# Patient Record
Sex: MALE | Race: WHITE | NOT HISPANIC OR LATINO | Employment: OTHER | URBAN - METROPOLITAN AREA
[De-identification: names, ages, dates, MRNs, and addresses within clinical notes are randomized per-mention and may not be internally consistent; named-entity substitution may affect disease eponyms.]

---

## 2017-01-11 ENCOUNTER — TELEPHONE (OUTPATIENT)
Dept: AUDIOLOGY | Facility: CLINIC | Age: 67
End: 2017-01-11

## 2017-01-25 ENCOUNTER — DOCUMENTATION ONLY (OUTPATIENT)
Dept: FAMILY MEDICINE | Facility: CLINIC | Age: 67
End: 2017-01-25

## 2017-01-25 NOTE — PROGRESS NOTES
Pre-Visit Chart Review  For Appointment Scheduled on 1/27/17    Health Maintenance Due   Topic Date Due    TETANUS VACCINE  12/16/1970    Influenza Vaccine  08/01/2016

## 2017-01-27 ENCOUNTER — HOSPITAL ENCOUNTER (OUTPATIENT)
Dept: RADIOLOGY | Facility: CLINIC | Age: 67
Discharge: HOME | End: 2017-01-27
Attending: NURSE PRACTITIONER
Payer: MEDICARE

## 2017-01-27 ENCOUNTER — OFFICE VISIT (OUTPATIENT)
Dept: FAMILY MEDICINE | Facility: CLINIC | Age: 67
End: 2017-01-27
Payer: MEDICARE

## 2017-01-27 ENCOUNTER — DOCUMENTATION ONLY (OUTPATIENT)
Dept: FAMILY MEDICINE | Facility: CLINIC | Age: 67
End: 2017-01-27

## 2017-01-27 VITALS
WEIGHT: 166.88 LBS | SYSTOLIC BLOOD PRESSURE: 150 MMHG | HEART RATE: 100 BPM | TEMPERATURE: 98 F | DIASTOLIC BLOOD PRESSURE: 80 MMHG | HEIGHT: 65 IN | BODY MASS INDEX: 27.81 KG/M2

## 2017-01-27 DIAGNOSIS — I10 ESSENTIAL HYPERTENSION: ICD-10-CM

## 2017-01-27 DIAGNOSIS — R19.7 POSTCHOLECYSTECTOMY DIARRHEA: ICD-10-CM

## 2017-01-27 DIAGNOSIS — Z96.21 COCHLEAR IMPLANT IN PLACE: ICD-10-CM

## 2017-01-27 DIAGNOSIS — M25.562 ACUTE PAIN OF LEFT KNEE: ICD-10-CM

## 2017-01-27 DIAGNOSIS — H91.90 DEAFNESS, UNSPECIFIED LATERALITY: ICD-10-CM

## 2017-01-27 DIAGNOSIS — K91.89 POSTCHOLECYSTECTOMY DIARRHEA: ICD-10-CM

## 2017-01-27 DIAGNOSIS — Z00.00 ANNUAL PHYSICAL EXAM: Primary | ICD-10-CM

## 2017-01-27 DIAGNOSIS — E78.5 HYPERLIPIDEMIA, UNSPECIFIED HYPERLIPIDEMIA TYPE: ICD-10-CM

## 2017-01-27 PROCEDURE — 99396 PREV VISIT EST AGE 40-64: CPT | Mod: S$GLB,,, | Performed by: NURSE PRACTITIONER

## 2017-01-27 PROCEDURE — 73562 X-RAY EXAM OF KNEE 3: CPT | Mod: 26,LT,S$GLB, | Performed by: RADIOLOGY

## 2017-01-27 PROCEDURE — 99999 PR PBB SHADOW E&M-EST. PATIENT-LVL IV: CPT | Mod: PBBFAC,,, | Performed by: NURSE PRACTITIONER

## 2017-01-27 PROCEDURE — 3077F SYST BP >= 140 MM HG: CPT | Mod: S$GLB,,, | Performed by: NURSE PRACTITIONER

## 2017-01-27 PROCEDURE — 73560 X-RAY EXAM OF KNEE 1 OR 2: CPT | Mod: 26,59,LT,S$GLB | Performed by: RADIOLOGY

## 2017-01-27 PROCEDURE — 99499 UNLISTED E&M SERVICE: CPT | Mod: S$GLB,,, | Performed by: NURSE PRACTITIONER

## 2017-01-27 PROCEDURE — 73560 X-RAY EXAM OF KNEE 1 OR 2: CPT | Mod: TC,59,PO,RT,LT

## 2017-01-27 PROCEDURE — 3079F DIAST BP 80-89 MM HG: CPT | Mod: S$GLB,,, | Performed by: NURSE PRACTITIONER

## 2017-01-27 RX ORDER — ATORVASTATIN CALCIUM 40 MG/1
40 TABLET, FILM COATED ORAL DAILY
Qty: 30 TABLET | Refills: 11 | Status: SHIPPED | OUTPATIENT
Start: 2017-01-27 | End: 2017-11-16

## 2017-01-27 RX ORDER — LOSARTAN POTASSIUM 50 MG/1
50 TABLET ORAL NIGHTLY
Qty: 30 TABLET | Refills: 11 | Status: SHIPPED | OUTPATIENT
Start: 2017-01-27 | End: 2017-02-23 | Stop reason: SDUPTHER

## 2017-01-27 NOTE — PROGRESS NOTES
Subjective:       Patient ID: Buddy Park is a 66 y.o. male.    Chief Complaint: Annual Exam    HPI Comments: Mr. Park presents to the clinic today for physical.  He does have multiple complaints.  He states he has a cochlear implant he had placed in 2012.  In the past five weeks or so he has had a throbbing sensation over where the implant was placed in the left ear.  He denies severe pain, drainage.  He also states he has left knee pain when he kneels down on the left knee.  No recent falls or trauma.  No fever.  He states knee is painless when walking.  This pain has been occurring for several weeks.  He stopped taking his blood pressure medication and Lipitor because he forgot.  Blood pressure is elevated today.  He admits to eating a high salt diet and not exercising routinely.  He also states he has diarrhea up to 10 times per day and sometimes has fecal incontinence.  This has been occurring since his cholecystectomy.  He has not been taking the Questran prescribed for this because he states he didn't know that was what it was for.  He has history of deafness and would like speech therapy referral for his speech impediment.  He states he goes to a speech therapist every few years to help his impediment.      Review of Systems   Constitutional: Negative for chills, fatigue and fever.   HENT: Positive for ear pain (mild) and hearing loss. Negative for congestion and sinus pressure.    Eyes: Negative for visual disturbance.   Respiratory: Negative for cough, shortness of breath and wheezing.    Cardiovascular: Negative for chest pain, palpitations and leg swelling.   Gastrointestinal: Negative for abdominal pain, constipation and diarrhea.   Musculoskeletal: Positive for arthralgias (left knee). Negative for joint swelling.   Neurological: Negative for dizziness and light-headedness.       Objective:      Physical Exam   Constitutional: He is oriented to person, place, and time. He appears well-developed  and well-nourished. No distress.   HENT:   Head: Normocephalic and atraumatic.   Right Ear: External ear normal. No tenderness. No foreign bodies. Tympanic membrane is not erythematous.   Left Ear: External ear normal. No tenderness. A foreign body is present. Tympanic membrane is not erythematous.   Ears:    Mouth/Throat: Oropharynx is clear and moist. No oropharyngeal exudate.   Eyes: Pupils are equal, round, and reactive to light. Right eye exhibits no discharge. Left eye exhibits no discharge.   Neck: Neck supple. No thyromegaly present.   Cardiovascular: Normal rate and regular rhythm.  Exam reveals no gallop and no friction rub.    No murmur heard.  Pulmonary/Chest: Effort normal and breath sounds normal. No respiratory distress. He has no wheezes. He has no rales.   Abdominal: Soft. He exhibits no distension. There is no tenderness.   Musculoskeletal:        Left knee: He exhibits normal range of motion, no swelling, no effusion, no deformity, no erythema, no LCL laxity, normal patellar mobility, no bony tenderness, normal meniscus and no MCL laxity. Tenderness found. Patellar tendon tenderness noted.   Lymphadenopathy:     He has no cervical adenopathy.   Neurological: He is alert and oriented to person, place, and time. Coordination normal.   Skin: Skin is warm and dry.   Psychiatric: He has a normal mood and affect. His behavior is normal. Thought content normal.   Vitals reviewed.          Current Outpatient Prescriptions:     betamethasone dipropionate (DIPROLENE) 0.05 % cream, Apply topically 2 (two) times daily as needed. Use very sparingly to affected area only.  Do not use for longer than 2 weeks, Disp: 45 g, Rfl: 3    cholestyramine-aspartame (QUESTRAN LIGHT) 4 gram Powd, Take one scoop a day for 3 days, then two scoops a day for three days, then three scoops a day, Disp: 231 g, Rfl: 11    epinephrine (EPIPEN) 0.3 mg/0.3 mL (1:1,000) AtIn, Inject into the muscle once., Disp: , Rfl:      atorvastatin (LIPITOR) 40 MG tablet, Take 1 tablet (40 mg total) by mouth once daily., Disp: 30 tablet, Rfl: 11    losartan (COZAAR) 50 MG tablet, Take 1 tablet (50 mg total) by mouth every evening., Disp: 30 tablet, Rfl: 11  Assessment:       1. Annual physical exam    2. Essential hypertension    3. Hyperlipidemia, unspecified hyperlipidemia type    4. Postcholecystectomy diarrhea    5. Acute pain of left knee    6. Deafness, unspecified laterality        Plan:     Annual physical exam  -     Comprehensive metabolic panel; Future; Expected date: 1/27/17  -     Lipid panel; Future; Expected date: 1/27/17    Essential hypertension  RTC four weeks  Orders:  -     losartan (COZAAR) 50 MG tablet; Take 1 tablet (50 mg total) by mouth every evening.  Dispense: 30 tablet; Refill: 11  Patient readiness: acceptance and barriers:readiness    During the course of the visit the patient was educated and counseled about the following:     Hypertension:   Dietary sodium restriction.  Regular aerobic exercise.    Goals: Hypertension: Reduce Blood Pressure    Did patient meet goals/outcomes: No    The following self management tools provided: blood pressure log    Patient Instructions (the written plan) was given to the patient/family.     Time spent with patient: 45 minutes    Hyperlipidemia, unspecified hyperlipidemia type  Resume lipitor.  -     Lipid panel; Future; Expected date: 1/27/17  -     atorvastatin (LIPITOR) 40 MG tablet; Take 1 tablet (40 mg total) by mouth once daily.  Dispense: 30 tablet; Refill: 11    Postcholecystectomy diarrhea  Education done.  Notify me if not effective.  -     cholestyramine-aspartame (QUESTRAN LIGHT) 4 gram Powd; Take one scoop a day for 3 days, then two scoops a day for three days, then three scoops a day  Dispense: 231 g; Refill: 11    Acute pain of left knee  -     Cancel: X-Ray Knee 3 View Left; Future; Expected date: 1/27/17    Deafness, unspecified laterality  -     Ambulatory Referral  to Speech Therapy    Cochlear implant in place  Patient will schedule with Dr Chiu who placed cochlear implant as soon as possible.

## 2017-01-27 NOTE — PROGRESS NOTES
Pre-Visit Chart Review  For Appointment Scheduled on 01/30/2017      Health Maintenance Due   Topic Date Due    TETANUS VACCINE  12/16/1970    Influenza Vaccine  08/01/2016

## 2017-01-27 NOTE — MR AVS SNAPSHOT
[facility]  [address]  [place] [address]  Phone: [phone]  Fax: [phone]                  Buddy Park   2017 3:40 PM   Office Visit    Description:  Male : 1950   Provider:  MIKE Mcnair   Department:  [facility]           Reason for Visit     Annual Exam           Diagnoses this Visit        Comments    Annual physical exam    -  Primary     Essential hypertension     uncontrolled, patient stoppe dmedication due to ineffectiveness.  Sent in Losartan and scheduled apt with pcp    Hyperlipidemia, unspecified hyperlipidemia type         Postcholecystectomy diarrhea         Acute pain of left knee         Deafness, unspecified laterality                To Do List           Future Appointments        Provider Department Dept Phone    2017 2:00 PM CHAD Galvan [facility] [phone]    2017 9:00 AM MIKE Mcnair [facility] [phone]      Goals (5 Years of Data)     None      Follow-Up and Disposition     Return in about 4 weeks (around 2017) for labs fasting when possible.       These Medications        Disp Refills Start End    losartan (COZAAR) 50 MG tablet 30 tablet 11 2017    Take 1 tablet (50 mg total) by mouth every evening. - Oral    Pharmacy: [facility] [address] [place] [id number] [employer] Ph #: [phone]       cholestyramine-aspartame (QUESTRAN LIGHT) 4 gram Powd 231 g 11 2017     Take one scoop a day for 3 days, then two scoops a day for three days, then three scoops a day    [facility] [place] [employer] [id number] [employer] [place] Ph #: [phone]       atorvastatin (LIPITOR) 40 MG tablet 30 tablet 11 2017     Take 1 tablet (40 mg total) by mouth once daily. - Oral    Pharmacy: [facility] [address] [place] [id number] [employer] Ph #: [phone]         [facility]     [facility]  Line - 24/7 Assistance  Registered nurses in the [facility] provide clinical advisement, health education, appointment booking, and other advisory services.  Call for this free service at [phone].             Medications           Message regarding Medications     Verify the changes and/or additions to your medication regime listed below are the same as discussed with your clinician today.  If any of these changes or additions are incorrect, please notify your healthcare provider.        CHANGE how you are taking these medications     Start Taking Instead of    losartan (COZAAR) 50 MG tablet losartan (COZAAR) 25 MG tablet    Dosage:  Take 1 tablet (50 mg total) by mouth every evening. Dosage:  Take 1 tablet (25 mg total) by mouth once daily.    Reason for Change:  Reorder            Verify that the below list of medications is an accurate representation of the medications you are currently taking.  If none reported, the list may be blank. If incorrect, please contact your healthcare provider. Carry this list with you in case of emergency.           Current Medications     betamethasone dipropionate (DIPROLENE) 0.05 % cream Apply topically 2 (two) times daily as needed. Use very sparingly to affected area only.  Do not use for longer than 2 weeks    cholestyramine-aspartame (QUESTRAN LIGHT) 4 gram Powd Take one scoop a day for 3 days, then two scoops a day for three days, then three scoops a day    epinephrine (EPIPEN) 0.3 mg/0.3 mL (1:1,000) AtIn Inject into the muscle once.    atorvastatin (LIPITOR) 40 MG tablet Take 1 tablet (40 mg total) by mouth once daily.    losartan (COZAAR) 50 MG tablet Take 1 tablet (50 mg total) by mouth every evening.           Clinical Reference Information           Vital Signs - Last Recorded  Most recent update: 1/27/2017  3:45 PM by Daisha Ospina MA    BP Pulse Temp Ht Wt BMI    (!) 160/94 (BP Location: Right arm, Patient Position: Sitting, BP Method: Automatic)  "100 98 °F (36.7 °C) (Oral) 5' 5" (1.651 m) 75.7 kg (166 lb 14.2 oz) 27.77 kg/m2      Blood Pressure          Most Recent Value    BP  (!)  160/94      Allergies as of 1/27/2017     Bee Pollens    Milk Containing Products    Metoprolol      Immunizations Administered on Date of Encounter - 1/27/2017     None      Orders Placed During Today's Visit      Normal Orders This Visit    Ambulatory Referral to Speech Therapy     Future Labs/Procedures Expected by Expires    Comprehensive metabolic panel  1/27/2017 3/28/2018    Lipid panel  1/27/2017 3/28/2018    X-Ray Knee 3 View Left  1/27/2017 1/27/2018      Instructions      Low-Salt Diet  This diet removes foods that are high in salt. It also limits the amount of salt you use when cooking. It is most often used for people with high blood pressure, edema (fluid retention), and kidney, liver, or heart disease.  Table salt contains the mineral sodium. Your body needs sodium to work normally. But too much sodium can make your health problems worse. Your healthcare provider is recommending a low-salt (also called low-sodium) diet for you. Your total daily allowance of salt is 1,500 to 2,300 milligrams (mg). It is less than 1 teaspoon of table salt. This means you can have only about 500 to 700 mg of sodium at each meal. People with certain health problems should limit salt intake to the lower end of the recommended range.    When you cook, dont add much salt. If you can cook without using salt, even better. Dont add salt to your food at the table.  When shopping, read food labels. Salt is often called sodium on the label. Choose foods that are salt-free, low salt, or very low salt. Note that foods with reduced salt may not lower your salt intake enough.    Beans, potatoes, and pasta  Ok: Dry beans, split peas, lentils, potatoes, rice, macaroni, pasta, spaghetti without added salt  Avoid: Potato chips, tortilla chips, and similar products  Breads and cereals  Ok: Low-sodium " breads, rolls, cereals, and cakes; low-salt crackers, matzo crackers  Avoid: Salted crackers, pretzels, popcorn, Malaysian toast, pancakes, muffins  Dairy  Ok: Milk, chocolate milk, hot chocolate mix, low-salt cheeses, and yogurt  Avoid: Processed cheese and cheese spreads; Roquefort, Camembert, and cottage cheese; buttermilk, instant breakfast drink  Desserts  Ok: Ice cream, frozen yogurt, juice bars, gelatin, cookies and pies, sugar, honey, jelly, hard candy  Avoid: Most pies, cakes and cookies prepared or processed with salt; instant pudding  Drinks  Ok: Tea, coffee, fizzy (carbonated) drinks, juices  Avoid: Flavored coffees, electrolyte replacement drinks, sports drinks  Meats  Ok: All fresh meat, fish, poultry, low-salt tuna, eggs, egg substitute  Avoid: Smoked, pickled, brine-cured, or salted meats and fish. This includes lam, chipped beef, corned beef, hot dogs, deli meats, ham, kosher meats, salt pork, sausage, canned tuna, salted codfish, smoked salmon, herring, sardines, or anchovies.  Seasonings and spices  Ok: Most seasonings are okay. Good substitutes for salt include: fresh herb blends, hot sauce, lemon, garlic, kapoor, vinegar, dry mustard, parsley, cilantro, horseradish, tomato paste, regular margarine, mayonnaise, unsalted butter, cream cheese, vegetable oil, cream, low-salt salad dressing and gravy.  Avoid: Regular ketchup, relishes, pickles, soy sauce, teriyaki sauce, Worcestershire sauce, BBQ sauce, tartar sauce, meat tenderizer, chili sauce, regular gravy, regular salad dressing, salted butter  Soups  Ok: Low-salt soups and broths made with allowed foods  Avoid: Bouillon cubes, soups with smoked or salted meats, regular soup and broth  Vegetables  Ok: Most vegetables are okay; also low-salt tomato and vegetable juices  Avoid: Sauerkraut and other brine-soaked vegetables; pickles and other pickled vegetables; tomato juice, olives  © 3710-5519 [employer]. [address] [place] [address],  CHAD Cruz [address]. All rights reserved. This information is not intended as a substitute for professional medical care. Always follow your healthcare professional's instructions.

## 2017-01-27 NOTE — PROGRESS NOTES
Patient, Buddy Park (MRN #[id number]), presented with a recent Ejection Fraction less than 45% consistent with the definition of cardiomyopathy (ICD10- I42.8).    EF   Date Value Ref Range Status   10/03/2013 40       The patient's cardiomyopathy was monitored, evaluated, addressed and/or treated. This addendum to the medical record is made on 01/27/2017.

## 2017-01-27 NOTE — PATIENT INSTRUCTIONS

## 2017-01-29 ENCOUNTER — TELEPHONE (OUTPATIENT)
Dept: FAMILY MEDICINE | Facility: CLINIC | Age: 67
End: 2017-01-29

## 2017-01-29 NOTE — TELEPHONE ENCOUNTER
Spoke with the patient and apologized that we were having to cancel his scheduled appointment for tomorrow due to Provider illness. I informed him that Missy'elsie BOSTON would be contacting him tomorrow to help reschedule his appointment at his earliest convenience.

## 2017-01-30 DIAGNOSIS — R74.01 ELEVATED ALT MEASUREMENT: Primary | ICD-10-CM

## 2017-02-01 ENCOUNTER — OFFICE VISIT (OUTPATIENT)
Dept: OTOLARYNGOLOGY | Facility: CLINIC | Age: 67
End: 2017-02-01
Payer: MEDICARE

## 2017-02-01 ENCOUNTER — LAB VISIT (OUTPATIENT)
Dept: LAB | Facility: HOSPITAL | Age: 67
End: 2017-02-01
Payer: MEDICARE

## 2017-02-01 VITALS — BODY MASS INDEX: 28.17 KG/M2 | WEIGHT: 169.06 LBS | HEIGHT: 65 IN

## 2017-02-01 DIAGNOSIS — Z96.21 COCHLEAR IMPLANT IN PLACE: Primary | ICD-10-CM

## 2017-02-01 DIAGNOSIS — H91.92 DEAFNESS, LEFT: ICD-10-CM

## 2017-02-01 DIAGNOSIS — Z96.21 COCHLEAR IMPLANT IN PLACE: ICD-10-CM

## 2017-02-01 DIAGNOSIS — H93.12 TINNITUS OF LEFT EAR: ICD-10-CM

## 2017-02-01 LAB
CREAT SERPL-MCNC: 1.3 MG/DL
EST. GFR  (AFRICAN AMERICAN): >60 ML/MIN/1.73 M^2
EST. GFR  (NON AFRICAN AMERICAN): 57.7 ML/MIN/1.73 M^2

## 2017-02-01 PROCEDURE — 99213 OFFICE O/P EST LOW 20 MIN: CPT | Mod: S$GLB,,, | Performed by: OTOLARYNGOLOGY

## 2017-02-01 PROCEDURE — 82565 ASSAY OF CREATININE: CPT

## 2017-02-01 PROCEDURE — 36415 COLL VENOUS BLD VENIPUNCTURE: CPT

## 2017-02-01 PROCEDURE — 92504 EAR MICROSCOPY EXAMINATION: CPT | Mod: GC,S$GLB,, | Performed by: OTOLARYNGOLOGY

## 2017-02-01 PROCEDURE — 99999 PR PBB SHADOW E&M-EST. PATIENT-LVL III: CPT | Mod: PBBFAC,,, | Performed by: OTOLARYNGOLOGY

## 2017-02-01 NOTE — PROGRESS NOTES
Otolaryngology - Head and Neck Surgery  Consultation Report      Chief Complaint: tinnitus, concern for cochlear implant on left extruding    History of Present Illness: 66 y.o. male with history of cochlear implant ~5 years ago presents due to concern for device extrusion. Patient denies any hearing complaints -- he has profound hearing loss on the left. He uses a HA on the right with good results. No otorrhea or otalgia. He does report some increase in tinnitus AS over the last several weeks. (Low freq humming)    He does not use his right CI -- he got no benefit from it.     Review of Systems - Negative except as mentioned in HPI.   History obtained from chart review and the patient  General ROS: negative  Psychological ROS: negative  Ophthalmic ROS: negative  ENT ROS: positive for - tinnitus and hearing loss  negative for - vertigo  Allergy and Immunology ROS: negative  Hematological and Lymphatic ROS: negative  Endocrine ROS: negative  Respiratory ROS: no cough, shortness of breath, or wheezing  Cardiovascular ROS: no chest pain or dyspnea on exertion  Gastrointestinal ROS: no abdominal pain, change in bowel habits, or black or bloody stools  Genito-Urinary ROS: no dysuria, trouble voiding, or hematuria  Musculoskeletal ROS: negative  Neurological ROS: no TIA or stroke symptoms  Dermatological ROS: negative    Past Medical History: he  has a past medical history of Angina pectoris; Anxiety; Biliary colic; Cochlear implant in place; Deaf; Depression; [place] (hard of hearing); Hyperlipidemia; Hypertension; Kidney stone; Kidney stones; Renal stones; Trouble in sleeping; and Wears glasses.    Past Surgical History: he  has a past surgical history that includes Ear mastoidectomy w/ cochlear implant w/ landmark; Rotator cuff repair; Tonsillectomy; Sinus surgery; Colonoscopy (1/9/2013); Cholecystectomy (2-6-2014); Fracture surgery; Foreign Body Removal (Right); and Lithotripsy.    Social History: he  reports that he has  never smoked. He has never used smokeless tobacco. He reports that he drinks alcohol. He reports that he does not use illicit drugs.    Family History: family history includes Alcohol abuse in his brother and brother; Arthritis in his father; Cancer in his brother, mother, and paternal uncle; Early death in his daughter; Gout in his father; Heart disease in his father, maternal grandmother, and paternal grandmother; Hypertension in his father; Kidney disease in his father; No Known Problems in his paternal grandfather, sister, son, and son; Stroke in his maternal grandfather. There is no history of Allergic rhinitis, Allergies, Angioedema, Asthma, Atopy, Eczema, Immunodeficiency, Rhinitis, Urticaria, or Collagen disease.    Medications:     Allergies: he is allergic to bee pollens; milk containing products; and metoprolol.    Physical Exam:  afvss    General: nad, alert, oriented, aided speech  Head: ncat  Eyes: eomi, perrl  Ears:   AS: auricle normal. Pinna normal. Well healed PA incision. EAC clear. TM intact but retracted with monomeric drum superiorly. No evidence of device extrusion. No ROGER.   AD: auricle normal. Pinna, mastoid normal. EAC clear. TM intact. No ROGER.   Nose: septum straight, no rhinorrhea or epistaxis  Mouth: adequate dentition, no lesions  Neck: soft supple  Pulmonary: normal effort  Cardiovascular: RRR    AS: The patient was brought to the minor procedure room and placed under the operating microscope. Using a combination of suction, curettes and cup forceps the patient's cerumen impaction was removed. The tympanic membrane was evaluated and was unremarkable with the above findings. The patient tolerated the procedure well. There were no complications.      Assessment: 66M s/p CI without evidence of device extrusion. +TM retraction. +tinnitus. ?? Hydrops AS    Plan:   - CT temporal bone with contrast. Will call patient with results after scan.     Low Na + diet

## 2017-02-03 ENCOUNTER — HOSPITAL ENCOUNTER (OUTPATIENT)
Dept: RADIOLOGY | Facility: HOSPITAL | Age: 67
Discharge: HOME | End: 2017-02-03
Attending: OTOLARYNGOLOGY
Payer: MEDICARE

## 2017-02-03 DIAGNOSIS — H91.92 DEAFNESS, LEFT: ICD-10-CM

## 2017-02-03 DIAGNOSIS — Z96.21 COCHLEAR IMPLANT IN PLACE: ICD-10-CM

## 2017-02-03 DIAGNOSIS — H93.12 TINNITUS OF LEFT EAR: ICD-10-CM

## 2017-02-03 PROCEDURE — 25500020 PHARM REV CODE 255

## 2017-02-03 PROCEDURE — 70482 CT ORBIT/EAR/FOSSA W/O&W/DYE: CPT | Mod: TC

## 2017-02-03 PROCEDURE — 70482 CT ORBIT/EAR/FOSSA W/O&W/DYE: CPT | Mod: 26,,, | Performed by: RADIOLOGY

## 2017-02-03 RX ADMIN — IOHEXOL 75 ML: 350 INJECTION, SOLUTION INTRAVENOUS at 04:02

## 2017-02-07 ENCOUNTER — DOCUMENTATION ONLY (OUTPATIENT)
Dept: FAMILY MEDICINE | Facility: CLINIC | Age: 67
End: 2017-02-07

## 2017-02-07 NOTE — PROGRESS NOTES
Pre-Visit Chart Review  For Appointment Scheduled on 02/09/2017      Health Maintenance Due   Topic Date Due    TETANUS VACCINE  12/16/1970    Influenza Vaccine  08/01/2016

## 2017-02-09 ENCOUNTER — OFFICE VISIT (OUTPATIENT)
Dept: FAMILY MEDICINE | Facility: CLINIC | Age: 67
End: 2017-02-09
Payer: MEDICARE

## 2017-02-09 VITALS
HEIGHT: 65 IN | SYSTOLIC BLOOD PRESSURE: 142 MMHG | HEART RATE: 89 BPM | DIASTOLIC BLOOD PRESSURE: 86 MMHG | WEIGHT: 167.31 LBS | TEMPERATURE: 98 F | BODY MASS INDEX: 27.88 KG/M2

## 2017-02-09 DIAGNOSIS — Z00.00 ENCOUNTER FOR PREVENTIVE HEALTH EXAMINATION: ICD-10-CM

## 2017-02-09 DIAGNOSIS — I77.9 CAROTID DISEASE, BILATERAL: ICD-10-CM

## 2017-02-09 DIAGNOSIS — I10 ESSENTIAL HYPERTENSION: ICD-10-CM

## 2017-02-09 DIAGNOSIS — I42.8 NONISCHEMIC CARDIOMYOPATHY: ICD-10-CM

## 2017-02-09 DIAGNOSIS — I20.9 ANGINA PECTORIS: Primary | ICD-10-CM

## 2017-02-09 DIAGNOSIS — E78.5 HYPERLIPIDEMIA, UNSPECIFIED HYPERLIPIDEMIA TYPE: ICD-10-CM

## 2017-02-09 DIAGNOSIS — I70.0 ABDOMINAL AORTIC ATHEROSCLEROSIS: ICD-10-CM

## 2017-02-09 PROCEDURE — 99499 UNLISTED E&M SERVICE: CPT | Mod: S$GLB,,, | Performed by: PHYSICIAN ASSISTANT

## 2017-02-09 PROCEDURE — 99999 PR PBB SHADOW E&M-EST. PATIENT-LVL III: CPT | Mod: PBBFAC,,, | Performed by: PHYSICIAN ASSISTANT

## 2017-02-09 PROCEDURE — 3077F SYST BP >= 140 MM HG: CPT | Mod: S$GLB,,, | Performed by: PHYSICIAN ASSISTANT

## 2017-02-09 PROCEDURE — 3079F DIAST BP 80-89 MM HG: CPT | Mod: S$GLB,,, | Performed by: PHYSICIAN ASSISTANT

## 2017-02-09 PROCEDURE — G0439 PPPS, SUBSEQ VISIT: HCPCS | Mod: S$GLB,,, | Performed by: PHYSICIAN ASSISTANT

## 2017-02-09 RX ORDER — NEOMYCIN SULFATE, POLYMYXIN B SULFATE, AND DEXAMETHASONE 3.5; 10000; 1 MG/G; [USP'U]/G; MG/G
OINTMENT OPHTHALMIC
COMMUNITY
Start: 2017-02-02 | End: 2017-08-14

## 2017-02-09 RX ORDER — NEOMYCIN SULFATE, POLYMYXIN B SULFATE AND DEXAMETHASONE 3.5; 10000; 1 MG/ML; [USP'U]/ML; MG/ML
SUSPENSION/ DROPS OPHTHALMIC 4 TIMES DAILY PRN
COMMUNITY
Start: 2017-02-02 | End: 2019-02-01 | Stop reason: ALTCHOICE

## 2017-02-09 NOTE — Clinical Note
Primary Care Providers: Buddy Chatman MD, MD (General)  Your patient was seen today for a HRA visit. Gap(s) in care (HEDIS gaps) have been identified during this visit that require additional testing and possible follow up.  No orders of the defined types were placed in this encounter.   These orders were placed using [facility] approved protocol and any results will be forwarded to your office for appropriate follow up. I have included a copy of my visit note; please review the note and feel free to contact me with any questions.   Thank you for allowing me to participate in the care of your patients. CHAD Boothe

## 2017-02-09 NOTE — PATIENT INSTRUCTIONS
Counseling and Referral of Other Preventative  (Italic type indicates deductible and co-insurance are waived)    Patient Name: Buddy Park  Today's Date: 2/9/2017      SERVICE LIMITATIONS RECOMMENDATION    Vaccines    · Pneumococcal (once after 65)    · Influenza (annually)    · Hepatitis B (if medium/high risk)    · Prevnar 13      Hepatitis B medium/high risk factors:       - End-stage renal disease       - Hemophiliacs who received Factor VII or         IX concentrates       - Clients of institutions for the mentally             retarded       - Persons who live in the same house as          a HepB carrier       - Homosexual men       - Illicit injectable drug abusers     Pneumococcal: Recommended to patient, declined     Influenza: Recommended to patient, declined     Hepatitis B: Done, repeat at next scheduled date     Prevnar 13: Recommended to patient, declined    Prostate cancer screening (annually to age 75)     Prostate specific antigen (PSA) Shared decision making with Provider. Sometimes a co-pay may be required if the patient decides to have this test. The USPSTF no longer recommends prostate cancer screening routinely in medicine: not to follow    Colorectal cancer screening (to age 75)    · Fecal occult blood test (annual)  · Flexible sigmoidoscopy (5y)  · Screening colonoscopy (10y)  · Barium enema   Last done 1/9/2013, recommend to repeat every 5  years    Diabetes self-management training (no USPSTF recommendations)  Requires referral by treating physician for patient with diabetes or renal disease. 10 hours of initial DSMT sessions of no less than 30 minutes each in a continuous 12-month period. 2 hours of follow-up DSMT in subsequent years.  N/A not indicated    Glaucoma screening (no USPSTF recommendation)  Diabetes mellitus, family history   , age 50 or over    American, age 65 or over  Scheduled, see appointments    Medical nutrition therapy for diabetes or renal  disease (no recommended schedule)  Requires referral by treating physician for patient with diabetes or renal disease or kidney transplant within the past 3 years.  Can be provided in same year as diabetes self-management training (DSMT), and CMS recommends medical nutrition therapy take place after DSMT. Up to 3 hours for initial year and 2 hours in subsequent years.  N/A not indicated    Cardiovascular screening blood tests (every 5 years)  · Fasting lipid panel  Order as a panel if possible  Done this year, repeat every year    Diabetes screening tests (at least every 3 years, Medicare covers annually or at 6-month intervals for prediabetic patients)  · Fasting blood sugar (FBS) or glucose tolerance test (GTT)  Patient must be diagnosed with one of the following:       - Hypertension       - Dyslipidemia       - Obesity (BMI 30kg/m2)       - Previous elevated impaired FBS or GTT       ... or any two of the following:       - Overweight (BMI 25 but <30)       - Family history of diabetes       - Age 65 or older       - History of gestational diabetes or birth of baby weighing more than 9 pounds  Done this year, repeat every year    Abdominal aortic aneurysm screening (once)  · Sonogram   Limited to patients who meet one of the following criteria:       - Men who are 65-75 years old and have smoked more than 100 cigarette in their lifetime       - Anyone with a family history of abdominal aortic aneurysm       - Anyone recommended for screening by the USPSTF  N/A not indicated    HIV screening (annually for increased risk patients)  · HIV-1 and HIV-2 by EIA, or RADHA, rapid antibody test or oral mucosa transudate  Patients must be at increased risk for HIV infection per USPSTF guidelines or pregnant. Tests covered annually for patient at increased risk or as requested by the patient. Pregnant patients may receive up to 3 tests during pregnancy.  Risks discussed, screening is not recommended    Smoking cessation  counseling (up to 8 sessions per year)  Patients must be asymptomatic of tobacco-related conditions to receive as a preventative service.  non-smoker    Subsequent annual wellness visit  At least 12 months since last AWV  Return in one year     The following information is provided to all patients.  This information is to help you find resources for any of the problems found today that may be affecting your health:                Living healthy guide: www.[facility].[place].[facility]      Understanding Diabetes: www.diabetes.org      Eating healthy: www.cdc.gov/healthyweight      [facility] home safety checklist: www.cdc.gov/steadi/patient.html      Agency on Aging: www.goea.[place].[facility]      Alcoholics anonymous (AA): www.aa.org      Physical Activity: www.noreen.nih.gov/il6rtag      Tobacco use: www.quitwithusla.org

## 2017-02-09 NOTE — PROGRESS NOTES
"Buddy Park presented for a  Medicare AWV and comprehensive Health Risk Assessment today. The following components were reviewed and updated:    · Medical history  · Family History  · Social history  · Allergies and Current Medications  · Health Risk Assessment  · Health Maintenance  · Care Team     ** See Completed Assessments for Annual Wellness Visit within the encounter summary.**       The following assessments were completed:  · Living Situation  · CAGE  · Depression Screening  · Timed Get Up and Go  · Whisper Test  · Cognitive Function Screening  · Nutrition Screening  · ADL Screening  · PAQ Screening    Vitals:    02/09/17 1543   BP: (!) 142/86   BP Location: Right arm   Patient Position: Sitting   BP Method: Automatic   Pulse: 89   Temp: 97.7 °F (36.5 °C)   TempSrc: Oral   Weight: 75.9 kg (167 lb 5.3 oz)   Height: 5' 5" (1.651 m)     Body mass index is 27.85 kg/(m^2).  Physical Exam   Constitutional: He is oriented to person, place, and time. He appears well-developed and well-nourished. No distress.   HENT:   Head: Normocephalic and atraumatic.   Eyes: Conjunctivae are normal. Pupils are equal, round, and reactive to light.   Neck: Normal range of motion. Neck supple. No JVD present. No thyromegaly present.   Cardiovascular: Normal rate and regular rhythm.  Exam reveals no gallop and no friction rub.    No murmur heard.  Pulmonary/Chest: Effort normal. No respiratory distress. He has no wheezes. He has no rales. He exhibits no tenderness.   Neurological: He is alert and oriented to person, place, and time.   Skin: He is not diaphoretic.               Diagnoses and health risks identified today and associated recommendations/orders:    Buddy was seen today for health risk assessment.    Diagnoses and all orders for this visit:    Angina pectoris  Comments:  STABLE, meds if needed    Abdominal aortic atherosclerosis  Comments:  stable, continue to monitor    Nonischemic cardiomyopathy  Comments:  stable, " continue to monitor    Carotid disease, bilateral  Comments:  stable, continue to monitor    Essential hypertension  Comments:  controlled, continue meds    Hyperlipidemia, unspecified hyperlipidemia type  Comments:  stable, continue meds    Encounter for preventive health examination        Provided Buddy with a 5-10 year written screening schedule and personal prevention plan. Recommendations were developed using the USPSTF age appropriate recommendations. Education, counseling, and referrals were provided as needed. After Visit Summary printed and given to patient which includes a list of additional screenings\tests needed.    No Follow-up on file.    CHAD Boothe     Patient readiness: acceptance and barriers:none    During the course of the visit the patient was educated and counseled about the following:     Hypertension:   Regular aerobic exercise.    Goals: Hypertension: Reduce Blood Pressure    Did patient meet goals/outcomes: Yes    The following self management tools provided: declined    Patient Instructions (the written plan) was given to the patient/family.     Time spent with patient: 55 minutes

## 2017-02-14 ENCOUNTER — PATIENT MESSAGE (OUTPATIENT)
Dept: OTOLARYNGOLOGY | Facility: CLINIC | Age: 67
End: 2017-02-14

## 2017-02-15 ENCOUNTER — CLINICAL SUPPORT (OUTPATIENT)
Dept: REHABILITATION | Facility: HOSPITAL | Age: 67
End: 2017-02-15
Attending: NURSE PRACTITIONER
Payer: MEDICARE

## 2017-02-15 DIAGNOSIS — H91.92 DEAFNESS, LEFT: ICD-10-CM

## 2017-02-15 DIAGNOSIS — H90.3 SENSORINEURAL HEARING LOSS (SNHL) OF BOTH EARS: ICD-10-CM

## 2017-02-15 DIAGNOSIS — Z96.21 COCHLEAR IMPLANT IN PLACE: ICD-10-CM

## 2017-02-15 DIAGNOSIS — F80.0 SPEECH ARTICULATION DISORDER: Primary | ICD-10-CM

## 2017-02-15 PROCEDURE — 92522 EVALUATE SPEECH PRODUCTION: CPT | Mod: PN

## 2017-02-15 PROCEDURE — G9186 MOTOR SPEECH GOAL STATUS: HCPCS | Mod: CI,PN

## 2017-02-15 PROCEDURE — G8999 MOTOR SPEECH CURRENT STATUS: HCPCS | Mod: CJ,PN

## 2017-02-15 NOTE — PLAN OF CARE
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 Pt has had speech therapy throughout his life to facilitate and maintain accurate speech production and speech intelligibility. His most recent speech therapy was from 2/2016 to 5/2016  Past Medical History   Diagnosis Date    Angina pectoris     Anxiety     Biliary colic     Cochlear implant in place     Deaf      LEFT EAR    Depression     Grindstone (hard of hearing)      HEARING AID - RIGHT    Hyperlipidemia     Hypertension     Kidney stone     Kidney stones     Renal stones     Trouble in sleeping     Wears glasses      Precautions:  Standard  Medications: Buddy Park has a current medication list which includes the following prescription(s): atorvastatin, betamethasone dipropionate, cholestyramine-aspartame, epinephrine, losartan, neomycin-polymyxin-dexamethasone, and neomycin-polymyxin-dexamethasone.  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  Patient Therapy Goals: Pt wants to maintain his optimal level of articulation. Since he can not hear his own speech he is concerned that his speech production skills have declined    Evaluation:  Articulation: Patient's speech was assessed via his spontaneous conversational speech sample and isolated word productions per phoneme of the English language. He exhibited  "consistent distortions of /s/, /z/, and inconsistent sh/ch, w/r substitutions. Errors were noted in consonantal and vocalic /r/ productions. However, pt's speech was 100% intelligible in spite of the noted articulation errors.  Voice: Pt's voice was judged to be WFL in regards to quality, pitch, intensity and resonance.   Fluency: Rate,  and rhythm of speech was found to be WNL.  Language: Pt presents with receptive and expressive language WNL. He did exhibit inconsistent omission of pluralization of words due to his problem articulating the /s/ and /z/ phonemes.  Rehab Potential: Good    Short Term Goals:  1. Pt will exhibit accurate production of sibilants, /s/, /z/, /sh/, /ch/ and vocalic and consonantal /r/ in speech acts that increase in length, phonetic complexity, and spontaneous nature of utterance from word productions to spontaneous connected speech.  2. Pt will produce plural forms at 98% accuracy in spontaneous speech production.  3. Pt will maintain compensatory speech strategies of slow rate through increased vowel duration, grammatical pause, and over articulation in speech acts that increase in length and spontaneous nature of utterance, progressing from word to spontaneous speech production.  4. Given auditory presentation of information pt will present 98% auditory comprehension via accurate response to "wh" questions and paraphrasing of information.      Long Term Goals   1 Pt will maintain optimal articulation of speech production.  2. Pt will utilize compensatory speech strategies to maintain speech intelligibility.  3. Pt will utilize lip reading skills to maintain accurate auditory comprehension of  the spoken word.    Certification Period: 02/20/2017 to 03/31/2017  Recommended Treatment Plan: speech therapy 1 times per week for 6 weeks:     Functional Measures of Motor Speech Production  Current:  CK  Goal:  CJ        Therapist's Name:Maria A Hummel    Therapist's Signature: " _Maria A Hummel, SERGEY, CCC-SLP_________________________________  Date: 02/15/2017      I CERTIFY THE NEED FOR THESE SERVICES FURNISHED UNDER THIS PLAN OF TREATMENT AND WHILE UNDER  CARE    Physician's comments: ________________________________________________________________________________________________________________________________________________      Physician's Name: Buddy Chatman MD

## 2017-02-15 NOTE — PROGRESS NOTES
Pt seen today for a Speech Evaluation secondary to severe sensorineural hearing loss. See treatment POC for evaluation results.

## 2017-02-23 ENCOUNTER — CLINICAL SUPPORT (OUTPATIENT)
Dept: FAMILY MEDICINE | Facility: CLINIC | Age: 67
End: 2017-02-23
Payer: MEDICARE

## 2017-02-23 VITALS — DIASTOLIC BLOOD PRESSURE: 88 MMHG | SYSTOLIC BLOOD PRESSURE: 168 MMHG

## 2017-02-23 DIAGNOSIS — I10 ESSENTIAL HYPERTENSION: ICD-10-CM

## 2017-02-23 PROCEDURE — 99999 PR PBB SHADOW E&M-EST. PATIENT-LVL II: CPT | Mod: PBBFAC,,, | Performed by: NURSE PRACTITIONER

## 2017-02-23 RX ORDER — LOSARTAN POTASSIUM 50 MG/1
50 TABLET ORAL 2 TIMES DAILY
Qty: 60 TABLET | Refills: 11 | Status: SHIPPED | OUTPATIENT
Start: 2017-02-23 | End: 2018-02-08 | Stop reason: SDUPTHER

## 2017-02-23 NOTE — MR AVS SNAPSHOT
[facility]  [address]  [place] [address]  Phone: [phone]  Fax: [phone]                  Buddy Park   2017 9:00 AM   Clinical Support    Description:  Male : 1950   Provider:  MIKE Mcnair   Department:  [facility]           Diagnoses this Visit        Comments    Essential hypertension     uncontrolled, patient stoppe dmedication due to ineffectiveness.  Sent in Losartan and scheduled apt with pcp           To Do List           Future Appointments        Provider Department Dept Phone    3/7/2017 1:00 PM Maria A Hummel [facility]-SLP [facility] [phone]    3/8/2017 3:00 PM Mildred Rosa CCC-ELZBIETA [facility] - Audiology [phone]    3/8/2017 3:45 PM MD Luis Tong [facility] - Otorhinolaryngology [phone]    3/9/2017 9:40 AM MIKE Mcnair [facility] [phone]    3/14/2017 1:00 PM Maria A Hummel CCC-SLP [facility] [phone]      Goals (5 Years of Data)     None      Follow-Up and Disposition     Return in about 2 weeks (around 3/9/2017).    Follow-up and Disposition History       These Medications        Disp Refills Start End    losartan (COZAAR) 50 MG tablet 60 tablet 11 2017    Take 1 tablet (50 mg total) by mouth 2 (two) times daily. - Oral    Pharmacy: [facility] [address] [place] [address] Ph #: [phone]         [facility]     [facility] Care Line -  Assistance  Registered nurses in the [facility] Center provide clinical advisement, health education, appointment booking, and other advisory services.  Call for this free service at [phone].             Medications           Message regarding Medications     Verify the changes and/or additions to your medication regime listed below are the same as discussed with your clinician today.  If any of these changes or additions are  incorrect, please notify your healthcare provider.        CHANGE how you are taking these medications     Start Taking Instead of    losartan (COZAAR) 50 MG tablet losartan (COZAAR) 50 MG tablet    Dosage:  Take 1 tablet (50 mg total) by mouth 2 (two) times daily. Dosage:  Take 1 tablet (50 mg total) by mouth every evening.    Reason for Change:  Reorder            Verify that the below list of medications is an accurate representation of the medications you are currently taking.  If none reported, the list may be blank. If incorrect, please contact your healthcare provider. Carry this list with you in case of emergency.           Current Medications     atorvastatin (LIPITOR) 40 MG tablet Take 1 tablet (40 mg total) by mouth once daily.    betamethasone dipropionate (DIPROLENE) 0.05 % cream Apply topically 2 (two) times daily as needed. Use very sparingly to affected area only.  Do not use for longer than 2 weeks    cholestyramine-aspartame (QUESTRAN LIGHT) 4 gram Powd Take one scoop a day for 3 days, then two scoops a day for three days, then three scoops a day    epinephrine (EPIPEN) 0.3 mg/0.3 mL (1:1,000) AtIn Inject into the muscle once.    losartan (COZAAR) 50 MG tablet Take 1 tablet (50 mg total) by mouth 2 (two) times daily.    neomycin-polymyxin-dexamethasone (DEXACINE) 3.5 mg/g-10,000 unit/g-0.1 % Oint Apply every evening    neomycin-polymyxin-dexamethasone (MAXITROL) 3.5mg/mL-10,000 unit/mL-0.1 % DrpS One drop four times a day           Clinical Reference Information           Your Vitals Were     BP                   168/88 (Patient Position: Sitting, BP Method: Manual)           Blood Pressure          Most Recent Value    BP  (!)  168/88      Allergies as of 2/23/2017     Bee Pollens    Milk Containing Products    Metoprolol      Immunizations Administered on Date of Encounter - 2/23/2017     None      Language Assistance Services     ATTENTION: Language assistance services are available, free of  charge. Please call [phone].      ATENCIÓN: Si habla español, tiene a garces disposición servicios gratuitos de asistencia lingüística. Llame al [phone].     CHÚ Ý: N?u b?n nói Ti?ng Vi?t, có các d?ch v? h? tr? ngôn ng? mi?n phí dành cho b?n. G?i s? [phone].         [facility] complies with applicable Federal civil rights laws and does not discriminate on the basis of race, color, national origin, age, disability, or sex.

## 2017-02-23 NOTE — PROGRESS NOTES
Patient here for 2 wk f/u for BP check> Automatic reading this /92. After sitting 10 minutes the patients BP reading was 168/88 manually.  Ms Sears has recommended that the patient increase Losartan 50 mg to BID and come in for a BP recheck in 2 wks. The patient was instructed and verbalized full understanding. New Rx for Losartan 50 mg Bid was sent to the pharmacy by Ms Sears. @ wk BP recheck scheduled for 3/9/17.

## 2017-03-07 ENCOUNTER — CLINICAL SUPPORT (OUTPATIENT)
Dept: REHABILITATION | Facility: HOSPITAL | Age: 67
End: 2017-03-07
Attending: NURSE PRACTITIONER
Payer: MEDICARE

## 2017-03-07 DIAGNOSIS — F80.0 SPEECH ARTICULATION DISORDER: Primary | ICD-10-CM

## 2017-03-07 DIAGNOSIS — H91.92 DEAFNESS, LEFT: ICD-10-CM

## 2017-03-07 DIAGNOSIS — Z96.21 COCHLEAR IMPLANT IN PLACE: ICD-10-CM

## 2017-03-07 PROCEDURE — 92507 TX SP LANG VOICE COMM INDIV: CPT | Mod: PN

## 2017-03-07 NOTE — PROGRESS NOTES
"TIME RECORD    Date:  03/07/2017    Start Time: 1300  Stop Time:  1400    PROCEDURES: Speech Therapy/Articulation    Total Timed Minutes:  0  Total Timed Units:  0  Total Untimed Units:  60  Charges Billed/# of units:  1      Progress/Current Status    Subjective:     Patient ID: Buddy Park is a 66 y.o. male.  Diagnosis:   1. Speech articulation disorder     2. Deafness, left     3. Cochlear implant in place         Pt notes he gets "lazy" with speech indicating that he continues to require therapy session periodically to keep his speech "tuned up" He indicates that he does daily speech exercises and he comes to sessions with a therapy exercise book that he purchased online.    Objective:     Pt will improve articulation of sibilant phonemes /s/, /z/, /sh/, /ch/  Pt will present accurate production of /sh and /ch/ given minimal pair words  Pt will express realistic expectations of his speech production in light of his hearing loss.    Assessment:   Pt was able to improve the accuracy of all phonemes targeted given cues for tactile, kinesthetic feedback, placement cues and manner and distinctive feature of targeted phonemes. Given minimal pairs for /sh/ and /ch/ pt was able to produce the phoneme distinctions accurately at 90% success given mo cue. NOted errors in st clusters medially and at end of words in pt.'s conversational speech. Targeted words with this cluster in isolated word production, pt achieved accurate production at 80% success. Pt does not express realistic expectations for his speech production. He expresses motivation for perfect speech and speaks in negative connotations regarding his speech and himself as a speaker.    Patient Education/Response:     Pt educated in phoneme manner and placement and voicing. Pt educated in cognitive therapy in regards to his acceptance of his deficits and realistic expectations of his speech production. Pt does not accept the mild distortions in his spontaneous " speech.    Plans and Goals:     Continue POC to improve the accuracy of phoneme production; address cognitive aspects of his speech impairment

## 2017-03-08 ENCOUNTER — CLINICAL SUPPORT (OUTPATIENT)
Dept: AUDIOLOGY | Facility: CLINIC | Age: 67
End: 2017-03-08
Payer: MEDICARE

## 2017-03-08 ENCOUNTER — OFFICE VISIT (OUTPATIENT)
Dept: OTOLARYNGOLOGY | Facility: CLINIC | Age: 67
End: 2017-03-08
Payer: MEDICARE

## 2017-03-08 VITALS — WEIGHT: 162.94 LBS | BODY MASS INDEX: 27.11 KG/M2

## 2017-03-08 DIAGNOSIS — H81.392 PERIPHERAL VERTIGO, LEFT: Primary | ICD-10-CM

## 2017-03-08 DIAGNOSIS — G43.109 VERTIGINOUS MIGRAINE: Primary | ICD-10-CM

## 2017-03-08 PROCEDURE — 99215 OFFICE O/P EST HI 40 MIN: CPT | Mod: S$GLB,,, | Performed by: OTOLARYNGOLOGY

## 2017-03-08 PROCEDURE — 92537 CALORIC VSTBLR TEST W/REC: CPT | Mod: S$GLB,,, | Performed by: AUDIOLOGIST-HEARING AID FITTER

## 2017-03-08 PROCEDURE — 99999 PR PBB SHADOW E&M-EST. PATIENT-LVL III: CPT | Mod: PBBFAC,,, | Performed by: OTOLARYNGOLOGY

## 2017-03-08 PROCEDURE — 92540 BASIC VESTIBULAR EVALUATION: CPT | Mod: S$GLB,,, | Performed by: AUDIOLOGIST-HEARING AID FITTER

## 2017-03-08 PROCEDURE — 1160F RVW MEDS BY RX/DR IN RCRD: CPT | Mod: S$GLB,,, | Performed by: OTOLARYNGOLOGY

## 2017-03-08 NOTE — LETTER
March 8, 2017      Buddy Chatman MD  [address] [facility] [address]           [facility] - Otorhinolaryngology  [address]  [facility] [address]  Phone: [phone]  Fax: [phone]          Patient: Buddy Park   MR Number: [id number]   YOB: 1950   Date of Visit: 3/8/2017       Dear Dr. Buddy Chatman:    Thank you for referring Buddy Park to me for evaluation. Attached you will find relevant portions of my assessment and plan of care.    If you have questions, please do not hesitate to call me. I look forward to following Buddy Park along with you.    Sincerely,    Fortino Pedraza MD    Enclosure  CC:  No Recipients    If you would like to receive this communication electronically, please contact [email or web address] or [phone] to request more information on [facility] Link access.    For providers and/or their staff who would like to refer a patient to [facility], please contact us through our one-stop-shop provider referral line, [facility], at [phone].    If you feel you have received this communication in error or would no longer like to receive these types of communications, please e-mail [email or web address]

## 2017-03-08 NOTE — PROGRESS NOTES
VNG/Postuography Evaluation    Referring physician:  Dr. Pedraza    66 y.o. male complains of dizziness, lightheadedness, imbalance, nausea and minor headaches.  Symptoms occur spontaneously and have been recurring over the past several months (around [place]).    Gaze nystagmus was absent.    Oculomotor function was normal.    Spontaneous nystagmus was absent.    The head-hanging left Hallpike was negative.    The head-hanging right Hallpike was negative.    Static positional nystagmus was absent.    Unilateral weakness: 64% (left)  Directional preponderance 20% (right beating)    RC: 11 d/s   d/s  RW: 30 d/s  LW: 9 d/s    Fixation suppression was normal.    Impression: Left peripheral vestibular abnormality.    Recommendations: Trial period with [place] exercises

## 2017-03-08 NOTE — PROGRESS NOTES
Subjective:       Patient ID: Buddy Park is a 66 y.o. male.    Chief Complaint: vng results    HPI:Mr. Park presents today for evaluation of his dizziness syndrome.  For the   past few months, he has had brief bursts of subjective feeling of dizziness,   which he localizes to his left hemicranium.  These can happen a number of times   a week or not at all for a number of weeks.  They generally tend to last less   than 30 seconds and are unassociated with any otologic symptoms.  He does have a   remote history of headache and he cannot specifically identify any triggering   factors.  He does have a left-sided cochlear implant, which he has had for five   years and uses daily as well as a right-sided hearing aid.  He does ingest a   fair amount of caffeinated beverages as well chocolate and other migraine type   triggers.      TBM/HN  dd: 03/08/2017 16:48:28 (CST)  td: 03/09/2017 10:37:36 (CST)  Doc ID   #[id number]  Job ID #[id number]    CC:       Past Medical History: Patient has a past medical history of Angina pectoris; Anxiety; Biliary colic; Cochlear implant in place; Deaf; Depression; [place] (hard of hearing); Hyperlipidemia; Hypertension; Kidney stone; Kidney stones; Renal stones; Trouble in sleeping; and Wears glasses.    Past Surgical History: Patient has a past surgical history that includes Ear mastoidectomy w/ cochlear implant w/ landmark; Rotator cuff repair; Tonsillectomy; Sinus surgery; Colonoscopy (1/9/2013); Cholecystectomy (2-6-2014); Fracture surgery; Foreign Body Removal (Right); and Lithotripsy.    Social History: Patient reports that he has never smoked. He has never used smokeless tobacco. He reports that he drinks alcohol. He reports that he does not use illicit drugs.    Family History: family history includes Alcohol abuse in his brother and brother; Arthritis in his father; Cancer in his brother, mother, and paternal uncle; Early death in his daughter; Gout in his father; Heart disease in his  father, maternal grandmother, and paternal grandmother; Hypertension in his father; Kidney disease in his father; No Known Problems in his paternal grandfather, sister, son, and son; Stroke in his maternal grandfather. There is no history of Allergic rhinitis, Allergies, Angioedema, Asthma, Atopy, Eczema, Immunodeficiency, Rhinitis, Urticaria, or Collagen disease.    Medications:   Current Outpatient Prescriptions   Medication Sig    atorvastatin (LIPITOR) 40 MG tablet Take 1 tablet (40 mg total) by mouth once daily.    betamethasone dipropionate (DIPROLENE) 0.05 % cream Apply topically 2 (two) times daily as needed. Use very sparingly to affected area only.  Do not use for longer than 2 weeks    cholestyramine-aspartame (QUESTRAN LIGHT) 4 gram Powd Take one scoop a day for 3 days, then two scoops a day for three days, then three scoops a day    epinephrine (EPIPEN) 0.3 mg/0.3 mL (1:1,000) AtIn Inject into the muscle once.    losartan (COZAAR) 50 MG tablet Take 1 tablet (50 mg total) by mouth 2 (two) times daily.    neomycin-polymyxin-dexamethasone (DEXACINE) 3.5 mg/g-10,000 unit/g-0.1 % Oint Apply every evening    neomycin-polymyxin-dexamethasone (MAXITROL) 3.5mg/mL-10,000 unit/mL-0.1 % DrpS One drop four times a day     No current facility-administered medications for this visit.        Allergies: Patient is allergic to bee pollens; milk containing products; and metoprolol.    Review of Systems   Constitutional: Negative.    HENT: Negative for ear discharge, ear pain, hearing loss and tinnitus.    Neurological: Positive for dizziness. Negative for seizures, syncope, facial asymmetry, speech difficulty, weakness, light-headedness and headaches.       Objective:      Physical Exam   Constitutional: He appears well-developed and well-nourished. No distress.   HENT:   Head: Normocephalic and atraumatic. Head abrasion: Vng with L RVR.   Right Ear: No lacerations. No drainage, swelling or tenderness. No foreign  bodies. No mastoid tenderness. Tympanic membrane is not injected, not scarred, not perforated, not erythematous, not retracted and not bulging. Tympanic membrane mobility is normal. No middle ear effusion. No hemotympanum. No decreased hearing is noted.   Left Ear: No lacerations. No drainage, swelling or tenderness. No foreign bodies. No mastoid tenderness. Tympanic membrane is not injected, not scarred, not perforated, not erythematous, not retracted and not bulging. Tympanic membrane mobility is normal.  No middle ear effusion. No hemotympanum. No decreased hearing is noted.   Mouth/Throat: Oropharynx is clear and moist. No oropharyngeal exudate.       CN II-XII     EOM's nl    Cerebellar nl.    VNG with L RVR       Eyes: Pupils are equal, round, and reactive to light. Right eye exhibits normal extraocular motion and no nystagmus. Left eye exhibits normal extraocular motion and no nystagmus.   Neck: Normal range of motion.   Neurological: He is alert. He has normal strength. He displays no atrophy and no tremor. No cranial nerve deficit or sensory deficit. He exhibits normal muscle tone. He displays a negative Romberg sign. He displays no seizure activity. Coordination and gait normal.   Skin: He is not diaphoretic.       Assessment:       1. Vertiginous migraine vs ?? Hydrops AS       Plan:         Migraine elim diet trial    F/U prn

## 2017-03-14 ENCOUNTER — CLINICAL SUPPORT (OUTPATIENT)
Dept: REHABILITATION | Facility: HOSPITAL | Age: 67
End: 2017-03-14
Attending: NURSE PRACTITIONER
Payer: MEDICARE

## 2017-03-14 DIAGNOSIS — H91.92 DEAFNESS, LEFT: ICD-10-CM

## 2017-03-14 DIAGNOSIS — F80.0 SPEECH ARTICULATION DISORDER: ICD-10-CM

## 2017-03-14 DIAGNOSIS — H90.3 SENSORINEURAL HEARING LOSS (SNHL) OF BOTH EARS: Primary | ICD-10-CM

## 2017-03-14 DIAGNOSIS — Z96.21 COCHLEAR IMPLANT IN PLACE: ICD-10-CM

## 2017-03-14 PROCEDURE — 92507 TX SP LANG VOICE COMM INDIV: CPT | Mod: PN

## 2017-03-14 NOTE — PROGRESS NOTES
TIME RECORD    Date:  03/14/2017    Start Time:  1300  Stop Time:  1400    PROCEDURES: Speech Therapy    Total Timed Minutes:  0  Total Timed Units: 0  Total Untimed Units: 60  Charges Billed/# of units:  1      Progress/Current Status    Subjective:     Patient ID: Buddy Park is a 66 y.o. male.  Diagnosis:   1. Sensorineural hearing loss (SNHL) of both ears     2. Cochlear implant in place     3. Speech articulation disorder     4. Deafness, left       Pain: 0 /10  Pt noted that he had a  Busy and difficult weekend since his father-in law had a stroke, hence implied no time to practice speech exercises    Objective:     Pt. Will accurately produce all phonemes in connected speech  Pt will exhibit accurate articulation for distinctive features of /s/ /sh/ /ch/ /j/ , given minimal pairs  Pt will accurately produce trained minimal pair /sh/ and /ch/ in sentence formulation and repetition tasks.   Accurate production of /st/ cluster in medial and initial placement of words.    Assessment:     Pt presents a mild distortion of /s/ phoneme; He generally produces a slight distortion of /s/ with more palatalization; instructed in modification of placement and manner for more accurate /s/ which he was able to achieve approximately 20% if trials. In connected speech he did not exhibit other distortions or errors, but some over-articulation with increased pausing. He produced minimal pairs /sh-ch/ at 80% success; he produced minimal pair of /j-ch/ for voicing distinction at 100% success; he produced st cluster in final position of words at 80% success and medial at 75% success. His primary error overall is distortion of /sh/    Patient Education/Response:     Pt educated in distinctive features of sounds and minimal pair concept    Plans and Goals:     Continue POC to instruct pt in fine distinction of phoneme productions and stress and inflection.

## 2017-03-21 ENCOUNTER — CLINICAL SUPPORT (OUTPATIENT)
Dept: REHABILITATION | Facility: HOSPITAL | Age: 67
End: 2017-03-21
Attending: NURSE PRACTITIONER
Payer: MEDICARE

## 2017-03-21 DIAGNOSIS — H90.3 SENSORINEURAL HEARING LOSS (SNHL) OF BOTH EARS: Primary | ICD-10-CM

## 2017-03-21 DIAGNOSIS — H91.92 DEAFNESS, LEFT: ICD-10-CM

## 2017-03-21 DIAGNOSIS — Z96.21 COCHLEAR IMPLANT IN PLACE: ICD-10-CM

## 2017-03-21 PROCEDURE — 92507 TX SP LANG VOICE COMM INDIV: CPT | Mod: PN

## 2017-03-21 NOTE — PROGRESS NOTES
"TIME RECORD    Date:  03/21/2017    Start Time: 1300  Stop Time:  1400    PROCEDURES: Speech Therapy: Articulation      Total Timed Minutes: 0  Total Timed Units: 0  Total Untimed Units: 60  Charges Billed/# of units:01      Progress/Current Status    Subjective:     Patient ID: Buddy Park is a 66 y.o. male.  Diagnosis:   1. Sensorineural hearing loss (SNHL) of both ears     2. Deafness, left     3. Cochlear implant in place         Pt stated that a friend pulled him aside at a social event and expressed to him that he was talking too slow and should "pull it back a bit."  Pt reported excessive practice this past week.    Objective:     Pt will actual production for minimal pairs for the following phonemes: /sh-ch/, /sh-s/ in word productions.  Pt will accurately produce  s blends and s in iniital medial and final word positions in isolated word productions.  Pt will express understanding of appropriate compensatory strategies and techniques to slow rate, i.e. Stretch on vowel and grammatical pause.    Assessment:     Pt presented an increase in distortion of his /s/ production caused by over emphasis of the sound and creating either too much stop of air flow or too much air flow as an /sh/ Given direction he was able to improve /s/ production about 30% of the time. He was able to achieve distinctions for minimal pairs sh-ch, and sh-s . Given a barrier activity in which clinician did not know the choice of word pt said from minimal pair list, he achieved 200% intelligibility. Pt cautioned about over exercising, and practicing an error since he did not have auditory feedback to  his sound production. Implementation of video production of mouth postures for /sh/ and /s/ cluster words facilitated pt.'s accuracy and preciseness of sound production.     Patient Education/Response:   Pt educated on mouth postures and tongue placement for phonemes. He was given web site to use for pictures of mouth and tongue " postures and videos of mouth postures.    Plans and Goals:     Continue POC with emphasis on compensatory strategies and possible spectogram apps for /s/ production in order to give pt another feedback mode to use for judging the accuracy of his sound production.

## 2017-03-28 ENCOUNTER — CLINICAL SUPPORT (OUTPATIENT)
Dept: REHABILITATION | Facility: HOSPITAL | Age: 67
End: 2017-03-28
Attending: NURSE PRACTITIONER
Payer: MEDICARE

## 2017-03-28 DIAGNOSIS — H91.92 DEAFNESS, LEFT: ICD-10-CM

## 2017-03-28 DIAGNOSIS — Z96.21 COCHLEAR IMPLANT IN PLACE: ICD-10-CM

## 2017-03-28 DIAGNOSIS — H90.3 SENSORINEURAL HEARING LOSS (SNHL) OF BOTH EARS: Primary | ICD-10-CM

## 2017-03-28 PROCEDURE — 92507 TX SP LANG VOICE COMM INDIV: CPT | Mod: PN

## 2017-03-28 NOTE — PROGRESS NOTES
TIME RECORD    Date:  03/28/2017    Start [id number]  Stop Time:1400    PROCEDURES: Speech Therapy      Total Timed Minutes:0  Total Timed Units:  0  Total Untimed Units:  60  Charges Billed/# of units: 01      Progress/Current Status    Subjective:     Patient ID: Buddy Park is a 66 y.o. male.  Diagnosis:   1. Sensorineural hearing loss (SNHL) of both ears     2. Deafness, left     3. Cochlear implant in place       Pt related deaths of friends at onset of session to indicate why he didn't get much practice time done since last session.    Objective:     Pt will accurately produce sibilant phonemes    Assessment:     Review of /sh/, /ch/ /j/ /s/ minimal pairs, produced /sh/, /ch/, /j/ successfully. /s/ produced with too much pressure on palate and too much palatal placement. He was able to improve with instruction in shift of placement and video of mouth posture. Phonemic rule of plural addressed. Pt given written guide for all phonemes to determine /s/ or /z/ or /es/ production. He generally produced an /s/ for /z/ and created a separate sibilant sound with shwa on end due to overemphasis. Given instruction in the modifications needed pt improved production approximately 30% of trials.     Patient Education/Response:     Phonemic rules for distinctive pairs    Plans and Goals:     Continue POC to facilitate optimal articulatory  production

## 2017-04-04 ENCOUNTER — TELEPHONE (OUTPATIENT)
Dept: FAMILY MEDICINE | Facility: CLINIC | Age: 67
End: 2017-04-04

## 2017-04-04 NOTE — TELEPHONE ENCOUNTER
Letter received from patient :     Dear Dr Chatman    Need help on a matter. Approx Oct 10, 2016, I lost my hearing aid. Since under warranty, I received a new hearing aid on Oct 17, 2016. And had to pay a $350.00 deductible. In Jan 2017 I found out that my insurance would have paid that deductible. they are now telling me that I need an OK or letter authoring that I do indeed need this hearing aid, in order to get the reimbursement of the $350.00. Your help on this matter is greatly appreciated.   Sincerely yours   Buddy ENRIQUEZ The claim # is # [id number]  The fax to the claims office is [phone]

## 2017-04-04 NOTE — TELEPHONE ENCOUNTER
He has a report dated 3/8/17 from an office visit with Dr. Pedraza in [facility] saying he has normal hearing in both ears.  I think he is going to need to get a letter from ent.

## 2017-04-05 ENCOUNTER — PATIENT MESSAGE (OUTPATIENT)
Dept: OTOLARYNGOLOGY | Facility: CLINIC | Age: 67
End: 2017-04-05

## 2017-08-04 ENCOUNTER — DOCUMENTATION ONLY (OUTPATIENT)
Dept: FAMILY MEDICINE | Facility: CLINIC | Age: 67
End: 2017-08-04

## 2017-08-04 NOTE — PROGRESS NOTES
Pre-Visit Chart Review  For Appointment Scheduled on 8-14-17    Health Maintenance Due   Topic Date Due    Influenza Vaccine  08/01/2017

## 2017-08-14 ENCOUNTER — OFFICE VISIT (OUTPATIENT)
Dept: FAMILY MEDICINE | Facility: CLINIC | Age: 67
End: 2017-08-14
Payer: MEDICARE

## 2017-08-14 VITALS
WEIGHT: 167.56 LBS | SYSTOLIC BLOOD PRESSURE: 128 MMHG | TEMPERATURE: 98 F | HEART RATE: 83 BPM | RESPIRATION RATE: 16 BRPM | BODY MASS INDEX: 27.92 KG/M2 | OXYGEN SATURATION: 96 % | HEIGHT: 65 IN | DIASTOLIC BLOOD PRESSURE: 84 MMHG

## 2017-08-14 DIAGNOSIS — I10 GOOD HYPERTENSION CONTROL: ICD-10-CM

## 2017-08-14 DIAGNOSIS — Z96.21 COCHLEAR IMPLANT IN PLACE: ICD-10-CM

## 2017-08-14 DIAGNOSIS — H26.9 CATARACT OF BOTH EYES, UNSPECIFIED CATARACT TYPE: Primary | ICD-10-CM

## 2017-08-14 DIAGNOSIS — E78.5 HYPERLIPIDEMIA, UNSPECIFIED HYPERLIPIDEMIA TYPE: ICD-10-CM

## 2017-08-14 DIAGNOSIS — Z01.818 PREOP EXAMINATION: ICD-10-CM

## 2017-08-14 DIAGNOSIS — F41.9 ANXIETY: ICD-10-CM

## 2017-08-14 PROCEDURE — 3079F DIAST BP 80-89 MM HG: CPT | Mod: S$GLB,,, | Performed by: FAMILY MEDICINE

## 2017-08-14 PROCEDURE — 3008F BODY MASS INDEX DOCD: CPT | Mod: S$GLB,,, | Performed by: FAMILY MEDICINE

## 2017-08-14 PROCEDURE — 99999 PR PBB SHADOW E&M-EST. PATIENT-LVL IV: CPT | Mod: PBBFAC,,, | Performed by: FAMILY MEDICINE

## 2017-08-14 PROCEDURE — 3074F SYST BP LT 130 MM HG: CPT | Mod: S$GLB,,, | Performed by: FAMILY MEDICINE

## 2017-08-14 PROCEDURE — 99499 UNLISTED E&M SERVICE: CPT | Mod: S$GLB,,, | Performed by: FAMILY MEDICINE

## 2017-08-14 PROCEDURE — 99214 OFFICE O/P EST MOD 30 MIN: CPT | Mod: S$GLB,,, | Performed by: FAMILY MEDICINE

## 2017-08-14 NOTE — PATIENT INSTRUCTIONS
Controlling High Blood Pressure  High blood pressure (hypertension) is often called the silent killer. This is because many people who have it dont know it. High blood pressure is defined as 140/90 mm Hg or higher. Know your blood pressure and remember to check it regularly. Doing so can save your life. Here are some things you can do to help control your blood pressure.    Choose heart-healthy foods  · Select low-salt, low-fat foods. Limit sodium intake to 2,400 mg per day or the amount suggested by your healthcare provider.  · Limit canned, dried, cured, packaged, and fast foods. These can contain a lot of salt.  · Eat 8 to 10 servings of fruits and vegetables every day.  · Choose lean meats, fish, or chicken.  · Eat whole-grain pasta, brown rice, and beans.  · Eat 2 to 3 servings of low-fat or fat-free dairy products.  · Ask your doctor about the DASH eating plan. This plan helps reduce blood pressure.  · When you go to a restaurant, ask that your meal be prepared with no added salt.  Maintain a healthy weight  · Ask your healthcare provider how many calories to eat a day. Then stick to that number.  · Ask your healthcare provider what weight range is healthiest for you. If you are overweight, a weight loss of only 3% to 5% of your body weight can help lower blood pressure. Generally, a good weight loss goal is to lose 10% of your body weight in a year.  · Limit snacks and sweets.  · Get regular exercise.  Get up and get active  · Choose activities you enjoy. Find ones you can do with friends or family. This includes bicycling, dancing, walking, and jogging.  · Park farther away from building entrances.  · Use stairs instead of the elevator.  · When you can, walk or bike instead of driving.  · [place] leaves, garden, or do household repairs.  · Be active at a moderate to vigorous level of physical activity for at least 40 minutes for a minimum of 3 to 4 days a week.   Manage stress  · Make time to relax and enjoy  life. Find time to laugh.  · Communicate your concerns with your loved ones and your healthcare provider.  · Visit with family and friends, and keep up with hobbies.  Limit alcohol and quit smoking  · Men should have no more than 2 drinks per day.  · Women should have no more than 1 drink per day.  · Talk with your healthcare provider about quitting smoking. Smoking significantly increases your risk for heart disease and stroke. Ask your healthcare provider about community smoking cessation programs and other options.  Medicines  If lifestyle changes arent enough, your healthcare provider may prescribe high blood pressure medicine. Take all medicines as prescribed. If you have any questions about your medicines, ask your healthcare provider before stopping or changing them.   Date Last Reviewed: 4/27/2016  © 6645-4092 [employer]. [address] [place] [address], [place] [address]. All rights reserved. This information is not intended as a substitute for professional medical care. Always follow your healthcare professional's instructions.

## 2017-08-14 NOTE — PROGRESS NOTES
Subjective:       Patient ID: Buddy Park is a 66 y.o. male.    Chief Complaint: Pre-op Exam (cataracts)    The following is a Dragon Naturally-Speaking voice-recognition dictation.  It frequently converts normal text into incorrect phrases for which the author apologizes.      66-year-old male coming in to have preoperative clearance for bilateral cataract surgery to be done by Dr. Lambert.  The right eye is to be done on August 28 in the left eye to follow on September 14.  The patient reports that he began developing problems over a relatively short period of time at first involving glare blindness at night and then progressing to eye strain with associated fatigue.  He reports he is feeling nervous and jumpy and has difficulty sleeping as he cannot get comfortable.  His wife reports he tosses and turns.  While awake he finds he is very restless and can't sit still and associates this with his father who had similar symptoms when diagnosed with Parkinson's.  The patient reports he had a normal eye exam in January and when I last saw him approximately 15 months ago I did not record any cataracts being present.  He is going to be getting the adjustable lenses so that he won't have to wear reading glasses since his cochlear implant makes it very difficult to wear glasses.  His insurance has declined to cover the cost of the lenses as they consider it to be a cosmetic issue.    Past Medical History:  No date: Angina pectoris  No date: Anxiety  No date: Biliary colic  No date: Cataract  No date: Cochlear implant in place  No date: Deaf      Comment: LEFT EAR  No date: Depression  No date: [place] (hard of hearing)      Comment: HEARING AID - RIGHT  No date: Hyperlipidemia  No date: Hypertension  No date: Kidney stone  No date: Kidney stones  No date: Renal stones  No date: Trouble in sleeping  No date: Wears glasses    Past Surgical History:  2-6-2014: CHOLECYSTECTOMY  1/9/2013: COLONOSCOPY      Comment: Dr. Gillette,  5 year recheck (family history                colon cancer)  No date: EAR MASTOIDECTOMY W/ COCHLEAR IMPLANT W/ LANDM*      Comment: left ear  No date: FOREIGN BODY REMOVAL Right      Comment: glass removed from right foot/repair  No date: FRACTURE SURGERY      Comment: right arm x2  No date: LITHOTRIPSY  No date: ROTATOR CUFF REPAIR      Comment: Right   No date: SINUS SURGERY  No date: TONSILLECTOMY    Review of patient's family history indicates:    Cancer                         Mother                      Comment: colon    Heart disease                  Father                    Gout                           Father                    Kidney disease                 Father                    Arthritis                      Father                    Hypertension                   Father                    Early death                    Daughter                    Comment: mva    Alcohol abuse                  Brother                   Cancer                         Brother                     Comment: mouth cancer w mets    No Known Problems              Sister                    Alcohol abuse                  Brother                   No Known Problems              Son                       Heart disease                  Maternal Grandmother        Comment: MI    Stroke                         Maternal Grandfather      Heart disease                  Paternal Grandmother        Comment: MI    No Known Problems              Paternal Grandfather      No Known Problems              Son                       Cancer                         Paternal Uncle              Comment: lung cancer    Allergic rhinitis              Neg Hx                    Allergies                      Neg Hx                    Angioedema                     Neg Hx                    Asthma                         Neg Hx                    Atopy                          Neg Hx                    Eczema                         Neg Hx                     Immunodeficiency               Neg Hx                    Rhinitis                       Neg Hx                    Urticaria                      Neg Hx                    Collagen disease               Neg Hx                    Social History    Marital status:              Spouse name:                       Years of education:                 Number of children:               Social History Main Topics    Smoking status: Never Smoker                                                                Smokeless tobacco: Never Used                        Alcohol use: Yes                Comment: once every two months    Drug use: No              Sexual activity: Yes                      Current Outpatient Prescriptions:     atorvastatin (LIPITOR) 40 MG tablet, Take 1 tablet (40 mg total) by mouth once daily. (Patient taking differently: Take 40 mg by mouth daily as needed. ), Disp: 30 tablet, Rfl: 11    betamethasone dipropionate (DIPROLENE) 0.05 % cream, Apply topically 2 (two) times daily as needed. Use very sparingly to affected area only.  Do not use for longer than 2 weeks, Disp: 45 g, Rfl: 3    cholestyramine-aspartame (QUESTRAN LIGHT) 4 gram Powd, Take one scoop a day for 3 days, then two scoops a day for three days, then three scoops a day (Patient taking differently: every other day. Take one scoop a day for 3 days, then two scoops a day for three days, then three scoops a day), Disp: 231 g, Rfl: 11    epinephrine (EPIPEN) 0.3 mg/0.3 mL (1:1,000) AtIn, Inject into the muscle once., Disp: , Rfl:     losartan (COZAAR) 50 MG tablet, Take 1 tablet (50 mg total) by mouth 2 (two) times daily., Disp: 60 tablet, Rfl: 11    neomycin-polymyxin-dexamethasone (MAXITROL) 3.5mg/mL-10,000 unit/mL-0.1 % DrpS, One drop four times a day, Disp: , Rfl:     Zoster Vaccine due on 12/16/2012  Influenza Vaccine due on 08/01/2017-not yet due, should get this around the end of October or first of November        Review  of Systems   Constitutional: Positive for activity change (very restless) and fatigue. Negative for chills, diaphoresis and fever.   HENT: Negative for congestion, rhinorrhea and sinus pressure.    Eyes: Positive for photophobia and visual disturbance. Negative for pain, discharge, redness and itching.   Respiratory: Negative for chest tightness and shortness of breath.    Cardiovascular: Negative for chest pain and palpitations.   Gastrointestinal: Negative for constipation, diarrhea, nausea and vomiting.   Endocrine: Negative for cold intolerance, heat intolerance, polydipsia and polyuria.   Genitourinary: Negative for dysuria, frequency and hematuria.   Musculoskeletal: Negative for arthralgias and joint swelling.   Skin: Negative for color change and rash.        He does feel he sweats excessively   Neurological: Negative for dizziness, light-headedness and headaches.   Psychiatric/Behavioral: Positive for sleep disturbance. Negative for decreased concentration and dysphoric mood. The patient is nervous/anxious.        Objective:      Physical Exam   Constitutional: He is oriented to person, place, and time. He appears well-developed. No distress.   --------------------            08/14/17               1736     --------------------   BP:       128/84     Pulse:               Resp:                Temp:               --------------------      Good blood pressure control  Slightly overweight with BMI 27.9.  He is up 2.6 pounds from his last visit with me April 28, 2016   HENT:   Head: Normocephalic and atraumatic.   Right Ear: External ear normal.   Left Ear: External ear normal.   Nose: Nose normal.   Mouth/Throat: Oropharynx is clear and moist. No oropharyngeal exudate.   Eyes: EOM are normal. Pupils are equal, round, and reactive to light. No scleral icterus.   Bilateral cataracts, the right appears more dense but is slightly off center.  He reports the right as being his better eye.  No  proptosis is noted and no lid lag is noted   Neck: Normal range of motion. Neck supple. No JVD present. No tracheal deviation present. No thyromegaly present.   Cardiovascular: Normal rate, regular rhythm and normal heart sounds.  Exam reveals no gallop and no friction rub.    No murmur heard.  No tachycardia.  Carotid pulses 2+ no bruit   Pulmonary/Chest: Effort normal and breath sounds normal. No respiratory distress. He has no wheezes. He has no rales.   Abdominal: Soft. Bowel sounds are normal. He exhibits no distension and no mass. There is no tenderness. There is no rebound and no guarding. No hernia.   Lymphadenopathy:     He has no cervical adenopathy.   Neurological: He is alert and oriented to person, place, and time. He has normal strength. He displays abnormal reflex. He displays no tremor. No cranial nerve deficit or sensory deficit. He exhibits normal muscle tone. GCS eye subscore is 4. GCS verbal subscore is 5. GCS motor subscore is 6.   Reflex Scores:       Tricep reflexes are 3+ on the right side and 3+ on the left side.       Bicep reflexes are 3+ on the right side and 3+ on the left side.       Brachioradialis reflexes are 3+ on the right side and 3+ on the left side.       Patellar reflexes are 3+ on the right side and 3+ on the left side.  He is very restless and constantly moving, reflexes are slightly hyperreflexic.  No lid lag noted.   Skin: Skin is warm. No rash noted. He is not diaphoretic. No erythema. No pallor.   No onycholysis.  Skin somewhat excessively moist   Psychiatric: Judgment and thought content normal. His mood appears anxious. His speech is rapid and/or pressured and slurred (Due to his hearing loss he normally has a somewhat slurred speech and it is no different from his usual). He is hyperactive. He is not agitated and not aggressive. Cognition and memory are normal. He is attentive.   Nursing note and vitals reviewed.      Assessment:       1. Cataract of both eyes,  unspecified cataract type    2. Preop examination    3. Anxiety    4. Cochlear implant in place    5. Good hypertension control    6. Hyperlipidemia, unspecified hyperlipidemia type        Plan:       1. Cataract of both eyes, unspecified cataract type  Right side worse than left.  Appears to have had relatively rapid progression    2. Preop examination  He is cleared for cataract surgery.  I am somewhat concerned that he may be slightly hyperthyroid with his symptoms and findings but there is no sign of Graves' ophthalmopathy at this point.  We'll check a TSH level with some basic labs but it should not delay surgery  - Basic metabolic panel; Future  - CBC auto differential; Future    3. Anxiety  - TSH; Future    4. Cochlear implant in place  Makes it difficult to wear glasses.  Will write a letter for his insurance company seeking an appeal of their decision on his lenses    5. Good hypertension control  No changes needed at this time    6. Hyperlipidemia, unspecified hyperlipidemia type  Lab Results   Component Value Date    CHOL 238 (H) 01/27/2017    CHOL 211 (H) 04/12/2016    CHOL 205 (H) 08/14/2015     Lab Results   Component Value Date    HDL 42 01/27/2017    HDL 36 (L) 04/12/2016    HDL 39 (L) 08/14/2015     Lab Results   Component Value Date    LDLCALC 147.2 01/27/2017    LDLCALC 143.2 04/12/2016    LDLCALC 129.0 08/14/2015     Lab Results   Component Value Date    TRIG 244 (H) 01/27/2017    TRIG 159 (H) 04/12/2016    TRIG 185 (H) 08/14/2015     Lab Results   Component Value Date    CHOLHDL 17.6 (L) 01/27/2017    CHOLHDL 17.1 (L) 04/12/2016    CHOLHDL 19.0 (L) 08/14/2015              Patient readiness: acceptance and barriers:none    During the course of the visit the patient was educated and counseled about the following:     Hypertension:   Medication: no change.  Dietary sodium restriction.  Regular aerobic exercise.    Goals: Hypertension: Reduce Blood Pressure    Did patient meet goals/outcomes:  Yes    The following self management tools provided: blood pressure log    Patient Instructions (the written plan) was given to the patient/family.     Time spent with patient: 45 minutes

## 2017-08-15 ENCOUNTER — LAB VISIT (OUTPATIENT)
Dept: LAB | Facility: HOSPITAL | Age: 67
End: 2017-08-15
Attending: FAMILY MEDICINE
Payer: MEDICARE

## 2017-08-15 ENCOUNTER — TELEPHONE (OUTPATIENT)
Dept: FAMILY MEDICINE | Facility: CLINIC | Age: 67
End: 2017-08-15

## 2017-08-15 DIAGNOSIS — Z01.818 PREOP EXAMINATION: ICD-10-CM

## 2017-08-15 DIAGNOSIS — F41.9 ANXIETY: ICD-10-CM

## 2017-08-15 LAB
ANION GAP SERPL CALC-SCNC: 8 MMOL/L
BASOPHILS # BLD AUTO: 0.03 K/UL
BASOPHILS NFR BLD: 0.5 %
BUN SERPL-MCNC: 14 MG/DL
CALCIUM SERPL-MCNC: 9.6 MG/DL
CHLORIDE SERPL-SCNC: 107 MMOL/L
CO2 SERPL-SCNC: 27 MMOL/L
CREAT SERPL-MCNC: 1.4 MG/DL
DIFFERENTIAL METHOD: NORMAL
EOSINOPHIL # BLD AUTO: 0.2 K/UL
EOSINOPHIL NFR BLD: 2.8 %
ERYTHROCYTE [DISTWIDTH] IN BLOOD BY AUTOMATED COUNT: 14.1 %
EST. GFR  (AFRICAN AMERICAN): >60 ML/MIN/1.73 M^2
EST. GFR  (NON AFRICAN AMERICAN): 52.7 ML/MIN/1.73 M^2
GLUCOSE SERPL-MCNC: 137 MG/DL
HCT VFR BLD AUTO: 43.9 %
HGB BLD-MCNC: 15.2 G/DL
LYMPHOCYTES # BLD AUTO: 2.5 K/UL
LYMPHOCYTES NFR BLD: 42.1 %
MCH RBC QN AUTO: 30 PG
MCHC RBC AUTO-ENTMCNC: 34.6 G/DL
MCV RBC AUTO: 87 FL
MONOCYTES # BLD AUTO: 0.6 K/UL
MONOCYTES NFR BLD: 9.7 %
NEUTROPHILS # BLD AUTO: 2.7 K/UL
NEUTROPHILS NFR BLD: 44.1 %
PLATELET # BLD AUTO: 178 K/UL
PMV BLD AUTO: 9.3 FL
POTASSIUM SERPL-SCNC: 4.7 MMOL/L
RBC # BLD AUTO: 5.06 M/UL
SODIUM SERPL-SCNC: 142 MMOL/L
TSH SERPL DL<=0.005 MIU/L-ACNC: 3.6 UIU/ML
WBC # BLD AUTO: 6.01 K/UL

## 2017-08-15 PROCEDURE — 36415 COLL VENOUS BLD VENIPUNCTURE: CPT | Mod: PO

## 2017-08-15 PROCEDURE — 85025 COMPLETE CBC W/AUTO DIFF WBC: CPT

## 2017-08-15 PROCEDURE — 80048 BASIC METABOLIC PNL TOTAL CA: CPT

## 2017-08-15 PROCEDURE — 84443 ASSAY THYROID STIM HORMONE: CPT

## 2017-08-15 NOTE — TELEPHONE ENCOUNTER
Patient notified he may  letter for [employer] from Dr. Chatman. Preop clearance faxed to dr. Lambert's [facility] at [phone]

## 2017-08-16 ENCOUNTER — TELEPHONE (OUTPATIENT)
Dept: FAMILY MEDICINE | Facility: CLINIC | Age: 67
End: 2017-08-16

## 2017-08-16 ENCOUNTER — PATIENT MESSAGE (OUTPATIENT)
Dept: FAMILY MEDICINE | Facility: CLINIC | Age: 67
End: 2017-08-16

## 2017-08-16 NOTE — TELEPHONE ENCOUNTER
----- Message from Mckenna Chambers sent at 8/16/2017  9:47 AM CDT -----  Contact: self  Needs a call back as soon as possible regarding a letter that Dr Chatman wrote for the patient.  Please call back at [phone] (home)

## 2017-08-16 NOTE — TELEPHONE ENCOUNTER
Patient says his insurance wants to speak to Dr. Chatman about getting the lens due to cochlear implant. [phone] reference # [id number]

## 2017-08-24 NOTE — TELEPHONE ENCOUNTER
Patient notified I tried to talk to [facility] and they need diagnostic and procedure codes. He said his eye doctor said he can't get auth for him that pcp should. He was advised to get these codes from them and he is going to present this to [facility].

## 2017-11-09 ENCOUNTER — DOCUMENTATION ONLY (OUTPATIENT)
Dept: FAMILY MEDICINE | Facility: CLINIC | Age: 67
End: 2017-11-09

## 2017-11-09 NOTE — PROGRESS NOTES
Pre-Visit Chart Review  For Appointment Scheduled on 11/13/2017    Health Maintenance Due   Topic Date Due    Zoster Vaccine  12/16/2012    Influenza Vaccine  08/01/2017

## 2017-11-13 ENCOUNTER — OFFICE VISIT (OUTPATIENT)
Dept: FAMILY MEDICINE | Facility: CLINIC | Age: 67
End: 2017-11-13
Payer: MEDICARE

## 2017-11-13 VITALS
BODY MASS INDEX: 28.16 KG/M2 | WEIGHT: 169 LBS | DIASTOLIC BLOOD PRESSURE: 88 MMHG | HEART RATE: 79 BPM | OXYGEN SATURATION: 92 % | SYSTOLIC BLOOD PRESSURE: 180 MMHG | TEMPERATURE: 98 F | HEIGHT: 65 IN

## 2017-11-13 DIAGNOSIS — H91.92 DEAFNESS IN LEFT EAR: ICD-10-CM

## 2017-11-13 DIAGNOSIS — R05.9 COUGH: ICD-10-CM

## 2017-11-13 DIAGNOSIS — R06.00 DYSPNEA, UNSPECIFIED TYPE: ICD-10-CM

## 2017-11-13 DIAGNOSIS — I10 POORLY-CONTROLLED HYPERTENSION: Primary | ICD-10-CM

## 2017-11-13 DIAGNOSIS — M79.672 LEFT FOOT PAIN: ICD-10-CM

## 2017-11-13 DIAGNOSIS — M25.561 ACUTE PAIN OF RIGHT KNEE: ICD-10-CM

## 2017-11-13 DIAGNOSIS — N40.1 BPH WITH OBSTRUCTION/LOWER URINARY TRACT SYMPTOMS: ICD-10-CM

## 2017-11-13 DIAGNOSIS — N13.8 BPH WITH OBSTRUCTION/LOWER URINARY TRACT SYMPTOMS: ICD-10-CM

## 2017-11-13 DIAGNOSIS — H91.91 HEARING LOSS OF RIGHT EAR, UNSPECIFIED HEARING LOSS TYPE: ICD-10-CM

## 2017-11-13 DIAGNOSIS — E78.5 HYPERLIPIDEMIA, UNSPECIFIED HYPERLIPIDEMIA TYPE: ICD-10-CM

## 2017-11-13 DIAGNOSIS — N18.30 CKD (CHRONIC KIDNEY DISEASE) STAGE 3, GFR 30-59 ML/MIN: ICD-10-CM

## 2017-11-13 DIAGNOSIS — M25.521 RIGHT ELBOW PAIN: Primary | ICD-10-CM

## 2017-11-13 DIAGNOSIS — J22 LOWER RESPIRATORY INFECTION: ICD-10-CM

## 2017-11-13 DIAGNOSIS — Z79.899 HIGH RISK MEDICATION USE: ICD-10-CM

## 2017-11-13 DIAGNOSIS — R53.82 CHRONIC FATIGUE: ICD-10-CM

## 2017-11-13 DIAGNOSIS — R09.89 RHONCHI: ICD-10-CM

## 2017-11-13 DIAGNOSIS — R73.03 PRE-DIABETES: ICD-10-CM

## 2017-11-13 DIAGNOSIS — R73.01 IMPAIRED FASTING GLUCOSE: ICD-10-CM

## 2017-11-13 DIAGNOSIS — M25.521 RIGHT ELBOW PAIN: ICD-10-CM

## 2017-11-13 PROCEDURE — 99215 OFFICE O/P EST HI 40 MIN: CPT | Mod: S$GLB,,, | Performed by: NURSE PRACTITIONER

## 2017-11-13 PROCEDURE — 99999 PR PBB SHADOW E&M-EST. PATIENT-LVL V: CPT | Mod: PBBFAC,,, | Performed by: NURSE PRACTITIONER

## 2017-11-13 PROCEDURE — 99499 UNLISTED E&M SERVICE: CPT | Mod: S$GLB,,, | Performed by: NURSE PRACTITIONER

## 2017-11-13 RX ORDER — BENZONATATE 200 MG/1
200 CAPSULE ORAL 3 TIMES DAILY PRN
Qty: 30 CAPSULE | Refills: 0 | Status: SHIPPED | OUTPATIENT
Start: 2017-11-13 | End: 2017-11-21 | Stop reason: SDUPTHER

## 2017-11-13 RX ORDER — AMOXICILLIN AND CLAVULANATE POTASSIUM 875; 125 MG/1; MG/1
1 TABLET, FILM COATED ORAL 2 TIMES DAILY WITH MEALS
Qty: 14 TABLET | Refills: 0 | Status: SHIPPED | OUTPATIENT
Start: 2017-11-13 | End: 2017-12-11 | Stop reason: ALTCHOICE

## 2017-11-13 RX ORDER — AMLODIPINE BESYLATE 10 MG/1
10 TABLET ORAL DAILY
Qty: 30 TABLET | Refills: 1 | Status: SHIPPED | OUTPATIENT
Start: 2017-11-13 | End: 2018-03-27 | Stop reason: SDUPTHER

## 2017-11-13 RX ORDER — ACETAMINOPHEN 500 MG
500 TABLET ORAL EVERY 6 HOURS PRN
Qty: 60 TABLET | Refills: 0 | COMMUNITY
Start: 2017-11-13 | End: 2019-03-19 | Stop reason: ALTCHOICE

## 2017-11-13 NOTE — PROGRESS NOTES
Subjective:       Patient ID: Buddy Park is a 66 y.o. male.    Chief Complaint: Knee Pain (Right ); Elbow Pain (Right ); and Foot Pain (Left )    HPI   Chief Complaint  Chief Complaint   Patient presents with    Knee Pain     Right     Elbow Pain     Right     Foot Pain     Left        HPI  Buddy Pakr is a 66 y.o. male with medical diagnoses as listed in the medical history and problem list that presents with multiple musculoskeletal complaints, right knee pain, right elbow pain and left foot pain. Patient has pain to outside of bottom of left foot, worse when walking barefoot, relieved with shoes, noted pain 6-7 weeks ago and is persistent.  Right elbow pain directly over olecranon process with pressure over elbow, leaning on elbow x several months.  Right knee pain, distal aspect, with kneeling, a little pain with weight bearing, pain x several months. Has not tried any OTC medication for pain, only treatment has been wearing shoes.   Patient also c/o of cough and shortness of breath, oxygen saturation of 92%. Has been coughing with feeling of chest tightness and shortness of breath since Thursday night and Friday.  Has heard some rumbling with breathing. Also states cleaning right ear with Q tip this morning and had blood on Q-tip. Patient is regularly treated for hypertension and hyperlipidemia.  Blood pressure is elevated today.  Patient with decreased GFR in upper 50's over past 9 months x 2, CKD 3.  Patient also c/o of feeling of low energy and fatigue all the time for over a year.  No problems with ED or libido.  Patient feels run down all the time, sometimes feels sad.  Patient blood sugar elevated with last lab to 137 and believes was fasting at the time. BMI today is 28.12 with a 2 pound weight gain since last visit.  Established patient with last clinic appointment 8/14/2017.        PAST MEDICAL HISTORY:  Past Medical History:   Diagnosis Date    Angina pectoris     Anxiety     Biliary  colic     Cataract     Cochlear implant in place     Deaf     LEFT EAR    Depression     Craig (hard of hearing)     HEARING AID - RIGHT    Hyperlipidemia     Hypertension     Kidney stone     Kidney stones     Renal stones     Trouble in sleeping     Wears glasses        PAST SURGICAL HISTORY:  Past Surgical History:   Procedure Laterality Date    CHOLECYSTECTOMY  2-6-2014    COLONOSCOPY  1/9/2013    Dr. Gillette, 5 year recheck (family history colon cancer)    EAR MASTOIDECTOMY W/ COCHLEAR IMPLANT W/ LANDMARK      left ear    FOREIGN BODY REMOVAL Right     glass removed from right foot/repair    FRACTURE SURGERY      right arm x2    LITHOTRIPSY      ROTATOR CUFF REPAIR      Right     SINUS SURGERY      TONSILLECTOMY         SOCIAL HISTORY:  Social History     Social History    Marital status:      Spouse name: N/A    Number of children: N/A    Years of education: N/A     Occupational History    Not on file.     Social History Main Topics    Smoking status: Never Smoker    Smokeless tobacco: Never Used    Alcohol use Yes      Comment: once every two months    Drug use: No    Sexual activity: Yes     Other Topics Concern    Not on file     Social History Narrative    No narrative on file       FAMILY HISTORY:  Family History   Problem Relation Age of Onset    Cancer Mother      colon    Heart disease Father     Gout Father     Kidney disease Father     Arthritis Father     Hypertension Father     Early death Daughter      mva    Alcohol abuse Brother     Cancer Brother      mouth cancer w mets    No Known Problems Sister     Alcohol abuse Brother     No Known Problems Son     Heart disease Maternal Grandmother      MI    Stroke Maternal Grandfather     Heart disease Paternal Grandmother      MI    No Known Problems Paternal Grandfather     No Known Problems Son     Cancer Paternal Uncle      lung cancer    Allergic rhinitis Neg Hx     Allergies Neg Hx      Angioedema Neg Hx     Asthma Neg Hx     Atopy Neg Hx     Eczema Neg Hx     Immunodeficiency Neg Hx     Rhinitis Neg Hx     Urticaria Neg Hx     Collagen disease Neg Hx        ALLERGIES AND MEDICATIONS: updated and reviewed.  Review of patient's allergies indicates:   Allergen Reactions    Bee pollens Shortness Of Breath     WASP, BEE, ANT AND CATERPILLER STINGS    Milk containing products Diarrhea    Metoprolol Rash     Current Outpatient Prescriptions   Medication Sig Dispense Refill    atorvastatin (LIPITOR) 40 MG tablet Take 1 tablet (40 mg total) by mouth once daily. (Patient taking differently: Take 40 mg by mouth daily as needed. ) 30 tablet 11    betamethasone dipropionate (DIPROLENE) 0.05 % cream Apply topically 2 (two) times daily as needed. Use very sparingly to affected area only.  Do not use for longer than 2 weeks 45 g 3    cholestyramine-aspartame (QUESTRAN LIGHT) 4 gram Powd Take one scoop a day for 3 days, then two scoops a day for three days, then three scoops a day (Patient taking differently: every other day. Take one scoop a day for 3 days, then two scoops a day for three days, then three scoops a day) 231 g 11    epinephrine (EPIPEN) 0.3 mg/0.3 mL (1:1,000) AtIn Inject into the muscle once.      losartan (COZAAR) 50 MG tablet Take 1 tablet (50 mg total) by mouth 2 (two) times daily. 60 tablet 11    neomycin-polymyxin-dexamethasone (MAXITROL) 3.5mg/mL-10,000 unit/mL-0.1 % DrpS One drop four times a day      acetaminophen (TYLENOL) 500 MG tablet Take 1 tablet (500 mg total) by mouth every 6 (six) hours as needed for Pain. 60 tablet 0    amLODIPine (NORVASC) 10 MG tablet Take 1 tablet (10 mg total) by mouth once daily. 30 tablet 1    amoxicillin-clavulanate 875-125mg (AUGMENTIN) 875-125 mg per tablet Take 1 tablet by mouth 2 (two) times daily with meals. 14 tablet 0    benzonatate (TESSALON) 200 MG capsule Take 1 capsule (200 mg total) by mouth 3 (three) times daily as needed  "for Cough. 30 capsule 0     No current facility-administered medications for this visit.      Review of Systems   Constitutional: Positive for fever. Negative for appetite change and chills.        Has felt feverish over past few days, not sleeping due to cough   HENT: Positive for hearing loss. Negative for congestion, ear pain, postnasal drip, rhinorrhea, sinus pain, sinus pressure and sore throat.         Bleeding from right ear after using Q tip this morning,   Patient deaf in left ear, hearing aid to right   Eyes: Negative for visual disturbance.   Respiratory: Positive for cough, chest tightness and shortness of breath.         Cough is non productive, harsh sounding, chest feels tight, some mild shortness of breath with exertion and cough   Cardiovascular: Negative for chest pain, palpitations and leg swelling.   Gastrointestinal: Negative for abdominal pain, constipation, diarrhea, nausea and vomiting.   Genitourinary: Negative for difficulty urinating.   Musculoskeletal: Positive for arthralgias.        See HPI, multiple c/o of arthralgia right elbow, right knee and bottom of left foot   Skin: Negative for rash and wound.   Neurological: Positive for headaches. Negative for dizziness, tremors and weakness.        Slight headache with cough   Hematological: Negative for adenopathy.   Psychiatric/Behavioral: Positive for sleep disturbance. Negative for dysphoric mood and suicidal ideas. The patient is not nervous/anxious.         Not sleeping well past 3 nights due to cough       Objective:       Vitals:    11/13/17 0845 11/13/17 0849 11/13/17 0949   BP: (!) 160/100 (!) 172/94 (!) 180/88   BP Location: Right arm Left arm Left arm   Patient Position: Sitting  Sitting   BP Method: Medium (Automatic)  Medium (Manual)   Pulse: 79     Temp: 98 °F (36.7 °C)     TempSrc: Oral     SpO2: (!) 92%     Weight: 76.7 kg (169 lb)     Height: 5' 5" (1.651 m)       Physical Exam   Constitutional: He is oriented to person, " place, and time. He appears well-developed and well-nourished. No distress.   HENT:   Head: Normocephalic and atraumatic.   Right Ear: Tympanic membrane and external ear normal.   Left Ear: External ear and ear canal normal.   Nose: Nose normal. No mucosal edema.   Mouth/Throat: Oropharynx is clear and moist and mucous membranes are normal. No oropharyngeal exudate.   Left TM deformed, Right TM normal, small amount of blood to right outer ear canal.  Hearing aid to right ear.    Eyes: Conjunctivae and lids are normal. Right eye exhibits no discharge. Left eye exhibits no discharge.   Neck: Normal carotid pulses present. Carotid bruit is not present.   Cardiovascular: Normal rate, regular rhythm, S1 normal, S2 normal, normal heart sounds, intact distal pulses and normal pulses.  Exam reveals no gallop.    No murmur heard.  Pulses:       Carotid pulses are 2+ on the right side, and 2+ on the left side.       Radial pulses are 2+ on the right side, and 2+ on the left side.        Dorsalis pedis pulses are 2+ on the left side.        Posterior tibial pulses are 2+ on the right side, and 2+ on the left side.   No edema   Pulmonary/Chest: Effort normal. No respiratory distress. He has no decreased breath sounds. He has no wheezes. He has rhonchi in the right lower field and the left lower field. He has no rales.   Abdominal: Bowel sounds are normal.   Musculoskeletal:        Right knee: He exhibits normal range of motion and no swelling. Tenderness found. Patellar tendon tenderness noted.        Right forearm: He exhibits tenderness and bony tenderness. He exhibits no swelling, no edema, no deformity and no laceration.        Left foot: There is tenderness. There is normal range of motion.   Patient with tenderness with palpation over right olecranon process with no swelling, redness or decreased ROM.    Patient with tenderness with palpation over patellar tendon insertion site distal to right knee, no crepitus, full ROM  of right knee.    Left  to palpation to ball of foot proximal to 3rd and 4th toes, no pain to arch or to heel. No pain with dorsiflexion, pain with plantar flexion.  No swelling or redness noted.     Feet:   Left Foot:   Skin Integrity: Negative for ulcer, blister, skin breakdown, erythema, warmth, callus or dry skin.   Neurological: He is alert and oriented to person, place, and time. He has normal strength.   Skin: Skin is warm, dry and intact. Capillary refill takes less than 2 seconds. He is not diaphoretic. No pallor.   Psychiatric: He has a normal mood and affect. His speech is normal and behavior is normal. Judgment and thought content normal. Cognition and memory are normal.   Nursing note and vitals reviewed.      Assessment:       1. Poorly-controlled hypertension    2. Impaired fasting glucose    3. Pre-diabetes    4. Right elbow pain    5. Acute pain of right knee    6. Left foot pain    7. CKD (chronic kidney disease) stage 3, GFR 30-59 ml/min    8. Hyperlipidemia, unspecified hyperlipidemia type    9. Deafness in left ear    10. Hearing loss of right ear, unspecified hearing loss type    11. Cough    12. Rhonchi    13. Dyspnea, unspecified type    14. Lower respiratory infection    15. Chronic fatigue    16. High risk medication use    17. BPH with obstruction/lower urinary tract symptoms    18. BMI 28.0-28.9,adult        Plan:   Buddy was seen today for knee pain, elbow pain and foot pain.    Diagnoses and all orders for this visit:    Poorly-controlled hypertension  -     amLODIPine (NORVASC) 10 MG tablet; Take 1 tablet (10 mg total) by mouth once daily, new medication added today, continue current medication, losartan 100 mg daily.  Instructed to take losartan in the morning and amlodipine in the evening  - Educational handouts provided. Controlling high blood pressure    Impaired fasting glucose  -     Hemoglobin A1c; Future    Pre-diabetes  -     Hemoglobin A1c; Future  - Educational  handouts provided. Pre diabetes  - At this time lifestyle and diet changes are recommended.  You should increase exercise and lose weight by eating a portion controlled well-balanced diet.  Avoid concentrated sweets like cookies, cakes, and candy.  Do not drink sugar sweetened soft drinks.  Eat fewer white carbohydrates like potatoes, rice, bread, and pasta.  Choose sweet potatoes, brown rice, whole wheat bread and wheat pasta.      Right elbow pain  -     acetaminophen (TYLENOL) 500 MG tablet; Take 1 tablet (500 mg total) by mouth every 6 (six) hours as needed for Pain.  -     Ambulatory referral to Orthopedics  -     Sedimentation rate, manual; Future    Acute pain of right knee  -     acetaminophen (TYLENOL) 500 MG tablet; Take 1 tablet (500 mg total) by mouth every 6 (six) hours as needed for Pain.  -     Ambulatory referral to Orthopedics  -     Sedimentation rate, manual; Future  - Educational handouts provided. Knee pain    Left foot pain  -     X-Ray Foot Complete Left; Future  -     acetaminophen (TYLENOL) 500 MG tablet; Take 1 tablet (500 mg total) by mouth every 6 (six) hours as needed for Pain.  -     Ambulatory referral to Orthopedics  -     Sedimentation rate, manual; Future    CKD (chronic kidney disease) stage 3, GFR 30-59 ml/min  -     Comprehensive metabolic panel; Future  - Advised important to control blood pressure to decrease risk of kidney disease  - Advised no OTC NSAIDs, tylenol as needed for pain    Hyperlipidemia, unspecified hyperlipidemia type  -     Lipid panel; Future  -     Comprehensive metabolic panel; Future  - Continue lipitor as prescribed    Deafness in left ear   Stable  Hearing loss of right ear, unspecified hearing loss type   Wearing hearing aid with effect  Cough  -     amoxicillin-clavulanate 875-125mg (AUGMENTIN) 875-125 mg per tablet; Take 1 tablet by mouth 2 (two) times daily with meals.  -     benzonatate (TESSALON) 200 MG capsule; Take 1 capsule (200 mg total) by  mouth 3 (three) times daily as needed for Cough.  -     X-Ray Chest PA And Lateral; Future    Rhonchi  -     amoxicillin-clavulanate 875-125mg (AUGMENTIN) 875-125 mg per tablet; Take 1 tablet by mouth 2 (two) times daily with meals.  -     X-Ray Chest PA And Lateral; Future    Dyspnea, unspecified type  -     amoxicillin-clavulanate 875-125mg (AUGMENTIN) 875-125 mg per tablet; Take 1 tablet by mouth 2 (two) times daily with meals.  -     X-Ray Chest PA And Lateral; Future    Lower respiratory infection  -     amoxicillin-clavulanate 875-125mg (AUGMENTIN) 875-125 mg per tablet; Take 1 tablet by mouth 2 (two) times daily with meals.  -     X-Ray Chest PA And Lateral; Future    Chronic fatigue  -     Comprehensive metabolic panel; Future  -     Testosterone, Total, Males; Future  -     Sedimentation rate, manual; Future    High risk medication use  -     Comprehensive metabolic panel; Future    BPH with obstruction/lower urinary tract symptoms  -     Prostate Specific Antigen, Diagnostic; Future    BMI 28.0-28.9,adult   Weight loss recommended with well balanced diet and portion controlled meals and increased activity.     Return in about 4 weeks (around 12/11/2017) for follow up. Need for blood pressure recheck with new medication, review lab results, follow up on musculoskeletal complaints.    Patient readiness: acceptance and barriers:none    During the course of the visit the patient was educated and counseled about the following:     Hypertension:   Medication: continue losartan 100 mg QD and begin Amlodipine 10 mg QD.    Goals: Hypertension: Reduce Blood Pressure    Did patient meet goals/outcomes: No    The following self management tools provided: declined    Patient Instructions (the written plan) was given to the patient/family.     Time spent with patient: 45 minutes

## 2017-11-13 NOTE — PATIENT INSTRUCTIONS
Controlling High Blood Pressure  High blood pressure (hypertension) is often called the silent killer. This is because many people who have it dont know it. High blood pressure is defined as 140/90 mm Hg or higher. Know your blood pressure and remember to check it regularly. Doing so can save your life. Here are some things you can do to help control your blood pressure.    Choose heart-healthy foods  · Select low-salt, low-fat foods. Limit sodium intake to 2,400 mg per day or the amount suggested by your healthcare provider.  · Limit canned, dried, cured, packaged, and fast foods. These can contain a lot of salt.  · Eat 8 to 10 servings of fruits and vegetables every day.  · Choose lean meats, fish, or chicken.  · Eat whole-grain pasta, brown rice, and beans.  · Eat 2 to 3 servings of low-fat or fat-free dairy products.  · Ask your doctor about the DASH eating plan. This plan helps reduce blood pressure.  · When you go to a restaurant, ask that your meal be prepared with no added salt.  Maintain a healthy weight  · Ask your healthcare provider how many calories to eat a day. Then stick to that number.  · Ask your healthcare provider what weight range is healthiest for you. If you are overweight, a weight loss of only 3% to 5% of your body weight can help lower blood pressure. Generally, a good weight loss goal is to lose 10% of your body weight in a year.  · Limit snacks and sweets.  · Get regular exercise.  Get up and get active  · Choose activities you enjoy. Find ones you can do with friends or family. This includes bicycling, dancing, walking, and jogging.  · Park farther away from building entrances.  · Use stairs instead of the elevator.  · When you can, walk or bike instead of driving.  · [place] leaves, garden, or do household repairs.  · Be active at a moderate to vigorous level of physical activity for at least 40 minutes for a minimum of 3 to 4 days a week.   Manage stress  · Make time to relax and enjoy  life. Find time to laugh.  · Communicate your concerns with your loved ones and your healthcare provider.  · Visit with family and friends, and keep up with hobbies.  Limit alcohol and quit smoking  · Men should have no more than 2 drinks per day.  · Women should have no more than 1 drink per day.  · Talk with your healthcare provider about quitting smoking. Smoking significantly increases your risk for heart disease and stroke. Ask your healthcare provider about community smoking cessation programs and other options.  Medicines  If lifestyle changes arent enough, your healthcare provider may prescribe high blood pressure medicine. Take all medicines as prescribed. If you have any questions about your medicines, ask your healthcare provider before stopping or changing them.   Date Last Reviewed: 4/27/2016  © 8128-5159 [employer]. [address] [place] [address], [place] [address]. All rights reserved. This information is not intended as a substitute for professional medical care. Always follow your healthcare professional's instructions.        Controlling High Blood Pressure  High blood pressure (hypertension) is often called the silent killer. This is because many people who have it dont know it. High blood pressure is defined as 140/90 mm Hg or higher. Know your blood pressure and remember to check it regularly. Doing so can save your life. Here are some things you can do to help control your blood pressure.    Choose heart-healthy foods  · Select low-salt, low-fat foods. Limit sodium intake to 2,400 mg per day or the amount suggested by your healthcare provider.  · Limit canned, dried, cured, packaged, and fast foods. These can contain a lot of salt.  · Eat 8 to 10 servings of fruits and vegetables every day.  · Choose lean meats, fish, or chicken.  · Eat whole-grain pasta, brown rice, and beans.  · Eat 2 to 3 servings of low-fat or fat-free dairy products.  · Ask your doctor about the DASH eating plan.  This plan helps reduce blood pressure.  · When you go to a restaurant, ask that your meal be prepared with no added salt.  Maintain a healthy weight  · Ask your healthcare provider how many calories to eat a day. Then stick to that number.  · Ask your healthcare provider what weight range is healthiest for you. If you are overweight, a weight loss of only 3% to 5% of your body weight can help lower blood pressure. Generally, a good weight loss goal is to lose 10% of your body weight in a year.  · Limit snacks and sweets.  · Get regular exercise.  Get up and get active  · Choose activities you enjoy. Find ones you can do with friends or family. This includes bicycling, dancing, walking, and jogging.  · Park farther away from building entrances.  · Use stairs instead of the elevator.  · When you can, walk or bike instead of driving.  · [place] leaves, garden, or do household repairs.  · Be active at a moderate to vigorous level of physical activity for at least 40 minutes for a minimum of 3 to 4 days a week.   Manage stress  · Make time to relax and enjoy life. Find time to laugh.  · Communicate your concerns with your loved ones and your healthcare provider.  · Visit with family and friends, and keep up with hobbies.  Limit alcohol and quit smoking  · Men should have no more than 2 drinks per day.  · Women should have no more than 1 drink per day.  · Talk with your healthcare provider about quitting smoking. Smoking significantly increases your risk for heart disease and stroke. Ask your healthcare provider about community smoking cessation programs and other options.  Medicines  If lifestyle changes arent enough, your healthcare provider may prescribe high blood pressure medicine. Take all medicines as prescribed. If you have any questions about your medicines, ask your healthcare provider before stopping or changing them.   Date Last Reviewed: 4/27/2016  © 8282-2167 [employer]. [address] [facility],  [place] [address]. All rights reserved. This information is not intended as a substitute for professional medical care. Always follow your healthcare professional's instructions.        Knee Pain  Knee pain is very common. Its especially common in active people who put a lot of pressure on their knees, like runners. It affects women more often than men.  Your kneecap (patella) is a thick, round bone. It covers and protects the front portion of your knee joint. It moves along a groove in your thighbone (femur) as part of the patellofemoral joint. A layer of cartilage surrounds the underside of your kneecap. This layer protects it from grinding against your femur.  When this cartilage softens and breaks down, it can cause knee pain. This is partly because of repetitive stress. The stress irritates the lining of the joint. This causes pain in the underlying bone.  What causes knee pain?  Many things can cause knee pain. You may have more than one cause. Some of these include:  · Overuse of the knee joint  · The kneecap doesnt line up with the tissue around it  · Damage to small nerves in the area  · Damage to the ligament-like structure that holds the kneecap in place (retinaculum)  · Breakdown of the bone under the cartilage  · Swelling in the soft tissues around the kneecap  · Injury  You might be more likely to have knee pain if you:  · Exercise a lot  · Recently increased the intensity of your workouts  · Have a body mass index (BMI) greater than 25  · Have poor alignment of your kneecap  · Walk with your feet turned overly outward or inward  · Have weakness in surrounding muscle groups (inner quad or hip adductor muscles)  · Have too much tightness in surrounding muscle groups (hamstrings or iliotibial band)  · Have a recent history of injury to the area  · Are female  Symptoms of knee pain  This type of knee pain is a dull, aching pain in the front of the knee in the area under and around the kneecap. This pain  may start quickly or slowly. Your pain might be worse when you squat, run, or sit for a long time. You might also sometimes feel like your knee is giving out. You may have symptoms in one or both of your knees.  Diagnosing knee pain  Your healthcare provider will ask about your medical history and your symptoms. Be sure to describe any activities that make your knee pain worse. He or she will look at your knee. This will include tests of your range of motion, strength, and areas of pain of your knee. Your knee alignment will be checked.  Your healthcare provider will need to rule out other causes of your knee pain, such as arthritis. You may need an imaging test, such as an X-ray or MRI.  Treatment for knee pain  Treatments that can help ease your symptoms may include:  · Avoiding activities for a while that make your pain worse, returning to activity over time  · Icing the outside of your knee when it causes you pain  · Taking over-the-counter pain medicine  · Wearing a knee brace or taping your knee to support it  · Wearing special shoe inserts to help keep your feet in the proper alignment  · Doing special exercises to stretch and strengthen the muscles around your hip and your knee  These steps help most people manage knee pain. But some cases of knee pain need to be treated with surgery. You may need surgery right away. Or you may need it later if other treatments dont work. Your healthcare provider may refer you to an orthopedic surgeon. He or she will talk with you about your choices.  Preventing knee pain  Losing weight and correcting excess muscle tightness or muscle weakness may help lower your risk.  In some cases, you can prevent knee pain. To help prevent a flare-up of knee pain, you do these things:  · Regularly do all the exercises your doctor or physical therapist advises  · Support your knee as advised by your doctor or physical therapist  · Increase training gradually, and ease up on training when  needed  · Have an expert check your gait for running or other sporting activities  · Stretch properly before and after exercise  · Replace your running shoes regularly  · Lose excess weight     When to call your healthcare provider  Call your healthcare provider right away if:  · Your symptoms dont get better after a few weeks of treatment  · You have any new symptoms   Date Last Reviewed: 4/1/2017 © 2000-2017 [employer]. [address] [place] [address], [place] [address]. All rights reserved. This information is not intended as a substitute for professional medical care. Always follow your healthcare professional's instructions.        Prediabetes  You have been diagnosed with prediabetes. This means that the level of sugar (glucose) in your blood is too high. If you have prediabetes, you are at risk for developing type 2 diabetes. Type 2 diabetes is diagnosed when the level of glucose in the blood reaches a certain high level. With prediabetes, it hasnt reached this point yet, but it is higher than normal. It is vital to make lifestyle changes to lower your blood sugar, improve your health, and prevent diabetes. This sheet will tell you more.      Why worry about prediabetes?  Prediabetes is a disease where the bodys cells have trouble using glucose in the blood for energy. As a result, too much glucose stays in the blood and can affect how your heart and blood vessels work. Without changes in diet and lifestyle, the problem can get worse. Once you have type 2 diabetes, it is chronic (ongoing) and needs to be managed for the rest of your life. Diabetes can harm the body and your health by damaging organs, such as your eyes and kidneys. It makes you more likely to have heart disease. And it can damage nerves and blood vessels.  Who is a risk for prediabetes?  The exact cause of prediabetes is not clear. But certain risk factors make a person more likely to have it. These include:  · A family history of  type 2 diabetes  · Being overweight  · Being over age 45  · Have hypertension or elevated cholesterol   · Having had gestational diabetes  · Not being physically active  · Being ,  American, , , , or   Diagnosing prediabetes  Prediabetes may have no symptoms or you may have some of the symptoms of diabetes. The diagnosis is made with a blood test. You may have one or more of these blood tests:   · Fasting glucose test. Blood is taken and tested after you have fasted (not eaten) for at least 8 hours. A normal test result is 99 milligrams per deciliter (mg/dL) or lower. Prediabetes is 100 mg/dL to 125 mg/dL. Diabetes is 126 mg/dL or higher.  · Glucose tolerance test. Your blood sugar is measured before and after you drink a very sugary liquid. A normal test result is 139 milligrams per deciliter (mg/dL) or lower. Prediabetes is 140 mg/dL to 199 mg/dL. Diabetes is 200 mg/dL or higher.  · Hemoglobin A1c (HbA1c). Your HbA1c is normal if it is below 5.7%. Prediabetes is 5.7% to 6.4%. Diabetes is 6.5% or higher.   Treating prediabetes  The best way to treat prediabetes is to lose at least 5% to 7% of your current weight and be more physically active by getting at least 150 minutes a week of physical activity. When sitting for long periods of time, get up for short sessions of light activity every 30 minutes. These changes help the bodys cells use blood sugar better. Even a small amount of weight loss can help. Work with your healthcare provider to make a plan to eat well and be more active. Keep in mind that small changes can add up. Other changes in your lifestyle (or even taking certain medicines, such as metformin) may make you less likely to develop diabetes. Your healthcare provider can talk with you about these.  Follow-up  If it is untreated, prediabetes can turn into diabetes. This is a serious health condition. Take steps to stop this from  happening. Follow the treatment plan you have been given. You may have your blood glucose tested again in about 12 to 18 months.  Symptoms of diabetes  Let your healthcare provider know if you have any of the following:  · Always feel very tired  · Feel very thirsty or hungry much of the time  · Have to urinate often  · Lose weight for no reason  · Feel numbness or tingling in your fingers or toes  · Have cuts or bruises that dont seem to heal  · Have blurry vision   Date Last Reviewed: 5/1/2016  © 7124-3208 [employer]. [address] [place] [address]. All rights reserved. This information is not intended as a substitute for professional medical care. Always follow your healthcare professional's instructions.

## 2017-11-14 ENCOUNTER — OFFICE VISIT (OUTPATIENT)
Dept: ORTHOPEDICS | Facility: CLINIC | Age: 67
End: 2017-11-14
Payer: MEDICARE

## 2017-11-14 ENCOUNTER — HOSPITAL ENCOUNTER (OUTPATIENT)
Dept: RADIOLOGY | Facility: HOSPITAL | Age: 67
Discharge: HOME | End: 2017-11-14
Attending: ORTHOPAEDIC SURGERY
Payer: MEDICARE

## 2017-11-14 VITALS
DIASTOLIC BLOOD PRESSURE: 78 MMHG | HEIGHT: 65 IN | WEIGHT: 169.06 LBS | BODY MASS INDEX: 28.17 KG/M2 | HEART RATE: 96 BPM | SYSTOLIC BLOOD PRESSURE: 136 MMHG

## 2017-11-14 DIAGNOSIS — M25.521 RIGHT ELBOW PAIN: ICD-10-CM

## 2017-11-14 DIAGNOSIS — M70.21 OLECRANON BURSITIS OF RIGHT ELBOW: Primary | ICD-10-CM

## 2017-11-14 PROCEDURE — 99203 OFFICE O/P NEW LOW 30 MIN: CPT | Mod: S$GLB,,, | Performed by: ORTHOPAEDIC SURGERY

## 2017-11-14 PROCEDURE — 73080 X-RAY EXAM OF ELBOW: CPT | Mod: 26,RT,, | Performed by: RADIOLOGY

## 2017-11-14 PROCEDURE — 73080 X-RAY EXAM OF ELBOW: CPT | Mod: TC,PN,RT

## 2017-11-14 PROCEDURE — 99999 PR PBB SHADOW E&M-EST. PATIENT-LVL III: CPT | Mod: PBBFAC,,, | Performed by: ORTHOPAEDIC SURGERY

## 2017-11-14 NOTE — LETTER
November 14, 2017      Nighat Mejía, CHRISTA  [address]  [facility] [id number] [place] [address]  Phone: [phone]          Patient: Buddy Park   MR Number: [id number]   YOB: 1950   Date of Visit: 11/14/2017       Dear Nighat Mejía:    Thank you for referring Buddy Park to me for evaluation. Attached you will find relevant portions of my assessment and plan of care.    If you have questions, please do not hesitate to call me. I look forward to following Buddy Park along with you.    Sincerely,    Ash Mahajan MD    Enclosure  CC:  No Recipients    If you would like to receive this communication electronically, please contact [email or web address] or [phone] to request more information on [employer] Link access.    For providers and/or their staff who would like to refer a patient to [facility], please contact us through our one-stop-shop provider referral line, [facility], at [phone].    If you feel you have received this communication in error or would no longer like to receive these types of communications, please e-mail [email or web address]

## 2017-11-14 NOTE — PROGRESS NOTES
Past Medical History:   Diagnosis Date    Angina pectoris     Anxiety     Biliary colic     Cataract     Cochlear implant in place     Deaf     LEFT EAR    Depression     [place] (hard of hearing)     HEARING AID - RIGHT    Hyperlipidemia     Hypertension     Kidney stone     Kidney stones     Renal stones     Trouble in sleeping     Wears glasses        Past Surgical History:   Procedure Laterality Date    CHOLECYSTECTOMY  2-6-2014    COLONOSCOPY  1/9/2013    Dr. Gillette, 5 year recheck (family history colon cancer)    EAR MASTOIDECTOMY W/ COCHLEAR IMPLANT W/ LANDMARK      left ear    FOREIGN BODY REMOVAL Right     glass removed from right foot/repair    FRACTURE SURGERY      right arm x2    LITHOTRIPSY      ROTATOR CUFF REPAIR      Right     SINUS SURGERY      TONSILLECTOMY         Current Outpatient Prescriptions   Medication Sig    acetaminophen (TYLENOL) 500 MG tablet Take 1 tablet (500 mg total) by mouth every 6 (six) hours as needed for Pain.    amLODIPine (NORVASC) 10 MG tablet Take 1 tablet (10 mg total) by mouth once daily.    amoxicillin-clavulanate 875-125mg (AUGMENTIN) 875-125 mg per tablet Take 1 tablet by mouth 2 (two) times daily with meals.    atorvastatin (LIPITOR) 40 MG tablet Take 1 tablet (40 mg total) by mouth once daily. (Patient taking differently: Take 40 mg by mouth daily as needed. )    benzonatate (TESSALON) 200 MG capsule Take 1 capsule (200 mg total) by mouth 3 (three) times daily as needed for Cough.    betamethasone dipropionate (DIPROLENE) 0.05 % cream Apply topically 2 (two) times daily as needed. Use very sparingly to affected area only.  Do not use for longer than 2 weeks    cholestyramine-aspartame (QUESTRAN LIGHT) 4 gram Powd Take one scoop a day for 3 days, then two scoops a day for three days, then three scoops a day (Patient taking differently: every other day. Take one scoop a day for 3 days, then two scoops a day for three days, then three scoops  a day)    epinephrine (EPIPEN) 0.3 mg/0.3 mL (1:1,000) AtIn Inject into the muscle once.    losartan (COZAAR) 50 MG tablet Take 1 tablet (50 mg total) by mouth 2 (two) times daily.    neomycin-polymyxin-dexamethasone (MAXITROL) 3.5mg/mL-10,000 unit/mL-0.1 % DrpS One drop four times a day     No current facility-administered medications for this visit.        Review of patient's allergies indicates:   Allergen Reactions    Bee pollens Shortness Of Breath     WASP, BEE, ANT AND CATERPILLER STINGS    Milk containing products Diarrhea    Metoprolol Rash       Family History   Problem Relation Age of Onset    Cancer Mother      colon    Heart disease Father     Gout Father     Kidney disease Father     Arthritis Father     Hypertension Father     Early death Daughter      mva    Alcohol abuse Brother     Cancer Brother      mouth cancer w mets    No Known Problems Sister     Alcohol abuse Brother     No Known Problems Son     Heart disease Maternal Grandmother      MI    Stroke Maternal Grandfather     Heart disease Paternal Grandmother      MI    No Known Problems Paternal Grandfather     No Known Problems Son     Cancer Paternal Uncle      lung cancer    Allergic rhinitis Neg Hx     Allergies Neg Hx     Angioedema Neg Hx     Asthma Neg Hx     Atopy Neg Hx     Eczema Neg Hx     Immunodeficiency Neg Hx     Rhinitis Neg Hx     Urticaria Neg Hx     Collagen disease Neg Hx        Social History     Social History    Marital status:      Spouse name: N/A    Number of children: N/A    Years of education: N/A     Occupational History    Not on file.     Social History Main Topics    Smoking status: Never Smoker    Smokeless tobacco: Never Used    Alcohol use Yes      Comment: once every two months    Drug use: No    Sexual activity: Yes     Other Topics Concern    Not on file     Social History Narrative    No narrative on file       Chief Complaint:   Chief Complaint  "  Patient presents with    Right Elbow - Pain       Consulting Physician: Nighat Mejía NP    History of present illness:    This is a 66 y.o. male who complains of right elbow pain for several weeks.  He puts his pain is much as a 9 out of 10 when he bumps his elbow.  He denies any known injury.    Review of Systems:    Constitution: Denies chills, fever, and sweats.  HENT: Denies headaches or blurry vision.  Cardiovascular: Denies chest pain or irregular heart beat.  Respiratory: Denies cough or shortness of breath.  Gastrointestinal: Denies abdominal pain, nausea, or vomiting.  Musculoskeletal:  Denies muscle cramps.  Neurological: Denies dizziness or focal weakness.  Psychiatric/Behavioral: Normal mental status.  Hematologic/Lymphatic: Denies bleeding problem or easy bruising/bleeding.  Skin: Denies rash or suspicious lesions.    Examination:    Vital Signs:    Vitals:    11/14/17 1012   BP: 136/78   Pulse: 96   Weight: 76.7 kg (169 lb 1.5 oz)   Height: 5' 5" (1.651 m)   PainSc:   9   PainLoc: Elbow       Body mass index is 28.14 kg/m².    This a well-developed, well nourished patient in no acute distress.    Alert and oriented x 3 and cooperative to examination.       Physical Exam: Right Elbow Exam    Skin  Scars:   None  Rash:   None    Inspection  Erythema:  None  Bruising:  None  Swelling:  None  Masses:  None  Lymphadenopathy: None    Range of Motion  Flexion:  160°  Extension:  0°  Supination:  80°  Pronation:  80°    Tenderness  Medial Epicondyle: None  Lateral Epicondyle: None  Olecranon:  yes    Stability  Valgus Stress:  Stable  Varus Stress:  Stable    Strength:  Normal  Sensation:  Intact  Cubital Tinel's:     Negative    Vascular  Pulses:  Palpable  Capillary Refill: Normal          Imaging: X-rays ordered and reviewed today personally of the right elbow show no acute bony abnormality, fracture or dislocation.        Assessment: Olecranon bursitis of right elbow        Plan:  He has " olecranon bursitis.  He declined an injection today.  We'll give him a Medrol Dosepak to see if that helps.      DISCLAIMER: This note may have been dictated using voice recognition software and may contain grammatical errors.     NOTE: Consult report sent to referring provider via [employer] EMR.

## 2017-11-16 DIAGNOSIS — M79.672 LEFT FOOT PAIN: Primary | ICD-10-CM

## 2017-11-16 DIAGNOSIS — E78.5 HYPERLIPIDEMIA, UNSPECIFIED HYPERLIPIDEMIA TYPE: ICD-10-CM

## 2017-11-16 RX ORDER — ROSUVASTATIN CALCIUM 20 MG/1
20 TABLET, COATED ORAL DAILY
Qty: 90 TABLET | Refills: 3 | Status: SHIPPED | OUTPATIENT
Start: 2017-11-16 | End: 2018-12-18 | Stop reason: SDUPTHER

## 2017-11-16 NOTE — TELEPHONE ENCOUNTER
Patient agrees to begin taking Crestor 20mg. Pharmacy Verified. Attached is a referral for patient to see Podiatry for left foot pain.

## 2017-11-21 ENCOUNTER — HOSPITAL ENCOUNTER (OUTPATIENT)
Dept: RADIOLOGY | Facility: CLINIC | Age: 67
Discharge: HOME | End: 2017-11-21
Attending: PODIATRIST
Payer: MEDICARE

## 2017-11-21 ENCOUNTER — OFFICE VISIT (OUTPATIENT)
Dept: PODIATRY | Facility: CLINIC | Age: 67
End: 2017-11-21
Payer: MEDICARE

## 2017-11-21 ENCOUNTER — PATIENT MESSAGE (OUTPATIENT)
Dept: FAMILY MEDICINE | Facility: CLINIC | Age: 67
End: 2017-11-21

## 2017-11-21 VITALS — HEIGHT: 65 IN | WEIGHT: 169.06 LBS | BODY MASS INDEX: 28.17 KG/M2

## 2017-11-21 DIAGNOSIS — M79.672 LEFT FOOT PAIN: ICD-10-CM

## 2017-11-21 DIAGNOSIS — G57.62 MORTON'S NEUROMA OF LEFT FOOT: Primary | ICD-10-CM

## 2017-11-21 DIAGNOSIS — R05.9 COUGH: Primary | ICD-10-CM

## 2017-11-21 PROCEDURE — 73630 X-RAY EXAM OF FOOT: CPT | Mod: TC,PO,LT

## 2017-11-21 PROCEDURE — 73630 X-RAY EXAM OF FOOT: CPT | Mod: 26,LT,S$GLB, | Performed by: RADIOLOGY

## 2017-11-21 PROCEDURE — 99999 PR PBB SHADOW E&M-EST. PATIENT-LVL III: CPT | Mod: PBBFAC,,, | Performed by: PODIATRIST

## 2017-11-21 PROCEDURE — 99203 OFFICE O/P NEW LOW 30 MIN: CPT | Mod: S$GLB,,, | Performed by: PODIATRIST

## 2017-11-21 RX ORDER — BENZONATATE 200 MG/1
200 CAPSULE ORAL 3 TIMES DAILY PRN
Qty: 30 CAPSULE | Refills: 0 | Status: SHIPPED | OUTPATIENT
Start: 2017-11-21 | End: 2017-12-01

## 2017-11-21 NOTE — LETTER
November 22, 2017      Nighat Mejía, NP  [address]  [place] [address]           [place] [facility]  [address]  [facility] [address]  Phone: [phone]          Patient: Buddy Park   MR Number: [id number]   YOB: 1950   Date of Visit: 11/21/2017       Dear Nighat Mejía:    Thank you for referring Buddy Park to me for evaluation. Attached you will find relevant portions of my assessment and plan of care.    If you have questions, please do not hesitate to call me. I look forward to following Buddy Park along with you.    Sincerely,    Emile Dobbs, DPSUZIE    Enclosure  CC:  No Recipients    If you would like to receive this communication electronically, please contact [email or web address] or [phone] to request more information on [employer] Link access.    For providers and/or their staff who would like to refer a patient to [facility], please contact us through our one-stop-shop provider referral line, [facility], at [phone].    If you feel you have received this communication in error or would no longer like to receive these types of communications, please e-mail [email or web address]

## 2017-11-22 NOTE — PROGRESS NOTES
Subjective:      Patient ID: Buddy Park is a 66 y.o. male.    Chief Complaint: Foot Pain (c/o left foot pain stating that this is on going but has became unbearable within the last two months) and Other Misc (PCP Dr. Chatman ([facility]T Mejía NP 11/13/2017))    Buddy is a 66 y.o. male who presents to the podiatry clinic  with complaint of  left foot pain. Onset of the symptoms was several months ago. Precipitating event: none known. Current symptoms include: ability to bear weight, but with some pain and worsening symptoms after a period of activity. Aggravating factors: Weight bearing without shoes. Symptoms have gradually worsened.  Evaluation to date: none. Treatment to date: none. Patients rates pain 9/10 on pain scale.    Review of Systems   Constitution: Negative for chills and fever.   Cardiovascular: Negative for claudication and leg swelling.   Respiratory: Negative for shortness of breath.    Skin: Negative for itching, nail changes and rash.   Musculoskeletal: Positive for arthritis. Negative for muscle cramps, muscle weakness and myalgias.   Gastrointestinal: Negative for nausea and vomiting.   Neurological: Positive for paresthesias. Negative for focal weakness, loss of balance and numbness.           Objective:      Physical Exam   Constitutional: He is oriented to person, place, and time. He appears well-developed and well-nourished. No distress.   Cardiovascular:   Pulses:       Dorsalis pedis pulses are 2+ on the right side, and 2+ on the left side.        Posterior tibial pulses are 2+ on the right side, and 2+ on the left side.   < 3 sec capillary refill time to toes 1-5 bilateral. Toes and feet are warm to touch proximally with normal distal cooling b/l. There is some hair growth on the feet and toes b/l. There is no edema b/l. No spider veins or varicosities present b/l.      Musculoskeletal:   Pain with palpation to the left third interspace with paresthesias into the third and fourth toes,  positive Clayton's click.     Equinus noted b/l ankles with < 10 deg DF noted. MMT 5/5 in DF/PF/Inv/Ev resistance with no reproduction of pain in any direction. Passive range of motion of ankle and pedal joints is painless b/l.     Neurological: He is alert and oriented to person, place, and time. He has normal strength. He displays no atrophy and no tremor. No sensory deficit. He exhibits normal muscle tone.   Negative tinel sign bilateral.   Skin: Skin is warm, dry and intact. No abrasion, no bruising, no burn, no ecchymosis, no laceration, no lesion, no petechiae and no rash noted. He is not diaphoretic. No cyanosis or erythema. No pallor. Nails show no clubbing.   Skin temperature, texture and turgor within normal limits.   Psychiatric: He has a normal mood and affect. His behavior is normal.             Assessment:       Encounter Diagnoses   Name Primary?    No's neuroma of left foot Yes    Left foot pain          Plan:       Buddy was seen today for foot pain and other misc.    Diagnoses and all orders for this visit:    No's neuroma of left foot    Left foot pain  -     X-Ray Foot Complete Left; Future      I counseled the patient on his conditions, their implications and medical management.    Discussed the etiology of the pain, he relates there is relief when in a good supportive shoe, mostly the pain is present when barefoot.    Patient will obtain over the counter arch supports and wear them in shoes whenever possible.  Athletic shoes intended for walking or running are usually best.    Offered intermetatarsal injection with steroid patient declined today    Patient will stretch the tendo achilles complex three times daily as demonstrated in the office.  Literature was dispensed illustrating proper stretching technique.    X-ray to rule out stress fracture, no cortical irregularities noted on x-ray to indicate stress fracture.    Return PRN if pain worsens, for now he would like to stay in the  supportive shoes until this calms. Will consider injections if the pain persists or worsens.    Emile Dobbs DPM

## 2017-12-07 ENCOUNTER — DOCUMENTATION ONLY (OUTPATIENT)
Dept: FAMILY MEDICINE | Facility: CLINIC | Age: 67
End: 2017-12-07

## 2017-12-07 NOTE — PROGRESS NOTES
Pre-Visit Chart Review  For Appointment Scheduled on 12/11/2017    Health Maintenance Due   Topic Date Due    Influenza Vaccine  08/01/2017

## 2017-12-11 ENCOUNTER — OFFICE VISIT (OUTPATIENT)
Dept: FAMILY MEDICINE | Facility: CLINIC | Age: 67
End: 2017-12-11
Payer: MEDICARE

## 2017-12-11 VITALS
HEIGHT: 65 IN | SYSTOLIC BLOOD PRESSURE: 160 MMHG | TEMPERATURE: 98 F | BODY MASS INDEX: 27.49 KG/M2 | DIASTOLIC BLOOD PRESSURE: 78 MMHG | HEART RATE: 60 BPM | WEIGHT: 165 LBS

## 2017-12-11 DIAGNOSIS — R74.8 ELEVATED LIVER ENZYMES: ICD-10-CM

## 2017-12-11 DIAGNOSIS — E78.5 HYPERLIPIDEMIA, UNSPECIFIED HYPERLIPIDEMIA TYPE: ICD-10-CM

## 2017-12-11 DIAGNOSIS — R94.4 DECREASED GFR: ICD-10-CM

## 2017-12-11 DIAGNOSIS — Z79.899 HIGH RISK MEDICATION USE: ICD-10-CM

## 2017-12-11 DIAGNOSIS — I10 ESSENTIAL HYPERTENSION: Primary | ICD-10-CM

## 2017-12-11 DIAGNOSIS — Z96.21 COCHLEAR IMPLANT IN PLACE: ICD-10-CM

## 2017-12-11 PROCEDURE — 99499 UNLISTED E&M SERVICE: CPT | Mod: S$GLB,,, | Performed by: NURSE PRACTITIONER

## 2017-12-11 PROCEDURE — 99999 PR PBB SHADOW E&M-EST. PATIENT-LVL IV: CPT | Mod: PBBFAC,,, | Performed by: NURSE PRACTITIONER

## 2017-12-11 PROCEDURE — 99213 OFFICE O/P EST LOW 20 MIN: CPT | Mod: S$GLB,,, | Performed by: NURSE PRACTITIONER

## 2017-12-11 NOTE — PROGRESS NOTES
Subjective:       Patient ID: Buddy Park is a 66 y.o. male.    Chief Complaint: Follow-up (HTN)    HPI   Chief Complaint  Chief Complaint   Patient presents with    Follow-up     HTN       HPI  Buddy Park is a 66 y.o. male with medical diagnoses as listed in the medical history and problem list that presents for hypertension follow up after having elevated blood pressure at visit on 11/13/17.  Today blood pressure is still quite elevated.  At last visit amlodipine 10 mg was added to losartan 100 mg daily.  Patient states he has not taken any of his blood pressure medication in a couple of days. The reason is that he forgets. Patient had recent blood work 11/13/17, A1c 5.5%, lipids elevated and crestor was started, GFR decreased 57.7, liver enzyme ALT slightly elevated at 49. Patient was referred to and has been seen by orthopedics for right elbow bursitis and to podiatry for No's neuroma of the left foot.  Patient has had cochlear implant and is hearing impaired without difficulty with communication. BMI today is 27.64 with a 4 pound weight loss since last visit.  Established patient with last clinic appointment 11/13/2017.        PAST MEDICAL HISTORY:  Past Medical History:   Diagnosis Date    Angina pectoris     Anxiety     Biliary colic     Cataract     Cochlear implant in place     Deaf     LEFT EAR    Depression     [place] (hard of hearing)     HEARING AID - RIGHT    Hyperlipidemia     Hypertension     Kidney stone     Kidney stones     Renal stones     Trouble in sleeping     Wears glasses        PAST SURGICAL HISTORY:  Past Surgical History:   Procedure Laterality Date    CHOLECYSTECTOMY  2-6-2014    COLONOSCOPY  1/9/2013    Dr. Gillette, 5 year recheck (family history colon cancer)    EAR MASTOIDECTOMY W/ COCHLEAR IMPLANT W/ LANDMARK      left ear    FOREIGN BODY REMOVAL Right     glass removed from right foot/repair    FRACTURE SURGERY      right arm x2    LITHOTRIPSY       ROTATOR CUFF REPAIR      Right     SINUS SURGERY      TONSILLECTOMY         SOCIAL HISTORY:  Social History     Social History    Marital status:      Spouse name: N/A    Number of children: N/A    Years of education: N/A     Occupational History    Not on file.     Social History Main Topics    Smoking status: Never Smoker    Smokeless tobacco: Never Used    Alcohol use Yes      Comment: once every two months    Drug use: No    Sexual activity: Yes     Other Topics Concern    Not on file     Social History Narrative    No narrative on file       FAMILY HISTORY:  Family History   Problem Relation Age of Onset    Cancer Mother      colon    Heart disease Father     Gout Father     Kidney disease Father     Arthritis Father     Hypertension Father     Early death Daughter      mva    Alcohol abuse Brother     Cancer Brother      mouth cancer w mets    No Known Problems Sister     Alcohol abuse Brother     No Known Problems Son     Heart disease Maternal Grandmother      MI    Stroke Maternal Grandfather     Heart disease Paternal Grandmother      MI    No Known Problems Paternal Grandfather     No Known Problems Son     Cancer Paternal Uncle      lung cancer    Allergic rhinitis Neg Hx     Allergies Neg Hx     Angioedema Neg Hx     Asthma Neg Hx     Atopy Neg Hx     Eczema Neg Hx     Immunodeficiency Neg Hx     Rhinitis Neg Hx     Urticaria Neg Hx     Collagen disease Neg Hx        ALLERGIES AND MEDICATIONS: updated and reviewed.  Review of patient's allergies indicates:   Allergen Reactions    Bee pollens Shortness Of Breath     WASP, BEE, ANT AND CATERPILLER STINGS    Milk containing products Diarrhea    Metoprolol Rash     Current Outpatient Prescriptions   Medication Sig Dispense Refill    acetaminophen (TYLENOL) 500 MG tablet Take 1 tablet (500 mg total) by mouth every 6 (six) hours as needed for Pain. 60 tablet 0    amLODIPine (NORVASC) 10 MG tablet Take 1  tablet (10 mg total) by mouth once daily. 30 tablet 1    betamethasone dipropionate (DIPROLENE) 0.05 % cream Apply topically 2 (two) times daily as needed. Use very sparingly to affected area only.  Do not use for longer than 2 weeks 45 g 3    cholestyramine-aspartame (QUESTRAN LIGHT) 4 gram Powd Take one scoop a day for 3 days, then two scoops a day for three days, then three scoops a day (Patient taking differently: every other day. Take one scoop a day for 3 days, then two scoops a day for three days, then three scoops a day) 231 g 11    epinephrine (EPIPEN) 0.3 mg/0.3 mL (1:1,000) AtIn Inject into the muscle once.      losartan (COZAAR) 50 MG tablet Take 1 tablet (50 mg total) by mouth 2 (two) times daily. 60 tablet 11    neomycin-polymyxin-dexamethasone (MAXITROL) 3.5mg/mL-10,000 unit/mL-0.1 % DrpS One drop four times a day      rosuvastatin (CRESTOR) 20 MG tablet Take 1 tablet (20 mg total) by mouth once daily. 90 tablet 3     No current facility-administered medications for this visit.      Review of Systems   Constitutional: Negative for activity change, appetite change, chills, fatigue and fever.        Does not exercise much   HENT: Positive for hearing loss.         Wearing hearing aid to right ear   Eyes: Negative for visual disturbance.   Respiratory: Negative for cough.    Cardiovascular: Negative for chest pain, palpitations and leg swelling.   Gastrointestinal: Positive for diarrhea. Negative for abdominal pain, constipation, nausea and vomiting.        Has been having some diarrhea and takes questran as needed   Genitourinary: Negative for difficulty urinating.   Musculoskeletal: Positive for arthralgias.        Some pain to right elbow and left foot   Skin: Negative for rash and wound.   Neurological: Negative for dizziness, numbness and headaches.   Hematological: Negative for adenopathy.   Psychiatric/Behavioral: Negative for dysphoric mood, sleep disturbance and suicidal ideas. The patient  "is not nervous/anxious.        Objective:       Vitals:    12/11/17 0834 12/11/17 0838 12/11/17 0914   BP: (!) 185/80 (!) 180/90 (!) 160/78   BP Location: Right arm Left arm Right arm   Patient Position: Sitting Sitting Sitting   BP Method: Large (Automatic)  Medium (Manual)   Pulse: 60     Temp: 98.2 °F (36.8 °C)     TempSrc: Oral     Weight: 74.8 kg (165 lb)     Height: 5' 5" (1.651 m)       Physical Exam   Constitutional: He is oriented to person, place, and time. He appears well-developed and well-nourished. No distress.   HENT:   Head: Normocephalic and atraumatic.   Eyes: Lids are normal. Pupils are equal, round, and reactive to light. Right eye exhibits no discharge. Left eye exhibits no discharge.   Neck: Normal carotid pulses present. Carotid bruit is not present.   Cardiovascular: Normal rate, regular rhythm, S1 normal, S2 normal, normal heart sounds, intact distal pulses and normal pulses.    No murmur heard.  Pulses:       Carotid pulses are 2+ on the right side, and 2+ on the left side.       Radial pulses are 2+ on the right side, and 2+ on the left side.        Posterior tibial pulses are 2+ on the right side, and 2+ on the left side.   No edema   Pulmonary/Chest: Effort normal and breath sounds normal. No respiratory distress. He has no decreased breath sounds. He has no wheezes. He has no rhonchi. He has no rales.   Musculoskeletal: Normal range of motion.   Neurological: He is alert and oriented to person, place, and time. He has normal strength.   Skin: Skin is warm, dry and intact. Capillary refill takes less than 2 seconds. He is not diaphoretic. No pallor.   Psychiatric: He has a normal mood and affect. His speech is normal and behavior is normal. Judgment and thought content normal. Cognition and memory are normal.   Nursing note and vitals reviewed.      Assessment:       1. Essential hypertension    2. Hyperlipidemia, unspecified hyperlipidemia type    3. Cochlear implant in place    4. " Decreased GFR    5. Elevated liver enzymes    6. High risk medication use    7. BMI 27.0-27.9,adult        Plan:   Buddy was seen today for follow-up of high blood pressure following medication change.    Diagnoses and all orders for this visit:    Essential hypertension  Comments:  Poorly controlled, patient not taking medication as prescribed, encouraged to take medication as ordered, return 2 weeks for nurse BP check  Educational handouts provided. Uncontrolled high blood pressure; Taking your blood pressure; Controlling your blood pressure  Orders:  -     Comprehensive metabolic panel; Future    Hyperlipidemia, unspecified hyperlipidemia type  Comments:  Taking Crestor, repeat CMP and Lipid end of February  Orders:  -     Comprehensive metabolic panel; Future  -     Lipid panel; Future    Cochlear implant in place  Comments:  Stable, hearing aid to right, able to communicate without difficulty    Decreased GFR  Comments:  Discussed poorly controlled HTN and kidney disease  Advised no OTC or prescription NSAIDs  Orders:  -     Comprehensive metabolic panel; Future    Elevated liver enzymes  Comments:  Has had an US of abdomen that showed fatty liver disease, repeat CMP at end of February  Orders:  -     Comprehensive metabolic panel; Future    High risk medication use  -     Comprehensive metabolic panel; Future    BMI 27.0-27.9,adult   Weight loss recommended with well balanced diet and portion controlled meals and increased activity.       Return in about 2 weeks (around 12/25/2017),  for Nurse visit for blood pressure check and in 3 month regular check up, labs to be done at end of February    Patient readiness: acceptance and barriers:none    During the course of the visit the patient was educated and counseled about the following:     Hypertension:   Medication: no change. Take medication as instructed.  Poorly controlled hypertension leads to kidney disease.    Goals: Hypertension: Reduce Blood  Pressure    Did patient meet goals/outcomes: No    The following self management tools provided: declined    Patient Instructions (the written plan) was given to the patient/family.     Time spent with patient: 30 minutes    Barriers to medications present remembering to take as prescribed, discussed use of pill box, placing medication with toothbrush.    Adverse reactions to current medications (no)    Over the counter medications reviewed (Yes)

## 2017-12-28 ENCOUNTER — CLINICAL SUPPORT (OUTPATIENT)
Dept: FAMILY MEDICINE | Facility: CLINIC | Age: 67
End: 2017-12-28
Payer: MEDICARE

## 2017-12-28 NOTE — PROGRESS NOTES
Patient came into clinic today for blood pressure check. Today his blood pressure reading was 132/76 right arm manually with a pulse of 73. His current medications include amlodipine 10 mg and losartan 50 mg.

## 2018-01-29 ENCOUNTER — OFFICE VISIT (OUTPATIENT)
Dept: FAMILY MEDICINE | Facility: CLINIC | Age: 68
End: 2018-01-29
Payer: MEDICARE

## 2018-01-29 VITALS
SYSTOLIC BLOOD PRESSURE: 152 MMHG | TEMPERATURE: 98 F | HEART RATE: 68 BPM | WEIGHT: 166.69 LBS | DIASTOLIC BLOOD PRESSURE: 90 MMHG | BODY MASS INDEX: 27.77 KG/M2 | HEIGHT: 65 IN

## 2018-01-29 DIAGNOSIS — I10 ESSENTIAL HYPERTENSION: ICD-10-CM

## 2018-01-29 DIAGNOSIS — E78.5 HYPERLIPIDEMIA, UNSPECIFIED HYPERLIPIDEMIA TYPE: ICD-10-CM

## 2018-01-29 DIAGNOSIS — Z00.00 ANNUAL PHYSICAL EXAM: Primary | ICD-10-CM

## 2018-01-29 PROCEDURE — 99397 PER PM REEVAL EST PAT 65+ YR: CPT | Mod: S$GLB,,, | Performed by: NURSE PRACTITIONER

## 2018-01-29 PROCEDURE — 99499 UNLISTED E&M SERVICE: CPT | Mod: S$GLB,,, | Performed by: NURSE PRACTITIONER

## 2018-01-29 PROCEDURE — 99999 PR PBB SHADOW E&M-EST. PATIENT-LVL III: CPT | Mod: PBBFAC,,, | Performed by: NURSE PRACTITIONER

## 2018-01-29 NOTE — PROGRESS NOTES
Subjective:       Patient ID: Buddy Park is a 67 y.o. male.    Chief Complaint: Annual Exam    Mr. Park presents to the clinic today for annual exam.  He has no new complaints today.  He has only been taking losartan once per day instead of twice as prescribed, and blood pressure is elevated.  He does not eat a low salt diet.  He exercises five hours per week.  He does not drink alcohol.  He has hearing loss to left ear and cochlear implant did not help.  He is scheduled for labs next month.  He declines Prevnar.      Review of Systems   Constitutional: Negative for chills and fever.   HENT: Positive for hearing loss. Negative for congestion, ear pain and sinus pressure.    Respiratory: Negative for cough, shortness of breath and wheezing.    Cardiovascular: Negative for chest pain, palpitations and leg swelling.   Gastrointestinal: Negative for abdominal pain, constipation and diarrhea.       Objective:      Physical Exam   Constitutional: He is oriented to person, place, and time. He appears well-developed and well-nourished. No distress.   HENT:   Head: Normocephalic and atraumatic.   Right Ear: External ear normal.   Left Ear: External ear normal.   Mouth/Throat: Oropharynx is clear and moist. No oropharyngeal exudate.   Hearing aid right ear   Eyes: Pupils are equal, round, and reactive to light. Right eye exhibits no discharge. Left eye exhibits no discharge.   Neck: Neck supple. No thyromegaly present.   Cardiovascular: Normal rate and regular rhythm.  Exam reveals no gallop and no friction rub.    No murmur heard.  Pulmonary/Chest: Effort normal and breath sounds normal. No respiratory distress. He has no wheezes. He has no rales.   Abdominal: Soft. He exhibits no distension. There is no tenderness.   Lymphadenopathy:     He has no cervical adenopathy.   Neurological: He is alert and oriented to person, place, and time. Coordination normal.   Skin: Skin is warm and dry.   Psychiatric: He has a normal  mood and affect. His behavior is normal. Thought content normal.   Vitals reviewed.          Current Outpatient Prescriptions:     acetaminophen (TYLENOL) 500 MG tablet, Take 1 tablet (500 mg total) by mouth every 6 (six) hours as needed for Pain., Disp: 60 tablet, Rfl: 0    amLODIPine (NORVASC) 10 MG tablet, Take 1 tablet (10 mg total) by mouth once daily., Disp: 30 tablet, Rfl: 1    betamethasone dipropionate (DIPROLENE) 0.05 % cream, Apply topically 2 (two) times daily as needed. Use very sparingly to affected area only.  Do not use for longer than 2 weeks, Disp: 45 g, Rfl: 3    cholestyramine-aspartame (QUESTRAN LIGHT) 4 gram Powd, Take one scoop a day for 3 days, then two scoops a day for three days, then three scoops a day (Patient taking differently: every other day. Take one scoop a day for 3 days, then two scoops a day for three days, then three scoops a day), Disp: 231 g, Rfl: 11    epinephrine (EPIPEN) 0.3 mg/0.3 mL (1:1,000) AtIn, Inject into the muscle once., Disp: , Rfl:     losartan (COZAAR) 50 MG tablet, Take 1 tablet (50 mg total) by mouth 2 (two) times daily., Disp: 60 tablet, Rfl: 11    neomycin-polymyxin-dexamethasone (MAXITROL) 3.5mg/mL-10,000 unit/mL-0.1 % DrpS, One drop four times a day, Disp: , Rfl:     rosuvastatin (CRESTOR) 20 MG tablet, Take 1 tablet (20 mg total) by mouth once daily., Disp: 90 tablet, Rfl: 3  Assessment:       1. Annual physical exam    2. Essential hypertension    3. Hyperlipidemia, unspecified hyperlipidemia type        Plan:       Annual physical exam  Labs as scheduled.    Essential hypertension  He will check prescription bottle for losartan he has at home against his AVS.    BP check two weeks nurse visit.  Labs already scheduled.  DASH diet, 150 minutes moderate exercise weekly.    Hyperlipidemia, unspecified hyperlipidemia type  Labs as scheduled.    Patient readiness: acceptance and barriers:readiness    During the course of the visit the patient was  educated and counseled about the following:     Hypertension:   Dietary sodium restriction.  Regular aerobic exercise.    Goals: Hypertension: Reduce Blood Pressure    Did patient meet goals/outcomes: No    The following self management tools provided: declined    Patient Instructions (the written plan) was given to the patient/family.     Time spent with patient: 30 minutes    Barriers to medications present (yes )    Adverse reactions to current medications (no)    Over the counter medications reviewed (Yes)

## 2018-01-30 NOTE — PROGRESS NOTES
Patient, Buddy Park (MRN #[id number]), presented with a recent Ejection Fraction less than 45% consistent with the definition of cardiomyopathy (ICD10- I42.8).    EF   Date Value Ref Range Status   10/03/2013 40       The patient's cardiomyopathy was monitored, evaluated, addressed and/or treated. This addendum to the medical record is made on 01/29/2018.

## 2018-01-31 ENCOUNTER — PES CALL (OUTPATIENT)
Dept: ADMINISTRATIVE | Facility: CLINIC | Age: 68
End: 2018-01-31

## 2018-02-08 DIAGNOSIS — I10 ESSENTIAL HYPERTENSION: ICD-10-CM

## 2018-02-08 RX ORDER — LOSARTAN POTASSIUM 50 MG/1
50 TABLET ORAL 2 TIMES DAILY
Qty: 180 TABLET | Refills: 0 | Status: SHIPPED | OUTPATIENT
Start: 2018-02-08 | End: 2018-02-27 | Stop reason: DRUGHIGH

## 2018-02-12 ENCOUNTER — CLINICAL SUPPORT (OUTPATIENT)
Dept: FAMILY MEDICINE | Facility: CLINIC | Age: 68
End: 2018-02-12
Payer: MEDICARE

## 2018-02-12 VITALS — HEART RATE: 70 BPM | DIASTOLIC BLOOD PRESSURE: 90 MMHG | SYSTOLIC BLOOD PRESSURE: 158 MMHG

## 2018-02-12 DIAGNOSIS — I10 ESSENTIAL HYPERTENSION: Primary | ICD-10-CM

## 2018-02-12 PROCEDURE — 99999 PR PBB SHADOW E&M-EST. PATIENT-LVL III: CPT | Mod: PBBFAC,,, | Performed by: NURSE PRACTITIONER

## 2018-02-12 RX ORDER — CHLORTHALIDONE 25 MG/1
25 TABLET ORAL DAILY
Qty: 30 TABLET | Refills: 11 | Status: SHIPPED | OUTPATIENT
Start: 2018-02-12 | End: 2018-02-27 | Stop reason: ALTCHOICE

## 2018-02-12 NOTE — PROGRESS NOTES
Patient here for BP check due to elevated reading at his last 2 visits. States that he has been taking his medications as prescribed. He has not been monitoring his BP at home and just returned from a 7 day cruise. BP this AM are 160/90 P 70 taken with the automatic cuff. After sitting 10 minutes his reading was 158/90 manually. Ms Sears was informed and has added chlorthalidone 25 mg daily to the medication regimen. Patient has been instructed on new medication and verbalizes full understanding. He was provided with a BP record card and instructed to monitor BP at home daily and scheduled to return in 2 weeks for a nurse visit for BP check.

## 2018-02-19 ENCOUNTER — DOCUMENTATION ONLY (OUTPATIENT)
Dept: FAMILY MEDICINE | Facility: CLINIC | Age: 68
End: 2018-02-19

## 2018-02-19 NOTE — PROGRESS NOTES
Pre-Visit Chart Review  For Appointment Scheduled on 2/20/2018    There are no preventive care reminders to display for this patient.

## 2018-02-20 ENCOUNTER — OFFICE VISIT (OUTPATIENT)
Dept: FAMILY MEDICINE | Facility: CLINIC | Age: 68
End: 2018-02-20
Payer: MEDICARE

## 2018-02-20 VITALS
DIASTOLIC BLOOD PRESSURE: 59 MMHG | HEIGHT: 65 IN | SYSTOLIC BLOOD PRESSURE: 87 MMHG | TEMPERATURE: 98 F | BODY MASS INDEX: 27.73 KG/M2 | HEART RATE: 92 BPM | WEIGHT: 166.44 LBS

## 2018-02-20 DIAGNOSIS — Z00.00 ENCOUNTER FOR PREVENTIVE HEALTH EXAMINATION: Primary | ICD-10-CM

## 2018-02-20 DIAGNOSIS — I42.8 NONISCHEMIC CARDIOMYOPATHY: ICD-10-CM

## 2018-02-20 DIAGNOSIS — Z13.31 POSITIVE DEPRESSION SCREENING: ICD-10-CM

## 2018-02-20 DIAGNOSIS — I70.0 ABDOMINAL AORTIC ATHEROSCLEROSIS: ICD-10-CM

## 2018-02-20 DIAGNOSIS — H91.90 DEAFNESS, UNSPECIFIED LATERALITY: ICD-10-CM

## 2018-02-20 DIAGNOSIS — Z80.0 FAMILY HISTORY OF COLON CANCER IN MOTHER: ICD-10-CM

## 2018-02-20 DIAGNOSIS — I20.9 ANGINA PECTORIS: ICD-10-CM

## 2018-02-20 DIAGNOSIS — I10 ESSENTIAL HYPERTENSION: ICD-10-CM

## 2018-02-20 DIAGNOSIS — Z86.010 HISTORY OF COLON POLYPS: ICD-10-CM

## 2018-02-20 DIAGNOSIS — E78.5 HYPERLIPIDEMIA, UNSPECIFIED HYPERLIPIDEMIA TYPE: ICD-10-CM

## 2018-02-20 DIAGNOSIS — I77.9 CAROTID DISEASE, BILATERAL: ICD-10-CM

## 2018-02-20 PROBLEM — Z86.0100 HISTORY OF COLON POLYPS: Status: ACTIVE | Noted: 2018-02-20

## 2018-02-20 PROCEDURE — 99499 UNLISTED E&M SERVICE: CPT | Mod: S$GLB,,, | Performed by: PHYSICIAN ASSISTANT

## 2018-02-20 PROCEDURE — G0402 INITIAL PREVENTIVE EXAM: HCPCS | Mod: S$GLB,,, | Performed by: PHYSICIAN ASSISTANT

## 2018-02-20 PROCEDURE — 99999 PR PBB SHADOW E&M-EST. PATIENT-LVL V: CPT | Mod: PBBFAC,,, | Performed by: PHYSICIAN ASSISTANT

## 2018-02-20 NOTE — PROGRESS NOTES
"Buddy Park presented for a  Medicare AWV and comprehensive Health Risk Assessment today. The following components were reviewed and updated:    · Medical history  · Family History  · Social history  · Allergies and Current Medications  · Health Risk Assessment  · Health Maintenance  · Care Team     ** See Completed Assessments for Annual Wellness Visit within the encounter summary.**       The following assessments were completed:  · Living Situation  · CAGE  · Depression Screening  · Timed Get Up and Go  · Whisper Test  · Cognitive Function Screening  · Nutrition Screening  · ADL Screening  · PAQ Screening    I offered to discuss end of life issues, including information on how to make advance directives that the patient could use to name someone who would make medical decisions on their behalf if they became too ill to make themselves.    ___Patient declined  _X_Patient is interested, I provided paper work and offered to discuss.  Vitals:    02/20/18 1255 02/20/18 1349   BP: (!) 92/57 (!) 87/59   BP Location: Right arm    Patient Position: Sitting    BP Method: Large (Automatic)    Pulse: 95 92   Temp: 97.7 °F (36.5 °C)    TempSrc: Oral    Weight: 75.5 kg (166 lb 7.2 oz)    Height: 5' 5" (1.651 m)      Body mass index is 27.7 kg/m².  Physical Exam   Constitutional: He is oriented to person, place, and time. He appears well-developed and well-nourished.   HENT:   Head: Normocephalic and atraumatic.   Eyes: Conjunctivae and EOM are normal. Pupils are equal, round, and reactive to light.   Cardiovascular: Normal rate, regular rhythm, normal heart sounds and intact distal pulses.    Pulmonary/Chest: Effort normal and breath sounds normal.   Neurological: He is alert and oriented to person, place, and time.   Skin: Skin is warm and dry.   Psychiatric: His speech is normal and behavior is normal. His mood appears anxious. He exhibits a depressed mood.             Diagnoses and health risks identified today and " associated recommendations/orders:    Encounter for preventive health examination    Deafness, unspecified laterality  Comments:  Chronic, continue to follow with PCP    Nonischemic cardiomyopathy  Comments:  Stable, last Echo 2013 showed EF 40%, no follow up since that time, recommend re-establish with Cardiology  Orders:  -     Ambulatory referral to Cardiology    Hyperlipidemia, unspecified hyperlipidemia type  Comments:  Stable, recently changed from lipitor to crestor, continue to follow with PCP    Essential hypertension  Comments:  Hypotensive today in clinic, decrease chlorthalidone 1/2 tab PO daily, continue norvasc 10 mg daily, losartan 50 mg BID, monitor BP at home with goal >100/60    Carotid disease, bilateral  Comments:  Stable, mild plaque notes on carotid US 2011, cholesterol and BP controlled, continue per PCP    Positive depression screening  Comments:  Positive depression screen, pt declined SI/HI. Recommend f/u with PCP within 1-2 weeks    Family history of colon cancer in mother  -     Ambulatory referral to Gastroenterology    History of colon polyps  -     Ambulatory referral to Gastroenterology. Due for colonoscopy 1/2018    Angina pectoris  Comments:  Stable, hx of angina and nitro use, overdue for cardiology follow up    Abdominal aortic atherosclerosis  Comments:  Stable, mild plaque noted on CT ab/pelvis 3/2012, BP and cholesterol controlled per PCP        Provided Buddy with a 5-10 year written screening schedule and personal prevention plan. Recommendations were developed using the USPSTF age appropriate recommendations. Education, counseling, and referrals were provided as needed. After Visit Summary printed and given to patient which includes a list of additional screenings\tests needed.    Follow up with PCP team in 1-2 weeks for (positive depression screen, foot pain, hypotension). Pt declines influenza and pneumonia vaccines.    Katie Waterman PA-C

## 2018-02-20 NOTE — Clinical Note
Primary Care Providers: Buddy Chatman MD, MD (General)  Your patient was seen today for a HRA visit. Gap(s) in care (HEDIS gaps) have been identified during this visit that require additional testing and possible follow up.  Orders Placed This Encounter     Ambulatory referral to Gastroenterology     Ambulatory referral to Cardiology   These orders were placed using [facility] approved protocol and any results will be forwarded to your office for appropriate follow up. I have included a copy of my visit note; please review the note and feel free to contact me with any questions.   Thank you for allowing me to participate in the care of your patients. Katie Waterman PA-C

## 2018-02-20 NOTE — PATIENT INSTRUCTIONS
Counseling and Referral of Other Preventative  (Italic type indicates deductible and co-insurance are waived)    Patient Name: Buddy Park  Today's Date: 2/20/2018    Health Maintenance       Date Due Completion Date    Pneumococcal (65+) (1 of 2 - PCV13) 07/29/2018 (Originally 12/16/2017) ---    Lipid Panel 11/13/2018 11/13/2017    High Dose Statin 01/29/2019 1/29/2018    TETANUS VACCINE 03/10/2022 3/10/2012    Colonoscopy 11/13/2022 11/13/2017 (Declined)    Override on 11/13/2017: Declined        Patient is due for colonoscopy and referral will be placed today.  The following information is provided to all patients.  This information is to help you find resources for any of the problems found today that may be affecting your health:                Living healthy guide: www.[facility].gov      Understanding Diabetes: www.diabetes.org      Eating healthy: www.cdc.gov/healthyweight      CDC home safety checklist: www.cdc.gov/steadi/patient.html      Agency on Aging: www.goea.[place].gov      Alcoholics anonymous (AA): www.aa.org      Physical Activity: www.noreen.nih.gov/oc7xgmh      Tobacco use: www.quitwithusla.org

## 2018-02-21 DIAGNOSIS — E78.5 HYPERLIPIDEMIA, UNSPECIFIED HYPERLIPIDEMIA TYPE: ICD-10-CM

## 2018-02-21 RX ORDER — ATORVASTATIN CALCIUM 40 MG/1
TABLET, FILM COATED ORAL
Qty: 30 TABLET | Refills: 11 | OUTPATIENT
Start: 2018-02-21

## 2018-02-26 ENCOUNTER — LAB VISIT (OUTPATIENT)
Dept: LAB | Facility: HOSPITAL | Age: 68
End: 2018-02-26
Attending: NURSE PRACTITIONER
Payer: MEDICARE

## 2018-02-26 ENCOUNTER — CLINICAL SUPPORT (OUTPATIENT)
Dept: FAMILY MEDICINE | Facility: CLINIC | Age: 68
End: 2018-02-26
Payer: MEDICARE

## 2018-02-26 VITALS — HEART RATE: 83 BPM | SYSTOLIC BLOOD PRESSURE: 123 MMHG | DIASTOLIC BLOOD PRESSURE: 81 MMHG

## 2018-02-26 DIAGNOSIS — Z79.899 HIGH RISK MEDICATION USE: ICD-10-CM

## 2018-02-26 DIAGNOSIS — R94.4 DECREASED GFR: ICD-10-CM

## 2018-02-26 DIAGNOSIS — I10 HYPERTENSION, UNSPECIFIED TYPE: Primary | ICD-10-CM

## 2018-02-26 DIAGNOSIS — E78.5 HYPERLIPIDEMIA, UNSPECIFIED HYPERLIPIDEMIA TYPE: ICD-10-CM

## 2018-02-26 DIAGNOSIS — R74.8 ELEVATED LIVER ENZYMES: ICD-10-CM

## 2018-02-26 DIAGNOSIS — I10 ESSENTIAL HYPERTENSION: ICD-10-CM

## 2018-02-26 LAB
ALBUMIN SERPL BCP-MCNC: 4 G/DL
ALP SERPL-CCNC: 71 U/L
ALT SERPL W/O P-5'-P-CCNC: 21 U/L
ANION GAP SERPL CALC-SCNC: 12 MMOL/L
AST SERPL-CCNC: 16 U/L
BILIRUB SERPL-MCNC: 1 MG/DL
BUN SERPL-MCNC: 33 MG/DL
CALCIUM SERPL-MCNC: 10.2 MG/DL
CHLORIDE SERPL-SCNC: 101 MMOL/L
CHOLEST SERPL-MCNC: 160 MG/DL
CHOLEST/HDLC SERPL: 4.4 {RATIO}
CO2 SERPL-SCNC: 31 MMOL/L
CREAT SERPL-MCNC: 1.6 MG/DL
EST. GFR  (AFRICAN AMERICAN): 51.5 ML/MIN/1.73 M^2
EST. GFR  (NON AFRICAN AMERICAN): 44.5 ML/MIN/1.73 M^2
GLUCOSE SERPL-MCNC: 122 MG/DL
HDLC SERPL-MCNC: 36 MG/DL
HDLC SERPL: 22.5 %
LDLC SERPL CALC-MCNC: 70.6 MG/DL
NONHDLC SERPL-MCNC: 124 MG/DL
POTASSIUM SERPL-SCNC: 4.4 MMOL/L
PROT SERPL-MCNC: 7.8 G/DL
SODIUM SERPL-SCNC: 144 MMOL/L
TRIGL SERPL-MCNC: 267 MG/DL

## 2018-02-26 PROCEDURE — 80061 LIPID PANEL: CPT

## 2018-02-26 PROCEDURE — 80053 COMPREHEN METABOLIC PANEL: CPT

## 2018-02-26 PROCEDURE — 36415 COLL VENOUS BLD VENIPUNCTURE: CPT | Mod: PO

## 2018-02-26 PROCEDURE — 99999 PR PBB SHADOW E&M-EST. PATIENT-LVL II: CPT | Mod: PBBFAC,,, | Performed by: NURSE PRACTITIONER

## 2018-02-26 NOTE — PROGRESS NOTES
"Patient here today for BP check. Reading this morning was 123/81 P 83.  Chlorthalidone 25 mg daily was added to his medication regimen at his last BP check visit on 2/12/18. After starting this medication the patient reports that he was very tired and lightheaded. BP was 86/63, so he decreased to 1/2 tab ob the chlorthalidone. The lightheadedness continued for the next 3 days and patient discontinued this medication all together. Patient reports that he feels "great" at present. He has an appointment with Ms Mejía on tomorrow to address several issues. He has been instructed to bring his home BP monitor with him so it can be checked for accuracy.   Per Ms Sears; Patient should stay off of chlorthalidone for now.  Patient was instructed and verbalizes understanding.  "

## 2018-02-27 ENCOUNTER — OFFICE VISIT (OUTPATIENT)
Dept: FAMILY MEDICINE | Facility: CLINIC | Age: 68
End: 2018-02-27
Payer: MEDICARE

## 2018-02-27 VITALS
HEART RATE: 79 BPM | TEMPERATURE: 98 F | BODY MASS INDEX: 27.66 KG/M2 | WEIGHT: 166 LBS | HEIGHT: 65 IN | SYSTOLIC BLOOD PRESSURE: 129 MMHG | DIASTOLIC BLOOD PRESSURE: 79 MMHG

## 2018-02-27 DIAGNOSIS — G47.00 INSOMNIA, UNSPECIFIED TYPE: ICD-10-CM

## 2018-02-27 DIAGNOSIS — H91.92 DEAFNESS IN LEFT EAR: ICD-10-CM

## 2018-02-27 DIAGNOSIS — K52.9 CHRONIC DIARRHEA: ICD-10-CM

## 2018-02-27 DIAGNOSIS — R53.82 CHRONIC FATIGUE: ICD-10-CM

## 2018-02-27 DIAGNOSIS — F41.8 DEPRESSION WITH ANXIETY: Primary | ICD-10-CM

## 2018-02-27 DIAGNOSIS — R47.9 SPEECH IMPEDIMENT: ICD-10-CM

## 2018-02-27 DIAGNOSIS — I10 ESSENTIAL HYPERTENSION: ICD-10-CM

## 2018-02-27 PROCEDURE — 99499 UNLISTED E&M SERVICE: CPT | Mod: S$GLB,,, | Performed by: NURSE PRACTITIONER

## 2018-02-27 PROCEDURE — 3008F BODY MASS INDEX DOCD: CPT | Mod: S$GLB,,, | Performed by: NURSE PRACTITIONER

## 2018-02-27 PROCEDURE — 99999 PR PBB SHADOW E&M-EST. PATIENT-LVL IV: CPT | Mod: PBBFAC,,, | Performed by: NURSE PRACTITIONER

## 2018-02-27 PROCEDURE — 99214 OFFICE O/P EST MOD 30 MIN: CPT | Mod: S$GLB,,, | Performed by: NURSE PRACTITIONER

## 2018-02-27 RX ORDER — LOSARTAN POTASSIUM 100 MG/1
100 TABLET ORAL DAILY
Qty: 90 TABLET | Refills: 3 | Status: SHIPPED | OUTPATIENT
Start: 2018-02-27 | End: 2019-03-17 | Stop reason: SDUPTHER

## 2018-02-27 RX ORDER — ESCITALOPRAM OXALATE 10 MG/1
10 TABLET ORAL DAILY
Qty: 30 TABLET | Refills: 1 | Status: SHIPPED | OUTPATIENT
Start: 2018-02-27 | End: 2018-03-27

## 2018-02-27 NOTE — PATIENT INSTRUCTIONS
What Can Cause Depression?  Depression is a real illness and certain factors have been known to trigger it. Below are some common known causes. Any of these factors, or a combination of them, can make depression more likely. Sometimes, depression occurs for no one clear reason. But no matter what the cause, depression can be treated.    Loss or stress  Depression can occur in children and adults, but it often starts in adulthood. Normal grief over a death, breakup, or other loss may become depression. Life stresses such as physical abuse, job loss, or sudden change in finances can also trigger depression. In some cases, years go by before the depression sets in.  Family history  The tendency to develop depression seems to run in families. If one or more of your close relatives (parents, grandparents, or siblings) have had an episode of depression, you may be more likely to develop the illness, too.  Drugs or alcohol  Drugs and alcohol can upset the chemical balance in the brain. This can lead to an episode of depression. Some depressed people turn to drugs or alcohol to numb the pain. But in the long run, doing so just makes depression worse.  Medicines  Depression can be a side effect of some medicines for high blood pressure, cancer, pain, and other health problems. So tell your doctor about all medicines you take. But never stop taking one without your doctors OK.  Physical illness  Being sick can make anyone feel frustrated and sad. But some health problems may cause actual changes in your brain that lead to depression. Other health problems, such as an underactive thyroid, may be mistaken for depression.  Hormones  Hormones carry messages in the bloodstream. They may affect brain chemicals, leading to depression. Women may get depressed when their hormone levels change quickly, such as just before their period, after giving birth, or during menopause.  Date Last Reviewed: 1/1/2017  © 7052-8931 [employer]. [address] [place] [address], [place] [address]. All rights reserved. This information is not intended as a substitute for professional medical care. Always follow your healthcare professional's instructions.        Know the Signs and Symptoms of Depression  Everyone feels down at times. The blues are a natural part of life. But an unhappy period thats intense or lasts for more than a couple of weeks can be a sign of depression.  Depression is a serious illness. It is not a sign of weakness or a "character flaw," and it is not something you can "snap out of." In fact, most people with depression need treatment to get better. Depression can disrupt the lives of family and friends. If you know someone you think may be depressed, find out what you can do to help.    Recognizing signs of depression  People who are depressed may:  · Feel unhappy, sad, blue, down, or miserable nearly every day  · Feel helpless, hopeless, or worthless  · Lose interest in hobbies, friends, and activities that used to give pleasure  · Not sleep well or sleep too much  · Gain or lose weight  · Feel low on energy or constantly tired  · Have a hard time concentrating or making decisions  · Lose interest in sex  · Have physical symptoms, such as stomachaches, headaches, or backaches  Know the serious signals  Never ignore a person's comments about suicide or behaviors that can lead to self-harm. Warning signals for suicide include:  · Threats or talk of suicide  · Statements such as I wont be a problem much longer or Nothing matters  · Giving away possessions or making a will or  arrangements  · Buying a gun or other weapon  · Sudden, unexplained cheerfulness or calm after a period of depression  If you notice any of these signs, get help right away. Call a healthcare professional, mental health clinic, or suicide hotline and ask what action to take. In an emergency, dont hesitate to call the police.  Resources:  · National " Institutes of Mental Wuudlz335-442-4438esq.[facility].nih.gov  · National Boss on Mental Negdbar344-024-5455jxd.rigoberto.org   · Mental Health Ihagimq296-331-6534ues.[facility].org  · National Suicide Ufcwyhi077-471-9968 (800-SUICIDE)  · National Suicide Prevention Tvkidzyz608-103-3713 (918-661-QQPS)www.suicidepreventionlifeline.org   Date Last Reviewed: 1/1/2017  © 9680-0901 [employer]. [address] [place] [address]. All rights reserved. This information is not intended as a substitute for professional medical care. Always follow your healthcare professional's instructions.        Depression Affects Your Mind and Body  Everyone feels sad or blue from time to time for a few days or weeks. Depression is when these feelings don't go away and they interfere with daily life.  Depression is a real illness that can develop at any age. It is one of the most common mental health problems in the [place] Depression makes you feel sad, helpless, and hopeless. It gets in the way of your life and relationships. It inhibits your ability to think and act. But, with help, you can feel better again.     When I was depressed, I felt awful. I was so tired all the time I could hardly think, but at night I couldnt fall asleep. My head hurt. My stomach hurt. I didnt know what was wrong with me.   Depression affects your whole body  Brain chemicals affect your body as well as your mood. So depression may do more than just make you feel low. You may also feel bad physically. Depression can:  · Cause trouble with mental tasks such as remembering, concentrating, or making decisions  · Make you feel nervous and jumpy  · Cause trouble sleeping. Or you may sleep too much  · Change your appetite  · Cause headaches, stomachaches, or other aches and pains  · Drain your body of energy  Depression and other illness  It is common for people who have chronic health problems to also have depression. It can often be hard to tell which  one caused the other. A person might become depressed after finding out they have a health problem. But some studies suggest being depressed may make certain health problems more likely. And some depressed people stop taking care of themselves. This may make them more likely to get sick.  Date Last Reviewed: 1/1/2017  © 8790-7219 [employer]. [address] [place] [address], [place] [address]. All rights reserved. This information is not intended as a substitute for professional medical care. Always follow your healthcare professional's instructions.        Depression: Tips to Help Yourself  As your healthcare providers help treat your depression, you can also help yourself. Keep in mind that your illness affects you emotionally, physically, mentally, and socially. So full recovery will take time. Take care of your body and your soul, and be patient with yourself as you get better.    Self-care  · Educate yourself. Read about treatment and medicine options. If you have the energy, attend local conferences or support groups. Keep a list of useful websites and helpful books and use them as needed. This illness is not your fault. Dont blame yourself for your depression.  · Manage early symptoms. If you notice symptoms returning, experience triggers, or identify other factors that may lead to a depressive episode, get help as soon as possible. Ask trusted friends and family to monitor your behavior and let you know if they see anything of concern.  · Work with your provider. Find a provider you can trust. Communicate honestly with that person and share information on your treatment for depression and your reaction to medicines.  · Be prepared for a crisis. Know what to do if you experience a crisis. Keep the phone number of a crisis hotline and know the location of your community's urgent care centers and the closest emergency department.  · Hold off on big decisions. Depression can cloud your judgment. So wait  until you feel better before making major life decisions, such as changing jobs, moving, or getting  or .  · Be patient. Recovering from depression is a process. Dont be discouraged if it takes some time to feel better.  · Keep it simple. Depression saps your energy and concentration. So you wont be able to do all the things you used to do. Set small goals and do what you can.  · Be with others. Dont isolate yourself--youll only feel worse. Try to be with other people. And take part in fun activities when you can. Go to a movie, [employer], [employer] service, or social event. Talk openly with people you can trust. And accept help when its offered.  Take care of your body  People with depression often lose the desire to take care of themselves. That only makes their problems worse. During treatment and afterward, make a point to:  · Exercise. Its a great way to take care of your body. And studies have shown that exercise helps fight depression.  · Avoid drugs and alcohol. These may ease the pain in the short term. But theyll only make your problems worse in the long run.  · Get relief from stress. Ask your healthcare provider for relaxation exercises and techniques to help relieve stress.  · Eat right. A balanced and healthy diet helps keep your body healthy.  Date Last Reviewed: 1/1/2017  © 0394-3976 [employer]. [address] [place] [address], [place] [address]. All rights reserved. This information is not intended as a substitute for professional medical care. Always follow your healthcare professional's instructions.
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
## 2018-03-02 DIAGNOSIS — Z86.010 HISTORY OF COLON POLYPS: Primary | ICD-10-CM

## 2018-03-12 ENCOUNTER — HOSPITAL ENCOUNTER (OUTPATIENT)
Facility: HOSPITAL | Age: 68
Discharge: HOME | End: 2018-03-12
Attending: INTERNAL MEDICINE | Admitting: INTERNAL MEDICINE
Payer: MEDICARE

## 2018-03-12 ENCOUNTER — ANESTHESIA (OUTPATIENT)
Dept: ENDOSCOPY | Facility: HOSPITAL | Age: 68
End: 2018-03-12
Payer: MEDICARE

## 2018-03-12 ENCOUNTER — ANESTHESIA EVENT (OUTPATIENT)
Dept: ENDOSCOPY | Facility: HOSPITAL | Age: 68
End: 2018-03-12
Payer: MEDICARE

## 2018-03-12 ENCOUNTER — SURGERY (OUTPATIENT)
Age: 68
End: 2018-03-12

## 2018-03-12 DIAGNOSIS — Z86.010 HISTORY OF COLON POLYPS: ICD-10-CM

## 2018-03-12 DIAGNOSIS — K63.5 POLYP OF COLON, UNSPECIFIED PART OF COLON, UNSPECIFIED TYPE: Primary | ICD-10-CM

## 2018-03-12 DIAGNOSIS — K64.8 INTERNAL HEMORRHOIDS: ICD-10-CM

## 2018-03-12 PROCEDURE — 27201012 HC FORCEPS, HOT/COLD, DISP: Performed by: INTERNAL MEDICINE

## 2018-03-12 PROCEDURE — D9220A PRA ANESTHESIA: Mod: PT,ANES,, | Performed by: ANESTHESIOLOGY

## 2018-03-12 PROCEDURE — D9220A PRA ANESTHESIA: Mod: PT,CRNA,, | Performed by: NURSE ANESTHETIST, CERTIFIED REGISTERED

## 2018-03-12 PROCEDURE — 45380 COLONOSCOPY AND BIOPSY: CPT | Mod: 59,,, | Performed by: INTERNAL MEDICINE

## 2018-03-12 PROCEDURE — 37000009 HC ANESTHESIA EA ADD 15 MINS: Performed by: INTERNAL MEDICINE

## 2018-03-12 PROCEDURE — 63600175 PHARM REV CODE 636 W HCPCS: Performed by: NURSE ANESTHETIST, CERTIFIED REGISTERED

## 2018-03-12 PROCEDURE — 25000003 PHARM REV CODE 250: Performed by: INTERNAL MEDICINE

## 2018-03-12 PROCEDURE — 37000008 HC ANESTHESIA 1ST 15 MINUTES: Performed by: INTERNAL MEDICINE

## 2018-03-12 PROCEDURE — 27201089 HC SNARE, DISP (ANY): Performed by: INTERNAL MEDICINE

## 2018-03-12 PROCEDURE — 45385 COLONOSCOPY W/LESION REMOVAL: CPT | Performed by: INTERNAL MEDICINE

## 2018-03-12 PROCEDURE — 45380 COLONOSCOPY AND BIOPSY: CPT | Performed by: INTERNAL MEDICINE

## 2018-03-12 PROCEDURE — 88305 TISSUE EXAM BY PATHOLOGIST: CPT | Mod: 59 | Performed by: PATHOLOGY

## 2018-03-12 PROCEDURE — 45385 COLONOSCOPY W/LESION REMOVAL: CPT | Mod: PT,,, | Performed by: INTERNAL MEDICINE

## 2018-03-12 RX ORDER — PROPOFOL 10 MG/ML
VIAL (ML) INTRAVENOUS
Status: DISCONTINUED | OUTPATIENT
Start: 2018-03-12 | End: 2018-03-12

## 2018-03-12 RX ORDER — DEXTROMETHORPHAN/PSEUDOEPHED 2.5-7.5/.8
DROPS ORAL
Status: DISCONTINUED
Start: 2018-03-12 | End: 2018-03-12 | Stop reason: HOSPADM

## 2018-03-12 RX ORDER — SODIUM CHLORIDE 9 MG/ML
INJECTION, SOLUTION INTRAVENOUS CONTINUOUS
Status: DISCONTINUED | OUTPATIENT
Start: 2018-03-12 | End: 2018-03-12 | Stop reason: HOSPADM

## 2018-03-12 RX ORDER — LIDOCAINE HCL/PF 100 MG/5ML
SYRINGE (ML) INTRAVENOUS
Status: DISCONTINUED | OUTPATIENT
Start: 2018-03-12 | End: 2018-03-12

## 2018-03-12 RX ADMIN — SODIUM CHLORIDE 1000 ML: 0.9 INJECTION, SOLUTION INTRAVENOUS at 10:03

## 2018-03-12 RX ADMIN — PROPOFOL 40 MG: 10 INJECTION, EMULSION INTRAVENOUS at 11:03

## 2018-03-12 RX ADMIN — LIDOCAINE HYDROCHLORIDE 50 MG: 20 INJECTION, SOLUTION INTRAVENOUS at 11:03

## 2018-03-12 RX ADMIN — PROPOFOL 80 MG: 10 INJECTION, EMULSION INTRAVENOUS at 11:03

## 2018-03-12 NOTE — ANESTHESIA POSTPROCEDURE EVALUATION
"Anesthesia Post Evaluation    Patient: Buddy Park    Procedure(s) Performed: Procedure(s) (LRB):  COLONOSCOPY (N/A)    Final Anesthesia Type: general  Patient location during evaluation: PACU  Patient participation: Yes- Able to Participate  Level of consciousness: awake and alert and oriented  Post-procedure vital signs: reviewed and stable  Pain management: adequate  Airway patency: patent  PONV status at discharge: No PONV  Anesthetic complications: no      Cardiovascular status: blood pressure returned to baseline  Respiratory status: unassisted, spontaneous ventilation and room air  Hydration status: euvolemic  Follow-up not needed.        Visit Vitals  BP (!) 165/94   Pulse 79   Temp 36.7 °C (98.1 °F) (Oral)   Resp 20   Ht 5' 5" (1.651 m)   Wt 74.8 kg (165 lb)   SpO2 99%   BMI 27.46 kg/m²       Pain/Slime Score: Pain Assessment Performed: Yes (3/12/2018 10:45 AM)  Presence of Pain: denies (3/12/2018 10:45 AM)      "

## 2018-03-12 NOTE — PLAN OF CARE
Have you had a colonoscopy LESS THAN 3 years ago? NO  * If YES, answer these questions*:    1. Did patient have a prior colonic polyp in a previous surveillance/diagnostic colonoscopy and is 18 years or older on date of encounter?    2. Documentation of < 3 year interval since the patients last colonoscopy due to medical reasons (eg., last colonoscopy incomplete, last colonoscopy had inadequate prep, piecemeal removal of adenomas, or last colonoscopy found > 10 adenomas) ?

## 2018-03-12 NOTE — DISCHARGE INSTRUCTIONS
"Discharge Instructions: After Your Surgery/Procedure  Youve just had surgery. During surgery you were given medicine called anesthesia to keep you relaxed and free of pain. After surgery you may have some pain or nausea. This is common. Here are some tips for feeling better and getting well after surgery.     Stay on schedule with your medication.   Going home  Your doctor or nurse will show you how to take care of yourself when you go home. He or she will also answer your questions. Have an adult family member or friend drive you home.      For your safety we recommend these precaution for the first 24 hours after your procedure:  · Do not drive or use heavy equipment.  · Do not make important decisions or sign legal papers.  · Do not drink alcohol.  · Have someone stay with you, if needed. He or she can watch for problems and help keep you safe.  · Your concentration, balance, coordination, and judgement may be impaired for many hours after anesthesia.  Use caution when ambulating or standing up.     · You may feel weak and "washed out" after anesthesia and surgery.      Subtle residual effects of general anesthesia or sedation with regional / local anesthesia can last more than 24 hours.  Rest for the remainder of the day or longer if your Doctor/Surgeon has advised you to do so.  Although you may feel normal within the first 24 hours, your reflexes and mental ability may be impaired without you realizing it.  You may feel dizzy, lightheaded or sleepy for 24 hours or longer.      Be sure to go to all follow-up visits with your doctor. And rest after your surgery for as long as your doctor tells you to.  Coping with pain  If you have pain after surgery, pain medicine will help you feel better. Take it as told, before pain becomes severe. Also, ask your doctor or pharmacist about other ways to control pain. This might be with heat, ice, or relaxation. And follow any other instructions your surgeon or nurse gives " you.  Tips for taking pain medicine  To get the best relief possible, remember these points:  · Pain medicines can upset your stomach. Taking them with a little food may help.  · Most pain relievers taken by mouth need at least 20 to 30 minutes to start to work.  · Taking medicine on a schedule can help you remember to take it. Try to time your medicine so that you can take it before starting an activity. This might be before you get dressed, go for a walk, or sit down for dinner.  · Constipation is a common side effect of pain medicines. Call your doctor before taking any medicines such as laxatives or stool softeners to help ease constipation. Also ask if you should skip any foods. Drinking lots of fluids and eating foods such as fruits and vegetables that are high in fiber can also help. Remember, do not take laxatives unless your surgeon has prescribed them.  · Drinking alcohol and taking pain medicine can cause dizziness and slow your breathing. It can even be deadly. Do not drink alcohol while taking pain medicine.  · Pain medicine can make you react more slowly to things. Do not drive or run machinery while taking pain medicine.  Your health care provider may tell you to take acetaminophen to help ease your pain. Ask him or her how much you are supposed to take each day. Acetaminophen or other pain relievers may interact with your prescription medicines or other over-the-counter (OTC) drugs. Some prescription medicines have acetaminophen and other ingredients. Using both prescription and OTC acetaminophen for pain can cause you to overdose. Read the labels on your OTC medicines with care. This will help you to clearly know the list of ingredients, how much to take, and any warnings. It may also help you not take too much acetaminophen. If you have questions or do not understand the information, ask your pharmacist or health care provider to explain it to you before you take the OTC medicine.  Managing  nausea  Some people have an upset stomach after surgery. This is often because of anesthesia, pain, or pain medicine, or the stress of surgery. These tips will help you handle nausea and eat healthy foods as you get better. If you were on a special food plan before surgery, ask your doctor if you should follow it while you get better. These tips may help:  · Do not push yourself to eat. Your body will tell you when to eat and how much.  · Start off with clear liquids and soup. They are easier to digest.  · Next try semi-solid foods, such as mashed potatoes, applesauce, and gelatin, as you feel ready.  · Slowly move to solid foods. Dont eat fatty, rich, or spicy foods at first.  · Do not force yourself to have 3 large meals a day. Instead eat smaller amounts more often.  · Take pain medicines with a small amount of solid food, such as crackers or toast, to avoid nausea.     Call your surgeon if  · You still have pain an hour after taking medicine. The medicine may not be strong enough.  · You feel too sleepy, dizzy, or groggy. The medicine may be too strong.  · You have side effects like nausea, vomiting, or skin changes, such as rash, itching, or hives.       If you have obstructive sleep apnea  You were given anesthesia medicine during surgery to keep you comfortable and free of pain. After surgery, you may have more apnea spells because of this medicine and other medicines you were given. The spells may last longer than usual.   At home:  · Keep using the continuous positive airway pressure (CPAP) device when you sleep. Unless your health care provider tells you not to, use it when you sleep, day or night. CPAP is a common device used to treat obstructive sleep apnea.  · Talk with your provider before taking any pain medicine, muscle relaxants, or sedatives. Your provider will tell you about the possible dangers of taking these medicines.  © 7860-8677 [employer]. [address] [place] [address]  [place] [address]. All rights reserved. This information is not intended as a substitute for professional medical care. Always follow your healthcare professional's instructions.    Hemorrhoids    Hemorrhoids are swollen and inflamed veins inside the rectum and near the anus. The rectum is the last several inches of the colon. The anus is the passage between the rectum and the outside of the body.  Causes  The veins can become swollen due to increased pressure in them. This is most often caused by:  · Chronic constipation or diarrhea  · Straining when having a bowel movement  · Sitting too long on the toilet  · A low-fiber diet  · Pregnancy  Symptoms  · Bleeding from the rectum (this may be noticeable after bowel movements)  · Lump near the anus  · Itching around the anus  · Pain around the anus  There are different types of hemorrhoids. Depending on the type you have and the severity, you may be able to treat yourself at home. In some cases, a procedure may be the best treatment option. Your healthcare provider can tell you more about this, if needed.  Home care  General care  · To get relief from pain or itching, try:  ¨ Topical products. Your healthcare provider may prescribe or recommend creams, ointments, or pads that can be applied to the hemorrhoid. Use these exactly as directed.  ¨ Medicines. Your healthcare provider may recommend stool softeners, suppositories, or laxatives to help manage constipation. Use these exactly as directed.  ¨ Sitz baths. A sitz bath involves sitting in a few inches of warm bath water. Be careful not to make the water so hot that you burn yourself--test it before sitting in it. Soak for about 10 to 15 minutes a few times a day. This may help relieve pain.  Tips to help prevent hemorrhoids  · Eat more fiber. Fiber adds bulk to stool and absorbs water as it moves through your colon. This makes stool softer and easier to pass.  ¨ Increase the fiber in your diet with more fiber-rich foods.  These include fresh fruit, vegetables, and whole grains.  ¨ Take a fiber supplement or bulking agent, if advised to by your provider. These include products such as psyllium or methylcellulose.  · Drink plenty of water, if directed to by your provider. This can help keep stool soft.  · Be more active. Frequent exercise aids digestion and helps prevent constipation. It may also help make bowel movements more regular.  · Dont strain during bowel movements. This can make hemorrhoids more likely. Also, dont sit on the toilet for long periods of time.  Follow-up care  Follow up with your healthcare provider, or as advised. If a culture or imaging tests were done, you will be notified of the results when they are ready. This may take a few days or longer.  When to seek medical advice  Call your healthcare provider right away if any of these occur:  · Increased bleeding from the rectum  · Increased pain around the rectum or anus  · Weakness or dizziness  Call 911  Call 911 or return to the emergency department right away if any of these occur:  · Trouble breathing or swallowing  · Fainting or loss of consciousness  · Unusually fast heart rate  · Vomiting blood  · Large amounts of blood in stool  Date Last Reviewed: 6/22/2015  © 4965-6663 [employer]. [address] [place] [address], [place] [address]. All rights reserved. This information is not intended as a substitute for professional medical care. Always follow your healthcare professional's instructions.        Understanding Colon and Rectal Polyps    The colon (also called the large intestine) is a muscular tube that forms the last part of the digestive tract. It absorbs water and stores food waste. The colon is about 4 to 6 feet long. The rectum is the last 6 inches of the colon. The colon and rectum have a smooth lining composed of millions of cells. Changes in these cells can lead to growths in the colon that can become cancerous and should be removed.  Multiple tests are available to screen for colon cancer, but the colonoscopy is the most recommended test. During colonoscopy, these polyps can be removed. How often you need this test depends on many things including your condition, your family history, symptoms, and what the findings were at the previous colonoscopy.   When the colon lining changes  Changes that happen in the cells that line the colon or rectum can lead to growths called polyps. Over a period of years, polyps can turn cancerous. Removing polyps early may prevent cancer from ever forming.  Polyps  Polyps are fleshy clumps of tissue that form on the lining of the colon or rectum. Small polyps are usually benign (not cancerous). However, over time, cells in a polyp can change and become cancerous. Certain types of polyps known as adenomatous polyps are premalignant. The risk for invasive cancer increases with the size of the polyp and certain cell and gene features. This means that they can become cancerous if they're not removed. Hyperplastic polyps are benign. They can grow quite large and not turn cancerous.   Cancer  Almost all colorectal cancers start when polyp cells begin growing abnormally. As a cancerous tumor grows, it may involve more and more of the colon or rectum. In time, cancer can also grow beyond the colon or rectum and spread to nearby organs or to glands called lymph nodes. The cells can also travel to other parts of the body. This is known as metastasis. The earlier a cancerous tumor is removed, the better the chance of preventing its spread.    Date Last Reviewed: 8/1/2016  © 2687-4442 [employer]. [address] [place] [address], [place] [address]. All rights reserved. This information is not intended as a substitute for professional medical care. Always follow your healthcare professional's instructions.

## 2018-03-12 NOTE — ANESTHESIA PREPROCEDURE EVALUATION
03/12/2018  Buddy Park is a 67 y.o., male.    Anesthesia Evaluation    I have reviewed the Patient Summary Reports.    I have reviewed the Nursing Notes.   I have reviewed the Medications.     Review of Systems  Anesthesia Hx:  No problems with previous Anesthesia    Social:  Non-Smoker Smoking Status: Never Smoker  Smokeless Tobacco Status: Never Used  Alcohol use: Yes, unspecified volume  Drug use: No     Denies Tobacco Use.   Cardiovascular:   Hypertension Angina           Anesthesia Plan  Type of Anesthesia, risks & benefits discussed:  Anesthesia Type:  general  Patient's Preference:   Intra-op Monitoring Plan: standard ASA monitors  Intra-op Monitoring Plan Comments:   Post Op Pain Control Plan:   Post Op Pain Control Plan Comments:   Induction:   IV  Beta Blocker:  Patient is not currently on a Beta-Blocker (No further documentation required).       Informed Consent: Patient understands risks and agrees with Anesthesia plan.  Questions answered. Anesthesia consent signed with patient.  ASA Score: 3     Day of Surgery Review of History & Physical: I have interviewed and examined the patient. I have reviewed the patient's H&P dated:  There are no significant changes.          Ready For Surgery From Anesthesia Perspective.

## 2018-03-12 NOTE — H&P
CC: History of polyps and family history of colon cancer in mother before age 60 - last scope 5 years ago    67 year old male with above. States that symptoms are absent, no alleviating/exacerbating factors. Positive family history of CA. Positive personal history of polyps. No bleeding or weight loss.     ROS:  No headache, no fever/chills, no chest pain/SOB, no nausea/vomiting/diarrhea/constipation/GI bleeding/abdominal pain, no dysuria/hematuria.    VSSAF   Exam:   Alert and oriented x 3; no apparent distress   PERRLA, sclera anicteric  CV: Regular rate/rhythm, normal PMI   Lungs: Clear bilaterally with no wheeze/rales   Abdomen: Soft, NT/ND, normal bowel sounds   Ext: No cyanosis, clubbing     Impression:   As above    Plan:   Proceed with endoscopy. Further recs to follow.

## 2018-03-12 NOTE — TRANSFER OF CARE
"Anesthesia Transfer of Care Note    Patient: Buddy Park    Procedure(s) Performed: Procedure(s) (LRB):  COLONOSCOPY (N/A)    Patient location: PACU    Anesthesia Type: general    Transport from OR: Transported from OR on room air with adequate spontaneous ventilation    Post pain: adequate analgesia    Post assessment: no apparent anesthetic complications and tolerated procedure well    Post vital signs: stable    Level of consciousness: awake, alert and oriented    Nausea/Vomiting: no nausea/vomiting    Complications: none    Transfer of care protocol was followed      Last vitals:   Visit Vitals  BP (!) 165/94   Pulse 79   Temp 36.7 °C (98.1 °F) (Oral)   Resp 20   Ht 5' 5" (1.651 m)   Wt 74.8 kg (165 lb)   SpO2 99%   BMI 27.46 kg/m²     "

## 2018-03-13 VITALS
OXYGEN SATURATION: 98 % | WEIGHT: 165 LBS | DIASTOLIC BLOOD PRESSURE: 56 MMHG | TEMPERATURE: 98 F | SYSTOLIC BLOOD PRESSURE: 110 MMHG | HEART RATE: 65 BPM | HEIGHT: 65 IN | RESPIRATION RATE: 16 BRPM | BODY MASS INDEX: 27.49 KG/M2

## 2018-03-27 ENCOUNTER — OFFICE VISIT (OUTPATIENT)
Dept: FAMILY MEDICINE | Facility: CLINIC | Age: 68
End: 2018-03-27
Payer: MEDICARE

## 2018-03-27 VITALS
BODY MASS INDEX: 27.16 KG/M2 | HEART RATE: 67 BPM | SYSTOLIC BLOOD PRESSURE: 137 MMHG | TEMPERATURE: 98 F | WEIGHT: 163 LBS | HEIGHT: 65 IN | DIASTOLIC BLOOD PRESSURE: 88 MMHG

## 2018-03-27 DIAGNOSIS — I10 ESSENTIAL HYPERTENSION: ICD-10-CM

## 2018-03-27 DIAGNOSIS — R53.82 CHRONIC FATIGUE: ICD-10-CM

## 2018-03-27 DIAGNOSIS — N52.2 DRUG-INDUCED ERECTILE DYSFUNCTION: ICD-10-CM

## 2018-03-27 DIAGNOSIS — F32.1 CURRENT MODERATE EPISODE OF MAJOR DEPRESSIVE DISORDER WITHOUT PRIOR EPISODE: Primary | ICD-10-CM

## 2018-03-27 PROCEDURE — 99499 UNLISTED E&M SERVICE: CPT | Mod: S$GLB,,, | Performed by: NURSE PRACTITIONER

## 2018-03-27 PROCEDURE — 99214 OFFICE O/P EST MOD 30 MIN: CPT | Mod: S$GLB,,, | Performed by: NURSE PRACTITIONER

## 2018-03-27 PROCEDURE — 3079F DIAST BP 80-89 MM HG: CPT | Mod: CPTII,S$GLB,, | Performed by: NURSE PRACTITIONER

## 2018-03-27 PROCEDURE — 99999 PR PBB SHADOW E&M-EST. PATIENT-LVL III: CPT | Mod: PBBFAC,,, | Performed by: NURSE PRACTITIONER

## 2018-03-27 PROCEDURE — 3075F SYST BP GE 130 - 139MM HG: CPT | Mod: CPTII,S$GLB,, | Performed by: NURSE PRACTITIONER

## 2018-03-27 RX ORDER — BUPROPION HYDROCHLORIDE 150 MG/1
150 TABLET ORAL DAILY
Qty: 30 TABLET | Refills: 1 | Status: SHIPPED | OUTPATIENT
Start: 2018-03-27 | End: 2018-04-24 | Stop reason: SDUPTHER

## 2018-03-27 RX ORDER — AMLODIPINE BESYLATE 5 MG/1
5 TABLET ORAL DAILY
Qty: 90 TABLET | Refills: 3 | Status: SHIPPED | OUTPATIENT
Start: 2018-03-27 | End: 2018-05-21 | Stop reason: SDUPTHER

## 2018-03-27 NOTE — PROGRESS NOTES
Subjective:       Patient ID: Buddy Park is a 67 y.o. male.    Chief Complaint: Depression    HPI   Chief Complaint  Chief Complaint   Patient presents with    Depression       HPI  Buddy Park is a 67 y.o. male with medical diagnoses as listed in the medical history and problem list that presents for follow up of anxiety and depression since starting lexapro 1 month ago. Patient is regularly treated for hypertension and blood pressure today is 137/88.  Apparently, the patient is only taking losartan 100 mg daily and has stopped the amlodipine 10 mg inadvertently.  Patient c/o inability to perform sexually since starting lexapro, takes longer to achieve erection and to reach climax,  and also c/o some restlessness.  Has been feeling more fatigued, when sleeping feels like he sleeps well, but still feels fatigued.  Was fatigued before taking lexapro but more so now.  BMI today is 27.12 and patient has lost 2 pounds since last visit.  Established patient with last clinic appointment 2/27/2018.        PAST MEDICAL HISTORY:  Past Medical History:   Diagnosis Date    Angina pectoris     Anxiety     Biliary colic     Cataract     Cochlear implant in place     Deaf     LEFT EAR    Depression     Heart failure     [place] (hard of hearing)     HEARING AID - RIGHT    Hyperlipidemia     Hypertension     Kidney stone     Kidney stones     Renal stones     Trouble in sleeping     Wears glasses        PAST SURGICAL HISTORY:  Past Surgical History:   Procedure Laterality Date    CHOLECYSTECTOMY  2-6-2014    COLONOSCOPY  1/9/2013    Dr. Gillette, 5 year recheck (family history colon cancer)    COLONOSCOPY N/A 3/12/2018    Procedure: COLONOSCOPY;  Surgeon: Garett Go MD;  Location: [facility];  Service: Endoscopy;  Laterality: N/A;    EAR MASTOIDECTOMY W/ COCHLEAR IMPLANT W/ LANDMARK      left ear    FOREIGN BODY REMOVAL Right     glass removed from right foot/repair    FRACTURE SURGERY      right  arm x2    LITHOTRIPSY      ROTATOR CUFF REPAIR      Right     SINUS SURGERY      TONSILLECTOMY         SOCIAL HISTORY:  Social History     Social History    Marital status:      Spouse name: N/A    Number of children: N/A    Years of education: N/A     Occupational History    Not on file.     Social History Main Topics    Smoking status: Never Smoker    Smokeless tobacco: Never Used    Alcohol use Yes      Comment: once every two months    Drug use: No    Sexual activity: Yes     Other Topics Concern    Not on file     Social History Narrative    No narrative on file       FAMILY HISTORY:  Family History   Problem Relation Age of Onset    Cancer Mother      colon    Heart disease Father     Gout Father     Kidney disease Father     Arthritis Father     Hypertension Father     Early death Daughter      mva    Alcohol abuse Brother     Cancer Brother      mouth cancer w mets    No Known Problems Sister     Alcohol abuse Brother     No Known Problems Son     Heart disease Maternal Grandmother      MI    Stroke Maternal Grandfather     Heart disease Paternal Grandmother      MI    No Known Problems Paternal Grandfather     No Known Problems Son     Cancer Paternal Uncle      lung cancer    Allergic rhinitis Neg Hx     Allergies Neg Hx     Angioedema Neg Hx     Asthma Neg Hx     Atopy Neg Hx     Eczema Neg Hx     Immunodeficiency Neg Hx     Rhinitis Neg Hx     Urticaria Neg Hx     Collagen disease Neg Hx        ALLERGIES AND MEDICATIONS: updated and reviewed.  Review of patient's allergies indicates:   Allergen Reactions    Bee pollens Shortness Of Breath     WASP, BEE, ANT AND CATERPILLER STINGS    Milk containing products Diarrhea    Metoprolol Rash     Current Outpatient Prescriptions   Medication Sig Dispense Refill    acetaminophen (TYLENOL) 500 MG tablet Take 1 tablet (500 mg total) by mouth every 6 (six) hours as needed for Pain. 60 tablet 0    betamethasone  dipropionate (DIPROLENE) 0.05 % cream Apply topically 2 (two) times daily as needed. Use very sparingly to affected area only.  Do not use for longer than 2 weeks 45 g 3    epinephrine (EPIPEN) 0.3 mg/0.3 mL (1:1,000) AtIn Inject into the muscle once.      losartan (COZAAR) 100 MG tablet Take 1 tablet (100 mg total) by mouth once daily. 90 tablet 3    neomycin-polymyxin-dexamethasone (MAXITROL) 3.5mg/mL-10,000 unit/mL-0.1 % DrpS One drop four times a day      rosuvastatin (CRESTOR) 20 MG tablet Take 1 tablet (20 mg total) by mouth once daily. 90 tablet 3    amLODIPine (NORVASC) 5 MG tablet Take 1 tablet (5 mg total) by mouth once daily. 90 tablet 3    buPROPion (WELLBUTRIN XL) 150 MG TB24 tablet Take 1 tablet (150 mg total) by mouth once daily. In the morning 30 tablet 1     No current facility-administered medications for this visit.            Review of Systems   Constitutional: Positive for fatigue. Negative for activity change, appetite change, chills and fever.        No regular exercise, more fatigued recently   HENT: Positive for hearing loss.    Eyes: Negative for visual disturbance.   Respiratory: Negative for cough and shortness of breath.    Cardiovascular: Negative for chest pain, palpitations and leg swelling.   Gastrointestinal: Negative for abdominal pain, constipation, diarrhea, nausea and vomiting.   Genitourinary: Negative for difficulty urinating.        Patient c/o sexual dysfunction difficulty achieving erection and taking longer to climax   Musculoskeletal: Negative for arthralgias and myalgias.   Skin: Negative for rash and wound.   Neurological: Negative for dizziness, numbness and headaches.   Hematological: Negative for adenopathy.   Psychiatric/Behavioral: Positive for dysphoric mood. Negative for sleep disturbance. The patient is nervous/anxious.         Still having some depression and anxiety.  States sleeping well but feels fatigued during the day.     Patient Health  "Questionnaire Depression Scale (PHQ-9):    Over the last 2 weeks, how often have you been bothered by any of the following problems?  (Not at all: 0; several days: 1, more than half the days: 2, nearly every day: 3)    1. Little interest or pleasure in doing things: 2  2. Feeling down, depressed, or hopeless: 2  3. Trouble falling or staying asleep, or sleeping too much: 1  4. Feeling tired or having little energy: 3  5. Poor appetite or overeatin  6. Feeling bad about yourself, or that you are a failure, or have let yourself or your family down: 2  7. Trouble concentrating on things, such as reading the newspaper or watching television: 3  8. Moving or speaking so slowly that other people could have noticed. Or the opposite- being so fidgety or restless that you have been moving       around a lot more than usual: 0  9. Thoughts that you would be better off dead, or of hurting yourself in some way: 0    Total Score: 13    (Score >15 - major depression; Score >20 - severe major depression)  Objective:       Vitals:    18 0907   BP: 137/88   BP Location: Right arm   Patient Position: Sitting   BP Method: Large (Automatic)   Pulse: 67   Temp: 98 °F (36.7 °C)   TempSrc: Oral   Weight: 73.9 kg (163 lb)   Height: 5' 5" (1.651 m)     Physical Exam   Constitutional: He is oriented to person, place, and time. Vital signs are normal. He appears well-developed and well-nourished. No distress.   HENT:   Head: Normocephalic and atraumatic.   Right Ear: Decreased hearing is noted.   Left Ear: Decreased hearing is noted.   Eyes: Conjunctivae and lids are normal. Right conjunctiva is not injected. Left conjunctiva is not injected.   Neck: Normal carotid pulses present. Carotid bruit is not present.   Cardiovascular: Normal rate, regular rhythm, S1 normal, S2 normal, normal heart sounds, intact distal pulses and normal pulses.    No murmur heard.  Pulses:       Carotid pulses are 2+ on the right side, and 2+ on the left " side.       Radial pulses are 2+ on the right side, and 2+ on the left side.        Posterior tibial pulses are 2+ on the right side, and 2+ on the left side.   No edema   Pulmonary/Chest: Effort normal and breath sounds normal. No respiratory distress. He has no decreased breath sounds. He has no wheezes. He has no rhonchi. He has no rales.   Musculoskeletal: Normal range of motion.   Neurological: He is alert and oriented to person, place, and time. He has normal strength.   Skin: Skin is warm, dry and intact. He is not diaphoretic. No pallor.   Psychiatric: He has a normal mood and affect. His speech is normal and behavior is normal. Judgment and thought content normal. Cognition and memory are normal.   Nursing note and vitals reviewed.      Assessment:       1. Current moderate episode of major depressive disorder without prior episode    2. Essential hypertension    3. BMI 27.0-27.9,adult    4. Drug-induced erectile dysfunction    5. Chronic fatigue        Plan:   Buddy was seen today for depression.    Diagnoses and all orders for this visit:    Current moderate episode of major depressive disorder without prior episode  -     buPROPion (WELLBUTRIN XL) 150 MG TB24 tablet; Take 1 tablet (150 mg total) by mouth once daily. In the morning  - Discontinue lexapro at this time, ineffective    Essential hypertension  -     amLODIPine (NORVASC) 5 MG tablet; Take 1 tablet (5 mg total) by mouth once daily.  - Blood pressure slightly higher than usual 137/88, plan to add amlodipine 5 mg and will recheck blood pressure at next visit    BMI 27.0-27.9,adult   Slightly overweight   Weight loss recommended with well balanced diet and portion controlled meals and increased activity.   Drug-induced erectile dysfunction   Lexapro discontinued  Chronic fatigue   Possibly related to depression, will reassess at visit in one month with wellbutrin XL    Follow-up in about 4 weeks (around 4/24/2018) for hypertension and depression  follow up.     Patient readiness: acceptance and barriers:none    During the course of the visit the patient was educated and counseled about the following:     Hypertension:   Medication: continue losartan 100 mg QD and begin Norvasc 5 mg QD.  Dietary sodium restriction.  Regular aerobic exercise.    Goals: Hypertension: Reduce Blood Pressure    Did patient meet goals/outcomes: Yes    The following self management tools provided: declined    Patient Instructions (the written plan) was given to the patient/family.     Time spent with patient: 45 minutes    Barriers to medications present (no )    Adverse reactions to current medications (no)    Over the counter medications reviewed (Yes)

## 2018-04-18 ENCOUNTER — OFFICE VISIT (OUTPATIENT)
Dept: CARDIOLOGY | Facility: CLINIC | Age: 68
End: 2018-04-18
Payer: MEDICARE

## 2018-04-18 VITALS
DIASTOLIC BLOOD PRESSURE: 79 MMHG | BODY MASS INDEX: 27.99 KG/M2 | SYSTOLIC BLOOD PRESSURE: 117 MMHG | WEIGHT: 168 LBS | HEIGHT: 65 IN | HEART RATE: 83 BPM | OXYGEN SATURATION: 98 %

## 2018-04-18 DIAGNOSIS — I10 HYPERTENSION, UNSPECIFIED TYPE: ICD-10-CM

## 2018-04-18 DIAGNOSIS — R09.89 BRUIT: ICD-10-CM

## 2018-04-18 DIAGNOSIS — I42.9 CARDIOMYOPATHY, UNSPECIFIED TYPE: Primary | ICD-10-CM

## 2018-04-18 DIAGNOSIS — I10 HYPERTENSION, UNSPECIFIED TYPE: Primary | ICD-10-CM

## 2018-04-18 PROCEDURE — 93000 ELECTROCARDIOGRAM COMPLETE: CPT | Mod: S$GLB,,, | Performed by: INTERNAL MEDICINE

## 2018-04-18 PROCEDURE — 3078F DIAST BP <80 MM HG: CPT | Mod: CPTII,S$GLB,, | Performed by: INTERNAL MEDICINE

## 2018-04-18 PROCEDURE — 99214 OFFICE O/P EST MOD 30 MIN: CPT | Mod: S$GLB,,, | Performed by: INTERNAL MEDICINE

## 2018-04-18 PROCEDURE — 3074F SYST BP LT 130 MM HG: CPT | Mod: CPTII,S$GLB,, | Performed by: INTERNAL MEDICINE

## 2018-04-18 PROCEDURE — 99999 PR PBB SHADOW E&M-EST. PATIENT-LVL III: CPT | Mod: PBBFAC,,, | Performed by: INTERNAL MEDICINE

## 2018-04-18 RX ORDER — ASPIRIN 81 MG/1
81 TABLET ORAL DAILY
Refills: 0 | COMMUNITY
Start: 2018-04-18 | End: 2019-08-06

## 2018-04-18 NOTE — PROGRESS NOTES
[facility]    CC: [facility]    Patient ID: Buddy Park is a 67 y.o. male with a past medical history of hyper-lipidemia, hypertension, cardiomyopathy  HPI  Patient was seen by Dr. Adames in the past.  He has been feeling fatigued and tired in the last few months.  He denies any exertional chest pains.    Past Medical History:   Diagnosis Date    Angina pectoris     Anxiety     Biliary colic     Cataract     Cochlear implant in place     Deaf     LEFT EAR    Depression     Heart failure     [place] (hard of hearing)     HEARING AID - RIGHT    Hyperlipidemia     Hypertension     Kidney stone     Kidney stones     Renal stones     Trouble in sleeping     Wears glasses      Past Surgical History:   Procedure Laterality Date    CHOLECYSTECTOMY  2-6-2014    COLONOSCOPY  1/9/2013    Dr. Gillette, 5 year recheck (family history colon cancer)    COLONOSCOPY N/A 3/12/2018    Procedure: COLONOSCOPY;  Surgeon: Garett Go MD;  Location: [facility];  Service: Endoscopy;  Laterality: N/A;    EAR MASTOIDECTOMY W/ COCHLEAR IMPLANT W/ LANDMARK      left ear    FOREIGN BODY REMOVAL Right     glass removed from right foot/repair    FRACTURE SURGERY      right arm x2    LITHOTRIPSY      ROTATOR CUFF REPAIR      Right     SINUS SURGERY      TONSILLECTOMY       Social History     Social History    Marital status:      Spouse name: N/A    Number of children: N/A    Years of education: N/A     Occupational History    Not on file.     Social History Main Topics    Smoking status: Never Smoker    Smokeless tobacco: Never Used    Alcohol use Yes      Comment: once every two months    Drug use: No    Sexual activity: Yes     Other Topics Concern    Not on file     Social History Narrative    No narrative on file     Family History   Problem Relation Age of Onset    Cancer Mother      colon    Heart disease Father     Gout Father     Kidney disease Father     Arthritis  Father     Hypertension Father     Early death Daughter      mva    Alcohol abuse Brother     Cancer Brother      mouth cancer w mets    No Known Problems Sister     Alcohol abuse Brother     No Known Problems Son     Heart disease Maternal Grandmother      MI    Stroke Maternal Grandfather     Heart disease Paternal Grandmother      MI    No Known Problems Paternal Grandfather     No Known Problems Son     Cancer Paternal Uncle      lung cancer    Allergic rhinitis Neg Hx     Allergies Neg Hx     Angioedema Neg Hx     Asthma Neg Hx     Atopy Neg Hx     Eczema Neg Hx     Immunodeficiency Neg Hx     Rhinitis Neg Hx     Urticaria Neg Hx     Collagen disease Neg Hx        Review of patient's allergies indicates:   Allergen Reactions    Bee pollens Shortness Of Breath     WASP, BEE, ANT AND CATERPILLER STINGS    Milk containing products Diarrhea    Metoprolol Rash       Medication List with Changes/Refills   New Medications    ASPIRIN (ECOTRIN) 81 MG EC TABLET    Take 1 tablet (81 mg total) by mouth once daily.   Current Medications    ACETAMINOPHEN (TYLENOL) 500 MG TABLET    Take 1 tablet (500 mg total) by mouth every 6 (six) hours as needed for Pain.    AMLODIPINE (NORVASC) 5 MG TABLET    Take 1 tablet (5 mg total) by mouth once daily.    BETAMETHASONE DIPROPIONATE (DIPROLENE) 0.05 % CREAM    Apply topically 2 (two) times daily as needed. Use very sparingly to affected area only.  Do not use for longer than 2 weeks    BUPROPION (WELLBUTRIN XL) 150 MG TB24 TABLET    Take 1 tablet (150 mg total) by mouth once daily. In the morning    EPINEPHRINE (EPIPEN) 0.3 MG/0.3 ML (1:1,000) ATIN    Inject into the muscle once.    LOSARTAN (COZAAR) 100 MG TABLET    Take 1 tablet (100 mg total) by mouth once daily.    NEOMYCIN-POLYMYXIN-DEXAMETHASONE (MAXITROL) 3.5MG/ML-10,000 UNIT/ML-0.1 % DRPS    One drop four times a day    ROSUVASTATIN (CRESTOR) 20 MG TABLET    Take 1 tablet (20 mg total) by mouth once  "daily.       Review of Systems  Constitution: Denies chills, fever, and sweats.  HENT: Denies headaches or blurry vision.  Cardiovascular: Denies chest pain or irregular heart beat.  Respiratory: Denies cough or shortness of breath.  Gastrointestinal: Denies abdominal pain, nausea, or vomiting.  Musculoskeletal: Denies muscle cramps.  Neurological: Denies dizziness or focal weakness.  Psychiatric/Behavioral: Normal mental status.  Hematologic/Lymphatic: Denies bleeding problem or easy bruising/bleeding.  Skin: Denies rash or suspicious lesions    Physical Examination  /79 (BP Location: Left arm, Patient Position: Sitting)   Pulse 83   Ht 5' 5" (1.651 m)   Wt 76.2 kg (167 lb 15.9 oz)   SpO2 98%   BMI 27.96 kg/m²     Constitutional: No acute distress, conversant  HEENT: Sclera anicteric, Pupils equal, round and reactive to light, extraocular motions intact, Oropharynx clear  Neck: No JVD, left carotid bruits  Cardiovascular: regular rate and rhythm, no murmur, rubs or gallops, normal S1/S2  Pulmonary: Clear to auscultation bilaterally  Abdominal: Abdomen soft, nontender, nondistended, positive bowel sounds  Extremities: No lower extremity edema,   Pulses:  Carotid pulses are 2+ on the right side, and 2+ on the left side.  Radial pulses are 2+ on the right side, and 2+ on the left side.      Skin: No ecchymosis, erythema, or ulcers  Psych: Alert and oriented x 3, appropriate affect  Neuro: CNII-XII intact, no focal deficits    Labs:  Most Recent Data  CBC:   Lab Results   Component Value Date    WBC 6.01 08/15/2017    HGB 15.2 08/15/2017    HCT 43.9 08/15/2017     08/15/2017    MCV 87 08/15/2017    RDW 14.1 08/15/2017     BMP:   Lab Results   Component Value Date     02/26/2018    K 4.4 02/26/2018     02/26/2018    CO2 31 (H) 02/26/2018    BUN 33 (H) 02/26/2018    CREATININE 1.6 (H) 02/26/2018     (H) 02/26/2018    CALCIUM 10.2 02/26/2018    MG 2.2 03/26/2012    PHOS 3.7 03/26/2012 "     LFTS;   Lab Results   Component Value Date    PROT 7.8 02/26/2018    ALBUMIN 4.0 02/26/2018    BILITOT 1.0 02/26/2018    AST 16 02/26/2018    ALKPHOS 71 02/26/2018    ALT 21 02/26/2018     COAGS: No results found for: INR, PROTIME, PTT  FLP:   Lab Results   Component Value Date    CHOL 160 02/26/2018    HDL 36 (L) 02/26/2018    LDLCALC 70.6 02/26/2018    TRIG 267 (H) 02/26/2018    CHOLHDL 22.5 02/26/2018     CARDIAC: No results found for: TROPONINI, CKMB, BNP    Imaging:    EKG: Sinus rhythm with multiple PACs.        Stress Testing:  CONCLUSIONS     1 - Concentric remodeling.     2 - Mildly to moderately depressed left ventricular systolic function (EF 40-45%).     3 - Normal left ventricular diastolic function.     4 - Normal right ventricular systolic function .     5 - Mild to moderate mitral regurgitation.     6 - Trivial to mild pulmonic regurgitation.     No evidence of stress induced myocardial ischemia.   I have personally reviewed these images and echo data    Assessment/Plan:  Diagnoses and all orders for this visit:    Cardiomyopathy, unspecified type  -     Transthoracic echo (TTE) complete (CUPID ONLY-[facility] [place]); Future    Hypertension, unspecified type  -     SCHEDULED EKG 12-LEAD (to Muse)    Bruit  -     Cardiology Lab Carotid US Bilateral; Future    Other orders  -     aspirin (ECOTRIN) 81 MG EC tablet; Take 1 tablet (81 mg total) by mouth once daily.        Follow-up in about 3 weeks (around 5/9/2018).          Total duration of face to face visit time 30 minutes.  Total time spent counseling greater than fifty percent of total visit time.  Counseling included discussion regarding imaging findings, diagnosis, possibilities, treatment options, risks and benefits.  The patient had many questions regarding the options and long-term effect which were all answered to my best ability.      Brice Garner MD,MRCP,RPVI,FACC,FSCAI.  Interventional Cardiology   Phone  [phone]

## 2018-04-18 NOTE — LETTER
April 18, 2018      Katie Waterman PA-C  [address]  [place] [address]           [place] [facility] [place] - Cardiology  [address]  [place] [address]  Phone: [phone]          Patient: Buddy Park   MR Number: [id number]   YOB: 1950   Date of Visit: 4/18/2018       Dear Katie Waterman:    Thank you for referring Buddy Park to me for evaluation. Attached you will find relevant portions of my assessment and plan of care.    If you have questions, please do not hesitate to call me. I look forward to following Buddy Park along with you.    Sincerely,    Brice Garner MD    Enclosure  CC:  No Recipients    If you would like to receive this communication electronically, please contact [email or web address] or [phone] to request more information on [employer] access.    For providers and/or their staff who would like to refer a patient to [facility], please contact us through our one-stop-shop provider referral line, [facility], at [phone].    If you feel you have received this communication in error or would no longer like to receive these types of communications, please e-mail [email or web address]

## 2018-04-20 ENCOUNTER — HOSPITAL ENCOUNTER (OUTPATIENT)
Dept: RADIOLOGY | Facility: HOSPITAL | Age: 68
Discharge: HOME | End: 2018-04-20
Attending: INTERNAL MEDICINE
Payer: MEDICARE

## 2018-04-20 DIAGNOSIS — R09.89 BRUIT: ICD-10-CM

## 2018-04-20 PROCEDURE — 93880 EXTRACRANIAL BILAT STUDY: CPT

## 2018-04-23 ENCOUNTER — CLINICAL SUPPORT (OUTPATIENT)
Dept: CARDIOLOGY | Facility: CLINIC | Age: 68
End: 2018-04-23
Attending: INTERNAL MEDICINE
Payer: MEDICARE

## 2018-04-23 DIAGNOSIS — I42.9 CARDIOMYOPATHY, UNSPECIFIED TYPE: ICD-10-CM

## 2018-04-23 LAB
ASCENDING AORTA: 0.02 CM
AV MEAN GRADIENT: 2.74 MMHG
AV VALVE AREA: 2.48 CM2
CV ECHO LV RWT: 0.32 CM
DOP CALC AO PEAK VEL: 1.16 M/S
DOP CALC AO VTI: 0.23 CM
DOP CALC LVOT AREA: 3.17 CM2
DOP CALC LVOT DIAMETER: 2.01 CM
DOP CALC LVOT STROKE VOLUME: 0.57 CM3
DOP CALCLVOT PEAK VEL VTI: 0.18 CM
E WAVE DECELERATION TIME: 135.72 MSEC
E/A RATIO: 0.81
E/E' RATIO: 11.33
ECHO LV POSTERIOR WALL: 0.82 CM (ref 0.6–1.1)
FRACTIONAL SHORTENING: 32 % (ref 28–44)
INTERVENTRICULAR SEPTUM: 0.76 CM (ref 0.6–1.1)
IVRT: 0.1 MSEC
LA MAJOR: 3.49 CM
LA MINOR: 3.62 CM
LA WIDTH: 3.23
LEFT ATRIUM SIZE: 3.45 CM
LEFT INTERNAL DIMENSION IN SYSTOLE: 3.39 CM (ref 2.1–4)
LEFT VENTRICULAR INTERNAL DIMENSION IN DIASTOLE: 5.01 CM (ref 3.5–6)
LV LATERAL E/E' RATIO: 11.33
LV SEPTAL E/E' RATIO: 11.33
MV PEAK A VEL: 0.84 M/S
MV PEAK E VEL: 0.68 M/S
MV STENOSIS PRESSURE HALF TIME: 39.36 MS
MV VALVE AREA P 1/2 METHOD: 5.59 CM2
PISA TR MAX VEL: 2.37 M/S
PULM VEIN S/D RATIO: 1.14
PV PEAK D VEL: 0.43 M/S
PV PEAK S VEL: 0.49 M/S
RA MAJOR: 3.11 CM
RA WIDTH: 2.96 CM
RIGHT VENTRICULAR END-DIASTOLIC DIMENSION: 2.23 CM
SINUS: 0.03 CM
STJ: 0.02 CM
TDI LATERAL: 0.06
TDI SEPTAL: 0.06
TDI: 0.06
TR MAX PG: 22.46 MMHG
TRICUSPID ANNULAR PLANE SYSTOLIC EXCURSION: 0.02 CM

## 2018-04-23 PROCEDURE — 93306 TTE W/DOPPLER COMPLETE: CPT | Mod: S$GLB,,, | Performed by: INTERNAL MEDICINE

## 2018-04-24 ENCOUNTER — OFFICE VISIT (OUTPATIENT)
Dept: FAMILY MEDICINE | Facility: CLINIC | Age: 68
End: 2018-04-24
Payer: MEDICARE

## 2018-04-24 VITALS
DIASTOLIC BLOOD PRESSURE: 76 MMHG | SYSTOLIC BLOOD PRESSURE: 117 MMHG | HEART RATE: 73 BPM | WEIGHT: 165 LBS | BODY MASS INDEX: 27.49 KG/M2 | TEMPERATURE: 98 F | HEIGHT: 65 IN

## 2018-04-24 DIAGNOSIS — Z96.21 COCHLEAR IMPLANT IN PLACE: ICD-10-CM

## 2018-04-24 DIAGNOSIS — I42.9 CARDIOMYOPATHY, UNSPECIFIED TYPE: ICD-10-CM

## 2018-04-24 DIAGNOSIS — H91.92 DEAFNESS IN LEFT EAR: ICD-10-CM

## 2018-04-24 DIAGNOSIS — L57.0 ACTINIC KERATOSIS: ICD-10-CM

## 2018-04-24 DIAGNOSIS — F32.5 MAJOR DEPRESSIVE DISORDER WITH SINGLE EPISODE, IN FULL REMISSION: Primary | ICD-10-CM

## 2018-04-24 DIAGNOSIS — F41.9 ANXIETY: ICD-10-CM

## 2018-04-24 DIAGNOSIS — I10 ESSENTIAL HYPERTENSION: ICD-10-CM

## 2018-04-24 DIAGNOSIS — R09.89 LEFT CAROTID BRUIT: ICD-10-CM

## 2018-04-24 PROCEDURE — 3074F SYST BP LT 130 MM HG: CPT | Mod: CPTII,S$GLB,, | Performed by: NURSE PRACTITIONER

## 2018-04-24 PROCEDURE — 3078F DIAST BP <80 MM HG: CPT | Mod: CPTII,S$GLB,, | Performed by: NURSE PRACTITIONER

## 2018-04-24 PROCEDURE — 99499 UNLISTED E&M SERVICE: CPT | Mod: S$GLB,,, | Performed by: NURSE PRACTITIONER

## 2018-04-24 PROCEDURE — 99999 PR PBB SHADOW E&M-EST. PATIENT-LVL IV: CPT | Mod: PBBFAC,,, | Performed by: NURSE PRACTITIONER

## 2018-04-24 PROCEDURE — 99214 OFFICE O/P EST MOD 30 MIN: CPT | Mod: S$GLB,,, | Performed by: NURSE PRACTITIONER

## 2018-04-24 RX ORDER — BUPROPION HYDROCHLORIDE 150 MG/1
150 TABLET ORAL DAILY
Qty: 90 TABLET | Refills: 1 | Status: SHIPPED | OUTPATIENT
Start: 2018-04-24 | End: 2019-03-19 | Stop reason: SDUPTHER

## 2018-04-24 NOTE — PATIENT INSTRUCTIONS
Understanding Actinic Keratosis     Wear a hat that protects your face and ears from the sun.     Actinic keratosis is a skin growth caused by sun damage. These growths are not cancer, but they may develop into skin cancer (precancerous). Many people get these growths, especially as they age. This is because of cumulative sun exposure over years. Multiple growths are called actinic keratoses.  What causes actinic keratosis?  Sun damage causes actinic keratosis. These growths usually appear on skin that is most often exposed to the sun, such as the face, ears, or back of the hands. People who easily sunburn are likely to develop actinic keratoses. Actinic keratoses often appear later in life from cumulative, extensive sun exposure.  What are the symptoms of actinic keratosis?  Actinic keratosis growths may be described as:  · Rough, like sandpaper  · Wart-like  · Scaly or scabby  · More easily felt than seen  They may appear singly or in clusters. They may start out as red patches, and the skin around them may be discolored.  Actinic keratoses usually are not painful. For some people they may make the skin feel tender.  How is actinic keratosis treated?  Because actinic keratoses may develop into skin cancer, a healthcare provider should look at them. Your healthcare provider may choose to remove one or more of these growths. It may be examined under a microscope. This is called a biopsy. You may also wish to have actinic keratoses removed if they bother you. They can be removed in several ways:  · By using a medicine on the skin such as 5 fluorouracil or imiquimod  · By removing them with a scalpel  · By freezing them with liquid nitrogen  How can I prevent actinic keratoses?  Avoiding sun damage to your skin is the best way to prevent actinic keratoses. Here are some ways to protect your skin:  · Use sunscreen with an SPF of 30 or higher on exposed skin when you are outside.  · Wear a hat to protect your face and  scalp. Consider wearing clothing that covers your arms and legs.  · Avoid the sun in the middle of the day, when sunlight is most direct.  · Be aware of how long you have been out in the sun. Reapply sunscreen according to package directions.  · Do not use tanning beds.  What are the complications of actinic keratosis?  Actinic keratoses are a sign of skin damage. They may become cancerous. Its a good idea to ask your healthcare provider to check new skin growths. Report any skin problem that concerns you.  When should I call my healthcare provider?  Call your healthcare provider right away if any of these occur:  · You have an actinic keratosis sore that does not respond to treatment  · You have an actinic keratosis sore that does not heal within a few weeks, or heals and then comes back  · You have a skin growth that is changing in size, shape, or color  · You have a skin growth that looks different on one side from the other  · You have a skin growth that is not the same color throughout   Date Last Reviewed: 5/1/2016  © 4557-7346 [employer] Get [employer]. [address] [place] [address], [place] [address]. All rights reserved. This information is not intended as a substitute for professional medical care. Always follow your healthcare professional's instructions.

## 2018-04-24 NOTE — PROGRESS NOTES
Subjective:       Patient ID: Buddy Park is a 67 y.o. male.    Chief Complaint: Depression (follow up)    HPI   Chief Complaint  Chief Complaint   Patient presents with    Depression     follow up       HPI  Buddy Park is a 67 y.o. male with medical diagnoses as listed in the medical history and problem list that presents for follow up of depression since discontinuing lexapro and starting wellbutrin in its place.  Lexapro was causing inability to climax during intercourse. Patient feels more motivated to get things done and is working on his projects, working on a cookbook.  States in a good mood, trying to get moving more. Patient regularly treated for hypertension and blood pressure is controlled today 117/76.  BMI today is 27.46 and patient has lost 2 pounds since last visit.   Established patient with last clinic appointment 3/27/2018.        PAST MEDICAL HISTORY:  Past Medical History:   Diagnosis Date    Angina pectoris     Anxiety     Biliary colic     Cataract     Cochlear implant in place     Deaf     LEFT EAR    Depression     Heart failure     [place] (hard of hearing)     HEARING AID - RIGHT    Hyperlipidemia     Hypertension     Kidney stone     Kidney stones     Renal stones     Trouble in sleeping     Wears glasses        PAST SURGICAL HISTORY:  Past Surgical History:   Procedure Laterality Date    CHOLECYSTECTOMY  2-6-2014    COLONOSCOPY  1/9/2013    Dr. Gillette, 5 year recheck (family history colon cancer)    COLONOSCOPY N/A 3/12/2018    Procedure: COLONOSCOPY;  Surgeon: Garett Go MD;  Location: [facility];  Service: Endoscopy;  Laterality: N/A;    EAR MASTOIDECTOMY W/ COCHLEAR IMPLANT W/ LANDMARK      left ear    FOREIGN BODY REMOVAL Right     glass removed from right foot/repair    FRACTURE SURGERY      right arm x2    LITHOTRIPSY      ROTATOR CUFF REPAIR      Right     SINUS SURGERY      TONSILLECTOMY         SOCIAL HISTORY:  Social History     Social  History    Marital status:      Spouse name: N/A    Number of children: N/A    Years of education: N/A     Occupational History    Not on file.     Social History Main Topics    Smoking status: Never Smoker    Smokeless tobacco: Never Used    Alcohol use Yes      Comment: once every two months    Drug use: No    Sexual activity: Yes     Other Topics Concern    Not on file     Social History Narrative    No narrative on file       FAMILY HISTORY:  Family History   Problem Relation Age of Onset    Cancer Mother      colon    Heart disease Father     Gout Father     Kidney disease Father     Arthritis Father     Hypertension Father     Early death Daughter      mva    Alcohol abuse Brother     Cancer Brother      mouth cancer w mets    No Known Problems Sister     Alcohol abuse Brother     No Known Problems Son     Heart disease Maternal Grandmother      MI    Stroke Maternal Grandfather     Heart disease Paternal Grandmother      MI    No Known Problems Paternal Grandfather     No Known Problems Son     Cancer Paternal Uncle      lung cancer    Allergic rhinitis Neg Hx     Allergies Neg Hx     Angioedema Neg Hx     Asthma Neg Hx     Atopy Neg Hx     Eczema Neg Hx     Immunodeficiency Neg Hx     Rhinitis Neg Hx     Urticaria Neg Hx     Collagen disease Neg Hx        ALLERGIES AND MEDICATIONS: updated and reviewed.  Review of patient's allergies indicates:   Allergen Reactions    Bee pollens Shortness Of Breath     WASP, BEE, ANT AND CATERPILLER STINGS    Milk containing products Diarrhea    Metoprolol Rash     Current Outpatient Prescriptions   Medication Sig Dispense Refill    acetaminophen (TYLENOL) 500 MG tablet Take 1 tablet (500 mg total) by mouth every 6 (six) hours as needed for Pain. 60 tablet 0    amLODIPine (NORVASC) 5 MG tablet Take 1 tablet (5 mg total) by mouth once daily. 90 tablet 3    aspirin (ECOTRIN) 81 MG EC tablet Take 1 tablet (81 mg total) by  mouth once daily.  0    betamethasone dipropionate (DIPROLENE) 0.05 % cream Apply topically 2 (two) times daily as needed. Use very sparingly to affected area only.  Do not use for longer than 2 weeks 45 g 3    buPROPion (WELLBUTRIN XL) 150 MG TB24 tablet Take 1 tablet (150 mg total) by mouth once daily. In the morning 90 tablet 1    epinephrine (EPIPEN) 0.3 mg/0.3 mL (1:1,000) AtIn Inject into the muscle once.      losartan (COZAAR) 100 MG tablet Take 1 tablet (100 mg total) by mouth once daily. 90 tablet 3    neomycin-polymyxin-dexamethasone (MAXITROL) 3.5mg/mL-10,000 unit/mL-0.1 % DrpS One drop four times a day      rosuvastatin (CRESTOR) 20 MG tablet Take 1 tablet (20 mg total) by mouth once daily. 90 tablet 3     No current facility-administered medications for this visit.      Review of Systems   Constitutional: Negative for activity change, appetite change, chills, fatigue and fever.        States energy level is improved   Respiratory: Negative for cough, shortness of breath and wheezing.    Cardiovascular: Negative for chest pain and palpitations.   Gastrointestinal: Negative for abdominal pain, constipation, diarrhea, nausea and vomiting.   Genitourinary: Negative for difficulty urinating.        Denies erectile dysfunction with wellbutrin   Musculoskeletal: Negative for arthralgias and myalgias.   Skin: Negative for rash and wound.   Neurological: Negative for dizziness, numbness and headaches.   Psychiatric/Behavioral: Negative for decreased concentration, dysphoric mood, sleep disturbance and suicidal ideas. The patient is not nervous/anxious.      Patient Health Questionnaire Depression Scale (PHQ-9):    Over the last 2 weeks, how often have you been bothered by any of the following problems?  (Not at all: 0; several days: 1, more than half the days: 2, nearly every day: 3)    1. Little interest or pleasure in doing things: 1  2. Feeling down, depressed, or hopeless: 0  3. Trouble falling or  "staying asleep, or sleeping too much: 1  4. Feeling tired or having little energy: 1  5. Poor appetite or overeatin  6. Feeling bad about yourself, or that you are a failure, or have let yourself or your family down: 0  7. Trouble concentrating on things, such as reading the newspaper or watching television: 1  8. Moving or speaking so slowly that other people could have noticed. Or the opposite- being so fidgety or restless that you have been moving       around a lot more than usual: 0  9. Thoughts that you would be better off dead, or of hurting yourself in some way: 0    Total Score: 4    (Score >15 - major depression; Score >20 - severe major depression)  Objective:       Vitals:    18 0909   BP: 117/76   BP Location: Right arm   Patient Position: Sitting   BP Method: Large (Automatic)   Pulse: 73   Temp: 98 °F (36.7 °C)   TempSrc: Oral   Weight: 74.8 kg (165 lb)   Height: 5' 5" (1.651 m)     Physical Exam   Constitutional: He is oriented to person, place, and time. Vital signs are normal. He appears well-developed and well-nourished. No distress.   HENT:   Head: Normocephalic and atraumatic.   Eyes: Conjunctivae and lids are normal. Pupils are equal, round, and reactive to light. Right conjunctiva is not injected. Left conjunctiva is not injected.   Neck: Normal carotid pulses present. Carotid bruit is present.   Very quiet bruit heard to left carotid artery.   Cardiovascular: Normal rate, regular rhythm, S1 normal, S2 normal, normal heart sounds, intact distal pulses and normal pulses.    No murmur heard.  Pulses:       Carotid pulses are 2+ on the right side, and 2+ on the left side.       Radial pulses are 2+ on the right side, and 2+ on the left side.   Pulmonary/Chest: Effort normal and breath sounds normal. No respiratory distress. He has no decreased breath sounds. He has no wheezes. He has no rhonchi. He has no rales.   Musculoskeletal: Normal range of motion.   Neurological: He is alert and " oriented to person, place, and time. He has normal strength.   Skin: Skin is warm, dry and intact. Lesion noted. He is not diaphoretic. No pallor.   Small raised scaly lesion to the right side of nose, possible actinic keratosis   Psychiatric: He has a normal mood and affect. His speech is normal and behavior is normal. Judgment and thought content normal. Cognition and memory are normal.   Nursing note and vitals reviewed.      Assessment:       1. Major depressive disorder with single episode, in full remission    2. Anxiety    3. Deafness in left ear    4. Cochlear implant in place    5. Cardiomyopathy, unspecified type    6. Left carotid bruit    7. Essential hypertension    8. Actinic keratosis    9. BMI 27.0-27.9,adult        Plan:   Buddy was seen today for depression.    Diagnoses and all orders for this visit:    Major depressive disorder with single episode, in full remission  -     buPROPion (WELLBUTRIN XL) 150 MG TB24 tablet; Take 1 tablet (150 mg total) by mouth once daily. In the morning  - PHQ-9 score is 4 today, remission, patient reports doing well    Anxiety   Stable on wellbutrin  Deafness in left ear   Stable, wearing hearing aid  Cochlear implant in place   Stable, patient able to hear and communicate without difficulty  Cardiomyopathy, unspecified type   Patient currently having work up with Dr. Garner, follow up appointment on 5/9/18 to review results of carotid ultrasound and echocardiogram  Left carotid bruit    Recent carotid ultrasound with follow up scheduled with Dr. Garner, final report not available  Essential hypertension   Blood pressure 117/76 today, continue current medication  Actinic keratosis  -     Ambulatory referral to Dermatology  - Educational handouts provided. Actinic keratosis    BMI 27.0-27.9,adult   Overweight with 2 pound weight loss since last visit   Weight loss recommended with well balanced diet and portion controlled meals and increased activity.     Follow-up  in about 4 months (around 8/24/2018) for follow up.     Patient readiness: acceptance and barriers:none    During the course of the visit the patient was educated and counseled about the following:     Hypertension:   Medication: no change.  Dietary sodium restriction.  Regular aerobic exercise.    Goals: Hypertension: Reduce Blood Pressure    Did patient meet goals/outcomes: Yes    The following self management tools provided: declined    Patient Instructions (the written plan) was given to the patient/family.     Time spent with patient: 30 minutes    Barriers to medications present (no )    Adverse reactions to current medications (no)    Over the counter medications reviewed (No)

## 2018-04-30 LAB
LEFT CBA DIAS: 16 CM/S
LEFT CBA SYS: 72 CM/S
LEFT CCA DIST DIAS: 19 CM/S
LEFT CCA DIST SYS: 88 CM/S
LEFT CCA MID DIAS: 25 CM/S
LEFT CCA MID SYS: 103 CM/S
LEFT CCA PROX DIAS: 18 CM/S
LEFT CCA PROX SYS: 100 CM/S
LEFT ECA DIAS: 17 CM/S
LEFT ECA SYS: 290 CM/S
LEFT ICA DIST DIAS: 31 CM/S
LEFT ICA DIST SYS: 99 CM/S
LEFT ICA MID DIAS: 27 CM/S
LEFT ICA MID SYS: 127 CM/S
LEFT ICA PROX DIAS: 109 CM/S
LEFT ICA PROX SYS: 399 CM/S
LEFT ICA/CCA SYS: 4.5 M/S
LEFT VERTEBRAL DIAS: 24 CM/S
LEFT VERTEBRAL SYS: 70 CM/S
RIGHT CBA DIAS: 39 CM/S
RIGHT CBA SYS: 136 CM/S
RIGHT CCA DIST DIAS: 22 CM/S
RIGHT CCA DIST SYS: 88 CM/S
RIGHT CCA MID DIAS: 27 CM/S
RIGHT CCA MID SYS: 95 CM/S
RIGHT CCA PROX DIAS: 22 CM/S
RIGHT CCA PROX SYS: 101 CM/S
RIGHT ECA DIAS: 21 CM/S
RIGHT ECA SYS: 195 CM/S
RIGHT ICA DIST DIAS: 37 CM/S
RIGHT ICA DIST SYS: 120 CM/S
RIGHT ICA MID DIAS: 34 CM/S
RIGHT ICA MID SYS: 146 CM/S
RIGHT ICA PROX DIAS: 34 CM/S
RIGHT ICA PROX SYS: 148 CM/S
RIGHT ICA/CCA SYS: 1.7 CM/S
RIGHT VERTEBRAL DIAS: 15 CM/S
RIGHT VERTEBRAL SYS: 64 CM/S

## 2018-05-09 ENCOUNTER — OFFICE VISIT (OUTPATIENT)
Dept: CARDIOLOGY | Facility: CLINIC | Age: 68
End: 2018-05-09
Payer: MEDICARE

## 2018-05-09 VITALS
WEIGHT: 167.75 LBS | HEART RATE: 80 BPM | SYSTOLIC BLOOD PRESSURE: 126 MMHG | HEIGHT: 65 IN | OXYGEN SATURATION: 96 % | BODY MASS INDEX: 27.95 KG/M2 | DIASTOLIC BLOOD PRESSURE: 82 MMHG

## 2018-05-09 DIAGNOSIS — I77.9 CAROTID DISEASE, BILATERAL: Primary | ICD-10-CM

## 2018-05-09 PROCEDURE — 3074F SYST BP LT 130 MM HG: CPT | Mod: CPTII,S$GLB,, | Performed by: INTERNAL MEDICINE

## 2018-05-09 PROCEDURE — 3079F DIAST BP 80-89 MM HG: CPT | Mod: CPTII,S$GLB,, | Performed by: INTERNAL MEDICINE

## 2018-05-09 PROCEDURE — 99999 PR PBB SHADOW E&M-EST. PATIENT-LVL III: CPT | Mod: PBBFAC,,, | Performed by: INTERNAL MEDICINE

## 2018-05-09 PROCEDURE — 3008F BODY MASS INDEX DOCD: CPT | Mod: CPTII,S$GLB,, | Performed by: INTERNAL MEDICINE

## 2018-05-09 PROCEDURE — 99214 OFFICE O/P EST MOD 30 MIN: CPT | Mod: S$GLB,,, | Performed by: INTERNAL MEDICINE

## 2018-05-09 NOTE — PROGRESS NOTES
[facility]    CC: Follow-up for results.  Chief Complaint   Patient presents with    Follow-up       Patient ID:Buddy Park is a 67 y.o. male with a past medical history of hyper-lipidemia, hypertension  HPI  Patient was here to know the results of his tests.  He has significant lesion on the left carotid.  There is no history of any prior neurological events.    Past Medical History:   Diagnosis Date    Angina pectoris     Anxiety     Biliary colic     Cataract     Cochlear implant in place     Deaf     LEFT EAR    Depression     Heart failure     [place] (hard of hearing)     HEARING AID - RIGHT    Hyperlipidemia     Hypertension     Kidney stone     Kidney stones     Renal stones     Trouble in sleeping     Wears glasses      Past Surgical History:   Procedure Laterality Date    CHOLECYSTECTOMY  2-6-2014    COLONOSCOPY  1/9/2013    Dr. Gillette, 5 year recheck (family history colon cancer)    COLONOSCOPY N/A 3/12/2018    Procedure: COLONOSCOPY;  Surgeon: Garett Go MD;  Location: [facility];  Service: Endoscopy;  Laterality: N/A;    EAR MASTOIDECTOMY W/ COCHLEAR IMPLANT W/ LANDMARK      left ear    FOREIGN BODY REMOVAL Right     glass removed from right foot/repair    FRACTURE SURGERY      right arm x2    LITHOTRIPSY      ROTATOR CUFF REPAIR      Right     SINUS SURGERY      TONSILLECTOMY       Social History     Social History    Marital status:      Spouse name: N/A    Number of children: N/A    Years of education: N/A     Occupational History    Not on file.     Social History Main Topics    Smoking status: Never Smoker    Smokeless tobacco: Never Used    Alcohol use Yes      Comment: once every two months    Drug use: No    Sexual activity: Yes     Other Topics Concern    Not on file     Social History Narrative    No narrative on file     Family History   Problem Relation Age of Onset    Cancer Mother         colon    Heart disease Father      Gout Father     Kidney disease Father     Arthritis Father     Hypertension Father     Early death Daughter         mva    Alcohol abuse Brother     Cancer Brother         mouth cancer w mets    No Known Problems Sister     Alcohol abuse Brother     No Known Problems Son     Heart disease Maternal Grandmother         MI    Stroke Maternal Grandfather     Heart disease Paternal Grandmother         MI    No Known Problems Paternal Grandfather     No Known Problems Son     Cancer Paternal Uncle         lung cancer    Allergic rhinitis Neg Hx     Allergies Neg Hx     Angioedema Neg Hx     Asthma Neg Hx     Atopy Neg Hx     Eczema Neg Hx     Immunodeficiency Neg Hx     Rhinitis Neg Hx     Urticaria Neg Hx     Collagen disease Neg Hx        Review of patient's allergies indicates:   Allergen Reactions    Bee pollens Shortness Of Breath     WASP, BEE, ANT AND CATERPILLER STINGS    Milk containing products Diarrhea    Metoprolol Rash       Medication List with Changes/Refills   Current Medications    ACETAMINOPHEN (TYLENOL) 500 MG TABLET    Take 1 tablet (500 mg total) by mouth every 6 (six) hours as needed for Pain.    AMLODIPINE (NORVASC) 5 MG TABLET    Take 1 tablet (5 mg total) by mouth once daily.    ASPIRIN (ECOTRIN) 81 MG EC TABLET    Take 1 tablet (81 mg total) by mouth once daily.    BETAMETHASONE DIPROPIONATE (DIPROLENE) 0.05 % CREAM    Apply topically 2 (two) times daily as needed. Use very sparingly to affected area only.  Do not use for longer than 2 weeks    BUPROPION (WELLBUTRIN XL) 150 MG TB24 TABLET    Take 1 tablet (150 mg total) by mouth once daily. In the morning    EPINEPHRINE (EPIPEN) 0.3 MG/0.3 ML (1:1,000) ATIN    Inject into the muscle once.    LOSARTAN (COZAAR) 100 MG TABLET    Take 1 tablet (100 mg total) by mouth once daily.    NEOMYCIN-POLYMYXIN-DEXAMETHASONE (MAXITROL) 3.5MG/ML-10,000 UNIT/ML-0.1 % DRPS    One drop four times a day    ROSUVASTATIN (CRESTOR) 20 MG  "TABLET    Take 1 tablet (20 mg total) by mouth once daily.       Review of Systems  Constitution: Denies chills, fever, and sweats.  HENT: Denies headaches or blurry vision.  Cardiovascular: Denies chest pain or irregular heart beat.  Respiratory: Denies cough or shortness of breath.  Gastrointestinal: Denies abdominal pain, nausea, or vomiting.  Musculoskeletal: Denies muscle cramps.  Neurological: Denies dizziness or focal weakness.  Psychiatric/Behavioral: Normal mental status.  Hematologic/Lymphatic: Denies bleeding problem or easy bruising/bleeding.  Skin: Denies rash or suspicious lesions    Physical Examination  /82 (BP Location: Left arm)   Pulse 80   Ht 5' 5" (1.651 m)   Wt 76.1 kg (167 lb 12.3 oz)   SpO2 96%   BMI 27.92 kg/m²     Constitutional: No acute distress, conversant  HEENT: Sclera anicteric, Pupils equal, round and reactive to light, extraocular motions intact, Oropharynx clear  Neck: No JVD, left carotid bruits  Cardiovascular: regular rate and rhythm, no murmur, rubs or gallops, normal S1/S2  Pulmonary: Clear to auscultation bilaterally  Abdominal: Abdomen soft, nontender, nondistended, positive bowel sounds  Extremities: No lower extremity edema,   Pulses:  Carotid pulses are 2+ on the right side, and 2+ on the left side.  Radial pulses are 2+ on the right side, and 2+ on the left side.   .    Skin: No ecchymosis, erythema, or ulcers  Psych: Alert and oriented x 3, appropriate affect  Neuro: CNII-XII intact, no focal deficits    Labs:  Most Recent Data  CBC:   Lab Results   Component Value Date    WBC 6.01 08/15/2017    HGB 15.2 08/15/2017    HCT 43.9 08/15/2017     08/15/2017    MCV 87 08/15/2017    RDW 14.1 08/15/2017     BMP:   Lab Results   Component Value Date     02/26/2018    K 4.4 02/26/2018     02/26/2018    CO2 31 (H) 02/26/2018    BUN 33 (H) 02/26/2018    CREATININE 1.6 (H) 02/26/2018     (H) 02/26/2018    CALCIUM 10.2 02/26/2018    MG 2.2 " 03/26/2012    PHOS 3.7 03/26/2012     LFTS;   Lab Results   Component Value Date    PROT 7.8 02/26/2018    ALBUMIN 4.0 02/26/2018    BILITOT 1.0 02/26/2018    AST 16 02/26/2018    ALKPHOS 71 02/26/2018    ALT 21 02/26/2018     COAGS: No results found for: INR, PROTIME, PTT  FLP:   Lab Results   Component Value Date    CHOL 160 02/26/2018    HDL 36 (L) 02/26/2018    LDLCALC 70.6 02/26/2018    TRIG 267 (H) 02/26/2018    CHOLHDL 22.5 02/26/2018     CARDIAC: No results found for: TROPONINI, CKMB, BNP    Imaging:      Findings     Right Carotid The right proximal internal carotid artery demonstrates 50-69% stenosis with mild homogeneous plaque.      Left Carotid The left proximal internal carotid artery is 80-99% stenosed with severe homogeneous plaque.   The left middle internal carotid artery has turbulent flow.              Echo:  Conclusion     · Left ventricular diastolic filling is abnormal.  · Left ventricle ejection fraction is normal.  · Tricuspid valve shows mild regurgitation.  · Left atrium is mildly dilated.       I have personally reviewed these images and echo data    Assessment/Plan:  Buddy was seen today for follow-up.    Diagnoses and all orders for this visit:    Carotid disease, bilateral  -     CTA Neck; Future  -     Basic metabolic panel; Future       I will reassess him with the carotid arteries results.  If he has significant narrowing we will discuss about the possibility of an endarterectomy.  He is on an aspirin, statin regimen.  Follow-up in about 4 weeks (around 6/6/2018).          Total duration of face to face visit time 30 minutes.  Total time spent counseling greater than fifty percent of total visit time.  Counseling included discussion regarding imaging findings, diagnosis, possibilities, treatment options, risks and benefits.  The patient had many questions regarding the options and long-term effect which were all answered to my best ability.      Brice Garner,  MD,FARHADP,RPVI,FACC,FSCAI.  Interventional Cardiology   Phone [phone]

## 2018-05-15 ENCOUNTER — HOSPITAL ENCOUNTER (OUTPATIENT)
Dept: RADIOLOGY | Facility: HOSPITAL | Age: 68
Discharge: HOME | End: 2018-05-15
Attending: INTERNAL MEDICINE
Payer: MEDICARE

## 2018-05-15 DIAGNOSIS — I77.9 CAROTID DISEASE, BILATERAL: ICD-10-CM

## 2018-05-15 PROCEDURE — 25500020 PHARM REV CODE 255

## 2018-05-15 PROCEDURE — 70498 CT ANGIOGRAPHY NECK: CPT | Mod: TC

## 2018-05-15 PROCEDURE — 70498 CT ANGIOGRAPHY NECK: CPT | Mod: 26,,, | Performed by: RADIOLOGY

## 2018-05-15 RX ORDER — SODIUM CHLORIDE 9 MG/ML
INJECTION, SOLUTION INTRAVENOUS
Status: DISPENSED
Start: 2018-05-15 | End: 2018-05-15

## 2018-05-15 RX ADMIN — IOHEXOL 100 ML: 350 INJECTION, SOLUTION INTRAVENOUS at 08:05

## 2018-05-16 ENCOUNTER — INITIAL CONSULT (OUTPATIENT)
Dept: DERMATOLOGY | Facility: CLINIC | Age: 68
End: 2018-05-16
Payer: MEDICARE

## 2018-05-16 VITALS — BODY MASS INDEX: 27.82 KG/M2 | WEIGHT: 167 LBS | HEIGHT: 65 IN

## 2018-05-16 DIAGNOSIS — L82.1 SEBORRHEIC KERATOSIS: Primary | ICD-10-CM

## 2018-05-16 DIAGNOSIS — L82.0 INFLAMED SEBORRHEIC KERATOSIS: ICD-10-CM

## 2018-05-16 DIAGNOSIS — L81.4 LENTIGINES: ICD-10-CM

## 2018-05-16 PROCEDURE — 3008F BODY MASS INDEX DOCD: CPT | Mod: CPTII,S$GLB,, | Performed by: DERMATOLOGY

## 2018-05-16 PROCEDURE — 99202 OFFICE O/P NEW SF 15 MIN: CPT | Mod: 25,S$GLB,, | Performed by: DERMATOLOGY

## 2018-05-16 PROCEDURE — 17110 DESTRUCTION B9 LES UP TO 14: CPT | Mod: S$GLB,,, | Performed by: DERMATOLOGY

## 2018-05-16 PROCEDURE — 99999 PR PBB SHADOW E&M-EST. PATIENT-LVL II: CPT | Mod: PBBFAC,,, | Performed by: DERMATOLOGY

## 2018-05-16 NOTE — LETTER
May 16, 2018      Nighat Mejía, NP  [address]  [place] [address]           [place] [facility]  [address]  [place] [address]  Phone: [phone]          Patient: Buddy Park   MR Number: [id number]   YOB: 1950   Date of Visit: 5/16/2018       Dear Nighat Mejía:    Thank you for referring Buddy Park to me for evaluation. Attached you will find relevant portions of my assessment and plan of care.    If you have questions, please do not hesitate to call me. I look forward to following Buddy Park along with you.    Sincerely,    Natalie Robles MD    Enclosure  CC:  No Recipients    If you would like to receive this communication electronically, please contact [email or web address] or [phone] to request more information on [facility] Link access.    For providers and/or their staff who would like to refer a patient to [facility], please contact us through our one-stop-shop provider referral line, [facility] , at [phone].    If you feel you have received this communication in error or would no longer like to receive these types of communications, please e-mail [email or web address]

## 2018-05-16 NOTE — PROGRESS NOTES
Subjective:       Patient ID:  Buddy Park is a 67 y.o. male who presents for   Chief Complaint   Patient presents with    Spot     nose on the R side     New patient. No h/skin cancer.  Complaints today of spot on the right of side of nose for about 20 years, comes and goes, does get raised. Nothing visible today. Was treated with cryo years ago.    HPI    Review of Systems   Constitutional: Negative for fever, chills, weight loss, weight gain, fatigue, night sweats and malaise.   Skin: Positive for wears hat. Negative for daily sunscreen use and activity-related sunscreen use.   Hematologic/Lymphatic: Does not bruise/bleed easily.        Objective:    Physical Exam   Constitutional: He appears well-developed and well-nourished. No distress.   HENT:   Head:       Neurological: He is alert and oriented to person, place, and time. He is not disoriented.   Psychiatric: He has a normal mood and affect.   Skin:   Areas Examined (abnormalities noted in diagram):   Head / Face Inspection Performed  Neck Inspection Performed  Chest / Axilla Inspection Performed  Back Inspection Performed  RUE Inspected  LUE Inspection Performed              Diagram Legend     Erythematous scaling macule/papule c/w actinic keratosis       Vascular papule c/w angioma      Pigmented verrucoid papule/plaque c/w seborrheic keratosis      Yellow umbilicated papule c/w sebaceous hyperplasia      Irregularly shaped tan macule c/w lentigo     1-2 mm smooth white papules consistent with Milia      Movable subcutaneous cyst with punctum c/w epidermal inclusion cyst      Subcutaneous movable cyst c/w pilar cyst      Firm pink to brown papule c/w dermatofibroma      Pedunculated fleshy papule(s) c/w skin tag(s)      Evenly pigmented macule c/w junctional nevus     Mildly variegated pigmented, slightly irregular-bordered macule c/w mildly atypical nevus      Flesh colored to evenly pigmented papule c/w intradermal nevus       Pink pearly  papule/plaque c/w basal cell carcinoma      Erythematous hyperkeratotic cursted plaque c/w SCC      Surgical scar with no sign of skin cancer recurrence      Open and closed comedones      Inflammatory papules and pustules      Verrucoid papule consistent consistent with wart     Erythematous eczematous patches and plaques     Dystrophic onycholytic nail with subungual debris c/w onychomycosis     Umbilicated papule    Erythematous-base heme-crusted tan verrucoid plaque consistent with inflamed seborrheic keratosis     Erythematous Silvery Scaling Plaque c/w Psoriasis     See annotation      Assessment / Plan:        Seborrheic keratosis, face  These are benign inherited growths without a malignant potential. Reassurance given to patient. No treatment is necessary.     Cryosurgery procedure note- per patient request    Verbal consent from the patient is obtained. Liquid nitrogen cryosurgery is applied to 1 lesions to produce a freeze injury. The patient is aware that blisters may form and is instructed on wound care with gentle cleansing and use of vaseline ointment to keep moist until healed. The patient is supplied a handout on cryosurgery and is instructed to call if lesions do not completely resolve. Risk of dyspigmentation discussed.       Inflamed seborrheic keratosis  Cryosurgery procedure note:    Verbal consent from the patient is obtained. Liquid nitrogen cryosurgery is applied to 1 lesions to produce a freeze injury. The patient is aware that blisters may form and is instructed on wound care with gentle cleansing and use of vaseline ointment to keep moist until healed. The patient is supplied a handout on cryosurgery and is instructed to call if lesions do not completely resolve. Risk of dyspigmentation discussed.       Lentigines, face  This is a benign hyperpigmented sun induced lesion. Daily sun protection will reduce the number of new lesions. Treatment of these benign lesions are considered  cosmetic.               Follow-up in about 6 months (around 11/16/2018).

## 2018-05-21 ENCOUNTER — OFFICE VISIT (OUTPATIENT)
Dept: CARDIOLOGY | Facility: CLINIC | Age: 68
End: 2018-05-21
Payer: MEDICARE

## 2018-05-21 VITALS
SYSTOLIC BLOOD PRESSURE: 156 MMHG | RESPIRATION RATE: 16 BRPM | DIASTOLIC BLOOD PRESSURE: 76 MMHG | TEMPERATURE: 75 F | OXYGEN SATURATION: 95 % | HEIGHT: 65 IN | WEIGHT: 168.19 LBS | BODY MASS INDEX: 28.02 KG/M2

## 2018-05-21 DIAGNOSIS — R07.9 CHEST PAIN, UNSPECIFIED TYPE: Primary | ICD-10-CM

## 2018-05-21 DIAGNOSIS — I77.9 CAROTID DISEASE, BILATERAL: ICD-10-CM

## 2018-05-21 DIAGNOSIS — I10 ESSENTIAL HYPERTENSION: ICD-10-CM

## 2018-05-21 PROCEDURE — 99214 OFFICE O/P EST MOD 30 MIN: CPT | Mod: S$GLB,,, | Performed by: INTERNAL MEDICINE

## 2018-05-21 PROCEDURE — 3008F BODY MASS INDEX DOCD: CPT | Mod: CPTII,S$GLB,, | Performed by: INTERNAL MEDICINE

## 2018-05-21 PROCEDURE — 3077F SYST BP >= 140 MM HG: CPT | Mod: CPTII,S$GLB,, | Performed by: INTERNAL MEDICINE

## 2018-05-21 PROCEDURE — 3078F DIAST BP <80 MM HG: CPT | Mod: CPTII,S$GLB,, | Performed by: INTERNAL MEDICINE

## 2018-05-21 PROCEDURE — 99999 PR PBB SHADOW E&M-EST. PATIENT-LVL III: CPT | Mod: PBBFAC,,, | Performed by: INTERNAL MEDICINE

## 2018-05-21 RX ORDER — AMLODIPINE BESYLATE 5 MG/1
10 TABLET ORAL DAILY
Qty: 90 TABLET | Refills: 3 | Status: SHIPPED | OUTPATIENT
Start: 2018-05-21 | End: 2019-03-19 | Stop reason: SDUPTHER

## 2018-05-21 NOTE — PROGRESS NOTES
[facility]    CC: Follow-up visit  Chief Complaint   Patient presents with    Follow-up     4 week f/u        Patient ID: Buddy Park is a 67 y.o. male with a past medical history of hypertension hyperlipidemia     HPI  He was here for follow-up office carotid CT scan.  Results are dictated below.  He does not have prior history of neurological events :TIA or strokes.  In addition he said that he occasionally has chest pains.  Not necessarily at exertion.  He denies any associated shortness of breath, diaphoresis, orthopnea, PND, syncope or presyncope    Past Medical History:   Diagnosis Date    Angina pectoris     Anxiety     Biliary colic     Cataract     Cochlear implant in place     Deaf     LEFT EAR    Depression     Heart failure     [place] (hard of hearing)     HEARING AID - RIGHT    Hyperlipidemia     Hypertension     Kidney stone     Kidney stones     Renal stones     Trouble in sleeping     Wears glasses      Past Surgical History:   Procedure Laterality Date    CHOLECYSTECTOMY  2-6-2014    COLONOSCOPY  1/9/2013    Dr. Gillette, 5 year recheck (family history colon cancer)    COLONOSCOPY N/A 3/12/2018    Procedure: COLONOSCOPY;  Surgeon: Garett Go MD;  Location: [facility];  Service: Endoscopy;  Laterality: N/A;    EAR MASTOIDECTOMY W/ COCHLEAR IMPLANT W/ LANDMARK      left ear    FOREIGN BODY REMOVAL Right     glass removed from right foot/repair    FRACTURE SURGERY      right arm x2    LITHOTRIPSY      ROTATOR CUFF REPAIR      Right     SINUS SURGERY      TONSILLECTOMY       Social History     Social History    Marital status:      Spouse name: N/A    Number of children: N/A    Years of education: N/A     Occupational History    Not on file.     Social History Main Topics    Smoking status: Never Smoker    Smokeless tobacco: Never Used    Alcohol use Yes      Comment: once every two months    Drug use: No    Sexual activity: Yes     Other  Topics Concern    Not on file     Social History Narrative    No narrative on file     Family History   Problem Relation Age of Onset    Cancer Mother         colon    Heart disease Father     Gout Father     Kidney disease Father     Arthritis Father     Hypertension Father     Early death Daughter         mva    Alcohol abuse Brother     Cancer Brother         mouth cancer w mets    No Known Problems Sister     Alcohol abuse Brother     No Known Problems Son     Heart disease Maternal Grandmother         MI    Stroke Maternal Grandfather     Heart disease Paternal Grandmother         MI    No Known Problems Paternal Grandfather     No Known Problems Son     Cancer Paternal Uncle         lung cancer    Allergic rhinitis Neg Hx     Allergies Neg Hx     Angioedema Neg Hx     Asthma Neg Hx     Atopy Neg Hx     Eczema Neg Hx     Immunodeficiency Neg Hx     Rhinitis Neg Hx     Urticaria Neg Hx     Collagen disease Neg Hx        Review of patient's allergies indicates:   Allergen Reactions    Bee pollens Shortness Of Breath     WASP, BEE, ANT AND CATERPILLER STINGS    Milk containing products Diarrhea    Metoprolol Rash       Medication List with Changes/Refills   Current Medications    ACETAMINOPHEN (TYLENOL) 500 MG TABLET    Take 1 tablet (500 mg total) by mouth every 6 (six) hours as needed for Pain.    ASPIRIN (ECOTRIN) 81 MG EC TABLET    Take 1 tablet (81 mg total) by mouth once daily.    BETAMETHASONE DIPROPIONATE (DIPROLENE) 0.05 % CREAM    Apply topically 2 (two) times daily as needed. Use very sparingly to affected area only.  Do not use for longer than 2 weeks    BUPROPION (WELLBUTRIN XL) 150 MG TB24 TABLET    Take 1 tablet (150 mg total) by mouth once daily. In the morning    EPINEPHRINE (EPIPEN) 0.3 MG/0.3 ML (1:1,000) ATIN    Inject into the muscle once.    LOSARTAN (COZAAR) 100 MG TABLET    Take 1 tablet (100 mg total) by mouth once daily.    NEOMYCIN-POLYMYXIN-DEXAMETHASONE  "(MAXITROL) 3.5MG/ML-10,000 UNIT/ML-0.1 % DRPS    One drop four times a day    ROSUVASTATIN (CRESTOR) 20 MG TABLET    Take 1 tablet (20 mg total) by mouth once daily.   Changed and/or Refilled Medications    Modified Medication Previous Medication    AMLODIPINE (NORVASC) 5 MG TABLET amLODIPine (NORVASC) 5 MG tablet       Take 2 tablets (10 mg total) by mouth once daily.    Take 1 tablet (5 mg total) by mouth once daily.       Review of Systems  Constitution: Denies chills, fever, and sweats.  HENT: Denies headaches or blurry vision.  Cardiovascular: Denies chest pain or irregular heart beat.  Respiratory: Denies cough or shortness of breath.  Gastrointestinal: Denies abdominal pain, nausea, or vomiting.  Musculoskeletal: Denies muscle cramps.  Neurological: Denies dizziness or focal weakness.  Psychiatric/Behavioral: Normal mental status.  Hematologic/Lymphatic: Denies bleeding problem or easy bruising/bleeding.  Skin: Denies rash or suspicious lesions    Physical Examination  BP (!) 156/76 (BP Location: Right arm, Patient Position: Sitting)   Temp (!) 75 °F (23.9 °C)   Resp 16   Ht 5' 5" (1.651 m)   Wt 76.3 kg (168 lb 3.4 oz)   SpO2 95%   BMI 27.99 kg/m²     Constitutional: No acute distress, conversant  HEENT: Sclera anicteric, Pupils equal, round and reactive to light, extraocular motions intact, Oropharynx clear  Neck: No JVD, + carotid bruits L  Cardiovascular: regular rate and rhythm, no murmur, rubs or gallops, normal S1/S2  Pulmonary: Clear to auscultation bilaterally  Abdominal: Abdomen soft, nontender, nondistended, positive bowel sounds  Extremities: No lower extremity edema,   Pulses:  Carotid pulses are 2+ on the right side, and 2+ on the left side.  Radial pulses are 2+ on the right side, and 2+ on the left side.       Skin: No ecchymosis, erythema, or ulcers  Psych: Alert and oriented x 3, appropriate affect  Neuro: CNII-XII intact, no focal deficits    Labs:  Most Recent Data  CBC:   Lab Results "   Component Value Date    WBC 6.01 08/15/2017    HGB 15.2 08/15/2017    HCT 43.9 08/15/2017     08/15/2017    MCV 87 08/15/2017    RDW 14.1 08/15/2017     BMP:   Lab Results   Component Value Date     05/15/2018    K 3.8 05/15/2018     05/15/2018    CO2 28 05/15/2018    BUN 15 05/15/2018    CREATININE 1.3 05/15/2018     (H) 05/15/2018    CALCIUM 9.5 05/15/2018    MG 2.2 03/26/2012    PHOS 3.7 03/26/2012     LFTS;   Lab Results   Component Value Date    PROT 7.8 02/26/2018    ALBUMIN 4.0 02/26/2018    BILITOT 1.0 02/26/2018    AST 16 02/26/2018    ALKPHOS 71 02/26/2018    ALT 21 02/26/2018     COAGS: No results found for: INR, PROTIME, PTT  FLP:   Lab Results   Component Value Date    CHOL 160 02/26/2018    HDL 36 (L) 02/26/2018    LDLCALC 70.6 02/26/2018    TRIG 267 (H) 02/26/2018    CHOLHDL 22.5 02/26/2018     CARDIAC: No results found for: TROPONINI, CKMB, BNP    Imaging:    Impression       1. There is atherosclerosis involving the carotid bulb and proximal internal carotid arteries bilaterally.  On the right, stenosis at the origin of the right internal carotid artery is approximately 50%.  However, on the left, there is a short segment high-grade stenosis of 80% extending over a distance of approximately 1 cm.  There is no tandem lesion.  2. The vertebral arteries are widely patent in the neck.  3. The study extends intracranially.  There is no critical stenosis, occlusion, thrombosis, dissection, aneurysm or other vascular malformation involving the vessels which comprised anterior and posterior circulation.       I have personally reviewed these images and echo data    Assessment/Plan:  Buddy was seen today for follow-up.    Diagnoses and all orders for this visit:    Chest pain, unspecified type  -     Stress test with myocardial perfusion (CUPID ONLY-[facility], [facility], [facility], [place], [place]); Future    Essential hypertension  -     amLODIPine (NORVASC)  5 MG tablet; Take 2 tablets (10 mg total) by mouth once daily.    Carotid disease, bilateral        I had a discussion about revascularization for his left carotid artery.  He seemed to be very concerned.  We will refer him for surgical endarterectomy.    He continues to be on aspirin and a statin.    Given his underlying vascular disease and complains of chest pain we will perform a stress test at baseline.    Follow-up in about 4 months (around 9/21/2018).        Total duration of face to face visit time 30 minutes.  Total time spent counseling greater than fifty percent of total visit time.  Counseling included discussion regarding imaging findings, diagnosis, possibilities, treatment options, risks and benefits.  The patient had many questions regarding the options and long-term effect which were all answered to my best ability.      Brice Garner MD,MRCP,RPVI,FACC,FSCAI.  Interventional Cardiology   Phone [phone]

## 2018-06-01 ENCOUNTER — HOSPITAL ENCOUNTER (OUTPATIENT)
Dept: RADIOLOGY | Facility: HOSPITAL | Age: 68
Discharge: HOME | End: 2018-06-01
Attending: INTERNAL MEDICINE
Payer: MEDICARE

## 2018-06-01 ENCOUNTER — HOSPITAL ENCOUNTER (OUTPATIENT)
Dept: CARDIOLOGY | Facility: HOSPITAL | Age: 68
Discharge: HOME | End: 2018-06-01
Attending: INTERNAL MEDICINE
Payer: MEDICARE

## 2018-06-01 VITALS — SYSTOLIC BLOOD PRESSURE: 114 MMHG | DIASTOLIC BLOOD PRESSURE: 81 MMHG | HEART RATE: 80 BPM

## 2018-06-01 DIAGNOSIS — R07.9 CHEST PAIN, UNSPECIFIED TYPE: ICD-10-CM

## 2018-06-01 PROCEDURE — 93016 CV STRESS TEST SUPVJ ONLY: CPT | Mod: ,,, | Performed by: INTERNAL MEDICINE

## 2018-06-01 PROCEDURE — 93017 CV STRESS TEST TRACING ONLY: CPT

## 2018-06-01 PROCEDURE — 93018 CV STRESS TEST I&R ONLY: CPT | Mod: ,,, | Performed by: INTERNAL MEDICINE

## 2018-06-01 PROCEDURE — 78452 HT MUSCLE IMAGE SPECT MULT: CPT | Mod: 26,,, | Performed by: INTERNAL MEDICINE

## 2018-06-01 PROCEDURE — A9502 TC99M TETROFOSMIN: HCPCS

## 2018-06-04 ENCOUNTER — TELEPHONE (OUTPATIENT)
Dept: CARDIOLOGY | Facility: CLINIC | Age: 68
End: 2018-06-04

## 2018-06-04 LAB
CV STRESS BASE HR: 81
CVPEAKDIABP: 68
CVPEAKSYSBP: 166
CVRESTDIABP: 46
CVRESTSYSBP: 101
STRESS ECHO POST ESTIMATED WORKLOAD: 9 METS
STRESS ECHO POST EXERCISE DUR MIN: 6 MIN
STRESS ECHO POST EXERCISE DUR SEC: 55

## 2018-06-04 NOTE — TELEPHONE ENCOUNTER
----- Message from Brice Garner MD sent at 6/4/2018  9:10 AM CDT -----  Please call the patient regarding this. Tell him his stress is normal. He is cleared for the carotid surgery.  I have already informed Dr Santana. He should call his office too to reschedule the surgery.

## 2018-06-04 NOTE — TELEPHONE ENCOUNTER
Called and spoke with Ms. Park. I informed him per Dr. Garner that his stress test was normal and he is cleared for surgery with Dr. Santana. He verbalized understanding. No further issues noted.

## 2018-08-22 ENCOUNTER — DOCUMENTATION ONLY (OUTPATIENT)
Dept: FAMILY MEDICINE | Facility: CLINIC | Age: 68
End: 2018-08-22

## 2018-08-22 NOTE — PROGRESS NOTES
Pre-Visit Chart Review  For Appointment Scheduled on 8-24-18    Health Maintenance Due   Topic Date Due    Pneumococcal (65+) (1 of 2 - PCV13) 12/16/2017    Influenza Vaccine  08/01/2018

## 2018-08-24 ENCOUNTER — LAB VISIT (OUTPATIENT)
Dept: LAB | Facility: HOSPITAL | Age: 68
End: 2018-08-24
Attending: FAMILY MEDICINE
Payer: MEDICARE

## 2018-08-24 ENCOUNTER — OFFICE VISIT (OUTPATIENT)
Dept: FAMILY MEDICINE | Facility: CLINIC | Age: 68
End: 2018-08-24
Attending: FAMILY MEDICINE
Payer: MEDICARE

## 2018-08-24 VITALS
SYSTOLIC BLOOD PRESSURE: 138 MMHG | HEIGHT: 65 IN | RESPIRATION RATE: 16 BRPM | HEART RATE: 84 BPM | BODY MASS INDEX: 28.21 KG/M2 | TEMPERATURE: 98 F | DIASTOLIC BLOOD PRESSURE: 72 MMHG | WEIGHT: 169.31 LBS | OXYGEN SATURATION: 97 %

## 2018-08-24 DIAGNOSIS — E78.5 HYPERLIPIDEMIA, UNSPECIFIED HYPERLIPIDEMIA TYPE: ICD-10-CM

## 2018-08-24 DIAGNOSIS — Z96.21 COCHLEAR IMPLANT IN PLACE: ICD-10-CM

## 2018-08-24 DIAGNOSIS — I67.9 CEREBROVASCULAR DISEASE: ICD-10-CM

## 2018-08-24 DIAGNOSIS — M77.42 METATARSALGIA OF LEFT FOOT: ICD-10-CM

## 2018-08-24 DIAGNOSIS — E11.9 NEW ONSET TYPE 2 DIABETES MELLITUS: Primary | ICD-10-CM

## 2018-08-24 DIAGNOSIS — I42.9 CARDIOMYOPATHY, UNSPECIFIED TYPE: ICD-10-CM

## 2018-08-24 DIAGNOSIS — F32.5 MAJOR DEPRESSIVE DISORDER WITH SINGLE EPISODE, IN FULL REMISSION: ICD-10-CM

## 2018-08-24 DIAGNOSIS — N18.30 STAGE 3 CHRONIC KIDNEY DISEASE: ICD-10-CM

## 2018-08-24 DIAGNOSIS — Z86.010 HISTORY OF COLON POLYPS: ICD-10-CM

## 2018-08-24 DIAGNOSIS — Z00.00 ENCOUNTER FOR PREVENTIVE HEALTH EXAMINATION: Primary | ICD-10-CM

## 2018-08-24 DIAGNOSIS — R73.9 HYPERGLYCEMIA: ICD-10-CM

## 2018-08-24 DIAGNOSIS — I10 GOOD HYPERTENSION CONTROL: ICD-10-CM

## 2018-08-24 LAB
ANION GAP SERPL CALC-SCNC: 7 MMOL/L
BUN SERPL-MCNC: 20 MG/DL
CALCIUM SERPL-MCNC: 9.8 MG/DL
CHLORIDE SERPL-SCNC: 106 MMOL/L
CO2 SERPL-SCNC: 29 MMOL/L
CREAT SERPL-MCNC: 1.7 MG/DL
EST. GFR  (AFRICAN AMERICAN): 47.9 ML/MIN/1.73 M^2
EST. GFR  (NON AFRICAN AMERICAN): 41.4 ML/MIN/1.73 M^2
ESTIMATED AVG GLUCOSE: 117 MG/DL
GLUCOSE SERPL-MCNC: 126 MG/DL
HBA1C MFR BLD HPLC: 5.7 %
POTASSIUM SERPL-SCNC: 4.4 MMOL/L
SODIUM SERPL-SCNC: 142 MMOL/L

## 2018-08-24 PROCEDURE — 99397 PER PM REEVAL EST PAT 65+ YR: CPT | Mod: S$GLB,,, | Performed by: FAMILY MEDICINE

## 2018-08-24 PROCEDURE — 36415 COLL VENOUS BLD VENIPUNCTURE: CPT | Mod: PO

## 2018-08-24 PROCEDURE — 3075F SYST BP GE 130 - 139MM HG: CPT | Mod: CPTII,S$GLB,, | Performed by: FAMILY MEDICINE

## 2018-08-24 PROCEDURE — 83036 HEMOGLOBIN GLYCOSYLATED A1C: CPT

## 2018-08-24 PROCEDURE — 99999 PR PBB SHADOW E&M-EST. PATIENT-LVL IV: CPT | Mod: PBBFAC,,, | Performed by: FAMILY MEDICINE

## 2018-08-24 PROCEDURE — 80048 BASIC METABOLIC PNL TOTAL CA: CPT

## 2018-08-24 PROCEDURE — 3078F DIAST BP <80 MM HG: CPT | Mod: CPTII,S$GLB,, | Performed by: FAMILY MEDICINE

## 2018-08-24 RX ORDER — METFORMIN HYDROCHLORIDE 500 MG/1
500 TABLET ORAL 2 TIMES DAILY WITH MEALS
Qty: 180 TABLET | Refills: 1 | Status: SHIPPED | OUTPATIENT
Start: 2018-08-24 | End: 2019-03-19 | Stop reason: SDUPTHER

## 2018-08-28 ENCOUNTER — LAB VISIT (OUTPATIENT)
Dept: LAB | Facility: HOSPITAL | Age: 68
End: 2018-08-28
Attending: FAMILY MEDICINE
Payer: MEDICARE

## 2018-08-28 DIAGNOSIS — E11.9 NEW ONSET TYPE 2 DIABETES MELLITUS: ICD-10-CM

## 2018-08-28 DIAGNOSIS — N18.30 STAGE 3 CHRONIC KIDNEY DISEASE: ICD-10-CM

## 2018-08-28 LAB
ANION GAP SERPL CALC-SCNC: 10 MMOL/L
BUN SERPL-MCNC: 23 MG/DL
CALCIUM SERPL-MCNC: 10 MG/DL
CHLORIDE SERPL-SCNC: 104 MMOL/L
CO2 SERPL-SCNC: 26 MMOL/L
CREAT SERPL-MCNC: 1.7 MG/DL
EST. GFR  (AFRICAN AMERICAN): 47.9 ML/MIN/1.73 M^2
EST. GFR  (NON AFRICAN AMERICAN): 41.4 ML/MIN/1.73 M^2
ESTIMATED AVG GLUCOSE: 117 MG/DL
GLUCOSE SERPL-MCNC: 112 MG/DL
HBA1C MFR BLD HPLC: 5.7 %
POTASSIUM SERPL-SCNC: 4.7 MMOL/L
SODIUM SERPL-SCNC: 140 MMOL/L

## 2018-08-28 PROCEDURE — 36415 COLL VENOUS BLD VENIPUNCTURE: CPT | Mod: PO

## 2018-08-28 PROCEDURE — 80048 BASIC METABOLIC PNL TOTAL CA: CPT

## 2018-08-28 PROCEDURE — 83036 HEMOGLOBIN GLYCOSYLATED A1C: CPT

## 2018-08-29 ENCOUNTER — TELEPHONE (OUTPATIENT)
Dept: FAMILY MEDICINE | Facility: CLINIC | Age: 68
End: 2018-08-29

## 2018-08-29 DIAGNOSIS — N18.30 STAGE 3 CHRONIC KIDNEY DISEASE: Primary | ICD-10-CM

## 2018-08-29 NOTE — TELEPHONE ENCOUNTER
----- Message from Buddy Chatman MD sent at 8/28/2018  8:10 PM CDT -----  His repeat chemistry panel shows an elevated blood sugar in the prediabetic range at 112.  His kidney function remains impaired.  The A1c is prediabetic also.  A microalbumin creatinine ratio shows no sign of excessive protein leakage from the kidneys.  I suspect that he has hypertensive kidney disease but it also appears as if he is prediabetic.  Blood pressure control can help limit the damage and regular exercise can help control blood sugar.  An option would be to use metformin but will have to keep an eye on kidney function.  I would suggest a consult with a nephrologist.    Results sent by email

## 2018-08-30 ENCOUNTER — OFFICE VISIT (OUTPATIENT)
Dept: OPTOMETRY | Facility: CLINIC | Age: 68
End: 2018-08-30
Payer: MEDICARE

## 2018-08-30 DIAGNOSIS — H43.393 VITREOUS FLOATERS, BILATERAL: ICD-10-CM

## 2018-08-30 DIAGNOSIS — Z96.1 BILATERAL PSEUDOPHAKIA: ICD-10-CM

## 2018-08-30 DIAGNOSIS — Z13.5 GLAUCOMA SCREENING: ICD-10-CM

## 2018-08-30 DIAGNOSIS — E11.9 DIABETES MELLITUS TYPE 2 WITHOUT RETINOPATHY: Primary | ICD-10-CM

## 2018-08-30 DIAGNOSIS — H52.03 HYPEROPIA OF BOTH EYES WITH ASTIGMATISM: ICD-10-CM

## 2018-08-30 DIAGNOSIS — H52.203 HYPEROPIA OF BOTH EYES WITH ASTIGMATISM: ICD-10-CM

## 2018-08-30 PROCEDURE — 99499 UNLISTED E&M SERVICE: CPT | Mod: HCNC,S$GLB,, | Performed by: OPTOMETRIST

## 2018-08-30 PROCEDURE — 99999 PR PBB SHADOW E&M-EST. PATIENT-LVL III: CPT | Mod: PBBFAC,,, | Performed by: OPTOMETRIST

## 2018-08-30 PROCEDURE — 92015 DETERMINE REFRACTIVE STATE: CPT | Mod: ,,, | Performed by: OPTOMETRIST

## 2018-08-30 PROCEDURE — 92004 COMPRE OPH EXAM NEW PT 1/>: CPT | Mod: S$PBB,,, | Performed by: OPTOMETRIST

## 2018-08-30 RX ORDER — AMOXICILLIN 500 MG
CAPSULE ORAL DAILY
Status: ON HOLD | COMMUNITY
End: 2019-10-22 | Stop reason: CLARIF

## 2018-08-30 RX ORDER — MULTIVIT WITH MINERALS/HERBS
1 TABLET ORAL DAILY
COMMUNITY

## 2018-08-30 NOTE — PROGRESS NOTES
HPI     Annual Exam      Additional comments: DLE x 1 yr (outside [facility])   ocular health exam              Diabetic Eye Exam      Additional comments: just diagnosed              Blurred Vision      Additional comments: at near only -- distance VA good              Dry Eye      Additional comments: +Restasis OU prn, does not use daily               Spots and/or Floaters      Additional comments: OU -- no light flashes              Comments     Hemoglobin A1C       Date                     Value               Ref Range             Status                08/28/2018               5.7 (H)             4.0 - 5.6 %           Final              Comment:    ADA Screening Guidelines:  5.7-6.4%  Consistent with   prediabetes  >or=6.5%  Consistent with diabetes  High levels of fetal   hemoglobin interfere with the HbA1C  assay. Heterozygous hemoglobin   variants (HbS, HgC, etc)do  not significantly interfere with this assay.     However, presence of multiple variants may affect accuracy.         08/24/2018               5.7 (H)             4.0 - 5.6 %           Final              Comment:    ADA Screening Guidelines:  5.7-6.4%  Consistent with   prediabetes  >or=6.5%  Consistent with diabetes  High levels of fetal   hemoglobin interfere with the HbA1C  assay. Heterozygous hemoglobin   variants (HbS, HgC, etc)do  not significantly interfere with this assay.     However, presence of multiple variants may affect accuracy.         11/13/2017               5.5                 4.0 - 5.6 %           Final              Comment:    According to ADA guidelines, hemoglobin A1c <7.0% represents  optimal   control in non-pregnant diabetic patients. Different  metrics may apply to   specific patient populations.   Standards of Medical Care in   Diabetes-2016.  For the purpose of screening for the presence of   diabetes:  <5.7%     Consistent with the absence of diabetes  5.7-6.4%    Consistent with increasing risk for diabetes    (prediabetes)  >or=6.5%    Consistent with diabetes  Currently, no consensus exists for use of   hemoglobin A1c  for diagnosis of diabetes for children.  This Hemoglobin   A1c assay has significant interference with fetal   hemoglobin   (HbF).   The results are invalid for patients with abnormal amounts of   HbF,     including those with known Hereditary Persistence   of Fetal Hemoglobin.   Heterozygous hemoglobin variants (HbAS, HbAC,   HbAD, HbAE, HbA2) do not   significantly interfere with this assay;   however, presence of multiple   variants in a sample may impact the %   interference.    ----------            Last edited by Kathi Hernandez on 8/30/2018  2:16 PM. (History)        ROS     Positive for: Eyes    Negative for: Constitutional, Gastrointestinal, Neurological, Skin,   Genitourinary, Musculoskeletal, HENT, Endocrine, Cardiovascular,   Respiratory, Psychiatric, Allergic/Imm, Heme/Lymph    Last edited by ANTONINA Dawson, OD on 8/30/2018  2:30 PM. (History)        Assessment /Plan     For exam results, see Encounter Report.    Diabetes mellitus type 2 without retinopathy    Vitreous floaters, bilateral    Glaucoma screening    Bilateral pseudophakia    Hyperopia of both eyes with astigmatism      1. No ret/ no csme, gave info, control glucose, annual DFE  2. RD precautions given, reviewed  3. Not suspect  4. Stable OU, clear caps  Symfony IOL  5. Updated specs rx, gave copy for near and bifocals    Long discussion of multiple topics above   Ok continue with otc readers  Ok continue restasis sporadically vs not at all  Ok continue ATs otc  Ok to have Rx glasses made or not---pt initially stated was concerned with blurred distance vision and then at end, not so concerned    Discussed and educated patient on current findings /plan.  RTC 1 year, prn if any changes / issues

## 2018-08-30 NOTE — PROGRESS NOTES
HPI     Annual Exam      Additional comments: DLE x 1 yr (outside [facility])   ocular health exam              Diabetic Eye Exam      Additional comments: just diagnosed              Blurred Vision      Additional comments: at near only -- distance [facility] good              Dry Eye      Additional comments: +Restasis OU prn, does not use daily               Spots and/or Floaters      Additional comments: OU -- no light flashes              Comments     Hemoglobin A1C       Date                     Value               Ref Range             Status                08/28/2018               5.7 (H)             4.0 - 5.6 %           Final              Comment:    ADA Screening Guidelines:  5.7-6.4%  Consistent with   prediabetes  >or=6.5%  Consistent with diabetes  High levels of fetal   hemoglobin interfere with the HbA1C  assay. Heterozygous hemoglobin   variants (HbS, HgC, etc)do  not significantly interfere with this assay.     However, presence of multiple variants may affect accuracy.         08/24/2018               5.7 (H)             4.0 - 5.6 %           Final              Comment:    ADA Screening Guidelines:  5.7-6.4%  Consistent with   prediabetes  >or=6.5%  Consistent with diabetes  High levels of fetal   hemoglobin interfere with the HbA1C  assay. Heterozygous hemoglobin   variants (HbS, HgC, etc)do  not significantly interfere with this assay.     However, presence of multiple variants may affect accuracy.         11/13/2017               5.5                 4.0 - 5.6 %           Final              Comment:    According to ADA guidelines, hemoglobin A1c <7.0% represents  optimal   control in non-pregnant diabetic patients. Different  metrics may apply to   specific patient populations.   Standards of Medical Care in   Diabetes-2016.  For the purpose of screening for the presence of   diabetes:  <5.7%     Consistent with the absence of diabetes  5.7-6.4%    Consistent with increasing risk for diabetes    (prediabetes)  >or=6.5%    Consistent with diabetes  Currently, no consensus exists for use of   hemoglobin A1c  for diagnosis of diabetes for children.  This Hemoglobin   A1c assay has significant interference with fetal   hemoglobin   (HbF).   The results are invalid for patients with abnormal amounts of   HbF,     including those with known Hereditary Persistence   of Fetal Hemoglobin.   Heterozygous hemoglobin variants (HbAS, HbAC,   HbAD, HbAE, HbA2) do not   significantly interfere with this assay;   however, presence of multiple   variants in a sample may impact the %   interference.    ----------            Last edited by Kathi Hernandez on 8/30/2018  2:16 PM. (History)        ROS     Positive for: Eyes    Negative for: Constitutional, Gastrointestinal, Neurological, Skin,   Genitourinary, Musculoskeletal, HENT, Endocrine, Cardiovascular,   Respiratory, Psychiatric, Allergic/Imm, Heme/Lymph    Last edited by ANTONINA Dawson OD on 8/30/2018  2:30 PM. (History)        Assessment /Plan     For exam results, see Encounter Report.    There are no diagnoses linked to this encounter.  ***

## 2018-08-31 ENCOUNTER — CLINICAL SUPPORT (OUTPATIENT)
Dept: DIABETES | Facility: CLINIC | Age: 68
End: 2018-08-31
Attending: FAMILY MEDICINE
Payer: MEDICARE

## 2018-08-31 VITALS — WEIGHT: 169.31 LBS | HEIGHT: 65 IN | BODY MASS INDEX: 28.21 KG/M2

## 2018-08-31 DIAGNOSIS — I42.9 CARDIOMYOPATHY, UNSPECIFIED TYPE: Primary | ICD-10-CM

## 2018-08-31 DIAGNOSIS — E11.9 NEW ONSET TYPE 2 DIABETES MELLITUS: ICD-10-CM

## 2018-08-31 PROCEDURE — G0108 DIAB MANAGE TRN  PER INDIV: HCPCS | Mod: S$GLB,,, | Performed by: DIETITIAN, REGISTERED

## 2018-08-31 PROCEDURE — 99999 PR PBB SHADOW E&M-EST. PATIENT-LVL III: CPT | Mod: PBBFAC,,,

## 2018-08-31 NOTE — PROGRESS NOTES
Diabetes Education  Author: Ann Marie Cortes RD, CDE  Date: 8/31/2018    Diabetes Education Visit  Diabetes Education Record Assessment/Progress: Initial    Diabetes Type  Diabetes Type : Pre-Diabetes    Diabetes History  Diabetes Diagnosis: 0-1 year    Nutrition  Meal Planning: skipping meals, drinks regular soda  What type of sweetener do you use?: none  What type of beverages do you drink?: regular soda/tea, water  Meal Plan 24 Hour Recall - Breakfast: (Does not eat breakfast)  Meal Plan 24 Hour Recall - Lunch: (Yesterday he had 6inch shrimp poboy fried with 1/2 can of coke, he is trying to wean himself)  Meal Plan 24 Hour Recall - Dinner: (Yesterday he had the rest of shrimp poboy with  remaining 1/2 can of coke)  Meal Plan 24 Hour Recall - Snack: (Not snacking at present)    Monitoring   Monitoring: (Not receptive to monitoring at present)  Blood Glucose Logs: No  Do you use a personal glucose monitor?: No  In the last month, how often have you had a low blood sugar reaction?: never  Can you tell when your blood sugar is too high?: no    Exercise   Exercise Type: none(Reports being active)    Current Diabetes Treatment   Current Treatment: Oral Medication(500mg Metformin with lunch and dinner)    Social History  Preferred Learning Method: Face to Face  Primary Support: Self, Spouse, Family, Friends  Educational Level: Trade School  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)  Smoking Status: Never a Smoker  Alcohol Use: Weekly    Barriers to Change  Barriers to Change: None  Learning Challenges : None    Readiness to Learn   Readiness to Learn : Acceptance    Cultural Influences  Cultural Influences: None    Diabetes Education Assessment/Progress  Diabetes Disease Process (diabetes disease process and treatment options): Discussion  Nutrition (Incorporating nutritional management into one's lifestyle): Discussion, Instructed, Written Materials Provided, Demonstrates Understanding/Competency  (verbalizes/demonstrates), Comprehends Key Points  Physical Activity (incorporating physical activity into one's lifestyle): Discussion, Instructed, Demonstrates Understanding/Competency (verbalizes/demonstrates)  Medications (states correct name, dose, onset, peak, duration, side effects & timing of meds): Discussion, Instructed, Written Materials Provided  Monitoring (monitoring blood glucose/other parameters & using results): Discussion, Written Materials Provided  Acute Complications (preventing, detecting, and treating acute complications): Discussion  Chronic Complications (preventing, detecting, and treating chronic complications): Discussion  Clinical (diabetes, other pertinent medical history, and relevant comorbidities reviewed during visit): Discussion  Cognitive (knowledge of self-management skills, functional health literacy): Discussion  Psychosocial (emotional response to diabetes): Discussion  Diabetes Distress and Support Systems: Discussion  Behavioral (readiness for change, lifestyle practices, self-care behaviors): Discussion    Goals  Patient has selected/evaluated goals during today's session: Yes, selected  Healthy Eating: Set  Start Date: 08/31/18  Target Date: 11/30/18  Physical Activity: In Progress  Monitoring: In Progress  Medications: In Progress  Problem Solving: In Progress  Healthy Coping: In Progress  Reducing Risks: In Progress    Diabetes Care Plan/Intervention  Education Plan/Intervention: Individual Follow-Up DSMT    Diabetes Meal Plan  Restrictions: Restricted Carbohydrate, Low Fat, Low Sodium  Calories: 1400  Carbohydrate Per Meal: 20-30g  Carbohydrate Per Snack : 7-15g    Education Units of Time   Time Spent: 60 min    Health Maintenance was reviewed today with patient. Discussed with patient importance of routine eye exams, foot exams/foot care, blood work (i.e.: A1c, microalbumin, and lipid), dental visits, yearly flu vaccine, and pneumonia vaccine as indicated by PCP.  Patient verbalized understanding.     Health Maintenance Topics with due status: Not Due       Topic Last Completion Date    TETANUS VACCINE 03/10/2012    Lipid Panel 02/26/2018    Colonoscopy 03/12/2018    High Dose Statin 08/30/2018     Health Maintenance Due   Topic Date Due    Pneumococcal (65+) (1 of 2 - PCV13) 12/16/2017    Influenza Vaccine  08/01/2018

## 2018-09-21 ENCOUNTER — OFFICE VISIT (OUTPATIENT)
Dept: CARDIOLOGY | Facility: CLINIC | Age: 68
End: 2018-09-21
Payer: MEDICARE

## 2018-09-21 VITALS
OXYGEN SATURATION: 92 % | WEIGHT: 166.88 LBS | HEART RATE: 73 BPM | BODY MASS INDEX: 27.81 KG/M2 | HEIGHT: 65 IN | SYSTOLIC BLOOD PRESSURE: 124 MMHG | DIASTOLIC BLOOD PRESSURE: 64 MMHG

## 2018-09-21 DIAGNOSIS — I10 ESSENTIAL HYPERTENSION: ICD-10-CM

## 2018-09-21 DIAGNOSIS — I42.9 CARDIOMYOPATHY, UNSPECIFIED TYPE: ICD-10-CM

## 2018-09-21 DIAGNOSIS — I77.9 CAROTID DISEASE, BILATERAL: ICD-10-CM

## 2018-09-21 DIAGNOSIS — I20.9 ANGINA PECTORIS: Primary | ICD-10-CM

## 2018-09-21 PROCEDURE — 93005 ELECTROCARDIOGRAM TRACING: CPT | Mod: PBBFAC,PO | Performed by: INTERNAL MEDICINE

## 2018-09-21 PROCEDURE — 3078F DIAST BP <80 MM HG: CPT | Mod: CPTII,,, | Performed by: INTERNAL MEDICINE

## 2018-09-21 PROCEDURE — 1101F PT FALLS ASSESS-DOCD LE1/YR: CPT | Mod: CPTII,,, | Performed by: INTERNAL MEDICINE

## 2018-09-21 PROCEDURE — 93010 ELECTROCARDIOGRAM REPORT: CPT | Mod: S$PBB,,, | Performed by: INTERNAL MEDICINE

## 2018-09-21 PROCEDURE — 99214 OFFICE O/P EST MOD 30 MIN: CPT | Mod: PBBFAC,PO | Performed by: INTERNAL MEDICINE

## 2018-09-21 PROCEDURE — 99215 OFFICE O/P EST HI 40 MIN: CPT | Mod: S$PBB,,, | Performed by: INTERNAL MEDICINE

## 2018-09-21 PROCEDURE — 99499 UNLISTED E&M SERVICE: CPT | Mod: S$GLB,,, | Performed by: INTERNAL MEDICINE

## 2018-09-21 PROCEDURE — 3008F BODY MASS INDEX DOCD: CPT | Mod: CPTII,,, | Performed by: INTERNAL MEDICINE

## 2018-09-21 PROCEDURE — 3074F SYST BP LT 130 MM HG: CPT | Mod: CPTII,,, | Performed by: INTERNAL MEDICINE

## 2018-09-21 PROCEDURE — 99999 PR PBB SHADOW E&M-EST. PATIENT-LVL IV: CPT | Mod: PBBFAC,,, | Performed by: INTERNAL MEDICINE

## 2018-09-21 RX ORDER — NITROGLYCERIN 0.4 MG/1
0.4 TABLET SUBLINGUAL EVERY 5 MIN PRN
Qty: 90 TABLET | Refills: 3 | Status: SHIPPED | OUTPATIENT
Start: 2018-09-21 | End: 2019-09-21

## 2018-09-21 NOTE — PROGRESS NOTES
[facility]    CC:  Follow-up visit  Chief Complaint   Patient presents with    Follow-up     4 month    Chest Pain       Patient ID: Buddy Park is a 67 y.o. male with a past medical history of carotid artery disease status post carotid endarterectomy, hypertension, hyperlipidemia  HPI  He underwent left carotid endarterectomy.  He had an uneventful recovery.  He still continues to have central chest pain on peak exertion.  Some of the scenarios he gives me is that when he was having sexual intercourse with his wife he had an episode of chest pain which shortness of breath.  He had to stop and the pain went away.  The other day he was walking back to his car and at the end of the walk he had some chest pain with shortness of breath.    Past Medical History:   Diagnosis Date    Angina pectoris     Anxiety     Biliary colic     Cochlear implant in place     Deaf     LEFT EAR    Depression     Heart failure     [place] (hard of hearing)     HEARING AID - RIGHT    Hyperlipidemia     Hypertension     Kidney stone     Kidney stones     Renal stones     Trouble in sleeping     Wears glasses      Past Surgical History:   Procedure Laterality Date    CAROTID ARTERY ANGIOPLASTY  06/2018    [facility]     CATARACT EXTRACTION Bilateral     CHOLECYSTECTOMY  2-6-2014    CHOLECYSTECTOMY, LAPAROSCOPIC N/A 2/6/2014    Performed by [facility]y [facility]al [facility] at [facility] OR    COLONOSCOPY  1/9/2013    Dr. Gillette, 5 year recheck (family history colon cancer)    COLONOSCOPY N/A 3/12/2018    Procedure: COLONOSCOPY;  Surgeon: Garett Go MD;  Location: [facility];  Service: Endoscopy;  Laterality: N/A;    COLONOSCOPY N/A 3/12/2018    [facility] Go [facility] at [facility] ENDO    COLONOSCOPY N/A 1/9/2013    [facility] by Wilbert [facility] at [facility]    EAR MASTOIDECTOMY W/ COCHLEAR IMPLANT W/ LANDMARK      left ear    FOREIGN BODY REMOVAL Right     glass removed from right foot/repair    FRACTURE  SURGERY      right arm x2    LITHOTRIPSY      ROTATOR CUFF REPAIR      Right     SINUS SURGERY      TONSILLECTOMY       Social History     Socioeconomic History    Marital status:      Spouse name: Not on file    Number of children: Not on file    Years of education: Not on file    Highest education level: Not on file   Social Needs    Financial resource strain: Not on file    Food insecurity - worry: Not on file    Food insecurity - inability: Not on file    Transportation needs - medical: Not on file    Transportation needs - non-medical: Not on file   Occupational History    Not on file   Tobacco Use    Smoking status: Never Smoker    Smokeless tobacco: Never Used   Substance and Sexual Activity    Alcohol use: Yes     Comment: once every two months    Drug use: No    Sexual activity: Yes   Other Topics Concern    Not on file   Social History Narrative    Not on file     Family History   Problem Relation Age of Onset    Cancer Mother         colon    Heart disease Father     Gout Father     Kidney disease Father     Arthritis Father     Hypertension Father     Early death Daughter         mva    Alcohol abuse Brother     Cancer Brother         mouth cancer w mets    No Known Problems Sister     Alcohol abuse Brother     No Known Problems Son     Heart disease Maternal Grandmother         MI    Stroke Maternal Grandfather     Heart disease Paternal Grandmother         MI    No Known Problems Paternal Grandfather     No Known Problems Son     Cancer Paternal Uncle         lung cancer    Allergic rhinitis Neg Hx     Allergies Neg Hx     Angioedema Neg Hx     Asthma Neg Hx     Atopy Neg Hx     Eczema Neg Hx     Immunodeficiency Neg Hx     Rhinitis Neg Hx     Urticaria Neg Hx     Collagen disease Neg Hx        Review of patient's allergies indicates:   Allergen Reactions    Bee pollens Shortness Of Breath     WASP, BEE, ANT AND CATERPILLER STINGS    Milk  containing products Diarrhea    Metoprolol Rash          Medication List           Accurate as of 9/21/18 11:13 AM. If you have any questions, ask your nurse or doctor.               START taking these medications    nitroGLYCERIN 0.4 MG SL tablet  Commonly known as:  NITROSTAT  Place 1 tablet (0.4 mg total) under the tongue every 5 (five) minutes as needed for Chest pain.  Started by:  Brice Garner MD        CONTINUE taking these medications    acetaminophen 500 MG tablet  Commonly known as:  TYLENOL  Take 1 tablet (500 mg total) by mouth every 6 (six) hours as needed for Pain.     amLODIPine 5 MG tablet  Commonly known as:  NORVASC  Take 2 tablets (10 mg total) by mouth once daily.     aspirin 81 MG EC tablet  Commonly known as:  ECOTRIN  Take 1 tablet (81 mg total) by mouth once daily.     b complex vitamins tablet     betamethasone dipropionate 0.05 % cream  Commonly known as:  DIPROLENE  Apply topically 2 (two) times daily as needed. Use very sparingly to affected area only.  Do not use for longer than 2 weeks     buPROPion 150 MG TB24 tablet  Commonly known as:  WELLBUTRIN XL  Take 1 tablet (150 mg total) by mouth once daily. In the morning     EPINEPHrine 0.3 mg/0.3 mL Atin  Commonly known as:  EPIPEN     fish oil-omega-3 fatty acids 300-1,000 mg capsule     losartan 100 MG tablet  Commonly known as:  COZAAR  Take 1 tablet (100 mg total) by mouth once daily.     metFORMIN 500 MG tablet  Commonly known as:  GLUCOPHAGE  Take 1 tablet (500 mg total) by mouth 2 (two) times daily with meals.     neomycin-polymyxin-dexamethasone 3.5mg/mL-10,000 unit/mL-0.1 % Drps  Commonly known as:  MAXITROL     rosuvastatin 20 MG tablet  Commonly known as:  CRESTOR  Take 1 tablet (20 mg total) by mouth once daily.           Where to Get Your Medications      These medications were sent to [facility] [address] [place] [address] [facility]  [address] [facility] [address]    Phone:  [phone]   · nitroGLYCERIN  "0.4 MG SL tablet         Review of Systems  Constitution: Denies chills, fever, and sweats.  HENT: Denies headaches or blurry vision.  Cardiovascular:  chest pain   Respiratory:  shortness of breath.  Gastrointestinal: Denies abdominal pain, nausea, or vomiting.  Musculoskeletal: Denies muscle cramps.  Neurological: Denies dizziness or focal weakness.  Psychiatric/Behavioral: Normal mental status.  Hematologic/Lymphatic: Denies bleeding problem or easy bruising/bleeding.  Skin: Denies rash or suspicious lesions    Physical Examination  /64 (BP Location: Right arm, Patient Position: Sitting)   Pulse 73   Ht 5' 5" (1.651 m)   Wt 75.7 kg (166 lb 14.2 oz)   SpO2 (!) 92%   BMI 27.77 kg/m²     Constitutional: No acute distress, conversant  HEENT: Sclera anicteric, Pupils equal, round and reactive to light, extraocular motions intact, Oropharynx clear  Neck: No JVD, no carotid bruits  Cardiovascular: regular rate and rhythm, no murmur, rubs or gallops, normal S1/S2  Pulmonary: Clear to auscultation bilaterally  Abdominal: Abdomen soft, nontender, nondistended, positive bowel sounds  Extremities: No lower extremity edema,   Pulses:  Carotid pulses are 2+ on the right side, and 2+ on the left side.  Radial pulses are 2+ on the right side, and 2+ on the left side.      Skin: No ecchymosis, erythema, or ulcers  Psych: Alert and oriented x 3, appropriate affect  Neuro: CNII-XII intact, no focal deficits    Labs:  Most Recent Data  CBC:   Lab Results   Component Value Date    WBC 6.01 08/15/2017    HGB 15.2 08/15/2017    HCT 43.9 08/15/2017     08/15/2017    MCV 87 08/15/2017    RDW 14.1 08/15/2017     BMP:   Lab Results   Component Value Date     08/28/2018    K 4.7 08/28/2018     08/28/2018    CO2 26 08/28/2018    BUN 23 08/28/2018    CREATININE 1.7 (H) 08/28/2018     (H) 08/28/2018    CALCIUM 10.0 08/28/2018    MG 2.2 03/26/2012    PHOS 3.7 03/26/2012     LFTS;   Lab Results   Component " Value Date    PROT 7.8 02/26/2018    ALBUMIN 4.0 02/26/2018    BILITOT 1.0 02/26/2018    AST 16 02/26/2018    ALKPHOS 71 02/26/2018    ALT 21 02/26/2018     COAGS: No results found for: INR, PROTIME, PTT  FLP:   Lab Results   Component Value Date    CHOL 160 02/26/2018    HDL 36 (L) 02/26/2018    LDLCALC 70.6 02/26/2018    TRIG 267 (H) 02/26/2018    CHOLHDL 22.5 02/26/2018     CARDIAC: No results found for: TROPONINI, CKMB, BNP    Imaging:    EKG:  Sinus rhythm.    Stress Testing:  Conclusion     · Perfusion imaging is negative for ischemia or infarct  · EF-59%       I have personally reviewed these images and echo data    Assessment/Plan:  Buddy was seen today for follow-up and chest pain.    Diagnoses and all orders for this visit:    Angina pectoris    Cardiomyopathy, unspecified type  -     EKG 12-lead    Essential hypertension    Carotid disease, bilateral    Other orders  -     nitroGLYCERIN (NITROSTAT) 0.4 MG SL tablet; Place 1 tablet (0.4 mg total) under the tongue every 5 (five) minutes as needed for Chest pain.         1.  Carotid artery disease status post left carotid endarterectomy  2 angina pectoris  3.  Hypertension  4.  Hyperlipidemia      His chest pains are concerning for angina particularly since they are now coming on exertion.  Although his stress test is negative based on his history I recommended coronary angiogram.  He has significant risk factors including carotid artery disease.  He is on an aspirin and a statin.  The risks, benefits, and alternatives of coronary artery angiography and possible intervention were discussed with pt.  I had a detailed discussion with the patient regarding risk of stroke,arrhythmia, procedure related heart attack, bleeding and  access site complications, renal failure occasionally requiring hemodialysis,  emergent need for heart surgery, acute limb complications including ischemia and loss, contrast allergy and death.   If stents are needed and there is  preference for ARMIDA, pt understands that would necessitate aspirin for life with plavix/brilinta or prasugrel  for at least 1 year. The dual antiplatelet therapy may increase the risk of bleeding.    Additionally, pt is aware that non compliance is likely to result in stent clotting with heart attack, heart failure, and/or death.He has been strongly advised to call our office for recommendations if his dual antiplatelet therapy is interrupted for any reason.    After having a long discussion with the patient he said he wants to monitor for the time being.  He would get back to us if his symptoms get worse.  I have prescribed him sublingual nitroglycerin.    Follow-up in about 6 months (around 3/21/2019).        Total duration of face to face visit time 45 minutes.  Total time spent counseling greater than fifty percent of total visit time.  Counseling included discussion regarding imaging findings, diagnosis, possibilities, treatment options, risks and benefits.  The patient had many questions regarding the options and long-term effect which were all answered to my best ability.      Brice Garner MD,MRCP,RPVI,FACC,FSCAI.  Interventional Cardiology   Phone [phone]

## 2018-09-24 ENCOUNTER — OFFICE VISIT (OUTPATIENT)
Dept: NEPHROLOGY | Facility: CLINIC | Age: 68
End: 2018-09-24
Payer: MEDICARE

## 2018-09-24 VITALS
BODY MASS INDEX: 27.76 KG/M2 | WEIGHT: 166.63 LBS | SYSTOLIC BLOOD PRESSURE: 150 MMHG | HEIGHT: 65 IN | DIASTOLIC BLOOD PRESSURE: 68 MMHG | HEART RATE: 74 BPM | OXYGEN SATURATION: 99 %

## 2018-09-24 DIAGNOSIS — N17.9 ACUTE RENAL FAILURE, UNSPECIFIED ACUTE RENAL FAILURE TYPE: Primary | ICD-10-CM

## 2018-09-24 PROCEDURE — 99213 OFFICE O/P EST LOW 20 MIN: CPT | Mod: PBBFAC,PO | Performed by: INTERNAL MEDICINE

## 2018-09-24 PROCEDURE — 99999 PR PBB SHADOW E&M-EST. PATIENT-LVL III: CPT | Mod: PBBFAC,,, | Performed by: INTERNAL MEDICINE

## 2018-09-24 PROCEDURE — 3077F SYST BP >= 140 MM HG: CPT | Mod: CPTII,,, | Performed by: INTERNAL MEDICINE

## 2018-09-24 PROCEDURE — 3008F BODY MASS INDEX DOCD: CPT | Mod: CPTII,,, | Performed by: INTERNAL MEDICINE

## 2018-09-24 PROCEDURE — 1101F PT FALLS ASSESS-DOCD LE1/YR: CPT | Mod: CPTII,,, | Performed by: INTERNAL MEDICINE

## 2018-09-24 PROCEDURE — 99499 UNLISTED E&M SERVICE: CPT | Mod: HCNC,S$GLB,, | Performed by: INTERNAL MEDICINE

## 2018-09-24 PROCEDURE — 3078F DIAST BP <80 MM HG: CPT | Mod: CPTII,,, | Performed by: INTERNAL MEDICINE

## 2018-09-24 PROCEDURE — 99204 OFFICE O/P NEW MOD 45 MIN: CPT | Mod: S$PBB,,, | Performed by: INTERNAL MEDICINE

## 2018-09-24 NOTE — PROGRESS NOTES
Subjective:       Patient ID: Buddy Park is a 67 y.o. White male who presents for new patient evaluation for chronic renal failure.    Buddy Park is referred by Buddy Chatman MD to be evaluated for chronic renal failure.  He reports his father was on dialysis for about 8 years but he did not recall the etiology of is renal failure.  He does not recall being told he had [place]'s.  He denies chronic NSAIDs and otherwise has had no recent illnesses, procedures or hospitalizations.  He does take an 81 mg ASA each day.  He has no uremic or urinary symptoms and is in his usual state of health.        Review of Systems   Constitutional: Positive for fatigue. Negative for appetite change, chills and fever.   Eyes: Positive for visual disturbance.   Respiratory: Negative for cough and shortness of breath.    Cardiovascular: Negative for chest pain and leg swelling.   Gastrointestinal: Positive for diarrhea. Negative for nausea and vomiting.   Genitourinary: Negative for difficulty urinating, dysuria and hematuria.   Musculoskeletal: Positive for arthralgias (hands). Negative for myalgias.   Skin: Negative for rash.   Neurological: Negative for headaches.   Psychiatric/Behavioral: Negative for sleep disturbance.       The past medical, family and social histories were reviewed for this encounter.     Past Medical History:   Diagnosis Date    Angina pectoris     Anxiety     Biliary colic     Cochlear implant in place     Deaf     LEFT EAR    Depression     Heart failure     [place] (hard of hearing)     HEARING AID - RIGHT    Hyperlipidemia     Hypertension     Kidney stone     Kidney stones     Renal stones     Trouble in sleeping     Wears glasses      Past Surgical History:   Procedure Laterality Date    [facility]  [facility]     CATARACT EXTRACTION Bilateral     CHOLECYSTECTOMY  2-6-2014    CHOLECYSTECTOMY, LAPAROSCOPIC N/A 2/6/2014    [facility]  OR    COLONOSCOPY  1/9/2013    Dr. Gillette, 5 year recheck (family history colon cancer)    COLONOSCOPY N/A 3/12/2018    Procedure: COLONOSCOPY;  Surgeon: Garett Go MD;  Location: [facility];  Service: Endoscopy;  Laterality: N/A;    COLONOSCOPY N/A 3/12/2018    [facility] ENDO    COLONOSCOPY N/A 1/9/2013    Performed by Wilbert [facility]lette [facility] at [facility]    EAR MASTOIDECTOMY W/ COCHLEAR IMPLANT W/ LANDMARK      left ear    FOREIGN BODY REMOVAL Right     glass removed from right foot/repair    FRACTURE SURGERY      right arm x2    LITHOTRIPSY      ROTATOR CUFF REPAIR      Right     SINUS SURGERY      TONSILLECTOMY       Social History     Socioeconomic History    Marital status:      Spouse name: Not on file    Number of children: Not on file    Years of education: Not on file    Highest education level: Not on file   Social Needs    Financial resource strain: Not on file    Food insecurity - worry: Not on file    Food insecurity - inability: Not on file    Transportation needs - medical: Not on file    Transportation needs - non-medical: Not on file   Occupational History    Not on file   Tobacco Use    Smoking status: Never Smoker    Smokeless tobacco: Never Used   Substance and Sexual Activity    Alcohol use: Yes     Comment: once every two months    Drug use: No    Sexual activity: Yes   Other Topics Concern    Not on file   Social History Narrative    Not on file     Current Outpatient Medications   Medication Sig    acetaminophen (TYLENOL) 500 MG tablet Take 1 tablet (500 mg total) by mouth every 6 (six) hours as needed for Pain.    amLODIPine (NORVASC) 5 MG tablet Take 2 tablets (10 mg total) by mouth once daily.    aspirin (ECOTRIN) 81 MG EC tablet Take 1 tablet (81 mg total) by mouth once daily.    b complex vitamins tablet Take 1 tablet by mouth once daily.    betamethasone dipropionate (DIPROLENE) 0.05 % cream Apply topically 2  "(two) times daily as needed. Use very sparingly to affected area only.  Do not use for longer than 2 weeks    buPROPion (WELLBUTRIN XL) 150 MG TB24 tablet Take 1 tablet (150 mg total) by mouth once daily. In the morning    epinephrine (EPIPEN) 0.3 mg/0.3 mL (1:1,000) AtIn Inject into the muscle once.    fish oil-omega-3 fatty acids 300-1,000 mg capsule Take by mouth once daily.    losartan (COZAAR) 100 MG tablet Take 1 tablet (100 mg total) by mouth once daily.    metFORMIN (GLUCOPHAGE) 500 MG tablet Take 1 tablet (500 mg total) by mouth 2 (two) times daily with meals.    neomycin-polymyxin-dexamethasone (MAXITROL) 3.5mg/mL-10,000 unit/mL-0.1 % DrpS 4 (four) times daily as needed. One drop four times a day    nitroGLYCERIN (NITROSTAT) 0.4 MG SL tablet Place 1 tablet (0.4 mg total) under the tongue every 5 (five) minutes as needed for Chest pain.    rosuvastatin (CRESTOR) 20 MG tablet Take 1 tablet (20 mg total) by mouth once daily.     No current facility-administered medications for this visit.        Pulse 74   Ht 5' 5" (1.651 m)   Wt 75.6 kg (166 lb 9.6 oz)   SpO2 99%   BMI 27.72 kg/m²     Objective:      Physical Exam   Constitutional: He is oriented to person, place, and time. He appears well-developed and well-nourished. No distress.   HENT:   Head: Normocephalic and atraumatic.   Eyes: Conjunctivae are normal.   Neck: Neck supple. No JVD present.   Cardiovascular: Normal rate, regular rhythm and normal heart sounds. Exam reveals no gallop and no friction rub.   No murmur heard.  Pulmonary/Chest: Effort normal and breath sounds normal. No respiratory distress. He has no wheezes. He has no rales.   Abdominal: Soft. Bowel sounds are normal. He exhibits no distension. There is no tenderness.   Musculoskeletal: He exhibits no edema.   Neurological: He is alert and oriented to person, place, and time.   Skin: Skin is warm and dry. No rash noted.   Psychiatric: He has a normal mood and affect.   Vitals " reviewed.      Assessment:       1. Acute renal failure, unspecified acute renal failure type        Plan:   Return to clinic in 1 month-6 weeks.  Labs for next week include rp, ua, urine eosinophil, renal US in [place].  Baseline creatinine is 1.0-1.2 since 2004.  His past renal US showed R 10.5 cm, L 12.4 cm in 2014.  By history, he does not have any provocative things which would explain his labs.  It does not seem as if he has obstructive uropathy.  I will check his US and his urine to look into it further.

## 2018-09-24 NOTE — LETTER
September 24, 2018      Buddy Chatman MD  [address]  [place] [address]           [facility] [address] [facility] [address] [place] [address]  Phone: [phone]          Patient: Buddy Park   MR Number: [id number]   YOB: 1950   Date of Visit: 9/24/2018       Dear Dr. Buddy Chatman:    Thank you for referring Buddy Park to me for evaluation. Attached you will find relevant portions of my assessment and plan of care.    If you have questions, please do not hesitate to call me. I look forward to following Buddy Park along with you.    Sincerely,    Florian Palomo MD    Enclosure  CC:  No Recipients    If you would like to receive this communication electronically, please contact [email or web address] or [phone] to request more information on [employer] Link access.    For providers and/or their staff who would like to refer a patient to [facility], please contact us through our one-stop-shop provider referral line, [facility], at [phone].    If you feel you have received this communication in error or would no longer like to receive these types of communications, please e-mail [email or web address]

## 2018-09-25 ENCOUNTER — HOSPITAL ENCOUNTER (OUTPATIENT)
Dept: RADIOLOGY | Facility: CLINIC | Age: 68
Discharge: HOME | End: 2018-09-25
Attending: INTERNAL MEDICINE
Payer: MEDICARE

## 2018-09-25 DIAGNOSIS — N17.9 ACUTE RENAL FAILURE, UNSPECIFIED ACUTE RENAL FAILURE TYPE: ICD-10-CM

## 2018-09-25 PROCEDURE — 76770 US EXAM ABDO BACK WALL COMP: CPT | Mod: 26,,, | Performed by: RADIOLOGY

## 2018-09-25 PROCEDURE — 76770 US EXAM ABDO BACK WALL COMP: CPT | Mod: TC,PO

## 2018-10-08 DIAGNOSIS — Z91.038: ICD-10-CM

## 2018-10-08 RX ORDER — EPINEPHRINE 0.3 MG/.3ML
INJECTION SUBCUTANEOUS
Qty: 2 EACH | Refills: 0 | OUTPATIENT
Start: 2018-10-08

## 2018-10-16 RX ORDER — EPINEPHRINE 0.3 MG/.3ML
0.3 INJECTION SUBCUTANEOUS ONCE
Qty: 0.09 ML | Refills: 0 | Status: SHIPPED | OUTPATIENT
Start: 2018-10-16 | End: 2019-08-06

## 2018-12-12 ENCOUNTER — TELEPHONE (OUTPATIENT)
Dept: CARDIOLOGY | Facility: CLINIC | Age: 68
End: 2018-12-12

## 2018-12-12 ENCOUNTER — OFFICE VISIT (OUTPATIENT)
Dept: CARDIOLOGY | Facility: CLINIC | Age: 68
End: 2018-12-12
Payer: MEDICARE

## 2018-12-12 VITALS
DIASTOLIC BLOOD PRESSURE: 88 MMHG | WEIGHT: 170 LBS | SYSTOLIC BLOOD PRESSURE: 166 MMHG | BODY MASS INDEX: 28.32 KG/M2 | HEIGHT: 65 IN

## 2018-12-12 DIAGNOSIS — I42.9 CARDIOMYOPATHY, UNSPECIFIED TYPE: ICD-10-CM

## 2018-12-12 DIAGNOSIS — I77.9 BILATERAL CAROTID ARTERY DISEASE, UNSPECIFIED TYPE: ICD-10-CM

## 2018-12-12 DIAGNOSIS — I10 ESSENTIAL HYPERTENSION: Primary | ICD-10-CM

## 2018-12-12 PROCEDURE — 3079F DIAST BP 80-89 MM HG: CPT | Mod: CPTII,S$GLB,, | Performed by: INTERNAL MEDICINE

## 2018-12-12 PROCEDURE — 3008F BODY MASS INDEX DOCD: CPT | Mod: CPTII,S$GLB,, | Performed by: INTERNAL MEDICINE

## 2018-12-12 PROCEDURE — 93000 ELECTROCARDIOGRAM COMPLETE: CPT | Mod: S$GLB,,, | Performed by: INTERNAL MEDICINE

## 2018-12-12 PROCEDURE — 99999 PR PBB SHADOW E&M-EST. PATIENT-LVL II: CPT | Mod: PBBFAC,,, | Performed by: INTERNAL MEDICINE

## 2018-12-12 PROCEDURE — 1101F PT FALLS ASSESS-DOCD LE1/YR: CPT | Mod: CPTII,S$GLB,, | Performed by: INTERNAL MEDICINE

## 2018-12-12 PROCEDURE — 3077F SYST BP >= 140 MM HG: CPT | Mod: CPTII,S$GLB,, | Performed by: INTERNAL MEDICINE

## 2018-12-12 PROCEDURE — 99213 OFFICE O/P EST LOW 20 MIN: CPT | Mod: S$GLB,,, | Performed by: INTERNAL MEDICINE

## 2018-12-12 NOTE — TELEPHONE ENCOUNTER
----- Message from Maikol Ayala sent at 12/12/2018  8:17 AM CST -----  Contact: same  Patient called in and stated back in April/May this year Dr. Garner performed surgery.  Patient stated since then he has had this weird feeling in his neck that goes up to his jaw bone.  Patient stated it has not gone away.  Patient stated he is not sure if it has anything to do with the surgery.  Patient stated he is not having any chest pains or discomfort.  Patient would like call back number is [phone]

## 2018-12-12 NOTE — TELEPHONE ENCOUNTER
Called and spoke with Ms. Goodwin, He stated that he has a carotid angiogram back in April/May, and he just isn't feeling right. His chin is numb. I offered him an appt to see Dr. Garner today 12/12/18 @ 3:45. He stated he would take it. No further issues noted.

## 2018-12-18 ENCOUNTER — PATIENT MESSAGE (OUTPATIENT)
Dept: FAMILY MEDICINE | Facility: CLINIC | Age: 68
End: 2018-12-18

## 2018-12-18 DIAGNOSIS — E78.5 HYPERLIPIDEMIA, UNSPECIFIED HYPERLIPIDEMIA TYPE: ICD-10-CM

## 2018-12-18 DIAGNOSIS — R21 RASH AND NONSPECIFIC SKIN ERUPTION: ICD-10-CM

## 2018-12-18 RX ORDER — BETAMETHASONE DIPROPIONATE 0.5 MG/G
CREAM TOPICAL 2 TIMES DAILY PRN
Qty: 45 G | Refills: 3 | OUTPATIENT
Start: 2018-12-18

## 2018-12-18 RX ORDER — ROSUVASTATIN CALCIUM 20 MG/1
20 TABLET, COATED ORAL DAILY
Qty: 90 TABLET | Refills: 0 | Status: SHIPPED | OUTPATIENT
Start: 2018-12-18 | End: 2019-03-17 | Stop reason: SDUPTHER

## 2018-12-18 NOTE — PROGRESS NOTES
[facility]    CC:  Follow-up appointment    Patient ID:Buddy Park is a 68 y.o. male with a past medical history of carotid artery disease status post carotid endarterectomy, hypertension, hyperlipidemia  HPI  Patient is doing well.  His main complaint is a tingling numbness around his carotid endarterectomy site.  It is present all the time.  He denies any exertional chest pain or shortness of breath.    Past Medical History:   Diagnosis Date    Angina pectoris     Anxiety     Biliary colic     Cochlear implant in place     Deaf     LEFT EAR    Depression     Heart failure     [place] (hard of hearing)     HEARING AID - RIGHT    Hyperlipidemia     Hypertension     Kidney stone     Kidney stones     Renal stones     Trouble in sleeping     Wears glasses      Past Surgical History:   Procedure Laterality Date    CAROTID [facility]GIOP[facility]  06/2018    [facility]     CATARACT EXTRACTION Bilateral     CHOLECYSTECTOMY  2-6-2014    CHOLECYSTECTOMY, LAPAROSCOPIC N/A 2/6/2014    Performed by Vitaly Amaral [facility] at [facility] OR    COLONOSCOPY  1/9/2013    Dr. Gillette, 5 year recheck (family history colon cancer)    COLONOSCOPY N/A 3/12/2018    Procedure: COLONOSCOPY;  Surgeon: Garett Go MD;  Location: [facility];  Service: Endoscopy;  Laterality: N/A;    COLONOSCOPY N/A 3/12/2018    Per[facility] Garett Go MD at [facility] ENDO    COLONOSCOPY N/A 1/9/2013    Performed by Wilbert Gillette MD at [facility] ENDO    EAR MASTOIDECTOMY W/ COCHLEAR IMPLANT W/ LANDMARK      left ear    FOREIGN BODY REMOVAL Right     glass removed from right foot/repair    FRACTURE SURGERY      right arm x2    LITHOTRIPSY      ROTATOR CUFF REPAIR      Right     SINUS SURGERY      TONSILLECTOMY       Social History     Socioeconomic History    Marital status:      Spouse name: Not on file    Number of children: Not on file    Years of education: Not on file    Highest education level: Not on file   Social  Needs    Financial resource strain: Not on file    Food insecurity - worry: Not on file    Food insecurity - inability: Not on file    Transportation needs - medical: Not on file    Transportation needs - non-medical: Not on file   Occupational History    Not on file   Tobacco Use    Smoking status: Never Smoker    Smokeless tobacco: Never Used   Substance and Sexual Activity    Alcohol use: Yes     Comment: once every two months    Drug use: No    Sexual activity: Yes   Other Topics Concern    Not on file   Social History Narrative    Not on file     Family History   Problem Relation Age of Onset    Cancer Mother         colon    Heart disease Father     Gout Father     Kidney disease Father     Arthritis Father     Hypertension Father     Early death Daughter         mva    Alcohol abuse Brother     Cancer Brother         mouth cancer w mets    No Known Problems Sister     Alcohol abuse Brother     No Known Problems Son     Heart disease Maternal Grandmother         MI    Stroke Maternal Grandfather     Heart disease Paternal Grandmother         MI    No Known Problems Paternal Grandfather     No Known Problems Son     Cancer Paternal Uncle         lung cancer    Allergic rhinitis Neg Hx     Allergies Neg Hx     Angioedema Neg Hx     Asthma Neg Hx     Atopy Neg Hx     Eczema Neg Hx     Immunodeficiency Neg Hx     Rhinitis Neg Hx     Urticaria Neg Hx     Collagen disease Neg Hx        Review of patient's allergies indicates:   Allergen Reactions    Bee pollens Shortness Of Breath     WASP, BEE, ANT AND CATERPILLER STINGS    Milk containing products Diarrhea    Metoprolol Rash          Medication List           Accurate as of 12/12/18 11:59 PM. If you have any questions, ask your nurse or doctor.               CONTINUE taking these medications    acetaminophen 500 MG tablet  Commonly known as:  TYLENOL  Take 1 tablet (500 mg total) by mouth every 6 (six) hours as needed  for Pain.     amLODIPine 5 MG tablet  Commonly known as:  NORVASC  Take 2 tablets (10 mg total) by mouth once daily.     aspirin 81 MG EC tablet  Commonly known as:  ECOTRIN  Take 1 tablet (81 mg total) by mouth once daily.     b complex vitamins tablet     betamethasone dipropionate 0.05 % cream  Commonly known as:  DIPROLENE  Apply topically 2 (two) times daily as needed. Use very sparingly to affected area only.  Do not use for longer than 2 weeks     buPROPion 150 MG TB24 tablet  Commonly known as:  WELLBUTRIN XL  Take 1 tablet (150 mg total) by mouth once daily. In the morning     EPINEPHrine 0.3 mg/0.3 mL Atin  Commonly known as:  EPIPEN  Inject 0.09 mLs (0.09 mg total) into the muscle once. for 1 dose     fish oil-omega-3 fatty acids 300-1,000 mg capsule     losartan 100 MG tablet  Commonly known as:  COZAAR  Take 1 tablet (100 mg total) by mouth once daily.     metFORMIN 500 MG tablet  Commonly known as:  GLUCOPHAGE  Take 1 tablet (500 mg total) by mouth 2 (two) times daily with meals.     neomycin-polymyxin-dexamethasone 3.5mg/mL-10,000 unit/mL-0.1 % Drps  Commonly known as:  MAXITROL     nitroGLYCERIN 0.4 MG SL tablet  Commonly known as:  NITROSTAT  Place 1 tablet (0.4 mg total) under the tongue every 5 (five) minutes as needed for Chest pain.     rosuvastatin 20 MG tablet  Commonly known as:  CRESTOR  Take 1 tablet (20 mg total) by mouth once daily.            Review of Systems  Constitution: Denies chills, fever, and sweats.  HENT: Denies headaches or blurry vision.  Cardiovascular: Denies chest pain or irregular heart beat.  Respiratory: Denies cough or shortness of breath.  Gastrointestinal: Denies abdominal pain, nausea, or vomiting.  Musculoskeletal: Denies muscle cramps.  Neurological: Denies dizziness or focal weakness.  Psychiatric/Behavioral: Normal mental status.  Hematologic/Lymphatic: Denies bleeding problem or easy bruising/bleeding.  Skin: Denies rash or suspicious lesions    Physical  "Examination  BP (!) 166/88 (BP Location: Left arm, Patient Position: Sitting, BP Method: Medium (Automatic))   Ht 5' 5" (1.651 m)   Wt 77.1 kg (169 lb 15.6 oz)   BMI 28.29 kg/m²     Constitutional: No acute distress, conversant  HEENT: Sclera anicteric, Pupils equal, round and reactive to light, extraocular motions intact, Oropharynx clear  Neck: No JVD, no carotid bruits  Cardiovascular: regular rate and rhythm, no murmur, rubs or gallops, normal S1/S2  Pulmonary: Clear to auscultation bilaterally  Abdominal: Abdomen soft, nontender, nondistended, positive bowel sounds  Extremities: No lower extremity edema,   Pulses:  Carotid pulses are 2+ on the right side, and 2+ on the left side.  Radial pulses are 2+ on the right side, and 2+ on the left side.     Skin: No ecchymosis, erythema, or ulcers  Psych: Alert and oriented x 3, appropriate affect  Neuro:  Mild dysesthesia on the left side of his face near the carotid endarterectomy scar  Labs:  Most Recent Data  CBC:   Lab Results   Component Value Date    WBC 6.01 08/15/2017    HGB 15.2 08/15/2017    HCT 43.9 08/15/2017     08/15/2017    MCV 87 08/15/2017    RDW 14.1 08/15/2017     BMP:   Lab Results   Component Value Date     09/25/2018    K 4.1 09/25/2018     09/25/2018    CO2 25 09/25/2018    BUN 17 09/25/2018    CREATININE 1.5 (H) 09/25/2018    GLU 99 09/25/2018    CALCIUM 9.8 09/25/2018    MG 2.2 03/26/2012    PHOS 2.6 (L) 09/25/2018     LFTS;   Lab Results   Component Value Date    PROT 7.8 02/26/2018    ALBUMIN 3.9 09/25/2018    BILITOT 1.0 02/26/2018    AST 16 02/26/2018    ALKPHOS 71 02/26/2018    ALT 21 02/26/2018     COAGS: No results found for: INR, PROTIME, PTT  FLP:   Lab Results   Component Value Date    CHOL 160 02/26/2018    HDL 36 (L) 02/26/2018    LDLCALC 70.6 02/26/2018    TRIG 267 (H) 02/26/2018    CHOLHDL 22.5 02/26/2018     CARDIAC: No results found for: TROPONINI, CKMB, BNP    Imaging:    EKG:  Normal sinus " rhythm.    Stress test  Conclusion     · Perfusion imaging is negative for ischemia or infarct  · EF-59%       I have personally reviewed these images and echo data    Assessment/Plan:  1.  Carotid artery disease  2.  Essential hypertension  3.  Hyperlipidemia    Patient is currently stable from his cardiac perspective.  The change in sensation is likely secondary to damage of 1 of the cutaneous nerves.  No further treatment strategy is recommended for that.  I have asked him to follow up with Dr. Santana to see if there is anything further he would recommend.  Continue on losartan, amlodipine and aspirin.  Continue Crestor.  Recommendations regarding healthy lifestyle, diet and exercise given to the patient      Follow-up in about 1 year (around 12/12/2019).           Total duration of face to face visit time 15 minutes.  Total time spent counseling greater than fifty percent of total visit time.  Counseling included discussion regarding imaging findings, diagnosis, possibilities, treatment options, risks and benefits.  The patient had many questions regarding the options and long-term effect which were all answered to my best ability.      Brice Garner MD,MRCP,RPVI,FACC,FSCAI.  Interventional Cardiology   Phone [phone]

## 2018-12-18 NOTE — TELEPHONE ENCOUNTER
Please advise pt is wanting a cortisone cream for his leg rash.  At the same time he wants a refill on his betamethasone dipropionate cream for another issue. Please advise routed betamethasone dipropionate cream in a separate encounter.

## 2018-12-19 ENCOUNTER — OFFICE VISIT (OUTPATIENT)
Dept: FAMILY MEDICINE | Facility: CLINIC | Age: 68
End: 2018-12-19
Payer: MEDICARE

## 2018-12-19 ENCOUNTER — PATIENT MESSAGE (OUTPATIENT)
Dept: FAMILY MEDICINE | Facility: CLINIC | Age: 68
End: 2018-12-19

## 2018-12-19 ENCOUNTER — DOCUMENTATION ONLY (OUTPATIENT)
Dept: FAMILY MEDICINE | Facility: CLINIC | Age: 68
End: 2018-12-19

## 2018-12-19 VITALS
BODY MASS INDEX: 28.21 KG/M2 | TEMPERATURE: 97 F | HEIGHT: 65 IN | SYSTOLIC BLOOD PRESSURE: 112 MMHG | DIASTOLIC BLOOD PRESSURE: 71 MMHG | WEIGHT: 169.31 LBS | HEART RATE: 87 BPM

## 2018-12-19 DIAGNOSIS — L29.0 RECTAL ITCHING: Primary | ICD-10-CM

## 2018-12-19 DIAGNOSIS — L29.9 ITCHING: ICD-10-CM

## 2018-12-19 PROCEDURE — 3074F SYST BP LT 130 MM HG: CPT | Mod: CPTII,S$GLB,, | Performed by: PHYSICIAN ASSISTANT

## 2018-12-19 PROCEDURE — 1101F PT FALLS ASSESS-DOCD LE1/YR: CPT | Mod: CPTII,S$GLB,, | Performed by: PHYSICIAN ASSISTANT

## 2018-12-19 PROCEDURE — 99999 PR PBB SHADOW E&M-EST. PATIENT-LVL IV: CPT | Mod: PBBFAC,,, | Performed by: PHYSICIAN ASSISTANT

## 2018-12-19 PROCEDURE — 3078F DIAST BP <80 MM HG: CPT | Mod: CPTII,S$GLB,, | Performed by: PHYSICIAN ASSISTANT

## 2018-12-19 PROCEDURE — 99213 OFFICE O/P EST LOW 20 MIN: CPT | Mod: S$GLB,,, | Performed by: PHYSICIAN ASSISTANT

## 2018-12-19 RX ORDER — HYDROCORTISONE ACETATE PRAMOXINE HCL 2.5; 1 G/100G; G/100G
CREAM TOPICAL 3 TIMES DAILY
Qty: 28.35 G | Refills: 11 | Status: SHIPPED | OUTPATIENT
Start: 2018-12-19 | End: 2018-12-21 | Stop reason: CLARIF

## 2018-12-19 RX ORDER — METHYLPREDNISOLONE 4 MG/1
TABLET ORAL
Qty: 1 PACKAGE | Refills: 0 | Status: SHIPPED | OUTPATIENT
Start: 2018-12-19 | End: 2019-01-09

## 2018-12-19 NOTE — PROGRESS NOTES
Pre-Visit Chart Review  For Appointment Scheduled on 12/19/2018    Health Maintenance Due   Topic Date Due    Influenza Vaccine  08/01/2018    Foot Exam  11/13/2018

## 2018-12-19 NOTE — TELEPHONE ENCOUNTER
Asking for a very potent steroid cream that potentially can cause skin damage if overused.  He needs to be seen.

## 2018-12-20 ENCOUNTER — PATIENT MESSAGE (OUTPATIENT)
Dept: FAMILY MEDICINE | Facility: CLINIC | Age: 68
End: 2018-12-20

## 2018-12-20 NOTE — PROGRESS NOTES
"Subjective:       Patient ID: Buddy Park is a 68 y.o. male.    Chief Complaint: Rash (both legs)    Patient presents with itching of both lower legs.  He has not been taking anything for the symptoms.  He is requesting a refill of betamethasone dipropionate which he states he uses chronically for rectal itching.  This refill request was denied as it was a strong short term steroid and not to be used rectally.  He states this itching of his body occurred in 2013 and he "was given pills and cream" and everything resolved.  After a deep research of his record it looks like the cream in question was Elimite cream.  He currently denies a rash on the legs but is fearful the rash will worsen if he does not get this cream.         Review of Systems   Constitutional: Negative for activity change, appetite change, fatigue and unexpected weight change.   HENT: Negative for nosebleeds.    Eyes: Negative for pain and visual disturbance.   Respiratory: Negative for chest tightness, shortness of breath and wheezing.    Cardiovascular: Negative for chest pain, palpitations and leg swelling.   Musculoskeletal: Negative for neck pain.   Skin: Negative for color change, pallor, rash and wound.   Neurological: Negative for dizziness, syncope, light-headedness and headaches.       Objective:      Physical Exam   Constitutional: He is oriented to person, place, and time. He appears well-developed and well-nourished. No distress.   HENT:   Head: Normocephalic and atraumatic.   Eyes: Conjunctivae are normal. Pupils are equal, round, and reactive to light.   Neck: Normal range of motion. Neck supple. No JVD present. No thyromegaly present.   Cardiovascular: Normal rate and regular rhythm. Exam reveals no gallop and no friction rub.   No murmur heard.  Pulmonary/Chest: Effort normal. No respiratory distress. He has no wheezes. He has no rales. He exhibits no tenderness.   Neurological: He is alert and oriented to person, place, and " time.   Skin: He is not diaphoretic.   2-3 Excoriations noted on both lower legs.  No rash present.  There is dry skin to the legs.        Assessment:       1. Rectal itching    2. Itching        Plan:       Buddy was seen today for rash.    Diagnoses and all orders for this visit:    Rectal itching  -     hydrocortisone-pramoxine (ANALPRAM-HC) 2.5-1 % Crea; Place rectally 3 (three) times daily.    Itching  -     methylPREDNISolone (MEDROL DOSEPACK) 4 mg tablet; use as directed    Use OTC hydrocortisone cream for the itching on the legs.

## 2018-12-21 ENCOUNTER — TELEPHONE (OUTPATIENT)
Dept: FAMILY MEDICINE | Facility: CLINIC | Age: 68
End: 2018-12-21

## 2018-12-21 DIAGNOSIS — L29.0 RECTAL ITCHING: Primary | ICD-10-CM

## 2018-12-21 RX ORDER — HYDROCORTISONE 25 MG/G
CREAM TOPICAL 2 TIMES DAILY
Qty: 28 G | Refills: 11 | Status: SHIPPED | OUTPATIENT
Start: 2018-12-21 | End: 2019-03-19 | Stop reason: ALTCHOICE

## 2018-12-21 NOTE — TELEPHONE ENCOUNTER
Patient's insurance denied coverage for the HC Pramoxine 2.5-1% cream.  Most likely they will not cover any, but you can change to something else or patient will have to pay cash.

## 2019-01-24 ENCOUNTER — PES CALL (OUTPATIENT)
Dept: ADMINISTRATIVE | Facility: CLINIC | Age: 69
End: 2019-01-24

## 2019-02-01 ENCOUNTER — DOCUMENTATION ONLY (OUTPATIENT)
Dept: FAMILY MEDICINE | Facility: CLINIC | Age: 69
End: 2019-02-01

## 2019-02-01 ENCOUNTER — LAB VISIT (OUTPATIENT)
Dept: LAB | Facility: HOSPITAL | Age: 69
End: 2019-02-01
Attending: PHYSICIAN ASSISTANT
Payer: MEDICARE

## 2019-02-01 ENCOUNTER — OFFICE VISIT (OUTPATIENT)
Dept: FAMILY MEDICINE | Facility: CLINIC | Age: 69
End: 2019-02-01
Payer: MEDICARE

## 2019-02-01 VITALS
WEIGHT: 170.19 LBS | HEART RATE: 82 BPM | HEIGHT: 65 IN | DIASTOLIC BLOOD PRESSURE: 82 MMHG | TEMPERATURE: 98 F | BODY MASS INDEX: 28.36 KG/M2 | SYSTOLIC BLOOD PRESSURE: 152 MMHG

## 2019-02-01 DIAGNOSIS — I42.9 CARDIOMYOPATHY, UNSPECIFIED TYPE: ICD-10-CM

## 2019-02-01 DIAGNOSIS — F32.5 MAJOR DEPRESSIVE DISORDER WITH SINGLE EPISODE, IN FULL REMISSION: ICD-10-CM

## 2019-02-01 DIAGNOSIS — E78.5 HYPERLIPIDEMIA, UNSPECIFIED HYPERLIPIDEMIA TYPE: ICD-10-CM

## 2019-02-01 DIAGNOSIS — I70.0 ABDOMINAL AORTIC ATHEROSCLEROSIS: ICD-10-CM

## 2019-02-01 DIAGNOSIS — H91.92 DEAFNESS IN LEFT EAR: ICD-10-CM

## 2019-02-01 DIAGNOSIS — N18.30 CKD (CHRONIC KIDNEY DISEASE) STAGE 3, GFR 30-59 ML/MIN: ICD-10-CM

## 2019-02-01 DIAGNOSIS — I10 ESSENTIAL HYPERTENSION: ICD-10-CM

## 2019-02-01 DIAGNOSIS — Z96.21 COCHLEAR IMPLANT IN PLACE: ICD-10-CM

## 2019-02-01 DIAGNOSIS — I77.9 BILATERAL CAROTID ARTERY DISEASE, UNSPECIFIED TYPE: ICD-10-CM

## 2019-02-01 DIAGNOSIS — I20.9 ANGINA PECTORIS: ICD-10-CM

## 2019-02-01 DIAGNOSIS — Z00.00 ENCOUNTER FOR PREVENTIVE HEALTH EXAMINATION: Primary | ICD-10-CM

## 2019-02-01 DIAGNOSIS — R73.03 PRE-DIABETES: ICD-10-CM

## 2019-02-01 PROBLEM — Z86.010 HISTORY OF COLON POLYPS: Status: RESOLVED | Noted: 2018-02-20 | Resolved: 2019-02-01

## 2019-02-01 PROBLEM — Z86.0100 HISTORY OF COLON POLYPS: Status: RESOLVED | Noted: 2018-02-20 | Resolved: 2019-02-01

## 2019-02-01 LAB
ALBUMIN SERPL BCP-MCNC: 3.9 G/DL
ALP SERPL-CCNC: 59 U/L
ALT SERPL W/O P-5'-P-CCNC: 27 U/L
ANION GAP SERPL CALC-SCNC: 8 MMOL/L
AST SERPL-CCNC: 20 U/L
BASOPHILS # BLD AUTO: 0.03 K/UL
BASOPHILS NFR BLD: 0.5 %
BILIRUB SERPL-MCNC: 0.6 MG/DL
BUN SERPL-MCNC: 20 MG/DL
CALCIUM SERPL-MCNC: 9.6 MG/DL
CHLORIDE SERPL-SCNC: 105 MMOL/L
CHOLEST SERPL-MCNC: 196 MG/DL
CHOLEST/HDLC SERPL: 5.2 {RATIO}
CO2 SERPL-SCNC: 28 MMOL/L
CREAT SERPL-MCNC: 1.5 MG/DL
DIFFERENTIAL METHOD: ABNORMAL
EOSINOPHIL # BLD AUTO: 0.1 K/UL
EOSINOPHIL NFR BLD: 2.3 %
ERYTHROCYTE [DISTWIDTH] IN BLOOD BY AUTOMATED COUNT: 13.2 %
EST. GFR  (AFRICAN AMERICAN): 55.3 ML/MIN/1.73 M^2
EST. GFR  (NON AFRICAN AMERICAN): 47.8 ML/MIN/1.73 M^2
ESTIMATED AVG GLUCOSE: 117 MG/DL
GLUCOSE SERPL-MCNC: 124 MG/DL
HBA1C MFR BLD HPLC: 5.7 %
HCT VFR BLD AUTO: 41.9 %
HDLC SERPL-MCNC: 38 MG/DL
HDLC SERPL: 19.4 %
HGB BLD-MCNC: 14.1 G/DL
IMM GRANULOCYTES # BLD AUTO: 0.05 K/UL
IMM GRANULOCYTES NFR BLD AUTO: 0.9 %
LDLC SERPL CALC-MCNC: 117 MG/DL
LYMPHOCYTES # BLD AUTO: 2.6 K/UL
LYMPHOCYTES NFR BLD: 46.7 %
MCH RBC QN AUTO: 29.3 PG
MCHC RBC AUTO-ENTMCNC: 33.7 G/DL
MCV RBC AUTO: 87 FL
MONOCYTES # BLD AUTO: 0.6 K/UL
MONOCYTES NFR BLD: 10.6 %
NEUTROPHILS # BLD AUTO: 2.2 K/UL
NEUTROPHILS NFR BLD: 39 %
NONHDLC SERPL-MCNC: 158 MG/DL
NRBC BLD-RTO: 0 /100 WBC
PLATELET # BLD AUTO: 219 K/UL
PMV BLD AUTO: 9.5 FL
POTASSIUM SERPL-SCNC: 3.9 MMOL/L
PROT SERPL-MCNC: 7.5 G/DL
RBC # BLD AUTO: 4.82 M/UL
SODIUM SERPL-SCNC: 141 MMOL/L
TRIGL SERPL-MCNC: 205 MG/DL
WBC # BLD AUTO: 5.65 K/UL

## 2019-02-01 PROCEDURE — 99999 PR PBB SHADOW E&M-EST. PATIENT-LVL V: CPT | Mod: PBBFAC,HCNC,, | Performed by: PHYSICIAN ASSISTANT

## 2019-02-01 PROCEDURE — 3079F DIAST BP 80-89 MM HG: CPT | Mod: HCNC,CPTII,S$GLB, | Performed by: PHYSICIAN ASSISTANT

## 2019-02-01 PROCEDURE — 99999 PR PBB SHADOW E&M-EST. PATIENT-LVL V: ICD-10-PCS | Mod: PBBFAC,HCNC,, | Performed by: PHYSICIAN ASSISTANT

## 2019-02-01 PROCEDURE — 85025 COMPLETE CBC W/AUTO DIFF WBC: CPT | Mod: HCNC

## 2019-02-01 PROCEDURE — 83036 HEMOGLOBIN GLYCOSYLATED A1C: CPT | Mod: HCNC

## 2019-02-01 PROCEDURE — 99499 RISK ADDL DX/OHS AUDIT: ICD-10-PCS | Mod: S$PBB,,, | Performed by: PHYSICIAN ASSISTANT

## 2019-02-01 PROCEDURE — 3077F PR MOST RECENT SYSTOLIC BLOOD PRESSURE >= 140 MM HG: ICD-10-PCS | Mod: HCNC,CPTII,S$GLB, | Performed by: PHYSICIAN ASSISTANT

## 2019-02-01 PROCEDURE — G0439 PR MEDICARE ANNUAL WELLNESS SUBSEQUENT VISIT: ICD-10-PCS | Mod: HCNC,S$GLB,, | Performed by: PHYSICIAN ASSISTANT

## 2019-02-01 PROCEDURE — 80061 LIPID PANEL: CPT | Mod: HCNC

## 2019-02-01 PROCEDURE — G0439 PPPS, SUBSEQ VISIT: HCPCS | Mod: HCNC,S$GLB,, | Performed by: PHYSICIAN ASSISTANT

## 2019-02-01 PROCEDURE — 3079F PR MOST RECENT DIASTOLIC BLOOD PRESSURE 80-89 MM HG: ICD-10-PCS | Mod: HCNC,CPTII,S$GLB, | Performed by: PHYSICIAN ASSISTANT

## 2019-02-01 PROCEDURE — 36415 COLL VENOUS BLD VENIPUNCTURE: CPT | Mod: HCNC,PO

## 2019-02-01 PROCEDURE — 80053 COMPREHEN METABOLIC PANEL: CPT | Mod: HCNC

## 2019-02-01 PROCEDURE — 99499 UNLISTED E&M SERVICE: CPT | Mod: S$PBB,,, | Performed by: PHYSICIAN ASSISTANT

## 2019-02-01 PROCEDURE — 3077F SYST BP >= 140 MM HG: CPT | Mod: HCNC,CPTII,S$GLB, | Performed by: PHYSICIAN ASSISTANT

## 2019-02-01 NOTE — PATIENT INSTRUCTIONS
Controlling High Blood Pressure  High blood pressure (hypertension) is often called the silent killer. This is because many people who have it dont know it. High blood pressure is defined as 140/90 mm Hg or higher. Know your blood pressure and remember to check it regularly. Doing so can save your life. Here are some things you can do to help control your blood pressure.    Choose heart-healthy foods  · Select low-salt, low-fat foods. Limit sodium intake to 2,400 mg per day or the amount suggested by your healthcare provider.  · Limit canned, dried, cured, packaged, and fast foods. These can contain a lot of salt.  · Eat 8 to 10 servings of fruits and vegetables every day.  · Choose lean meats, fish, or chicken.  · Eat whole-grain pasta, brown rice, and beans.  · Eat 2 to 3 servings of low-fat or fat-free dairy products.  · Ask your doctor about the DASH eating plan. This plan helps reduce blood pressure.  · When you go to a restaurant, ask that your meal be prepared with no added salt.  Maintain a healthy weight  · Ask your healthcare provider how many calories to eat a day. Then stick to that number.  · Ask your healthcare provider what weight range is healthiest for you. If you are overweight, a weight loss of only 3% to 5% of your body weight can help lower blood pressure. Generally, a good weight loss goal is to lose 10% of your body weight in a year.  · Limit snacks and sweets.  · Get regular exercise.  Get up and get active  · Choose activities you enjoy. Find ones you can do with friends or family. This includes bicycling, dancing, walking, and jogging.  · Park farther away from building entrances.  · Use stairs instead of the elevator.  · When you can, walk or bike instead of driving.  · [place] leaves, garden, or do household repairs.  · Be active at a moderate to vigorous level of physical activity for at least 40 minutes for a minimum of 3 to 4 days a week.   Manage stress  · Make time to relax and enjoy  life. Find time to laugh.  · Communicate your concerns with your loved ones and your healthcare provider.  · Visit with family and friends, and keep up with hobbies.  Limit alcohol and quit smoking  · Men should have no more than 2 drinks per day.  · Women should have no more than 1 drink per day.  · Talk with your healthcare provider about quitting smoking. Smoking significantly increases your risk for heart disease and stroke. Ask your healthcare provider about community smoking cessation programs and other options.  Medicines  If lifestyle changes arent enough, your healthcare provider may prescribe high blood pressure medicine. Take all medicines as prescribed. If you have any questions about your medicines, ask your healthcare provider before stopping or changing them.   Date Last Reviewed: 4/27/2016  © 6139-4518 [employer]. [address] [place] [address], [place] [address]. All rights reserved. This information is not intended as a substitute for professional medical care. Always follow your healthcare professional's instructions.        Counseling and Referral of Other Preventative  (Italic type indicates deductible and co-insurance are waived)    Patient Name: Buddy Park  Today's Date: 2/1/2019    Health Maintenance       Date Due Completion Date    Foot Exam 11/13/2018 11/13/2017    Influenza Vaccine 03/22/2019 (Originally 8/1/2018) ---    Pneumococcal Vaccine (65+ High/Highest Risk) (1 of 2 - PCV13) 02/01/2020 (Originally 12/16/2017) ---    Lipid Panel 02/26/2019 2/26/2018    Hemoglobin A1c 02/28/2019 8/28/2018    Eye Exam 08/30/2019 8/30/2018    High Dose Statin 02/01/2020 2/1/2019    Aspirin/Antiplatelet Therapy 02/01/2020 2/1/2019    TETANUS VACCINE 03/10/2022 3/10/2012    Colonoscopy 03/12/2023 3/12/2018        Orders Placed This Encounter   Procedures    Hemoglobin A1c    CBC auto differential    Comprehensive metabolic panel    Lipid panel     The following information is provided to  all patients.  This information is to help you find resources for any of the problems found today that may be affecting your health:                Living healthy guide: www.[facility].[place].[facility]      Understanding Diabetes: www.diabetes.org      Eating healthy: www.cdc.gov/healthyweight      CDC home safety checklist: www.cdc.gov/steadi/patient.html      Agency on Aging: www.goea.[place].[facility]      Alcoholics anonymous (AA): www.aa.org      Physical Activity: www.noreen.nih.gov/ax1bqhh      Tobacco use: www.quitwithusla.org

## 2019-02-01 NOTE — PROGRESS NOTES
Pre-Visit Chart Review  For Appointment Scheduled on 02/01/2019    Health Maintenance Due   Topic Date Due    Influenza Vaccine  08/01/2018    Foot Exam  11/13/2018

## 2019-02-01 NOTE — PROGRESS NOTES
"Buddy Park presented for a  Medicare AWV and comprehensive Health Risk Assessment today. The following components were reviewed and updated:    · Medical history  · Family History  · Social history  · Allergies and Current Medications  · Health Risk Assessment  · Health Maintenance  · Care Team     ** See Completed Assessments for Annual Wellness Visit within the encounter summary.**       The following assessments were completed:  · Living Situation  · CAGE  · Depression Screening  · Timed Get Up and Go  · Whisper Test  · Cognitive Function Screening  · Nutrition Screening  · ADL Screening  · PAQ Screening    Vitals:    02/01/19 0855   BP: (!) 152/82   BP Location: Right arm   Patient Position: Sitting   BP Method: Large (Automatic)   Pulse: 82   Temp: 97.6 °F (36.4 °C)   TempSrc: Oral   Weight: 77.2 kg (170 lb 3.1 oz)   Height: 5' 5" (1.651 m)     Body mass index is 28.32 kg/m².  Physical Exam   Constitutional: He is oriented to person, place, and time. He appears well-developed and well-nourished. No distress.   HENT:   Head: Normocephalic and atraumatic.   Right Ear: External ear normal.   Left Ear: External ear normal.   Mouth/Throat: No oropharyngeal exudate.   Eyes: Conjunctivae and EOM are normal. Pupils are equal, round, and reactive to light. Right eye exhibits no discharge. Left eye exhibits no discharge.   Neck: Normal range of motion. Neck supple. No thyromegaly present.   Cardiovascular: Normal rate, regular rhythm and normal heart sounds. Exam reveals no gallop and no friction rub.   No murmur heard.  Pulses:       Dorsalis pedis pulses are 2+ on the right side, and 2+ on the left side.        Posterior tibial pulses are 2+ on the right side, and 2+ on the left side.   Pulmonary/Chest: Effort normal and breath sounds normal. No respiratory distress. He has no wheezes. He has no rales. He exhibits no tenderness.   Abdominal: Soft. Bowel sounds are normal. He exhibits no distension and no mass. There " is no tenderness. There is no guarding.   Musculoskeletal: Normal range of motion.        Right foot: There is normal range of motion and no deformity.        Left foot: There is normal range of motion and no deformity.   Feet:   Right Foot:   Protective Sensation: 5 sites tested. 5 sites sensed.   Skin Integrity: Positive for dry skin. Negative for ulcer, blister, skin breakdown, erythema, warmth or callus.   Left Foot:   Protective Sensation: 5 sites tested. 5 sites sensed.   Skin Integrity: Positive for dry skin. Negative for ulcer, blister, skin breakdown, erythema, warmth or callus.   Lymphadenopathy:     He has no cervical adenopathy.   Neurological: He is alert and oriented to person, place, and time.   Skin: Skin is warm and dry. He is not diaphoretic.   Psychiatric: He has a normal mood and affect. His behavior is normal. Judgment and thought content normal.         Diagnoses and health risks identified today and associated recommendations/orders:    Buddy was seen today for medicare awv.    Diagnoses and all orders for this visit:    Encounter for preventive health examination    Angina pectoris  Comments:  controlled, followed by cardiology    Abdominal aortic atherosclerosis  Comments:  stable, continue to monitor    Bilateral carotid artery disease, unspecified type  Comments:  stable, followed by cardiology    Major depressive disorder with single episode, in full remission  Comments:  controlled, continue regimen    Cardiomyopathy, unspecified type  Comments:  stable, followed by cardiology    CKD (chronic kidney disease) stage 3, GFR 30-59 ml/min  Comments:  stable, followed by nephrology    Hyperlipidemia, unspecified hyperlipidemia type  Comments:  uncontrolled, noncompliant with medications  Orders:  -     Lipid panel; Future    Essential hypertension  Comments:  uncontrolled, non-compliant with medications  Orders:  -     CBC auto differential; Future  -     Comprehensive metabolic panel;  Future    Pre-diabetes  Comments:  uncontrolled, non-compliant with is metformin  Orders:  -     Hemoglobin A1c; Future  -     CBC auto differential; Future  -     Comprehensive metabolic panel; Future  -     Lipid panel; Future        Provided Buddy with a 5-10 year written screening schedule and personal prevention plan. Recommendations were developed using the USPSTF age appropriate recommendations. Education, counseling, and referrals were provided as needed. After Visit Summary printed and given to patient which includes a list of additional screenings\tests needed.    No Follow-up on file.    CHAD Boothe     Patient readiness: acceptance and barriers:none    During the course of the visit the patient was educated and counseled about the following:     Hypertension:   Regular aerobic exercise.  Obesity:   General weight loss/lifestyle modification strategies discussed (elicit support from others; identify saboteurs; non-food rewards, etc).    Goals: Hypertension: Reduce Blood Pressure and Obesity: Reduce calorie intake and BMI    Did patient meet goals/outcomes: No    The following self management tools provided: declined    Patient Instructions (the written plan) was given to the patient/family.     Time spent with patient: 55 minutes    Barriers to medications present (yes )    Adverse reactions to current medications (no)    Over the counter medications reviewed (Yes)

## 2019-02-01 NOTE — Clinical Note
Primary Care Providers:Buddy Chatman MD, MD (General)Your patient was seen today for a HRA visit. Gap(s) in care (HEDIS gaps) have been identified during this visit that require additional testing and possible follow up.Orders Placed This Encounter    Hemoglobin A1c        Standing Status: Future        Standing Expiration Date: 4/1/2020    CBC auto differential        Standing Status: Future        Standing Expiration Date: 4/1/2020    Comprehensive metabolic panel        Standing Status: Future        Standing Expiration Date: 4/1/2020    Lipid panel        Standing Status: Future        Standing Expiration Date: 4/1/2020These orders were placed using [facility] approved protocol and any results will be forwarded to your office for appropriate follow up. I have included a copy of my visit note; please review the note and feel free to contact me with any questions. Thank you for allowing me to participate in the care of your patients.CHAD Boothe

## 2019-02-14 ENCOUNTER — CLINICAL SUPPORT (OUTPATIENT)
Dept: REHABILITATION | Facility: HOSPITAL | Age: 69
End: 2019-02-14
Payer: MEDICARE

## 2019-02-14 DIAGNOSIS — Z96.21 COCHLEAR IMPLANT IN PLACE: ICD-10-CM

## 2019-02-14 DIAGNOSIS — H91.92 DEAFNESS IN LEFT EAR: ICD-10-CM

## 2019-02-14 PROCEDURE — G9158 MOTOR SPEECH D/C STATUS: HCPCS | Mod: CH,HCNC,PN

## 2019-02-14 PROCEDURE — G8999 MOTOR SPEECH CURRENT STATUS: HCPCS | Mod: CH,HCNC,PN

## 2019-02-14 PROCEDURE — 92522 EVALUATE SPEECH PRODUCTION: CPT | Mod: HCNC,PN

## 2019-02-14 NOTE — PLAN OF CARE
TIME RECORD    Date: 02/14/2019    Start Time:  1100  Stop Time: 1140    PROCEDURES: Speech Evaluation    Total Timed Minutes:  0  Total Timed Units:  0  Total Untimed Units:  1  Charges Billed/# of units:  1    SPEECH/LANGUAGE PATHOLOGY EVALUATION    Order: Speech Therapy  Order date: 02/07/2019    Diagnosis/Past Medical History:   Past Medical History:   Diagnosis Date    Angina pectoris     Anxiety     Biliary colic     Cochlear implant in place     Deaf     LEFT EAR    Depression     Heart failure     History of colon polyps 2/20/2018    [place] (hard of hearing)     HEARING AID - RIGHT    Hyperlipidemia     Hypertension     Kidney stone     Kidney stones     Renal stones     Trouble in sleeping     Wears glasses      Functional Deficits Leading to Referral: 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 Pt has had speech therapy throughout his life to facilitate and maintain accurate speech production and speech intelligibility. Patient is familiar to this facility, having received therapy from 2/2016 to 05/2016, and then again, seen in therapy by this examiner, from    Examination:   Informal assessment of patient's articulation and speech intelligibility was performed. Stimuli from the [place] Test of Intelligibility was utilized, in addition to minimal pair productions in a barrier format.    Vision:  WFL, no problems reported  Hearing: Severe loss as stated above with cochlear implant and hearing aids    Receptive Language: intact   Expressive Language: intact    Spontaneous Speech:  Pt's speech production was found to be 100% intelligible; it is characterized by mild distortion of high frequency phonemes /s/, /sh/, /ch/ /z/, and imprecise production of vocalic and consonantal /r/. His speech remained intelligible with  consistent production as test stimuli increased in length and phonemic complexity. Patient expressed knowledge of and demonstrated proficient use of compensatory strategies of reduced rate, and over articulation for increased preciseness and improved oral resonance  Speech: Intelligibility: Intelligible   Voice Quality: WFL  Pitch:  WFLIntensity:  WFL    Oral Mechanism: No structural of functional anomalies that interfere with speech production    Impressions:  Patient presents minimal articulation deficit secondary to hearing imapairment. However, given extensive speech therapy in the past, patient's speech is judged to be at maximum potential. Patient is aware of his deficits, and,  he is skilled in knowledge and use of compensatory strategies.  Rehab Potential: Potential for further improvements in his speech is felt to be poor given pt's current level of  Proficiency, resultant from his extensive history of speech therapy. Hence, further speech therapy is contra-indicated. It was recommended to patient, that he seek psychological counseling if his lack of self confidence as a speaker impedes his communication and life goals.   Treatment Plan: NA    Therapist's Name: SERGEY Bustillo, CCC-SLP   Date: 02/14/2019    I CERTIFY THE NEED FOR THESE SERVICES FURNISHED UNDER THIS PLAN OF TREATMENT AND WHILE UNDER MY CARE    Physician's comments: ________________________________________________________________________________________________________________________________________________      Physician's Name: tiffanie Fang

## 2019-03-01 ENCOUNTER — NURSE TRIAGE (OUTPATIENT)
Dept: ADMINISTRATIVE | Facility: CLINIC | Age: 69
End: 2019-03-01

## 2019-03-01 ENCOUNTER — HOSPITAL ENCOUNTER (EMERGENCY)
Facility: HOSPITAL | Age: 69
Discharge: HOME | End: 2019-03-01
Attending: EMERGENCY MEDICINE
Payer: MEDICARE

## 2019-03-01 VITALS
HEIGHT: 65 IN | SYSTOLIC BLOOD PRESSURE: 146 MMHG | TEMPERATURE: 98 F | WEIGHT: 170.19 LBS | BODY MASS INDEX: 28.36 KG/M2 | DIASTOLIC BLOOD PRESSURE: 77 MMHG | OXYGEN SATURATION: 97 % | RESPIRATION RATE: 12 BRPM | HEART RATE: 87 BPM

## 2019-03-01 DIAGNOSIS — R20.2 PARESTHESIA: Primary | ICD-10-CM

## 2019-03-01 LAB
ALBUMIN SERPL BCP-MCNC: 4.3 G/DL
ALP SERPL-CCNC: 63 U/L
ALT SERPL W/O P-5'-P-CCNC: 19 U/L
ANION GAP SERPL CALC-SCNC: 8 MMOL/L
AST SERPL-CCNC: 18 U/L
BASOPHILS # BLD AUTO: 0 K/UL
BASOPHILS NFR BLD: 0.4 %
BILIRUB SERPL-MCNC: 0.6 MG/DL
BUN SERPL-MCNC: 19 MG/DL
CALCIUM SERPL-MCNC: 10.1 MG/DL
CHLORIDE SERPL-SCNC: 106 MMOL/L
CO2 SERPL-SCNC: 28 MMOL/L
CREAT SERPL-MCNC: 1.4 MG/DL
DIFFERENTIAL METHOD: ABNORMAL
EOSINOPHIL # BLD AUTO: 0.1 K/UL
EOSINOPHIL NFR BLD: 1.6 %
ERYTHROCYTE [DISTWIDTH] IN BLOOD BY AUTOMATED COUNT: 14.2 %
EST. GFR  (AFRICAN AMERICAN): >60 ML/MIN/1.73 M^2
EST. GFR  (NON AFRICAN AMERICAN): 52 ML/MIN/1.73 M^2
GLUCOSE SERPL-MCNC: 95 MG/DL
HCT VFR BLD AUTO: 42.7 %
HGB BLD-MCNC: 14.3 G/DL
LYMPHOCYTES # BLD AUTO: 2.8 K/UL
LYMPHOCYTES NFR BLD: 44.9 %
MCH RBC QN AUTO: 29.1 PG
MCHC RBC AUTO-ENTMCNC: 33.4 G/DL
MCV RBC AUTO: 87 FL
MONOCYTES # BLD AUTO: 0.5 K/UL
MONOCYTES NFR BLD: 8.3 %
NEUTROPHILS # BLD AUTO: 2.8 K/UL
NEUTROPHILS NFR BLD: 44.8 %
PLATELET # BLD AUTO: 256 K/UL
PMV BLD AUTO: 6.7 FL
POTASSIUM SERPL-SCNC: 4.8 MMOL/L
PROT SERPL-MCNC: 7.7 G/DL
RBC # BLD AUTO: 4.91 M/UL
SODIUM SERPL-SCNC: 142 MMOL/L
WBC # BLD AUTO: 6.3 K/UL

## 2019-03-01 PROCEDURE — 36415 COLL VENOUS BLD VENIPUNCTURE: CPT | Mod: HCNC

## 2019-03-01 PROCEDURE — 85025 COMPLETE CBC W/AUTO DIFF WBC: CPT | Mod: HCNC

## 2019-03-01 PROCEDURE — 80053 COMPREHEN METABOLIC PANEL: CPT | Mod: HCNC

## 2019-03-01 PROCEDURE — 99284 EMERGENCY DEPT VISIT MOD MDM: CPT | Mod: HCNC

## 2019-03-01 NOTE — TELEPHONE ENCOUNTER
Reason for Disposition   Patient sounds very sick or weak to the triager    Protocols used: [facility] NEUROLOGIC DEFICIT-A-[place]    Patient called because he has swelling on the left side of the face and tingling on that side through to the chin. He is not feeling it right now. His bp is 145/83 HR 74. He is not experiencing a headache. Patient advised to go to the ED for an evaluation, given his health history. He verbalized understanding.

## 2019-03-01 NOTE — ED NOTES
C/o numbness and tingling near chin on left side of face on and off for the past week. Even non labored respirations. Aware to notify nurse of needs or concerns.

## 2019-03-02 NOTE — DISCHARGE INSTRUCTIONS
Continue your routine medications.   Call and schedule a follow up with your primary care provider and cardiologist.  For worsening symptoms, chest pain, shortness of breath, increased abdominal pain, high grade fever, stroke or stroke like symptoms, immediately go to the nearest Emergency Room or call 911 as soon as possible.

## 2019-03-02 NOTE — ED PROVIDER NOTES
Encounter Date: 3/1/2019    SCRIBE #1 NOTE: Natalie HERNANDEZ, am scribing for, and in the presence of, Ericka Gatica PA-C.       History     Chief Complaint   Patient presents with    Tingling     L face off and on x 1 week.       Time seen by provider: 6:04 PM on 03/01/2019    Buddy Park is a 68 y.o. male with pmhx of chronic speech impediment, HTN and HF who presents to the ED for evaluation of tingling to face. For the past week, the patient has been experiencing intermittent tingling to a small area near his left jaw. The tingling lasts 5-10 seconds and resolves spontaneously. Today, the patient had an episode lasting 15 seconds. He called his PCP, who referred him to the ED. The patient experienced similar numbness 3 months ago, and one of his doctors told him that it may be due to nerve damage from hid Carotid Endarterectomy. The patient denies headache, vision changes, speech difficulty, weakness, gait change, or any other symptoms at this time. SHx: Cochlear Implant, Cholecystectomy, Carotid Angioplasty (6/2018). Carotid Endarterectomy (5/2018).  Never Smoker. Drug Allergies: Metoprolol.       The history is provided by the patient.     Review of patient's allergies indicates:   Allergen Reactions    Bee pollens Shortness Of Breath     WASP, BEE, ANT AND CATERPILLER STINGS    Milk containing products Diarrhea    Metoprolol Rash     Past Medical History:   Diagnosis Date    Angina pectoris     Anxiety     Biliary colic     Cochlear implant in place     Deaf     LEFT EAR    Depression     Heart failure     History of colon polyps 2/20/2018    [place] (hard of hearing)     HEARING AID - RIGHT    Hyperlipidemia     Hypertension     Kidney stone     Kidney stones     Renal stones     Trouble in sleeping     Wears glasses      Past Surgical History:   Procedure Laterality Date    CAROTID [facility]ERY [facility]GIOPLASTY  06/2018    [facility]     CATARACT EXTRACTION Bilateral     CHOLECYSTECTOMY   2-6-2014    CHOLECYSTECTOMY, LAPAROSCOPIC N/A 2/6/2014    Per[facility]al [facility] at [facility]    COLONOSCOPY  1/9/2013    Dr. Gillette, 5 year recheck (family history colon cancer)    COLONOSCOPY N/A 3/12/2018    Performed by Garett Go MD at [facility] ENDO    COLONOSCOPY N/A 1/9/2013    Performed by Wilbert Gillette MD at [facility] ENDO    EAR MASTOIDECTOMY W/ COCHLEAR IMPLANT W/ LANDMARK      left ear    FOREIGN BODY REMOVAL Right     glass removed from right foot/repair    FRACTURE SURGERY      right arm x2    LITHOTRIPSY      ROTATOR CUFF REPAIR      Right     SINUS SURGERY      TONSILLECTOMY       Family History   Problem Relation Age of Onset    Cancer Mother         colon    Heart disease Father     Gout Father     Kidney disease Father     Arthritis Father     Hypertension Father     Early death Daughter         mva    Alcohol abuse Brother     Cancer Brother         mouth cancer w mets    No Known Problems Sister     Alcohol abuse Brother     No Known Problems Son     Heart disease Maternal Grandmother         MI    Stroke Maternal Grandfather     Heart disease Paternal Grandmother         MI    No Known Problems Son     Cancer Paternal Uncle         lung cancer    Allergic rhinitis Neg Hx     Allergies Neg Hx     Angioedema Neg Hx     Asthma Neg Hx     Atopy Neg Hx     Eczema Neg Hx     Immunodeficiency Neg Hx     Rhinitis Neg Hx     Urticaria Neg Hx     Collagen disease Neg Hx      Social History     Tobacco Use    Smoking status: Never Smoker    Smokeless tobacco: Never Used   Substance Use Topics    Alcohol use: Yes     Comment: once every two months    Drug use: No     Review of Systems   Constitutional: Negative for activity change, appetite change, chills and fever.   HENT: Negative for congestion, rhinorrhea, sore throat and voice change.    Eyes: Negative for redness and visual disturbance.   Respiratory: Negative for cough, chest tightness and  shortness of breath.    Cardiovascular: Negative for chest pain.   Gastrointestinal: Negative for abdominal pain, diarrhea, nausea and vomiting.   Genitourinary: Negative for dysuria and frequency.   Musculoskeletal: Negative for back pain, gait problem, neck pain and neck stiffness.   Skin: Negative for rash.   Neurological: Positive for numbness. Negative for dizziness, syncope, facial asymmetry, speech difficulty, weakness, light-headedness and headaches.       Physical Exam     Initial Vitals [03/01/19 1447]   BP Pulse Resp Temp SpO2   (!) 146/77 87 12 97.7 °F (36.5 °C) 97 %      MAP       --         Physical Exam    Nursing note and vitals reviewed.  Constitutional: He appears well-developed and well-nourished. He is cooperative.  Non-toxic appearance. He does not have a sickly appearance.   HENT:   Head: Normocephalic and atraumatic.   Right Ear: Tympanic membrane, external ear and ear canal normal.   Left Ear: Tympanic membrane, external ear and ear canal normal.   Nose: Nose normal.   Mouth/Throat: Oropharynx is clear and moist.   Eyes: Conjunctivae and lids are normal. Pupils are equal, round, and reactive to light.   Neck: Normal range of motion and full passive range of motion without pain. Neck supple.   Cardiovascular: Normal rate, regular rhythm and normal heart sounds. Exam reveals no gallop and no friction rub.    No murmur heard.  Pulmonary/Chest: Breath sounds normal. He has no wheezes. He has no rhonchi. He has no rales.   Abdominal: Soft. Normal appearance. There is no tenderness. There is no rigidity, no rebound and no guarding.   Neurological: He is alert and oriented to person, place, and time. He has normal strength. No cranial nerve deficit or sensory deficit.   Equal sensation to face bilaterally. 5/5 strength and normal sensation to bilateral upper and lower extremities. Cranial nerves III-XII intact   Skin: Skin is warm, dry and intact. No rash noted.         ED Course   Procedures  Labs  Reviewed   CBC W/ AUTO DIFFERENTIAL - Abnormal; Notable for the following components:       Result Value    MPV 6.7 (*)     All other components within normal limits   COMPREHENSIVE METABOLIC PANEL - Abnormal; Notable for the following components:    eGFR if non  52 (*)     All other components within normal limits          Imaging Results          CT Head Without Contrast (Final result)  Result time 03/01/19 17:33:08    Final result by Portia Brooks MD (03/01/19 17:33:08)                 Impression:      Old right basal ganglia lacunar infarct and findings suggesting mild microvascular ischemic change and mild involutional changes with no acute intracranial findings.    Left cochlear implant.      Electronically signed by: Portia Brooks MD  Date:    03/01/2019  Time:    17:33             Narrative:    EXAMINATION:  CT HEAD WITHOUT CONTRAST    CLINICAL HISTORY:  Stroke;tingling to left face s/p CEA;    TECHNIQUE:  Low dose axial images were obtained through the head.  Coronal and sagittal reformations were also performed. Contrast was not administered.    COMPARISON:  11/30/2006    FINDINGS:  There is left cochlear implant which causes some artifact.  The calvarium appears intact.  There are postoperative changes involving the paranasal sinuses there is mild mucosal thickening within the left maxillary sinus and the ethmoid sinuses without air-fluid levels.  Mastoids appear well pneumatized.    Ventricles, sulci, fissures unremarkable appearance for the patient's age which is mild dilatation of sulci and fissures..  There is old right basal ganglia lacunar infarct.  There is subtle decreased density in white matter suggesting microvascular ischemic change.  No acute intracranial hemorrhage.  No intracranial mass effect.  No acute major vascular territory infarct.  Note is made that MRI is typically more sensitive than CT particularly for detection of early or small nonhemorrhagic infarcts.  The  right basal ganglia lacunar infarct has become apparent since the prior exam but is old in appearance.                                 Medical Decision Making:   History:   Old Medical Records: I decided to obtain old medical records.  Clinical Tests:   Lab Tests: Ordered and Reviewed  Radiological Study: Ordered and Reviewed       APC / Resident Notes:   Urgent evaluation of a 68-year-old male who presents with intermittent tingling to a 5 cm area of the left face.  He reports similar symptoms a few months ago after having a carotid endarterectomy.  At that time he was told it was just a nerve.  He reports no other associated symptoms.  He is awake, alert and oriented. He has no facial droop, speech difficulty, a phase he a, unilateral weakness, headache or visual changes.  He has normal sensation to the face.  He has a normal neurological exam.  Cranial nerves 3-12 are intact. He reported to the emergency room because when he called the on-call nurse they told him that he may be having many strokes.  I have low suspicion for this.  I do think it is related to a nerve in the face.  He has been encouraged to follow up with Neurology.  We discussed in detail signs and symptoms of stroke.  He is to return if any of these present. Discussed results with patient. Return precautions given. Based on my clinical evaluation, I do not appreciate any immediate, emergent, or life threatening condition or etiology that warrants additional workup today and feel that the patient can be discharged with close follow up care.  Patient is to follow up with their primary care provider. Case was discussed with Dr. Chiu who is in agreement with the plan of care. All questions answered.          Scribe Attestation:   Scribe #1: I performed the above scribed service and the documentation accurately describes the services I performed. I attest to the accuracy of the note.    I, Ericka Gatica PA-C, personally performed the services  described in this documentation. All medical record entries made by the scribe were at my direction and in my presence.  I have reviewed the chart and agree that the record reflects my personal performance and is accurate and complete. Ericka Gatica PA-C.  9:58 AM 03/02/2019             Clinical Impression:     1. Paresthesia          Disposition:   Disposition: Discharged  Condition: Stable                        Ericka Gatica PA-C  03/02/19 1001

## 2019-03-17 DIAGNOSIS — I10 ESSENTIAL HYPERTENSION: ICD-10-CM

## 2019-03-17 DIAGNOSIS — E78.5 HYPERLIPIDEMIA, UNSPECIFIED HYPERLIPIDEMIA TYPE: ICD-10-CM

## 2019-03-17 RX ORDER — LOSARTAN POTASSIUM 100 MG/1
TABLET ORAL
Qty: 90 TABLET | Refills: 0 | Status: SHIPPED | OUTPATIENT
Start: 2019-03-17 | End: 2019-06-19 | Stop reason: SDUPTHER

## 2019-03-17 RX ORDER — ROSUVASTATIN CALCIUM 20 MG/1
TABLET, COATED ORAL
Qty: 90 TABLET | Refills: 0 | Status: SHIPPED | OUTPATIENT
Start: 2019-03-17 | End: 2019-06-19 | Stop reason: SDUPTHER

## 2019-03-17 NOTE — TELEPHONE ENCOUNTER
He had a recent annual wellness visit with Ela BONILLA.  His blood pressure was elevated at that visit.  His last checkup was in August of 2018.  He is due for a six month follow-up with myself for Dr. Chatman.  I have filled his prescriptions as requested for 90 days with no refills.  Please schedule a follow-up visit.  Thanks.  Nighat

## 2019-03-19 ENCOUNTER — OFFICE VISIT (OUTPATIENT)
Dept: FAMILY MEDICINE | Facility: CLINIC | Age: 69
End: 2019-03-19
Payer: MEDICARE

## 2019-03-19 VITALS
HEIGHT: 65 IN | BODY MASS INDEX: 27.16 KG/M2 | DIASTOLIC BLOOD PRESSURE: 80 MMHG | HEART RATE: 68 BPM | SYSTOLIC BLOOD PRESSURE: 158 MMHG | TEMPERATURE: 98 F | WEIGHT: 163 LBS

## 2019-03-19 DIAGNOSIS — F32.5 MAJOR DEPRESSIVE DISORDER WITH SINGLE EPISODE, IN FULL REMISSION: ICD-10-CM

## 2019-03-19 DIAGNOSIS — R73.03 PREDIABETES: ICD-10-CM

## 2019-03-19 DIAGNOSIS — E78.2 MIXED HYPERLIPIDEMIA: ICD-10-CM

## 2019-03-19 DIAGNOSIS — N18.30 CKD (CHRONIC KIDNEY DISEASE) STAGE 3, GFR 30-59 ML/MIN: ICD-10-CM

## 2019-03-19 DIAGNOSIS — I10 ESSENTIAL HYPERTENSION: Primary | ICD-10-CM

## 2019-03-19 PROCEDURE — 3079F DIAST BP 80-89 MM HG: CPT | Mod: HCNC,CPTII,S$GLB, | Performed by: NURSE PRACTITIONER

## 2019-03-19 PROCEDURE — 1101F PT FALLS ASSESS-DOCD LE1/YR: CPT | Mod: HCNC,CPTII,S$GLB, | Performed by: NURSE PRACTITIONER

## 2019-03-19 PROCEDURE — 99999 PR PBB SHADOW E&M-EST. PATIENT-LVL IV: ICD-10-PCS | Mod: PBBFAC,HCNC,, | Performed by: NURSE PRACTITIONER

## 2019-03-19 PROCEDURE — 1101F PR PT FALLS ASSESS DOC 0-1 FALLS W/OUT INJ PAST YR: ICD-10-PCS | Mod: HCNC,CPTII,S$GLB, | Performed by: NURSE PRACTITIONER

## 2019-03-19 PROCEDURE — 99214 PR OFFICE/OUTPT VISIT, EST, LEVL IV, 30-39 MIN: ICD-10-PCS | Mod: HCNC,S$GLB,, | Performed by: NURSE PRACTITIONER

## 2019-03-19 PROCEDURE — 3077F PR MOST RECENT SYSTOLIC BLOOD PRESSURE >= 140 MM HG: ICD-10-PCS | Mod: HCNC,CPTII,S$GLB, | Performed by: NURSE PRACTITIONER

## 2019-03-19 PROCEDURE — 99499 UNLISTED E&M SERVICE: CPT | Mod: S$GLB,,, | Performed by: NURSE PRACTITIONER

## 2019-03-19 PROCEDURE — 99214 OFFICE O/P EST MOD 30 MIN: CPT | Mod: HCNC,S$GLB,, | Performed by: NURSE PRACTITIONER

## 2019-03-19 PROCEDURE — 99499 RISK ADDL DX/OHS AUDIT: ICD-10-PCS | Mod: S$GLB,,, | Performed by: NURSE PRACTITIONER

## 2019-03-19 PROCEDURE — 3077F SYST BP >= 140 MM HG: CPT | Mod: HCNC,CPTII,S$GLB, | Performed by: NURSE PRACTITIONER

## 2019-03-19 PROCEDURE — 3079F PR MOST RECENT DIASTOLIC BLOOD PRESSURE 80-89 MM HG: ICD-10-PCS | Mod: HCNC,CPTII,S$GLB, | Performed by: NURSE PRACTITIONER

## 2019-03-19 PROCEDURE — 99999 PR PBB SHADOW E&M-EST. PATIENT-LVL IV: CPT | Mod: PBBFAC,HCNC,, | Performed by: NURSE PRACTITIONER

## 2019-03-19 RX ORDER — AMLODIPINE BESYLATE 10 MG/1
10 TABLET ORAL DAILY
Qty: 90 TABLET | Refills: 1 | Status: SHIPPED | OUTPATIENT
Start: 2019-03-19 | End: 2019-09-23 | Stop reason: SDUPTHER

## 2019-03-19 RX ORDER — BUPROPION HYDROCHLORIDE 150 MG/1
150 TABLET ORAL DAILY
Qty: 90 TABLET | Refills: 1 | Status: SHIPPED | OUTPATIENT
Start: 2019-03-19 | End: 2019-09-23 | Stop reason: SDUPTHER

## 2019-03-19 RX ORDER — METFORMIN HYDROCHLORIDE 500 MG/1
500 TABLET ORAL 2 TIMES DAILY WITH MEALS
Qty: 180 TABLET | Refills: 1 | Status: SHIPPED | OUTPATIENT
Start: 2019-03-19 | End: 2019-09-23 | Stop reason: SDUPTHER

## 2019-03-19 NOTE — PROGRESS NOTES
Subjective:       Patient ID: Buddy Park is a 68 y.o. male.    Chief Complaint: Hypertension    Chief Complaint  Chief Complaint   Patient presents with    Hypertension       HPI  Buddy Park is a 68 y.o. male with medical diagnoses as listed in the medical history and problem list that presents for regularly scheduled follow-up of hypertension, hyperlipidemia, CKD3, pre diabetes, and depression.  Blood pressure is elevated today 156/90 on the right, 160/92 on the left and was elevated at his last visit. Patient states that he forgets to take his blood pressure medication some days. He is also unable to name his medications and dosages at this visit. Blood pressure measurement at end of visit still elevated 158/80.   Patient has had blood work done recently and his A1c was 5.7% on 2/1/2019.  The GFR was 52 on 3/1/2019.  BMI today is 27.12 and the patient has lost 7 lb since his last visit.  Established patient with last clinic appointment 2/1/2019.        PAST MEDICAL HISTORY:  Past Medical History:   Diagnosis Date    Angina pectoris     Anxiety     Biliary colic     Cochlear implant in place     Deaf     LEFT EAR    Depression     Heart failure     History of colon polyps 2/20/2018    [place] (hard of hearing)     HEARING AID - RIGHT    Hyperlipidemia     Hypertension     Kidney stone     Kidney stones     Renal stones     Trouble in sleeping     Wears glasses        PAST SURGICAL HISTORY:  Past Surgical History:   Procedure Laterality Date    CAR[facility]OP[facility]  06/2018    [facility]     CATARACT EXTRACTION Bilateral     CHOLECYSTECTOMY  2-6-2014    CHOLECYSTECTOMY, LAPAROSCOPIC N/A 2/6/2014    [facility]al [facility]    COLONOSCOPY  1/9/2013    Dr. Gillette, 5 year recheck (family history colon cancer)    COLONOSCOPY N/A 3/12/2018    Performed by Garett Go MD at [facility] ENDO    COLONOSCOPY N/A 1/9/2013    [facility]    EAR  MASTOIDECTOMY W/ COCHLEAR IMPLANT W/ LANDMARK      left ear    FOREIGN BODY REMOVAL Right     glass removed from right foot/repair    FRACTURE SURGERY      right arm x2    LITHOTRIPSY      ROTATOR CUFF REPAIR      Right     SINUS SURGERY      TONSILLECTOMY         SOCIAL HISTORY:  Social History     Socioeconomic History    Marital status:      Spouse name: Not on file    Number of children: Not on file    Years of education: Not on file    Highest education level: Not on file   Social Needs    Financial resource strain: Not on file    Food insecurity - worry: Not on file    Food insecurity - inability: Not on file    Transportation needs - medical: Not on file    Transportation needs - non-medical: Not on file   Occupational History    Not on file   Tobacco Use    Smoking status: Never Smoker    Smokeless tobacco: Never Used   Substance and Sexual Activity    Alcohol use: Yes     Comment: once every two months    Drug use: No    Sexual activity: Yes   Other Topics Concern    Not on file   Social History Narrative    Not on file       FAMILY HISTORY:  Family History   Problem Relation Age of Onset    Cancer Mother         colon    Heart disease Father     Gout Father     Kidney disease Father     Arthritis Father     Hypertension Father     Early death Daughter         mva    Alcohol abuse Brother     Cancer Brother         mouth cancer w mets    No Known Problems Sister     Alcohol abuse Brother     No Known Problems Son     Heart disease Maternal Grandmother         MI    Stroke Maternal Grandfather     Heart disease Paternal Grandmother         MI    No Known Problems Son     Cancer Paternal Uncle         lung cancer    Allergic rhinitis Neg Hx     Allergies Neg Hx     Angioedema Neg Hx     Asthma Neg Hx     Atopy Neg Hx     Eczema Neg Hx     Immunodeficiency Neg Hx     Rhinitis Neg Hx     Urticaria Neg Hx     Collagen disease Neg Hx        ALLERGIES AND  MEDICATIONS: updated and reviewed.  Review of patient's allergies indicates:   Allergen Reactions    Bee pollens Shortness Of Breath     WASP, BEE, ANT AND CATERPILLER STINGS    Milk containing products Diarrhea    Metoprolol Rash     Current Outpatient Medications   Medication Sig Dispense Refill    amLODIPine (NORVASC) 10 MG tablet Take 1 tablet (10 mg total) by mouth once daily. 90 tablet 1    aspirin (ECOTRIN) 81 MG EC tablet Take 1 tablet (81 mg total) by mouth once daily.  0    b complex vitamins tablet Take 1 tablet by mouth once daily.      buPROPion (WELLBUTRIN XL) 150 MG TB24 tablet Take 1 tablet (150 mg total) by mouth once daily. In the morning 90 tablet 1    fish oil-omega-3 fatty acids 300-1,000 mg capsule Take by mouth once daily.      losartan (COZAAR) 100 MG tablet TAKE 1 TABLET BY MOUTH ONCE DAILY 90 tablet 0    metFORMIN (GLUCOPHAGE) 500 MG tablet Take 1 tablet (500 mg total) by mouth 2 (two) times daily with meals. 180 tablet 1    nitroGLYCERIN (NITROSTAT) 0.4 MG SL tablet Place 1 tablet (0.4 mg total) under the tongue every 5 (five) minutes as needed for Chest pain. 90 tablet 3    rosuvastatin (CRESTOR) 20 MG tablet TAKE ONE TABLET BY MOUTH ONCE DAILY 90 tablet 0    EPINEPHrine (EPIPEN) 0.3 mg/0.3 mL AtIn Inject 0.09 mLs (0.09 mg total) into the muscle once. for 1 dose 0.09 mL 0     No current facility-administered medications for this visit.        I have reviewed the patient's medical history in detail and updated the computerized patient record.    Review of Systems   Constitutional: Negative for activity change, appetite change, chills, fatigue and fever.        No regular exercise, active lifestyle. Has been dieting and has lost weight.    HENT: Negative for congestion, ear pain, postnasal drip, rhinorrhea, sinus pressure, sinus pain, sneezing and sore throat.    Eyes: Positive for visual disturbance.        Reports blurred vision occasionally. Needs glasses for reading.   "  Respiratory: Negative for cough and shortness of breath.    Cardiovascular: Negative for chest pain, palpitations and leg swelling.   Gastrointestinal: Negative for abdominal pain, constipation, diarrhea, nausea and vomiting.   Genitourinary: Negative for difficulty urinating and dysuria.   Musculoskeletal: Negative for arthralgias and myalgias.   Skin: Negative for rash and wound.   Neurological: Negative for dizziness, numbness and headaches.   Hematological: Does not bruise/bleed easily.   Psychiatric/Behavioral: Negative for dysphoric mood and sleep disturbance. The patient is not nervous/anxious.         States has had a couple of friends who have  recently. Feels that wellbutrin is working well for depression.          Objective:      Vitals:    19 1220 19 1224 19 1247   BP: (!) 156/90 (!) 160/92 (!) 158/80   BP Location: Right arm Left arm Right arm   Patient Position: Sitting Sitting Sitting   BP Method: Large (Automatic) Medium (Automatic) Large (Manual)   Pulse: 68     Temp: 97.8 °F (36.6 °C)     TempSrc: Oral     Weight: 73.9 kg (163 lb)     Height: 5' 5" (1.651 m)       Physical Exam   Constitutional: He is oriented to person, place, and time. He appears well-developed and well-nourished. No distress.   Blood pressure is elevated even with recheck at end of visit 158/80   HENT:   Head: Normocephalic and atraumatic.   Right Ear: Tympanic membrane, external ear and ear canal normal. Decreased hearing is noted.   Left Ear: Tympanic membrane, external ear and ear canal normal. Decreased hearing is noted.   Nose: Nose normal. No mucosal edema.   Mouth/Throat: Oropharynx is clear and moist and mucous membranes are normal. No posterior oropharyngeal edema.   Hearing aid to right ear.   Eyes: Conjunctivae and lids are normal. Pupils are equal, round, and reactive to light. Right eye exhibits no discharge. Left eye exhibits no discharge. Right conjunctiva is not injected. Left conjunctiva " is not injected.   Neck: Normal range of motion. Neck supple. Normal carotid pulses present. Carotid bruit is not present. Normal range of motion present. No thyromegaly present.   Cardiovascular: Normal rate, regular rhythm, S1 normal, S2 normal, normal heart sounds, intact distal pulses and normal pulses.   No murmur heard.  Pulses:       Carotid pulses are 2+ on the right side, and 2+ on the left side.       Radial pulses are 2+ on the right side, and 2+ on the left side.        Posterior tibial pulses are 2+ on the right side, and 2+ on the left side.   No edema   Pulmonary/Chest: Effort normal and breath sounds normal. No respiratory distress. He has no decreased breath sounds. He has no wheezes. He has no rhonchi. He has no rales.   Musculoskeletal: Normal range of motion.   Lymphadenopathy:        Head (right side): No submental, no submandibular and no tonsillar adenopathy present.        Head (left side): No submental, no submandibular and no tonsillar adenopathy present.     He has no cervical adenopathy.        Right: No supraclavicular adenopathy present.        Left: No supraclavicular adenopathy present.   Neurological: He is alert and oriented to person, place, and time. He has normal strength.   Skin: Skin is warm, dry and intact. He is not diaphoretic. No pallor.   Psychiatric: He has a normal mood and affect. His speech is normal and behavior is normal. Judgment and thought content normal. Cognition and memory are normal.   Nursing note and vitals reviewed.        Assessment:       1. Essential hypertension    2. Major depressive disorder with single episode, in full remission    3. Prediabetes    4. Mixed hyperlipidemia    5. CKD (chronic kidney disease) stage 3, GFR 30-59 ml/min    6. BMI 27.0-27.9,adult          Plan:       Buddy was seen today for hypertension.    Diagnoses and all orders for this visit:    Essential hypertension  -     amLODIPine (NORVASC) 10 MG tablet; Take 1 tablet (10 mg  total) by mouth once daily.  - Continue losartan 100 mg daily  - Blood pressure elevated today 156/90 right automatic, 160/92 left manual initial; 158/80 right manual at end of visit  - patient is unable to name the medications and dosages that he should be taking for his blood pressure at visit today, states he often forgets to take his medication  - patient states that he does check his blood pressure at home and states that his blood pressure this morning was 134/85, advised to bring his blood pressure monitor to his two week nurse blood pressure check so that it can be checked for accuracy  - written instructions provided for patient:  For your blood pressure, you should be taking amlodipine (norvasc) 10 mg once daily and Losartan 100 mg once daily.  You are going to come back to office in 2 weeks for a nurse blood pressure check. Bring home machine with you.         - patient advised that blood pressure control and lack there of directly impacts kidney function, it is important to control the blood pressure with goal blood pressure<130/<80    Major depressive disorder with single episode, in full remission  -     buPROPion (WELLBUTRIN XL) 150 MG TB24 tablet; Take 1 tablet (150 mg total) by mouth once daily. In the morning  - depression symptoms in remission on current medication, to continue as is    Prediabetes  -     metFORMIN (GLUCOPHAGE) 500 MG tablet; Take 1 tablet (500 mg total) by mouth 2 (two) times daily with meals.  -   Lab Results   Component Value Date    HGBA1C 5.7 (H) 02/01/2019   - pre diabetes is controlled on current medication and with current management plan, to continue as is    Mixed hyperlipidemia     Lab Results   Component Value Date    CHOL 196 02/01/2019    CHOL 160 02/26/2018    CHOL 224 (H) 11/13/2017     Lab Results   Component Value Date    HDL 38 (L) 02/01/2019    HDL 36 (L) 02/26/2018    HDL 37 (L) 11/13/2017     Lab Results   Component Value Date    LDLCALC 117.0 02/01/2019     LDLCALC 70.6 02/26/2018    LDLCALC 134.6 11/13/2017     Lab Results   Component Value Date    TRIG 205 (H) 02/01/2019    TRIG 267 (H) 02/26/2018    TRIG 262 (H) 11/13/2017     Lab Results   Component Value Date    CHOLHDL 19.4 (L) 02/01/2019    CHOLHDL 22.5 02/26/2018    CHOLHDL 16.5 (L) 11/13/2017      To continue Crestor 20 mg daily  CKD (chronic kidney disease) stage 3, GFR 30-59 ml/min   Most recent GFR was 52 on 3/1/2019   Advised no OTC NSAIDs, tylenol only as needed for pain or fever.    Educational handouts provided.  Chronic kidney disease  BMI 27.0-27.9,adult   BMI today is 27.12 and the patient has lost 7 lb since his last visit on 2/1/2019   Weight loss recommended with well balanced diet and portion controlled meals and increased activity.    Congratulated on recent weight loss    Follow-up in about 6 months (around 9/19/2019) for Dr. Chatman, 40 minutes, 2 week nurse blood pressure check.      Patient readiness: acceptance and barriers:none    During the course of the visit the patient was educated and counseled about the following:     Hypertension:   Medication: continue Amlodipine 10 mg daily and losartan 100 mg daily, reinforced need to take medications as prescribed.  Dietary sodium restriction.  Regular aerobic exercise.  Check blood pressures daily and record.  Follow up: 2 weeks and as needed.    Goals: Hypertension: Reduce Blood Pressure    Did patient meet goals/outcomes: No    The following self management tools provided: blood pressure log    Patient Instructions (the written plan) was given to the patient/family.     Time spent with patient: 30 minutes    Barriers to medications present (no )    Adverse reactions to current medications (no)    Over the counter medications reviewed (Yes)

## 2019-03-19 NOTE — PATIENT INSTRUCTIONS
For your blood pressure, you should be taking amlodipine (norvasc) 10 mg once daily and Losartan 100 mg once daily.    You are going to come back to office in 2 weeks for a nurse blood pressure check. Bring home machine with you.   Chronic Kidney Disease (CKD)     The role of the kidneys is to remove waste products and extra water from the blood.  When the kidneys do not work as they should, waste products begin to build up in the blood. This is called chronic kidney disease (CKD). CKD means that you have kidney damage or a decrease in kidney function lasting at least 3 months. CKD allows extra water, waste, and toxins to build up in the body. This can eventually become life-threatening. You might need dialysis or a kidney transplant to stay alive. This most severe form is called end stage renal disease.  Diabetes is the leading causes of chronic renal failure. Other causes include high blood pressure, hardening of the arteries (atherosclerosis), lupus, inflammation of the blood vessels (vasculitis), and past viral or bacterial infections. Certain over-the-counter pain medicines can cause renal failure when taken often over a long period of time. These include aspirin, ibuprofen, and related anti-inflammatory medicines called NSAIDs (nonsteroidal anti-inflammatory drugs).  Home care  The following guidelines will help you care for yourself at home:  · If you have diabetes, talk with your healthcare provider about keeping your blood sugar under control. Ask if you need to make and changes to your diet, lifestyle, or medicines.  · If you have high blood pressure:  ¨ Take prescribed medicine to lower your blood pressure to the recommended goal of less than 130/80.  ¨ Start a regular exercise program that you enjoy. Check with your healthcare provider to be sure your planned exercise program is right for you.  ¨ Eat less salt (sodium). Your healthcare provider can tell you how much salt per day is safe for you.  · If  you are overweight, talk with your healthcare provider about a weight loss plan.  · If you smoke, you must quit. Smoking makes kidney disease worse. Talk with your healthcare provider about ways to help you quit.  For more information, visit the following links:  ¨ www.smokefree.gov/sites/default/files/pdf/clearing-the-air-accessible.pdf  ¨ www.smokefree.gov  ¨ www.cancer.org/healthy/stayawayfromtobacco/guidetoquittingsmoking/  · Most people with CKD need to follow a special diet.  Be sure you understand yours. In general, you will need to limit protein, salt, potassium, and phosphorus. You also need to limit how much fluid you drink.   · CKD is a risk factor for heart disease. Talk with your healthcare provider about any other risk factors you might have and what you can do to lessen them.  · Talk with your healthcare provider about any medicines you are taking to find out if they need to be reduced or stopped.  · Don't use the following over-the-counter medicines, or consult your healthcare provider before using:  ¨ Aspirin and NSAIDs such as ibuprofen or naproxen. Using acetaminophen for fever or pain is OK.  ¨ Laxatives and antacids containing magnesium or aluminum  ¨ Fleet or phospho soda enemas containing phosphorus  ¨ Certain stomach acid-blocking medicine such as cimetidine or ranitidine   ¨ Decongestants containing pseudoephedrine   ¨ Herbal supplements  Follow-up care  Follow up with your healthcare provider, or as advised. Contact one of the following for more information:  · American Association of Kidney Patients 123-939-9325 www.aakp.org  · National Kidney Foundation 384-265-0388 www.kidney.org  · American Kidney Fund 074-335-0705 www.kidneyfund.org  · National Kidney Disease Education Program 866-4KIDNEY www.nkdep.nih.gov  If an X-ray, ECG (cardiogram), or other diagnostic test was taken, you will be told of any new findings that may affect your care.  Call 911  Call 911 if you have any of the  following:  · Severe weakness, dizziness, fainting, drowsiness, or confusion  · Chest pain or shortness of breath  · Heart beating fast, slow, or irregularly  When to seek medical advice  Call your healthcare provider right away if any of these occur:  · Nausea or vomiting  · Fever of 100.4°F (38°C) or higher, or as directed by your healthcare provider  · Unexpected weight gain or swelling in the legs, ankles, or around the eyes  · Decrease or absent urine output  Date Last Reviewed: 9/1/2016 © 2000-2017 [employer]. [address] [place] [id number]. All rights reserved. This information is not intended as a substitute for professional medical care. Always follow your healthcare professional's instructions.

## 2019-03-20 ENCOUNTER — PATIENT OUTREACH (OUTPATIENT)
Dept: ADMINISTRATIVE | Facility: HOSPITAL | Age: 69
End: 2019-03-20

## 2019-03-29 NOTE — PATIENT INSTRUCTIONS
DIABETES  Reason for Visit:     RENETTA MATTHEWS is a(n) established patient here today for Diabetes, Dyslipidemia and Hypertension 61/o female with type 1 DM since [id number] insulin pump Rx, with retin, p.neuro,HTN and hypercholesterolemia and s/p gastric bypass, 3 years.          History of Present Illness:  She reported: Not feeling tired (fatigue) great. Wt stale off 110 lbs + extra -7 pounds since last seen in 8/17  History of diabetic hypoglycemia no severe hypo.  A recent examination by an ophthalmologist 6months - stable min on L, home blood sugar check performed very few readings, because of problem with insurance covering strips as well as equipment for  the omnipod pump, also glucoses done at at bedtime readings greater than 200 with one reading of 400 and urine protein was checked microalb 38.3 and 25.6 (less than 30)  No blurry vision. No frequent, small amounts of urine and no polyuria. No polydipsia. No dizziness, no fainting, no tingling, no burning sensation in the distal extremities, and no numbness.  almost 5-1/2 years post Zahraa -n -y bypass- has lost 120 lbs.            BG readings:   Hemoglobin A1C (%)   Date Value   01/21/2019 9.0 (H)     Creatinine (mg/dL)   Date Value   01/21/2019 0.67     No results found for: MICALB  CHOLESTEROL (mg/dL)   Date Value   01/21/2019 198     HDL (mg/dL)   Date Value   01/21/2019 113     TRIGLYCERIDE (mg/dL)   Date Value   01/21/2019 64     CALCULATED LDL (mg/dL)   Date Value   01/21/2019 72     No results found for: TSH  No components found for: CMP  No components found for: BMP    Current medications:    Current Outpatient Medications   Medication Sig Last Dose   • insulin aspart (NOVOLOG) 100 UNIT/ML injectable solution Use 40 units daily through pump or as directed    • citalopram (CELEXA) 40 MG tablet Take 1 tablet by mouth daily.    • lisinopril (PRINIVIL,ZESTRIL) 40 MG tablet Take 1 tablet by mouth daily.    • blood glucose (FREESTYLE TEST STRIPS)    Taking Your Blood Pressure  Blood pressure is the force of blood against the artery wall as it moves from the heart through the blood vessels. You can take your own blood pressure reading using a digital monitor. Take your readings the same each time, using the same arm. Take readings as often as your healthcare provider instructs.  About blood pressure monitors  Blood pressure monitors are designed for certain ages and cases. You can find monitors for older adults, for pregnant women, and for children. Make sure the one you choose is the right one for your age and situation.  The American Heart Association recommends an automatic cuff monitor that fits on your upper arm (bicep). The cuff should fit your arm size. A cuff thats too large or too small will not give an accurate reading. Measure around your upper arm to find your size.  Monitors that attach to your finger or wrist are not as accurate as monitors for your upper arm.  Ask your healthcare provider for help in choosing a monitor. Bring your monitor to your next provider visit if you need help in using it the correct way.  The steps below are general instructions for using an automatic digital monitor.  Step 1. Relax    · Take your blood pressure at the same time every day, such as in the morning or evening, or at the time your healthcare provider recommends.  · Wait at least a half-hour after smoking, eating, or exercising. Don't drink coffee, tea, soda, or other caffeinated beverages before checking your blood pressure.  · Sit comfortably at a table with both feet on the floor. Do not cross your legs or feet. Place the monitor near you.  · Rest for a few minutes before you begin.  Step 2. Wrap the cuff    · Place your arm on the table, palm up. Your arm should be at the level of your heart. Wrap the cuff around your upper arm, just above your elbow. Its best done on bare skin, not over clothing. Most cuffs will indicate where the brachial artery (the  blood vessel in the middle of the arm at the inner side of the elbow) should line up with the cuff. Look in your monitor's instruction booklet for an illustration. You can also bring your cuff to your healthcare provider and have them show you how to correctly place the cuff.  Step 3. Inflate the cuff    · Push the button that starts the pump.  · The cuff will tighten, then loosen.  · The numbers will change. When they stop changing, your blood pressure reading will appear.  · Take 2 or 3 readings one minute apart.  Step 4. Write down the results of each reading    · Write down your blood pressure numbers for each reading. Note the date and time. Keep your results in one place, such as a notebook. Even if your monitor has a built-in memory, keep a hard copy of the readings.  · Remove the cuff from your arm. Turn off the machine.  · Bring your blood pressure records with your healthcare providers at each visit.  · If you start a new blood pressure medicine, note the day you started the new medicine. Also note the day if you change the dose of your medicine. This information goes on your blood pressure recording sheet. This will help your healthcare provider monitor how well the medicine changes are working.  · Ask your healthcare provider what numbers should prompt you to call him or her. Also ask what numbers should prompt you to get help right away.  Date Last Reviewed: 11/1/2016 © 2000-2017 [employer]. [address] [place] [id number]. All rights reserved. This information is not intended as a substitute for professional medical care. Always follow your healthcare professional's instructions.        Controlling High Blood Pressure  High blood pressure (hypertension) is often called the silent killer. This is because many people who have it dont know it. High blood pressure is defined as 140/90 mm Hg or higher. Know your blood pressure and remember to check it regularly. Doing so can save  test strip Test blood sugar 3 times daily as directed. Diagnosis:    • simvastatin (ZOCOR) 40 MG tablet Take 1 tablet by mouth nightly.    • estrogens, conjugated (PREMARIN) vaginal cream Place 1 g vaginally daily. Apply daily for 2 weeks, then try reducing as directed    • Vitamin D, Ergocalciferol, 29514 units capsule Take 1 capsule by mouth 1 day a week.    • Ca & Phos-Vit D-Mag (CALCIUM) 233--133 Tab  Taking at Unknown time   • Multiple Vitamins Tab  Taking at Unknown time     No current facility-administered medications for this visit.          Past Medical History:   Diagnosis Date   • Calcaneal spur    • Herpes zoster        Past Surgical History:   Procedure Laterality Date   • Appendectomy     • Breast lumpectomy      benign   •  delivery only     • Gastric restriction surgery      BYpass with Zahraa-en-Y, 13 at [facility], Dr Padilla, laparoscopic robotic procedure   • Tonsillectomy     • Total abdominal hysterectomy      no oophorectomy       Family History   Problem Relation Age of Onset   • Hypertension Mother    • Cancer, Skin Mother    • Celiac Disease Father    • Coronary Artery Disease Father    • Cancer, Prostate Father        Social History     Tobacco Use   • Smoking status: Former Smoker     Packs/day: 1.00     Years: 20.00     Pack years: 20.00     Last attempt to quit: 1994     Years since quittin.2   • Smokeless tobacco: Never Used   Substance Use Topics   • Alcohol use: Yes     Alcohol/week: 6.0 oz     Types: 10 Shots of liquor per week           ALLERGIES: no known allergies.    Review of Systems     Const: not feeling tired, no recent weight gain and nrecent weight loss.  -7 pounds  Eyes: no blurred vision.   CV: no chest pain, no lower extremity edema and no dyspnea on exertion.   Resp: not expressed as feeling short of breath   GI: no abdominal pain, no diarrhea, no constipation   : no change in urinary frequency .   Endo: no polyuria, no polydipsia .   Musc: joint  pain,    Neuro: no numbness    Psych: no depression                    Vitals:   /76   Pulse 82   Temp 97.3 °F (36.3 °C)   Ht 5' 4.5\" (1.638 m)   Wt 73 kg (161 lb)   BMI 27.21 kg/m²   BSA 1.79 m²      Physical Exam     Constitutional: alert.   Head and Face: no deformities.   Eyes: no eyelid swelling and no ptosis.?  Occasional microaneurysm seen  Neck: no mass was seen. no thyroid nodules and thyroid not enlarged.   Lymphatic: no lymphadenopathy.   Pulmonary: normal respiratory rate and effort. breath sounds clear to auscultation bilaterally.   Cardiovascular: normal rate and regular rhythm. no right carotid bruit right dorsalis pedis 2+ no left carotid bruit left dorsalis pedis 2+ edema was not present in the lower extremities.   Abdomen: soft and nontender. no hepatomegaly and no splenomegaly.   Neurologic: Deep tendon reflexes: 0 right patella, 1+ left patella, 0 right ankle jerk and 0 left ankle jerk.   Psychiatric: mood/affect were appropriate.   Skin, Hair, Nails: no foot ulcers.   Diabetic Foot Exam:   Decreased sensation of both feet.   No ulcers observed.   Skin integrity intact.   No Calluses.   No Charcot.    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        Management Plan:           A1c 8.4,7.9.8.2, 8.7 ,9.01/19  HFD515/LDL47-and /LDL 72 40 mg simvastatin  BUN8/Creat.64/BUN 12/creatinine 0.67-1/19  Microalb/creat 38(<30) and 25.6 and 1/19  Patient's omnipod is probably out of warranty, for some reason supplies are not being covered.  We will have staff look into problem with insurance coverage [employer]  your life. Here are some things you can do to help control your blood pressure.    Choose heart-healthy foods  · Select low-salt, low-fat foods. Limit sodium intake to 2,400 mg per day or the amount suggested by your healthcare provider.  · Limit canned, dried, cured, packaged, and fast foods. These can contain a lot of salt.  · Eat 8 to 10 servings of fruits and vegetables every day.  · Choose lean meats, fish, or chicken.  · Eat whole-grain pasta, brown rice, and beans.  · Eat 2 to 3 servings of low-fat or fat-free dairy products.  · Ask your doctor about the DASH eating plan. This plan helps reduce blood pressure.  · When you go to a restaurant, ask that your meal be prepared with no added salt.  Maintain a healthy weight  · Ask your healthcare provider how many calories to eat a day. Then stick to that number.  · Ask your healthcare provider what weight range is healthiest for you. If you are overweight, a weight loss of only 3% to 5% of your body weight can help lower blood pressure. Generally, a good weight loss goal is to lose 10% of your body weight in a year.  · Limit snacks and sweets.  · Get regular exercise.  Get up and get active  · Choose activities you enjoy. Find ones you can do with friends or family. This includes bicycling, dancing, walking, and jogging.  · Park farther away from building entrances.  · Use stairs instead of the elevator.  · When you can, walk or bike instead of driving.  · Thorp leaves, garden, or do household repairs.  · Be active at a moderate to vigorous level of physical activity for at least 40 minutes for a minimum of 3 to 4 days a week.   Manage stress  · Make time to relax and enjoy life. Find time to laugh.  · Communicate your concerns with your loved ones and your healthcare provider.  · Visit with family and friends, and keep up with hobbies.  Limit alcohol and quit smoking  · Men should have no more than 2 drinks per day.  · Women should have no more than 1 drink per  Shield.  Addition did discuss the new MiniMed 670 G with guardian sensor, and the Tandem T slim insulin pump with Dex com G6 sensor, both of which allow the basal rate to be controlled by the pump also have the off before low feature.  Suggested patient see Dyan Salcedo, our diabetes educator to discuss these options.  Patient has been seen in the past  ..               Ankur Hall MD     day.  · Talk with your healthcare provider about quitting smoking. Smoking significantly increases your risk for heart disease and stroke. Ask your healthcare provider about community smoking cessation programs and other options.  Medicines  If lifestyle changes arent enough, your healthcare provider may prescribe high blood pressure medicine. Take all medicines as prescribed. If you have any questions about your medicines, ask your healthcare provider before stopping or changing them.   Date Last Reviewed: 4/27/2016 © 2000-2017 [employer]. [address] [place] [address], [place] [address]. All rights reserved. This information is not intended as a substitute for professional medical care. Always follow your healthcare professional's instructions.        Uncontrolled High Blood Pressure (Established)    Your blood pressure was unusually high today. This can occur if youve missed doses of your blood pressure medicine. Or it can happen if you are taking other medicines. These include some asthma inhalers, decongestants, diet pills, and street drugs like cocaine and amphetamine.  Other causes include:  · Weight gain  · More salt in your diet  · Smoking  · Caffeine  Your blood pressure can also rise if you are emotionally upset or in intense pain. It may go back to normal after a period of rest.  A blood pressure reading is made up of 2 numbers. There is a top number over a bottom number. The top number is the systolic pressure. The bottom number is the diastolic pressure. A normal blood pressure is a systolic pressure of less than 120 over a diastolic pressure of less than 80. High blood pressure (hypertension) is when the top number is 140 or higher. Or it is when the bottom number is 90 or higher. You will see your blood pressure readings written together. For example, a person with a systolic pressure of 118 and a diastolic pressure of 78 will have 118/78 written in the medical record. To be high blood  pressure, the numbers must be higher when tested over a period of time. The blood pressures between normal and hypertension are called prehypertension. Prehypertension is a warning sign. The information gives you a chance to make lifestyle changes (weight loss, more exercise) that can keep your blood pressure from going higher.  Home care  Its important to take steps to lower your blood pressure. If you are taking blood pressure medicine, the guidelines below may help you need less or no medicines in the future.  · Begin a weight-loss program if you are overweight.  · Cut back on the amount of salt in your diet:  ¨ Avoid high-salt foods like olives, pickles, smoked meats, and salted potato chips.  ¨ Dont add salt to your food at the table.  ¨ Use only small amounts of salt when cooking.  · Begin an exercise program. Talk with your health care provider about what exercise program is best for you. It doesnt have to be difficult. Even brisk walking for 20 minutes 3 times a week is a good form of exercise.  · Avoid medicines that stimulates the heart. This includes many over-the-counter cold and sinus decongestant pills and sprays, as well as diet pills. Check the warnings about hypertension on the label. Before purchasing any over-the-counter medicines or supplements, always ask the pharmacist about the product's potential interaction with your high blood pressure and your medicines.  · Stimulants such as amphetamine or cocaine could be lethal for someone with hypertension. Never take these.  · Limit how much caffeine you drink. Or switch to noncaffeinated beverages.  · Stop smoking. If you are a long-time smoker, this can be hard. Enroll in a stop-smoking program to make it more likely that you will succeed. Talk with your provider about ways to quit.  · Learn how to handle stress better. This is an important part of any program to lower blood pressure. Learn ways to relax. These include meditation, yoga, and  biofeedback.  · If medicines were prescribed, take them exactly as directed. Missing doses may cause your blood pressure to get out of control.  · If you miss a dose or doses of your medicines, check with your healthcare provider or pharmacist about what to do.  · Consider buying an automatic blood pressure machine. Your provider may recommend a certain type. You can get one of these at most pharmacies. Measure your blood pressure twice a day, in the morning, and in the late afternoon. Keep a written record of your home blood pressure readings and take the record to your medical appointments.  Here are some additional guidelines on home blood pressure monitoring from the American Heart Association.  · Don't smoke or drink coffee for 30 minutes  · Go to the bathroom before the test.  · Relax for 5 minutes before taking the measurement.  · Sit correctly. Be sure your back is supported. Don't sit on a couch or soft chair. Uncross your feet and place them flat on the floor. Place your arm on a solid, flat surface like a table with the upper arm at heart level. Make certain the middle of the cuff is directly above the eye of the elbow. Check the monitor's instruction manual for an illustration.  · Take multiple readings. When you measure, take 2 or 3 readings one minute apart and record all of the results.  · Take your blood pressure at the same time every day, or as your healthcare provider recommends.  · Record the date, time, and blood pressure reading.  · Take the record with you to your next appointment. If your blood pressure monitor has a built-in memory, simply take the monitor with you to your next appointment.  · Call your provider if you have several high readings. Don't be frightened by a single high reading, but if you get several high readings, check in with your healthcare provider.  · Note: When blood pressure reaches a systolic (top number) of 180 or higher or a diastolic (bottom number) of 110 or  higher, emergency medical treatment is required. Call your healthcare provider immediately.  Follow-up care  Regular visits to your own healthcare provider for blood pressure and medicine checks are an important part of your care. Make a follow-up appointment as directed. Bring the record of your home blood pressure readings to the appointment.  When to seek medical advice  Call your healthcare provider right away if any of these occur:  · Blood pressure reaches a systolic (top number) of 180 or higher or diastolic (bottom number) of 110 or higher, emergency medical treatment is required.  · Chest, arm, shoulder, neck, or upper back pain  · Shortness of breath  · Severe headache  · Throbbing or rushing sound in the ears  · Nosebleed  · Extreme drowsiness, confusion, or fainting  · Dizziness or dizziness with spinning sensation (vertigo)  · Weakness in an arm or leg or on one side of the face  · Trouble speaking or seeing   Date Last Reviewed: 1/1/2017 © 2000-2017 [employer]. [address] [place] [address]. All rights reserved. This information is not intended as a substitute for professional medical care. Always follow your healthcare professional's instructions.        Established High Blood Pressure    High blood pressure (hypertension) is a chronic disease. Often, healthcare providers dont know what causes it. But it can be caused by certain health conditions and medicines.  If you have high blood pressure, you may not have any symptoms. If you do have symptoms, they may include headache, dizziness, changes in your vision, chest pain, and shortness of breath. But even without symptoms, high blood pressure thats not treated raises your risk for heart attack and stroke. High blood pressure is a serious health risk and shouldnt be ignored.  A blood pressure reading is made up of two numbers: a higher number over a lower number. The top number is the systolic pressure. The bottom number  is the diastolic pressure. A normal blood pressure is a systolic pressure of  less than 120 over a diastolic pressure of less than 80. You will see your blood pressure readings written together. For example, a person with a systolic pressure of 188 and a diastolic pressure of 78 will have 118/78 written in the medical record.  High blood pressure is when either the top number is 140 or higher, or the bottom number is 90 or higher. This must be the result when taking your blood pressure a number of times. The blood pressures between normal and high are called prehypertension.  Home care  If you have high blood pressure, you should do what is listed below to lower your blood pressure. If you are taking medicines for high blood pressure, these methods may reduce or end your need for medicines in the future.  · Begin a weight-loss program if you are overweight.  · Cut back on how much salt you get in your diet. Heres how to do this:  ¨ Dont eat foods that have a lot of salt. These include olives, pickles, smoked meats, and salted potato chips.  ¨ Dont add salt to your food at the table.  ¨ Use only small amounts of salt when cooking.  · Start an exercise program. Talk with your healthcare provider about the type of exercise program that would be best for you. It doesn't have to be hard. Even brisk walking for 20 minutes 3 times a week is a good form of exercise.  · Dont take medicines that stimulate the heart. This includes many over-the-counter cold and sinus decongestant pills and sprays, as well as diet pills. Check the warnings about hypertension on the label. Before buying any over-the-counter medicines or supplements, always ask the pharmacist about the product's potential interaction with your high blood pressure and your high blood pressure medicines.  · Stimulants such as amphetamine or cocaine could be deadly for someone with high blood pressure. Never take these.  · Limit how much caffeine you get in your  diet. Switch to caffeine-free products.  · Stop smoking. If you are a long-time smoker, this can be hard. Talk to your healthcare provider about medicines and nicotine replacement options to help you. Also, enroll in a stop-smoking program to make it more likely that you will quit for good.  · Learn how to handle stress. This is an important part of any program to lower blood pressure. Learn about relaxation methods like meditation, yoga, or biofeedback.  · If your provider prescribed medicines, take them exactly as directed. Missing doses may cause your blood pressure get out of control.  · If you miss a dose or doses, check with your healthcare provider or pharmacist about what to do.  · Consider buying an automatic blood pressure machine. Ask your provider for a recommendation. You can get one of these at most pharmacies.     The American Heart Association recommends the following guidelines for home blood pressure monitoring:  · Don't smoke or drink coffee for 30 minutes before taking your blood pressure.  · Go to the bathroom before the test.  · Relax for 5 minutes before taking the measurement.  · Sit with your back supported (don't sit on a couch or soft chair); keep your feet on the floor uncrossed. Place your arm on a solid flat surface (like a table) with the upper part of the arm at heart level. Place the middle of the cuff directly above the eye of the elbow. Check the monitor's instruction manual for an illustration.  · Take multiple readings. When you measure, take 2 to 3 readings one minute apart and record all of the results.  · Take your blood pressure at the same time every day, or as your healthcare provider recommends.  · Record the date, time, and blood pressure reading.  · Take the record with you to your next medical appointment. If your blood pressure monitor has a built-in memory, simply take the monitor with you to your next appointment.  · Call your provider if you have several high  readings. Don't be frightened by a single high blood pressure reading, but if you get several high readings, check in with your healthcare provider.  · Note: When blood pressure reaches a systolic (top number) of 180 or higher OR diastolic (bottom number) of 110 or higher, seek emergency medical treatment.  Follow-up care  You will need to see your healthcare provider regularly. This is to check your blood pressure and to make changes to your medicines. Make a follow-up appointment as directed. Bring the record of your home blood pressure readings to the appointment.  When to seek medical advice  Call your healthcare provider right away if any of these occur:  · Blood pressure reaches a systolic (upper number) of 180 or higher OR a diastolic (bottom number) of 110 or higher  · Chest pain or shortness of breath  · Severe headache  · Throbbing or rushing sound in the ears  · Nosebleed  · Sudden severe pain in your belly (abdomen)  · Extreme drowsiness, confusion, or fainting  · Dizziness or spinning sensation (vertigo)  · Weakness of an arm or leg or one side of the face  · You have problems speaking or seeing   Date Last Reviewed: 12/1/2016  © 0383-9378 [employer]. [address] [place] [address], [place] [address]. All rights reserved. This information is not intended as a substitute for professional medical care. Always follow your healthcare professional's instructions.

## 2019-04-02 ENCOUNTER — CLINICAL SUPPORT (OUTPATIENT)
Dept: FAMILY MEDICINE | Facility: CLINIC | Age: 69
End: 2019-04-02
Payer: MEDICARE

## 2019-04-02 ENCOUNTER — TELEPHONE (OUTPATIENT)
Dept: FAMILY MEDICINE | Facility: CLINIC | Age: 69
End: 2019-04-02

## 2019-04-02 NOTE — TELEPHONE ENCOUNTER
Patient presented to clinic today for a blood pressure check. His blood pressure was taken manually his right arm read 136/78 with a heart rate of 83. Patient was advised to continue to take medication as prescribed.

## 2019-05-28 ENCOUNTER — OFFICE VISIT (OUTPATIENT)
Dept: UROLOGY | Facility: CLINIC | Age: 69
End: 2019-05-28
Payer: MEDICARE

## 2019-05-28 ENCOUNTER — OFFICE VISIT (OUTPATIENT)
Dept: FAMILY MEDICINE | Facility: CLINIC | Age: 69
End: 2019-05-28
Payer: MEDICARE

## 2019-05-28 ENCOUNTER — HOSPITAL ENCOUNTER (OUTPATIENT)
Dept: RADIOLOGY | Facility: HOSPITAL | Age: 69
Discharge: HOME | End: 2019-05-28
Attending: PHYSICIAN ASSISTANT
Payer: MEDICARE

## 2019-05-28 VITALS
DIASTOLIC BLOOD PRESSURE: 90 MMHG | BODY MASS INDEX: 28.21 KG/M2 | WEIGHT: 169.31 LBS | HEIGHT: 65 IN | SYSTOLIC BLOOD PRESSURE: 156 MMHG | HEART RATE: 76 BPM | TEMPERATURE: 97 F

## 2019-05-28 VITALS
HEART RATE: 87 BPM | HEIGHT: 65 IN | TEMPERATURE: 98 F | BODY MASS INDEX: 26.89 KG/M2 | SYSTOLIC BLOOD PRESSURE: 147 MMHG | RESPIRATION RATE: 18 BRPM | WEIGHT: 161.38 LBS | DIASTOLIC BLOOD PRESSURE: 91 MMHG

## 2019-05-28 DIAGNOSIS — N20.0 NEPHROLITHIASIS: Primary | ICD-10-CM

## 2019-05-28 DIAGNOSIS — R10.9 LEFT FLANK PAIN: ICD-10-CM

## 2019-05-28 DIAGNOSIS — R10.9 FLANK PAIN: ICD-10-CM

## 2019-05-28 DIAGNOSIS — M54.50 LOW BACK PAIN WITHOUT SCIATICA, UNSPECIFIED BACK PAIN LATERALITY, UNSPECIFIED CHRONICITY: ICD-10-CM

## 2019-05-28 LAB
BILIRUB SERPL-MCNC: NORMAL MG/DL
BILIRUB UR QL STRIP: NEGATIVE
BLOOD URINE, POC: NORMAL
CLARITY UR: CLEAR
COLOR UR: YELLOW
COLOR, POC UA: YELLOW
GLUCOSE UR QL STRIP: NEGATIVE
GLUCOSE UR QL STRIP: NORMAL
HGB UR QL STRIP: NEGATIVE
KETONES UR QL STRIP: NEGATIVE
KETONES UR QL STRIP: NORMAL
LEUKOCYTE ESTERASE UR QL STRIP: NEGATIVE
LEUKOCYTE ESTERASE URINE, POC: NORMAL
NITRITE UR QL STRIP: NEGATIVE
NITRITE, POC UA: NORMAL
PH UR STRIP: 6 [PH] (ref 5–8)
PH, POC UA: 5.5
POC RESIDUAL URINE VOLUME: 20 ML (ref 0–100)
PROT UR QL STRIP: NEGATIVE
PROTEIN, POC: NORMAL
SP GR UR STRIP: 1.02 (ref 1–1.03)
SPECIFIC GRAVITY, POC UA: 1.02
URN SPEC COLLECT METH UR: NORMAL
UROBILINOGEN UR STRIP-ACNC: NEGATIVE EU/DL
UROBILINOGEN, POC UA: 0.2

## 2019-05-28 PROCEDURE — 3077F SYST BP >= 140 MM HG: CPT | Mod: HCNC,CPTII,S$GLB, | Performed by: PHYSICIAN ASSISTANT

## 2019-05-28 PROCEDURE — 81002 URINALYSIS NONAUTO W/O SCOPE: CPT | Mod: HCNC,PO

## 2019-05-28 PROCEDURE — 99999 PR PBB SHADOW E&M-EST. PATIENT-LVL V: ICD-10-PCS | Mod: PBBFAC,HCNC,, | Performed by: PHYSICIAN ASSISTANT

## 2019-05-28 PROCEDURE — 3077F SYST BP >= 140 MM HG: CPT | Mod: HCNC,CPTII,S$GLB, | Performed by: NURSE PRACTITIONER

## 2019-05-28 PROCEDURE — 1101F PT FALLS ASSESS-DOCD LE1/YR: CPT | Mod: HCNC,CPTII,S$GLB, | Performed by: NURSE PRACTITIONER

## 2019-05-28 PROCEDURE — 3080F DIAST BP >= 90 MM HG: CPT | Mod: HCNC,CPTII,S$GLB, | Performed by: NURSE PRACTITIONER

## 2019-05-28 PROCEDURE — 74176 CT RENAL STONE STUDY ABD PELVIS WO: ICD-10-PCS | Mod: 26,HCNC,, | Performed by: RADIOLOGY

## 2019-05-28 PROCEDURE — 3077F PR MOST RECENT SYSTOLIC BLOOD PRESSURE >= 140 MM HG: ICD-10-PCS | Mod: HCNC,CPTII,S$GLB, | Performed by: NURSE PRACTITIONER

## 2019-05-28 PROCEDURE — 1101F PT FALLS ASSESS-DOCD LE1/YR: CPT | Mod: HCNC,CPTII,S$GLB, | Performed by: PHYSICIAN ASSISTANT

## 2019-05-28 PROCEDURE — 99204 PR OFFICE/OUTPT VISIT, NEW, LEVL IV, 45-59 MIN: ICD-10-PCS | Mod: HCNC,25,S$GLB, | Performed by: NURSE PRACTITIONER

## 2019-05-28 PROCEDURE — 99999 PR PBB SHADOW E&M-EST. PATIENT-LVL V: CPT | Mod: PBBFAC,HCNC,, | Performed by: PHYSICIAN ASSISTANT

## 2019-05-28 PROCEDURE — 3080F DIAST BP >= 90 MM HG: CPT | Mod: HCNC,CPTII,S$GLB, | Performed by: PHYSICIAN ASSISTANT

## 2019-05-28 PROCEDURE — 99999 PR PBB SHADOW E&M-EST. PATIENT-LVL V: CPT | Mod: PBBFAC,HCNC,, | Performed by: NURSE PRACTITIONER

## 2019-05-28 PROCEDURE — 1101F PR PT FALLS ASSESS DOC 0-1 FALLS W/OUT INJ PAST YR: ICD-10-PCS | Mod: HCNC,CPTII,S$GLB, | Performed by: PHYSICIAN ASSISTANT

## 2019-05-28 PROCEDURE — 51798 PR MEAS,POST-VOID RES,US,NON-IMAGING: ICD-10-PCS | Mod: HCNC,S$GLB,, | Performed by: NURSE PRACTITIONER

## 2019-05-28 PROCEDURE — 81002 POCT URINE DIPSTICK WITHOUT MICROSCOPE: ICD-10-PCS | Mod: HCNC,S$GLB,, | Performed by: NURSE PRACTITIONER

## 2019-05-28 PROCEDURE — 3080F PR MOST RECENT DIASTOLIC BLOOD PRESSURE >= 90 MM HG: ICD-10-PCS | Mod: HCNC,CPTII,S$GLB, | Performed by: NURSE PRACTITIONER

## 2019-05-28 PROCEDURE — 3080F PR MOST RECENT DIASTOLIC BLOOD PRESSURE >= 90 MM HG: ICD-10-PCS | Mod: HCNC,CPTII,S$GLB, | Performed by: PHYSICIAN ASSISTANT

## 2019-05-28 PROCEDURE — 1101F PR PT FALLS ASSESS DOC 0-1 FALLS W/OUT INJ PAST YR: ICD-10-PCS | Mod: HCNC,CPTII,S$GLB, | Performed by: NURSE PRACTITIONER

## 2019-05-28 PROCEDURE — 81002 URINALYSIS NONAUTO W/O SCOPE: CPT | Mod: HCNC,S$GLB,, | Performed by: NURSE PRACTITIONER

## 2019-05-28 PROCEDURE — 99204 OFFICE O/P NEW MOD 45 MIN: CPT | Mod: HCNC,25,S$GLB, | Performed by: NURSE PRACTITIONER

## 2019-05-28 PROCEDURE — 99214 OFFICE O/P EST MOD 30 MIN: CPT | Mod: HCNC,S$GLB,, | Performed by: PHYSICIAN ASSISTANT

## 2019-05-28 PROCEDURE — 3077F PR MOST RECENT SYSTOLIC BLOOD PRESSURE >= 140 MM HG: ICD-10-PCS | Mod: HCNC,CPTII,S$GLB, | Performed by: PHYSICIAN ASSISTANT

## 2019-05-28 PROCEDURE — 99214 PR OFFICE/OUTPT VISIT, EST, LEVL IV, 30-39 MIN: ICD-10-PCS | Mod: HCNC,S$GLB,, | Performed by: PHYSICIAN ASSISTANT

## 2019-05-28 PROCEDURE — 51798 US URINE CAPACITY MEASURE: CPT | Mod: HCNC,S$GLB,, | Performed by: NURSE PRACTITIONER

## 2019-05-28 PROCEDURE — 74176 CT ABD & PELVIS W/O CONTRAST: CPT | Mod: 26,HCNC,, | Performed by: RADIOLOGY

## 2019-05-28 PROCEDURE — 99999 PR PBB SHADOW E&M-EST. PATIENT-LVL V: ICD-10-PCS | Mod: PBBFAC,HCNC,, | Performed by: NURSE PRACTITIONER

## 2019-05-28 PROCEDURE — 74176 CT ABD & PELVIS W/O CONTRAST: CPT | Mod: TC,HCNC

## 2019-05-28 RX ORDER — TAMSULOSIN HYDROCHLORIDE 0.4 MG/1
0.4 CAPSULE ORAL DAILY
Qty: 30 CAPSULE | Refills: 0 | Status: SHIPPED | OUTPATIENT
Start: 2019-05-28 | End: 2019-08-10 | Stop reason: SDUPTHER

## 2019-05-28 RX ORDER — DOXYCYCLINE HYCLATE 100 MG
100 TABLET ORAL 2 TIMES DAILY
Qty: 42 TABLET | Refills: 0 | Status: SHIPPED | OUTPATIENT
Start: 2019-05-28 | End: 2019-06-18

## 2019-05-28 RX ORDER — IBUPROFEN 800 MG/1
800 TABLET ORAL EVERY 6 HOURS PRN
Qty: 30 TABLET | Refills: 0 | Status: SHIPPED | OUTPATIENT
Start: 2019-05-28 | End: 2019-06-19

## 2019-05-28 NOTE — PROGRESS NOTES
Subjective:       Patient ID: Buddy Park is a 68 y.o. male.    Chief Complaint: Back Pain (left)    Flank Pain   This is a recurrent problem. The current episode started in the past 7 days. The problem occurs constantly. The problem has been gradually worsening since onset. The pain is present in the costovertebral angle. The quality of the pain is described as stabbing. The pain does not radiate. The pain is at a severity of 10/10. The symptoms are aggravated by position, coughing, bending, standing and twisting. Pertinent negatives include no abdominal pain, bladder incontinence, bowel incontinence, chest pain, dysuria, fever or leg pain. Risk factors include renal stones. He has tried analgesics for the symptoms. The treatment provided mild relief.     Review of Systems   Constitutional: Positive for activity change. Negative for appetite change, fatigue and fever.   Cardiovascular: Negative for chest pain, palpitations and leg swelling.   Gastrointestinal: Negative for abdominal distention, abdominal pain, anal bleeding, blood in stool, bowel incontinence, constipation, diarrhea and nausea.   Genitourinary: Positive for flank pain. Negative for bladder incontinence, decreased urine volume, difficulty urinating, dysuria, enuresis, frequency, hematuria, penile pain, scrotal swelling, testicular pain and urgency.   Musculoskeletal: Positive for back pain.   Skin: Negative for rash.       Objective:      Physical Exam   Constitutional: He appears well-developed and well-nourished. He appears distressed (in pain ).   Cardiovascular: Normal rate, regular rhythm and normal heart sounds.   Pulmonary/Chest: Effort normal and breath sounds normal.   Abdominal: Soft. Normal appearance and bowel sounds are normal. He exhibits no distension. There is no tenderness. There is CVA tenderness (Left). There is no rigidity, no rebound and no guarding.   Skin: Skin is warm and dry. Capillary refill takes less than 2 seconds.  No rash noted.       Assessment:       1. Nephrolithiasis    2. Flank pain        Plan:       Buddy was seen today for back pain.    Diagnoses and all orders for this visit:    Nephrolithiasis  -     CBC auto differential; Future  -     Comprehensive metabolic panel; Future  -     Urinalysis  -     Ambulatory referral to Urology    Flank pain  -     CT Renal Stone Study ABD Pelvis WO; Future  -     CBC auto differential; Future  -     Comprehensive metabolic panel; Future  -     Urinalysis  -     Ambulatory referral to Urology      -     tamsulosin (FLOMAX) 0.4 mg Cap; Take 1 capsule (0.4 mg total) by mouth once daily.  -     ibuprofen (ADVIL,MOTRIN) 800 MG tablet; Take 1 tablet (800 mg total) by mouth every 6 (six) hours as needed for Pain.    Buddy Park was given a handout which discussed their disease process, precautions, and instructions for follow-up and therapy.

## 2019-05-28 NOTE — PATIENT INSTRUCTIONS
Treating Kidney Stones: Expectant Therapy  Most kidney stones are about the size of a grape seed. Stones of this size are small enough to pass naturally. Once it is passed, a stone can be analyzed. This wait-and-see approach is called expectant therapy. Small stones can often be passed with expectant therapy. If pain is a problem, ask your healthcare provider about pain medicines. Then follow his or her directions on how much water to drink. Drinking more water creates more urine to flush out your stone. Also be sure to strain your urine. Take any stones you pass to your provider for analysis.    Drink lots of water  Drinking lots of water may help your stone pass. Water also dilutes the chemicals in your urine. This reduces your risk of forming new stones. You may be told to drink 8, 12-ounce glasses of water a day. Avoid liquids that dehydrate you, such as those containing caffeine or alcohol.  Strain your urine  Straining your urine lets you collect your stone for analysis. Use the strainer each time you urinate. Strain your urine for as long as your healthcare provider suggests. Watch for brown, tan, gold, or black specks or tiny zabrina. These may be kidney stones.  Take your medicine  Your healthcare provider may give you medicine that makes it more likely for you to pass the kidney stone.   Follow up with your healthcare provider  Follow up by taking any stones you find to your provider for analysis. The type of stone you have determines your diet and prevention program. You may need more tests in the future. These tests will ensure that new stones are not forming.  Date Last Reviewed: 1/1/2017  © 2998-3418 [employer]. [address] [place] [address], [place] [address]. All rights reserved. This information is not intended as a substitute for professional medical care. Always follow your healthcare professional's instructions.        Kidney Stone (with Pain)    The sharp cramping pain on either side  of your lower back and nausea/vomiting that you have are because of a small stone that has formed in the kidney. It is now passing down a narrow tube (ureter) on its way to your bladder. Once the stone reaches your bladder, the pain will often stop. But it may come back as the stone continues to pass out of the bladder and through the urethra. The stone may pass in your urine stream in one piece. The size may be 1/16 inch to 1/4 inch (1 mm to 6 mm). Or, the stone may break up into leonora fragments that you may not even notice.  Once you have had a kidney stone, you are at risk of getting another one in the future. There are 4 types of kidney stones. Eighty percent are calcium stones--mostly calcium oxalate but also some with calcium phosphate. The other 3 types include uric acid stones, struvite stones (from a preceding infection), and rarely, cystine stones.  Most stones will pass on their own, but may take from a few hours to a few days. Sometimes the stone is too large to pass by itself. In that case, the healthcare provider will need to use other ways to remove the stone. These techniques include:  · Lithotripsy. This uses ultrasound waves to break up the stone.  · Ureteroscopy. This pushes a basket-like instrument through the urethra and bladder and into the ureter to pull out the stone.  · Various types of direct surgery through the skin  Home care  The following are general care guidelines:  · Drink plenty of fluids. This means at least 12, 8-ounce glasses of fluid--mostly water--a day.  · Each time you urinate, do so in a jar. Pour the urine from the jar through the strainer and into the toilet. Continue doing this until 24 hours after your pain stops. By then, if there was a kidney stone, it should pass from your bladder. Some stones dissolve into sand-like particles and pass right through the strainer. In that case, you wont ever see a stone.  · Save any stone that you find in the strainer and bring it to  your healthcare provider to look at. It may be possible to stop certain types of stones from forming. For this reason, it is important to know what kind of stone you have.  · Try to stay as active as possible. This will help the stone pass. Don't stay in bed unless your pain keeps you from getting up. You may notice a red, pink, or brown color to your urine. This is normal while passing a kidney stone.  · If you develop pain, you may take ibuprofen or naproxen for pain, unless another medicine was prescribed. If you have chronic liver or kidney disease, talk with your healthcare provider before taking these medicines. Also talk with your provider if you've had a stomach ulcer or GI bleeding.  Preventing stones  Each year for the next 5 to 7 years, you are at risk that a new stone will form. Your risk is a 50% chance over this time period. The risk is higher if you have a family history of kidney stones or have certain chronic illnesses like hypertension, obesity, or diabetes. But you can make changes to your lifestyle and diet that can lower your risk for another stone.  Most kidney stones are made of calcium. The following is advice for preventing another calcium stone. If you dont know the type of stone you have, follow this advice until the cause of your stone is found.  Things that help:  · The most important thing you can do is to drink plenty of fluids each day. See home care above.   · Eat foods that contain phytates. These include wheat, rice, rye, barley, and beans. Phytates are substances that may lower your risk for any type of stone to form.  · Eat more fruits and vegetables. Choose those that are high in potassium.  · Eat foods high in natural citrate like fruit and low-sugar fruit juices.  · Having too little calcium in your diet can put you at risk for calcium kidney stones. Eat a normal amount of calcium in your diet and talk with your healthcare provider if you are taking calcium supplements.  Cutting back on your calcium intake may raise your risk. New research shows that eating calcium-rich and oxalate-rich foods together lowers your risk for stones by binding the minerals in the stomach and intestines before they can reach the kidneys.    · Limit salt intake to 2 grams (1 teaspoon) per day. Use limited amounts when cooking, and dont add salt at the table. Processed and canned foods are usually high in salt.   · Spinach, rhubarb, peanuts, cashews, almonds, grapefruit, and grapefruit juice are all high oxalate foods. You should limit how much of these you eat. Or eat them with calcium-rich foods. These include dairy products, dark leafy greens, soy products, and calcium-enriched foods.  · Reducing the amount of animal meat and high protein foods in your diet may lower your risk for uric acid stones.  · Avoid excess sugar (sucrose) and fructose (sweetener in many soft drinks) in your diet.   · If you take vitamin C as a supplement, don't take more than 1,000 mg a day.  · A dietitian or your healthcare provider can give you information about changes in your diet that will help prevent more kidney stones from forming.  Follow-up care  Follow up with your healthcare provider, or as advised, if the pain lasts more than 48 hours. Talk with your provider about urine and blood tests to find out the cause of your stone. If you had an X-ray, CT scan, or other diagnostic test, you will be told of any new findings that may affect your care.  Call 911  Call 911 if you have any of these:  · Weakness, dizziness, or fainting  When to seek medical advice  Call your healthcare provider right away if any of these occur:  · Pain that is not controlled by the medicine given  · Repeated vomiting or unable to keep down fluids  · Fever of 100.4ºF (38ºC) or higher, or as directed by your healthcare provider  · Passage of solid red or brown urine (can't see through it) or urine with lots of blood clots  · Foul-smelling or cloudy  urine  · Unable to pass urine for 8 hours and increasing bladder pressure  Date Last Reviewed: 10/1/2016  © 8512-3139 [employer]. [address] [place] [address], [place] [address]. All rights reserved. This information is not intended as a substitute for professional medical care. Always follow your healthcare professional's instructions.

## 2019-05-28 NOTE — PROGRESS NOTES
[facility] Note  Staff: MIKE Dias    Referring provider and please cc: CHAD Gould  PCP: Dr. Buddy Chatman    Chief Complaint: Left flank pain, history of kidney stones    Subjective:        HPI: Buddy Park is a 68 y.o. male NEW PT presents with c/o left sided back pain which began over 7 days ago with no improvement in his symptoms.    Stone Hx:  Ever seen a urologist before? Yes at [facility] over ten years ago.  Previous stone episode? Yes, last episode was 10 years ago.  Pass the stone? Some episodes he has passed the stones, others he needed ESWL procedure performed.  How did they treat it if not? Lithotripsy  Type of stone? Unknown    Recent Imaging Results:  CT RSS done TODAY:  IMPRESSION:  Nonobstructing bilateral renal stones.  No hydronephrosis opaque ureteral   stone or ureteral obstruction     Mild diffuse bladder wall thickening versus incomplete distention and   recommend correlation clinically as to if clinical consideration for   cystitis or chronic bladder outlet obstruction.     Prostate gland does appear mildly enlarged and contains dense   calcifications.     Sclerotic lesion right ischium not significantly changed compared to prior   study 2012 suggesting benign finding.     Diffuse fatty infiltration of the liver, diverticulosis without CT   findings of acute diverticulitis and additional findings as detailed above    CT W/ CONTRAST OF ABDOMEN AND PELVIS DONE [facility]  5/14/19:  IMPRESSION:  No noncontrast CT evidence of acute process involving the abdomen or pelvis.  Fat containing umbilical hernia    Renal US done on 9/25/18:  IMPRESSION:  Possible 3 mm left intrarenal stone without hydronephrosis on either side.  Thinning and increased echogenicity of the renal parenchyma bilaterally consistent with intrinsic renal disease.    REVIEW OF SYSTEMS:  Review of Systems   Constitutional: Negative for chills, diaphoresis, fever and weight loss.    HENT: Negative for congestion, hearing loss, nosebleeds and sore throat.    Eyes: Negative for blurred vision and pain.   Respiratory: Negative for cough and wheezing.    Cardiovascular: Negative for chest pain, palpitations and leg swelling.   Gastrointestinal: Negative for abdominal pain, heartburn, nausea and vomiting.   Genitourinary: Positive for flank pain. Negative for dysuria, frequency, hematuria and urgency.   Musculoskeletal: Positive for back pain. Negative for joint pain, myalgias and neck pain.   Skin: Negative for itching and rash.   Neurological: Negative for dizziness, tremors, sensory change, seizures, loss of consciousness, weakness and headaches.   Endo/Heme/Allergies: Does not bruise/bleed easily.   Psychiatric/Behavioral: Negative for depression and suicidal ideas. The patient is not nervous/anxious.      Physical Exam    PMHx:  Past Medical History:   Diagnosis Date    Angina pectoris     Anxiety     Biliary colic     Cochlear implant in place     Deaf     LEFT EAR    Depression     Heart failure     History of colon polyps 2/20/2018    [place] (hard of hearing)     HEARING AID - RIGHT    Hyperlipidemia     Hypertension     Kidney stone     Kidney stones     Renal stones     Trouble in sleeping     Wears glasses      PSHx:  Past Surgical History:   Procedure Laterality Date    [facility]     CATARACT EXTRACTION Bilateral     CHOLECYSTECTOMY  2-6-2014    CHOLECYSTECTOMY, LAPAROSCOPIC N/A 2/6/2014    [facility]    COLONOSCOPY  1/9/2013    Dr. Gillette, 5 year recheck (family history colon cancer)    COLONOSCOPY N/A 3/12/2018    Performed by Garett Go MD at [facility] ENDO    COLONOSCOPY N/A 1/9/2013    Performed by Wilbert [facility]lette MD at [facility] ENDO    EAR MASTOIDECTOMY W/ COCHLEAR IMPLANT W/ LANDMARK      left ear    FOREIGN BODY REMOVAL Right     glass removed from right foot/repair    FRACTURE SURGERY       right arm x2    LITHOTRIPSY      ROTATOR CUFF REPAIR      Right     SINUS SURGERY      TONSILLECTOMY       Fam Hx:   malignancies: No    kidney stones: No     Allergies:  Bee pollens; Milk containing products; and Metoprolol    Medications: reviewed   Anticoagulation: Yes - Ecotrin 81 mg one tablet daily.    Objective:     Vitals:    05/28/19 1358   BP: (!) 147/91   Pulse: 87   Resp: 18   Temp: 97.9 °F (36.6 °C)     General:WDWN in NAD  Eyes: PERRLA, normal conjunctiva  Respiratory: no increased work on breathing, clear to auscultation  Cardiovascular: regular rate and rhythm. No obvious extremity edema.  GI: palpation of masses. No tenderness. No hepatosplenomegaly to palpation.  Musculoskeletal: normal range of motion of bilateral upper extremities. Normal muscle strength and tone.  Skin: no obvious rashes or lesions. No tightening of skin noted.  Neurologic: CN grossly normal. Normal sensation.   Psychiatric: awake, alert and oriented x 3. Mood and affect normal. Cooperative.    PVR by bladder scan performed by MA today:  20 mL    LABS REVIEW:  UA today:  Color:Clear, Yellow  Spec. Grav.  1.025  PH  5.5  Negative for leukocytes, nitrates, protein, glucose, ketones, urobili, bili, and blood.    Cr:   Lab Results   Component Value Date    CREATININE 1.4 05/28/2019     Assessment:       1. Nephrolithiasis    2. Left flank pain    3. Low back pain without sciatica, unspecified back pain laterality, unspecified chronicity          Plan:   Left Flank pain; Cystitis vs. Bladder outlet obstruction vs. Enlarged prostate:    1.  Pt was instructed to start Flomax 0.4 mg one tablet daily as prescribed by PCP this am.  2.  Vibra-tabs 100 mg 1 p.o. BID x 21 days prescribed to pt today for tx of cystitis/bladder inflammation at this time.    F/u with Urologist at next ov in one month.  Pt would like to schedule for removal of stones and discuss further evaluation of prostate enlargement with calcifications based on recent  CT findings today.    [facility]: ACTIVE    Lucy Gabriel, MIKE

## 2019-05-28 NOTE — PATIENT INSTRUCTIONS
Identifying Kidney Stones  There are 4 general types of kidney stones. Your kidney stones size and shape determine whether it is likely to pass by itself. Knowing what a stone is made of (its composition) helps your healthcare provider find its cause. Then he or she can suggest the best treatment. X-rays or scans can help show the stone's size and shape. Your healthcare provider may also give you a strainer. You can use this to catch the stone while passing urine, and the provider can then test the stone. Other urine and blood tests may be done to help identify the stone. These tests can also help identify causes for different types of stones.     Size  A stone may be as small as a grain of sand. Or it may be as large as a golf ball. Small stones may pass out of your body when you urinate.   Shape  Small, smooth, round stones may pass easily. Jagged-edged stones often lodge inside the kidney or ureter. Staghorn stones can fill the entire renal pelvis and calyces.   Composition  Most stones are made of calcium oxalate, a hard compound. Stones made of cystine or uric acid, or caused by infection (struvite stones), are less dense. Stones often contain more than one chemical.      Your kidneys filter your blood and release chemicals into the urine. If certain chemicals build up in the kidneys, they can form a stone.   Treating your stones  You and your healthcare provider will work together to form a treatment plan. Your provider may suggest that you let your stone pass naturally. Or you may manage it with medicines. Certain procedures may also help, such as SWL (shock wave lithotripsy) or using a camera inside the body to remove the stone (ureteroscopy). And you will be told how you can help prevent kidney stones in the future.  Date Last Reviewed: 1/1/2017  © 4684-0081 [employer]. [address] [place] [address], [place] [address]. All rights reserved. This information is not intended as a substitute for  professional medical care. Always follow your healthcare professional's instructions.        Understanding Kidney Stones  Your kidneys are bean-shaped organs. They help filter extra salts, waste, and water from your body. You need to drink enough water every day to help flush the extra salts into your urine.     What are kidney stones?  Kidney stones are made up of chemical crystals that separate out from urine. These crystals clump together to make stones. They form in the calyx of the kidney. They may stay in the kidney or move into the urinary tract.   Why kidney stones form  Kidneys form stones for many reasons. If you dont drink enough water, for instance, you wont have enough urine to dilute chemicals. Then the chemicals may form crystals, which can develop into stones. Here are some reasons why kidney stones form:  · Fluid loss (dehydration). This can concentrate urine, causing stones to form.  · Certain foods. Some foods contain large amounts of the chemicals that sometimes crystallize into stones. Eating foods that contain a lot of meat or salt can lead to more kidney stones.  · Kidney infections. These infections foster stones by slowing urine flow or changing the acid balance of your urine.  · Family history. If family members have had kidney stones, youre more likely to have them, too.  · A lack of certain substances in your urine. Some substances can help protect you from forming stones. If you dont have enough of these in your urine, stone formation can increase.  Where stones form  Stones begin in the cup-shaped part of the kidney (calyx). Some stay in the calyx and grow. Others move into the kidney, pelvis, or into the ureter. There they can lodge, block the flow of urine, and cause pain.  Symptoms  Many stones cause sudden and severe pain and bloody urine. Others cause nausea or frequent, burning urination. Symptoms often depend on your stones size and location. Fever may indicate a serious  infection. Call your healthcare provider right away if you develop a fever.  Date Last Reviewed: 1/1/2017 © 2000-2017 [employer]. [address] [place] [address], [place] [address]. All rights reserved. This information is not intended as a substitute for professional medical care. Always follow your healthcare professional's instructions.        Bladder Infection, Male (Adult)    You have a bladder infection.  Urine is normally free of bacteria. But bacteria can get into the urinary tract from the skin around the rectum or it may travel in the blood from elsewhere in the body.  This is called a urinary tract infection (UTI). An infection can occur anywhere in the urinary tract. It could be in a kidney (pyelonephritis)or in the bladder (cystitis) and urethra (urethritis). The urethra is the tube that drains the urine from the bladder through the tip of the penis.  The most common place for a UTI is in the bladder. This is called a bladder infection. Most bladder infections are easily treated. They are not serious unless the infection spreads up to the kidney.  The terms bladder infection, UTI, and cystitis are often used to describe the same thing, but they arent always the same. Cystitis is an inflammation of the bladder. The most common cause of cystitis is an infection.   Keep in mind:  · Infections in the urine are called UTIs.  · Cystitis is usually caused by a UTI.  · Not all UTIs and cases of cystitis are bladder infections.  · Bladder infections are the most common type of cystitis.  Symptoms of a bladder infection  The infection causes inflammation in the urethra and bladder. This inflammation causes many of the symptoms. The most common symptoms of a bladder infection are:  · Pain or burning when urinating  · Having to go more often than usual  · Feeling like you need to go right away  · Only a small amount comes out  · Blood in urine  · Discomfort in your belly (abdomen), usually in the lower  abdomen, above the pubic bone  · Cloudy, strong, or bad smelling urine  · Unable to urinate (retention)  · Urinary incontinence  · Fever  · Loss of appetite  Older adults may also feel confused.  Causes of a bladder infection  Bladder infections are not contagious. You can't get one from someone else, from a toilet seat, or from sharing a bath.  The most common cause of bladder infections is bacteria from the bowels. The bacteria get onto the skin around the opening of the urethra. From there they can get into the urine and travel up to the bladder. This causes inflammation and an infection. This usually happens because of:  · An enlarged prostate  · Poor cleaning of the genitals  · Procedures that put a tube in your bladder, like a Garcia catheter  · Bowel incontinence  · Older age  · Not emptying your bladder (The urine stays there, giving the bacteria a chance to grow.)  · Dehydration (This allows urine to stay in the bladder longer.)  · Constipation (This can cause the bowels to push on the bladder or urethra and keep the bladder from emptying.)  Treatment  Bladder infections are treated with antibiotics. They usually clear up quickly without complications. Treatment helps prevent a more serious kidney infection.  Medicines  Medicines can help in the treatment of a bladder infection:  · You may have been given phenazopyridine to ease burning when you urinate. It will cause your urine to be bright orange. It can stain clothing.  · You may have been prescribed antibiotics. Take this medicine until you have finished it, even if you feel better. Taking all of the medicine will make sure the infection has cleared.  You can use acetaminophen or ibuprofen for pain, fever, or discomfort, unless another medicine was prescribed. You can also alternate them, or use both together. They work differently and are a different class of medicines, so taking them together is not an overdose. If you have chronic liver or kidney  disease, talk with your healthcare provider before using these medicines. Also talk with your provider if youve had a stomach ulcer or GI bleeding or are taking blood thinner medicines.  Home care  Here are some guidelines to help you care for yourself at home:  · Drink plenty of fluids, unless your healthcare provider told you not to. Fluids will prevent dehydration and flush out your bladder.  · Use good personal hygiene. Wipe from front to back after using the toilet, and clean your penis regularly. If you arent circumcised, retract the foreskin when cleaning.  · Urinate more frequently, and dont try to hold it in for long periods of time, if possible.  · Wear loose-fitting clothes and cotton underwear. Avoid tight-fitting pants. This helps keep you clean and dry.  · Change your diet to prevent constipation. This means eating more fresh foods and more fiber, and less junk and fatty foods.  · Avoid sex until your symptoms are gone.  · Avoid caffeine, alcohol, and spicy foods. These can irritate the bladder.  Follow-up care  Follow up with your healthcare provider, or as advised if all symptoms have not cleared up within 5 days. It is important to keep your follow-up appointment. You can talk with your provider to see if you need more tests of the urinary tract. This is especially important if you have infections that keep coming back.  If a culture was done, you will be told if your treatment needs to be changed. If directed, you can call to find out the results.  If X-rays were taken, you will be told of any findings that may affect your care.  Call 911  Call 911 if any of these occur:  · Trouble breathing  · Difficulty waking up  · Feeling confused  · Fainting or loss of consciousness  · Rapid heart rate  When to seek medical advice  Call your healthcare provider right away if any of these occur:  · Fever of 100.4ºF (38ºC) or higher, or as directed by your healthcare provider  · Your symptoms dont improve  after 2 days of treatment  · Back or abdominal pain that gets worse  · Repeated vomiting, or you arent able to keep medicine down  · Weakness or dizziness  Date Last Reviewed: 10/1/2016  © 0677-3695 [employer]. [address] [place] [address], [place] [address]. All rights reserved. This information is not intended as a substitute for professional medical care. Always follow your healthcare professional's instructions.        BPH (Enlarged Prostate)  The prostate is a gland at the base of the bladder. As some men get older, the prostate may get bigger in size. This problem is called benign prostatic hyperplasia (BPH). BPH puts pressure on the urethra. This is the tube that carries urine from the bladder to the penis. It may interfere with the flow of urine. It may also keep the bladder from emptying fully.    Symptoms of BPH include trouble starting urination and feeling as though the bladder isnt emptying all the way. It also includes a weak urine stream, dribbling and leaking of urine, and frequent and urgent urination (especially at night). BPH can increase the risk of urinary infections. It can also block off urine flow completely. If this occurs, a thin tube (catheter) may be passed into the bladder to help drain urine.  If symptoms are mild, no treatment may be needed right now. If symptoms are more severe, treatment is likely needed. The goal of treatment is to improve urine flow and reduce symptoms. Treatments can include medicine and procedures. Your healthcare provider will discuss treatment options with you as needed.  Home care  The following guidelines will help you care for yourself at home:  · Urinate as soon as you feel the urge. Don't try to hold your urine.  · Don't limit your fluid intake during the day. Drink 6 to 8 glasses of water or liquids a day. This prevents bacteria from building up in the bladder.  · Avoid drinking fluids after dinner to help reduce urination during the  night.  · Avoid medicines that can worsen your symptoms. These include certain cold and allergy medicines and antidepressants. Diuretics used for high blood pressure can also worsen symptoms. Talk to your doctor about the medicines you take. Other choices may work better for you.  Prostate cancer screening  BPH does not increase the risk of prostate cancer. But because prostate cancer is a common cancer in men, screening is sometimes recommended. This may help detect the cancer in its early stages when treatment is most effective. Factors that can increase the risk of prostate cancer include being -American or having a father or brother who had prostate cancer. A high-fat diet may also increase the risk of prostate cancer. Talk to your healthcare provider to see whether you should be screened for prostate cancer.  Follow-up care  Follow up with your healthcare provider, or as advised  To learn more, go to:  · National Kidney & Urologic Diseases Information Clearinghouse  kidney.niddk.nih.gov, 651.510.3781  When to seek medical advice  Call your healthcare provider right away if any of these occur:  · Fever of 100.4°F (38.0°C) or higher, or as advised  · Unable to pass urine for 8 hours  · Increasing pressure or pain in your bladder (lower abdomen)  · Blood in the urine  · Increasing low back pain, not related to injury  · Symptoms of urinary infection (increased urge to urinate, burning when passing urine, foul-smelling urine)  Date Last Reviewed: 7/1/2016  © 1486-0195 [employer]. [address] [place] [address], [place] [address]. All rights reserved. This information is not intended as a substitute for professional medical care. Always follow your healthcare professional's instructions.

## 2019-05-28 NOTE — LETTER
May 29, 2019      CHAD Preston  [address] [address]           [facility]  [address] [place] [address] Dr. He  [place] [address]  Phone: [phone]  Fax: [phone]          Patient: Buddy Park   MR Number: [id number]   YOB: 1950   Date of Visit: 5/28/2019       Dear Danelle Vanessa:    Thank you for referring Buddy Park to me for evaluation. Attached you will find relevant portions of my assessment and plan of care.    If you have questions, please do not hesitate to call me. I look forward to following Buddy Park along with you.    Sincerely,    Lucy Gabriel, [facility]    Enclosure  CC:  No Recipients    If you would like to receive this communication electronically, please contact [email or web address] or [phone] to request more information on [employer] access.    For providers and/or their staff who would like to refer a patient to [facility], please contact us through our one-stop-shop provider referral line, [facility], at [phone].    If you feel you have received this communication in error or would no longer like to receive these types of communications, please e-mail [email or web address]

## 2019-06-17 ENCOUNTER — DOCUMENTATION ONLY (OUTPATIENT)
Dept: FAMILY MEDICINE | Facility: CLINIC | Age: 69
End: 2019-06-17

## 2019-06-17 ENCOUNTER — OFFICE VISIT (OUTPATIENT)
Dept: FAMILY MEDICINE | Facility: CLINIC | Age: 69
End: 2019-06-17
Payer: MEDICARE

## 2019-06-17 ENCOUNTER — HOSPITAL ENCOUNTER (OUTPATIENT)
Dept: RADIOLOGY | Facility: CLINIC | Age: 69
Discharge: HOME | End: 2019-06-17
Attending: PHYSICIAN ASSISTANT
Payer: MEDICARE

## 2019-06-17 VITALS
HEIGHT: 65 IN | TEMPERATURE: 98 F | SYSTOLIC BLOOD PRESSURE: 172 MMHG | WEIGHT: 169.56 LBS | HEART RATE: 84 BPM | BODY MASS INDEX: 28.25 KG/M2 | DIASTOLIC BLOOD PRESSURE: 94 MMHG

## 2019-06-17 DIAGNOSIS — M79.675 PAIN OF TOE OF LEFT FOOT: Primary | ICD-10-CM

## 2019-06-17 DIAGNOSIS — M79.675 PAIN OF TOE OF LEFT FOOT: ICD-10-CM

## 2019-06-17 PROCEDURE — 1101F PR PT FALLS ASSESS DOC 0-1 FALLS W/OUT INJ PAST YR: ICD-10-PCS | Mod: HCNC,CPTII,S$GLB, | Performed by: PHYSICIAN ASSISTANT

## 2019-06-17 PROCEDURE — 3077F PR MOST RECENT SYSTOLIC BLOOD PRESSURE >= 140 MM HG: ICD-10-PCS | Mod: HCNC,CPTII,S$GLB, | Performed by: PHYSICIAN ASSISTANT

## 2019-06-17 PROCEDURE — 1101F PT FALLS ASSESS-DOCD LE1/YR: CPT | Mod: HCNC,CPTII,S$GLB, | Performed by: PHYSICIAN ASSISTANT

## 2019-06-17 PROCEDURE — 3080F DIAST BP >= 90 MM HG: CPT | Mod: HCNC,CPTII,S$GLB, | Performed by: PHYSICIAN ASSISTANT

## 2019-06-17 PROCEDURE — 99213 PR OFFICE/OUTPT VISIT, EST, LEVL III, 20-29 MIN: ICD-10-PCS | Mod: HCNC,S$GLB,, | Performed by: PHYSICIAN ASSISTANT

## 2019-06-17 PROCEDURE — 3080F PR MOST RECENT DIASTOLIC BLOOD PRESSURE >= 90 MM HG: ICD-10-PCS | Mod: HCNC,CPTII,S$GLB, | Performed by: PHYSICIAN ASSISTANT

## 2019-06-17 PROCEDURE — 99999 PR PBB SHADOW E&M-EST. PATIENT-LVL V: ICD-10-PCS | Mod: PBBFAC,HCNC,, | Performed by: PHYSICIAN ASSISTANT

## 2019-06-17 PROCEDURE — 3077F SYST BP >= 140 MM HG: CPT | Mod: HCNC,CPTII,S$GLB, | Performed by: PHYSICIAN ASSISTANT

## 2019-06-17 PROCEDURE — 99213 OFFICE O/P EST LOW 20 MIN: CPT | Mod: HCNC,S$GLB,, | Performed by: PHYSICIAN ASSISTANT

## 2019-06-17 PROCEDURE — 73630 X-RAY EXAM OF FOOT: CPT | Mod: 26,HCNC,LT,S$GLB | Performed by: RADIOLOGY

## 2019-06-17 PROCEDURE — 73630 X-RAY EXAM OF FOOT: CPT | Mod: TC,HCNC,FY,PO,LT

## 2019-06-17 PROCEDURE — 73630 XR FOOT COMPLETE 3 VIEW LEFT: ICD-10-PCS | Mod: 26,HCNC,LT,S$GLB | Performed by: RADIOLOGY

## 2019-06-17 PROCEDURE — 99999 PR PBB SHADOW E&M-EST. PATIENT-LVL V: CPT | Mod: PBBFAC,HCNC,, | Performed by: PHYSICIAN ASSISTANT

## 2019-06-17 RX ORDER — TRAMADOL HYDROCHLORIDE 50 MG/1
50 TABLET ORAL EVERY 6 HOURS PRN
Qty: 21 TABLET | Refills: 0 | Status: SHIPPED | OUTPATIENT
Start: 2019-06-17 | End: 2019-06-24

## 2019-06-17 RX ORDER — PREDNISONE 20 MG/1
TABLET ORAL
Qty: 12 TABLET | Refills: 0 | Status: SHIPPED | OUTPATIENT
Start: 2019-06-17 | End: 2019-06-19

## 2019-06-17 NOTE — PROGRESS NOTES
Subjective:       Patient ID: Buddy Park is a 68 y.o. male.    Chief Complaint: Foot Pain (left foot, big toe)    Foot Injury    The incident occurred 2 days ago. There was no injury mechanism. The pain is present in the left toes. The quality of the pain is described as shooting, stabbing, aching and burning. The pain is at a severity of 10/10. The pain has been constant since onset. Associated symptoms include an inability to bear weight and a loss of motion. Pertinent negatives include no loss of sensation, muscle weakness, numbness or tingling. He reports no foreign bodies present. The symptoms are aggravated by movement, palpation and weight bearing. He has tried nothing for the symptoms.     Review of Systems   Musculoskeletal: Positive for arthralgias and joint swelling.   Skin: Negative for color change, pallor, rash and wound.   Neurological: Negative for tingling and numbness.       Objective:      Physical Exam   Musculoskeletal:        Left ankle: He exhibits decreased range of motion and swelling. Tenderness. Achilles tendon normal.        Left foot: There is decreased range of motion, tenderness and swelling.   There is significant tenderness to palpation to the 1st MP joint swelling to the entire foot    Skin: Skin is warm and dry. Capillary refill takes less than 2 seconds. No erythema.       Assessment:       1. Pain of toe of left foot        Plan:       Buddy was seen today for foot pain.    Diagnoses and all orders for this visit:    Pain of toe of left foot  -     X-Ray Foot Complete Left; Future  -     CBC auto differential; Future  -     Uric acid; Future  -     Basic metabolic panel; Future  -     traMADol (ULTRAM) 50 mg tablet; Take 1 tablet (50 mg total) by mouth every 6 (six) hours as needed for Pain.    Other orders  -     predniSONE (DELTASONE) 20 MG tablet; Take 3 tablets (60 mg total) by mouth once daily for 1 day, THEN 2 tablets (40 mg total) once daily for 3 days, THEN 1 tablet  (20 mg total) once daily for 3 days.    Follow up for lab and xray today .  \

## 2019-06-17 NOTE — PROGRESS NOTES
Pre-Visit Chart Review  For Appointment Scheduled on 06/17/2019    Health Maintenance Due   Topic Date Due    Aspirin/Antiplatelet Therapy  12/16/1968

## 2019-06-18 ENCOUNTER — TELEPHONE (OUTPATIENT)
Dept: FAMILY MEDICINE | Facility: CLINIC | Age: 69
End: 2019-06-18

## 2019-06-18 ENCOUNTER — DOCUMENTATION ONLY (OUTPATIENT)
Dept: FAMILY MEDICINE | Facility: CLINIC | Age: 69
End: 2019-06-18

## 2019-06-18 NOTE — PROGRESS NOTES
Pre-Visit Chart Review  For Appointment Scheduled on 6/19/2019.       Health Maintenance Due   Topic Date Due    Aspirin/Antiplatelet Therapy  [birth year]/1970

## 2019-06-18 NOTE — TELEPHONE ENCOUNTER
----- Message from Hue Luz sent at 6/18/2019  3:59 PM CDT -----  Type:  Patient Returning Call    Who Called:  Patient  Who Left Message for Patient:  ERIK  Does the patient know what this is regarding?:  ERIK  Best Call Back Number:  [phone]  Additional Information:

## 2019-06-19 ENCOUNTER — OFFICE VISIT (OUTPATIENT)
Dept: FAMILY MEDICINE | Facility: CLINIC | Age: 69
End: 2019-06-19
Payer: MEDICARE

## 2019-06-19 ENCOUNTER — OFFICE VISIT (OUTPATIENT)
Dept: PODIATRY | Facility: CLINIC | Age: 69
End: 2019-06-19
Payer: MEDICARE

## 2019-06-19 VITALS
HEIGHT: 65 IN | WEIGHT: 169.56 LBS | SYSTOLIC BLOOD PRESSURE: 190 MMHG | TEMPERATURE: 98 F | DIASTOLIC BLOOD PRESSURE: 96 MMHG | HEART RATE: 91 BPM | BODY MASS INDEX: 28.25 KG/M2

## 2019-06-19 VITALS — WEIGHT: 169.56 LBS | HEIGHT: 65 IN | BODY MASS INDEX: 28.25 KG/M2

## 2019-06-19 DIAGNOSIS — I10 ESSENTIAL HYPERTENSION: ICD-10-CM

## 2019-06-19 DIAGNOSIS — M19.072 PRIMARY OSTEOARTHRITIS OF LEFT FOOT: ICD-10-CM

## 2019-06-19 DIAGNOSIS — M20.5X2 HALLUX LIMITUS OF LEFT FOOT: Primary | ICD-10-CM

## 2019-06-19 DIAGNOSIS — M79.675 PAIN OF TOE OF LEFT FOOT: Primary | ICD-10-CM

## 2019-06-19 DIAGNOSIS — M79.672 PAIN IN LEFT FOOT: ICD-10-CM

## 2019-06-19 DIAGNOSIS — E78.5 HYPERLIPIDEMIA, UNSPECIFIED HYPERLIPIDEMIA TYPE: ICD-10-CM

## 2019-06-19 PROCEDURE — 1101F PR PT FALLS ASSESS DOC 0-1 FALLS W/OUT INJ PAST YR: ICD-10-PCS | Mod: HCNC,CPTII,S$GLB, | Performed by: PODIATRIST

## 2019-06-19 PROCEDURE — 3077F SYST BP >= 140 MM HG: CPT | Mod: HCNC,CPTII,S$GLB, | Performed by: PODIATRIST

## 2019-06-19 PROCEDURE — 3080F PR MOST RECENT DIASTOLIC BLOOD PRESSURE >= 90 MM HG: ICD-10-PCS | Mod: HCNC,CPTII,S$GLB, | Performed by: PHYSICIAN ASSISTANT

## 2019-06-19 PROCEDURE — 99213 PR OFFICE/OUTPT VISIT, EST, LEVL III, 20-29 MIN: ICD-10-PCS | Mod: HCNC,S$GLB,, | Performed by: PODIATRIST

## 2019-06-19 PROCEDURE — 99213 OFFICE O/P EST LOW 20 MIN: CPT | Mod: HCNC,S$GLB,, | Performed by: PHYSICIAN ASSISTANT

## 2019-06-19 PROCEDURE — 3077F PR MOST RECENT SYSTOLIC BLOOD PRESSURE >= 140 MM HG: ICD-10-PCS | Mod: HCNC,CPTII,S$GLB, | Performed by: PHYSICIAN ASSISTANT

## 2019-06-19 PROCEDURE — 99999 PR PBB SHADOW E&M-EST. PATIENT-LVL III: ICD-10-PCS | Mod: PBBFAC,HCNC,, | Performed by: PODIATRIST

## 2019-06-19 PROCEDURE — 1101F PR PT FALLS ASSESS DOC 0-1 FALLS W/OUT INJ PAST YR: ICD-10-PCS | Mod: HCNC,CPTII,S$GLB, | Performed by: PHYSICIAN ASSISTANT

## 2019-06-19 PROCEDURE — 3080F PR MOST RECENT DIASTOLIC BLOOD PRESSURE >= 90 MM HG: ICD-10-PCS | Mod: HCNC,CPTII,S$GLB, | Performed by: PODIATRIST

## 2019-06-19 PROCEDURE — 3080F DIAST BP >= 90 MM HG: CPT | Mod: HCNC,CPTII,S$GLB, | Performed by: PHYSICIAN ASSISTANT

## 2019-06-19 PROCEDURE — 1101F PT FALLS ASSESS-DOCD LE1/YR: CPT | Mod: HCNC,CPTII,S$GLB, | Performed by: PODIATRIST

## 2019-06-19 PROCEDURE — 3077F SYST BP >= 140 MM HG: CPT | Mod: HCNC,CPTII,S$GLB, | Performed by: PHYSICIAN ASSISTANT

## 2019-06-19 PROCEDURE — 3080F DIAST BP >= 90 MM HG: CPT | Mod: HCNC,CPTII,S$GLB, | Performed by: PODIATRIST

## 2019-06-19 PROCEDURE — 99213 PR OFFICE/OUTPT VISIT, EST, LEVL III, 20-29 MIN: ICD-10-PCS | Mod: HCNC,S$GLB,, | Performed by: PHYSICIAN ASSISTANT

## 2019-06-19 PROCEDURE — 99999 PR PBB SHADOW E&M-EST. PATIENT-LVL III: CPT | Mod: PBBFAC,HCNC,, | Performed by: PODIATRIST

## 2019-06-19 PROCEDURE — 3077F PR MOST RECENT SYSTOLIC BLOOD PRESSURE >= 140 MM HG: ICD-10-PCS | Mod: HCNC,CPTII,S$GLB, | Performed by: PODIATRIST

## 2019-06-19 PROCEDURE — 99999 PR PBB SHADOW E&M-EST. PATIENT-LVL IV: CPT | Mod: PBBFAC,HCNC,, | Performed by: PHYSICIAN ASSISTANT

## 2019-06-19 PROCEDURE — 1101F PT FALLS ASSESS-DOCD LE1/YR: CPT | Mod: HCNC,CPTII,S$GLB, | Performed by: PHYSICIAN ASSISTANT

## 2019-06-19 PROCEDURE — 99213 OFFICE O/P EST LOW 20 MIN: CPT | Mod: HCNC,S$GLB,, | Performed by: PODIATRIST

## 2019-06-19 PROCEDURE — 99999 PR PBB SHADOW E&M-EST. PATIENT-LVL IV: ICD-10-PCS | Mod: PBBFAC,HCNC,, | Performed by: PHYSICIAN ASSISTANT

## 2019-06-19 RX ORDER — IBUPROFEN 600 MG/1
600 TABLET ORAL EVERY 8 HOURS PRN
Qty: 90 TABLET | Refills: 1 | Status: SHIPPED | OUTPATIENT
Start: 2019-06-19 | End: 2019-07-19

## 2019-06-19 NOTE — PATIENT INSTRUCTIONS
- Wear surgical shoe at all times when ambulating.    - Rest/reduce ambulation to necessary activities of daily living.    - Ice foot/ankle for no more than 20 minutes/per hour.    - Elevate foot as much as possible throughout the day.    - Take medications as directed.    - Apply OTC topical arnica to affected area for pain relief and to reduce bruising/swelling.    - Notify clinic if pain worsens or fails to improve.    - Follow up in 2 weeks for foot pain.

## 2019-06-19 NOTE — PROGRESS NOTES
Subjective:       Patient ID: Buddy Park is a 68 y.o. male.    Chief Complaint: Follow-up (gout, left)    Patient presents for follow-up of left toe pain. His pain began approximately 4-5 days ago.  There is no injury.  He describes the pain is stabbing aching and burning.  He rates the pain as 10/10.  Patient was placed on tapering dose of prednisone and tramadol 2 days ago.  He states that Monday evening and Tuesday he was feeling well.  He was able to ambulate around the grocery store.  Yesterday evening pain became severe again.  This morning he woke up with worse pain than previous.  X-ray showed arthritic changes in bone spur in the 1st MP joint.  Patient has no prior history of gout.  Uric acid level was checked 2 days ago and was elevated 8.3.  Again this was during the acute period of pain so this may not be accurate.    Patients patient medical/surgical, social and family histories have been reviewed       Review of Systems      Review of Systems   Musculoskeletal: Positive for arthralgias and joint swelling.   Skin: Negative for color change, pallor, rash and wound.   Neurological: Negative for tingling and numbness.   Objective:      Physical Exam      Physical Exam   Musculoskeletal:        Left ankle: He exhibits decreased range of motion and swelling. Tenderness. Achilles tendon normal.        Left foot: There is decreased range of motion, tenderness and swelling.   There is significant tenderness to palpation to the 1st MP joint swelling to the entire foot    Skin: Skin is warm and dry. Capillary refill takes less than 2 seconds. No erythema.           Assessment:       1. Pain of toe of left foot    2. Essential hypertension        Plan:       Buddy was seen today for follow-up.    Diagnoses and all orders for this visit:    Pain of toe of left foot  -     CRUTCHES FOR HOME USE  -     Ambulatory referral to Podiatry  Continue  ice elevation tramadol prn   amb with crutches     Essential  hypertension  Will stop prednisone   Continue BP meds  Follow up 1-2  week BP nurse

## 2019-06-19 NOTE — PATIENT INSTRUCTIONS
Stop prednisone  May continue tramadol     Continue ice, elevation, ambulate with crutches     Podiatry today

## 2019-06-20 RX ORDER — ROSUVASTATIN CALCIUM 40 MG/1
40 TABLET, COATED ORAL DAILY
Qty: 90 TABLET | Refills: 3 | Status: SHIPPED | OUTPATIENT
Start: 2019-06-20 | End: 2020-06-16 | Stop reason: ALTCHOICE

## 2019-06-20 RX ORDER — LOSARTAN POTASSIUM 100 MG/1
TABLET ORAL
Qty: 90 TABLET | Refills: 0 | Status: SHIPPED | OUTPATIENT
Start: 2019-06-20 | End: 2019-09-23 | Stop reason: SDUPTHER

## 2019-06-20 NOTE — TELEPHONE ENCOUNTER
6-21-19 Patient has apt with Dr Gabriel to follow up UTI he is on antibiotics     7-1-19 apt with Nurse for BP ck Danelle    7-10 Apt : Dr Johnson, podiatry to follow up arthritis in foot. Stated his foot is very swollen. He will call them today to ask about that.      9-20-19 apt with Dr Chatman for 6 month f/u      Patient stated he is willing to change crestor to 40 please call into his [employer].

## 2019-06-20 NOTE — TELEPHONE ENCOUNTER
Recent blood pressure readings are all elevated and his last lipid panel the LDL was definitely not to goal.  These are insufficient.  He needs a follow-up visit.  I would recommend increasing the Crestor/rosuvastatin to 40 mg.  He was told to stop the prednisone by Danelle which should help the blood pressure.  He saw Podiatry but there are no notes I do not know what they found.

## 2019-06-21 ENCOUNTER — PATIENT MESSAGE (OUTPATIENT)
Dept: FAMILY MEDICINE | Facility: CLINIC | Age: 69
End: 2019-06-21

## 2019-06-27 ENCOUNTER — TELEPHONE (OUTPATIENT)
Dept: FAMILY MEDICINE | Facility: CLINIC | Age: 69
End: 2019-06-27

## 2019-06-27 NOTE — TELEPHONE ENCOUNTER
----- Message from aMikol Ayala sent at 6/27/2019  1:40 PM CDT -----  Contact: same  Patient has a nurse visit on 7/1/19 but needs to reschedule for 7/9/19 because he will be out of town.    Patient would like a call back number is [phone]

## 2019-06-30 PROBLEM — M20.5X2 HALLUX LIMITUS OF LEFT FOOT: Status: ACTIVE | Noted: 2019-06-30

## 2019-06-30 PROBLEM — M19.072 PRIMARY OSTEOARTHRITIS OF LEFT FOOT: Status: ACTIVE | Noted: 2019-06-30

## 2019-06-30 PROBLEM — M79.672 PAIN IN LEFT FOOT: Status: ACTIVE | Noted: 2019-06-30

## 2019-07-01 NOTE — PROGRESS NOTES
Subjective:      Patient ID: Buddy Park is a 68 y.o. male.    Chief Complaint: Foot Pain (left foot pain )      HPI:  Buddy Park is a 68 y.o. male who presents to clinic with a chief complaint of left great toe pain.  Patient denies any known history of trauma but does relate walking around misstepping, which she states may have started the pain.  He relates pain did decreased somewhat yesterday and was able to walk around [facility] with little pain, but his pain has returned and is now 5/10 on the pain scale.  Patient displays severe guarding and will not allow the foot to be touched due to fear of pain.  He flailed his legs and arms about is him to keep everyone and distance and will not allow for exam to be performed today.  He is accompanied by his teenage grandson, whom gives better history than the patient.    PCP:  Buddy Chatman MD  Date last seen:  6/18/19 CHAD Preston    Review of Systems   Constitutional: Negative for appetite change, fever, chills, fatigue and unexpected weight change.   Respiratory: Negative for cough, wheezing, and shortness of breath.   Cardiovascular: Negative for chest pain, claudication, cyanosis, and leg swelling.  Endocrine:  Negative for intolerance to cold, intolerance to heat, polydipsia, polyphagia, and polyuria.    Gastrointestinal: Negative for nausea, vomiting, diarrhea, and constipation.   Musculoskeletal: Negative for back pain, arthritis, joint pain, joint swelling, myalgias, and stiffness.   Skin: Negative for nail bed changes, discoloration, rash, itching, poor wound healing, suspicious lesion, and unusual hair distribution.   Neurological: Negative for loss of balance, sensory change, paresthesias, and numbness.   Hematological: Negative for adenopathy, bleeding, and bruising easily.   Psychiatric/Behavioral: The patient is nervous/anxious.  Negative for altered mental status.  Exaggerated response to anyone whom gets near his  foot.    Hemoglobin A1C   Date Value Ref Range Status   02/01/2019 5.7 (H) 4.0 - 5.6 % Final     Comment:     ADA Screening Guidelines:  5.7-6.4%  Consistent with prediabetes  >or=6.5%  Consistent with diabetes  High levels of fetal hemoglobin interfere with the HbA1C  assay. Heterozygous hemoglobin variants (HbS, HgC, etc)do  not significantly interfere with this assay.   However, presence of multiple variants may affect accuracy.     08/28/2018 5.7 (H) 4.0 - 5.6 % Final     Comment:     ADA Screening Guidelines:  5.7-6.4%  Consistent with prediabetes  >or=6.5%  Consistent with diabetes  High levels of fetal hemoglobin interfere with the HbA1C  assay. Heterozygous hemoglobin variants (HbS, HgC, etc)do  not significantly interfere with this assay.   However, presence of multiple variants may affect accuracy.     08/24/2018 5.7 (H) 4.0 - 5.6 % Final     Comment:     ADA Screening Guidelines:  5.7-6.4%  Consistent with prediabetes  >or=6.5%  Consistent with diabetes  High levels of fetal hemoglobin interfere with the HbA1C  assay. Heterozygous hemoglobin variants (HbS, HgC, etc)do  not significantly interfere with this assay.   However, presence of multiple variants may affect accuracy.         Past Medical History:   Diagnosis Date    Angina pectoris     Anxiety     Biliary colic     Cochlear implant in place     Deaf     LEFT EAR    Depression     Heart failure     History of colon polyps 2/20/2018    [place] (hard of hearing)     HEARING AID - RIGHT    Hyperlipidemia     Hypertension     Kidney stone     Kidney stones     Renal stones     Trouble in sleeping     Wears glasses      Past Surgical History:   Procedure Laterality Date    [facility]  [facility]     CATARACT EXTRACTION Bilateral     CHOLECYSTECTOMY  2-6-2014    CHOLECYSTECTOMY, LAPAROSCOPIC N/A 2/6/2014    [facility]    COLONOSCOPY  1/9/2013    Dr. Gillette, 5 year recheck (family history  colon cancer)    COLONOSCOPY N/A 3/12/2018    [facility]    COLONOSCOPY N/A 1/9/2013    Per[facility] by Wilbert [facility] at [facility] ENDO    EAR MASTOIDECTOMY W/ COCHLEAR IMPLANT W/ LANDMARK      left ear    FOREIGN BODY REMOVAL Right     glass removed from right foot/repair    FRACTURE SURGERY      right arm x2    LITHOTRIPSY      ROTATOR CUFF REPAIR      Right     SINUS SURGERY      TONSILLECTOMY       Family History   Problem Relation Age of Onset    Cancer Mother         colon    Heart disease Father     Gout Father     Kidney disease Father     Arthritis Father     Hypertension Father     Early death Daughter         mva    Alcohol abuse Brother     Cancer Brother         mouth cancer w mets    No Known Problems Sister     Alcohol abuse Brother     No Known Problems Son     Heart disease Maternal Grandmother         MI    Stroke Maternal Grandfather     Heart disease Paternal Grandmother         MI    No Known Problems Son     Cancer Paternal Uncle         lung cancer    Allergic rhinitis Neg Hx     Allergies Neg Hx     Angioedema Neg Hx     Asthma Neg Hx     Atopy Neg Hx     Eczema Neg Hx     Immunodeficiency Neg Hx     Rhinitis Neg Hx     Urticaria Neg Hx     Collagen disease Neg Hx      Social History     Socioeconomic History    Marital status:      Spouse name: Not on file    Number of children: Not on file    Years of education: Not on file    Highest education level: Not on file   Occupational History    Not on file   Social Needs    Financial resource strain: Not on file    Food insecurity:     Worry: Not on file     Inability: Not on file    Transportation needs:     Medical: Not on file     Non-medical: Not on file   Tobacco Use    Smoking status: Never Smoker    Smokeless tobacco: Never Used   Substance and Sexual Activity    Alcohol use: Yes     Comment: once every two months    Drug use: No    Sexual  "activity: Yes   Lifestyle    Physical activity:     Days per week: Not on file     Minutes per session: Not on file    Stress: Not on file   Relationships    Social connections:     Talks on phone: Not on file     Gets together: Not on file     Attends [employer] service: Not on file     Active member of club or organization: Not on file     Attends meetings of clubs or organizations: Not on file     Relationship status: Not on file   Other Topics Concern    Not on file   Social History Narrative    Not on file           Objective:        Ht 5' 5" (1.651 m)   Wt 76.9 kg (169 lb 8.5 oz)   BMI 28.21 kg/m²     Physical Exam   Constitutional: Patient is oriented to person, place, and time. Patient appears well-developed and well-nourished. No acute distress.     Psychiatric: Patient has abnormal mood and affect. Patient's speech is normal but behavior is abnormal. Judgment is abnormal. Cognition and memory are normal.     Left pedal exam was not performed today due to patient's refusal to cooperate with exam.    Nursing note and vitals reviewed.          Assessment:       Encounter Diagnoses   Name Primary?    Hallux limitus of left foot Yes    Primary osteoarthritis of left foot     Pain in left foot          Plan:       Buddy was seen today for foot pain.    Diagnoses and all orders for this visit:    Hallux limitus of left foot  -     ibuprofen (ADVIL,MOTRIN) 600 MG tablet; Take 1 tablet (600 mg total) by mouth every 8 (eight) hours as needed for Pain.    Primary osteoarthritis of left foot  -     ibuprofen (ADVIL,MOTRIN) 600 MG tablet; Take 1 tablet (600 mg total) by mouth every 8 (eight) hours as needed for Pain.    Pain in left foot  -     ibuprofen (ADVIL,MOTRIN) 600 MG tablet; Take 1 tablet (600 mg total) by mouth every 8 (eight) hours as needed for Pain.      I counseled the patient on his conditions, their implications and medical management.    - Reviewed xrays from 6/17/19 with patient.    - " Educated patient on PRICE therapy.    - Fit and dispensed to patient 1 surgical shoe to be worn at all times when ambulating.    Patient was given the following recommendations and instructions:  Patient Instructions   - Wear surgical shoe at all times when ambulating.    - Rest/reduce ambulation to necessary activities of daily living.    - Ice foot/ankle for no more than 20 minutes/per hour.    - Elevate foot as much as possible throughout the day.    - Take medications as directed.    - Apply OTC topical arnica to affected area for pain relief and to reduce bruising/swelling.    - Notify clinic if pain worsens or fails to improve.    - Follow up in 2 weeks for foot pain.          Mariluz Johnson DPM        Dictation was performed using M*Modal Fluency.  Transcription errors may be present.

## 2019-07-09 ENCOUNTER — TELEPHONE (OUTPATIENT)
Dept: FAMILY MEDICINE | Facility: CLINIC | Age: 69
End: 2019-07-09

## 2019-07-09 ENCOUNTER — CLINICAL SUPPORT (OUTPATIENT)
Dept: FAMILY MEDICINE | Facility: CLINIC | Age: 69
End: 2019-07-09
Payer: MEDICARE

## 2019-07-09 VITALS — DIASTOLIC BLOOD PRESSURE: 86 MMHG | SYSTOLIC BLOOD PRESSURE: 156 MMHG

## 2019-07-09 DIAGNOSIS — I10 ESSENTIAL HYPERTENSION: Primary | ICD-10-CM

## 2019-07-09 DIAGNOSIS — I10 HYPERTENSION, ESSENTIAL: Primary | ICD-10-CM

## 2019-07-09 PROCEDURE — 99999 PR PBB SHADOW E&M-EST. PATIENT-LVL III: CPT | Mod: PBBFAC,HCNC,,

## 2019-07-09 PROCEDURE — 99999 PR PBB SHADOW E&M-EST. PATIENT-LVL III: ICD-10-PCS | Mod: PBBFAC,HCNC,,

## 2019-07-09 NOTE — TELEPHONE ENCOUNTER
Pt presented to clinic today for nurse BP check. Initial BP check was 158/82. After 5 mins rest, BP was at 156/86.  Pt noted pain in his left foot due to arthritis and stated that is possibly why his BP is high. I advised pt to con't with current med regimen and message will be sent to PCP for further recommendation. Pt denied SOB, chest pain, dizziness or HA. Pt is advised to present to ER if any above sx develops. Understanding verbalized.

## 2019-07-09 NOTE — TELEPHONE ENCOUNTER
We can add low-dose HCTZ but he had a problem with that apparently in 2013 not well documented some not sure what happened.  Other alternatives would be adding labetalol.  The had a rash with metoprolol but they are not closely related.  Hydralazine would be a 3rd option and he has never used it but it has to be taken three to 4 times a day to work properly.  It is a bit of a nuisance.

## 2019-07-09 NOTE — PROGRESS NOTES
Pt comes in for a nurse B/P check. Pt's BP was 158/82, manual on right arm, sitting position. After minimum of 5 minutes rest, recheck BP was at 156/86. Pt's current medications are Norvasc 10 mg daily and losartan 100 mg daily. Pt stated that his BP is elevated because his arthritis in the left foot flared up. I advised patient to con't with current medication regimen and I will notify PCP regarding this matter. Advised pt if SOB, chest pain, HA or blurry vision occurs to go to ER. Understanding verbalized.

## 2019-07-10 ENCOUNTER — OFFICE VISIT (OUTPATIENT)
Dept: PODIATRY | Facility: CLINIC | Age: 69
End: 2019-07-10
Payer: MEDICARE

## 2019-07-10 ENCOUNTER — PATIENT MESSAGE (OUTPATIENT)
Dept: PODIATRY | Facility: CLINIC | Age: 69
End: 2019-07-10

## 2019-07-10 VITALS
HEIGHT: 65 IN | HEART RATE: 79 BPM | WEIGHT: 166 LBS | SYSTOLIC BLOOD PRESSURE: 163 MMHG | BODY MASS INDEX: 27.66 KG/M2 | DIASTOLIC BLOOD PRESSURE: 89 MMHG

## 2019-07-10 DIAGNOSIS — M20.5X2 HALLUX LIMITUS OF LEFT FOOT: Primary | ICD-10-CM

## 2019-07-10 DIAGNOSIS — M79.672 PAIN IN LEFT FOOT: ICD-10-CM

## 2019-07-10 DIAGNOSIS — M19.072 PRIMARY OSTEOARTHRITIS OF LEFT FOOT: ICD-10-CM

## 2019-07-10 PROCEDURE — 3079F DIAST BP 80-89 MM HG: CPT | Mod: HCNC,CPTII,S$GLB, | Performed by: PODIATRIST

## 2019-07-10 PROCEDURE — 99213 OFFICE O/P EST LOW 20 MIN: CPT | Mod: HCNC,S$GLB,, | Performed by: PODIATRIST

## 2019-07-10 PROCEDURE — 1101F PT FALLS ASSESS-DOCD LE1/YR: CPT | Mod: HCNC,CPTII,S$GLB, | Performed by: PODIATRIST

## 2019-07-10 PROCEDURE — 3077F SYST BP >= 140 MM HG: CPT | Mod: HCNC,CPTII,S$GLB, | Performed by: PODIATRIST

## 2019-07-10 PROCEDURE — 3077F PR MOST RECENT SYSTOLIC BLOOD PRESSURE >= 140 MM HG: ICD-10-PCS | Mod: HCNC,CPTII,S$GLB, | Performed by: PODIATRIST

## 2019-07-10 PROCEDURE — 99999 PR PBB SHADOW E&M-EST. PATIENT-LVL III: CPT | Mod: PBBFAC,HCNC,, | Performed by: PODIATRIST

## 2019-07-10 PROCEDURE — 1101F PR PT FALLS ASSESS DOC 0-1 FALLS W/OUT INJ PAST YR: ICD-10-PCS | Mod: HCNC,CPTII,S$GLB, | Performed by: PODIATRIST

## 2019-07-10 PROCEDURE — 99999 PR PBB SHADOW E&M-EST. PATIENT-LVL III: ICD-10-PCS | Mod: PBBFAC,HCNC,, | Performed by: PODIATRIST

## 2019-07-10 PROCEDURE — 3079F PR MOST RECENT DIASTOLIC BLOOD PRESSURE 80-89 MM HG: ICD-10-PCS | Mod: HCNC,CPTII,S$GLB, | Performed by: PODIATRIST

## 2019-07-10 PROCEDURE — 99213 PR OFFICE/OUTPT VISIT, EST, LEVL III, 20-29 MIN: ICD-10-PCS | Mod: HCNC,S$GLB,, | Performed by: PODIATRIST

## 2019-07-10 RX ORDER — CARVEDILOL 3.12 MG/1
3.12 TABLET ORAL 2 TIMES DAILY WITH MEALS
Qty: 60 TABLET | Refills: 5 | Status: SHIPPED | OUTPATIENT
Start: 2019-07-10 | End: 2019-08-02 | Stop reason: DRUGHIGH

## 2019-07-10 NOTE — PATIENT INSTRUCTIONS
- Wear CAM boot at all times when ambulating.  Look for EvenUp on [employer] if needed for right shoe to reduce knee, hip, and back pain if feel uneven.    - Wear Jobst Sport compression socks 15-20 mmHg at all times when ambulating.    - Rest/reduce ambulation to necessary activities of daily living.    - Ice foot/ankle for no more than 20 minutes/per hour.    - Elevate foot as much as possible throughout the day.    - Take medications as directed.    - Apply OTC topical arnica to affected area for pain relief and to reduce bruising/swelling.    - Notify clinic if pain worsens or fails to improve.    - Follow up in 2-4 weeks for foot pain.

## 2019-07-10 NOTE — TELEPHONE ENCOUNTER
Saw Danelle Vanessa 6/3/13-diagnosed with allergic reaction to HCTZ- I updated allergy list to include HCTZ. Patient will ing to try what the physician recommends.

## 2019-07-10 NOTE — TELEPHONE ENCOUNTER
Carvedilol 3.125 mg twice daily sent to [facility].  Recheck blood pressure with a pulse in 2-3 weeks

## 2019-07-10 NOTE — TELEPHONE ENCOUNTER
I would like to add a beta-blocker but he has an allergy listed to metoprolol in April of 2016 during a visit with Ela where there is no information at all on an allergy.  It was actually stopped in 2013 for no reason given

## 2019-07-16 ENCOUNTER — PATIENT MESSAGE (OUTPATIENT)
Dept: PODIATRY | Facility: CLINIC | Age: 69
End: 2019-07-16

## 2019-07-22 NOTE — PROGRESS NOTES
Subjective:      Patient ID: Buddy Park is a 68 y.o. male.    Chief Complaint: Follow-up (left foot pain)      HPI:  Buddy Park is a 68 y.o. male who presents to clinic with a chief complaint of left great toe pain.  Patient having difficulty keeping still and did well with previous recommendations until he did some extensive walking yesterday.  He admits to slight pain when walking but severe pain once he sat down for awhile afterward.  He reports elevating his foot and taking ibuprofen, which helped reduce the pain but not enough to get restorative sleep last night.  He rates his pain as 5/10 on the pain scale.  Patient denies any other pedal complaints at this time.    PCP:  Buddy Chatman MD  Date last seen:  6/18/19 CHAD Preston    Review of Systems   Constitutional: Negative for appetite change, fever, chills, fatigue and unexpected weight change.   Cardiovascular: Negative for chest pain, claudication, cyanosis, and leg swelling.  Musculoskeletal: Negative for back pain.  Positive for arthritis, joint pain, joint swelling, myalgias, and stiffness.   Skin: Negative for nail bed changes, discoloration, rash, itching, poor wound healing, suspicious lesion, and unusual hair distribution.   Neurological: Negative for loss of balance, sensory change, paresthesias, and numbness.  Positive for pain.  Hematological: Negative for adenopathy, bleeding, and bruising easily.   Psychiatric/Behavioral: The patient is nervous/anxious.  Negative for altered mental status.  Exaggerated response to anyone whom gets near his foot.    Hemoglobin A1C   Date Value Ref Range Status   02/01/2019 5.7 (H) 4.0 - 5.6 % Final     Comment:     ADA Screening Guidelines:  5.7-6.4%  Consistent with prediabetes  >or=6.5%  Consistent with diabetes  High levels of fetal hemoglobin interfere with the HbA1C  assay. Heterozygous hemoglobin variants (HbS, HgC, etc)do  not significantly interfere with this assay.   However,  presence of multiple variants may affect accuracy.     08/28/2018 5.7 (H) 4.0 - 5.6 % Final     Comment:     ADA Screening Guidelines:  5.7-6.4%  Consistent with prediabetes  >or=6.5%  Consistent with diabetes  High levels of fetal hemoglobin interfere with the HbA1C  assay. Heterozygous hemoglobin variants (HbS, HgC, etc)do  not significantly interfere with this assay.   However, presence of multiple variants may affect accuracy.     08/24/2018 5.7 (H) 4.0 - 5.6 % Final     Comment:     ADA Screening Guidelines:  5.7-6.4%  Consistent with prediabetes  >or=6.5%  Consistent with diabetes  High levels of fetal hemoglobin interfere with the HbA1C  assay. Heterozygous hemoglobin variants (HbS, HgC, etc)do  not significantly interfere with this assay.   However, presence of multiple variants may affect accuracy.         Past Medical History:   Diagnosis Date    Angina pectoris     Anxiety     Biliary colic     Cochlear implant in place     Deaf     LEFT EAR    Depression     Heart failure     History of colon polyps 2/20/2018    [place] (hard of hearing)     HEARING AID - RIGHT    Hyperlipidemia     Hypertension     Kidney stone     Kidney stones     Renal stones     Trouble in sleeping     Wears glasses      Past Surgical History:   Procedure Laterality Date    CAROTID [facility]  06/[facility]    [facility]     CATARACT EXTRACTION Bilateral     CHOLECYSTECTOMY  2-6-2014    CHOLECYSTECTOMY, LAPAROSCOPIC N/A 2/6/2014    Performed [facility] at [facility] OR    COLONOSCOPY  1/9/2013    Dr. Gillette, 5 year recheck (family history colon cancer)    COLONOSCOPY N/A 3/12/2018    Performed by Garett Go [facility] at [facility] ENDO    COLONOSCOPY N/A 1/9/2013    Performed by Wilbert [facility]lette MD at [facility] ENDO    EAR MASTOIDECTOMY W/ COCHLEAR IMPLANT W/ LANDMARK      left ear    FOREIGN BODY REMOVAL Right     glass removed from right foot/repair    FRACTURE SURGERY      right arm x2    LITHOTRIPSY      ROTATOR  CUFF REPAIR      Right     SINUS SURGERY      TONSILLECTOMY       Family History   Problem Relation Age of Onset    Cancer Mother         colon    Heart disease Father     Gout Father     Kidney disease Father     Arthritis Father     Hypertension Father     Early death Daughter         mva    Alcohol abuse Brother     Cancer Brother         mouth cancer w mets    No Known Problems Sister     Alcohol abuse Brother     No Known Problems Son     Heart disease Maternal Grandmother         MI    Stroke Maternal Grandfather     Heart disease Paternal Grandmother         MI    No Known Problems Son     Cancer Paternal Uncle         lung cancer    Allergic rhinitis Neg Hx     Allergies Neg Hx     Angioedema Neg Hx     Asthma Neg Hx     Atopy Neg Hx     Eczema Neg Hx     Immunodeficiency Neg Hx     Rhinitis Neg Hx     Urticaria Neg Hx     Collagen disease Neg Hx      Social History     Socioeconomic History    Marital status:      Spouse name: Not on file    Number of children: Not on file    Years of education: Not on file    Highest education level: Not on file   Occupational History    Not on file   Social Needs    Financial resource strain: Not on file    Food insecurity:     Worry: Not on file     Inability: Not on file    Transportation needs:     Medical: Not on file     Non-medical: Not on file   Tobacco Use    Smoking status: Never Smoker    Smokeless tobacco: Never Used   Substance and Sexual Activity    Alcohol use: Yes     Comment: once every two months    Drug use: No    Sexual activity: Yes   Lifestyle    Physical activity:     Days per week: Not on file     Minutes per session: Not on file    Stress: Not on file   Relationships    Social connections:     Talks on phone: Not on file     Gets together: Not on file     Attends Rastafari service: Not on file     Active member of club or organization: Not on file     Attends meetings of clubs or organizations:  "Not on file     Relationship status: Not on file   Other Topics Concern    Not on file   Social History Narrative    Not on file           Objective:        BP (!) 163/89   Pulse 79   Ht 5' 5" (1.651 m)   Wt 75.3 kg (166 lb 0.1 oz)   BMI 27.62 kg/m²     Physical Exam   Constitutional: Patient is oriented to person, place, and time. Patient appears well-developed and well-nourished. No acute distress.     Psychiatric: Patient has a normal mood and affect. Patient's speech is normal and behavior is normal. Judgment is normal. Cognition and memory are normal.     Left pedal exam was performed today.  Vascular: Pedal pulses palpable 2/4 DP & PT.  CFT is < 3 seconds to the hallux.  Skin temperature is warm to cool proximal tibia to distal toes without localized increase in calor noted.  No erythema or ecchymosis noted to the foot or ankle.  Hair growth present distally to the LE.  Mild, nonpitting edema noted to the 1st MTPJ.     Musculoskeletal: Ankle joint ROM is decreased. Subtalar joint ROM is decreased.  Midtarsal joint ROM is decreased.  1st ray ROM is within normal limits.  1st  MTPJ ROM is decreased.  Ankle joint dorsiflexion is restricted with the knee extended and flexed per [place] exam.    Muscle strength is 5/5 for all LE muscle groups tested.    Neurological:  Epicritic sensation is grossly Intact to the foot.   Achilles DTR and Chaddock STR are Intact to left lower extremity.   Negative Babinski sign left lower extremity.  Negative clonus sign left lower extremity.  Tenderness to palpation and on ROM of the 1st MTPJ.    Dermatological: Toenails 1-5 left are WNL in length and thickness.  Webspaces 1-4 left are clean, dry, and intact.  Skin turgor is supple.  No dry, flaky skin noted to the LE.  No open wounds or suspicious pigmented lesions appreciable to the foot or ankle.    Nursing note and vitals reviewed.      Assessment:       Encounter Diagnoses   Name Primary?    Hallux limitus of left " foot Yes    Primary osteoarthritis of left foot     Pain in left foot          Plan:       Buddy was seen today for follow-up.    Diagnoses and all orders for this visit:    Hallux limitus of left foot    Primary osteoarthritis of left foot    Pain in left foot      I counseled the patient on his conditions, their implications and medical management.    - Reviewed with patient PRICE therapy and need to rest to allow for acute injury to heal.    - Fit and dispensed to patient 1 CAM boot to be worn at all times when ambulating.    Patient was given the following recommendations and instructions:  Patient Instructions   - Wear CAM boot at all times when ambulating.  Look for EvenUp on [employer] if needed for right shoe to reduce knee, hip, and back pain if feel uneven.    - Wear Jobst Sport compression socks 15-20 mmHg at all times when ambulating.    - Rest/reduce ambulation to necessary activities of daily living.    - Ice foot/ankle for no more than 20 minutes/per hour.    - Elevate foot as much as possible throughout the day.    - Take medications as directed.    - Apply OTC topical arnica to affected area for pain relief and to reduce bruising/swelling.    - Notify clinic if pain worsens or fails to improve.    - Follow up in 2-4 weeks for foot pain.          Mariluz Johnson DPM        Dictation was performed using M*Modal Fluency.  Transcription errors may be present.

## 2019-07-24 ENCOUNTER — OFFICE VISIT (OUTPATIENT)
Dept: PODIATRY | Facility: CLINIC | Age: 69
End: 2019-07-24
Payer: MEDICARE

## 2019-07-24 VITALS
HEART RATE: 79 BPM | HEIGHT: 65 IN | SYSTOLIC BLOOD PRESSURE: 183 MMHG | BODY MASS INDEX: 27.66 KG/M2 | WEIGHT: 166 LBS | DIASTOLIC BLOOD PRESSURE: 99 MMHG

## 2019-07-24 DIAGNOSIS — M10.072 ACUTE IDIOPATHIC GOUT INVOLVING TOE OF LEFT FOOT: Primary | ICD-10-CM

## 2019-07-24 DIAGNOSIS — M79.672 PAIN IN LEFT FOOT: ICD-10-CM

## 2019-07-24 DIAGNOSIS — M19.072 PRIMARY OSTEOARTHRITIS OF LEFT FOOT: ICD-10-CM

## 2019-07-24 DIAGNOSIS — M20.5X2 HALLUX LIMITUS OF LEFT FOOT: ICD-10-CM

## 2019-07-24 PROCEDURE — 1101F PR PT FALLS ASSESS DOC 0-1 FALLS W/OUT INJ PAST YR: ICD-10-PCS | Mod: HCNC,CPTII,S$GLB, | Performed by: PODIATRIST

## 2019-07-24 PROCEDURE — 3080F DIAST BP >= 90 MM HG: CPT | Mod: HCNC,CPTII,S$GLB, | Performed by: PODIATRIST

## 2019-07-24 PROCEDURE — 3077F PR MOST RECENT SYSTOLIC BLOOD PRESSURE >= 140 MM HG: ICD-10-PCS | Mod: HCNC,CPTII,S$GLB, | Performed by: PODIATRIST

## 2019-07-24 PROCEDURE — 99999 PR PBB SHADOW E&M-EST. PATIENT-LVL III: ICD-10-PCS | Mod: PBBFAC,HCNC,, | Performed by: PODIATRIST

## 2019-07-24 PROCEDURE — 3080F PR MOST RECENT DIASTOLIC BLOOD PRESSURE >= 90 MM HG: ICD-10-PCS | Mod: HCNC,CPTII,S$GLB, | Performed by: PODIATRIST

## 2019-07-24 PROCEDURE — 99214 OFFICE O/P EST MOD 30 MIN: CPT | Mod: HCNC,S$GLB,, | Performed by: PODIATRIST

## 2019-07-24 PROCEDURE — 99214 PR OFFICE/OUTPT VISIT, EST, LEVL IV, 30-39 MIN: ICD-10-PCS | Mod: HCNC,S$GLB,, | Performed by: PODIATRIST

## 2019-07-24 PROCEDURE — 99999 PR PBB SHADOW E&M-EST. PATIENT-LVL III: CPT | Mod: PBBFAC,HCNC,, | Performed by: PODIATRIST

## 2019-07-24 PROCEDURE — 3077F SYST BP >= 140 MM HG: CPT | Mod: HCNC,CPTII,S$GLB, | Performed by: PODIATRIST

## 2019-07-24 PROCEDURE — 1101F PT FALLS ASSESS-DOCD LE1/YR: CPT | Mod: HCNC,CPTII,S$GLB, | Performed by: PODIATRIST

## 2019-07-24 RX ORDER — COLCHICINE 0.6 MG/1
0.6 TABLET ORAL DAILY
Qty: 30 TABLET | Refills: 11 | Status: SHIPPED | OUTPATIENT
Start: 2019-07-24 | End: 2020-04-03 | Stop reason: SDUPTHER

## 2019-07-24 NOTE — PATIENT INSTRUCTIONS
- Wear Jobst Sport compression socks 15-20 mmHg at all times when ambulating.    - Rest/reduce ambulation to necessary activities of daily living.    - Apply warm compress to foot throughout the day until pain resolves.    - Elevate foot as much as possible throughout the day.    - Take medications as directed.    - Apply OTC topical arnica to affected area for pain relief and to reduce bruising/swelling.    - Supplement with tart cherry juice or extract.    - Notify clinic if pain worsens or fails to improve.    - Follow up in 2-4 weeks for foot pain.

## 2019-07-25 NOTE — PROGRESS NOTES
Subjective:      Patient ID: Buddy Park is a 68 y.o. male.    Chief Complaint: Follow-up (left foot pain) and Other Misc (Buddy Chatman- 6/21/19)      HPI:  Buddy Park is a 68 y.o. male who presents to clinic with a chief complaint of left great toe pain.  Patient having difficulty keeping still and did well with previous recommendations until he did some extensive walking yesterday.  He admits to slight pain when walking but severe pain once he sat down for awhile afterward.  He reports elevating his foot and taking ibuprofen, which helped reduce the pain.  He rates his pain as 2/10 on the pain scale.  Patient denies any other pedal complaints at this time.    PCP:  Buddy Chatman MD  Date last seen:  6/19/19 CHAD Preston    Review of Systems   Constitutional: Negative for appetite change, fever, chills, fatigue and unexpected weight change.   Cardiovascular: Negative for chest pain, claudication, cyanosis, and leg swelling.  Musculoskeletal: Negative for back pain.  Positive for arthritis, joint pain, joint swelling, myalgias, and stiffness.   Skin: Negative for nail bed changes, discoloration, rash, itching, poor wound healing, suspicious lesion, and unusual hair distribution.   Neurological: Negative for loss of balance, sensory change, paresthesias, and numbness.  Positive for pain.  Hematological: Negative for adenopathy, bleeding, and bruising easily.   Psychiatric/Behavioral: The patient is nervous/anxious.  Negative for altered mental status.  Exaggerated response to anyone whom gets near his foot.    Hemoglobin A1C   Date Value Ref Range Status   02/01/2019 5.7 (H) 4.0 - 5.6 % Final     Comment:     ADA Screening Guidelines:  5.7-6.4%  Consistent with prediabetes  >or=6.5%  Consistent with diabetes  High levels of fetal hemoglobin interfere with the HbA1C  assay. Heterozygous hemoglobin variants (HbS, HgC, etc)do  not significantly interfere with this assay.   However, presence of  multiple variants may affect accuracy.     08/28/2018 5.7 (H) 4.0 - 5.6 % Final     Comment:     ADA Screening Guidelines:  5.7-6.4%  Consistent with prediabetes  >or=6.5%  Consistent with diabetes  High levels of fetal hemoglobin interfere with the HbA1C  assay. Heterozygous hemoglobin variants (HbS, HgC, etc)do  not significantly interfere with this assay.   However, presence of multiple variants may affect accuracy.     08/24/2018 5.7 (H) 4.0 - 5.6 % Final     Comment:     ADA Screening Guidelines:  5.7-6.4%  Consistent with prediabetes  >or=6.5%  Consistent with diabetes  High levels of fetal hemoglobin interfere with the HbA1C  assay. Heterozygous hemoglobin variants (HbS, HgC, etc)do  not significantly interfere with this assay.   However, presence of multiple variants may affect accuracy.         Past Medical History:   Diagnosis Date    Angina pectoris     Anxiety     Biliary colic     Cochlear implant in place     Deaf     LEFT EAR    Depression     Heart failure     History of colon polyps 2/20/2018    [place] (hard of hearing)     HEARING AID - RIGHT    Hyperlipidemia     Hypertension     Kidney stone     Kidney stones     Renal stones     Trouble in sleeping     Wears glasses      Past Surgical History:   Procedure Laterality Date    [facility]     CATARACT EXTRACTION Bilateral     CHOLECYSTECTOMY  2-6-2014    CHOLECYSTECTOMY, LAPAROSCOPIC N/A 2/6/2014    Performed [facility]al [facility]    COLONOSCOPY  1/9/2013    Dr. Gillette, 5 year recheck (family history colon cancer)    COLONOSCOPY N/A 3/12/2018    [facility]    COLONOSCOPY N/A 1/9/2013    [facility] Wilbert [facility] at [facility]    EAR MASTOIDECTOMY W/ COCHLEAR IMPLANT W/ LANDMARK      left ear    FOREIGN BODY REMOVAL Right     glass removed from right foot/repair    FRACTURE SURGERY      right arm x2    LITHOTRIPSY      ROTATOR CUFF REPAIR       Right     SINUS SURGERY      TONSILLECTOMY       Family History   Problem Relation Age of Onset    Cancer Mother         colon    Heart disease Father     Gout Father     Kidney disease Father     Arthritis Father     Hypertension Father     Early death Daughter         mva    Alcohol abuse Brother     Cancer Brother         mouth cancer w mets    No Known Problems Sister     Alcohol abuse Brother     No Known Problems Son     Heart disease Maternal Grandmother         MI    Stroke Maternal Grandfather     Heart disease Paternal Grandmother         MI    No Known Problems Son     Cancer Paternal Uncle         lung cancer    Allergic rhinitis Neg Hx     Allergies Neg Hx     Angioedema Neg Hx     Asthma Neg Hx     Atopy Neg Hx     Eczema Neg Hx     Immunodeficiency Neg Hx     Rhinitis Neg Hx     Urticaria Neg Hx     Collagen disease Neg Hx      Social History     Socioeconomic History    Marital status:      Spouse name: Not on file    Number of children: Not on file    Years of education: Not on file    Highest education level: Not on file   Occupational History    Not on file   Social Needs    Financial resource strain: Not on file    Food insecurity:     Worry: Not on file     Inability: Not on file    Transportation needs:     Medical: Not on file     Non-medical: Not on file   Tobacco Use    Smoking status: Never Smoker    Smokeless tobacco: Never Used   Substance and Sexual Activity    Alcohol use: Yes     Comment: once every two months    Drug use: No    Sexual activity: Yes   Lifestyle    Physical activity:     Days per week: Not on file     Minutes per session: Not on file    Stress: Not on file   Relationships    Social connections:     Talks on phone: Not on file     Gets together: Not on file     Attends Church service: Not on file     Active member of club or organization: Not on file     Attends meetings of clubs or organizations: Not on file      "Relationship status: Not on file   Other Topics Concern    Not on file   Social History Narrative    Not on file           Objective:        BP (!) 183/99   Pulse 79   Ht 5' 5" (1.651 m)   Wt 75.3 kg (166 lb 0.1 oz)   BMI 27.62 kg/m²     Physical Exam   Constitutional: Patient is oriented to person, place, and time. Patient appears well-developed and well-nourished. No acute distress.     Psychiatric: Patient has a normal mood and affect. Patient's speech is normal and behavior is normal. Judgment is normal. Cognition and memory are normal.     Left pedal exam was performed today.  Vascular: Pedal pulses palpable 2/4 DP & PT.  CFT is < 3 seconds to the hallux.  Skin temperature is warm to cool proximal tibia to distal toes with localized increase in calor and slight erythema noted.  No ecchymosis noted to the foot or ankle.  Hair growth present distally to the LE.  Mild, nonpitting edema noted to the 1st MTPJ.     Musculoskeletal: Ankle joint ROM is decreased. Subtalar joint ROM is decreased.  Midtarsal joint ROM is decreased.  1st ray ROM is within normal limits.  1st  MTPJ ROM is decreased.  Ankle joint dorsiflexion is restricted with the knee extended and flexed per Silfverskiold exam.    Muscle strength is 5/5 for all LE muscle groups tested.    Neurological:  Epicritic sensation is grossly Intact to the foot.   Achilles DTR and Chaddock STR are Intact to left lower extremity.   Negative Babinski sign left lower extremity.  Negative clonus sign left lower extremity.  Tenderness to palpation and on ROM of the 1st MTPJ.    Dermatological: Toenails 1-5 left are WNL in length and thickness.  Webspaces 1-4 left are clean, dry, and intact.  Skin turgor is supple.  No dry, flaky skin noted to the LE.  No open wounds or suspicious pigmented lesions appreciable to the foot or ankle.    Nursing note and vitals reviewed.      Assessment:       Encounter Diagnoses   Name Primary?    Acute idiopathic gout involving " toe of left foot Yes    Hallux limitus of left foot     Primary osteoarthritis of left foot     Pain in left foot          Plan:       Buddy was seen today for follow-up and other misc.    Diagnoses and all orders for this visit:    Acute idiopathic gout involving toe of left foot  -     colchicine (COLCRYS) 0.6 mg tablet; Take 1 tablet (0.6 mg total) by mouth once daily.    Hallux limitus of left foot  -     colchicine (COLCRYS) 0.6 mg tablet; Take 1 tablet (0.6 mg total) by mouth once daily.    Primary osteoarthritis of left foot  -     colchicine (COLCRYS) 0.6 mg tablet; Take 1 tablet (0.6 mg total) by mouth once daily.    Pain in left foot  -     colchicine (COLCRYS) 0.6 mg tablet; Take 1 tablet (0.6 mg total) by mouth once daily.      I counseled the patient on his conditions, their implications and medical management.    - Reviewed with patient PRICE therapy and need to rest to allow for acute injury to heal along with use of warm compress instead of ice until pain/warmth subsides from gout.    - Patient to continue wearing CAM boot at all times when ambulating.    Patient was given the following recommendations and instructions:  Patient Instructions   - Wear Jobst Sport compression socks 15-20 mmHg at all times when ambulating.    - Rest/reduce ambulation to necessary activities of daily living.    - Apply warm compress to foot throughout the day until pain resolves.    - Elevate foot as much as possible throughout the day.    - Take medications as directed.    - Apply OTC topical arnica to affected area for pain relief and to reduce bruising/swelling.    - Supplement with tart cherry juice or extract.    - Notify clinic if pain worsens or fails to improve.    - Follow up in 2-4 weeks for foot pain.          Mariluz Johnson DPM        Dictation was performed using M*Modal Fluency.  Transcription errors may be present.

## 2019-08-02 ENCOUNTER — CLINICAL SUPPORT (OUTPATIENT)
Dept: FAMILY MEDICINE | Facility: CLINIC | Age: 69
End: 2019-08-02
Payer: MEDICARE

## 2019-08-02 ENCOUNTER — TELEPHONE (OUTPATIENT)
Dept: FAMILY MEDICINE | Facility: CLINIC | Age: 69
End: 2019-08-02

## 2019-08-02 VITALS — SYSTOLIC BLOOD PRESSURE: 160 MMHG | HEART RATE: 80 BPM | DIASTOLIC BLOOD PRESSURE: 98 MMHG

## 2019-08-02 DIAGNOSIS — I10 ESSENTIAL HYPERTENSION: Primary | ICD-10-CM

## 2019-08-02 DIAGNOSIS — I10 HYPERTENSION, UNSPECIFIED TYPE: Primary | ICD-10-CM

## 2019-08-02 PROCEDURE — 99999 PR PBB SHADOW E&M-EST. PATIENT-LVL I: ICD-10-PCS | Mod: PBBFAC,HCNC,,

## 2019-08-02 PROCEDURE — 99999 PR PBB SHADOW E&M-EST. PATIENT-LVL I: CPT | Mod: PBBFAC,HCNC,,

## 2019-08-02 RX ORDER — CARVEDILOL 6.25 MG/1
6.25 TABLET ORAL 2 TIMES DAILY WITH MEALS
Qty: 60 TABLET | Refills: 1 | Status: SHIPPED | OUTPATIENT
Start: 2019-08-02 | End: 2019-09-23 | Stop reason: SDUPTHER

## 2019-08-02 NOTE — TELEPHONE ENCOUNTER
Well, that is not a good blood pressure. Let's increase the coreg to 6.25 mg BID, looks like his pulse can handle it. He will need another 2 week nurse BP check. I have sent the increased dose to his [facility].   Thanks.   Nighat

## 2019-08-04 PROBLEM — M10.9 ACUTE GOUT OF LEFT FOOT: Status: ACTIVE | Noted: 2019-08-04

## 2019-08-04 PROBLEM — M10.072 ACUTE IDIOPATHIC GOUT INVOLVING TOE OF LEFT FOOT: Status: ACTIVE | Noted: 2019-08-04

## 2019-08-06 ENCOUNTER — OFFICE VISIT (OUTPATIENT)
Dept: UROLOGY | Facility: CLINIC | Age: 69
End: 2019-08-06
Payer: MEDICARE

## 2019-08-06 ENCOUNTER — HOSPITAL ENCOUNTER (OUTPATIENT)
Dept: RADIOLOGY | Facility: HOSPITAL | Age: 69
Discharge: HOME | End: 2019-08-06
Attending: UROLOGY
Payer: MEDICARE

## 2019-08-06 VITALS
SYSTOLIC BLOOD PRESSURE: 115 MMHG | DIASTOLIC BLOOD PRESSURE: 64 MMHG | BODY MASS INDEX: 27.7 KG/M2 | HEART RATE: 71 BPM | RESPIRATION RATE: 18 BRPM | HEIGHT: 65 IN | WEIGHT: 166.25 LBS | TEMPERATURE: 97 F

## 2019-08-06 DIAGNOSIS — N20.0 CALCULUS OF KIDNEY: ICD-10-CM

## 2019-08-06 DIAGNOSIS — N40.0 BENIGN PROSTATIC HYPERPLASIA WITHOUT LOWER URINARY TRACT SYMPTOMS: ICD-10-CM

## 2019-08-06 DIAGNOSIS — N20.0 CALCULUS OF KIDNEY: Primary | ICD-10-CM

## 2019-08-06 LAB
BILIRUB SERPL-MCNC: ABNORMAL MG/DL
BLOOD URINE, POC: ABNORMAL
COLOR, POC UA: YELLOW
GLUCOSE UR QL STRIP: ABNORMAL
KETONES UR QL STRIP: ABNORMAL
LEUKOCYTE ESTERASE URINE, POC: ABNORMAL
NITRITE, POC UA: ABNORMAL
PH, POC UA: 5.5
PROTEIN, POC: ABNORMAL
SPECIFIC GRAVITY, POC UA: 1.02
UROBILINOGEN, POC UA: ABNORMAL

## 2019-08-06 PROCEDURE — 3074F SYST BP LT 130 MM HG: CPT | Mod: HCNC,CPTII,S$GLB, | Performed by: UROLOGY

## 2019-08-06 PROCEDURE — 3078F PR MOST RECENT DIASTOLIC BLOOD PRESSURE < 80 MM HG: ICD-10-PCS | Mod: HCNC,CPTII,S$GLB, | Performed by: UROLOGY

## 2019-08-06 PROCEDURE — 99999 PR PBB SHADOW E&M-EST. PATIENT-LVL III: CPT | Mod: PBBFAC,HCNC,, | Performed by: UROLOGY

## 2019-08-06 PROCEDURE — 81002 URINALYSIS NONAUTO W/O SCOPE: CPT | Mod: HCNC,S$GLB,, | Performed by: UROLOGY

## 2019-08-06 PROCEDURE — 99214 PR OFFICE/OUTPT VISIT, EST, LEVL IV, 30-39 MIN: ICD-10-PCS | Mod: 25,HCNC,S$GLB, | Performed by: UROLOGY

## 2019-08-06 PROCEDURE — 74018 RADEX ABDOMEN 1 VIEW: CPT | Mod: 26,HCNC,, | Performed by: RADIOLOGY

## 2019-08-06 PROCEDURE — 74018 RADEX ABDOMEN 1 VIEW: CPT | Mod: TC,HCNC,FY

## 2019-08-06 PROCEDURE — 99214 OFFICE O/P EST MOD 30 MIN: CPT | Mod: 25,HCNC,S$GLB, | Performed by: UROLOGY

## 2019-08-06 PROCEDURE — 3074F PR MOST RECENT SYSTOLIC BLOOD PRESSURE < 130 MM HG: ICD-10-PCS | Mod: HCNC,CPTII,S$GLB, | Performed by: UROLOGY

## 2019-08-06 PROCEDURE — 1101F PR PT FALLS ASSESS DOC 0-1 FALLS W/OUT INJ PAST YR: ICD-10-PCS | Mod: HCNC,CPTII,S$GLB, | Performed by: UROLOGY

## 2019-08-06 PROCEDURE — 99999 PR PBB SHADOW E&M-EST. PATIENT-LVL III: ICD-10-PCS | Mod: PBBFAC,HCNC,, | Performed by: UROLOGY

## 2019-08-06 PROCEDURE — 3078F DIAST BP <80 MM HG: CPT | Mod: HCNC,CPTII,S$GLB, | Performed by: UROLOGY

## 2019-08-06 PROCEDURE — 1101F PT FALLS ASSESS-DOCD LE1/YR: CPT | Mod: HCNC,CPTII,S$GLB, | Performed by: UROLOGY

## 2019-08-06 PROCEDURE — 74018 XR ABDOMEN AP 1 VIEW: ICD-10-PCS | Mod: 26,HCNC,, | Performed by: RADIOLOGY

## 2019-08-06 PROCEDURE — 81002 POCT URINE DIPSTICK WITHOUT MICROSCOPE: ICD-10-PCS | Mod: HCNC,S$GLB,, | Performed by: UROLOGY

## 2019-08-06 NOTE — PROGRESS NOTES
OFFICE NOTE  [unfilled]  [id number]  8/6/2019       CHIEF COMPLAINT:   BPH, renal calculi     HISTORY OF PRESENT ILLNESS:   This 68-year-old male returns owen radford.  Denies any complaints on today's visit.  He has a history of BPH for which he continues to take Flomax with which he continues to do well.  He also has history of renal calculi but has had no recent stone episodes.  CT scan on 05/28/2019 revealed bilateral nonobstructing renal calculi, a 1 mm stone in the right kidney and a 4-5 mm stone in the left kidney with no hydronephrosis with also a group of stones measuring approximately 9 mm across in the lower pole of the left kidney.    Physical examination: Abdomen - soft benign nontender no masses no organomegaly                                      External genitalia - normal phallus and adequate meatus testes are descended and feel normal no scrotal masses                                      Rectal:  20 g smooth prostate no nodules normal sphincter tone        PSA - 0.32 on 11/13/2017     UA - negative pH 5.5     FINAL IMPRESSION:   BPH, renal calculi     RECOMMENDATIONS:   KUB, PSA                                           Continue on Flomax 0.4 mg p.o. Q.d.                                            Will contact patient with the results and notify him of his next appointment

## 2019-08-12 ENCOUNTER — PATIENT MESSAGE (OUTPATIENT)
Dept: FAMILY MEDICINE | Facility: CLINIC | Age: 69
End: 2019-08-12

## 2019-08-12 RX ORDER — TAMSULOSIN HYDROCHLORIDE 0.4 MG/1
CAPSULE ORAL
Qty: 30 CAPSULE | Refills: 0 | Status: SHIPPED | OUTPATIENT
Start: 2019-08-12 | End: 2019-09-23 | Stop reason: SDUPTHER

## 2019-08-14 ENCOUNTER — OFFICE VISIT (OUTPATIENT)
Dept: PODIATRY | Facility: CLINIC | Age: 69
End: 2019-08-14
Payer: MEDICARE

## 2019-08-14 VITALS
HEIGHT: 65 IN | BODY MASS INDEX: 27.7 KG/M2 | HEART RATE: 74 BPM | SYSTOLIC BLOOD PRESSURE: 126 MMHG | DIASTOLIC BLOOD PRESSURE: 76 MMHG | WEIGHT: 166.25 LBS

## 2019-08-14 DIAGNOSIS — M10.072 ACUTE IDIOPATHIC GOUT INVOLVING TOE OF LEFT FOOT: Primary | ICD-10-CM

## 2019-08-14 DIAGNOSIS — M19.072 PRIMARY OSTEOARTHRITIS OF LEFT FOOT: ICD-10-CM

## 2019-08-14 DIAGNOSIS — M20.5X2 HALLUX LIMITUS OF LEFT FOOT: ICD-10-CM

## 2019-08-14 PROCEDURE — 99999 PR PBB SHADOW E&M-EST. PATIENT-LVL III: ICD-10-PCS | Mod: PBBFAC,HCNC,, | Performed by: PODIATRIST

## 2019-08-14 PROCEDURE — 99212 PR OFFICE/OUTPT VISIT, EST, LEVL II, 10-19 MIN: ICD-10-PCS | Mod: HCNC,S$GLB,, | Performed by: PODIATRIST

## 2019-08-14 PROCEDURE — 99212 OFFICE O/P EST SF 10 MIN: CPT | Mod: HCNC,S$GLB,, | Performed by: PODIATRIST

## 2019-08-14 PROCEDURE — 3078F PR MOST RECENT DIASTOLIC BLOOD PRESSURE < 80 MM HG: ICD-10-PCS | Mod: HCNC,CPTII,S$GLB, | Performed by: PODIATRIST

## 2019-08-14 PROCEDURE — 1101F PR PT FALLS ASSESS DOC 0-1 FALLS W/OUT INJ PAST YR: ICD-10-PCS | Mod: HCNC,CPTII,S$GLB, | Performed by: PODIATRIST

## 2019-08-14 PROCEDURE — 99999 PR PBB SHADOW E&M-EST. PATIENT-LVL III: CPT | Mod: PBBFAC,HCNC,, | Performed by: PODIATRIST

## 2019-08-14 PROCEDURE — 1101F PT FALLS ASSESS-DOCD LE1/YR: CPT | Mod: HCNC,CPTII,S$GLB, | Performed by: PODIATRIST

## 2019-08-14 PROCEDURE — 3074F SYST BP LT 130 MM HG: CPT | Mod: HCNC,CPTII,S$GLB, | Performed by: PODIATRIST

## 2019-08-14 PROCEDURE — 3074F PR MOST RECENT SYSTOLIC BLOOD PRESSURE < 130 MM HG: ICD-10-PCS | Mod: HCNC,CPTII,S$GLB, | Performed by: PODIATRIST

## 2019-08-14 PROCEDURE — 3078F DIAST BP <80 MM HG: CPT | Mod: HCNC,CPTII,S$GLB, | Performed by: PODIATRIST

## 2019-08-16 ENCOUNTER — TELEPHONE (OUTPATIENT)
Dept: FAMILY MEDICINE | Facility: CLINIC | Age: 69
End: 2019-08-16

## 2019-08-16 ENCOUNTER — CLINICAL SUPPORT (OUTPATIENT)
Dept: FAMILY MEDICINE | Facility: CLINIC | Age: 69
End: 2019-08-16
Payer: MEDICARE

## 2019-08-16 VITALS — DIASTOLIC BLOOD PRESSURE: 62 MMHG | SYSTOLIC BLOOD PRESSURE: 120 MMHG | HEART RATE: 80 BPM

## 2019-08-16 DIAGNOSIS — I10 HYPERTENSION, UNSPECIFIED TYPE: Primary | ICD-10-CM

## 2019-08-16 PROCEDURE — 99999 PR PBB SHADOW E&M-EST. PATIENT-LVL I: CPT | Mod: PBBFAC,HCNC,,

## 2019-08-16 PROCEDURE — 99999 PR PBB SHADOW E&M-EST. PATIENT-LVL I: ICD-10-PCS | Mod: PBBFAC,HCNC,,

## 2019-08-16 NOTE — TELEPHONE ENCOUNTER
Patient came in today for blood pressure check.    120/62 p80 patient advised to continue current medications and recheck in 6 months.

## 2019-08-17 NOTE — PROGRESS NOTES
Subjective:      Patient ID: Buddy Park is a 68 y.o. male.    Chief Complaint: Follow-up (Gout)      HPI:  Buddy Park is a 68 y.o. male who presents to clinic with a chief complaint of left great toe gout.  Patient reports pain has resolved and has been gone for over a week.  He continues to wear CAM boot and is awaiting instructions as to if he can return to normal shoe gear.  Patient denies any other pedal complaints at this time.    PCP:  Buddy Chatman MD  Date last seen:  6/19/19 CHAD Preston    Review of Systems   Constitutional: Negative for appetite change, fever, chills, fatigue and unexpected weight change.   Cardiovascular: Negative for chest pain, claudication, cyanosis, and leg swelling.  Musculoskeletal: Negative for back pain.  Positive for arthritis, joint pain, joint swelling, myalgias, and stiffness.   Skin: Negative for nail bed changes, discoloration, rash, itching, poor wound healing, suspicious lesion, and unusual hair distribution.   Neurological: Negative for loss of balance, sensory change, paresthesias, and numbness.   Hematological: Negative for adenopathy, bleeding, and bruising easily.   Psychiatric/Behavioral: The patient is nervous/anxious.  Negative for altered mental status.      Hemoglobin A1C   Date Value Ref Range Status   02/01/2019 5.7 (H) 4.0 - 5.6 % Final     Comment:     ADA Screening Guidelines:  5.7-6.4%  Consistent with prediabetes  >or=6.5%  Consistent with diabetes  High levels of fetal hemoglobin interfere with the HbA1C  assay. Heterozygous hemoglobin variants (HbS, HgC, etc)do  not significantly interfere with this assay.   However, presence of multiple variants may affect accuracy.     08/28/2018 5.7 (H) 4.0 - 5.6 % Final     Comment:     ADA Screening Guidelines:  5.7-6.4%  Consistent with prediabetes  >or=6.5%  Consistent with diabetes  High levels of fetal hemoglobin interfere with the HbA1C  assay. Heterozygous hemoglobin variants (HbS,  HgC, etc)do  not significantly interfere with this assay.   However, presence of multiple variants may affect accuracy.     08/24/2018 5.7 (H) 4.0 - 5.6 % Final     Comment:     ADA Screening Guidelines:  5.7-6.4%  Consistent with prediabetes  >or=6.5%  Consistent with diabetes  High levels of fetal hemoglobin interfere with the HbA1C  assay. Heterozygous hemoglobin variants (HbS, HgC, etc)do  not significantly interfere with this assay.   However, presence of multiple variants may affect accuracy.         Past Medical History:   Diagnosis Date    Angina pectoris     Anxiety     Biliary colic     Cochlear implant in place     Deaf     LEFT EAR    Depression     Heart failure     History of colon polyps 2/20/2018    [place]hard of hearing[place]     HEARING AID - RIGHT    Hyperlipidemia     Hypertension     Kidney stone     Kidney stones     Renal stones     Trouble in sleeping     Wears glasses      Past Surgical History:   Procedure Laterality Date    [facility]  [facility]     CATARACT EXTRACTION Bilateral     CHOLECYSTECTOMY  2-6-2014    CHOLECYSTECTOMY, LAPAROSCOPIC N/A 2/6/2014    [facility]    COLONOSCOPY  1/9/2013    Dr. Gillette, 5 year recheck (family history colon cancer)    COLONOSCOPY N/A 3/12/2018    [facility] Go [facility] ENDO    COLONOSCOPY N/A 1/9/2013    [facility] by Wilbert [facility] at [facility]    EAR MASTOIDECTOMY W/ COCHLEAR IMPLANT W/ LANDMARK      left ear    FOREIGN BODY REMOVAL Right     glass removed from right foot/repair    FRACTURE SURGERY      right arm x2    LITHOTRIPSY      ROTATOR CUFF REPAIR      Right     SINUS SURGERY      TONSILLECTOMY       Family History   Problem Relation Age of Onset    Cancer Mother         colon    Heart disease Father     Gout Father     Kidney disease Father     Arthritis Father     Hypertension Father     Early death Daughter         mva    Alcohol  "abuse Brother     Cancer Brother         mouth cancer w mets    No Known Problems Sister     Alcohol abuse Brother     No Known Problems Son     Heart disease Maternal Grandmother         MI    Stroke Maternal Grandfather     Heart disease Paternal Grandmother         MI    No Known Problems Son     Cancer Paternal Uncle         lung cancer    Allergic rhinitis Neg Hx     Allergies Neg Hx     Angioedema Neg Hx     Asthma Neg Hx     Atopy Neg Hx     Eczema Neg Hx     Immunodeficiency Neg Hx     Rhinitis Neg Hx     Urticaria Neg Hx     Collagen disease Neg Hx      Social History     Socioeconomic History    Marital status:      Spouse name: Not on file    Number of children: Not on file    Years of education: Not on file    Highest education level: Not on file   Occupational History    Not on file   Social Needs    Financial resource strain: Not on file    Food insecurity:     Worry: Not on file     Inability: Not on file    Transportation needs:     Medical: Not on file     Non-medical: Not on file   Tobacco Use    Smoking status: Never Smoker    Smokeless tobacco: Never Used   Substance and Sexual Activity    Alcohol use: Yes     Comment: once every two months    Drug use: No    Sexual activity: Yes   Lifestyle    Physical activity:     Days per week: Not on file     Minutes per session: Not on file    Stress: Not on file   Relationships    Social connections:     Talks on phone: Not on file     Gets together: Not on file     Attends Rastafari service: Not on file     Active member of club or organization: Not on file     Attends meetings of clubs or organizations: Not on file     Relationship status: Not on file   Other Topics Concern    Not on file   Social History Narrative    Not on file           Objective:        /76   Pulse 74   Ht 5' 5" (1.651 m)   Wt 75.4 kg (166 lb 3.6 oz)   BMI 27.66 kg/m²     Physical Exam   Constitutional: Patient is oriented to " person, place, and time. Patient appears well-developed and well-nourished. No acute distress.     Psychiatric: Patient has a normal mood and affect. Patient's speech is normal and behavior is normal. Judgment is normal. Cognition and memory are normal.     Left pedal exam was performed today.  Vascular: Pedal pulses palpable 2/4 DP & PT.  CFT is < 3 seconds to the hallux.  Skin temperature is warm to cool proximal tibia to distal toes with localized increase in calor and slight erythema noted.  No ecchymosis noted to the foot or ankle.  Hair growth present distally to the LE.  Mild, nonpitting edema noted to the 1st MTPJ.     Musculoskeletal: Ankle joint ROM is decreased. Subtalar joint ROM is decreased.  Midtarsal joint ROM is decreased.  1st ray ROM is within normal limits.  1st  MTPJ ROM is decreased.  Ankle joint dorsiflexion is restricted with the knee extended and flexed per Silfverskiold exam.    Muscle strength is 5/5 for all LE muscle groups tested.    Neurological:  Epicritic sensation is grossly Intact to the foot.   Achilles DTR and Chaddock STR are Intact to left lower extremity.   No tenderness to palpation and on ROM of the 1st MTPJ.    Dermatological: Toenails 1-5 left are WNL in length and thickness.  Webspaces 1-4 left are clean, dry, and intact.  Skin turgor is supple.  No dry, flaky skin noted to the LE.  No open wounds or suspicious pigmented lesions appreciable to the foot or ankle.    Nursing note and vitals reviewed.      Assessment:       Encounter Diagnoses   Name Primary?    Acute idiopathic gout involving toe of left foot Yes    Hallux limitus of left foot     Primary osteoarthritis of left foot          Plan:       Buddy was seen today for follow-up.    Diagnoses and all orders for this visit:    Acute idiopathic gout involving toe of left foot    Hallux limitus of left foot    Primary osteoarthritis of left foot      I counseled the patient on his conditions, their implications and  medical management.    - Reviewed with patient PRICE therapy.    - Patient to transition to normal shoe gear.    Patient was given the following recommendations and instructions:  Patient Instructions   - Wear Jobst Sport compression socks 15-20 mmHg at all times when ambulating.    - Rest/reduce ambulation to necessary activities of daily living.    - Elevate foot as much as possible throughout the day.    - Take medications as directed.    - Apply OTC topical arnica to affected area for pain relief and to reduce bruising/swelling.    - Supplement with tart cherry juice or extract.    - Notify clinic if pain worsens or fails to improve.        Mariluz Johnson DPM        Dictation was performed using M*Modal Fluency.  Transcription errors may be present.

## 2019-08-26 NOTE — PATIENT INSTRUCTIONS
- Wear Jobst Sport compression socks 15-20 mmHg at all times when ambulating.    - Rest/reduce ambulation to necessary activities of daily living.    - Elevate foot as much as possible throughout the day.    - Take medications as directed.    - Apply OTC topical arnica to affected area for pain relief and to reduce bruising/swelling.    - Supplement with tart cherry juice or extract.    - Notify clinic if pain worsens or fails to improve.

## 2019-08-27 ENCOUNTER — DOCUMENTATION ONLY (OUTPATIENT)
Dept: FAMILY MEDICINE | Facility: CLINIC | Age: 69
End: 2019-08-27

## 2019-08-27 NOTE — PROGRESS NOTES
Pre-Visit Chart Review  For Appointment Scheduled on 9-23-19    Health Maintenance Due   Topic Date Due    Aspirin/Antiplatelet Therapy  12/16/1968    Hemoglobin A1c  08/01/2019

## 2019-09-23 ENCOUNTER — OFFICE VISIT (OUTPATIENT)
Dept: FAMILY MEDICINE | Facility: CLINIC | Age: 69
End: 2019-09-23
Attending: FAMILY MEDICINE
Payer: MEDICARE

## 2019-09-23 VITALS
HEART RATE: 86 BPM | TEMPERATURE: 98 F | RESPIRATION RATE: 12 BRPM | SYSTOLIC BLOOD PRESSURE: 150 MMHG | WEIGHT: 169.56 LBS | DIASTOLIC BLOOD PRESSURE: 84 MMHG | HEIGHT: 65 IN | OXYGEN SATURATION: 97 % | BODY MASS INDEX: 28.25 KG/M2

## 2019-09-23 DIAGNOSIS — I10 ESSENTIAL HYPERTENSION: Primary | ICD-10-CM

## 2019-09-23 DIAGNOSIS — K91.89 POSTCHOLECYSTECTOMY DIARRHEA: ICD-10-CM

## 2019-09-23 DIAGNOSIS — N40.0 BPH WITHOUT OBSTRUCTION/LOWER URINARY TRACT SYMPTOMS: ICD-10-CM

## 2019-09-23 DIAGNOSIS — R73.03 PREDIABETES: ICD-10-CM

## 2019-09-23 DIAGNOSIS — R19.7 POSTCHOLECYSTECTOMY DIARRHEA: ICD-10-CM

## 2019-09-23 DIAGNOSIS — F32.5 MAJOR DEPRESSIVE DISORDER WITH SINGLE EPISODE, IN FULL REMISSION: ICD-10-CM

## 2019-09-23 PROCEDURE — 3079F DIAST BP 80-89 MM HG: CPT | Mod: HCNC,CPTII,S$GLB, | Performed by: FAMILY MEDICINE

## 2019-09-23 PROCEDURE — 3079F PR MOST RECENT DIASTOLIC BLOOD PRESSURE 80-89 MM HG: ICD-10-PCS | Mod: HCNC,CPTII,S$GLB, | Performed by: FAMILY MEDICINE

## 2019-09-23 PROCEDURE — 3077F PR MOST RECENT SYSTOLIC BLOOD PRESSURE >= 140 MM HG: ICD-10-PCS | Mod: HCNC,CPTII,S$GLB, | Performed by: FAMILY MEDICINE

## 2019-09-23 PROCEDURE — 99999 PR PBB SHADOW E&M-EST. PATIENT-LVL IV: ICD-10-PCS | Mod: PBBFAC,HCNC,, | Performed by: FAMILY MEDICINE

## 2019-09-23 PROCEDURE — 99499 RISK ADDL DX/OHS AUDIT: ICD-10-PCS | Mod: HCNC,S$GLB,, | Performed by: FAMILY MEDICINE

## 2019-09-23 PROCEDURE — 1101F PT FALLS ASSESS-DOCD LE1/YR: CPT | Mod: HCNC,CPTII,S$GLB, | Performed by: FAMILY MEDICINE

## 2019-09-23 PROCEDURE — 99999 PR PBB SHADOW E&M-EST. PATIENT-LVL IV: CPT | Mod: PBBFAC,HCNC,, | Performed by: FAMILY MEDICINE

## 2019-09-23 PROCEDURE — 99499 UNLISTED E&M SERVICE: CPT | Mod: HCNC,S$GLB,, | Performed by: FAMILY MEDICINE

## 2019-09-23 PROCEDURE — 3077F SYST BP >= 140 MM HG: CPT | Mod: HCNC,CPTII,S$GLB, | Performed by: FAMILY MEDICINE

## 2019-09-23 PROCEDURE — 99214 PR OFFICE/OUTPT VISIT, EST, LEVL IV, 30-39 MIN: ICD-10-PCS | Mod: HCNC,S$GLB,, | Performed by: FAMILY MEDICINE

## 2019-09-23 PROCEDURE — 99214 OFFICE O/P EST MOD 30 MIN: CPT | Mod: HCNC,S$GLB,, | Performed by: FAMILY MEDICINE

## 2019-09-23 PROCEDURE — 1101F PR PT FALLS ASSESS DOC 0-1 FALLS W/OUT INJ PAST YR: ICD-10-PCS | Mod: HCNC,CPTII,S$GLB, | Performed by: FAMILY MEDICINE

## 2019-09-23 RX ORDER — ASPIRIN 81 MG/1
81 TABLET ORAL DAILY
COMMUNITY

## 2019-09-23 RX ORDER — TAMSULOSIN HYDROCHLORIDE 0.4 MG/1
1 CAPSULE ORAL DAILY
Qty: 90 CAPSULE | Refills: 3 | Status: SHIPPED | OUTPATIENT
Start: 2019-09-23 | End: 2020-04-03 | Stop reason: ALTCHOICE

## 2019-09-23 RX ORDER — NITROGLYCERIN 0.4 MG/1
0.4 TABLET SUBLINGUAL EVERY 5 MIN PRN
Qty: 25 TABLET | Refills: 11 | Status: SHIPPED | OUTPATIENT
Start: 2019-09-23 | End: 2022-07-07

## 2019-09-23 RX ORDER — BUPROPION HYDROCHLORIDE 150 MG/1
150 TABLET ORAL DAILY
Qty: 90 TABLET | Refills: 1 | Status: SHIPPED | OUTPATIENT
Start: 2019-09-23 | End: 2020-04-03 | Stop reason: SDUPTHER

## 2019-09-23 RX ORDER — EPINEPHRINE 0.3 MG/.3ML
INJECTION SUBCUTANEOUS ONCE
Status: ON HOLD | COMMUNITY
End: 2020-02-07 | Stop reason: HOSPADM

## 2019-09-23 RX ORDER — LOSARTAN POTASSIUM 100 MG/1
100 TABLET ORAL DAILY
Qty: 90 TABLET | Refills: 3 | Status: SHIPPED | OUTPATIENT
Start: 2019-09-23 | End: 2019-10-10 | Stop reason: SDUPTHER

## 2019-09-23 RX ORDER — CARVEDILOL 6.25 MG/1
6.25 TABLET ORAL 2 TIMES DAILY WITH MEALS
Qty: 180 TABLET | Refills: 3 | Status: SHIPPED | OUTPATIENT
Start: 2019-09-23 | End: 2020-04-03 | Stop reason: SDUPTHER

## 2019-09-23 RX ORDER — IBUPROFEN 600 MG/1
600 TABLET ORAL EVERY 8 HOURS PRN
Refills: 1 | Status: ON HOLD | COMMUNITY
Start: 2019-09-16 | End: 2019-10-23 | Stop reason: SDUPTHER

## 2019-09-23 RX ORDER — AMLODIPINE BESYLATE 10 MG/1
10 TABLET ORAL DAILY
Qty: 90 TABLET | Refills: 3 | Status: SHIPPED | OUTPATIENT
Start: 2019-09-23 | End: 2020-04-03 | Stop reason: SDUPTHER

## 2019-09-23 RX ORDER — METFORMIN HYDROCHLORIDE 500 MG/1
500 TABLET ORAL 2 TIMES DAILY WITH MEALS
Qty: 180 TABLET | Refills: 1 | Status: SHIPPED | OUTPATIENT
Start: 2019-09-23 | End: 2019-12-17

## 2019-09-23 NOTE — PATIENT INSTRUCTIONS
Controlling High Blood Pressure  High blood pressure (hypertension) is often called the silent killer. This is because many people who have it dont know it. High blood pressure is defined as 140/90 mm Hg or higher. Know your blood pressure and remember to check it regularly. Doing so can save your life. Here are some things you can do to help control your blood pressure.    Choose heart-healthy foods  · Select low-salt, low-fat foods. Limit sodium intake to 2,400 mg per day or the amount suggested by your healthcare provider.  · Limit canned, dried, cured, packaged, and fast foods. These can contain a lot of salt.  · Eat 8 to 10 servings of fruits and vegetables every day.  · Choose lean meats, fish, or chicken.  · Eat whole-grain pasta, brown rice, and beans.  · Eat 2 to 3 servings of low-fat or fat-free dairy products.  · Ask your doctor about the DASH eating plan. This plan helps reduce blood pressure.  · When you go to a restaurant, ask that your meal be prepared with no added salt.  Maintain a healthy weight  · Ask your healthcare provider how many calories to eat a day. Then stick to that number.  · Ask your healthcare provider what weight range is healthiest for you. If you are overweight, a weight loss of only 3% to 5% of your body weight can help lower blood pressure. Generally, a good weight loss goal is to lose 10% of your body weight in a year.  · Limit snacks and sweets.  · Get regular exercise.  Get up and get active  · Choose activities you enjoy. Find ones you can do with friends or family. This includes bicycling, dancing, walking, and jogging.  · Park farther away from building entrances.  · Use stairs instead of the elevator.  · When you can, walk or bike instead of driving.  · [place] leaves, garden, or do household repairs.  · Be active at a moderate to vigorous level of physical activity for at least 40 minutes for a minimum of 3 to 4 days a week.   Manage stress  · Make time to relax and enjoy  life. Find time to laugh.  · Communicate your concerns with your loved ones and your healthcare provider.  · Visit with family and friends, and keep up with hobbies.  Limit alcohol and quit smoking  · Men should have no more than 2 drinks per day.  · Women should have no more than 1 drink per day.  · Talk with your healthcare provider about quitting smoking. Smoking significantly increases your risk for heart disease and stroke. Ask your healthcare provider about community smoking cessation programs and other options.  Medicines  If lifestyle changes arent enough, your healthcare provider may prescribe high blood pressure medicine. Take all medicines as prescribed. If you have any questions about your medicines, ask your healthcare provider before stopping or changing them.   Date Last Reviewed: 4/27/2016  © 5067-5565 [employer]. [address] [place] [address], [place] [address]. All rights reserved. This information is not intended as a substitute for professional medical care. Always follow your healthcare professional's instructions.

## 2019-10-08 DIAGNOSIS — I10 ESSENTIAL HYPERTENSION: ICD-10-CM

## 2019-10-08 NOTE — TELEPHONE ENCOUNTER
When he was here on September 23rd his blood pressure was elevated and he told me he had been on a trip without his medications.  He was supposed to call in two weeks with blood pressure level.  That would have been yesterday.  How is the blood pressure doing now?

## 2019-10-10 RX ORDER — LOSARTAN POTASSIUM 100 MG/1
TABLET ORAL
Qty: 90 TABLET | Refills: 0 | Status: ON HOLD | OUTPATIENT
Start: 2019-10-10 | End: 2019-10-23 | Stop reason: HOSPADM

## 2019-10-10 NOTE — TELEPHONE ENCOUNTER
Spoke with patient, he hasn't been keeping a blood pressure log. Instructed patient to begin taking blood pressure.

## 2019-10-22 ENCOUNTER — HOSPITAL ENCOUNTER (OUTPATIENT)
Facility: HOSPITAL | Age: 69
Discharge: HOME | End: 2019-10-23
Attending: EMERGENCY MEDICINE | Admitting: HOSPITALIST
Payer: MEDICARE

## 2019-10-22 DIAGNOSIS — N28.9 RENAL INSUFFICIENCY: Primary | ICD-10-CM

## 2019-10-22 DIAGNOSIS — N23 RENAL COLIC ON LEFT SIDE: ICD-10-CM

## 2019-10-22 PROBLEM — N17.9 AKI (ACUTE KIDNEY INJURY): Status: ACTIVE | Noted: 2019-10-22

## 2019-10-22 PROBLEM — N13.9 ACUTE BILATERAL OBSTRUCTIVE UROPATHY: Status: ACTIVE | Noted: 2019-10-22

## 2019-10-22 LAB
ANION GAP SERPL CALC-SCNC: 9 MMOL/L (ref 8–16)
BASOPHILS # BLD AUTO: 0.01 K/UL (ref 0–0.2)
BASOPHILS NFR BLD: 0.1 % (ref 0–1.9)
BILIRUB UR QL STRIP: NEGATIVE
BUN SERPL-MCNC: 28 MG/DL (ref 8–23)
CALCIUM SERPL-MCNC: 9.3 MG/DL (ref 8.7–10.5)
CHLORIDE SERPL-SCNC: 105 MMOL/L (ref 95–110)
CLARITY UR: CLEAR
CO2 SERPL-SCNC: 25 MMOL/L (ref 23–29)
COLOR UR: YELLOW
CREAT SERPL-MCNC: 2.4 MG/DL (ref 0.5–1.4)
DIFFERENTIAL METHOD: ABNORMAL
EOSINOPHIL # BLD AUTO: 0.1 K/UL (ref 0–0.5)
EOSINOPHIL NFR BLD: 0.7 % (ref 0–8)
ERYTHROCYTE [DISTWIDTH] IN BLOOD BY AUTOMATED COUNT: 12.9 % (ref 11.5–14.5)
EST. GFR  (AFRICAN AMERICAN): 31 ML/MIN/1.73 M^2
EST. GFR  (NON AFRICAN AMERICAN): 27 ML/MIN/1.73 M^2
GLUCOSE SERPL-MCNC: 130 MG/DL (ref 70–110)
GLUCOSE UR QL STRIP: NEGATIVE
HCT VFR BLD AUTO: 42.1 % (ref 40–54)
HGB BLD-MCNC: 14.6 G/DL (ref 14–18)
HGB UR QL STRIP: NEGATIVE
IMM GRANULOCYTES # BLD AUTO: 0.03 K/UL (ref 0–0.04)
KETONES UR QL STRIP: NEGATIVE
LEUKOCYTE ESTERASE UR QL STRIP: NEGATIVE
LYMPHOCYTES # BLD AUTO: 2 K/UL (ref 1–4.8)
LYMPHOCYTES NFR BLD: 22.3 % (ref 18–48)
MCH RBC QN AUTO: 29.7 PG (ref 27–31)
MCHC RBC AUTO-ENTMCNC: 34.7 G/DL (ref 32–36)
MCV RBC AUTO: 86 FL (ref 82–98)
MONOCYTES # BLD AUTO: 0.8 K/UL (ref 0.3–1)
MONOCYTES NFR BLD: 9.4 % (ref 4–15)
NEUTROPHILS # BLD AUTO: 6 K/UL (ref 1.8–7.7)
NEUTROPHILS NFR BLD: 67.2 % (ref 38–73)
NITRITE UR QL STRIP: NEGATIVE
NRBC BLD-RTO: 0 /100 WBC
PH UR STRIP: 6 [PH] (ref 5–8)
PLATELET # BLD AUTO: 200 K/UL (ref 150–350)
PMV BLD AUTO: 8.8 FL (ref 9.2–12.9)
POTASSIUM SERPL-SCNC: 4.2 MMOL/L (ref 3.5–5.1)
PROT UR QL STRIP: NEGATIVE
RBC # BLD AUTO: 4.91 M/UL (ref 4.6–6.2)
SODIUM SERPL-SCNC: 139 MMOL/L (ref 136–145)
SP GR UR STRIP: 1.02 (ref 1–1.03)
URN SPEC COLLECT METH UR: NORMAL
UROBILINOGEN UR STRIP-ACNC: NEGATIVE EU/DL
WBC # BLD AUTO: 8.85 K/UL (ref 3.9–12.7)

## 2019-10-22 PROCEDURE — 96366 THER/PROPH/DIAG IV INF ADDON: CPT | Performed by: EMERGENCY MEDICINE

## 2019-10-22 PROCEDURE — 63600175 PHARM REV CODE 636 W HCPCS: Mod: HCNC | Performed by: EMERGENCY MEDICINE

## 2019-10-22 PROCEDURE — 99285 EMERGENCY DEPT VISIT HI MDM: CPT | Mod: 25,HCNC

## 2019-10-22 PROCEDURE — 25000003 PHARM REV CODE 250: Mod: HCNC | Performed by: EMERGENCY MEDICINE

## 2019-10-22 PROCEDURE — 36415 COLL VENOUS BLD VENIPUNCTURE: CPT | Mod: HCNC

## 2019-10-22 PROCEDURE — 94761 N-INVAS EAR/PLS OXIMETRY MLT: CPT | Mod: HCNC

## 2019-10-22 PROCEDURE — G0378 HOSPITAL OBSERVATION PER HR: HCPCS | Mod: HCNC

## 2019-10-22 PROCEDURE — 85025 COMPLETE CBC W/AUTO DIFF WBC: CPT | Mod: HCNC

## 2019-10-22 PROCEDURE — 81003 URINALYSIS AUTO W/O SCOPE: CPT | Mod: HCNC

## 2019-10-22 PROCEDURE — 80048 BASIC METABOLIC PNL TOTAL CA: CPT | Mod: HCNC

## 2019-10-22 PROCEDURE — 96365 THER/PROPH/DIAG IV INF INIT: CPT | Mod: HCNC

## 2019-10-22 PROCEDURE — 25000003 PHARM REV CODE 250: Mod: HCNC | Performed by: HOSPITALIST

## 2019-10-22 PROCEDURE — 96366 THER/PROPH/DIAG IV INF ADDON: CPT | Mod: HCNC

## 2019-10-22 PROCEDURE — 96365 THER/PROPH/DIAG IV INF INIT: CPT | Performed by: EMERGENCY MEDICINE

## 2019-10-22 RX ORDER — ACETAMINOPHEN 325 MG/1
650 TABLET ORAL EVERY 6 HOURS PRN
Status: CANCELLED | OUTPATIENT
Start: 2019-10-22

## 2019-10-22 RX ORDER — ASPIRIN 81 MG/1
81 TABLET ORAL DAILY
Status: DISCONTINUED | OUTPATIENT
Start: 2019-10-22 | End: 2019-10-23 | Stop reason: HOSPADM

## 2019-10-22 RX ORDER — AMOXICILLIN 250 MG
1 CAPSULE ORAL 2 TIMES DAILY
Status: DISCONTINUED | OUTPATIENT
Start: 2019-10-22 | End: 2019-10-23 | Stop reason: HOSPADM

## 2019-10-22 RX ORDER — POTASSIUM CHLORIDE 20 MEQ/15ML
60 SOLUTION ORAL
Status: DISCONTINUED | OUTPATIENT
Start: 2019-10-22 | End: 2019-10-23 | Stop reason: HOSPADM

## 2019-10-22 RX ORDER — HYDROCODONE BITARTRATE AND ACETAMINOPHEN 5; 325 MG/1; MG/1
1 TABLET ORAL
Status: COMPLETED | OUTPATIENT
Start: 2019-10-22 | End: 2019-10-22

## 2019-10-22 RX ORDER — ONDANSETRON 2 MG/ML
8 INJECTION INTRAMUSCULAR; INTRAVENOUS EVERY 8 HOURS PRN
Status: DISCONTINUED | OUTPATIENT
Start: 2019-10-22 | End: 2019-10-23 | Stop reason: HOSPADM

## 2019-10-22 RX ORDER — SODIUM CHLORIDE 0.9 % (FLUSH) 0.9 %
10 SYRINGE (ML) INJECTION
Status: DISCONTINUED | OUTPATIENT
Start: 2019-10-22 | End: 2019-10-23 | Stop reason: HOSPADM

## 2019-10-22 RX ORDER — IBUPROFEN 200 MG
24 TABLET ORAL
Status: DISCONTINUED | OUTPATIENT
Start: 2019-10-22 | End: 2019-10-23 | Stop reason: HOSPADM

## 2019-10-22 RX ORDER — ROSUVASTATIN CALCIUM 20 MG/1
40 TABLET, COATED ORAL DAILY
Status: DISCONTINUED | OUTPATIENT
Start: 2019-10-22 | End: 2019-10-23 | Stop reason: HOSPADM

## 2019-10-22 RX ORDER — SODIUM CHLORIDE 9 MG/ML
INJECTION, SOLUTION INTRAVENOUS CONTINUOUS
Status: DISCONTINUED | OUTPATIENT
Start: 2019-10-22 | End: 2019-10-23 | Stop reason: HOSPADM

## 2019-10-22 RX ORDER — BUPROPION HYDROCHLORIDE 150 MG/1
150 TABLET ORAL DAILY
Status: DISCONTINUED | OUTPATIENT
Start: 2019-10-22 | End: 2019-10-23 | Stop reason: HOSPADM

## 2019-10-22 RX ORDER — CARVEDILOL 6.25 MG/1
6.25 TABLET ORAL 2 TIMES DAILY WITH MEALS
Status: DISCONTINUED | OUTPATIENT
Start: 2019-10-22 | End: 2019-10-23 | Stop reason: HOSPADM

## 2019-10-22 RX ORDER — ONDANSETRON 2 MG/ML
4 INJECTION INTRAMUSCULAR; INTRAVENOUS EVERY 8 HOURS PRN
Status: DISCONTINUED | OUTPATIENT
Start: 2019-10-22 | End: 2019-10-23 | Stop reason: HOSPADM

## 2019-10-22 RX ORDER — RAMELTEON 8 MG/1
8 TABLET ORAL NIGHTLY PRN
Status: DISCONTINUED | OUTPATIENT
Start: 2019-10-22 | End: 2019-10-23 | Stop reason: HOSPADM

## 2019-10-22 RX ORDER — ACETAMINOPHEN 500 MG
1000 TABLET ORAL EVERY 6 HOURS PRN
Status: DISCONTINUED | OUTPATIENT
Start: 2019-10-22 | End: 2019-10-23 | Stop reason: HOSPADM

## 2019-10-22 RX ORDER — LANOLIN ALCOHOL/MO/W.PET/CERES
800 CREAM (GRAM) TOPICAL
Status: DISCONTINUED | OUTPATIENT
Start: 2019-10-22 | End: 2019-10-23 | Stop reason: HOSPADM

## 2019-10-22 RX ORDER — TAMSULOSIN HYDROCHLORIDE 0.4 MG/1
1 CAPSULE ORAL DAILY
Status: DISCONTINUED | OUTPATIENT
Start: 2019-10-22 | End: 2019-10-23 | Stop reason: HOSPADM

## 2019-10-22 RX ORDER — IBUPROFEN 200 MG
16 TABLET ORAL
Status: DISCONTINUED | OUTPATIENT
Start: 2019-10-22 | End: 2019-10-23 | Stop reason: HOSPADM

## 2019-10-22 RX ORDER — GLUCAGON 1 MG
1 KIT INJECTION
Status: DISCONTINUED | OUTPATIENT
Start: 2019-10-22 | End: 2019-10-23 | Stop reason: HOSPADM

## 2019-10-22 RX ORDER — COLCHICINE 0.6 MG/1
0.6 TABLET, FILM COATED ORAL DAILY
Status: DISCONTINUED | OUTPATIENT
Start: 2019-10-22 | End: 2019-10-23 | Stop reason: HOSPADM

## 2019-10-22 RX ORDER — POTASSIUM CHLORIDE 20 MEQ/15ML
40 SOLUTION ORAL
Status: DISCONTINUED | OUTPATIENT
Start: 2019-10-22 | End: 2019-10-23 | Stop reason: HOSPADM

## 2019-10-22 RX ORDER — MORPHINE SULFATE 10 MG/ML
6 INJECTION INTRAMUSCULAR; INTRAVENOUS; SUBCUTANEOUS EVERY 4 HOURS PRN
Status: DISCONTINUED | OUTPATIENT
Start: 2019-10-22 | End: 2019-10-23 | Stop reason: HOSPADM

## 2019-10-22 RX ORDER — IBUPROFEN 600 MG/1
600 TABLET ORAL EVERY 8 HOURS PRN
Status: DISCONTINUED | OUTPATIENT
Start: 2019-10-22 | End: 2019-10-23 | Stop reason: HOSPADM

## 2019-10-22 RX ORDER — AMLODIPINE BESYLATE 5 MG/1
10 TABLET ORAL DAILY
Status: DISCONTINUED | OUTPATIENT
Start: 2019-10-22 | End: 2019-10-23 | Stop reason: HOSPADM

## 2019-10-22 RX ADMIN — CEFTRIAXONE 1 G: 1 INJECTION, SOLUTION INTRAVENOUS at 08:10

## 2019-10-22 RX ADMIN — BUPROPION HYDROCHLORIDE 150 MG: 150 TABLET, FILM COATED, EXTENDED RELEASE ORAL at 11:10

## 2019-10-22 RX ADMIN — SODIUM CHLORIDE 1000 ML: 0.9 INJECTION, SOLUTION INTRAVENOUS at 08:10

## 2019-10-22 RX ADMIN — ROSUVASTATIN CALCIUM 40 MG: 20 TABLET, FILM COATED ORAL at 11:10

## 2019-10-22 RX ADMIN — CARVEDILOL 6.25 MG: 6.25 TABLET, FILM COATED ORAL at 11:10

## 2019-10-22 RX ADMIN — ASPIRIN 81 MG: 81 TABLET, COATED ORAL at 11:10

## 2019-10-22 RX ADMIN — SODIUM CHLORIDE: 0.9 INJECTION, SOLUTION INTRAVENOUS at 11:10

## 2019-10-22 RX ADMIN — HYDROCODONE BITARTRATE AND ACETAMINOPHEN 1 TABLET: 5; 325 TABLET ORAL at 06:10

## 2019-10-22 RX ADMIN — AMLODIPINE BESYLATE 10 MG: 5 TABLET ORAL at 11:10

## 2019-10-22 NOTE — PROVIDER PROGRESS NOTES - EMERGENCY DEPT.
Encounter Date: 10/22/2019    ED Physician Progress Notes        Physician Note:   This patient was signed out to me by Dr. Alegria for follow-up of imaging and laboratories.  Acute renal insufficiency in the setting of known diabetes and noted in possibly related to decreased intake with recent obstructive uropathy.  Two UPJ 4 mm ureteral stones noted with small amount of forniceal rupture discussed with Dr. Proctor who recommends antibiotics without need for emergent stent placement.  I did discuss with hospital medicine who will admit for IV fluids and following of renal function. Analgesia and antiemetics as needed plan.  No sign of UTI.  Ceftriaxone done after discussion with Dr. Proctor Urology.  I have discussed with Dr. Michele who saw the patient in the emergency department as well and will admit.

## 2019-10-22 NOTE — HPI
Patient is a 68-year-old  male with a past medical history significant for hypertension, coronary artery disease, hyperlipidemia, and sensory deafness status post cochlear implant who presents the hospital with worsening left flank plain.  Patient states that the pain started approximately 2 days prior to admission and worsened with evidence of nausea and vomiting.  Patient has a long history of kidney stones and states that his pain is similar to previous kidney stones, albeit on the opposite side.  In the emergency department the patient underwent evaluation with CT scan of the abdomen kidney stone protocol that showed UPJ obstruction as well as multiple stones within the ureter and subsequent hydronephrosis bilaterally. Patient denies any fevers or other signs of infection, however CT scan shows significant evidence of stranding over the left kidney.  Urology was consulted from the emergency department and requested admission for the patient for active management of his kidney stones and acute kidney injury.

## 2019-10-22 NOTE — NURSING
Arivved to floor. Denies pain. BP high, Dr. Michele stated to give bp medications and let him know if it remains high,.

## 2019-10-22 NOTE — SUBJECTIVE & OBJECTIVE
Past Medical History:   Diagnosis Date    Angina pectoris     Anxiety     Biliary colic     Cochlear implant in place     Deaf     LEFT EAR    Depression     Heart failure     History of colon polyps 2/20/2018    [place]hard of hearing[place]     HEARING AID - RIGHT    Hyperlipidemia     Hypertension     Kidney stone     Kidney stones     Renal stones     Trouble in sleeping     Wears glasses        Past Surgical History:   Procedure Laterality Date    [facility]     CATARACT EXTRACTION Bilateral     CHOLECYSTECTOMY  2-6-2014    COLONOSCOPY  1/9/2013    Dr. Gillette, 5 year recheck (family history colon cancer)    COLONOSCOPY N/A 3/12/2018    Procedure: COLONOSCOPY;  Surgeon: Garett Go MD;  Location: [facility];  Service: Endoscopy;  Laterality: N/A;    EAR MASTOIDECTOMY W/ COCHLEAR IMPLANT W/ LANDMARK      left ear    FOREIGN BODY REMOVAL Right     glass removed from right foot/repair    FRACTURE SURGERY      right arm x2    LITHOTRIPSY      ROTATOR CUFF REPAIR      Right     SINUS SURGERY      TONSILLECTOMY         Review of patient's allergies indicates:   Allergen Reactions    Bee pollens Shortness Of Breath     WASP, BEE, ANT AND CATERPILLER STINGS    Hydrochlorothiazide Shortness Of Breath and Rash    Milk containing products Diarrhea    Metoprolol Rash       No current facility-administered medications on file prior to encounter.      Current Outpatient Medications on File Prior to Encounter   Medication Sig    amLODIPine (NORVASC) 10 MG tablet Take 1 tablet (10 mg total) by mouth once daily.    aspirin (ECOTRIN) 81 MG EC tablet Take 81 mg by mouth once daily.    buPROPion (WELLBUTRIN XL) 150 MG TB24 tablet Take 1 tablet (150 mg total) by mouth once daily. In the morning    carvedilol (COREG) 6.25 MG tablet Take 1 tablet (6.25 mg total) by mouth 2 (two) times daily with meals.    cholestyramine-aspartame (QUESTRAN LIGHT) 4 gram Powd Take 1  Scoop by mouth once daily.    ibuprofen (ADVIL,MOTRIN) 600 MG tablet Take 600 mg by mouth every 8 (eight) hours as needed.    losartan (COZAAR) 100 MG tablet TAKE 1 TABLET BY MOUTH ONCE DAILY    metFORMIN (GLUCOPHAGE) 500 MG tablet Take 1 tablet (500 mg total) by mouth 2 (two) times daily with meals.    rosuvastatin (CRESTOR) 40 MG Tab Take 1 tablet (40 mg total) by mouth once daily.    b complex vitamins tablet Take 1 tablet by mouth once daily.    colchicine (COLCRYS) 0.6 mg tablet Take 1 tablet (0.6 mg total) by mouth once daily.    EPINEPHrine (EPIPEN) 0.3 mg/0.3 mL AtIn Inject into the muscle once.    nitroGLYCERIN (NITROSTAT) 0.4 MG SL tablet Place 1 tablet (0.4 mg total) under the tongue every 5 (five) minutes as needed for Chest pain.    tamsulosin (FLOMAX) 0.4 mg Cap Take 1 capsule (0.4 mg total) by mouth once daily.    [DISCONTINUED] fish oil-omega-3 fatty acids 300-1,000 mg capsule Take by mouth once daily.     Family History     Problem Relation (Age of Onset)    Alcohol abuse Brother, Brother    Arthritis Father    Cancer Mother, Brother, Paternal Uncle    Early death Daughter    Gout Father    Heart disease Father, Maternal Grandmother, Paternal Grandmother    Hypertension Father    Kidney disease Father    No Known Problems Sister, Son, Son    Stroke Maternal Grandfather        Tobacco Use    Smoking status: Never Smoker    Smokeless tobacco: Never Used   Substance and Sexual Activity    Alcohol use: Yes     Comment: once every two months    Drug use: No    Sexual activity: Yes     Review of Systems   Constitutional: Positive for fatigue. Negative for activity change and fever.   HENT: Negative for ear discharge, facial swelling and sore throat.    Eyes: Negative for photophobia, pain and visual disturbance.   Respiratory: Negative for apnea, cough and shortness of breath.    Cardiovascular: Negative for chest pain and leg swelling.   Gastrointestinal: Negative for abdominal pain and  blood in stool.   Endocrine: Negative for cold intolerance and heat intolerance.   Genitourinary: Positive for flank pain and frequency.   Musculoskeletal: Negative for back pain and gait problem.   Skin: Negative for pallor and rash.   Neurological: Negative for speech difficulty and headaches.   Psychiatric/Behavioral: Negative for confusion, hallucinations and suicidal ideas.   All other systems reviewed and are negative.    Objective:     Vital Signs (Most Recent):  Temp: 96.1 °F (35.6 °C) (10/22/19 1045)  Pulse: 80 (10/22/19 1045)  Resp: 16 (10/22/19 1045)  BP: (!) 209/103 (10/22/19 1045)  SpO2: 98 % (10/22/19 1045) Vital Signs (24h Range):  Temp:  [96.1 °F (35.6 °C)-98.7 °F (37.1 °C)] 96.1 °F (35.6 °C)  Pulse:  [] 80  Resp:  [16] 16  SpO2:  [94 %-98 %] 98 %  BP: (178-209)/() 209/103     Weight: 74.6 kg (164 lb 7.4 oz)  Body mass index is 27.37 kg/m².    Physical Exam   Constitutional: He is oriented to person, place, and time. He appears well-developed and well-nourished. No distress.   HENT:   Head: Normocephalic.   Mouth/Throat: Oropharynx is clear and moist.   Eyes: Conjunctivae are normal. Right eye exhibits no discharge. Left eye exhibits no discharge. No scleral icterus.   Neck: No JVD present.   Cardiovascular: Normal rate, regular rhythm, normal heart sounds and intact distal pulses. Exam reveals no friction rub.   No murmur heard.  Pulmonary/Chest: Effort normal and breath sounds normal. No respiratory distress. He has no wheezes.   Abdominal: Soft. Bowel sounds are normal. He exhibits no distension. There is no tenderness.   Musculoskeletal: Normal range of motion. He exhibits no edema.   Lymphadenopathy:     He has no cervical adenopathy.   Neurological: He is alert and oriented to person, place, and time. He has normal reflexes.   Skin: No rash noted. He is not diaphoretic. No erythema.   Psychiatric: He has a normal mood and affect. His behavior is normal.   Nursing note and vitals  reviewed.          Significant Labs: All pertinent labs within the past 24 hours have been reviewed.    Significant Imaging: I have reviewed all pertinent imaging results/findings within the past 24 hours.

## 2019-10-22 NOTE — ED PROVIDER NOTES
Encounter Date: 10/22/2019       History     Chief Complaint   Patient presents with    Flank Pain     68-year-old male with a history of renal colic presents with left flank pain radiating to the left groin.  Symptoms started gradually several days ago.  He reports this is similar to previous episodes of renal colic but also bladder infections.  He reports general malaise.  He reports nausea but no vomiting. No right-sided abdominal pain.  No fevers or chills.  No aggravating or alleviating factors.        Review of patient's allergies indicates:   Allergen Reactions    Bee pollens Shortness Of Breath     WASP, BEE, ANT AND CATERPILLER STINGS    Hydrochlorothiazide Shortness Of Breath and Rash    Milk containing products Diarrhea    Metoprolol Rash     Past Medical History:   Diagnosis Date    Angina pectoris     Anxiety     Biliary colic     Cochlear implant in place     Deaf     LEFT EAR    Depression     Heart failure     History of colon polyps 2/20/2018    [place] (hard of hearing)     HEARING AID - RIGHT    Hyperlipidemia     Hypertension     Kidney stone     Kidney stones     Renal stones     Trouble in sleeping     Wears glasses      Past Surgical History:   Procedure Laterality Date    [facility]     CATARACT EXTRACTION Bilateral     CHOLECYSTECTOMY  2-6-2014    COLONOSCOPY  1/9/2013    Dr. Gillette, 5 year recheck (family history colon cancer)    COLONOSCOPY N/A 3/12/2018    Procedure: COLONOSCOPY;  Surgeon: Garett Go MD;  Location[facility];  Service: Endoscopy;  Laterality: N/A;    EAR MASTOIDECTOMY W/ COCHLEAR IMPLANT W/ LANDMARK      left ear    FOREIGN BODY REMOVAL Right     glass removed from right foot/repair    FRACTURE SURGERY      right arm x2    LITHOTRIPSY      ROTATOR CUFF REPAIR      Right     SINUS SURGERY      TONSILLECTOMY       Family History   Problem Relation Age of Onset    Cancer Mother         colon    Heart  disease Father     Gout Father     Kidney disease Father     Arthritis Father     Hypertension Father     Early death Daughter         mva    Alcohol abuse Brother     Cancer Brother         mouth cancer w mets    No Known Problems Sister     Alcohol abuse Brother     No Known Problems Son     Heart disease Maternal Grandmother         MI    Stroke Maternal Grandfather     Heart disease Paternal Grandmother         MI    No Known Problems Son     Cancer Paternal Uncle         lung cancer    Allergic rhinitis Neg Hx     Allergies Neg Hx     Angioedema Neg Hx     Asthma Neg Hx     Atopy Neg Hx     Eczema Neg Hx     Immunodeficiency Neg Hx     Rhinitis Neg Hx     Urticaria Neg Hx     Collagen disease Neg Hx      Social History     Tobacco Use    Smoking status: Never Smoker    Smokeless tobacco: Never Used   Substance Use Topics    Alcohol use: Yes     Comment: once every two months    Drug use: No     Review of Systems   Constitutional: Negative for chills and fever.   Respiratory: Negative for shortness of breath.    Cardiovascular: Negative for chest pain.   Gastrointestinal: Positive for abdominal pain and nausea. Negative for vomiting.   Genitourinary: Positive for flank pain. Negative for hematuria.   Skin: Negative for rash.       Physical Exam     Initial Vitals [10/22/19 0609]   BP Pulse Resp Temp SpO2   (!) 196/89 101 16 -- 98 %      MAP       --         Physical Exam    Nursing note and vitals reviewed.  Constitutional: He appears well-developed and well-nourished. He is not diaphoretic.  Non-toxic appearance. He does not have a sickly appearance. He does not appear ill. No distress.   Uncomfortable   HENT:   Head: Normocephalic and atraumatic.   Eyes: EOM are normal.   Neck: Normal range of motion. Neck supple. Normal range of motion present. No neck rigidity.   Cardiovascular: Normal rate, regular rhythm and normal heart sounds. Exam reveals no gallop and no friction rub.    No  murmur heard.  Pulmonary/Chest: Breath sounds normal. No respiratory distress. He has no wheezes. He has no rhonchi. He has no rales.   Abdominal: He exhibits no distension. There is tenderness. There is no rebound and no guarding.   Left lower quadrant tenderness   Musculoskeletal: Normal range of motion.   Neurological: He is alert and oriented to person, place, and time.   Skin: Skin is warm and dry. No rash noted.   Psychiatric: He has a normal mood and affect. His behavior is normal. Judgment and thought content normal.         ED Course   Procedures  Labs Reviewed   URINALYSIS, REFLEX TO URINE CULTURE   CBC W/ AUTO DIFFERENTIAL   URINALYSIS, REFLEX TO URINE CULTURE   BASIC METABOLIC PANEL          Imaging Results    None          Medical Decision Making:   History:   I obtained history from: someone other than patient.       <> Summary of History: Wife  Old Medical Records: I decided to obtain old medical records.  Clinical Tests:   Lab Tests: Ordered  Radiological Study: Ordered                   ED Course as of Oct 23 1335   Tue Oct 22, 2019   0613 BP(!): 196/89 [EF]   0613 Pulse: 101 [EF]   0613 Resp: 16 [EF]   0613 SpO2: 98 % [EF]   0659 S/o dr small    [EF]   0712 CT-AP:    The right kidney is normal. There are obstructing calculi at the left ureteropelvic junction measuring 4 x 3 x 5 mm and 4 x 3 x 3 mm resulting in moderate left hydronephrosis and perinephric stranding with trace fluid suggestive of forniceal rupture. Additional nonobstructing left lower pole renal calculus is noted.  (rad read)  The patient was informed of the incidental finding(s) as well as the need for PCP or specialist follow-up for reevaluation and possible further investigation or monitoring.    [MR]   0730 Patient's urology group unsuccessful contact with office (not open with no forward to answering service) and Dr. Proctor's mobile phone.    [MR]      ED Course User Index  [EF] Gus Alegria MD  [MR] Cesar ARRIETA  MD Toshia     Clinical Impression:       ICD-10-CM ICD-9-CM   1. Renal insufficiency N28.9 593.9   2. Renal colic on left side N23 788.0             68-year-old presents with several days of left flank pain radiating into the groin consistent with prior episodes of bladder infections and also renal colic.  Signed over to Dr. Pope.  Patient presented at shift change.                   Gus Alegria MD  10/23/19 5251

## 2019-10-22 NOTE — NURSING
Pt to room 309 via wheelchair in no distress, he denies pain or sob at this time.  AAO times 3, ID band on.  Skin intact no open wounds noted.  He is on room air.  He has a cell phone with him, no medications.  One right side hearing aid and a ear implant that he does not have on at this time.

## 2019-10-22 NOTE — NURSING
I talked to pt wife on the phone about his medications, she read them off to me over the phone, pt was not able to tell me what he takes or the dose.

## 2019-10-22 NOTE — ASSESSMENT & PLAN NOTE
Patient is chronically on statin. Will continue for now. Monitor clinically. Last LDL was   Lab Results   Component Value Date    LDLCALC 117.0 02/01/2019

## 2019-10-22 NOTE — ASSESSMENT & PLAN NOTE
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 Labs reviewed- BMP with Estimated Creatinine Clearance: 28.6 mL/min (A) (based on SCr of 2.4 mg/dL (H)). according to latest data. Monitor UOP and serial BMP and adjust therapy as needed. Avoid nephrotoxins and renally dose meds for GFR listed above.  Treat urinary obstruction per obstructive uropathy below.

## 2019-10-22 NOTE — ASSESSMENT & PLAN NOTE
Chronic, uncontrolled.  Latest blood pressure and vitals reviewed-   Temp:  [96.1 °F (35.6 °C)-98.7 °F (37.1 °C)]   Pulse:  []   Resp:  [16]   BP: (178-209)/()   SpO2:  [94 %-98 %] .   Home meds for hypertension were reviewed and noted below. Hospital anti-hypertensive changes were made as shown below.  Hypertension Medications             amLODIPine (NORVASC) 10 MG tablet Take 1 tablet (10 mg total) by mouth once daily.    carvedilol (COREG) 6.25 MG tablet Take 1 tablet (6.25 mg total) by mouth 2 (two) times daily with meals.    losartan (COZAAR) 100 MG tablet TAKE 1 TABLET BY MOUTH ONCE DAILY    nitroGLYCERIN (NITROSTAT) 0.4 MG SL tablet Place 1 tablet (0.4 mg total) under the tongue every 5 (five) minutes as needed for Chest pain.      Hospital Medications             amLODIPine tablet 10 mg 10 mg, Oral, Daily    carvedilol tablet 6.25 mg 6.25 mg, Oral, 2 times daily with meals        Will utilize p.r.n. blood pressure medication only if patient's blood pressure greater than  180/110 and he develops symptoms such as worsening chest pain or shortness of breath.  Will hold losartan secondary to acute kidney injury.

## 2019-10-22 NOTE — PLAN OF CARE
Cm completed the assessment at pt's bedside.  Pt arrived from home, he is independent in care.  PCP is Dr. Chatman.  Insurance verified as [employer] and Medicaid.  Pt denies diabetes, dialysis and coumadin.  Disposition:  Pt will discharge to home.  No needs verbalized at this time.       10/22/19 1225   Discharge Assessment   Assessment Type Discharge Planning Assessment   Confirmed/corrected address and phone number on facesheet? Yes   Assessment information obtained from? Patient   Prior to hospitilization cognitive status: Alert/Oriented   Prior to hospitalization functional status: Independent   Current cognitive status: Alert/Oriented   Current Functional Status: Independent   Facility Arrived From: home   Lives With spouse   Able to Return to Prior Arrangements yes   Is patient able to care for self after discharge? Yes   Who are your caregiver(s) and their phone number(s)? kishan Humphries - [phone]   Patient's perception of discharge disposition home or selfcare   Readmission Within the Last 30 Days no previous admission in last 30 days   Patient currently being followed by outpatient case management? Yes   If yes, name of outpatient case management following: insurance company assigned oupatient case management   Patient currently receives any other outside agency services? No   Equipment Currently Used at Home none   Do you have any problems affording any of your prescribed medications? No   Is the patient taking medications as prescribed? yes   Does the patient have transportation home? Yes   Transportation Anticipated car, drives self;family or friend will provide   Dialysis Name and Scheduled days na   Does the patient receive services at the [facility]madin Clinic? No   Discharge Plan A Home with family   DME Needed Upon Discharge  none   Patient/Family in Agreement with Plan yes

## 2019-10-22 NOTE — H&P
[facility] Medicine  History & Physical    Patient Name: Buddy Park  MRN: [id number]  Admission Date: 10/22/2019  Attending Physician: Sukhdev Michele MD  Primary Care Provider: Buddy Chatman MD         Patient information was obtained from patient, spouse/SO, past medical records and ER records.     Subjective:     Principal Problem:EARLENE (acute kidney injury)    Chief Complaint:   Chief Complaint   Patient presents with    Flank Pain        HPI: Patient is a 68-year-old  male with a past medical history significant for hypertension, coronary artery disease, hyperlipidemia, and sensory deafness status post cochlear implant who presents the hospital with worsening left flank plain.  Patient states that the pain started approximately 2 days prior to admission and worsened with evidence of nausea and vomiting.  Patient has a long history of kidney stones and states that his pain is similar to previous kidney stones, albeit on the opposite side.  In the emergency department the patient underwent evaluation with CT scan of the abdomen kidney stone protocol that showed UPJ obstruction as well as multiple stones within the ureter and subsequent hydronephrosis bilaterally. Patient denies any fevers or other signs of infection, however CT scan shows significant evidence of stranding over the left kidney.  Urology was consulted from the emergency department and requested admission for the patient for active management of his kidney stones and acute kidney injury.    Past Medical History:   Diagnosis Date    Angina pectoris     Anxiety     Biliary colic     Cochlear implant in place     Deaf     LEFT EAR    Depression     Heart failure     History of colon polyps 2/20/2018    [place]hard [place] hearing[place]     HEARING AID - RIGHT    Hyperlipidemia     Hypertension     Kidney stone     Kidney stones     Renal stones     Trouble in sleeping     Wears glasses        Past Surgical  History:   Procedure Laterality Date    CAROTID [facility]GIOPLAST[facility]  06/2018    [facility]     CATARACT EXTRACTION Bilateral     CHOLECYSTECTOMY  2-6-2014    COLONOSCOPY  1/9/2013    Dr. Gillette, 5 year recheck (family history colon cancer)    COLONOSCOPY N/A 3/12/2018    Procedure: COLONOSCOPY;  Surgeon: Garett Go MD;  Location: [facility];  Service: Endoscopy;  Laterality: N/A;    EAR MASTOIDECTOMY W/ COCHLEAR IMPLANT W/ LANDMARK      left ear    FOREIGN BODY REMOVAL Right     glass removed from right foot/repair    FRACTURE SURGERY      right arm x2    LITHOTRIPSY      ROTATOR CUFF REPAIR      Right     SINUS SURGERY      TONSILLECTOMY         Review of patient's allergies indicates:   Allergen Reactions    Bee pollens Shortness Of Breath     WASP, BEE, ANT AND CATERPILLER STINGS    Hydrochlorothiazide Shortness Of Breath and Rash    Milk containing products Diarrhea    Metoprolol Rash       No current facility-administered medications on file prior to encounter.      Current Outpatient Medications on File Prior to Encounter   Medication Sig    amLODIPine (NORVASC) 10 MG tablet Take 1 tablet (10 mg total) by mouth once daily.    aspirin (ECOTRIN) 81 MG EC tablet Take 81 mg by mouth once daily.    buPROPion (WELLBUTRIN XL) 150 MG TB24 tablet Take 1 tablet (150 mg total) by mouth once daily. In the morning    carvedilol (COREG) 6.25 MG tablet Take 1 tablet (6.25 mg total) by mouth 2 (two) times daily with meals.    cholestyramine-aspartame (QUESTRAN LIGHT) 4 gram Powd Take 1 Scoop by mouth once daily.    ibuprofen (ADVIL,MOTRIN) 600 MG tablet Take 600 mg by mouth every 8 (eight) hours as needed.    losartan (COZAAR) 100 MG tablet TAKE 1 TABLET BY MOUTH ONCE DAILY    metFORMIN (GLUCOPHAGE) 500 MG tablet Take 1 tablet (500 mg total) by mouth 2 (two) times daily with meals.    rosuvastatin (CRESTOR) 40 MG Tab Take 1 tablet (40 mg total) by mouth once daily.    b complex vitamins tablet Take 1  tablet by mouth once daily.    colchicine (COLCRYS) 0.6 mg tablet Take 1 tablet (0.6 mg total) by mouth once daily.    EPINEPHrine (EPIPEN) 0.3 mg/0.3 mL AtIn Inject into the muscle once.    nitroGLYCERIN (NITROSTAT) 0.4 MG SL tablet Place 1 tablet (0.4 mg total) under the tongue every 5 (five) minutes as needed for Chest pain.    tamsulosin (FLOMAX) 0.4 mg Cap Take 1 capsule (0.4 mg total) by mouth once daily.    [DISCONTINUED] fish oil-omega-3 fatty acids 300-1,000 mg capsule Take by mouth once daily.     Family History     Problem Relation (Age of Onset)    Alcohol abuse Brother, Brother    Arthritis Father    Cancer Mother, Brother, Paternal Uncle    Early death Daughter    Gout Father    Heart disease Father, Maternal Grandmother, Paternal Grandmother    Hypertension Father    Kidney disease Father    No Known Problems Sister, Son, Son    Stroke Maternal Grandfather        Tobacco Use    Smoking status: Never Smoker    Smokeless tobacco: Never Used   Substance and Sexual Activity    Alcohol use: Yes     Comment: once every two months    Drug use: No    Sexual activity: Yes     Review of Systems   Constitutional: Positive for fatigue. Negative for activity change and fever.   HENT: Negative for ear discharge, facial swelling and sore throat.    Eyes: Negative for photophobia, pain and visual disturbance.   Respiratory: Negative for apnea, cough and shortness of breath.    Cardiovascular: Negative for chest pain and leg swelling.   Gastrointestinal: Negative for abdominal pain and blood in stool.   Endocrine: Negative for cold intolerance and heat intolerance.   Genitourinary: Positive for flank pain and frequency.   Musculoskeletal: Negative for back pain and gait problem.   Skin: Negative for pallor and rash.   Neurological: Negative for speech difficulty and headaches.   Psychiatric/Behavioral: Negative for confusion, hallucinations and suicidal ideas.   All other systems reviewed and are  negative.    Objective:     Vital Signs (Most Recent):  Temp: 96.1 °F (35.6 °C) (10/22/19 1045)  Pulse: 80 (10/22/19 1045)  Resp: 16 (10/22/19 1045)  BP: (!) 209/103 (10/22/19 1045)  SpO2: 98 % (10/22/19 1045) Vital Signs (24h Range):  Temp:  [96.1 °F (35.6 °C)-98.7 °F (37.1 °C)] 96.1 °F (35.6 °C)  Pulse:  [] 80  Resp:  [16] 16  SpO2:  [94 %-98 %] 98 %  BP: (178-209)/() 209/103     Weight: 74.6 kg (164 lb 7.4 oz)  Body mass index is 27.37 kg/m².    Physical Exam   Constitutional: He is oriented to person, place, and time. He appears well-developed and well-nourished. No distress.   HENT:   Head: Normocephalic.   Mouth/Throat: Oropharynx is clear and moist.   Eyes: Conjunctivae are normal. Right eye exhibits no discharge. Left eye exhibits no discharge. No scleral icterus.   Neck: No JVD present.   Cardiovascular: Normal rate, regular rhythm, normal heart sounds and intact distal pulses. Exam reveals no friction rub.   No murmur heard.  Pulmonary/Chest: Effort normal and breath sounds normal. No respiratory distress. He has no wheezes.   Abdominal: Soft. Bowel sounds are normal. He exhibits no distension. There is no tenderness.   Musculoskeletal: Normal range of motion. He exhibits no edema.   Lymphadenopathy:     He has no cervical adenopathy.   Neurological: He is alert and oriented to person, place, and time. He has normal reflexes.   Skin: No rash noted. He is not diaphoretic. No erythema.   Psychiatric: He has a normal mood and affect. His behavior is normal.   Nursing note and vitals reviewed.          Significant Labs: All pertinent labs within the past 24 hours have been reviewed.    Significant Imaging: I have reviewed all pertinent imaging results/findings within the past 24 hours.    Assessment/Plan:     * EARLENE (acute kidney injury)  Patient with EARLENE likely d/t Post-obstructive  Which is currently Uncontrolled. Labs reviewed- BMP with Estimated Creatinine Clearance: 28.6 mL/min (A) (based on  SCr of 2.4 mg/dL (H)). according to latest data. Monitor UOP and serial BMP and adjust therapy as needed. Avoid nephrotoxins and renally dose meds for GFR listed above.  Treat urinary obstruction per obstructive uropathy below.        Acute bilateral obstructive uropathy  Patient with bilateral obstructive uropathy secondary to nephrolithiasis.  Will continue on tamsulosin, IV fluids, and pain/nausea management with supportive care.  Neurology consulted.  Will defer to Urology for further evaluation and management of this disease process.  Continue to monitor kidney function and urine output closely.      Prediabetes  Patient on metformin as an outpatient.  Will hold metformin for now for potential for administration of contrast, and monitor closely.  No need for sliding scale at the moment unless serum fasting glucose are greater than 180.      CKD (chronic kidney disease) stage 3, GFR 30-59 ml/min        Carotid disease, bilateral  Continue patient on statin and aspirin.      HTN (hypertension)  Chronic, uncontrolled.  Latest blood pressure and vitals reviewed-   Temp:  [96.1 °F (35.6 °C)-98.7 °F (37.1 °C)]   Pulse:  []   Resp:  [16]   BP: (178-209)/()   SpO2:  [94 %-98 %] .   Home meds for hypertension were reviewed and noted below. Hospital anti-hypertensive changes were made as shown below.  Hypertension Medications             amLODIPine (NORVASC) 10 MG tablet Take 1 tablet (10 mg total) by mouth once daily.    carvedilol (COREG) 6.25 MG tablet Take 1 tablet (6.25 mg total) by mouth 2 (two) times daily with meals.    losartan (COZAAR) 100 MG tablet TAKE 1 TABLET BY MOUTH ONCE DAILY    nitroGLYCERIN (NITROSTAT) 0.4 MG SL tablet Place 1 tablet (0.4 mg total) under the tongue every 5 (five) minutes as needed for Chest pain.      Hospital Medications             amLODIPine tablet 10 mg 10 mg, Oral, Daily    carvedilol tablet 6.25 mg 6.25 mg, Oral, 2 times daily with meals        Will utilize p.r.n.  blood pressure medication only if patient's blood pressure greater than  180/110 and he develops symptoms such as worsening chest pain or shortness of breath.  Will hold losartan secondary to acute kidney injury.        Cochlear implant in place  Noted-no acute inpatient needs.      Hyperlipidemia   Patient is chronically on statin. Will continue for now. Monitor clinically. Last LDL was   Lab Results   Component Value Date    LDLCALC 117.0 02/01/2019              VTE Risk Mitigation (From admission, onward)         Ordered     IP VTE LOW RISK PATIENT  Once      10/22/19 1050     Place PINKY hose  Until discontinued      10/22/19 1050     Place sequential compression device  Until discontinued      10/22/19 1050                   Sukhdev Michele MD  Department of Hospital Medicine   [facility]

## 2019-10-22 NOTE — ASSESSMENT & PLAN NOTE
Patient on metformin as an outpatient.  Will hold metformin for now for potential for administration of contrast, and monitor closely.  No need for sliding scale at the moment unless serum fasting glucose are greater than 180.

## 2019-10-22 NOTE — PLAN OF CARE
Pt alert and oriented. Ambulatory. Denies pain at this time. Fluids infusing. Refusing teds/scds at this time. Plan of care reviewed with patient, safety maintained. Will continue to monitor.

## 2019-10-22 NOTE — NURSING
Pt requested that the scd and teds be removed related to claustrophobia type reaction.  He also refused to use the yellow safety socks.

## 2019-10-22 NOTE — PLAN OF CARE
10/22/19 1121   ROCKWELL Message   Medicare Outpatient and Observation Notification regarding financial responsibility Given to patient/caregiver;Explained to patient/caregiver;Signed/date by patient/caregiver   Date ROCKWELL was signed 10/22/19   Time ROCKWELL was signed 0932

## 2019-10-22 NOTE — ASSESSMENT & PLAN NOTE
Patient with bilateral obstructive uropathy secondary to nephrolithiasis.  Will continue on tamsulosin, IV fluids, and pain/nausea management with supportive care.  Neurology consulted.  Will defer to Urology for further evaluation and management of this disease process.  Continue to monitor kidney function and urine output closely.

## 2019-10-23 ENCOUNTER — TELEPHONE (OUTPATIENT)
Dept: UROLOGY | Facility: CLINIC | Age: 69
End: 2019-10-23

## 2019-10-23 ENCOUNTER — TELEPHONE (OUTPATIENT)
Dept: FAMILY MEDICINE | Facility: CLINIC | Age: 69
End: 2019-10-23

## 2019-10-23 VITALS
OXYGEN SATURATION: 97 % | TEMPERATURE: 98 F | SYSTOLIC BLOOD PRESSURE: 179 MMHG | DIASTOLIC BLOOD PRESSURE: 88 MMHG | HEART RATE: 80 BPM | RESPIRATION RATE: 18 BRPM | HEIGHT: 65 IN | BODY MASS INDEX: 27.4 KG/M2 | WEIGHT: 164.44 LBS

## 2019-10-23 LAB
ANION GAP SERPL CALC-SCNC: 10 MMOL/L (ref 8–16)
BUN SERPL-MCNC: 21 MG/DL (ref 8–23)
CALCIUM SERPL-MCNC: 8.7 MG/DL (ref 8.7–10.5)
CHLORIDE SERPL-SCNC: 107 MMOL/L (ref 95–110)
CO2 SERPL-SCNC: 23 MMOL/L (ref 23–29)
CREAT SERPL-MCNC: 2 MG/DL (ref 0.5–1.4)
EST. GFR  (AFRICAN AMERICAN): 39 ML/MIN/1.73 M^2
EST. GFR  (NON AFRICAN AMERICAN): 34 ML/MIN/1.73 M^2
GLUCOSE SERPL-MCNC: 106 MG/DL (ref 70–110)
MAGNESIUM SERPL-MCNC: 2 MG/DL (ref 1.6–2.6)
PHOSPHATE SERPL-MCNC: 2.7 MG/DL (ref 2.7–4.5)
POTASSIUM SERPL-SCNC: 3.6 MMOL/L (ref 3.5–5.1)
SODIUM SERPL-SCNC: 140 MMOL/L (ref 136–145)

## 2019-10-23 PROCEDURE — 63600175 PHARM REV CODE 636 W HCPCS: Mod: HCNC | Performed by: EMERGENCY MEDICINE

## 2019-10-23 PROCEDURE — G0378 HOSPITAL OBSERVATION PER HR: HCPCS | Mod: HCNC

## 2019-10-23 PROCEDURE — 96376 TX/PRO/DX INJ SAME DRUG ADON: CPT | Performed by: EMERGENCY MEDICINE

## 2019-10-23 PROCEDURE — 80048 BASIC METABOLIC PNL TOTAL CA: CPT | Mod: HCNC

## 2019-10-23 PROCEDURE — 96375 TX/PRO/DX INJ NEW DRUG ADDON: CPT | Performed by: EMERGENCY MEDICINE

## 2019-10-23 PROCEDURE — 84100 ASSAY OF PHOSPHORUS: CPT | Mod: HCNC

## 2019-10-23 PROCEDURE — 25000003 PHARM REV CODE 250: Mod: HCNC | Performed by: HOSPITALIST

## 2019-10-23 PROCEDURE — 63600175 PHARM REV CODE 636 W HCPCS: Mod: HCNC | Performed by: HOSPITALIST

## 2019-10-23 PROCEDURE — 83735 ASSAY OF MAGNESIUM: CPT | Mod: HCNC

## 2019-10-23 PROCEDURE — 36415 COLL VENOUS BLD VENIPUNCTURE: CPT | Mod: HCNC

## 2019-10-23 RX ORDER — CIPROFLOXACIN 500 MG/1
500 TABLET ORAL 2 TIMES DAILY
Qty: 10 TABLET | Refills: 0 | Status: SHIPPED | OUTPATIENT
Start: 2019-10-23 | End: 2019-10-28

## 2019-10-23 RX ORDER — IBUPROFEN 600 MG/1
600 TABLET ORAL EVERY 6 HOURS PRN
Qty: 20 TABLET | Refills: 1 | Status: SHIPPED | OUTPATIENT
Start: 2019-10-23 | End: 2020-04-14

## 2019-10-23 RX ADMIN — ACETAMINOPHEN 1000 MG: 500 TABLET ORAL at 10:10

## 2019-10-23 RX ADMIN — COLCHICINE 0.6 MG: 0.6 CAPSULE ORAL at 08:10

## 2019-10-23 RX ADMIN — AMLODIPINE BESYLATE 10 MG: 5 TABLET ORAL at 08:10

## 2019-10-23 RX ADMIN — BUPROPION HYDROCHLORIDE 150 MG: 150 TABLET, FILM COATED, EXTENDED RELEASE ORAL at 08:10

## 2019-10-23 RX ADMIN — ACETAMINOPHEN 1000 MG: 500 TABLET ORAL at 02:10

## 2019-10-23 RX ADMIN — ONDANSETRON 8 MG: 2 INJECTION INTRAMUSCULAR; INTRAVENOUS at 03:10

## 2019-10-23 RX ADMIN — IBUPROFEN 600 MG: 600 TABLET ORAL at 10:10

## 2019-10-23 RX ADMIN — CARVEDILOL 6.25 MG: 6.25 TABLET, FILM COATED ORAL at 08:10

## 2019-10-23 RX ADMIN — TAMSULOSIN HYDROCHLORIDE 0.4 MG: 0.4 CAPSULE ORAL at 08:10

## 2019-10-23 RX ADMIN — CEFTRIAXONE 1 G: 1 INJECTION, SOLUTION INTRAVENOUS at 08:10

## 2019-10-23 RX ADMIN — ASPIRIN 81 MG: 81 TABLET, COATED ORAL at 08:10

## 2019-10-23 RX ADMIN — ROSUVASTATIN CALCIUM 40 MG: 20 TABLET, FILM COATED ORAL at 08:10

## 2019-10-23 RX ADMIN — ACETAMINOPHEN 1000 MG: 500 TABLET ORAL at 08:10

## 2019-10-23 NOTE — CONSULTS
This is a 68-year-old male  Patient of Dr. duron    Has left renal pelvic stones and hydronephrosis  Creatinine of 2    No significant pain this morning  Think this patient would benefit from a cystoscopy and stent  Patient was given breakfast at 8:00 a.m.    I spoke to dr. Duron. This patient may be discharged, and can return to out pt clinic where procedure can be scheduled as out pt. Thank you

## 2019-10-23 NOTE — DISCHARGE INSTRUCTIONS
Thank you for choosing [facility] for your medical care. The primary doctor who is taking care of you at the time of your discharge is Sukhdev Michele MD.     You were admitted to the hospital with EARLENE (acute kidney injury).     Please note your discharge instructions, including diet/activity restrictions, follow-up appointments, and medication changes.  If you have any questions about your medical issues, prescriptions, or any other questions, please feel free to contact the [facility] Medicine Dept at [phone] and we will help.    If you are previously with Home health, outpatient PT/OT or under a therapy program, you are cleared to return to those programs.    Please direct all long term medication refills and follow up to your primary care provider, Buddy Chatman MD. Thank you again for letting us take care of your health care needs.      DO NOT EAT OR DRINK ANYTHING PAST MIDNIGHT WEDS. OCT 23 UNTIL DR HERNANDEZ HAS TOLD YOU THAT YOU MAY DO SO.

## 2019-10-23 NOTE — PLAN OF CARE
Pt AAO, VSS. Plan of care reviewed with pt at beginning of shift.  Pt/family verbalized understanding. Urine strained throughout shift, no granules seen. IV fluids infusing. PRN acetaminophen given for headache. PRN Zofran given for nausea. Hourly/ Q2 hourly rounds completed on this pt throughout shift.  Pain monitored, restroom offered, repositions independently, safety maintained.  Patient has remained free from fall/injury, no skin breakdown noted.  Side rails up x2, bed in locked and lowest position, call light kept within reach.  Needs attended to, will continue to monitor/ update as indicated.

## 2019-10-23 NOTE — TELEPHONE ENCOUNTER
----- Message from Yulisa Francis sent at 10/23/2019 11:39 AM CDT -----  Contact: Karin  caprice/ [facility]  Type:  Sooner Apoointment Request    Caller is requesting a sooner appointment.  Caller declined first available appointment listed below.  Caller will not accept being placed on the waitlist and is requesting a message be sent to doctor.    Name of Caller:  Karin  When is the first available appointment?  Needs 2 week hospital follow up  Symptoms:  EARLENE  Best Call Back Number:  [phone]  Additional Information:  Pls call pt to set up appt

## 2019-10-23 NOTE — PROGRESS NOTES
Clinical Pharmacy Chart Review Note    Buddy Park is a 68 y.o. male was rounded on 10/23/2019 by a pharmacist alongside the hospitalist during bedside rounds. The patient was thoroughly reviewed and monitored by the hospitalist and pharmacist to ensure medication for specific disease states and/or problems were properly prescribed, dispensed, administered, and monitored. Identified dosing adjustments were addressed, in addition to assessment of proper dosing based on current renal/hepatic function. Pharmacy will provide medication recommendations whenever necessary to the provider to ensure positive patient outcomes. High risk adverse effects were reviewed will all healthcare providers involved in this patient's care. A medication reconciliation and drug-interaction check has been completed by a pharmacist. Discrepancies will be addressed and resolved on an on-going bases and pharmacy will continue to follow this patient until discharge. Feel free to contact us if you have any questions.    Lynn Joseph, PharmD  Pharmacy Resident  [phone]

## 2019-10-23 NOTE — NURSING
Pt given written and verbal D/C instructions w/ verbalized understanding; IV heplock d/c'd. Pt instructed to not eat or drink after midnite tonite for his procedure tomorrow w/ Dr Juarez.   Pt reminded that Dr Proctor will contact him for other specific instructions. Safely taken out to vehicle to go home.

## 2019-10-23 NOTE — PLAN OF CARE
Dr. Owusu saw pt today.  Spoke to his nurse who stated that he saw pt for Dr. Guillen.  Plan is for Dr. Martinez  to do pt's outpt procedure tomorrow.  Plan for his nurse to call pt with his appointment.  Updated the AVS, pt and the charge nurse.      10/23/19 8865   Discharge Reassessment   Assessment Type Discharge Planning Reassessment   Discharge Plan A Home with family

## 2019-10-23 NOTE — TELEPHONE ENCOUNTER
----- Message from Kandi Argueta sent at 10/23/2019 11:53 AM CDT -----  Contact: REMY Frazier   Type: Needs Medical Advice    Who Called:  REMY Frazier   Best Call Back Number: [phone] (home)  Additional Information: patient is getting discharged today from [facility]--needs to schedule an outpatient appt for tomorrow AM as early as possible for a Cysto & Stent--Dr. Owusu has already talked to Dr. Proctor regarding this appt but nothing is in system--please advise--thank you

## 2019-10-23 NOTE — PLAN OF CARE
10/23/19 1203   Final Note   Assessment Type Final Discharge Note   Anticipated Discharge Disposition Home   Hospital Follow Up  Appt(s) scheduled? Yes  (AVS updated)

## 2019-10-23 NOTE — PLAN OF CARE
10/22/19 2012   Patient Assessment/Suction   Level of Consciousness (AVPU) alert   Respiratory Effort Unlabored   Expansion/Accessory Muscles/Retractions no use of accessory muscles   PRE-TX-O2   O2 Device (Oxygen Therapy) room air   SpO2 97 %   Pulse Oximetry Type Intermittent   $ Pulse Oximetry - Multiple Charge Pulse Oximetry - Multiple   Pulse 84   Resp 18

## 2019-10-23 NOTE — TELEPHONE ENCOUNTER
Spoke with patient, informed soonest appt we can make is for this Friday, to come in discuss when to do the cysto w/ stents. Patient has lots of questions and is in process moving, patient verbally understood.

## 2019-10-24 ENCOUNTER — TELEPHONE (OUTPATIENT)
Dept: MEDSURG UNIT | Facility: HOSPITAL | Age: 69
End: 2019-10-24

## 2019-10-24 NOTE — HOSPITAL COURSE
Patient is a 68-year-old  male with a long history of kidney stones who was admitted to the hospital with postobstructive uropathy and acute kidney injury.  Patient was started on IV fluids and given expected management with pain control and nausea control.  Urology was consulted and felt that there was no acute inpatient need for stent placement at this point in time.  Patient's creatinine improved and he was discharged home with pain medication and follow-up with Urology within the next 48 hr.

## 2019-10-24 NOTE — DISCHARGE SUMMARY
[facility] Hospital Medicine  Discharge Summary      Patient Name: Buddy Park  MRN: [id number]  Admission Date: 10/22/2019  Hospital Length of Stay: 0 days  Discharge Date and Time: 10/23/2019  4:00 PM  Attending Physician: No att. providers found   Discharging Provider: Sukhdev Michele MD  Primary Care Provider: Buddy Chatman MD      HPI:   Patient is a 68-year-old  male with a past medical history significant for hypertension, coronary artery disease, hyperlipidemia, and sensory deafness status post cochlear implant who presents the hospital with worsening left flank plain.  Patient states that the pain started approximately 2 days prior to admission and worsened with evidence of nausea and vomiting.  Patient has a long history of kidney stones and states that his pain is similar to previous kidney stones, albeit on the opposite side.  In the emergency department the patient underwent evaluation with CT scan of the abdomen kidney stone protocol that showed UPJ obstruction as well as multiple stones within the ureter and subsequent hydronephrosis bilaterally. Patient denies any fevers or other signs of infection, however CT scan shows significant evidence of stranding over the left kidney.  Urology was consulted from the emergency department and requested admission for the patient for active management of his kidney stones and acute kidney injury.    Procedure(s) (LRB):  CYSTOSCOPY, WITH URETERAL STENT INSERTION (N/A)      Hospital Course:   Patient is a 68-year-old  male with a long history of kidney stones who was admitted to the hospital with postobstructive uropathy and acute kidney injury.  Patient was started on IV fluids and given expected management with pain control and nausea control.  Urology was consulted and felt that there was no acute inpatient need for stent placement at this point in time.  Patient's creatinine improved and he was discharged home with pain  medication and follow-up with Urology within the next 48 hr.     Consults:     No new Assessment & Plan notes have been filed under this hospital service since the last note was generated.  Service: Hospital Medicine    Final Active Diagnoses:    Diagnosis Date Noted POA    PRINCIPAL PROBLEM:  EARLENE (acute kidney injury) [N17.9] 10/22/2019 Yes    Acute bilateral obstructive uropathy [N13.9] 10/22/2019 Yes    Prediabetes [R73.03] 03/19/2019 Yes    Carotid disease, bilateral [I73.9] 02/09/2017 Yes    HTN (hypertension) [I10] 11/03/2014 Yes    Cochlear implant in place [Z96.21]  Not Applicable    Hyperlipidemia [E78.5]  Yes      Problems Resolved During this Admission:       Discharged Condition: stable    Disposition: Home or Self Care    Follow Up:  Follow-up Information     Ricky Owusu MD. Call today.    Specialty:  Urology  Why:  nothing to eat after midnight  Contact information:  [address]  [address]  [place] [address]  [phone]             La Chatman MD In 2 weeks.    Specialty:  Family Medicine  Why:  MD nurse to call pt with appointment  Contact information:  [address]  [place] [address] [phone]             Gretchen Proctor MD.    Specialty:  Urology  Why:  MD nurse will call pt with appointment.  Contact information:  [address] [place] [address]   [address]  [place] [address] [phone]                 Patient Instructions:      Diet Adult Regular     Notify your health care provider if you experience any of the following:  temperature >100.4     Notify your health care provider if you experience any of the following:  persistent nausea and vomiting or diarrhea     Notify your health care provider if you experience any of the following:  severe uncontrolled pain     Activity as tolerated       Significant Diagnostic Studies:     Pending Diagnostic Studies:     None         Medications:  Reconciled Home Medications:      Medication List      START taking these medications     ciprofloxacin HCl 500 MG tablet  Commonly known as:  CIPRO  Take 1 tablet (500 mg total) by mouth 2 (two) times daily. for 5 days        CHANGE how you take these medications    ibuprofen 600 MG tablet  Commonly known as:  ADVIL,MOTRIN  Take 1 tablet (600 mg total) by mouth every 6 (six) hours as needed for Pain.  What changed:    · when to take this  · reasons to take this        CONTINUE taking these medications    amLODIPine 10 MG tablet  Commonly known as:  NORVASC  Take 1 tablet (10 mg total) by mouth once daily.     aspirin 81 MG EC tablet  Commonly known as:  ECOTRIN  Take 81 mg by mouth once daily.     b complex vitamins tablet  Take 1 tablet by mouth once daily.     buPROPion 150 MG TB24 tablet  Commonly known as:  WELLBUTRIN XL  Take 1 tablet (150 mg total) by mouth once daily. In the morning     carvedilol 6.25 MG tablet  Commonly known as:  COREG  Take 1 tablet (6.25 mg total) by mouth 2 (two) times daily with meals.     cholestyramine-aspartame 4 gram Powd  Commonly known as:  QUESTRAN LIGHT  Take 1 Scoop by mouth once daily.     colchicine 0.6 mg tablet  Commonly known as:  COLCRYS  Take 1 tablet (0.6 mg total) by mouth once daily.     EPINEPHrine 0.3 mg/0.3 mL Atin  Commonly known as:  EPIPEN  Inject into the muscle once.     metFORMIN 500 MG tablet  Commonly known as:  GLUCOPHAGE  Take 1 tablet (500 mg total) by mouth 2 (two) times daily with meals.     nitroGLYCERIN 0.4 MG SL tablet  Commonly known as:  NITROSTAT  Place 1 tablet (0.4 mg total) under the tongue every 5 (five) minutes as needed for Chest pain.     rosuvastatin 40 MG Tab  Commonly known as:  CRESTOR  Take 1 tablet (40 mg total) by mouth once daily.     tamsulosin 0.4 mg Cap  Commonly known as:  FLOMAX  Take 1 capsule (0.4 mg total) by mouth once daily.        STOP taking these medications    losartan 100 MG tablet  Commonly known as:  COZAAR            Indwelling Lines/Drains at time of discharge:   Lines/Drains/Airways     None                  Time spent on the discharge of patient: 36 minutes  Patient was seen and examined on the date of discharge and determined to be suitable for discharge.         Sukhdev Michele MD  Department of Hospital Medicine  [facility]

## 2019-10-25 ENCOUNTER — LAB VISIT (OUTPATIENT)
Dept: LAB | Facility: HOSPITAL | Age: 69
End: 2019-10-25
Attending: UROLOGY
Payer: MEDICARE

## 2019-10-25 ENCOUNTER — OFFICE VISIT (OUTPATIENT)
Dept: UROLOGY | Facility: CLINIC | Age: 69
End: 2019-10-25
Payer: MEDICARE

## 2019-10-25 VITALS
DIASTOLIC BLOOD PRESSURE: 77 MMHG | BODY MASS INDEX: 27.22 KG/M2 | WEIGHT: 163.38 LBS | HEART RATE: 73 BPM | SYSTOLIC BLOOD PRESSURE: 133 MMHG | HEIGHT: 65 IN

## 2019-10-25 DIAGNOSIS — N20.0 RENAL CALCULUS, LEFT: ICD-10-CM

## 2019-10-25 DIAGNOSIS — N20.0 CALCULUS OF KIDNEY: ICD-10-CM

## 2019-10-25 DIAGNOSIS — R10.9 LEFT FLANK PAIN: ICD-10-CM

## 2019-10-25 DIAGNOSIS — R31.29 MICROSCOPIC HEMATURIA: ICD-10-CM

## 2019-10-25 DIAGNOSIS — R35.0 URINARY FREQUENCY: Primary | ICD-10-CM

## 2019-10-25 LAB
ALBUMIN SERPL BCP-MCNC: 4.2 G/DL (ref 3.5–5.2)
ALP SERPL-CCNC: 76 U/L (ref 55–135)
ALT SERPL W/O P-5'-P-CCNC: 21 U/L (ref 10–44)
ANION GAP SERPL CALC-SCNC: 12 MMOL/L (ref 8–16)
APTT BLDCRRT: 32 SEC (ref 21–32)
AST SERPL-CCNC: 18 U/L (ref 10–40)
BASOPHILS # BLD AUTO: 0.02 K/UL (ref 0–0.2)
BASOPHILS NFR BLD: 0.2 % (ref 0–1.9)
BILIRUB SERPL-MCNC: 0.9 MG/DL (ref 0.1–1)
BILIRUB SERPL-MCNC: ABNORMAL MG/DL
BLOOD URINE, POC: ABNORMAL
BUN SERPL-MCNC: 20 MG/DL (ref 8–23)
CALCIUM SERPL-MCNC: 10.4 MG/DL (ref 8.7–10.5)
CHLORIDE SERPL-SCNC: 109 MMOL/L (ref 95–110)
CO2 SERPL-SCNC: 22 MMOL/L (ref 23–29)
COLOR, POC UA: ABNORMAL
CREAT SERPL-MCNC: 2.5 MG/DL (ref 0.5–1.4)
DIFFERENTIAL METHOD: ABNORMAL
EOSINOPHIL # BLD AUTO: 0.2 K/UL (ref 0–0.5)
EOSINOPHIL NFR BLD: 2.2 % (ref 0–8)
ERYTHROCYTE [DISTWIDTH] IN BLOOD BY AUTOMATED COUNT: 13 % (ref 11.5–14.5)
EST. GFR  (AFRICAN AMERICAN): 30 ML/MIN/1.73 M^2
EST. GFR  (NON AFRICAN AMERICAN): 26 ML/MIN/1.73 M^2
GLUCOSE SERPL-MCNC: 104 MG/DL (ref 70–110)
GLUCOSE UR QL STRIP: ABNORMAL
HCT VFR BLD AUTO: 41.7 % (ref 40–54)
HGB BLD-MCNC: 13.9 G/DL (ref 14–18)
IMM GRANULOCYTES # BLD AUTO: 0.03 K/UL (ref 0–0.04)
INR PPP: 1 (ref 0.8–1.2)
KETONES UR QL STRIP: ABNORMAL
LEUKOCYTE ESTERASE URINE, POC: ABNORMAL
LYMPHOCYTES # BLD AUTO: 2.1 K/UL (ref 1–4.8)
LYMPHOCYTES NFR BLD: 25.5 % (ref 18–48)
MCH RBC QN AUTO: 29.6 PG (ref 27–31)
MCHC RBC AUTO-ENTMCNC: 33.3 G/DL (ref 32–36)
MCV RBC AUTO: 89 FL (ref 82–98)
MONOCYTES # BLD AUTO: 1 K/UL (ref 0.3–1)
MONOCYTES NFR BLD: 12.3 % (ref 4–15)
NEUTROPHILS # BLD AUTO: 4.8 K/UL (ref 1.8–7.7)
NEUTROPHILS NFR BLD: 59.4 % (ref 38–73)
NITRITE, POC UA: ABNORMAL
NRBC BLD-RTO: 0 /100 WBC
PH, POC UA: 5
PLATELET # BLD AUTO: 220 K/UL (ref 150–350)
PMV BLD AUTO: 9.1 FL (ref 9.2–12.9)
POTASSIUM SERPL-SCNC: 4.7 MMOL/L (ref 3.5–5.1)
PROT SERPL-MCNC: 7.8 G/DL (ref 6–8.4)
PROTEIN, POC: ABNORMAL
PROTHROMBIN TIME: 10.5 SEC (ref 9–12.5)
RBC # BLD AUTO: 4.7 M/UL (ref 4.6–6.2)
SODIUM SERPL-SCNC: 143 MMOL/L (ref 136–145)
SPECIFIC GRAVITY, POC UA: 1020
UROBILINOGEN, POC UA: ABNORMAL
WBC # BLD AUTO: 8.04 K/UL (ref 3.9–12.7)

## 2019-10-25 PROCEDURE — 99999 PR PBB SHADOW E&M-EST. PATIENT-LVL III: CPT | Mod: PBBFAC,HCNC,, | Performed by: UROLOGY

## 2019-10-25 PROCEDURE — 1101F PR PT FALLS ASSESS DOC 0-1 FALLS W/OUT INJ PAST YR: ICD-10-PCS | Mod: HCNC,CPTII,S$GLB, | Performed by: UROLOGY

## 2019-10-25 PROCEDURE — 85610 PROTHROMBIN TIME: CPT | Mod: HCNC

## 2019-10-25 PROCEDURE — 99999 PR PBB SHADOW E&M-EST. PATIENT-LVL III: ICD-10-PCS | Mod: PBBFAC,HCNC,, | Performed by: UROLOGY

## 2019-10-25 PROCEDURE — 3075F PR MOST RECENT SYSTOLIC BLOOD PRESS GE 130-139MM HG: ICD-10-PCS | Mod: HCNC,CPTII,S$GLB, | Performed by: UROLOGY

## 2019-10-25 PROCEDURE — 85730 THROMBOPLASTIN TIME PARTIAL: CPT | Mod: HCNC

## 2019-10-25 PROCEDURE — 85025 COMPLETE CBC W/AUTO DIFF WBC: CPT | Mod: HCNC

## 2019-10-25 PROCEDURE — 1101F PT FALLS ASSESS-DOCD LE1/YR: CPT | Mod: HCNC,CPTII,S$GLB, | Performed by: UROLOGY

## 2019-10-25 PROCEDURE — 99214 OFFICE O/P EST MOD 30 MIN: CPT | Mod: 25,HCNC,S$GLB, | Performed by: UROLOGY

## 2019-10-25 PROCEDURE — 99214 PR OFFICE/OUTPT VISIT, EST, LEVL IV, 30-39 MIN: ICD-10-PCS | Mod: 25,HCNC,S$GLB, | Performed by: UROLOGY

## 2019-10-25 PROCEDURE — 36415 COLL VENOUS BLD VENIPUNCTURE: CPT | Mod: HCNC

## 2019-10-25 PROCEDURE — 81002 URINALYSIS NONAUTO W/O SCOPE: CPT | Mod: HCNC,S$GLB,, | Performed by: UROLOGY

## 2019-10-25 PROCEDURE — 81002 POCT URINE DIPSTICK WITHOUT MICROSCOPE: ICD-10-PCS | Mod: HCNC,S$GLB,, | Performed by: UROLOGY

## 2019-10-25 PROCEDURE — 3078F DIAST BP <80 MM HG: CPT | Mod: HCNC,CPTII,S$GLB, | Performed by: UROLOGY

## 2019-10-25 PROCEDURE — 80053 COMPREHEN METABOLIC PANEL: CPT | Mod: HCNC

## 2019-10-25 PROCEDURE — 3078F PR MOST RECENT DIASTOLIC BLOOD PRESSURE < 80 MM HG: ICD-10-PCS | Mod: HCNC,CPTII,S$GLB, | Performed by: UROLOGY

## 2019-10-25 PROCEDURE — 3075F SYST BP GE 130 - 139MM HG: CPT | Mod: HCNC,CPTII,S$GLB, | Performed by: UROLOGY

## 2019-10-25 NOTE — PROGRESS NOTES
OFFICE NOTE  [unfilled]  [id number]  10/25/2019       CHIEF COMPLAINT:  .  Left flank pain, obstructing left UPJ calculi     HISTORY OF PRESENT ILLNESS:  .  This 68-year-old male presents with several day history of intermittent left flank pain.  CT scan revealed 2 obstructing calculi approximately 4 mm each at the left ureteropelvic junction and additional left kidney renal calculi.    Physical exam:  Abdomen - soft benign moderate left flank tenderness no masses no hernias no organomegaly                                 PSA  - 0.28 on 08/06/2019     UA - negative pH 5.0     FINAL IMPRESSION:  Left UPJ calculi with flank pain     RECOMMENDATIONS:   cystoscopy left retrograde pyelogram and stent placement with possibility of stone manipulation .

## 2019-10-25 NOTE — H&P (VIEW-ONLY)
Subjective:       Patient ID: Buddy Park is a 68 y.o. male.    Chief Complaint:  Intermittent left flank pain.  CT scan revealed 2 left UPJ calculi with obstruction and creatinine risen to 2.0..    Past Medical History:   Diagnosis Date    Angina pectoris     Anxiety     Biliary colic     Cochlear implant in place     Deaf     LEFT EAR    Depression     Heart failure     History of colon polyps 2/20/2018    [place] (hard of hearing)     HEARING AID - RIGHT    Hyperlipidemia     Hypertension     Kidney stone     Kidney stones     Renal stones     Trouble in sleeping     Wears glasses      Past Surgical History:   Procedure Laterality Date    CAROTID [facility]ERY [facility]OPLAST[facility]  06/2018    [facility]     CATARACT EXTRACTION Bilateral     CHOLECYSTECTOMY  2-6-2014    COLONOSCOPY  1/9/2013    Dr. Gillette, 5 year recheck (family history colon cancer)    COLONOSCOPY N/A 3/12/2018    Procedure: COLONOSCOPY;  Surgeon: Garett Go MD;  Location: [facility];  Service: Endoscopy;  Laterality: N/A;    EAR MASTOIDECTOMY W/ COCHLEAR IMPLANT W/ LANDMARK      left ear    FOREIGN BODY REMOVAL Right     glass removed from right foot/repair    FRACTURE SURGERY      right arm x2    LITHOTRIPSY      ROTATOR CUFF REPAIR      Right     SINUS SURGERY      TONSILLECTOMY       Family History   Problem Relation Age of Onset    Cancer Mother         colon    Heart disease Father     Gout Father     Kidney disease Father     Arthritis Father     Hypertension Father     Early death Daughter         mva    Alcohol abuse Brother     Cancer Brother         mouth cancer w mets    No Known Problems Sister     Alcohol abuse Brother     No Known Problems Son     Heart disease Maternal Grandmother         MI    Stroke Maternal Grandfather     Heart disease Paternal Grandmother         MI    No Known Problems Son     Cancer Paternal Uncle         lung cancer    Allergic rhinitis Neg Hx     Allergies Neg Hx      Angioedema Neg Hx     Asthma Neg Hx     Atopy Neg Hx     Eczema Neg Hx     Immunodeficiency Neg Hx     Rhinitis Neg Hx     Urticaria Neg Hx     Collagen disease Neg Hx      Social History     Socioeconomic History    Marital status:      Spouse name: Not on file    Number of children: Not on file    Years of education: Not on file    Highest education level: Not on file   Occupational History    Not on file   Social Needs    Financial resource strain: Not on file    Food insecurity:     Worry: Not on file     Inability: Not on file    Transportation needs:     Medical: Not on file     Non-medical: Not on file   Tobacco Use    Smoking status: Never Smoker    Smokeless tobacco: Never Used   Substance and Sexual Activity    Alcohol use: Yes     Comment: once every two months    Drug use: No    Sexual activity: Yes   Lifestyle    Physical activity:     Days per week: Not on file     Minutes per session: Not on file    Stress: Not on file   Relationships    Social connections:     Talks on phone: Not on file     Gets together: Not on file     Attends Congregation service: Not on file     Active member of club or organization: Not on file     Attends meetings of clubs or organizations: Not on file     Relationship status: Not on file   Other Topics Concern    Not on file   Social History Narrative    Not on file       Current Outpatient Medications   Medication Sig Dispense Refill    amLODIPine (NORVASC) 10 MG tablet Take 1 tablet (10 mg total) by mouth once daily. 90 tablet 3    aspirin (ECOTRIN) 81 MG EC tablet Take 81 mg by mouth once daily.      b complex vitamins tablet Take 1 tablet by mouth once daily.      buPROPion (WELLBUTRIN XL) 150 MG TB24 tablet Take 1 tablet (150 mg total) by mouth once daily. In the morning 90 tablet 1    carvedilol (COREG) 6.25 MG tablet Take 1 tablet (6.25 mg total) by mouth 2 (two) times daily with meals. 180 tablet 3    cholestyramine-aspartame  "(QUESTRAN LIGHT) 4 gram Powd Take 1 Scoop by mouth once daily. 231 g 11    ciprofloxacin HCl (CIPRO) 500 MG tablet Take 1 tablet (500 mg total) by mouth 2 (two) times daily. for 5 days 10 tablet 0    colchicine (COLCRYS) 0.6 mg tablet Take 1 tablet (0.6 mg total) by mouth once daily. 30 tablet 11    EPINEPHrine (EPIPEN) 0.3 mg/0.3 mL AtIn Inject into the muscle once.      ibuprofen (ADVIL,MOTRIN) 600 MG tablet Take 1 tablet (600 mg total) by mouth every 6 (six) hours as needed for Pain. 20 tablet 1    metFORMIN (GLUCOPHAGE) 500 MG tablet Take 1 tablet (500 mg total) by mouth 2 (two) times daily with meals. 180 tablet 1    nitroGLYCERIN (NITROSTAT) 0.4 MG SL tablet Place 1 tablet (0.4 mg total) under the tongue every 5 (five) minutes as needed for Chest pain. 25 tablet 11    rosuvastatin (CRESTOR) 40 MG Tab Take 1 tablet (40 mg total) by mouth once daily. 90 tablet 3    tamsulosin (FLOMAX) 0.4 mg Cap Take 1 capsule (0.4 mg total) by mouth once daily. 90 capsule 3     No current facility-administered medications for this visit.      Review of patient's allergies indicates:   Allergen Reactions    Bee pollens Shortness Of Breath     WASP, BEE, ANT AND CATERPILLER STINGS    Hydrochlorothiazide Shortness Of Breath and Rash    Milk containing products Diarrhea    Metoprolol Rash       Review of Systems   All other systems reviewed and are negative.      Objective:      Vitals:    10/25/19 0936   BP: 133/77   Pulse: 73   Weight: 74.1 kg (163 lb 5.8 oz)   Height: 5' 5" (1.651 m)     Physical Exam   Cardiovascular: Normal rate.   Pulmonary/Chest: Effort normal.   Abdominal: Soft.   Genitourinary: Prostate normal and penis normal.        Assessment:       1. Urinary frequency    2. Renal calculus, left    3. Microscopic hematuria        Plan:       Cystoscopy retrograde pyelogram left ureteral stent     "

## 2019-10-25 NOTE — H&P
Subjective:       Patient ID: Buddy Park is a 68 y.o. male.    Chief Complaint:  Intermittent left flank pain.  CT scan revealed 2 left UPJ calculi with obstruction and creatinine risen to 2.0..    Past Medical History:   Diagnosis Date    Angina pectoris     Anxiety     Biliary colic     Cochlear implant in place     Deaf     LEFT EAR    Depression     Heart failure     History of colon polyps 2/20/2018    [place] (hard of hearing)     HEARING AID - RIGHT    Hyperlipidemia     Hypertension     Kidney stone     Kidney stones     Renal stones     Trouble in sleeping     Wears glasses      Past Surgical History:   Procedure Laterality Date    CAROTID [facility]ERY [facility]OPLAST[facility]  06/2018    [facility]     CATARACT EXTRACTION Bilateral     CHOLECYSTECTOMY  2-6-2014    COLONOSCOPY  1/9/2013    Dr. Gillette, 5 year recheck (family history colon cancer)    COLONOSCOPY N/A 3/12/2018    Procedure: COLONOSCOPY;  Surgeon: Garett Go MD;  Location: [facility];  Service: Endoscopy;  Laterality: N/A;    EAR MASTOIDECTOMY W/ COCHLEAR IMPLANT W/ LANDMARK      left ear    FOREIGN BODY REMOVAL Right     glass removed from right foot/repair    FRACTURE SURGERY      right arm x2    LITHOTRIPSY      ROTATOR CUFF REPAIR      Right     SINUS SURGERY      TONSILLECTOMY       Family History   Problem Relation Age of Onset    Cancer Mother         colon    Heart disease Father     Gout Father     Kidney disease Father     Arthritis Father     Hypertension Father     Early death Daughter         mva    Alcohol abuse Brother     Cancer Brother         mouth cancer w mets    No Known Problems Sister     Alcohol abuse Brother     No Known Problems Son     Heart disease Maternal Grandmother         MI    Stroke Maternal Grandfather     Heart disease Paternal Grandmother         MI    No Known Problems Son     Cancer Paternal Uncle         lung cancer    Allergic rhinitis Neg Hx     Allergies Neg Hx      Angioedema Neg Hx     Asthma Neg Hx     Atopy Neg Hx     Eczema Neg Hx     Immunodeficiency Neg Hx     Rhinitis Neg Hx     Urticaria Neg Hx     Collagen disease Neg Hx      Social History     Socioeconomic History    Marital status:      Spouse name: Not on file    Number of children: Not on file    Years of education: Not on file    Highest education level: Not on file   Occupational History    Not on file   Social Needs    Financial resource strain: Not on file    Food insecurity:     Worry: Not on file     Inability: Not on file    Transportation needs:     Medical: Not on file     Non-medical: Not on file   Tobacco Use    Smoking status: Never Smoker    Smokeless tobacco: Never Used   Substance and Sexual Activity    Alcohol use: Yes     Comment: once every two months    Drug use: No    Sexual activity: Yes   Lifestyle    Physical activity:     Days per week: Not on file     Minutes per session: Not on file    Stress: Not on file   Relationships    Social connections:     Talks on phone: Not on file     Gets together: Not on file     Attends Rastafari service: Not on file     Active member of club or organization: Not on file     Attends meetings of clubs or organizations: Not on file     Relationship status: Not on file   Other Topics Concern    Not on file   Social History Narrative    Not on file       Current Outpatient Medications   Medication Sig Dispense Refill    amLODIPine (NORVASC) 10 MG tablet Take 1 tablet (10 mg total) by mouth once daily. 90 tablet 3    aspirin (ECOTRIN) 81 MG EC tablet Take 81 mg by mouth once daily.      b complex vitamins tablet Take 1 tablet by mouth once daily.      buPROPion (WELLBUTRIN XL) 150 MG TB24 tablet Take 1 tablet (150 mg total) by mouth once daily. In the morning 90 tablet 1    carvedilol (COREG) 6.25 MG tablet Take 1 tablet (6.25 mg total) by mouth 2 (two) times daily with meals. 180 tablet 3    cholestyramine-aspartame  "(QUESTRAN LIGHT) 4 gram Powd Take 1 Scoop by mouth once daily. 231 g 11    ciprofloxacin HCl (CIPRO) 500 MG tablet Take 1 tablet (500 mg total) by mouth 2 (two) times daily. for 5 days 10 tablet 0    colchicine (COLCRYS) 0.6 mg tablet Take 1 tablet (0.6 mg total) by mouth once daily. 30 tablet 11    EPINEPHrine (EPIPEN) 0.3 mg/0.3 mL AtIn Inject into the muscle once.      ibuprofen (ADVIL,MOTRIN) 600 MG tablet Take 1 tablet (600 mg total) by mouth every 6 (six) hours as needed for Pain. 20 tablet 1    metFORMIN (GLUCOPHAGE) 500 MG tablet Take 1 tablet (500 mg total) by mouth 2 (two) times daily with meals. 180 tablet 1    nitroGLYCERIN (NITROSTAT) 0.4 MG SL tablet Place 1 tablet (0.4 mg total) under the tongue every 5 (five) minutes as needed for Chest pain. 25 tablet 11    rosuvastatin (CRESTOR) 40 MG Tab Take 1 tablet (40 mg total) by mouth once daily. 90 tablet 3    tamsulosin (FLOMAX) 0.4 mg Cap Take 1 capsule (0.4 mg total) by mouth once daily. 90 capsule 3     No current facility-administered medications for this visit.      Review of patient's allergies indicates:   Allergen Reactions    Bee pollens Shortness Of Breath     WASP, BEE, ANT AND CATERPILLER STINGS    Hydrochlorothiazide Shortness Of Breath and Rash    Milk containing products Diarrhea    Metoprolol Rash       Review of Systems   All other systems reviewed and are negative.      Objective:      Vitals:    10/25/19 0936   BP: 133/77   Pulse: 73   Weight: 74.1 kg (163 lb 5.8 oz)   Height: 5' 5" (1.651 m)     Physical Exam   Cardiovascular: Normal rate.   Pulmonary/Chest: Effort normal.   Abdominal: Soft.   Genitourinary: Prostate normal and penis normal.        Assessment:       1. Urinary frequency    2. Renal calculus, left    3. Microscopic hematuria        Plan:       Cystoscopy retrograde pyelogram left ureteral stent     "

## 2019-10-26 ENCOUNTER — ANESTHESIA EVENT (OUTPATIENT)
Dept: SURGERY | Facility: HOSPITAL | Age: 69
End: 2019-10-26
Payer: MEDICARE

## 2019-10-28 ENCOUNTER — ANESTHESIA (OUTPATIENT)
Dept: SURGERY | Facility: HOSPITAL | Age: 69
End: 2019-10-28
Payer: MEDICARE

## 2019-10-28 ENCOUNTER — HOSPITAL ENCOUNTER (OUTPATIENT)
Facility: HOSPITAL | Age: 69
Discharge: HOME | End: 2019-10-28
Attending: UROLOGY | Admitting: UROLOGY
Payer: MEDICARE

## 2019-10-28 DIAGNOSIS — N20.0 RENAL CALCULUS, LEFT: ICD-10-CM

## 2019-10-28 DIAGNOSIS — R31.29 MICROSCOPIC HEMATURIA: ICD-10-CM

## 2019-10-28 PROCEDURE — 25000003 PHARM REV CODE 250: Mod: HCNC | Performed by: ANESTHESIOLOGY

## 2019-10-28 PROCEDURE — 36000707: Mod: HCNC | Performed by: UROLOGY

## 2019-10-28 PROCEDURE — 25000003 PHARM REV CODE 250: Mod: HCNC | Performed by: UROLOGY

## 2019-10-28 PROCEDURE — 27200651 HC AIRWAY, LMA: Mod: HCNC | Performed by: NURSE ANESTHETIST, CERTIFIED REGISTERED

## 2019-10-28 PROCEDURE — 63600175 PHARM REV CODE 636 W HCPCS: Mod: HCNC | Performed by: UROLOGY

## 2019-10-28 PROCEDURE — 25500020 PHARM REV CODE 255: Mod: HCNC | Performed by: UROLOGY

## 2019-10-28 PROCEDURE — C2617 STENT, NON-COR, TEM W/O DEL: HCPCS | Mod: HCNC | Performed by: UROLOGY

## 2019-10-28 PROCEDURE — C1769 GUIDE WIRE: HCPCS | Mod: HCNC | Performed by: UROLOGY

## 2019-10-28 PROCEDURE — 74420 PR  X-RAY RETROGRADE PYELOGRAM: ICD-10-PCS | Mod: 26,HCNC,, | Performed by: UROLOGY

## 2019-10-28 PROCEDURE — 37000009 HC ANESTHESIA EA ADD 15 MINS: Mod: HCNC | Performed by: UROLOGY

## 2019-10-28 PROCEDURE — 36000706: Mod: HCNC | Performed by: UROLOGY

## 2019-10-28 PROCEDURE — 63600175 PHARM REV CODE 636 W HCPCS: Mod: HCNC | Performed by: ANESTHESIOLOGY

## 2019-10-28 PROCEDURE — 71000039 HC RECOVERY, EACH ADD'L HOUR: Mod: HCNC | Performed by: UROLOGY

## 2019-10-28 PROCEDURE — 99900103 DSU ONLY-NO CHARGE-INITIAL HR (STAT): Mod: HCNC | Performed by: UROLOGY

## 2019-10-28 PROCEDURE — 37000008 HC ANESTHESIA 1ST 15 MINUTES: Mod: HCNC | Performed by: UROLOGY

## 2019-10-28 PROCEDURE — 25000003 PHARM REV CODE 250: Mod: HCNC | Performed by: NURSE ANESTHETIST, CERTIFIED REGISTERED

## 2019-10-28 PROCEDURE — 63600175 PHARM REV CODE 636 W HCPCS: Mod: HCNC | Performed by: NURSE ANESTHETIST, CERTIFIED REGISTERED

## 2019-10-28 PROCEDURE — 52332 CYSTOSCOPY AND TREATMENT: CPT | Mod: HCNC,LT,, | Performed by: UROLOGY

## 2019-10-28 PROCEDURE — 99900104 DSU ONLY-NO CHARGE-EA ADD'L HR (STAT): Mod: HCNC | Performed by: UROLOGY

## 2019-10-28 PROCEDURE — 52332 PR CYSTOSCOPY,INSERT URETERAL STENT: ICD-10-PCS | Mod: HCNC,LT,, | Performed by: UROLOGY

## 2019-10-28 PROCEDURE — D9220A PRA ANESTHESIA: Mod: HCNC,ANES,, | Performed by: ANESTHESIOLOGY

## 2019-10-28 PROCEDURE — 71000015 HC POSTOP RECOV 1ST HR: Mod: HCNC | Performed by: UROLOGY

## 2019-10-28 PROCEDURE — D9220A PRA ANESTHESIA: ICD-10-PCS | Mod: HCNC,ANES,, | Performed by: ANESTHESIOLOGY

## 2019-10-28 PROCEDURE — 71000033 HC RECOVERY, INTIAL HOUR: Mod: HCNC | Performed by: UROLOGY

## 2019-10-28 PROCEDURE — 74420 UROGRAPHY RTRGR +-KUB: CPT | Mod: 26,HCNC,, | Performed by: UROLOGY

## 2019-10-28 DEVICE — STENT URETERAL UNIV 6FR 24CM: Type: IMPLANTABLE DEVICE | Site: URETER | Status: FUNCTIONAL

## 2019-10-28 RX ORDER — MIDAZOLAM HYDROCHLORIDE 1 MG/ML
INJECTION INTRAMUSCULAR; INTRAVENOUS
Status: DISCONTINUED | OUTPATIENT
Start: 2019-10-28 | End: 2019-10-28

## 2019-10-28 RX ORDER — ACETAMINOPHEN 10 MG/ML
INJECTION, SOLUTION INTRAVENOUS
Status: DISCONTINUED | OUTPATIENT
Start: 2019-10-28 | End: 2019-10-28

## 2019-10-28 RX ORDER — ONDANSETRON 2 MG/ML
4 INJECTION INTRAMUSCULAR; INTRAVENOUS ONCE AS NEEDED
Status: DISCONTINUED | OUTPATIENT
Start: 2019-10-28 | End: 2019-10-28 | Stop reason: HOSPADM

## 2019-10-28 RX ORDER — HYDROCODONE BITARTRATE AND ACETAMINOPHEN 5; 325 MG/1; MG/1
1 TABLET ORAL EVERY 4 HOURS PRN
Status: DISCONTINUED | OUTPATIENT
Start: 2019-10-28 | End: 2019-10-28 | Stop reason: HOSPADM

## 2019-10-28 RX ORDER — HYDROCODONE BITARTRATE AND ACETAMINOPHEN 5; 325 MG/1; MG/1
1 TABLET ORAL
Qty: 20 TABLET | Refills: 0 | Status: SHIPPED | OUTPATIENT
Start: 2019-10-28 | End: 2019-11-12

## 2019-10-28 RX ORDER — PHENYLEPHRINE HYDROCHLORIDE 10 MG/ML
INJECTION INTRAVENOUS
Status: DISCONTINUED | OUTPATIENT
Start: 2019-10-28 | End: 2019-10-28

## 2019-10-28 RX ORDER — ONDANSETRON HYDROCHLORIDE 2 MG/ML
INJECTION, SOLUTION INTRAMUSCULAR; INTRAVENOUS
Status: DISCONTINUED | OUTPATIENT
Start: 2019-10-28 | End: 2019-10-28

## 2019-10-28 RX ORDER — PHENAZOPYRIDINE HYDROCHLORIDE 100 MG/1
200 TABLET, FILM COATED ORAL ONCE
Status: COMPLETED | OUTPATIENT
Start: 2019-10-28 | End: 2019-10-28

## 2019-10-28 RX ORDER — LIDOCAINE HYDROCHLORIDE 10 MG/ML
1 INJECTION, SOLUTION EPIDURAL; INFILTRATION; INTRACAUDAL; PERINEURAL ONCE
Status: COMPLETED | OUTPATIENT
Start: 2019-10-28 | End: 2019-10-28

## 2019-10-28 RX ORDER — PHENAZOPYRIDINE HYDROCHLORIDE 100 MG/1
200 TABLET, FILM COATED ORAL 3 TIMES DAILY PRN
Qty: 20 TABLET | Refills: 0 | Status: SHIPPED | OUTPATIENT
Start: 2019-10-28 | End: 2019-11-12

## 2019-10-28 RX ORDER — DEXAMETHASONE SODIUM PHOSPHATE 4 MG/ML
INJECTION, SOLUTION INTRA-ARTICULAR; INTRALESIONAL; INTRAMUSCULAR; INTRAVENOUS; SOFT TISSUE
Status: DISCONTINUED | OUTPATIENT
Start: 2019-10-28 | End: 2019-10-28

## 2019-10-28 RX ORDER — SODIUM CHLORIDE, SODIUM LACTATE, POTASSIUM CHLORIDE, CALCIUM CHLORIDE 600; 310; 30; 20 MG/100ML; MG/100ML; MG/100ML; MG/100ML
INJECTION, SOLUTION INTRAVENOUS CONTINUOUS
Status: ACTIVE | OUTPATIENT
Start: 2019-10-28

## 2019-10-28 RX ORDER — CEFUROXIME AXETIL 500 MG/1
500 TABLET ORAL 2 TIMES DAILY
Qty: 20 TABLET | Refills: 0 | Status: SHIPPED | OUTPATIENT
Start: 2019-10-28 | End: 2019-11-12

## 2019-10-28 RX ORDER — FENTANYL CITRATE 50 UG/ML
25 INJECTION, SOLUTION INTRAMUSCULAR; INTRAVENOUS EVERY 5 MIN PRN
Status: DISCONTINUED | OUTPATIENT
Start: 2019-10-28 | End: 2019-10-28 | Stop reason: HOSPADM

## 2019-10-28 RX ORDER — PROPOFOL 10 MG/ML
VIAL (ML) INTRAVENOUS
Status: DISCONTINUED | OUTPATIENT
Start: 2019-10-28 | End: 2019-10-28

## 2019-10-28 RX ORDER — LIDOCAINE HCL/PF 100 MG/5ML
SYRINGE (ML) INTRAVENOUS
Status: DISCONTINUED | OUTPATIENT
Start: 2019-10-28 | End: 2019-10-28

## 2019-10-28 RX ORDER — OXYCODONE HYDROCHLORIDE 5 MG/1
5 TABLET ORAL ONCE AS NEEDED
Status: COMPLETED | OUTPATIENT
Start: 2019-10-28 | End: 2019-10-28

## 2019-10-28 RX ORDER — GLYCOPYRROLATE 0.2 MG/ML
INJECTION INTRAMUSCULAR; INTRAVENOUS
Status: DISCONTINUED | OUTPATIENT
Start: 2019-10-28 | End: 2019-10-28

## 2019-10-28 RX ORDER — FENTANYL CITRATE 50 UG/ML
INJECTION, SOLUTION INTRAMUSCULAR; INTRAVENOUS
Status: DISCONTINUED | OUTPATIENT
Start: 2019-10-28 | End: 2019-10-28

## 2019-10-28 RX ORDER — LIDOCAINE HYDROCHLORIDE 20 MG/ML
JELLY TOPICAL
Status: DISCONTINUED | OUTPATIENT
Start: 2019-10-28 | End: 2019-10-28 | Stop reason: HOSPADM

## 2019-10-28 RX ORDER — EPHEDRINE SULFATE 50 MG/ML
INJECTION, SOLUTION INTRAVENOUS
Status: DISCONTINUED | OUTPATIENT
Start: 2019-10-28 | End: 2019-10-28

## 2019-10-28 RX ADMIN — PROPOFOL 120 MG: 10 INJECTION, EMULSION INTRAVENOUS at 11:10

## 2019-10-28 RX ADMIN — PHENYLEPHRINE HYDROCHLORIDE 200 MCG: 10 INJECTION INTRAVENOUS at 11:10

## 2019-10-28 RX ADMIN — ACETAMINOPHEN 1000 MG: 10 INJECTION, SOLUTION INTRAVENOUS at 11:10

## 2019-10-28 RX ADMIN — EPHEDRINE SULFATE 25 MG: 50 INJECTION, SOLUTION INTRAMUSCULAR; INTRAVENOUS; SUBCUTANEOUS at 11:10

## 2019-10-28 RX ADMIN — MIDAZOLAM HYDROCHLORIDE 2 MG: 1 INJECTION, SOLUTION INTRAMUSCULAR; INTRAVENOUS at 11:10

## 2019-10-28 RX ADMIN — FENTANYL CITRATE 50 MCG: 50 INJECTION, SOLUTION INTRAMUSCULAR; INTRAVENOUS at 11:10

## 2019-10-28 RX ADMIN — PHENAZOPYRIDINE HYDROCHLORIDE 200 MG: 100 TABLET ORAL at 12:10

## 2019-10-28 RX ADMIN — ONDANSETRON 4 MG: 2 INJECTION, SOLUTION INTRAMUSCULAR; INTRAVENOUS at 11:10

## 2019-10-28 RX ADMIN — OXYCODONE HYDROCHLORIDE 5 MG: 5 TABLET ORAL at 12:10

## 2019-10-28 RX ADMIN — DEXAMETHASONE SODIUM PHOSPHATE 4 MG: 4 INJECTION, SOLUTION INTRAMUSCULAR; INTRAVENOUS at 11:10

## 2019-10-28 RX ADMIN — SODIUM CHLORIDE, SODIUM LACTATE, POTASSIUM CHLORIDE, AND CALCIUM CHLORIDE: .6; .31; .03; .02 INJECTION, SOLUTION INTRAVENOUS at 10:10

## 2019-10-28 RX ADMIN — GLYCOPYRROLATE 0.2 MG: 0.2 INJECTION, SOLUTION INTRAMUSCULAR; INTRAVENOUS at 11:10

## 2019-10-28 RX ADMIN — DEXTROSE 2 G: 50 INJECTION, SOLUTION INTRAVENOUS at 11:10

## 2019-10-28 RX ADMIN — LIDOCAINE HYDROCHLORIDE 50 MG: 20 INJECTION, SOLUTION INTRAVENOUS at 11:10

## 2019-10-28 RX ADMIN — LIDOCAINE HYDROCHLORIDE: 10 INJECTION, SOLUTION EPIDURAL; INFILTRATION; INTRACAUDAL; PERINEURAL at 10:10

## 2019-10-28 NOTE — PROGRESS NOTES
Discharged home per wheelchair per personal vehicle with mother and father in law.  aao x 4. No complaints voiced at discharge.

## 2019-10-28 NOTE — PLAN OF CARE
aao x 4.  Denies pain.  Tolerating po fluids.  Vs stable.  Due to void.  Will discharge once discharge teaching completed and when patient voids.

## 2019-10-28 NOTE — ANESTHESIA PREPROCEDURE EVALUATION
10/28/2019  Buddy Park is a 68 y.o., male.    Anesthesia Evaluation    I have reviewed the Patient Summary Reports.    I have reviewed the Nursing Notes.   I have reviewed the Medications.     Review of Systems  Anesthesia Hx:  No problems with previous Anesthesia    Social:  Non-Smoker    Hematology/Oncology:  Hematology Normal   Oncology Normal     EENT/Dental:EENT/Dental Normal   Cardiovascular:   Hypertension Angina hyperlipidemia    Pulmonary:  Pulmonary Normal    Renal/:   Chronic Renal Disease    Musculoskeletal:   Arthritis     Neurological:  Neurology Normal    Psych:   Psychiatric History depression          Physical Exam  General:  Well nourished    Airway/Jaw/Neck:  Airway Findings: Mouth Opening: Normal Tongue: Normal  General Airway Assessment: Adult  Mallampati: II  TM Distance: Normal, at least 6 cm        Eyes/Ears/Nose:  EYES/EARS/NOSE FINDINGS: Normal   Dental:  DENTAL FINDINGS: Normal   Chest/Lungs:  Chest/Lungs Findings: Clear to auscultation, Normal Respiratory Rate     Heart/Vascular:  Heart Findings: Rate: Normal  Rhythm: Regular Rhythm        Mental Status:  Mental Status Findings:  Cooperative, Alert and Oriented         Anesthesia Plan  Type of Anesthesia, risks & benefits discussed:  Anesthesia Type:  general  Patient's Preference:   Intra-op Monitoring Plan: standard ASA monitors  Intra-op Monitoring Plan Comments:   Post Op Pain Control Plan: IV/PO Opioids PRN and multimodal analgesia  Post Op Pain Control Plan Comments:   Induction:   IV  Beta Blocker:  Patient is on a Beta-Blocker and has received one dose within the past 24 hours (No further documentation required).       Informed Consent: Patient understands risks and agrees with Anesthesia plan.  Questions answered. Anesthesia consent signed with patient.  ASA Score: 3     Day of Surgery Review of History & Physical:     H&P update referred to the surgeon.         Ready For Surgery From Anesthesia Perspective.

## 2019-10-28 NOTE — ANESTHESIA POSTPROCEDURE EVALUATION
Anesthesia Post Evaluation    Patient: Buddy Park    Procedure(s) Performed: Procedure(s) (LRB):  CYSTOSCOPY, WITH RETROGRADE PYELOGRAM (Bilateral)  CYSTOSCOPY, WITH URETERAL STENT INSERTION (Left)    Final Anesthesia Type: general  Patient location during evaluation: PACU  Patient participation: Yes- Able to Participate  Level of consciousness: awake and alert  Post-procedure vital signs: reviewed and stable  Pain management: adequate  Airway patency: patent  PONV status at discharge: No PONV  Anesthetic complications: no      Cardiovascular status: hemodynamically stable  Respiratory status: unassisted and room air  Hydration status: euvolemic  Follow-up not needed.          Vitals Value Taken Time   /72 10/28/2019  1:24 PM   Temp 36.9 °C (98.4 °F) 10/28/2019  1:15 PM   Pulse 74 10/28/2019  1:24 PM   Resp 16 10/28/2019  1:24 PM   SpO2 96 % 10/28/2019  1:24 PM         Event Time     Out of Recovery 13:24:32          Pain/Slime Score: Pain Rating Prior to Med Admin: 2 (10/28/2019  1:00 PM)  Pain Rating Post Med Admin: 0 (10/28/2019  1:24 PM)  Slime Score: 10 (10/28/2019  1:24 PM)

## 2019-10-28 NOTE — TRANSFER OF CARE
"Anesthesia Transfer of Care Note    Patient: Buddy Park    Procedure(s) Performed: Procedure(s) (LRB):  CYSTOSCOPY, WITH RETROGRADE PYELOGRAM (Bilateral)  CYSTOSCOPY, WITH URETERAL STENT INSERTION (Left)    Patient location: PACU    Anesthesia Type: general    Transport from OR: Transported from OR on 2-3 L/min O2 by NC with adequate spontaneous ventilation    Post pain: adequate analgesia    Post assessment: no apparent anesthetic complications and tolerated procedure well    Post vital signs: stable    Level of consciousness: sedated and responds to stimulation    Nausea/Vomiting: no nausea/vomiting    Complications: none    Transfer of care protocol was followed      Last vitals:   Visit Vitals  BP (!) 176/88 (Patient Position: Lying)   Pulse 87   Temp 37 °C (98.6 °F) (Skin)   Resp 18   Ht 5' 5" (1.651 m)   Wt 70.9 kg (156 lb 4.9 oz)   SpO2 97%   BMI 26.01 kg/m²     "

## 2019-10-28 NOTE — OP NOTE
DATE OF PROCEDURE:  10/28/2019    PREOPERATIVE DIAGNOSES:  Left ureteropelvic junction calculus with   hydronephrosis and ureteral obstruction.    POSTOPERATIVE DIAGNOSES:  Left ureteropelvic junction obstruction with apparent   urinary calculus, although not clearly visualized.    PROCEDURES PERFORMED:  Cystourethroscopy, bilateral retrograde pyelograms and   insertion of a left double-J ureteral stent (6-Haitian x 24 cm).    SURGEON:  Gretchen Proctor M.D.    ANESTHESIA:  General.    PROCEDURE IN DETAIL:  The patient was brought to Cystoscopy Suite.  After   adequate induction of general anesthesia, he was placed in the lithotomy   position.  Genitalia were prepped and draped in usual manner.  Plain   fluoroscopic images were obtained of the abdomen and pelvis and the radiopaque   densities that had been seen as calculi on the CT scan could not be clearly   visualized on today's plain films, although there were abundant bowel shadows   that could be obscuring it.  A 22-Haitian cystoscope sheath with a foroblique   lens video camera was inserted under direct vision into urethra.  Examination of   anterior urethra was unremarkable.  The instrument was advanced towards the   prostatic urethra where the verumontanum was encountered.  The lateral lobes of   the prostate were visualized and found to be quite open with no evidence of any   obstruction.  There were multiple small calcific deposits within the mucosa of   the prostatic urethra, but the prostatic urethra and bladder neck were otherwise   open.  The interior of the bladder was then examined.  The trigone was   symmetrically configurated.  Both orifices were slit-like.  Bladder mucosa was   smooth throughout, no evidence of any trabeculation or diverticula, no cellules,   no lesions, no hemorrhagic sites and no calculi.  An 8-Haitian cone tip ureteral   catheter was then inserted into left ureteral orifice and contrast was   injected.  In this fashion, a left  retrograde pyelogram was obtained.  This   revealed what appeared to be an essentially normal left ureter until the   ureteropelvic junction was reached where there appeared to be an area of   narrowing and pyelocalyceal system did fill, but found to be dilated with   moderate hydronephrosis.  The calculi that had been noted on the CT scan could   not be clearly visualized on today's imaging.  It was decided at this point, we   would proceed with placement of double-J stent.  Initially, a #35 guidewire was   introduced into left ureteral orifice and advanced up the ureter into the renal   pelvis.  A 6-Arabic 24 cm double-J stent was then threaded over the guidewire   and advanced up the ureter until the proximal end was in the renal pelvis and   distal end protruding into the bladder from the left ureteral orifice.    Guidewire and pushers were removed and x-rays confirmed good position of the   stent with the proximal end coiled in the renal pelvis and distal end coiled in   the bladder from the left ureteral orifice.  Subsequently, right retrograde   pyelogram was obtained, which revealed essentially normal right ureter and   pyelocalyceal system.  Bladder was then drained, instruments removed and the   patient having tolerated the procedure well, was then transferred to the   Recovery Room in stable condition.      SISI  dd: 10/28/2019 12:05:43 (CDT)  td: 10/28/2019 14:47:52 (CDT)  Doc ID   #[id number]  Job ID #[id number]    CC:

## 2019-10-28 NOTE — BRIEF OP NOTE
Operative Note     SUMMARY     Surgery Date: 10/28/2019     Surgeon(s) and Role:     * Gretchen Proctor MD - Primary    Pre-op Diagnosis:  Renal calculus, left [N20.0]  Microscopic hematuria [R31.29]    Post-op Diagnosis:  Renal calculus, left [N20.0]  Microscopic hematuria [R31.29]    Procedure(s) (LRB):  CYSTOSCOPY, WITH RETROGRADE PYELOGRAM (Bilateral)  CYSTOSCOPY, WITH URETERAL STENT INSERTION (Left)    Anesthesia: General    Findings/Key Components:  See Op Note      Estimated Blood Loss: 0 mL         Specimens (From admission, onward)    None            Discharge Note      SUMMARY     Admit Date: 10/28/2019    Attending Physician: Gretchen Proctor MD     Discharge Physician: Gretchen Proctor MD    Discharge Date: 10/28/2019     Final Diagnosis: Renal calculus, left [N20.0]  Microscopic hematuria [R31.29]    Hospital Course: uneventful, going home same day    Disposition: Home or Self Care    Patient Instructions:   Current Discharge Medication List      START taking these medications    Details   cefUROXime (CEFTIN) 500 MG tablet Take 1 tablet (500 mg total) by mouth 2 (two) times daily.  Qty: 20 tablet, Refills: 0      HYDROcodone-acetaminophen (NORCO) 5-325 mg per tablet Take 1 tablet by mouth every 4 to 6 hours as needed for Pain.  Qty: 20 tablet, Refills: 0      phenazopyridine (PYRIDIUM) 100 MG tablet Take 2 tablets (200 mg total) by mouth 3 (three) times daily as needed for Pain.  Qty: 20 tablet, Refills: 0         CONTINUE these medications which have NOT CHANGED    Details   amLODIPine (NORVASC) 10 MG tablet Take 1 tablet (10 mg total) by mouth once daily.  Qty: 90 tablet, Refills: 3    Associated Diagnoses: Essential hypertension      b complex vitamins tablet Take 1 tablet by mouth once daily.      buPROPion (WELLBUTRIN XL) 150 MG TB24 tablet Take 1 tablet (150 mg total) by mouth once daily. In the morning  Qty: 90 tablet, Refills: 1    Associated Diagnoses: Major depressive disorder with single  episode, in full remission      carvedilol (COREG) 6.25 MG tablet Take 1 tablet (6.25 mg total) by mouth 2 (two) times daily with meals.  Qty: 180 tablet, Refills: 3    Associated Diagnoses: Essential hypertension      cholestyramine-aspartame (QUESTRAN LIGHT) 4 gram Powd Take 1 Scoop by mouth once daily.  Qty: 231 g, Refills: 11    Associated Diagnoses: Postcholecystectomy diarrhea      ciprofloxacin HCl (CIPRO) 500 MG tablet Take 1 tablet (500 mg total) by mouth 2 (two) times daily. for 5 days  Qty: 10 tablet, Refills: 0      colchicine (COLCRYS) 0.6 mg tablet Take 1 tablet (0.6 mg total) by mouth once daily.  Qty: 30 tablet, Refills: 11    Associated Diagnoses: Acute idiopathic gout involving toe of left foot; Hallux limitus of left foot; Primary osteoarthritis of left foot; Pain in left foot      ibuprofen (ADVIL,MOTRIN) 600 MG tablet Take 1 tablet (600 mg total) by mouth every 6 (six) hours as needed for Pain.  Qty: 20 tablet, Refills: 1      metFORMIN (GLUCOPHAGE) 500 MG tablet Take 1 tablet (500 mg total) by mouth 2 (two) times daily with meals.  Qty: 180 tablet, Refills: 1    Associated Diagnoses: Prediabetes      rosuvastatin (CRESTOR) 40 MG Tab Take 1 tablet (40 mg total) by mouth once daily.  Qty: 90 tablet, Refills: 3    Associated Diagnoses: Hyperlipidemia, unspecified hyperlipidemia type      tamsulosin (FLOMAX) 0.4 mg Cap Take 1 capsule (0.4 mg total) by mouth once daily.  Qty: 90 capsule, Refills: 3    Comments: Please consider 90 day supplies to promote better adherence  Associated Diagnoses: BPH without obstruction/lower urinary tract symptoms      aspirin (ECOTRIN) 81 MG EC tablet Take 81 mg by mouth once daily.      EPINEPHrine (EPIPEN) 0.3 mg/0.3 mL AtIn Inject into the muscle once.      nitroGLYCERIN (NITROSTAT) 0.4 MG SL tablet Place 1 tablet (0.4 mg total) under the tongue every 5 (five) minutes as needed for Chest pain.  Qty: 25 tablet, Refills: 11             Discharge Procedure Orders  (must include Diet, Follow-up, Activity)  Resume previous diet.  Follow up with PCP as needed.

## 2019-10-28 NOTE — INTERVAL H&P NOTE
The patient has been examined and the H&P has been reviewed:    I concur with the findings and no changes have occurred since H&P was written.    Anesthesia/Surgery risks, benefits and alternative options discussed and understood by patient/family.          Active Hospital Problems    Diagnosis  POA    Renal calculus, left [N20.0]  Yes      Resolved Hospital Problems   No resolved problems to display.

## 2019-10-28 NOTE — DISCHARGE INSTRUCTIONS
"Discharge Instructions: After Your Surgery/Procedure  Youve just had surgery. During surgery you were given medicine called anesthesia to keep you relaxed and free of pain. After surgery you may have some pain or nausea. This is common. Here are some tips for feeling better and getting well after surgery.     Stay on schedule with your medication.   Going home  Your doctor or nurse will show you how to take care of yourself when you go home. He or she will also answer your questions. Have an adult family member or friend drive you home.      For your safety we recommend these precaution for the first 24 hours after your procedure:  · Do not drive or use heavy equipment.  · Do not make important decisions or sign legal papers.  · Do not drink alcohol.  · Have someone stay with you, if needed. He or she can watch for problems and help keep you safe.  · Your concentration, balance, coordination, and judgement may be impaired for many hours after anesthesia.  Use caution when ambulating or standing up.     · You may feel weak and "washed out" after anesthesia and surgery.      Subtle residual effects of general anesthesia or sedation with regional / local anesthesia can last more than 24 hours.  Rest for the remainder of the day or longer if your Doctor/Surgeon has advised you to do so.  Although you may feel normal within the first 24 hours, your reflexes and mental ability may be impaired without you realizing it.  You may feel dizzy, lightheaded or sleepy for 24 hours or longer.      Be sure to go to all follow-up visits with your doctor. And rest after your surgery for as long as your doctor tells you to.  Coping with pain  If you have pain after surgery, pain medicine will help you feel better. Take it as told, before pain becomes severe. Also, ask your doctor or pharmacist about other ways to control pain. This might be with heat, ice, or relaxation. And follow any other instructions your surgeon or nurse gives " you.  Tips for taking pain medicine  To get the best relief possible, remember these points:  · Pain medicines can upset your stomach. Taking them with a little food may help.  · Most pain relievers taken by mouth need at least 20 to 30 minutes to start to work.  · Taking medicine on a schedule can help you remember to take it. Try to time your medicine so that you can take it before starting an activity. This might be before you get dressed, go for a walk, or sit down for dinner.  · Constipation is a common side effect of pain medicines. Call your doctor before taking any medicines such as laxatives or stool softeners to help ease constipation. Also ask if you should skip any foods. Drinking lots of fluids and eating foods such as fruits and vegetables that are high in fiber can also help. Remember, do not take laxatives unless your surgeon has prescribed them.  · Drinking alcohol and taking pain medicine can cause dizziness and slow your breathing. It can even be deadly. Do not drink alcohol while taking pain medicine.  · Pain medicine can make you react more slowly to things. Do not drive or run machinery while taking pain medicine.  Your health care provider may tell you to take acetaminophen to help ease your pain. Ask him or her how much you are supposed to take each day. Acetaminophen or other pain relievers may interact with your prescription medicines or other over-the-counter (OTC) drugs. Some prescription medicines have acetaminophen and other ingredients. Using both prescription and OTC acetaminophen for pain can cause you to overdose. Read the labels on your OTC medicines with care. This will help you to clearly know the list of ingredients, how much to take, and any warnings. It may also help you not take too much acetaminophen. If you have questions or do not understand the information, ask your pharmacist or health care provider to explain it to you before you take the OTC medicine.  Managing  nausea  Some people have an upset stomach after surgery. This is often because of anesthesia, pain, or pain medicine, or the stress of surgery. These tips will help you handle nausea and eat healthy foods as you get better. If you were on a special food plan before surgery, ask your doctor if you should follow it while you get better. These tips may help:  · Do not push yourself to eat. Your body will tell you when to eat and how much.  · Start off with clear liquids and soup. They are easier to digest.  · Next try semi-solid foods, such as mashed potatoes, applesauce, and gelatin, as you feel ready.  · Slowly move to solid foods. Dont eat fatty, rich, or spicy foods at first.  · Do not force yourself to have 3 large meals a day. Instead eat smaller amounts more often.  · Take pain medicines with a small amount of solid food, such as crackers or toast, to avoid nausea.     Call your surgeon if  · You still have pain an hour after taking medicine. The medicine may not be strong enough.  · You feel too sleepy, dizzy, or groggy. The medicine may be too strong.  · You have side effects like nausea, vomiting, or skin changes, such as rash, itching, or hives.       If you have obstructive sleep apnea  You were given anesthesia medicine during surgery to keep you comfortable and free of pain. After surgery, you may have more apnea spells because of this medicine and other medicines you were given. The spells may last longer than usual.   At home:  · Keep using the continuous positive airway pressure (CPAP) device when you sleep. Unless your health care provider tells you not to, use it when you sleep, day or night. CPAP is a common device used to treat obstructive sleep apnea.  · Talk with your provider before taking any pain medicine, muscle relaxants, or sedatives. Your provider will tell you about the possible dangers of taking these medicines.  © 0878-1980 [employer]. [address] [place] [address]  [place] [address]. All rights reserved. This information is not intended as a substitute for professional medical care. Always follow your healthcare professional's instructions.        General Information:    1.  Do not drink alcoholic beverages including beer for 24 hours or as long as you are on pain medication..  2.  Do not drive a motor vehicle, operate machinery or power tools, or signs legal papers for 24 hours or as long as you are on pain medication.   3.  You may experience light-headedness, dizziness, and sleepiness following surgery. Please do not stay alone. A responsible adult should be with you for this 24 hour period.  4.  Go home and rest.    5. Progress slowly to a normal diet unless instructed.  Otherwise, begin with liquids such as soft drinks, then soup and crackers working up to solid foods. Drink plenty of nonalcoholic fluids.  6.  Certain anesthetics and pain medications produce nausea and vomiting in certain       individuals. If nausea becomes a problem at home, call you doctor.    7. A nurse will be calling you sometime after surgery. Do not be alarmed. This is our way of finding out how you are doing.    8. Several times every hour while you are awake, take 2-3 deep breaths and cough. If you had stomach surgery hold a pillow or rolled towel firmly against your stomach before you cough. This will help with any pain the cough might cause.  9. Several times every hour while you are awake, pump and flex your feet 5-6 times and do foot circles. This will help prevent blood clots.    10.Call your doctor for severe pain, bleeding, fever, or signs or symptoms of infection (pain, swelling, redness, foul odor, drainage).    Post op instructions for prevention of DVT  What is deep vein thrombosis?  Deep vein thrombosis (DVT) is the medical term for blood clots in the deep veins of the leg.  These blood clots can be dangerous.  A DVT can block a blood vessel and keep blood from getting where it needs to go.   Another problem is that the clot can travel to other parts of the body such as the lungs.  A clot that travels to the lungs is called a pulmonary embolus (PE) and can cause serious problems with breathing which can lead to death.  Am I at risk for DVT/PE?  If you are not very active, you are at risk of DVT.  Anyone confined to bed, sitting for long periods of time, recovering from surgery, etc. increases the risk of DVT.  Other risk factors are cancer diagnosis, certain medications, estrogen replacement in any form,older age, obesity, pregnancy, smoking, history of clotting disorders, and dehydration.  How will I know if I have a DVT?   Swelling in the lower leg   Pain   Warmth, redness, hardness or bulging of the vein  If you have any of these symptoms, call your doctors office right away.  Some people will not have any symptoms until the clot moves to the lungs.  What are the symptoms of a PE?   Panting, shortness of breath, or trouble breathing   Sharp, knife-like chest pain when you breathe   Coughing or coughing up blood   Rapid heartbeat  If you have any of these symptoms or get worse quickly, call 911 for emergency treatment.  How can I prevent a DVT?   Avoid long periods of inactivity and dont cross your legs--get up and walk around every hour or so.   Stay active--walking after surgery is highly encouraged.  This means you should get out of the house and walk in the neighborhood.  Going up and down stairs will not impair healing (unless advised against such activity by your doctor).     Drink plenty of noncaffeinated, nonalcoholic fluids each day to prevent dehydration.   Wear special support stockings, if they have been advised by your doctor.   If you travel, stop at least once an hour and walk around.   Avoid smoking (assistance with stopping is available through your healthcare provider)  Always notify your doctor if you are not able to follow the post operative instructions that are  given to you at the time of discharge.  It may be necessary to prescribe one of the medications available to prevent DVT.    We hope your stay was comfortable as you heal now, mend and rest.    For we have enjoyed taking care of you by giving your our best.    And as you get better, by regaining your health and strength;   We count it as a privilege to have served you and hope your time at [facility] was well spent.      Thank  You!!!

## 2019-10-29 ENCOUNTER — CLINICAL SUPPORT (OUTPATIENT)
Dept: UROLOGY | Facility: CLINIC | Age: 69
End: 2019-10-29
Payer: MEDICARE

## 2019-10-29 ENCOUNTER — DOCUMENTATION ONLY (OUTPATIENT)
Dept: FAMILY MEDICINE | Facility: CLINIC | Age: 69
End: 2019-10-29

## 2019-10-29 ENCOUNTER — TELEPHONE (OUTPATIENT)
Dept: UROLOGY | Facility: CLINIC | Age: 69
End: 2019-10-29

## 2019-10-29 VITALS
WEIGHT: 156.31 LBS | DIASTOLIC BLOOD PRESSURE: 72 MMHG | TEMPERATURE: 98 F | HEIGHT: 65 IN | BODY MASS INDEX: 26.04 KG/M2 | HEART RATE: 72 BPM | RESPIRATION RATE: 16 BRPM | SYSTOLIC BLOOD PRESSURE: 151 MMHG | OXYGEN SATURATION: 96 %

## 2019-10-29 DIAGNOSIS — R33.9 URINARY RETENTION: Primary | ICD-10-CM

## 2019-10-29 NOTE — PROGRESS NOTES
Pre-Visit Chart Review  For Appointment Scheduled on 11-6-19    There are no preventive care reminders to display for this patient.

## 2019-10-29 NOTE — PROGRESS NOTES
Patient arrived to clinic, unable to urinate. Bladder scan done, resulted 647 ml. Results given to MD, patient draped and prepped, doctor inserted 16 fr coude jiménez catheter with some resistance, with orange colored urine returned. Stat lock and leg bag attached, patient will return to clinic on this Thurs to have catheter removed, patient verbally understood.

## 2019-10-29 NOTE — TELEPHONE ENCOUNTER
----- Message from Katherin Parks sent at 10/29/2019  9:54 AM CDT -----  Contact: self  Type: Needs Medical Advice    Who Called:  self  Symptoms (please be specific):  Not able to urinate and is painful  How long has patient had these symptoms:  Surgery yesterday morning  Pharmacy name and phone #:    [facility] - [place], [place] - [address]  [address]  [facility] [address]  Phone: [phone] Fax: [phone]  Best Call Back Number: [phone] (home)   Additional Information: Patient would like advise. Thanks!

## 2019-10-29 NOTE — TELEPHONE ENCOUNTER
Spoke with patient, he states that he has been unable to urinate this morning, put on nurse visit to come in for bladder scan, patient verbally understood.

## 2019-10-31 ENCOUNTER — CLINICAL SUPPORT (OUTPATIENT)
Dept: UROLOGY | Facility: CLINIC | Age: 69
End: 2019-10-31
Payer: MEDICARE

## 2019-10-31 ENCOUNTER — TELEPHONE (OUTPATIENT)
Dept: UROLOGY | Facility: CLINIC | Age: 69
End: 2019-10-31

## 2019-10-31 DIAGNOSIS — Z97.8 INDWELLING FOLEY CATHETER PRESENT: Primary | ICD-10-CM

## 2019-10-31 NOTE — PROGRESS NOTES
Patient arrived to clinic to have catheter removed. 300 ml yellow urine in leg bag, deflated 10 ml NS balloon, removed 16 fr jiménez without difficulty, patient tolerated well, discharge teaching done, patient verbally understood.

## 2019-10-31 NOTE — TELEPHONE ENCOUNTER
----- Message from Katherin Parks sent at 10/31/2019  2:02 PM CDT -----  Contact: self  Type: Needs Medical Advice    Who Called:  self  Symptoms (please be specific):  Not able to urine  How long has patient had these symptoms:  Since the catheter out this morning  Pharmacy name and phone #:  [facility] in [place] (patient recently moved)  Best Call Back Number: [phone] (home)   Additional Information:  Patient would like advise on what he should do. Patient states he is not received tamsulosin (FLOMAX) 0.4 mg Cap. Please call patient. Thanks!

## 2019-10-31 NOTE — TELEPHONE ENCOUNTER
"Returned call and spoke with patient, he states he is having the urge with no output and has been drinking water. Inquired has he been taking his Flomax. Patient states he does not remember getting an RX for it. In profile, Dr Chatman ordered med for patient in Sept. Patient went and got all of his medications, and looked thru and did not have the medication at all, he has not been taking. Informed I can call it in to his pharmacy, he has moved to [place] and wants it called in to the [facility], done as requested. Inquired about getting catheter put back in, patient yelled out " hell no". He will take the med and if nothing happens, then he will go to the ER, patient verbally understood.  "

## 2019-11-01 ENCOUNTER — HOSPITAL ENCOUNTER (EMERGENCY)
Facility: HOSPITAL | Age: 69
Discharge: HOME | End: 2019-11-01
Attending: EMERGENCY MEDICINE
Payer: MEDICARE

## 2019-11-01 VITALS
HEIGHT: 65 IN | HEART RATE: 89 BPM | WEIGHT: 160 LBS | OXYGEN SATURATION: 96 % | BODY MASS INDEX: 26.66 KG/M2 | RESPIRATION RATE: 20 BRPM | DIASTOLIC BLOOD PRESSURE: 71 MMHG | SYSTOLIC BLOOD PRESSURE: 163 MMHG

## 2019-11-01 DIAGNOSIS — R33.9 URINARY RETENTION: Primary | ICD-10-CM

## 2019-11-01 PROCEDURE — 99282 EMERGENCY DEPT VISIT SF MDM: CPT | Mod: HCNC

## 2019-11-01 PROCEDURE — 51702 INSERT TEMP BLADDER CATH: CPT

## 2019-11-01 NOTE — ED PROVIDER NOTES
Encounter Date: 11/1/2019       History     Chief Complaint   Patient presents with    Urinary Retention     stent placed monday for kidney stones. catheter removed this morning. stent still in place     Chief complaint:  Can not pee    HPI:  68-year-old male status post ureteral stent presents with urinary retention and pelvic pain. He had a catheter placed 2 days ago and removed yesterday.  He reports that he is unable to urinate.  He denies any fever, nausea or vomiting.        Review of patient's allergies indicates:   Allergen Reactions    Bee pollens Shortness Of Breath     WASP, BEE, ANT AND CATERPILLER STINGS    Hydrochlorothiazide Shortness Of Breath and Rash    Milk containing products Diarrhea    Metoprolol Rash     Past Medical History:   Diagnosis Date    Angina pectoris     Anxiety     Biliary colic     Cochlear implant in place     Deaf     LEFT EAR    Depression     Heart failure     High cholesterol     History of colon polyps 2/20/2018    [place] (hard of hearing)     HEARING AID - RIGHT    Hyperlipidemia     Hypertension     Kidney stone     Kidney stones     Renal stones     Trouble in sleeping     Wears glasses      Past Surgical History:   Procedure Laterality Date    [facility]     CATARACT EXTRACTION Bilateral     CHOLECYSTECTOMY  2-6-2014    COLONOSCOPY  1/9/2013    Dr. Gillette, 5 year recheck (family history colon cancer)    COLONOSCOPY N/A 3/12/2018    Procedure: COLONOSCOPY;  Surgeon: Garett Go MD;  Location: [facility];  Service: Endoscopy;  Laterality: N/A;    CYSTOSCOPY W/ RETROGRADES Bilateral 10/28/2019    Procedure: CYSTOSCOPY, WITH RETROGRADE PYELOGRAM;  Surgeon: Gretchen Proctor MD;  Location: [facility];  Service: Urology;  Laterality: Bilateral;    CYSTOSCOPY W/ URETERAL STENT PLACEMENT Left 10/28/2019    Procedure: CYSTOSCOPY, WITH URETERAL STENT INSERTION;  Surgeon: Gretchen Proctor MD;  Location: [facility];  Service:  Urology;  Laterality: Left;    EAR MASTOIDECTOMY W/ COCHLEAR IMPLANT W/ LANDMARK      left ear    FOREIGN BODY REMOVAL Right     glass removed from right foot/repair    FRACTURE SURGERY      right arm x2    LITHOTRIPSY      ROTATOR CUFF REPAIR      Right     SINUS SURGERY      TONSILLECTOMY      VASCULAR SURGERY       Family History   Problem Relation Age of Onset    Cancer Mother         colon    Heart disease Father     Gout Father     Kidney disease Father     Arthritis Father     Hypertension Father     Early death Daughter         mva    Alcohol abuse Brother     Cancer Brother         mouth cancer w mets    No Known Problems Sister     Alcohol abuse Brother     No Known Problems Son     Heart disease Maternal Grandmother         MI    Stroke Maternal Grandfather     Heart disease Paternal Grandmother         MI    No Known Problems Son     Cancer Paternal Uncle         lung cancer    Allergic rhinitis Neg Hx     Allergies Neg Hx     Angioedema Neg Hx     Asthma Neg Hx     Atopy Neg Hx     Eczema Neg Hx     Immunodeficiency Neg Hx     Rhinitis Neg Hx     Urticaria Neg Hx     Collagen disease Neg Hx      Social History     Tobacco Use    Smoking status: Never Smoker    Smokeless tobacco: Never Used   Substance Use Topics    Alcohol use: Yes     Comment: once every two months    Drug use: No     Review of Systems   Constitutional: Negative for activity change, appetite change, chills, fatigue and fever.   Eyes: Negative for visual disturbance.   Respiratory: Negative for apnea and shortness of breath.    Cardiovascular: Negative for chest pain and palpitations.   Gastrointestinal: Positive for abdominal pain. Negative for abdominal distention.   Genitourinary: Positive for decreased urine volume and difficulty urinating.   Musculoskeletal: Negative for neck pain.   Skin: Negative for pallor and rash.   Neurological: Negative for headaches.   Hematological: Does not  bruise/bleed easily.   Psychiatric/Behavioral: Negative for agitation.       Physical Exam     Initial Vitals [11/01/19 0345]   BP Pulse Resp Temp SpO2   (!) 172/70 99 20 -- 100 %      MAP       --         Physical Exam    Nursing note and vitals reviewed.  Constitutional: He appears well-developed and well-nourished.   HENT:   Head: Normocephalic and atraumatic.   Eyes: Conjunctivae are normal.   Neck: Normal range of motion. Neck supple.   Cardiovascular: Normal rate, regular rhythm and normal heart sounds. Exam reveals no gallop and no friction rub.    No murmur heard.  Pulmonary/Chest: Breath sounds normal. No respiratory distress. He has no wheezes. He has no rhonchi. He has no rales.   Abdominal: Soft. He exhibits no distension. There is no tenderness.   Musculoskeletal: Normal range of motion.   Neurological: He is alert and oriented to person, place, and time.   Skin: Skin is warm and dry.   Psychiatric: He has a normal mood and affect.         ED Course   Procedures  Labs Reviewed - No data to display       Imaging Results    None          Medical Decision Making:   ED Management:  68-year-old male presents with urinary retention.  Garcia catheter is placed with 400 cc of bloody urine without clots with complete symptomatic relief.  Catheter is left in place.                      Clinical Impression:       ICD-10-CM ICD-9-CM   1. Urinary retention R33.9 788.20                                uJan Pacheco III, MD  11/01/19 0424

## 2019-11-04 ENCOUNTER — TELEPHONE (OUTPATIENT)
Dept: UROLOGY | Facility: CLINIC | Age: 69
End: 2019-11-04

## 2019-11-04 DIAGNOSIS — N20.0 CALCULUS OF KIDNEY: Primary | ICD-10-CM

## 2019-11-04 NOTE — TELEPHONE ENCOUNTER
----- Message from Anna Bennett sent at 11/4/2019 10:01 AM CST -----  Type: Needs Medical Advice    Who Called: pt  Symptoms (please be specific):   Kidney  stones  How long has patient had these symptoms:   Pt  Went to  ER  At  3:00 Friday   morning  Pharmacy name and phone #:   [id number] - [place], [place] - [address]  [address] [place] [address]  Phone: [phone] Fax: [phone]    Best Call Back Number: [phone]  Additional Information:   Calling  For  Advice   On  The  Kidney  Stones  / pt   Is  Coming in on  NOV 12 // please call  To advise

## 2019-11-04 NOTE — TELEPHONE ENCOUNTER
I spoke with the pt and he states he had the jiménez replaced Friday AM. I advised him we will keep the appointment afternoon and d/c the jiménez in the AM. The pt will also obtain a KUB the morning of 11/12/19. The pt verbalized understanding.

## 2019-11-12 ENCOUNTER — HOSPITAL ENCOUNTER (OUTPATIENT)
Dept: RADIOLOGY | Facility: HOSPITAL | Age: 69
Discharge: HOME | End: 2019-11-12
Attending: UROLOGY
Payer: MEDICARE

## 2019-11-12 ENCOUNTER — OFFICE VISIT (OUTPATIENT)
Dept: UROLOGY | Facility: CLINIC | Age: 69
End: 2019-11-12
Payer: MEDICARE

## 2019-11-12 ENCOUNTER — CLINICAL SUPPORT (OUTPATIENT)
Dept: UROLOGY | Facility: CLINIC | Age: 69
End: 2019-11-12
Payer: MEDICARE

## 2019-11-12 VITALS
BODY MASS INDEX: 26.67 KG/M2 | WEIGHT: 160.06 LBS | DIASTOLIC BLOOD PRESSURE: 71 MMHG | SYSTOLIC BLOOD PRESSURE: 143 MMHG | HEIGHT: 65 IN | TEMPERATURE: 99 F | HEART RATE: 74 BPM | RESPIRATION RATE: 18 BRPM

## 2019-11-12 DIAGNOSIS — N20.0 RENAL CALCULUS, LEFT: Primary | ICD-10-CM

## 2019-11-12 DIAGNOSIS — R33.9 URINARY RETENTION: ICD-10-CM

## 2019-11-12 DIAGNOSIS — N20.0 CALCULUS OF KIDNEY: ICD-10-CM

## 2019-11-12 DIAGNOSIS — N20.0 RENAL CALCULUS, LEFT: ICD-10-CM

## 2019-11-12 DIAGNOSIS — Z97.8 INDWELLING FOLEY CATHETER PRESENT: Primary | ICD-10-CM

## 2019-11-12 PROCEDURE — 99999 PR PBB SHADOW E&M-EST. PATIENT-LVL III: ICD-10-PCS | Mod: PBBFAC,HCNC,, | Performed by: UROLOGY

## 2019-11-12 PROCEDURE — 74018 RADEX ABDOMEN 1 VIEW: CPT | Mod: 26,HCNC,, | Performed by: RADIOLOGY

## 2019-11-12 PROCEDURE — 1101F PT FALLS ASSESS-DOCD LE1/YR: CPT | Mod: HCNC,CPTII,S$GLB, | Performed by: UROLOGY

## 2019-11-12 PROCEDURE — 3077F PR MOST RECENT SYSTOLIC BLOOD PRESSURE >= 140 MM HG: ICD-10-PCS | Mod: HCNC,CPTII,S$GLB, | Performed by: UROLOGY

## 2019-11-12 PROCEDURE — 74018 XR ABDOMEN AP 1 VIEW: ICD-10-PCS | Mod: 26,HCNC,, | Performed by: RADIOLOGY

## 2019-11-12 PROCEDURE — 51798 US URINE CAPACITY MEASURE: CPT | Mod: HCNC,S$GLB,, | Performed by: UROLOGY

## 2019-11-12 PROCEDURE — 3077F SYST BP >= 140 MM HG: CPT | Mod: HCNC,CPTII,S$GLB, | Performed by: UROLOGY

## 2019-11-12 PROCEDURE — 74018 RADEX ABDOMEN 1 VIEW: CPT | Mod: TC,HCNC,FY

## 2019-11-12 PROCEDURE — 1101F PR PT FALLS ASSESS DOC 0-1 FALLS W/OUT INJ PAST YR: ICD-10-PCS | Mod: HCNC,CPTII,S$GLB, | Performed by: UROLOGY

## 2019-11-12 PROCEDURE — 99213 OFFICE O/P EST LOW 20 MIN: CPT | Mod: 25,HCNC,S$GLB, | Performed by: UROLOGY

## 2019-11-12 PROCEDURE — 3078F DIAST BP <80 MM HG: CPT | Mod: HCNC,CPTII,S$GLB, | Performed by: UROLOGY

## 2019-11-12 PROCEDURE — 3078F PR MOST RECENT DIASTOLIC BLOOD PRESSURE < 80 MM HG: ICD-10-PCS | Mod: HCNC,CPTII,S$GLB, | Performed by: UROLOGY

## 2019-11-12 PROCEDURE — 99999 PR PBB SHADOW E&M-EST. PATIENT-LVL III: CPT | Mod: PBBFAC,HCNC,, | Performed by: UROLOGY

## 2019-11-12 PROCEDURE — 99213 PR OFFICE/OUTPT VISIT, EST, LEVL III, 20-29 MIN: ICD-10-PCS | Mod: 25,HCNC,S$GLB, | Performed by: UROLOGY

## 2019-11-12 PROCEDURE — 51798 PR MEAS,POST-VOID RES,US,NON-IMAGING: ICD-10-PCS | Mod: HCNC,S$GLB,, | Performed by: UROLOGY

## 2019-11-12 NOTE — PROGRESS NOTES
OFFICE NOTE  [unfilled]  [id number]  11/12/2019       CHIEF COMPLAINT:   left UPJ calculus with indwelling ureteral stent, recent urinary retention     HISTORY OF PRESENT ILLNESS:   this 68-year-old male returns for routine recheck.  He had undergone cystoscopy insertion of left ureteral stent for an obstructing left UPJ calculus on 10/28/2019.  Following the procedure developed urinary retention.  Garcia catheter was removed this morning and he has been voiding well since then.    Physical exam:  Abdomen - soft benign some moderate left flank tenderness no masses no hernias no organomegaly                                 Bladder scan - 76 mL postvoid residual     PSA  - 0.28 on 08/06/2019       FINAL IMPRESSION:  Left UPJ calculus with indwelling ureteral stent, recent urinary retention that appears to have resolved       RECOMMENDATIONS:  KUB and subsequent plan for ESWL of the left renal calculus.

## 2019-11-12 NOTE — PROGRESS NOTES
Patient arrived to clinic to have catheter removed. 400 ml yellow urine in leg bag, deflated 10 ml NS balloon, removed 16 fr jiménez catheter without difficulty, no trauma or bleeding to head of penis. Discharge teaching done, patient going to have KUB done right now, and has follow up appt this afternoon, patient verbally understood.

## 2019-11-13 ENCOUNTER — OFFICE VISIT (OUTPATIENT)
Dept: FAMILY MEDICINE | Facility: CLINIC | Age: 69
End: 2019-11-13
Attending: FAMILY MEDICINE
Payer: MEDICARE

## 2019-11-13 ENCOUNTER — TELEPHONE (OUTPATIENT)
Dept: UROLOGY | Facility: CLINIC | Age: 69
End: 2019-11-13

## 2019-11-13 VITALS
RESPIRATION RATE: 17 BRPM | WEIGHT: 160.25 LBS | HEIGHT: 65 IN | HEART RATE: 72 BPM | OXYGEN SATURATION: 98 % | BODY MASS INDEX: 26.7 KG/M2 | SYSTOLIC BLOOD PRESSURE: 134 MMHG | DIASTOLIC BLOOD PRESSURE: 88 MMHG | TEMPERATURE: 98 F

## 2019-11-13 DIAGNOSIS — N17.9 AKI (ACUTE KIDNEY INJURY): ICD-10-CM

## 2019-11-13 DIAGNOSIS — Z09 HOSPITAL DISCHARGE FOLLOW-UP: Primary | ICD-10-CM

## 2019-11-13 DIAGNOSIS — N13.9 ACUTE BILATERAL OBSTRUCTIVE UROPATHY: ICD-10-CM

## 2019-11-13 DIAGNOSIS — I10 ESSENTIAL HYPERTENSION: ICD-10-CM

## 2019-11-13 DIAGNOSIS — N20.0 RENAL CALCULUS, LEFT: ICD-10-CM

## 2019-11-13 DIAGNOSIS — N18.30 CKD (CHRONIC KIDNEY DISEASE) STAGE 3, GFR 30-59 ML/MIN: ICD-10-CM

## 2019-11-13 PROCEDURE — 3075F SYST BP GE 130 - 139MM HG: CPT | Mod: HCNC,CPTII,S$GLB, | Performed by: NURSE PRACTITIONER

## 2019-11-13 PROCEDURE — 1101F PR PT FALLS ASSESS DOC 0-1 FALLS W/OUT INJ PAST YR: ICD-10-PCS | Mod: HCNC,CPTII,S$GLB, | Performed by: NURSE PRACTITIONER

## 2019-11-13 PROCEDURE — 3075F PR MOST RECENT SYSTOLIC BLOOD PRESS GE 130-139MM HG: ICD-10-PCS | Mod: HCNC,CPTII,S$GLB, | Performed by: NURSE PRACTITIONER

## 2019-11-13 PROCEDURE — 3079F DIAST BP 80-89 MM HG: CPT | Mod: HCNC,CPTII,S$GLB, | Performed by: NURSE PRACTITIONER

## 2019-11-13 PROCEDURE — 99499 RISK ADDL DX/OHS AUDIT: ICD-10-PCS | Mod: HCNC,S$GLB,, | Performed by: NURSE PRACTITIONER

## 2019-11-13 PROCEDURE — 99999 PR PBB SHADOW E&M-EST. PATIENT-LVL V: CPT | Mod: PBBFAC,HCNC,, | Performed by: NURSE PRACTITIONER

## 2019-11-13 PROCEDURE — 99214 OFFICE O/P EST MOD 30 MIN: CPT | Mod: HCNC,S$GLB,, | Performed by: NURSE PRACTITIONER

## 2019-11-13 PROCEDURE — 99499 UNLISTED E&M SERVICE: CPT | Mod: HCNC,S$GLB,, | Performed by: NURSE PRACTITIONER

## 2019-11-13 PROCEDURE — 99999 PR PBB SHADOW E&M-EST. PATIENT-LVL V: ICD-10-PCS | Mod: PBBFAC,HCNC,, | Performed by: NURSE PRACTITIONER

## 2019-11-13 PROCEDURE — 1101F PT FALLS ASSESS-DOCD LE1/YR: CPT | Mod: HCNC,CPTII,S$GLB, | Performed by: NURSE PRACTITIONER

## 2019-11-13 PROCEDURE — 99214 PR OFFICE/OUTPT VISIT, EST, LEVL IV, 30-39 MIN: ICD-10-PCS | Mod: HCNC,S$GLB,, | Performed by: NURSE PRACTITIONER

## 2019-11-13 PROCEDURE — 3079F PR MOST RECENT DIASTOLIC BLOOD PRESSURE 80-89 MM HG: ICD-10-PCS | Mod: HCNC,CPTII,S$GLB, | Performed by: NURSE PRACTITIONER

## 2019-11-13 NOTE — PROGRESS NOTES
Transitional Care Note  Subjective:       Patient ID: Buddy Park is a 68 y.o. male.  Chief Complaint: Follow-up (hospital follow up 11/1/19)    Family and/or Caretaker present at visit?  No.  Diagnostic tests reviewed/disposition: No diagnosic tests pending after this hospitalization. He is following up with urology  Disease/illness education:   Diet Adult Regular      Notify your health care provider if you experience any of the following:  temperature >100.4      Notify your health care provider if you experience any of the following:  persistent nausea and vomiting or diarrhea      Notify your health care provider if you experience any of the following:  severe uncontrolled pain      Activity as tolerated     Home health/community services discussion/referrals: Patient does not have home health established from hospital visit.  They do not need home health.  If needed, we will set up home health for the patient.   Establishment or re-establishment of referral orders for community resources: No other necessary community resources.   Discussion with other health care providers: He will be following up with urology for possible surgery. .   HPI Feels better today. Able to urinate, felt some flank pain with urination. Patient rated the pain 2/10. Denies any fever, hematuria, or CP. Following up with urology for possible lithotripsy.   Review of Systems   Constitutional: Negative for chills, fatigue and fever.   HENT: Negative for congestion, ear pain, sinus pressure and sore throat.    Respiratory: Negative for shortness of breath and wheezing.    Cardiovascular: Negative for chest pain and leg swelling.   Gastrointestinal: Negative for abdominal distention and abdominal pain.   Genitourinary: Positive for flank pain (left sided flank pain when urinating) and penile pain. Negative for difficulty urinating and dysuria.   Skin: Negative for color change and pallor.   Neurological: Negative for light-headedness,  numbness and headaches.       Objective:      Physical Exam   Constitutional: He is oriented to person, place, and time. He appears well-developed and well-nourished.   HENT:   Head: Normocephalic and atraumatic.   Right Ear: External ear normal.   Left Ear: External ear normal.   Eyes: Pupils are equal, round, and reactive to light. Conjunctivae and EOM are normal.   Neck: Normal range of motion. Neck supple.   Cardiovascular: Normal rate and regular rhythm.   Pulmonary/Chest: Effort normal and breath sounds normal.   Abdominal: Soft. Bowel sounds are normal. There is no tenderness. There is no rigidity, no rebound, no guarding and no CVA tenderness.   Musculoskeletal: Normal range of motion.   Neurological: He is alert and oriented to person, place, and time.   Skin: Skin is warm and dry.   Nursing note and vitals reviewed.      Assessment:       1. Hospital discharge follow-up    2. Essential hypertension    3. CKD (chronic kidney disease) stage 3, GFR 30-59 ml/min    4. BMI 27.0-27.9,adult    5. EARLENE (acute kidney injury)    6. Acute bilateral obstructive uropathy    7. Renal calculus, left        Plan:       Buddy was seen today for follow-up.    Diagnoses and all orders for this visit:    Hospital discharge follow-up  -     Comprehensive metabolic panel; Future    Essential hypertension  Stable on current medications.   CKD (chronic kidney disease) stage 3, GFR 30-59 ml/min  -     Comprehensive metabolic panel; Future    BMI 27.0-27.9,adult  Recommend a heart healthy diet rich in fiber, fresh vegetables and fruit and low in saturated fats (fried foods, red meat, etc.). Recommend regular exercise including a minimum of 150 minutes of cardio exercise per week and 2-30 minute workouts of strength training like light weights, yoga, pilates, etc.  Work toward a body mass index of < 25.      EARLENE (acute kidney injury)  -     Comprehensive metabolic panel; Future    Acute bilateral obstructive uropathy  Follow up with  urology.   Renal calculus, left  Follow up with urology.     Discussed diet and hydration status for preventing kidney stones in the future. Discussed worsening signs/symptoms and when to return to clinic or go to ED.   Patient expresses understanding and agrees with treatment plan.

## 2019-11-13 NOTE — PROGRESS NOTES
Patient, Buddy Park (MRN #[id number]), presented with a recent Estimated Glumerular Filtration Rate (EGFR) between 15 and 29 consistent with the definition of chronic kidney disease stage 4 (ICD10 - N18.4).    eGFR if non    Date Value Ref Range Status   10/25/2019 26 (A) >60 mL/min/1.73 m^2 Final     Comment:     Calculation used to obtain the estimated glomerular filtration  rate (eGFR) is the CKD-EPI equation.          The patient's chronic kidney disease stage 4 was monitored, evaluated, addressed and/or treated. This addendum to the medical record is made on 11/13/2019.

## 2019-11-13 NOTE — PATIENT INSTRUCTIONS
Understanding Kidney Stones  Your kidneys are bean-shaped organs. They help filter extra salts, waste, and water from your body. You need to drink enough water every day to help flush the extra salts into your urine.     What are kidney stones?  Kidney stones are made up of chemical crystals that separate out from urine. These crystals clump together to make stones. They form in the calyx of the kidney. They may stay in the kidney or move into the urinary tract.   Why kidney stones form    Preventing Kidney Stones  If youve had a kidney stone, you may worry that youll have another. Removing or passing your stone doesnt prevent future stones. But with your healthcare providers help, you can reduce your risk of forming new stones. Follow up with your healthcare provider to help find new stones. You may need follow-up every 3 months to a year for a lifetime.    Drink lots of water  Staying well-hydrated is the best way to reduce your risk of future stones. Drink 8 12-ounce glasses of water daily. Have 2 with each meal and 2 between meals. Try keeping a pitcher of water nearby during the day and at night.  Take medicines if needed  Medicines, including vitamins and minerals, may be prescribed for certain types of stones. You may want to write your doses and medicine times on a calendar. Some medicines decrease stone-forming chemicals in your blood. Others help prevent those chemicals from crystallizing in urine. Still others help keep a normal acid balance in your urine.  Follow your prescribed diet  Your healthcare provider will tell you which foods contain the chemicals you should avoid. Your healthcare provider may also suggest talking to a dietitian. He or she can help you plan meals youll enjoy. These meals wont put you at risk for future stones. You may be told to limit certain foods, depending on which type of stones youve had. You should limit the amount of salt in your food to about 2 grams a day. This  will help prevent most types of kidney stones. Make sure you get an adequate amount of calcium in your diet.  For calcium oxalate stones: Limit animal protein, such as meat, eggs, and fish. Limit grapefruit juice and alcohol. Limit high-oxalate foods (such as cola, tea, chocolate, spinach, rhubarb, wheat bran, and peanuts).  For uric acid stones: Limit high-purine foods, such as mushrooms, peas, beans, anchovies, meat, poultry, shellfish, and organ meats. These foods increase uric acid production.  For cystine stones: Limit high-methionine foods (fish is the most common, but eggs and meats, also). These foods increase production of cystine.  Date Last Reviewed: 2/1/2017 © 2000-2017 [employer]. [address] [place] [address]. All rights reserved. This information is not intended as a substitute for professional medical care. Always follow your healthcare professional's instructions.      Kidneys form stones for many reasons. If you dont drink enough water, for instance, you wont have enough urine to dilute chemicals. Then the chemicals may form crystals, which can develop into stones. Here are some reasons why kidney stones form:  · Fluid loss (dehydration). This can concentrate urine, causing stones to form.  · Certain foods. Some foods contain large amounts of the chemicals that sometimes crystallize into stones. Eating foods that contain a lot of meat or salt can lead to more kidney stones.  · Kidney infections. These infections foster stones by slowing urine flow or changing the acid balance of your urine.  · Family history. If family members have had kidney stones, youre more likely to have them, too.  · A lack of certain substances in your urine. Some substances can help protect you from forming stones. If you dont have enough of these in your urine, stone formation can increase.  Where stones form  Stones begin in the cup-shaped part of the kidney (calyx). Some stay in the calyx  and grow. Others move into the kidney, pelvis, or into the ureter. There they can lodge, block the flow of urine, and cause pain.  Symptoms  Many stones cause sudden and severe pain and bloody urine. Others cause nausea or frequent, burning urination. Symptoms often depend on your stones size and location. Fever may indicate a serious infection. Call your healthcare provider right away if you develop a fever.  Date Last Reviewed: 1/1/2017  © 4378-2652 [employer]. [address] [place] [address], [place] [address]. All rights reserved. This information is not intended as a substitute for professional medical care. Always follow your healthcare professional's instructions.

## 2019-11-13 NOTE — TELEPHONE ENCOUNTER
----- Message from Gretchen Proctor MD sent at 11/13/2019  3:00 PM CST -----  KUB - left renal calculi and left ureteral stent visualized    Rec:  [id number]

## 2019-11-21 DIAGNOSIS — N20.0 RENAL CALCULUS, LEFT: Primary | ICD-10-CM

## 2019-11-21 DIAGNOSIS — Z96.0 RETAINED URETERAL STENT: ICD-10-CM

## 2019-11-21 NOTE — H&P
Subjective:       Patient ID: Buddy Park is a 68 y.o. male.    Chief Complaint:  Obstructing 4-8 mm left UPJ calculus for which patient underwent placement of a left ureteral stent on 10/28/2019.    Past Medical History:   Diagnosis Date    Angina pectoris     Anxiety     Biliary colic     Cochlear implant in place     Deaf     LEFT EAR    Depression     Heart failure     High cholesterol     History of colon polyps 2/20/2018    [place] (hard of hearing)     HEARING AID - RIGHT    Hyperlipidemia     Hypertension     Kidney stone     Kidney stones     Renal stones     Trouble in sleeping     Wears glasses      Past Surgical History:   Procedure Laterality Date    CAROTID ARTERY ANGIOPLASTY  06/2018    [facility]     CATARACT EXTRACTION Bilateral     CHOLECYSTECTOMY  2-6-2014    COLONOSCOPY  1/9/2013    Dr. Gillette, 5 year recheck (family history colon cancer)    COLONOSCOPY N/A 3/12/2018    Procedure: COLONOSCOPY;  Surgeon: Garett Go MD;  Location: [facility];  Service: Endoscopy;  Laterality: N/A;    CYSTOSCOPY W/ RETROGRADES Bilateral 10/28/2019    Procedure: CYSTOSCOPY, WITH RETROGRADE PYELOGRAM;  Surgeon: Gretchen Proctor MD;  Location: [facility];  Service: Urology;  Laterality: Bilateral;    CYSTOSCOPY W/ URETERAL STENT PLACEMENT Left 10/28/2019    Procedure: CYSTOSCOPY, WITH URETERAL STENT INSERTION;  Surgeon: Gretchen Proctor MD;  Location: [facility];  Service: Urology;  Laterality: Left;    EAR MASTOIDECTOMY W/ COCHLEAR IMPLANT W/ LANDMARK      left ear    FOREIGN BODY REMOVAL Right     glass removed from right foot/repair    FRACTURE SURGERY      right arm x2    LITHOTRIPSY      ROTATOR CUFF REPAIR      Right     SINUS SURGERY      TONSILLECTOMY      VASCULAR SURGERY       Family History   Problem Relation Age of Onset    Cancer Mother         colon    Heart disease Father     Gout Father     Kidney disease Father     Arthritis Father     Hypertension Father     Early  death Daughter         mva    Alcohol abuse Brother     Cancer Brother         mouth cancer w mets    No Known Problems Sister     Alcohol abuse Brother     No Known Problems Son     Heart disease Maternal Grandmother         MI    Stroke Maternal Grandfather     Heart disease Paternal Grandmother         MI    No Known Problems Son     Cancer Paternal Uncle         lung cancer    Allergic rhinitis Neg Hx     Allergies Neg Hx     Angioedema Neg Hx     Asthma Neg Hx     Atopy Neg Hx     Eczema Neg Hx     Immunodeficiency Neg Hx     Rhinitis Neg Hx     Urticaria Neg Hx     Collagen disease Neg Hx      Social History     Socioeconomic History    Marital status:      Spouse name: Not on file    Number of children: Not on file    Years of education: Not on file    Highest education level: Not on file   Occupational History    Not on file   Social Needs    Financial resource strain: Not on file    Food insecurity:     Worry: Not on file     Inability: Not on file    Transportation needs:     Medical: Not on file     Non-medical: Not on file   Tobacco Use    Smoking status: Never Smoker    Smokeless tobacco: Never Used   Substance and Sexual Activity    Alcohol use: Yes     Comment: once every two months    Drug use: No    Sexual activity: Yes   Lifestyle    Physical activity:     Days per week: Not on file     Minutes per session: Not on file    Stress: Not on file   Relationships    Social connections:     Talks on phone: Not on file     Gets together: Not on file     Attends Hoahaoism service: Not on file     Active member of club or organization: Not on file     Attends meetings of clubs or organizations: Not on file     Relationship status: Not on file   Other Topics Concern    Not on file   Social History Narrative    Not on file       Current Outpatient Medications   Medication Sig Dispense Refill    amLODIPine (NORVASC) 10 MG tablet Take 1 tablet (10 mg total) by mouth  once daily. 90 tablet 3    aspirin (ECOTRIN) 81 MG EC tablet Take 81 mg by mouth once daily.      b complex vitamins tablet Take 1 tablet by mouth once daily.      buPROPion (WELLBUTRIN XL) 150 MG TB24 tablet Take 1 tablet (150 mg total) by mouth once daily. In the morning 90 tablet 1    carvedilol (COREG) 6.25 MG tablet Take 1 tablet (6.25 mg total) by mouth 2 (two) times daily with meals. 180 tablet 3    cholestyramine-aspartame (QUESTRAN LIGHT) 4 gram Powd Take 1 Scoop by mouth once daily. 231 g 11    colchicine (COLCRYS) 0.6 mg tablet Take 1 tablet (0.6 mg total) by mouth once daily. 30 tablet 11    EPINEPHrine (EPIPEN) 0.3 mg/0.3 mL AtIn Inject into the muscle once.      ibuprofen (ADVIL,MOTRIN) 600 MG tablet Take 1 tablet (600 mg total) by mouth every 6 (six) hours as needed for Pain. 20 tablet 1    metFORMIN (GLUCOPHAGE) 500 MG tablet Take 1 tablet (500 mg total) by mouth 2 (two) times daily with meals. 180 tablet 1    nitroGLYCERIN (NITROSTAT) 0.4 MG SL tablet Place 1 tablet (0.4 mg total) under the tongue every 5 (five) minutes as needed for Chest pain. 25 tablet 11    rosuvastatin (CRESTOR) 40 MG Tab Take 1 tablet (40 mg total) by mouth once daily. 90 tablet 3    tamsulosin (FLOMAX) 0.4 mg Cap Take 1 capsule (0.4 mg total) by mouth once daily. 90 capsule 3     No current facility-administered medications for this visit.      Facility-Administered Medications Ordered in Other Visits   Medication Dose Route Frequency Provider Last Rate Last Dose    lactated ringers infusion   Intravenous Continuous Demario Yates MD   Stopped at 10/28/19 0535     Review of patient's allergies indicates:   Allergen Reactions    Bee pollens Shortness Of Breath     WASP, BEE, ANT AND CATERPILLER STINGS    Hydrochlorothiazide Shortness Of Breath and Rash    Milk containing products Diarrhea    Metoprolol Rash       Review of Systems   All other systems reviewed and are negative.      Objective:      There were  no vitals filed for this visit.  Physical Exam   Cardiovascular: Normal rate.   Pulmonary/Chest: Effort normal.   Abdominal: Soft.   Genitourinary: Penis normal.          Assessment:       1. Renal calculus, left    2. Retained ureteral stent        Plan:       Left ESWL and cystoscopy with stent removal

## 2019-11-21 NOTE — H&P (VIEW-ONLY)
Subjective:       Patient ID: Buddy Park is a 68 y.o. male.    Chief Complaint:  Obstructing 4-8 mm left UPJ calculus for which patient underwent placement of a left ureteral stent on 10/28/2019.    Past Medical History:   Diagnosis Date    Angina pectoris     Anxiety     Biliary colic     Cochlear implant in place     Deaf     LEFT EAR    Depression     Heart failure     High cholesterol     History of colon polyps 2/20/2018    [place] (hard of hearing)     HEARING AID - RIGHT    Hyperlipidemia     Hypertension     Kidney stone     Kidney stones     Renal stones     Trouble in sleeping     Wears glasses      Past Surgical History:   Procedure Laterality Date    CAROTID ARTERY ANGIOPLASTY  06/2018    [facility]     CATARACT EXTRACTION Bilateral     CHOLECYSTECTOMY  2-6-2014    COLONOSCOPY  1/9/2013    Dr. Gillette, 5 year recheck (family history colon cancer)    COLONOSCOPY N/A 3/12/2018    Procedure: COLONOSCOPY;  Surgeon: Garett Go MD;  Location: [facility];  Service: Endoscopy;  Laterality: N/A;    CYSTOSCOPY W/ RETROGRADES Bilateral 10/28/2019    Procedure: CYSTOSCOPY, WITH RETROGRADE PYELOGRAM;  Surgeon: Gretchen Proctor MD;  Location: [facility] OR;  Service: Urology;  Laterality: Bilateral;    CYSTOSCOPY W/ URETERAL STENT PLACEMENT Left 10/28/2019    Procedure: CYSTOSCOPY, WITH URETERAL STENT INSERTION;  Surgeon: Gretchen Proctor MD;  Location: [facility] OR;  Service: Urology;  Laterality: Left;    EAR MASTOIDECTOMY W/ COCHLEAR IMPLANT W/ LANDMARK      left ear    FOREIGN BODY REMOVAL Right     glass removed from right foot/repair    FRACTURE SURGERY      right arm x2    LITHOTRIPSY      ROTATOR CUFF REPAIR      Right     SINUS SURGERY      TONSILLECTOMY      VASCULAR SURGERY       Family History   Problem Relation Age of Onset    Cancer Mother         colon    Heart disease Father     Gout Father     Kidney disease Father     Arthritis Father     Hypertension Father     Early  death Daughter         mva    Alcohol abuse Brother     Cancer Brother         mouth cancer w mets    No Known Problems Sister     Alcohol abuse Brother     No Known Problems Son     Heart disease Maternal Grandmother         MI    Stroke Maternal Grandfather     Heart disease Paternal Grandmother         MI    No Known Problems Son     Cancer Paternal Uncle         lung cancer    Allergic rhinitis Neg Hx     Allergies Neg Hx     Angioedema Neg Hx     Asthma Neg Hx     Atopy Neg Hx     Eczema Neg Hx     Immunodeficiency Neg Hx     Rhinitis Neg Hx     Urticaria Neg Hx     Collagen disease Neg Hx      Social History     Socioeconomic History    Marital status:      Spouse name: Not on file    Number of children: Not on file    Years of education: Not on file    Highest education level: Not on file   Occupational History    Not on file   Social Needs    Financial resource strain: Not on file    Food insecurity:     Worry: Not on file     Inability: Not on file    Transportation needs:     Medical: Not on file     Non-medical: Not on file   Tobacco Use    Smoking status: Never Smoker    Smokeless tobacco: Never Used   Substance and Sexual Activity    Alcohol use: Yes     Comment: once every two months    Drug use: No    Sexual activity: Yes   Lifestyle    Physical activity:     Days per week: Not on file     Minutes per session: Not on file    Stress: Not on file   Relationships    Social connections:     Talks on phone: Not on file     Gets together: Not on file     Attends Anabaptist service: Not on file     Active member of club or organization: Not on file     Attends meetings of clubs or organizations: Not on file     Relationship status: Not on file   Other Topics Concern    Not on file   Social History Narrative    Not on file       Current Outpatient Medications   Medication Sig Dispense Refill    amLODIPine (NORVASC) 10 MG tablet Take 1 tablet (10 mg total) by mouth  once daily. 90 tablet 3    aspirin (ECOTRIN) 81 MG EC tablet Take 81 mg by mouth once daily.      b complex vitamins tablet Take 1 tablet by mouth once daily.      buPROPion (WELLBUTRIN XL) 150 MG TB24 tablet Take 1 tablet (150 mg total) by mouth once daily. In the morning 90 tablet 1    carvedilol (COREG) 6.25 MG tablet Take 1 tablet (6.25 mg total) by mouth 2 (two) times daily with meals. 180 tablet 3    cholestyramine-aspartame (QUESTRAN LIGHT) 4 gram Powd Take 1 Scoop by mouth once daily. 231 g 11    colchicine (COLCRYS) 0.6 mg tablet Take 1 tablet (0.6 mg total) by mouth once daily. 30 tablet 11    EPINEPHrine (EPIPEN) 0.3 mg/0.3 mL AtIn Inject into the muscle once.      ibuprofen (ADVIL,MOTRIN) 600 MG tablet Take 1 tablet (600 mg total) by mouth every 6 (six) hours as needed for Pain. 20 tablet 1    metFORMIN (GLUCOPHAGE) 500 MG tablet Take 1 tablet (500 mg total) by mouth 2 (two) times daily with meals. 180 tablet 1    nitroGLYCERIN (NITROSTAT) 0.4 MG SL tablet Place 1 tablet (0.4 mg total) under the tongue every 5 (five) minutes as needed for Chest pain. 25 tablet 11    rosuvastatin (CRESTOR) 40 MG Tab Take 1 tablet (40 mg total) by mouth once daily. 90 tablet 3    tamsulosin (FLOMAX) 0.4 mg Cap Take 1 capsule (0.4 mg total) by mouth once daily. 90 capsule 3     No current facility-administered medications for this visit.      Facility-Administered Medications Ordered in Other Visits   Medication Dose Route Frequency Provider Last Rate Last Dose    lactated ringers infusion   Intravenous Continuous Demario Yates MD   Stopped at 10/28/19 5767     Review of patient's allergies indicates:   Allergen Reactions    Bee pollens Shortness Of Breath     WASP, BEE, ANT AND CATERPILLER STINGS    Hydrochlorothiazide Shortness Of Breath and Rash    Milk containing products Diarrhea    Metoprolol Rash       Review of Systems   All other systems reviewed and are negative.      Objective:      There were  no vitals filed for this visit.  Physical Exam   Cardiovascular: Normal rate.   Pulmonary/Chest: Effort normal.   Abdominal: Soft.   Genitourinary: Penis normal.          Assessment:       1. Renal calculus, left    2. Retained ureteral stent        Plan:       Left ESWL and cystoscopy with stent removal

## 2019-11-29 ENCOUNTER — ANESTHESIA EVENT (OUTPATIENT)
Dept: SURGERY | Facility: HOSPITAL | Age: 69
End: 2019-11-29
Payer: MEDICARE

## 2019-12-02 ENCOUNTER — TELEPHONE (OUTPATIENT)
Dept: UROLOGY | Facility: CLINIC | Age: 69
End: 2019-12-02

## 2019-12-02 ENCOUNTER — HOSPITAL ENCOUNTER (OUTPATIENT)
Facility: HOSPITAL | Age: 69
Discharge: HOME | End: 2019-12-02
Attending: UROLOGY | Admitting: UROLOGY
Payer: MEDICARE

## 2019-12-02 ENCOUNTER — ANESTHESIA (OUTPATIENT)
Dept: SURGERY | Facility: HOSPITAL | Age: 69
End: 2019-12-02
Payer: MEDICARE

## 2019-12-02 DIAGNOSIS — N20.0 RENAL CALCULUS, LEFT: ICD-10-CM

## 2019-12-02 DIAGNOSIS — Z96.0 RETAINED URETERAL STENT: ICD-10-CM

## 2019-12-02 LAB
ALBUMIN SERPL BCP-MCNC: 4.2 G/DL (ref 3.5–5.2)
ALP SERPL-CCNC: 68 U/L (ref 55–135)
ALT SERPL W/O P-5'-P-CCNC: 20 U/L (ref 10–44)
ANION GAP SERPL CALC-SCNC: 7 MMOL/L (ref 8–16)
AST SERPL-CCNC: 20 U/L (ref 10–40)
BASOPHILS # BLD AUTO: 0.04 K/UL (ref 0–0.2)
BASOPHILS NFR BLD: 0.7 % (ref 0–1.9)
BILIRUB SERPL-MCNC: 1.1 MG/DL (ref 0.1–1)
BUN SERPL-MCNC: 14 MG/DL (ref 8–23)
CALCIUM SERPL-MCNC: 9.8 MG/DL (ref 8.7–10.5)
CHLORIDE SERPL-SCNC: 109 MMOL/L (ref 95–110)
CO2 SERPL-SCNC: 28 MMOL/L (ref 23–29)
CREAT SERPL-MCNC: 1.4 MG/DL (ref 0.5–1.4)
DIFFERENTIAL METHOD: ABNORMAL
EOSINOPHIL # BLD AUTO: 0.2 K/UL (ref 0–0.5)
EOSINOPHIL NFR BLD: 3.9 % (ref 0–8)
ERYTHROCYTE [DISTWIDTH] IN BLOOD BY AUTOMATED COUNT: 13.2 % (ref 11.5–14.5)
EST. GFR  (AFRICAN AMERICAN): >60 ML/MIN/1.73 M^2
EST. GFR  (NON AFRICAN AMERICAN): 52 ML/MIN/1.73 M^2
GLUCOSE SERPL-MCNC: 116 MG/DL (ref 70–110)
HCT VFR BLD AUTO: 41 % (ref 40–54)
HGB BLD-MCNC: 13.6 G/DL (ref 14–18)
IMM GRANULOCYTES # BLD AUTO: 0.02 K/UL (ref 0–0.04)
LYMPHOCYTES # BLD AUTO: 2.6 K/UL (ref 1–4.8)
LYMPHOCYTES NFR BLD: 43 % (ref 18–48)
MCH RBC QN AUTO: 29.2 PG (ref 27–31)
MCHC RBC AUTO-ENTMCNC: 33.2 G/DL (ref 32–36)
MCV RBC AUTO: 88 FL (ref 82–98)
MONOCYTES # BLD AUTO: 0.6 K/UL (ref 0.3–1)
MONOCYTES NFR BLD: 9.5 % (ref 4–15)
NEUTROPHILS # BLD AUTO: 2.6 K/UL (ref 1.8–7.7)
NEUTROPHILS NFR BLD: 42.6 % (ref 38–73)
NRBC BLD-RTO: 0 /100 WBC
PLATELET # BLD AUTO: 200 K/UL (ref 150–350)
PMV BLD AUTO: 9.2 FL (ref 9.2–12.9)
POTASSIUM SERPL-SCNC: 4.6 MMOL/L (ref 3.5–5.1)
PROT SERPL-MCNC: 7.1 G/DL (ref 6–8.4)
RBC # BLD AUTO: 4.65 M/UL (ref 4.6–6.2)
SODIUM SERPL-SCNC: 144 MMOL/L (ref 136–145)
WBC # BLD AUTO: 6.09 K/UL (ref 3.9–12.7)

## 2019-12-02 PROCEDURE — D9220A PRA ANESTHESIA: ICD-10-PCS | Mod: HCNC,ANES,, | Performed by: ANESTHESIOLOGY

## 2019-12-02 PROCEDURE — 25000003 PHARM REV CODE 250: Mod: HCNC | Performed by: NURSE ANESTHETIST, CERTIFIED REGISTERED

## 2019-12-02 PROCEDURE — 50590 PR FRAGMENT KIDNEY STONE/ ESWL: ICD-10-PCS | Mod: HCNC,LT,, | Performed by: UROLOGY

## 2019-12-02 PROCEDURE — 80053 COMPREHEN METABOLIC PANEL: CPT | Mod: HCNC

## 2019-12-02 PROCEDURE — 85025 COMPLETE CBC W/AUTO DIFF WBC: CPT | Mod: HCNC

## 2019-12-02 PROCEDURE — 52310 CYSTOSCOPY AND TREATMENT: CPT | Mod: 59,HCNC,, | Performed by: UROLOGY

## 2019-12-02 PROCEDURE — 36415 COLL VENOUS BLD VENIPUNCTURE: CPT | Mod: HCNC

## 2019-12-02 PROCEDURE — 71000039 HC RECOVERY, EACH ADD'L HOUR: Mod: HCNC | Performed by: UROLOGY

## 2019-12-02 PROCEDURE — 63600175 PHARM REV CODE 636 W HCPCS: Mod: HCNC | Performed by: UROLOGY

## 2019-12-02 PROCEDURE — 99900103 DSU ONLY-NO CHARGE-INITIAL HR (STAT): Mod: HCNC | Performed by: UROLOGY

## 2019-12-02 PROCEDURE — 27200651 HC AIRWAY, LMA: Mod: HCNC | Performed by: NURSE ANESTHETIST, CERTIFIED REGISTERED

## 2019-12-02 PROCEDURE — 36000706: Mod: HCNC | Performed by: UROLOGY

## 2019-12-02 PROCEDURE — 71000016 HC POSTOP RECOV ADDL HR: Mod: HCNC | Performed by: UROLOGY

## 2019-12-02 PROCEDURE — D9220A PRA ANESTHESIA: ICD-10-PCS | Mod: HCNC,CRNA,, | Performed by: NURSE ANESTHETIST, CERTIFIED REGISTERED

## 2019-12-02 PROCEDURE — 71000033 HC RECOVERY, INTIAL HOUR: Mod: HCNC | Performed by: UROLOGY

## 2019-12-02 PROCEDURE — D9220A PRA ANESTHESIA: Mod: HCNC,ANES,, | Performed by: ANESTHESIOLOGY

## 2019-12-02 PROCEDURE — 36000707: Mod: HCNC | Performed by: UROLOGY

## 2019-12-02 PROCEDURE — 37000008 HC ANESTHESIA 1ST 15 MINUTES: Mod: HCNC | Performed by: UROLOGY

## 2019-12-02 PROCEDURE — 99900104 DSU ONLY-NO CHARGE-EA ADD'L HR (STAT): Mod: HCNC | Performed by: UROLOGY

## 2019-12-02 PROCEDURE — 63600175 PHARM REV CODE 636 W HCPCS: Mod: HCNC | Performed by: NURSE ANESTHETIST, CERTIFIED REGISTERED

## 2019-12-02 PROCEDURE — 63600175 PHARM REV CODE 636 W HCPCS: Mod: HCNC | Performed by: ANESTHESIOLOGY

## 2019-12-02 PROCEDURE — 37000009 HC ANESTHESIA EA ADD 15 MINS: Mod: HCNC | Performed by: UROLOGY

## 2019-12-02 PROCEDURE — 25000003 PHARM REV CODE 250: Mod: HCNC | Performed by: ANESTHESIOLOGY

## 2019-12-02 PROCEDURE — D9220A PRA ANESTHESIA: Mod: HCNC,CRNA,, | Performed by: NURSE ANESTHETIST, CERTIFIED REGISTERED

## 2019-12-02 PROCEDURE — 50590 FRAGMENTING OF KIDNEY STONE: CPT | Mod: HCNC,LT,, | Performed by: UROLOGY

## 2019-12-02 PROCEDURE — 25000003 PHARM REV CODE 250: Mod: HCNC | Performed by: UROLOGY

## 2019-12-02 PROCEDURE — 52310 PR CYSTOSCOPY,REMV CALCULUS,SIMPLE: ICD-10-PCS | Mod: 59,HCNC,, | Performed by: UROLOGY

## 2019-12-02 PROCEDURE — 71000015 HC POSTOP RECOV 1ST HR: Mod: HCNC | Performed by: UROLOGY

## 2019-12-02 RX ORDER — MEPERIDINE HYDROCHLORIDE 50 MG/ML
12.5 INJECTION INTRAMUSCULAR; INTRAVENOUS; SUBCUTANEOUS ONCE AS NEEDED
Status: DISCONTINUED | OUTPATIENT
Start: 2019-12-02 | End: 2019-12-02 | Stop reason: HOSPADM

## 2019-12-02 RX ORDER — PROPOFOL 10 MG/ML
VIAL (ML) INTRAVENOUS
Status: DISCONTINUED | OUTPATIENT
Start: 2019-12-02 | End: 2019-12-02

## 2019-12-02 RX ORDER — PHENAZOPYRIDINE HYDROCHLORIDE 100 MG/1
200 TABLET, FILM COATED ORAL 3 TIMES DAILY PRN
Qty: 20 TABLET | Refills: 0 | Status: ON HOLD | OUTPATIENT
Start: 2019-12-02 | End: 2020-02-07 | Stop reason: HOSPADM

## 2019-12-02 RX ORDER — HYDROCODONE BITARTRATE AND ACETAMINOPHEN 5; 325 MG/1; MG/1
1 TABLET ORAL EVERY 4 HOURS PRN
Status: DISCONTINUED | OUTPATIENT
Start: 2019-12-02 | End: 2019-12-02 | Stop reason: HOSPADM

## 2019-12-02 RX ORDER — HYDROCODONE BITARTRATE AND ACETAMINOPHEN 5; 325 MG/1; MG/1
1 TABLET ORAL
Qty: 20 TABLET | Refills: 0 | Status: ON HOLD | OUTPATIENT
Start: 2019-12-02 | End: 2020-02-07 | Stop reason: HOSPADM

## 2019-12-02 RX ORDER — DIPHENHYDRAMINE HYDROCHLORIDE 50 MG/ML
25 INJECTION INTRAMUSCULAR; INTRAVENOUS EVERY 6 HOURS PRN
Status: DISCONTINUED | OUTPATIENT
Start: 2019-12-02 | End: 2019-12-02 | Stop reason: HOSPADM

## 2019-12-02 RX ORDER — SODIUM CHLORIDE, SODIUM LACTATE, POTASSIUM CHLORIDE, CALCIUM CHLORIDE 600; 310; 30; 20 MG/100ML; MG/100ML; MG/100ML; MG/100ML
75 INJECTION, SOLUTION INTRAVENOUS CONTINUOUS
Status: DISCONTINUED | OUTPATIENT
Start: 2019-12-02 | End: 2019-12-02 | Stop reason: HOSPADM

## 2019-12-02 RX ORDER — ACETAMINOPHEN 10 MG/ML
INJECTION, SOLUTION INTRAVENOUS
Status: DISCONTINUED | OUTPATIENT
Start: 2019-12-02 | End: 2019-12-02

## 2019-12-02 RX ORDER — CEFAZOLIN SODIUM 2 G/50ML
2 SOLUTION INTRAVENOUS
Status: COMPLETED | OUTPATIENT
Start: 2019-12-02 | End: 2019-12-02

## 2019-12-02 RX ORDER — OXYCODONE HYDROCHLORIDE 5 MG/1
5 TABLET ORAL
Status: DISCONTINUED | OUTPATIENT
Start: 2019-12-02 | End: 2019-12-02 | Stop reason: HOSPADM

## 2019-12-02 RX ORDER — ONDANSETRON 2 MG/ML
4 INJECTION INTRAMUSCULAR; INTRAVENOUS ONCE AS NEEDED
Status: DISCONTINUED | OUTPATIENT
Start: 2019-12-02 | End: 2019-12-02 | Stop reason: HOSPADM

## 2019-12-02 RX ORDER — SODIUM CHLORIDE 0.9 % (FLUSH) 0.9 %
3 SYRINGE (ML) INJECTION
Status: DISCONTINUED | OUTPATIENT
Start: 2019-12-02 | End: 2019-12-02 | Stop reason: HOSPADM

## 2019-12-02 RX ORDER — FENTANYL CITRATE 50 UG/ML
25 INJECTION, SOLUTION INTRAMUSCULAR; INTRAVENOUS EVERY 5 MIN PRN
Status: DISCONTINUED | OUTPATIENT
Start: 2019-12-02 | End: 2019-12-02 | Stop reason: HOSPADM

## 2019-12-02 RX ORDER — FUROSEMIDE 10 MG/ML
20 INJECTION INTRAMUSCULAR; INTRAVENOUS ONCE
Status: COMPLETED | OUTPATIENT
Start: 2019-12-02 | End: 2019-12-02

## 2019-12-02 RX ORDER — LIDOCAINE HCL/PF 100 MG/5ML
SYRINGE (ML) INTRAVENOUS
Status: DISCONTINUED | OUTPATIENT
Start: 2019-12-02 | End: 2019-12-02

## 2019-12-02 RX ORDER — PHENAZOPYRIDINE HYDROCHLORIDE 100 MG/1
200 TABLET, FILM COATED ORAL ONCE
Status: COMPLETED | OUTPATIENT
Start: 2019-12-02 | End: 2019-12-02

## 2019-12-02 RX ORDER — LIDOCAINE HYDROCHLORIDE 20 MG/ML
JELLY TOPICAL
Status: DISCONTINUED | OUTPATIENT
Start: 2019-12-02 | End: 2019-12-02 | Stop reason: HOSPADM

## 2019-12-02 RX ORDER — DEXAMETHASONE SODIUM PHOSPHATE 4 MG/ML
INJECTION, SOLUTION INTRA-ARTICULAR; INTRALESIONAL; INTRAMUSCULAR; INTRAVENOUS; SOFT TISSUE
Status: DISCONTINUED | OUTPATIENT
Start: 2019-12-02 | End: 2019-12-02

## 2019-12-02 RX ORDER — EPHEDRINE SULFATE 50 MG/ML
INJECTION, SOLUTION INTRAVENOUS
Status: DISCONTINUED | OUTPATIENT
Start: 2019-12-02 | End: 2019-12-02

## 2019-12-02 RX ORDER — ONDANSETRON 2 MG/ML
INJECTION INTRAMUSCULAR; INTRAVENOUS
Status: DISCONTINUED | OUTPATIENT
Start: 2019-12-02 | End: 2019-12-02

## 2019-12-02 RX ORDER — CEFUROXIME AXETIL 500 MG/1
500 TABLET ORAL 2 TIMES DAILY
Qty: 14 TABLET | Refills: 0 | Status: ON HOLD | OUTPATIENT
Start: 2019-12-02 | End: 2020-02-07 | Stop reason: HOSPADM

## 2019-12-02 RX ORDER — FENTANYL CITRATE 50 UG/ML
INJECTION, SOLUTION INTRAMUSCULAR; INTRAVENOUS
Status: DISCONTINUED | OUTPATIENT
Start: 2019-12-02 | End: 2019-12-02

## 2019-12-02 RX ORDER — LIDOCAINE HYDROCHLORIDE 10 MG/ML
1 INJECTION, SOLUTION EPIDURAL; INFILTRATION; INTRACAUDAL; PERINEURAL ONCE
Status: COMPLETED | OUTPATIENT
Start: 2019-12-02 | End: 2019-12-02

## 2019-12-02 RX ORDER — HYDROMORPHONE HYDROCHLORIDE 2 MG/ML
0.2 INJECTION, SOLUTION INTRAMUSCULAR; INTRAVENOUS; SUBCUTANEOUS EVERY 5 MIN PRN
Status: DISCONTINUED | OUTPATIENT
Start: 2019-12-02 | End: 2019-12-02 | Stop reason: HOSPADM

## 2019-12-02 RX ORDER — KETOROLAC TROMETHAMINE 30 MG/ML
INJECTION, SOLUTION INTRAMUSCULAR; INTRAVENOUS
Status: DISCONTINUED | OUTPATIENT
Start: 2019-12-02 | End: 2019-12-02

## 2019-12-02 RX ORDER — MIDAZOLAM HYDROCHLORIDE 1 MG/ML
INJECTION, SOLUTION INTRAMUSCULAR; INTRAVENOUS
Status: DISCONTINUED | OUTPATIENT
Start: 2019-12-02 | End: 2019-12-02

## 2019-12-02 RX ADMIN — ACETAMINOPHEN 1000 MG: 10 INJECTION, SOLUTION INTRAVENOUS at 09:12

## 2019-12-02 RX ADMIN — EPHEDRINE SULFATE 15 MG: 50 INJECTION, SOLUTION INTRAMUSCULAR; INTRAVENOUS; SUBCUTANEOUS at 09:12

## 2019-12-02 RX ADMIN — MIDAZOLAM 2 MG: 1 INJECTION INTRAMUSCULAR; INTRAVENOUS at 08:12

## 2019-12-02 RX ADMIN — LIDOCAINE HYDROCHLORIDE 10 MG: 10 INJECTION, SOLUTION EPIDURAL; INFILTRATION; INTRACAUDAL; PERINEURAL at 08:12

## 2019-12-02 RX ADMIN — CEFAZOLIN SODIUM 2 G: 2 SOLUTION INTRAVENOUS at 08:12

## 2019-12-02 RX ADMIN — SODIUM CHLORIDE, SODIUM LACTATE, POTASSIUM CHLORIDE, AND CALCIUM CHLORIDE: .6; .31; .03; .02 INJECTION, SOLUTION INTRAVENOUS at 09:12

## 2019-12-02 RX ADMIN — FUROSEMIDE 20 MG: 10 INJECTION, SOLUTION INTRAMUSCULAR; INTRAVENOUS at 10:12

## 2019-12-02 RX ADMIN — EPHEDRINE SULFATE 25 MG: 50 INJECTION, SOLUTION INTRAMUSCULAR; INTRAVENOUS; SUBCUTANEOUS at 09:12

## 2019-12-02 RX ADMIN — PHENAZOPYRIDINE HYDROCHLORIDE 200 MG: 100 TABLET ORAL at 10:12

## 2019-12-02 RX ADMIN — SODIUM CHLORIDE, SODIUM LACTATE, POTASSIUM CHLORIDE, AND CALCIUM CHLORIDE: .6; .31; .03; .02 INJECTION, SOLUTION INTRAVENOUS at 08:12

## 2019-12-02 RX ADMIN — FENTANYL CITRATE 50 MCG: 50 INJECTION, SOLUTION INTRAMUSCULAR; INTRAVENOUS at 09:12

## 2019-12-02 RX ADMIN — ONDANSETRON 4 MG: 2 INJECTION, SOLUTION INTRAMUSCULAR; INTRAVENOUS at 09:12

## 2019-12-02 RX ADMIN — EPHEDRINE SULFATE 10 MG: 50 INJECTION, SOLUTION INTRAMUSCULAR; INTRAVENOUS; SUBCUTANEOUS at 09:12

## 2019-12-02 RX ADMIN — PROPOFOL 150 MG: 10 INJECTION, EMULSION INTRAVENOUS at 09:12

## 2019-12-02 RX ADMIN — KETOROLAC TROMETHAMINE 30 MG: 30 INJECTION, SOLUTION INTRAMUSCULAR; INTRAVENOUS at 09:12

## 2019-12-02 RX ADMIN — DEXAMETHASONE SODIUM PHOSPHATE 4 MG: 4 INJECTION, SOLUTION INTRAMUSCULAR; INTRAVENOUS at 09:12

## 2019-12-02 RX ADMIN — LIDOCAINE HYDROCHLORIDE 100 MG: 20 INJECTION, SOLUTION INTRAVENOUS at 09:12

## 2019-12-02 NOTE — PLAN OF CARE
Discharge instructions and handouts given. Questions answered. Attempted to make appt for follow-up,  spoke with nurse in office, they will call pt with follow-up appt. Pt escorted off unit per sathya Bailey to ride home with family. Belongings with pt.

## 2019-12-02 NOTE — ANESTHESIA PREPROCEDURE EVALUATION
12/02/2019  Buddy aPrk is a 68 y.o., male.    Pre-op Assessment    I have reviewed the Patient Summary Reports.     I have reviewed the Nursing Notes.   I have reviewed the Medications.     Review of Systems  Anesthesia Hx:  No problems with previous Anesthesia    Social:  Non-Smoker    Hematology/Oncology:  Hematology Normal   Oncology Normal     EENT/Dental:EENT/Dental Normal   Cardiovascular:   Hypertension Angina hyperlipidemia    Pulmonary:  Pulmonary Normal    Renal/:   Chronic Renal Disease    Musculoskeletal:   Arthritis     Neurological:  Neurology Normal    Psych:   Psychiatric History depression          Physical Exam  General:  Well nourished    Airway/Jaw/Neck:  Airway Findings: Mouth Opening: Normal Tongue: Normal  General Airway Assessment: Adult  Mallampati: II  Improves to II with phonation.  TM Distance: Normal, at least 6 cm        Eyes/Ears/Nose:  EYES/EARS/NOSE FINDINGS: Normal   Dental:  DENTAL FINDINGS: Normal   Chest/Lungs:  Chest/Lungs Findings: Clear to auscultation, Normal Respiratory Rate     Heart/Vascular:  Heart Findings: Rate: Normal  Rhythm: Regular Rhythm  Sounds: Normal  Heart murmur: negative       Mental Status:  Mental Status Findings:  Cooperative, Alert and Oriented         Anesthesia Plan  Type of Anesthesia, risks & benefits discussed:  Anesthesia Type:  general  Patient's Preference:   Intra-op Monitoring Plan: standard ASA monitors  Intra-op Monitoring Plan Comments:   Post Op Pain Control Plan: IV/PO Opioids PRN and multimodal analgesia  Post Op Pain Control Plan Comments:   Induction:   IV  Beta Blocker:  Patient is on a Beta-Blocker and has received one dose within the past 24 hours (No further documentation required).       Informed Consent: Patient understands risks and agrees with Anesthesia plan.  Questions answered. Anesthesia consent signed with  patient.  ASA Score: 3     Day of Surgery Review of History & Physical: I have interviewed and examined the patient. I have reviewed the patient's H&P dated:  There are no significant changes.  H&P update referred to the surgeon.         Ready For Surgery From Anesthesia Perspective.

## 2019-12-02 NOTE — ANESTHESIA PROCEDURE NOTES
Intubation  Performed by: Lilia Dorado CRNA  Authorized by: Yusef Silver MD     Intubation:     Induction:  Intravenous    Intubated:  Postinduction    Attempts:  1    Attempted By:  CRNA    Difficult Airway Encountered?: No      Complications:  None    Airway Device Size:  4.0    Style/Cuff Inflation:  Cuffed (inflated to minimal occlusive pressure)    Placement Verified By:  Capnometry    Complicating Factors:  None    Findings Post-Intubation:  BS equal bilateral and atraumatic/condition of teeth unchanged

## 2019-12-02 NOTE — ANESTHESIA POSTPROCEDURE EVALUATION
Anesthesia Post Evaluation    Patient: Buddy Park    Procedure(s) Performed: Procedure(s) (LRB):  LITHOTRIPSY, ESWL (Left)  CYSTOSCOPY, WITH URETERAL STENT REMOVAL (Left)    Final Anesthesia Type: general    Patient location during evaluation: PACU  Patient participation: Yes- Able to Participate  Level of consciousness: awake and alert and oriented  Post-procedure vital signs: reviewed and stable  Pain management: adequate  Airway patency: patent    PONV status at discharge: No PONV  Anesthetic complications: no      Cardiovascular status: blood pressure returned to baseline  Respiratory status: unassisted, spontaneous ventilation and room air  Hydration status: euvolemic  Follow-up not needed.          Vitals Value Taken Time   /93 12/2/2019  9:50 AM   Temp 36.5 °C (97.7 °F) 12/2/2019  7:44 AM   Pulse 100 12/2/2019  9:50 AM   Resp 17 12/2/2019  9:50 AM   SpO2 98 % 12/2/2019  9:50 AM   Vitals shown include unvalidated device data.      No case tracking events are documented in the log.      Pain/Slime Score: No data recorded

## 2019-12-02 NOTE — TRANSFER OF CARE
"Anesthesia Transfer of Care Note    Patient: Buddy Pakr    Procedure(s) Performed: Procedure(s) (LRB):  LITHOTRIPSY, ESWL (Left)  CYSTOSCOPY, WITH URETERAL STENT REMOVAL (Left)    Patient location: PACU    Anesthesia Type: general    Transport from OR: Transported from OR on 2-3 L/min O2 by NC with adequate spontaneous ventilation    Post pain: adequate analgesia    Post assessment: no apparent anesthetic complications    Post vital signs: stable    Level of consciousness: sedated and responds to stimulation    Nausea/Vomiting: no nausea/vomiting    Complications: none    Transfer of care protocol was followed      Last vitals:   Visit Vitals  BP (!) 197/93 (BP Location: Left arm, Patient Position: Lying)   Pulse 76   Temp 36.5 °C (97.7 °F) (Skin)   Resp 14   Ht 5' 5" (1.651 m)   Wt 72.6 kg (160 lb)   SpO2 97%   BMI 26.63 kg/m²     "

## 2019-12-02 NOTE — PLAN OF CARE
S/P Right ESWL . Admin. Pyridium and lasix as ordered. Urinated pink tinged urine and strained with no clots. BP elevated but correlated with Pre surgery VS. Denies complaints. Okay to transfer per anesthesia. NADnoted.

## 2019-12-02 NOTE — BRIEF OP NOTE
Operative Note     SUMMARY     Surgery Date: 12/2/2019     Surgeon(s) and Role:     * Gretchen Proctor MD - Primary    Pre-op Diagnosis:  Renal calculus, left [N20.0]  Retained ureteral stent [Z96.0]    Post-op Diagnosis:  Renal calculus, left [N20.0]  Retained ureteral stent [Z96.0]    Procedure(s) (LRB):  LITHOTRIPSY, ESWL (Left)  CYSTOSCOPY, WITH URETERAL STENT REMOVAL (Left)    Anesthesia: General    Findings/Key Components:  See Op Note      Estimated Blood Loss: * No values recorded between 12/2/2019  9:11 AM and 12/2/2019  9:44 AM *         Specimens (From admission, onward)    None            Discharge Note      SUMMARY     Admit Date: 12/2/2019    Attending Physician: Gretchen Proctor MD     Discharge Physician: Gretchen Proctor MD    Discharge Date: 12/2/2019     Final Diagnosis: Renal calculus, left [N20.0]  Retained ureteral stent [Z96.0]    Hospital Course: uneventful, going home same day    Disposition: Home or Self Care    Patient Instructions:   Current Discharge Medication List      START taking these medications    Details   cefUROXime (CEFTIN) 500 MG tablet Take 1 tablet (500 mg total) by mouth 2 (two) times daily.  Qty: 14 tablet, Refills: 0      HYDROcodone-acetaminophen (NORCO) 5-325 mg per tablet Take 1 tablet by mouth every 4 to 6 hours as needed for Pain.  Qty: 20 tablet, Refills: 0      phenazopyridine (PYRIDIUM) 100 MG tablet Take 2 tablets (200 mg total) by mouth 3 (three) times daily as needed for Pain.  Qty: 20 tablet, Refills: 0         CONTINUE these medications which have NOT CHANGED    Details   amLODIPine (NORVASC) 10 MG tablet Take 1 tablet (10 mg total) by mouth once daily.  Qty: 90 tablet, Refills: 3    Associated Diagnoses: Essential hypertension      aspirin (ECOTRIN) 81 MG EC tablet Take 81 mg by mouth once daily.      buPROPion (WELLBUTRIN XL) 150 MG TB24 tablet Take 1 tablet (150 mg total) by mouth once daily. In the morning  Qty: 90 tablet, Refills: 1    Associated  Diagnoses: Major depressive disorder with single episode, in full remission      carvedilol (COREG) 6.25 MG tablet Take 1 tablet (6.25 mg total) by mouth 2 (two) times daily with meals.  Qty: 180 tablet, Refills: 3    Associated Diagnoses: Essential hypertension      colchicine (COLCRYS) 0.6 mg tablet Take 1 tablet (0.6 mg total) by mouth once daily.  Qty: 30 tablet, Refills: 11    Associated Diagnoses: Acute idiopathic gout involving toe of left foot; Hallux limitus of left foot; Primary osteoarthritis of left foot; Pain in left foot      ibuprofen (ADVIL,MOTRIN) 600 MG tablet Take 1 tablet (600 mg total) by mouth every 6 (six) hours as needed for Pain.  Qty: 20 tablet, Refills: 1      metFORMIN (GLUCOPHAGE) 500 MG tablet Take 1 tablet (500 mg total) by mouth 2 (two) times daily with meals.  Qty: 180 tablet, Refills: 1    Associated Diagnoses: Prediabetes      rosuvastatin (CRESTOR) 40 MG Tab Take 1 tablet (40 mg total) by mouth once daily.  Qty: 90 tablet, Refills: 3    Associated Diagnoses: Hyperlipidemia, unspecified hyperlipidemia type      tamsulosin (FLOMAX) 0.4 mg Cap Take 1 capsule (0.4 mg total) by mouth once daily.  Qty: 90 capsule, Refills: 3    Comments: Please consider 90 day supplies to promote better adherence  Associated Diagnoses: BPH without obstruction/lower urinary tract symptoms      b complex vitamins tablet Take 1 tablet by mouth once daily.      cholestyramine-aspartame (QUESTRAN LIGHT) 4 gram Powd Take 1 Scoop by mouth once daily.  Qty: 231 g, Refills: 11    Associated Diagnoses: Postcholecystectomy diarrhea      EPINEPHrine (EPIPEN) 0.3 mg/0.3 mL AtIn Inject into the muscle once.      nitroGLYCERIN (NITROSTAT) 0.4 MG SL tablet Place 1 tablet (0.4 mg total) under the tongue every 5 (five) minutes as needed for Chest pain.  Qty: 25 tablet, Refills: 11             Discharge Procedure Orders (must include Diet, Follow-up, Activity)  Resume previous diet.  Follow up with PCP as needed.

## 2019-12-02 NOTE — TELEPHONE ENCOUNTER
----- Message from Rosaura Sorensen sent at 12/2/2019 11:12 AM CST -----  Contact: Nurse with surgery department  Type: Needs Medical Advice    Who Called:  Pt  Best Call Back Number: [phone]  Additional Information: Requesting a call back regarding pt needed a 2 week post op .  Please Advise ---Thank you

## 2019-12-02 NOTE — DISCHARGE INSTRUCTIONS
General Information:    1.  Do not drink alcoholic beverages including beer for 24 hours or as long as you are on pain medication..  2.  Do not drive a motor vehicle, operate machinery or power tools, or signs legal papers for 24 hours or as long as you are on pain medication.   3.  You may experience light-headedness, dizziness, and sleepiness following surgery. Please do not stay alone. A responsible adult should be with you for this 24 hour period.  4.  Go home and rest.    5. Progress slowly to a normal diet unless instructed.  Otherwise, begin with liquids such as soft drinks, then soup and crackers working up to solid foods. Drink plenty of nonalcoholic fluids.  6.  Certain anesthetics and pain medications produce nausea and vomiting in certain       individuals. If nausea becomes a problem at home, call you doctor.    7. A nurse will be calling you sometime after surgery. Do not be alarmed. This is our way of finding out how you are doing.    8. Several times every hour while you are awake, take 2-3 deep breaths and cough. If you had stomach surgery hold a pillow or rolled towel firmly against your stomach before you cough. This will help with any pain the cough might cause.  9. Several times every hour while you are awake, pump and flex your feet 5-6 times and do foot circles. This will help prevent blood clots.    10.Call your doctor for severe pain, bleeding, fever, or signs or symptoms of infection (pain, swelling, redness, foul odor, drainage).    Post op instructions for prevention of DVT  What is deep vein thrombosis?  Deep vein thrombosis (DVT) is the medical term for blood clots in the deep veins of the leg.  These blood clots can be dangerous.  A DVT can block a blood vessel and keep blood from getting where it needs to go.  Another problem is that the clot can travel to other parts of the body such as the lungs.  A clot that travels to the lungs is called a pulmonary embolus (PE) and can cause  serious problems with breathing which can lead to death.  Am I at risk for DVT/PE?  If you are not very active, you are at risk of DVT.  Anyone confined to bed, sitting for long periods of time, recovering from surgery, etc. increases the risk of DVT.  Other risk factors are cancer diagnosis, certain medications, estrogen replacement in any form,older age, obesity, pregnancy, smoking, history of clotting disorders, and dehydration.  How will I know if I have a DVT?   Swelling in the lower leg   Pain   Warmth, redness, hardness or bulging of the vein  If you have any of these symptoms, call your doctors office right away.  Some people will not have any symptoms until the clot moves to the lungs.  What are the symptoms of a PE?   Panting, shortness of breath, or trouble breathing   Sharp, knife-like chest pain when you breathe   Coughing or coughing up blood   Rapid heartbeat  If you have any of these symptoms or get worse quickly, call 911 for emergency treatment.  How can I prevent a DVT?   Avoid long periods of inactivity and dont cross your legs--get up and walk around every hour or so.   Stay active--walking after surgery is highly encouraged.  This means you should get out of the house and walk in the neighborhood.  Going up and down stairs will not impair healing (unless advised against such activity by your doctor).     Drink plenty of noncaffeinated, nonalcoholic fluids each day to prevent dehydration.   Wear special support stockings, if they have been advised by your doctor.   If you travel, stop at least once an hour and walk around.   Avoid smoking (assistance with stopping is available through your healthcare provider)  Always notify your doctor if you are not able to follow the post operative instructions that are given to you at the time of discharge.  It may be necessary to prescribe one of the medications available to prevent DVT.    Discharge Instructions: After Your  "Surgery/Procedure  Youve just had surgery. During surgery you were given medicine called anesthesia to keep you relaxed and free of pain. After surgery you may have some pain or nausea. This is common. Here are some tips for feeling better and getting well after surgery.     Stay on schedule with your medication.   Going home  Your doctor or nurse will show you how to take care of yourself when you go home. He or she will also answer your questions. Have an adult family member or friend drive you home.      For your safety we recommend these precaution for the first 24 hours after your procedure:  · Do not drive or use heavy equipment.  · Do not make important decisions or sign legal papers.  · Do not drink alcohol.  · Have someone stay with you, if needed. He or she can watch for problems and help keep you safe.  · Your concentration, balance, coordination, and judgement may be impaired for many hours after anesthesia.  Use caution when ambulating or standing up.     · You may feel weak and "washed out" after anesthesia and surgery.      Subtle residual effects of general anesthesia or sedation with regional / local anesthesia can last more than 24 hours.  Rest for the remainder of the day or longer if your Doctor/Surgeon has advised you to do so.  Although you may feel normal within the first 24 hours, your reflexes and mental ability may be impaired without you realizing it.  You may feel dizzy, lightheaded or sleepy for 24 hours or longer.      Be sure to go to all follow-up visits with your doctor. And rest after your surgery for as long as your doctor tells you to.  Coping with pain  If you have pain after surgery, pain medicine will help you feel better. Take it as told, before pain becomes severe. Also, ask your doctor or pharmacist about other ways to control pain. This might be with heat, ice, or relaxation. And follow any other instructions your surgeon or nurse gives you.  Tips for taking pain " medicine  To get the best relief possible, remember these points:  · Pain medicines can upset your stomach. Taking them with a little food may help.  · Most pain relievers taken by mouth need at least 20 to 30 minutes to start to work.  · Taking medicine on a schedule can help you remember to take it. Try to time your medicine so that you can take it before starting an activity. This might be before you get dressed, go for a walk, or sit down for dinner.  · Constipation is a common side effect of pain medicines. Call your doctor before taking any medicines such as laxatives or stool softeners to help ease constipation. Also ask if you should skip any foods. Drinking lots of fluids and eating foods such as fruits and vegetables that are high in fiber can also help. Remember, do not take laxatives unless your surgeon has prescribed them.  · Drinking alcohol and taking pain medicine can cause dizziness and slow your breathing. It can even be deadly. Do not drink alcohol while taking pain medicine.  · Pain medicine can make you react more slowly to things. Do not drive or run machinery while taking pain medicine.  Your health care provider may tell you to take acetaminophen to help ease your pain. Ask him or her how much you are supposed to take each day. Acetaminophen or other pain relievers may interact with your prescription medicines or other over-the-counter (OTC) drugs. Some prescription medicines have acetaminophen and other ingredients. Using both prescription and OTC acetaminophen for pain can cause you to overdose. Read the labels on your OTC medicines with care. This will help you to clearly know the list of ingredients, how much to take, and any warnings. It may also help you not take too much acetaminophen. If you have questions or do not understand the information, ask your pharmacist or health care provider to explain it to you before you take the OTC medicine.  Managing nausea  Some people have an upset  stomach after surgery. This is often because of anesthesia, pain, or pain medicine, or the stress of surgery. These tips will help you handle nausea and eat healthy foods as you get better. If you were on a special food plan before surgery, ask your doctor if you should follow it while you get better. These tips may help:  · Do not push yourself to eat. Your body will tell you when to eat and how much.  · Start off with clear liquids and soup. They are easier to digest.  · Next try semi-solid foods, such as mashed potatoes, applesauce, and gelatin, as you feel ready.  · Slowly move to solid foods. Dont eat fatty, rich, or spicy foods at first.  · Do not force yourself to have 3 large meals a day. Instead eat smaller amounts more often.  · Take pain medicines with a small amount of solid food, such as crackers or toast, to avoid nausea.     Call your surgeon if  · You still have pain an hour after taking medicine. The medicine may not be strong enough.  · You feel too sleepy, dizzy, or groggy. The medicine may be too strong.  · You have side effects like nausea, vomiting, or skin changes, such as rash, itching, or hives.       If you have obstructive sleep apnea  You were given anesthesia medicine during surgery to keep you comfortable and free of pain. After surgery, you may have more apnea spells because of this medicine and other medicines you were given. The spells may last longer than usual.   At home:  · Keep using the continuous positive airway pressure (CPAP) device when you sleep. Unless your health care provider tells you not to, use it when you sleep, day or night. CPAP is a common device used to treat obstructive sleep apnea.  · Talk with your provider before taking any pain medicine, muscle relaxants, or sedatives. Your provider will tell you about the possible dangers of taking these medicines.  © 3541-6810 [employer]. [address] [place] [address], [place] [address]. All rights reserved. This  information is not intended as a substitute for professional medical care. Always follow your healthcare professional's instructions.

## 2019-12-02 NOTE — OP NOTE
PREOPERATIVE DIAGNOSIS: left renal calculus , left ureteral stent    POST OPERATIVE DIAGNOSIS: left renal calculus, left ureteral stent    OPERATION: left extracorporeal shock wave lithotripsy, cystoscopy with stent    DATE:  12/02/2019    SURGEON:  Dr. Proctor    ANESTHESIA: General    PROCEDURE IN DETAIL: The patient was brought to the lithotripsy treatment room and placed on the [facility] treatment table where the patient's left flank was over the water bag. After adequate induction of general anesthesia, plain and AP fluoroscopic images were obtained of the left renal area and 6 mm calculus was visualized over the level of the lower pole of the left kidney. The calculus was positioned at the F2 point. Shock waves were administered at the rate of 120 shocks per minute using the automatic pacing mechanism. This was initially started at 16 kV and then increased to 26 kV and this was tolerated well throughout the procedure. Periodic repeat fluoroscopic images both AP and oblique were obtained of the left kidney and when necessary, repositioning was carried out. The repeat fluoroscopic images did reveal satisfactory fragmentation of the calculus. A total of 3000 shocks were administered at the end of which the patient was placed in frogleg position and cystoscopy was performed and stent removed. The patient having tolerated the procedure well, was transferred to the recovery room in stable condition.

## 2019-12-03 VITALS
OXYGEN SATURATION: 93 % | RESPIRATION RATE: 14 BRPM | HEIGHT: 65 IN | TEMPERATURE: 97 F | HEART RATE: 85 BPM | SYSTOLIC BLOOD PRESSURE: 174 MMHG | DIASTOLIC BLOOD PRESSURE: 84 MMHG | WEIGHT: 160 LBS | BODY MASS INDEX: 26.66 KG/M2

## 2019-12-17 DIAGNOSIS — R73.03 PREDIABETES: ICD-10-CM

## 2019-12-17 RX ORDER — METFORMIN HYDROCHLORIDE 500 MG/1
TABLET ORAL
Qty: 90 TABLET | Refills: 0 | Status: SHIPPED | OUTPATIENT
Start: 2019-12-17 | End: 2020-10-28 | Stop reason: ALTCHOICE

## 2020-02-01 ENCOUNTER — HOSPITAL ENCOUNTER (INPATIENT)
Facility: HOSPITAL | Age: 70
LOS: 17 days | Discharge: NURSING, REHAB OR LONG-TERM CARE | DRG: 065 | End: 2020-02-18
Attending: EMERGENCY MEDICINE | Admitting: INTERNAL MEDICINE
Payer: MEDICARE

## 2020-02-01 DIAGNOSIS — F41.9 ANXIETY: ICD-10-CM

## 2020-02-01 DIAGNOSIS — I63.9 STROKE: ICD-10-CM

## 2020-02-01 DIAGNOSIS — I63.81 CEREBROVASCULAR ACCIDENT (CVA) DUE TO OCCLUSION OF SMALL ARTERY: Primary | ICD-10-CM

## 2020-02-01 DIAGNOSIS — E78.49 OTHER HYPERLIPIDEMIA: ICD-10-CM

## 2020-02-01 DIAGNOSIS — R29.898 WEAKNESS OF LEFT LOWER EXTREMITY: ICD-10-CM

## 2020-02-01 DIAGNOSIS — I10 ESSENTIAL HYPERTENSION: ICD-10-CM

## 2020-02-01 LAB
ALBUMIN SERPL BCP-MCNC: 3.9 G/DL (ref 3.5–5.2)
ALP SERPL-CCNC: 68 U/L (ref 55–135)
ALT SERPL W/O P-5'-P-CCNC: 17 U/L (ref 10–44)
ANION GAP SERPL CALC-SCNC: 8 MMOL/L (ref 8–16)
APTT BLDCRRT: 30.4 SEC (ref 21–32)
AST SERPL-CCNC: 17 U/L (ref 10–40)
BASOPHILS # BLD AUTO: 0.04 K/UL (ref 0–0.2)
BASOPHILS NFR BLD: 0.8 % (ref 0–1.9)
BILIRUB SERPL-MCNC: 0.8 MG/DL (ref 0.1–1)
BUN SERPL-MCNC: 14 MG/DL (ref 8–23)
CALCIUM SERPL-MCNC: 9.6 MG/DL (ref 8.7–10.5)
CHLORIDE SERPL-SCNC: 108 MMOL/L (ref 95–110)
CHOLEST SERPL-MCNC: 229 MG/DL (ref 120–199)
CHOLEST/HDLC SERPL: 5 {RATIO} (ref 2–5)
CO2 SERPL-SCNC: 26 MMOL/L (ref 23–29)
CREAT SERPL-MCNC: 1.3 MG/DL (ref 0.5–1.4)
DIFFERENTIAL METHOD: ABNORMAL
EOSINOPHIL # BLD AUTO: 0.1 K/UL (ref 0–0.5)
EOSINOPHIL NFR BLD: 2.1 % (ref 0–8)
ERYTHROCYTE [DISTWIDTH] IN BLOOD BY AUTOMATED COUNT: 13 % (ref 11.5–14.5)
EST. GFR  (AFRICAN AMERICAN): >60 ML/MIN/1.73 M^2
EST. GFR  (NON AFRICAN AMERICAN): 56 ML/MIN/1.73 M^2
ESTIMATED AVG GLUCOSE: 111 MG/DL (ref 68–131)
GLUCOSE SERPL-MCNC: 107 MG/DL (ref 70–110)
HBA1C MFR BLD HPLC: 5.5 % (ref 4–5.6)
HCT VFR BLD AUTO: 44.3 % (ref 40–54)
HDLC SERPL-MCNC: 46 MG/DL (ref 40–75)
HDLC SERPL: 20.1 % (ref 20–50)
HGB BLD-MCNC: 15.2 G/DL (ref 14–18)
IMM GRANULOCYTES # BLD AUTO: 0.01 K/UL (ref 0–0.04)
INR PPP: 1 (ref 0.8–1.2)
LDLC SERPL CALC-MCNC: 146.6 MG/DL (ref 63–159)
LYMPHOCYTES # BLD AUTO: 2.4 K/UL (ref 1–4.8)
LYMPHOCYTES NFR BLD: 45.8 % (ref 18–48)
MCH RBC QN AUTO: 29.3 PG (ref 27–31)
MCHC RBC AUTO-ENTMCNC: 34.3 G/DL (ref 32–36)
MCV RBC AUTO: 86 FL (ref 82–98)
MONOCYTES # BLD AUTO: 0.4 K/UL (ref 0.3–1)
MONOCYTES NFR BLD: 8.2 % (ref 4–15)
NEUTROPHILS # BLD AUTO: 2.2 K/UL (ref 1.8–7.7)
NEUTROPHILS NFR BLD: 42.9 % (ref 38–73)
NONHDLC SERPL-MCNC: 183 MG/DL
NRBC BLD-RTO: 0 /100 WBC
PLATELET # BLD AUTO: 195 K/UL (ref 150–350)
PMV BLD AUTO: 8.7 FL (ref 9.2–12.9)
POCT GLUCOSE: 114 MG/DL (ref 70–110)
POCT GLUCOSE: 164 MG/DL (ref 70–110)
POTASSIUM SERPL-SCNC: 4 MMOL/L (ref 3.5–5.1)
PROT SERPL-MCNC: 7.3 G/DL (ref 6–8.4)
PROTHROMBIN TIME: 10.4 SEC (ref 9–12.5)
RBC # BLD AUTO: 5.18 M/UL (ref 4.6–6.2)
SODIUM SERPL-SCNC: 142 MMOL/L (ref 136–145)
TRIGL SERPL-MCNC: 182 MG/DL (ref 30–150)
TSH SERPL DL<=0.005 MIU/L-ACNC: 2.29 UIU/ML (ref 0.4–4)
WBC # BLD AUTO: 5.22 K/UL (ref 3.9–12.7)

## 2020-02-01 PROCEDURE — 85610 PROTHROMBIN TIME: CPT | Mod: HCNC

## 2020-02-01 PROCEDURE — 85730 THROMBOPLASTIN TIME PARTIAL: CPT | Mod: HCNC

## 2020-02-01 PROCEDURE — 85025 COMPLETE CBC W/AUTO DIFF WBC: CPT | Mod: HCNC

## 2020-02-01 PROCEDURE — 63600175 PHARM REV CODE 636 W HCPCS: Mod: HCNC | Performed by: INTERNAL MEDICINE

## 2020-02-01 PROCEDURE — 93005 ELECTROCARDIOGRAM TRACING: CPT | Mod: HCNC

## 2020-02-01 PROCEDURE — 25500020 PHARM REV CODE 255: Mod: HCNC | Performed by: EMERGENCY MEDICINE

## 2020-02-01 PROCEDURE — 99291 CRITICAL CARE FIRST HOUR: CPT | Mod: 25,HCNC

## 2020-02-01 PROCEDURE — 80053 COMPREHEN METABOLIC PANEL: CPT | Mod: HCNC

## 2020-02-01 PROCEDURE — 12000002 HC ACUTE/MED SURGE SEMI-PRIVATE ROOM: Mod: HCNC

## 2020-02-01 PROCEDURE — 96374 THER/PROPH/DIAG INJ IV PUSH: CPT

## 2020-02-01 PROCEDURE — 36415 COLL VENOUS BLD VENIPUNCTURE: CPT | Mod: HCNC

## 2020-02-01 PROCEDURE — 25000003 PHARM REV CODE 250: Mod: HCNC | Performed by: INTERNAL MEDICINE

## 2020-02-01 PROCEDURE — G0378 HOSPITAL OBSERVATION PER HR: HCPCS | Mod: HCNC

## 2020-02-01 PROCEDURE — 80061 LIPID PANEL: CPT | Mod: HCNC

## 2020-02-01 PROCEDURE — 83036 HEMOGLOBIN GLYCOSYLATED A1C: CPT | Mod: HCNC

## 2020-02-01 PROCEDURE — 25000003 PHARM REV CODE 250: Mod: HCNC | Performed by: EMERGENCY MEDICINE

## 2020-02-01 PROCEDURE — 84443 ASSAY THYROID STIM HORMONE: CPT | Mod: HCNC

## 2020-02-01 RX ORDER — AMLODIPINE BESYLATE 5 MG/1
10 TABLET ORAL
Status: COMPLETED | OUTPATIENT
Start: 2020-02-01 | End: 2020-02-01

## 2020-02-01 RX ORDER — BUPROPION HYDROCHLORIDE 150 MG/1
150 TABLET ORAL DAILY
Status: DISCONTINUED | OUTPATIENT
Start: 2020-02-01 | End: 2020-02-18 | Stop reason: HOSPADM

## 2020-02-01 RX ORDER — ASPIRIN 81 MG/1
81 TABLET ORAL DAILY
Status: DISCONTINUED | OUTPATIENT
Start: 2020-02-02 | End: 2020-02-18 | Stop reason: HOSPADM

## 2020-02-01 RX ORDER — ROSUVASTATIN CALCIUM 20 MG/1
40 TABLET, COATED ORAL DAILY
Status: DISCONTINUED | OUTPATIENT
Start: 2020-02-01 | End: 2020-02-18 | Stop reason: HOSPADM

## 2020-02-01 RX ORDER — CARVEDILOL 6.25 MG/1
6.25 TABLET ORAL ONCE
Status: COMPLETED | OUTPATIENT
Start: 2020-02-01 | End: 2020-02-01

## 2020-02-01 RX ORDER — AMLODIPINE BESYLATE 5 MG/1
10 TABLET ORAL DAILY
Status: DISCONTINUED | OUTPATIENT
Start: 2020-02-02 | End: 2020-02-18 | Stop reason: HOSPADM

## 2020-02-01 RX ORDER — GLUCAGON 1 MG
1 KIT INJECTION
Status: DISCONTINUED | OUTPATIENT
Start: 2020-02-01 | End: 2020-02-03

## 2020-02-01 RX ORDER — ASPIRIN 325 MG
325 TABLET ORAL
Status: COMPLETED | OUTPATIENT
Start: 2020-02-01 | End: 2020-02-01

## 2020-02-01 RX ORDER — IBUPROFEN 600 MG/1
600 TABLET ORAL EVERY 6 HOURS PRN
Status: DISCONTINUED | OUTPATIENT
Start: 2020-02-01 | End: 2020-02-01

## 2020-02-01 RX ORDER — HYDRALAZINE HYDROCHLORIDE 20 MG/ML
10 INJECTION INTRAMUSCULAR; INTRAVENOUS
Status: COMPLETED | OUTPATIENT
Start: 2020-02-01 | End: 2020-02-01

## 2020-02-01 RX ORDER — TAMSULOSIN HYDROCHLORIDE 0.4 MG/1
1 CAPSULE ORAL DAILY
Status: DISCONTINUED | OUTPATIENT
Start: 2020-02-02 | End: 2020-02-18 | Stop reason: HOSPADM

## 2020-02-01 RX ORDER — GLUCAGON 1 MG
1 KIT INJECTION
Status: CANCELLED | OUTPATIENT
Start: 2020-02-01

## 2020-02-01 RX ORDER — SODIUM CHLORIDE 0.9 % (FLUSH) 0.9 %
10 SYRINGE (ML) INJECTION
Status: DISCONTINUED | OUTPATIENT
Start: 2020-02-01 | End: 2020-02-18 | Stop reason: HOSPADM

## 2020-02-01 RX ORDER — INSULIN ASPART 100 [IU]/ML
0-5 INJECTION, SOLUTION INTRAVENOUS; SUBCUTANEOUS EVERY 6 HOURS PRN
Status: DISCONTINUED | OUTPATIENT
Start: 2020-02-01 | End: 2020-02-03

## 2020-02-01 RX ORDER — COLCHICINE 0.6 MG/1
0.6 TABLET, FILM COATED ORAL DAILY
Status: DISCONTINUED | OUTPATIENT
Start: 2020-02-01 | End: 2020-02-18 | Stop reason: HOSPADM

## 2020-02-01 RX ADMIN — BUPROPION HYDROCHLORIDE 150 MG: 150 TABLET, FILM COATED, EXTENDED RELEASE ORAL at 06:02

## 2020-02-01 RX ADMIN — COLCHICINE 0.6 MG: 0.6 CAPSULE ORAL at 06:02

## 2020-02-01 RX ADMIN — ROSUVASTATIN CALCIUM 40 MG: 20 TABLET, FILM COATED ORAL at 06:02

## 2020-02-01 RX ADMIN — CARVEDILOL 6.25 MG: 6.25 TABLET, FILM COATED ORAL at 12:02

## 2020-02-01 RX ADMIN — HYDRALAZINE HYDROCHLORIDE 10 MG: 20 INJECTION INTRAMUSCULAR; INTRAVENOUS at 02:02

## 2020-02-01 RX ADMIN — ASPIRIN 325 MG ORAL TABLET 325 MG: 325 PILL ORAL at 12:02

## 2020-02-01 RX ADMIN — IOHEXOL 75 ML: 350 INJECTION, SOLUTION INTRAVENOUS at 03:02

## 2020-02-01 RX ADMIN — AMLODIPINE BESYLATE 10 MG: 5 TABLET ORAL at 12:02

## 2020-02-01 NOTE — CONSULTS
"[facility]  Neurology  Consult Note    Patient Name: Buddy Park  MRN: [id number]  Admission Date: 2/1/2020  Hospital Length of Stay: 0 days  Code Status: Full Code   Attending Provider: Kirby Walter MD   Consulting Provider: Dr. Clayton Whalen  Consulting Practitioner: Char Prabhakar [facility]  Primary Care Physician: Buddy Chatman MD  Principal Problem:<principal problem not specified>    Consults  Subjective:     Chief Complaint:  Extremity Weakness    HPI:  Buddy Park is a 69 y.o. male with PMHx of HTN, HLD,  who presented to the ED for evaluation of left sided weakness and left facial droop that was noticed this morning by his spouse. However on discussion with patient he mentions he himself had started noticing symptoms of left arm weakness since yesterday morning and had difficulty writing. His speech as per him is baseline as he has a speech deficit from prio . Patient reports onset of fatigue x1 week ago. The spouse endorses noticing the patient "dragging his left foot" this morning, prompting her to bring him to the ED.He is a nonsmoker.He does endorse non compliance to his medications in the last 2 months including his antihypertensive.Patient mentions left carotid neck surgery ~x1-1.5 years ago. Patient has no other medical concerns or complaints at this moment. SHx includes Ear mastoidectomy w/ cochlear implant w/ landmark and Carotid angioplasty.  Patient admitted for work up of possible stroke with neurology consult.     Neurology Consult Note: Patient seen, examined, and plan of care discussed with Dr. Clayton Whalen and Dr. Alegria (ER Physician).  70yo male presented to ER with c/o fatigue that started one week ago, LUE weakness yesterday, and LLE weakness and left facial droop that started this morning. According to patient and wife he was last seen normal yesterday morning. Patient with PMH of HTN, HLD, Heart Failure. He reports he was not taking any blood thinners nor ASA. " Patient reports yesterday he noticed he had difficulty signing his name when he went to write a check and today his wife noticed the facial droop and dragging of his left leg which prompted him to come to the ER. CT obtained in ER shows old lacunar infarct; no acute changes. Patient cannot have MRI as he has cochlear implant. On exam patient noted to have left facial droop, LUE Drift. He is alert and oriented to person, place, time, and situation. He is able to follow commands and responds appropriately. We will obtain CTA of Head and Neck and TTE with bubble. Will continue to monitor.      Called emergently to evaluate patient in the ER.     Past Medical History:   Diagnosis Date    Angina pectoris     Anxiety     Arthritis     Biliary colic     Cochlear implant in place     Deaf     LEFT EAR    Depression     Heart failure     High cholesterol     History of colon polyps 2/20/2018    [place] (hard of hearing)     HEARING AID - RIGHT    Hyperlipidemia     Hypertension     Kidney stone     Kidney stones     Renal stones     Stroke     Trouble in sleeping     Wears glasses        Past Surgical History:   Procedure Laterality Date    CAROT[facility]OP[facility]  06/2018    [facility]     CATARACT EXTRACTION Bilateral     CHOLECYSTECTOMY  2-6-2014    COLONOSCOPY  1/9/2013    Dr. Gillette, 5 year recheck (family history colon cancer)    COLONOSCOPY N/A 3/12/2018    Procedure: COLONOSCOPY;  Surgeon: Garett Go MD;  Location: [facility];  Service: Endoscopy;  Laterality: N/A;    CYSTOSCOPY W/ RETROGRADES Bilateral 10/28/2019    Procedure: CYSTOSCOPY, WITH RETROGRADE PYELOGRAM;  Surgeon: Gretchen Proctor MD;  Location: [facility];  Service: Urology;  Laterality: Bilateral;    CYSTOSCOPY W/ URETERAL STENT PLACEMENT Left 10/28/2019    Procedure: CYSTOSCOPY, WITH URETERAL STENT INSERTION;  Surgeon: Gretchen Proctor MD;  Location: [facility] OR;  Service: Urology;  Laterality: Left;    CYSTOSCOPY W/ URETERAL STENT  REMOVAL Left 12/2/2019    Procedure: CYSTOSCOPY, WITH URETERAL STENT REMOVAL;  Surgeon: Gretchen Proctor MD;  Location: [facility] OR;  Service: Urology;  Laterality: Left;    EAR MASTOIDECTOMY W/ COCHLEAR IMPLANT W/ LANDMARK      left ear    EXTRACORPOREAL SHOCK WAVE LITHOTRIPSY Left 12/2/2019    Procedure: LITHOTRIPSY, ESWL;  Surgeon: Gretchen Proctor MD;  Location: [facility] OR;  Service: Urology;  Laterality: Left;    EYE SURGERY      FOREIGN BODY REMOVAL Right     glass removed from right foot/repair    FRACTURE SURGERY      right arm x2    LITHOTRIPSY      ROTATOR CUFF REPAIR      Right     SINUS SURGERY      TONSILLECTOMY      VASCULAR SURGERY         Review of patient's allergies indicates:   Allergen Reactions    Bee pollens Shortness Of Breath     WASP, BEE, ANT AND CATERPILLER STINGS    Hydrochlorothiazide Shortness Of Breath and Rash    Milk containing products Diarrhea    Metoprolol Rash       Current Neurological Medications: None    Current Facility-Administered Medications on File Prior to Encounter   Medication    lactated ringers infusion     Current Outpatient Medications on File Prior to Encounter   Medication Sig    amLODIPine (NORVASC) 10 MG tablet Take 1 tablet (10 mg total) by mouth once daily.    aspirin (ECOTRIN) 81 MG EC tablet Take 81 mg by mouth once daily.    b complex vitamins tablet Take 1 tablet by mouth once daily.    buPROPion (WELLBUTRIN XL) 150 MG TB24 tablet Take 1 tablet (150 mg total) by mouth once daily. In the morning    carvedilol (COREG) 6.25 MG tablet Take 1 tablet (6.25 mg total) by mouth 2 (two) times daily with meals.    cefUROXime (CEFTIN) 500 MG tablet Take 1 tablet (500 mg total) by mouth 2 (two) times daily.    cholestyramine-aspartame (QUESTRAN LIGHT) 4 gram Powd Take 1 Scoop by mouth once daily.    colchicine (COLCRYS) 0.6 mg tablet Take 1 tablet (0.6 mg total) by mouth once daily.    EPINEPHrine (EPIPEN) 0.3 mg/0.3 mL AtIn Inject into the muscle  once.    HYDROcodone-acetaminophen (NORCO) 5-325 mg per tablet Take 1 tablet by mouth every 4 to 6 hours as needed for Pain.    ibuprofen (ADVIL,MOTRIN) 600 MG tablet Take 1 tablet (600 mg total) by mouth every 6 (six) hours as needed for Pain.    metFORMIN (GLUCOPHAGE) 500 MG tablet TAKE 1 TABLET BY MOUTH TWICE DAILY WITH MEALS    nitroGLYCERIN (NITROSTAT) 0.4 MG SL tablet Place 1 tablet (0.4 mg total) under the tongue every 5 (five) minutes as needed for Chest pain.    phenazopyridine (PYRIDIUM) 100 MG tablet Take 2 tablets (200 mg total) by mouth 3 (three) times daily as needed for Pain.    rosuvastatin (CRESTOR) 40 MG Tab Take 1 tablet (40 mg total) by mouth once daily.    tamsulosin (FLOMAX) 0.4 mg Cap Take 1 capsule (0.4 mg total) by mouth once daily.      Family History     Problem Relation (Age of Onset)    Alcohol abuse Brother, Brother    Arthritis Father    Cancer Mother, Brother, Paternal Uncle    Early death Daughter    Gout Father    Heart disease Father, Maternal Grandmother, Paternal Grandmother    Hypertension Father    Kidney disease Father    No Known Problems Sister, Son, Son    Stroke Maternal Grandfather        Tobacco Use    Smoking status: Never Smoker    Smokeless tobacco: Never Used   Substance and Sexual Activity    Alcohol use: Yes     Comment: once every two months    Drug use: No    Sexual activity: Yes     Review of Systems   Constitutional: Positive for fatigue.   HENT: Positive for hearing loss.    Eyes: Negative.    Respiratory: Negative.    Cardiovascular: Negative.    Gastrointestinal: Negative.    Endocrine: Negative.    Genitourinary: Negative.    Musculoskeletal: Negative.    Skin: Negative.    Allergic/Immunologic: Negative.    Neurological: Positive for facial asymmetry, weakness and numbness.        Right Side   Hematological: Negative.    Psychiatric/Behavioral: Negative.         Objective:     Vital Signs (Most Recent):  Temp: 97.5 °F (36.4 °C) (02/01/20  1625)  Pulse: 86 (02/01/20 1625)  Resp: 18 (02/01/20 1625)  BP: 138/89 (02/01/20 1625)  SpO2: 97 % (02/01/20 1625) Vital Signs (24h Range):  Temp:  [97 °F (36.1 °C)-97.5 °F (36.4 °C)] 97.5 °F (36.4 °C)  Pulse:  [78-86] 86  Resp:  [18-20] 18  SpO2:  [97 %-98 %] 97 %  BP: (138-238)/() 138/89     Weight: 71.8 kg (158 lb 3 oz)  Body mass index is 26.32 kg/m².    Physical Exam   Constitutional: He is oriented to person, place, and time. He appears well-developed and well-nourished.   HENT:   Head: Normocephalic and atraumatic.   Eyes: Pupils are equal, round, and reactive to light. EOM are normal.   Neck: Normal range of motion. Neck supple.   Cardiovascular: Normal rate, regular rhythm, normal heart sounds and intact distal pulses.   Pulmonary/Chest: Effort normal and breath sounds normal.   Abdominal: Soft. Bowel sounds are normal.   Musculoskeletal: Normal range of motion.   Neurological: He is alert and oriented to person, place, and time. A cranial nerve deficit is present. He has a normal Finger-Nose-Finger Test.   Reflex Scores:       Tricep reflexes are 2+ on the right side and 2+ on the left side.       Bicep reflexes are 2+ on the right side and 2+ on the left side.       Brachioradialis reflexes are 2+ on the right side and 2+ on the left side.       Patellar reflexes are 2+ on the right side and 2+ on the left side.       Achilles reflexes are 2+ on the right side and 2+ on the left side.  Skin: Skin is warm. Capillary refill takes less than 2 seconds.   Psychiatric: He has a normal mood and affect. His behavior is normal. Judgment and thought content normal. His speech is slurred.       NEUROLOGICAL EXAMINATION:     MENTAL STATUS   Oriented to person, place, and time.   Follows 1 step commands.   Attention: normal. Concentration: normal.   Speech: slurred (Patient reports this is his baseline)  Level of consciousness: alert  Knowledge: good.   Able to name object. Able to repeat.     CRANIAL NERVES      CN II   Visual fields full to confrontation.   Right visual field deficit: none  Left visual field deficit: none     CN III, IV, VI   Pupils are equal, round, and reactive to light.  Extraocular motions are normal.   CN III: no CN III palsy  CN VI: no CN VI palsy  Diplopia: none    CN V   Left facial sensation deficit: forehead and cheeks    CN VII   Left facial weakness: central    CN VIII   Hearing: impaired (Has hearing aid in left ear and cochlear implant in right ear)    CN IX, X   CN IX normal.     CN XI   CN XI normal.     CN XII   CN XII normal.     MOTOR EXAM   Muscle bulk: normal  Overall muscle tone: normal  Right arm tone: normal  Left arm tone: decreased  Right arm pronator drift: absent  Left arm pronator drift: present  Right leg tone: normal  Left leg tone: normal    Strength   Strength 5/5 except as noted.   Left deltoid: 4/5  Left biceps: 4/5  Left triceps: 4/5    REFLEXES     Reflexes   Right brachioradialis: 2+  Left brachioradialis: 2+  Right biceps: 2+  Left biceps: 2+  Right triceps: 2+  Left triceps: 2+  Right patellar: 2+  Left patellar: 2+  Right achilles: 2+  Left achilles: 2+  Right plantar: normal  Left plantar: normal    SENSORY EXAM   Left arm light touch: decreased from elbow  Left leg light touch: decreased from knee    GAIT AND COORDINATION      Coordination   Finger to nose coordination: normal    Tremor   Resting tremor: absent  Intention tremor: absent  Action tremor: absent    NIH Stroke Scale:    Level of Consciousness: 0 - alert  LOC Questions: 0 - answers both correctly    Best Gaze: 0 - normal  Visual: 0 - no visual loss  Facial Palsy: 1 - minor  Motor Left Arm: 1 - drift  Motor Right Arm: 0 - no drift  Motor Left Le - no drift  Motor Right Le - no drift  Limb Ataxia: 0 - absent  Sensory: 1 - mild to moderate loss  Best Language: 1 - mild to moderate aphasia  Dysarthria: 0 - normal articulation  Extinction and Inattention: 0 - no neglect  NIH Stroke Scale Total:  4          Significant Labs:   CMP  Sodium   Date Value Ref Range Status   02/01/2020 142 136 - 145 mmol/L Final     Potassium   Date Value Ref Range Status   02/01/2020 4.0 3.5 - 5.1 mmol/L Final     Chloride   Date Value Ref Range Status   02/01/2020 108 95 - 110 mmol/L Final     CO2   Date Value Ref Range Status   02/01/2020 26 23 - 29 mmol/L Final     Glucose   Date Value Ref Range Status   02/01/2020 107 70 - 110 mg/dL Final     BUN, Bld   Date Value Ref Range Status   02/01/2020 14 8 - 23 mg/dL Final     Creatinine   Date Value Ref Range Status   02/01/2020 1.3 0.5 - 1.4 mg/dL Final   03/19/2012 1.1 0.2 - 1.4 mg/dl Final     Calcium   Date Value Ref Range Status   02/01/2020 9.6 8.7 - 10.5 mg/dL Final   03/19/2012 8.9 8.6 - 10.2 mg/dl Final     Total Protein   Date Value Ref Range Status   02/01/2020 7.3 6.0 - 8.4 g/dL Final     Albumin   Date Value Ref Range Status   02/01/2020 3.9 3.5 - 5.2 g/dL Final     Total Bilirubin   Date Value Ref Range Status   02/01/2020 0.8 0.1 - 1.0 mg/dL Final     Comment:     For infants and newborns, interpretation of results should be based  on gestational age, weight and in agreement with clinical  observations.  Premature Infant recommended reference ranges:  Up to 24 hours.............<8.0 mg/dL  Up to 48 hours............<12.0 mg/dL  3-5 days..................<15.0 mg/dL  6-29 days.................<15.0 mg/dL       Alkaline Phosphatase   Date Value Ref Range Status   02/01/2020 68 55 - 135 U/L Final   03/19/2012 68 23 - 119 UNIT/L Final     AST   Date Value Ref Range Status   02/01/2020 17 10 - 40 U/L Final   03/19/2012 46 (H) 10 - 34 UNIT/L Final     ALT   Date Value Ref Range Status   02/01/2020 17 10 - 44 U/L Final     Anion Gap   Date Value Ref Range Status   02/01/2020 8 8 - 16 mmol/L Final   03/19/2012 11 5 - 15 meq/L Final     eGFR if    Date Value Ref Range Status   02/01/2020 >60 >60 mL/min/1.73 m^2 Final     eGFR if non    Date  Value Ref Range Status   02/01/2020 56 (A) >60 mL/min/1.73 m^2 Final     Comment:     Calculation used to obtain the estimated glomerular filtration  rate (eGFR) is the CKD-EPI equation.        Lab Results   Component Value Date    WBC 5.22 02/01/2020    HGB 15.2 02/01/2020    HCT 44.3 02/01/2020    MCV 86 02/01/2020     02/01/2020       Lab Results   Component Value Date    TSH 2.285 02/01/2020     Lab Results   Component Value Date    LDLCALC 146.6 02/01/2020     Lab Results   Component Value Date    HGBA1C 5.5 02/01/2020     Lab Results   Component Value Date    INR 1.0 02/01/2020    INR 1.0 10/25/2019     Lab Results   Component Value Date    APTT 30.4 02/01/2020         Significant Imaging:   Ct Head Without Contrast 2/1/2020  Old lacunar infarct of the anterior limb of the right internal capsule.  No acute lesions seen.  Bilateral frontal lobe atrophy.  Cochlear implant noted in place on the left.     X-ray Chest Ap Portable 2/1/2020  Borderline heart size otherwise negative chest.  Prior ORIF of the right humerus Electronically signed by: Andrea Holt MD Date:    02/01/2020 Time:    11:21    Us Carotid Bilateral 2/1/2020  No evidence of a hemodynamically significant carotid bifurcation stenosis.  Significant improvement in the left carotid artery consistent with prior left carotid endarterectomy.     Cta Head And Neck (xpd): 2/1/2020  40% stenosis identified at the origin of the right internal carotid artery secondary to plaque.  Significant stenosis is not seen on the left.  Otherwise negative CTA of the head and neck.    TTE with Bubble 2/1/2020  Pending    Assessment and Plan:    1. CVA vs. TIA  -CT Head w/o Contrast  -CTA Head and Neck   -TTE with bubble study  -Start ASA 81mg daily  -Recommend Statin for secondary stroke prevention  -PT/OT/ST  -Frequent Neuro Checks  -Fall Precautions  -Lipid Panel and A1c    2. HTN  -IM to Manage    3. Hyperlipidemia  -Recommend statin for secondary stroke  prevention  -IM to Manage    Patient to follow up with [facility] at [phone] within 2 weeks from discharge.     Stroke education was provided including stroke risk factors modification and any acute neurological changes including weakness, confusion, visual changes to come straight to the ER. Patient instructed no driving until follow up appointment in clinic.      All side effects of new medications were discussed with patient and/or next of kin and all questions were answered.                                                                  Active Diagnoses:    Diagnosis Date Noted POA    Weakness of left upper and lower extremity [R29.898] 02/01/2020 Yes    BMI 26.0-26.9,adult [Z68.26] 03/19/2019 Not Applicable    Carotid disease, bilateral [I73.9] 02/09/2017 Yes    Deafness [H91.90] 04/12/2016 Yes    HTN (hypertension) [I10] 11/03/2014 Yes    Hyperlipidemia [E78.5]  Yes      Problems Resolved During this Admission:       VTE Risk Mitigation (From admission, onward)         Ordered     IP VTE LOW RISK PATIENT  Once      02/01/20 1320                Thank you for your consult. I will follow-up with patient. Please contact us if you have any additional questions.    Char Prabhakar, CHRISTA  Neurology  [facility]    I, Dr. Clayton Whalen, discussed care with my advanced practitioner and agree with above. I have reviewed patient clinical presentation, work up, impression and plan.Probable subacute stroke. Will follow.

## 2020-02-01 NOTE — SUBJECTIVE & OBJECTIVE
Past Medical History:   Diagnosis Date    Angina pectoris     Anxiety     Biliary colic     Cochlear implant in place     Deaf     LEFT EAR    Depression     Heart failure     High cholesterol     History of colon polyps 2/20/2018    [place] (hard of hearing)     HEARING AID - RIGHT    Hyperlipidemia     Hypertension     Kidney stone     Kidney stones     Renal stones     Trouble in sleeping     Wears glasses        Past Surgical History:   Procedure Laterality Date    CAR[facility]  [facility]/2018    [facility]     CATARACT EXTRACTION Bilateral     CHOLECYSTECTOMY  2-6-2014    COLONOSCOPY  1/9/2013    Dr. Gillette, 5 year recheck (family history colon cancer)    COLONOSCOPY N/A 3/12/2018    Procedure: COLONOSCOPY;  Surgeon: Garett Go MD;  Location: [facility] ENDO;  Service: Endoscopy;  Laterality: N/A;    CYSTOSCOPY W/ RETROGRADES Bilateral 10/28/2019    Procedure: CYSTOSCOPY, WITH RETROGRADE PYELOGRAM;  Surgeon: Gretchen Proctor MD;  Location: [facility] OR;  Service: Urology;  Laterality: Bilateral;    CYSTOSCOPY W/ URETERAL STENT PLACEMENT Left 10/28/2019    Procedure: CYSTOSCOPY, WITH URETERAL STENT INSERTION;  Surgeon: Gretchen Proctor MD;  Location: [facility] OR;  Service: Urology;  Laterality: Left;    CYSTOSCOPY W/ URETERAL STENT REMOVAL Left 12/2/2019    Procedure: CYSTOSCOPY, WITH URETERAL STENT REMOVAL;  Surgeon: Gretchen Proctor MD;  Location: [facility] OR;  Service: Urology;  Laterality: Left;    EAR MASTOIDECTOMY W/ COCHLEAR IMPLANT W/ LANDMARK      left ear    EXTRACORPOREAL SHOCK WAVE LITHOTRIPSY Left 12/2/2019    Procedure: LITHOTRIPSY, ESWL;  Surgeon: Gretchen Proctor MD;  Location: [facility] OR;  Service: Urology;  Laterality: Left;    FOREIGN BODY REMOVAL Right     glass removed from right foot/repair    FRACTURE SURGERY      right arm x2    LITHOTRIPSY      ROTATOR CUFF REPAIR      Right     SINUS SURGERY      TONSILLECTOMY      VASCULAR SURGERY         Review of patient's allergies  indicates:   Allergen Reactions    Bee pollens Shortness Of Breath     WASP, BEE, ANT AND CATERPILLER STINGS    Hydrochlorothiazide Shortness Of Breath and Rash    Milk containing products Diarrhea    Metoprolol Rash       Current Facility-Administered Medications on File Prior to Encounter   Medication    lactated ringers infusion     Current Outpatient Medications on File Prior to Encounter   Medication Sig    amLODIPine (NORVASC) 10 MG tablet Take 1 tablet (10 mg total) by mouth once daily.    aspirin (ECOTRIN) 81 MG EC tablet Take 81 mg by mouth once daily.    b complex vitamins tablet Take 1 tablet by mouth once daily.    buPROPion (WELLBUTRIN XL) 150 MG TB24 tablet Take 1 tablet (150 mg total) by mouth once daily. In the morning    carvedilol (COREG) 6.25 MG tablet Take 1 tablet (6.25 mg total) by mouth 2 (two) times daily with meals.    cefUROXime (CEFTIN) 500 MG tablet Take 1 tablet (500 mg total) by mouth 2 (two) times daily.    cholestyramine-aspartame (QUESTRAN LIGHT) 4 gram Powd Take 1 Scoop by mouth once daily.    colchicine (COLCRYS) 0.6 mg tablet Take 1 tablet (0.6 mg total) by mouth once daily.    EPINEPHrine (EPIPEN) 0.3 mg/0.3 mL AtIn Inject into the muscle once.    HYDROcodone-acetaminophen (NORCO) 5-325 mg per tablet Take 1 tablet by mouth every 4 to 6 hours as needed for Pain.    ibuprofen (ADVIL,MOTRIN) 600 MG tablet Take 1 tablet (600 mg total) by mouth every 6 (six) hours as needed for Pain.    metFORMIN (GLUCOPHAGE) 500 MG tablet TAKE 1 TABLET BY MOUTH TWICE DAILY WITH MEALS    nitroGLYCERIN (NITROSTAT) 0.4 MG SL tablet Place 1 tablet (0.4 mg total) under the tongue every 5 (five) minutes as needed for Chest pain.    phenazopyridine (PYRIDIUM) 100 MG tablet Take 2 tablets (200 mg total) by mouth 3 (three) times daily as needed for Pain.    rosuvastatin (CRESTOR) 40 MG Tab Take 1 tablet (40 mg total) by mouth once daily.    tamsulosin (FLOMAX) 0.4 mg Cap Take 1 capsule  (0.4 mg total) by mouth once daily.     Family History     Problem Relation (Age of Onset)    Alcohol abuse Brother, Brother    Arthritis Father    Cancer Mother, Brother, Paternal Uncle    Early death Daughter    Gout Father    Heart disease Father, Maternal Grandmother, Paternal Grandmother    Hypertension Father    Kidney disease Father    No Known Problems Sister, Son, Son    Stroke Maternal Grandfather        Tobacco Use    Smoking status: Never Smoker    Smokeless tobacco: Never Used   Substance and Sexual Activity    Alcohol use: Yes     Comment: once every two months    Drug use: No    Sexual activity: Yes     Review of Systems   Constitutional: Positive for fatigue. Negative for activity change, appetite change, chills, diaphoresis, fever and unexpected weight change.   HENT: Negative.    Eyes: Negative.    Respiratory: Negative.    Cardiovascular: Negative.    Gastrointestinal: Negative.    Endocrine: Negative.    Genitourinary: Negative.    Musculoskeletal: Negative.    Skin: Negative.    Allergic/Immunologic: Negative.    Neurological: Positive for facial asymmetry, speech difficulty, weakness (Left upper and lower extremity) and numbness.   Hematological: Negative.    Psychiatric/Behavioral: Negative.      Objective:     Vital Signs (Most Recent):  Temp: 97.5 °F (36.4 °C) (02/01/20 1625)  Pulse: 86 (02/01/20 1625)  Resp: 18 (02/01/20 1625)  BP: 138/89 (02/01/20 1625)  SpO2: 97 % (02/01/20 1625) Vital Signs (24h Range):  Temp:  [97 °F (36.1 °C)-97.5 °F (36.4 °C)] 97.5 °F (36.4 °C)  Pulse:  [78-86] 86  Resp:  [18-20] 18  SpO2:  [97 %-98 %] 97 %  BP: (138-238)/() 138/89     Weight: 72.6 kg (160 lb)  Body mass index is 26.63 kg/m².    Physical Exam   Constitutional: He is oriented to person, place, and time. He appears well-developed and well-nourished.   Eyes: Pupils are equal, round, and reactive to light. EOM are normal.   Neck: Normal range of motion.   Cardiovascular: Normal rate and regular  rhythm.   Pulmonary/Chest: Effort normal.   Abdominal: Soft.   Genitourinary:   Genitourinary Comments: deferred   Musculoskeletal: Normal range of motion.   Neurological: He is alert and oriented to person, place, and time.   Left side upper and lower extremity strenght 4/5  Has left side facial droop with partial loss of labial fold.         CRANIAL NERVES     CN III, IV, VI   Pupils are equal, round, and reactive to light.  Extraocular motions are normal.        Significant Labs: All pertinent labs within the past 24 hours have been reviewed.    Significant Imaging: I have reviewed and interpreted all pertinent imaging results/findings within the past 24 hours.

## 2020-02-01 NOTE — H&P
"[facility] Medicine  History & Physical    Patient Name: Buddy Park  MRN: [id number]  Admission Date: 2/1/2020  Attending Physician: Kirby Walter MD   Primary Care Provider: Buddy Chatman MD         Patient information was obtained from patient and review of medical records    Subjective:     Principal Problem:<principal problem not specified>    Chief Complaint:   Chief Complaint   Patient presents with    Extremity Weakness     left side / started one week ago / slurred speech         HPI: Buddy Park is a 69 y.o. male with PMHx of HTN, HLD,  who presented to the ED for evaluation of left sided weakness and left facial droop that was noticed this morning by his spouse. However on discussion with patient he mentions he himself had started noticing symptoms of left arm weakness since yesterday morning and had difficulty writing. His speech as per him is baseline as he has a speech deficit from prio . Patient reports onset of fatigue x1 week ago. The spouse endorses noticing the patient "dragging his left foot" this morning, prompting her to bring him to the ED.He is a nonsmoker.He does endorse non compliance to his medications in the last 2 months including his antihypertensive.Patient mentions left carotid neck surgery ~x1-1.5 years ago. Patient has no other medical concerns or complaints at this moment. SHx includes Ear mastoidectomy w/ cochlear implant w/ landmark and Carotid angioplasty.  Patient admitted for work up of possible stroke with neurology consult.     Past Medical History:   Diagnosis Date    Angina pectoris     Anxiety     Biliary colic     Cochlear implant in place     Deaf     LEFT EAR    Depression     Heart failure     High cholesterol     History of colon polyps 2/20/2018    [place] (hard of hearing)     HEARING AID - RIGHT    Hyperlipidemia     Hypertension     Kidney stone     Kidney stones     Renal stones     Trouble in sleeping     " Wears glasses        Past Surgical History:   Procedure Laterality Date    CAROTID [facility]OP[facility]  06/2018    [facility]     CATARACT EXTRACTION Bilateral     CHOLECYSTECTOMY  2-6-2014    COLONOSCOPY  1/9/2013    Dr. Gillette, 5 year recheck (family history colon cancer)    COLONOSCOPY N/A 3/12/2018    Procedure: COLONOSCOPY;  Surgeon: Garett Go MD;  Location: [facility] ENDO;  Service: Endoscopy;  Laterality: N/A;    CYSTOSCOPY W/ RETROGRADES Bilateral 10/28/2019    Procedure: CYSTOSCOPY, WITH RETROGRADE PYELOGRAM;  Surgeon: Gretchen Proctor MD;  Location: [facility] OR;  Service: Urology;  Laterality: Bilateral;    CYSTOSCOPY W/ URETERAL STENT PLACEMENT Left 10/28/2019    Procedure: CYSTOSCOPY, WITH URETERAL STENT INSERTION;  Surgeon: Gretchen Proctor MD;  Location: [facility] OR;  Service: Urology;  Laterality: Left;    CYSTOSCOPY W/ URETERAL STENT REMOVAL Left 12/2/2019    Procedure: CYSTOSCOPY, WITH URETERAL STENT REMOVAL;  Surgeon: Gretchen Proctor MD;  Location: [facility] OR;  Service: Urology;  Laterality: Left;    EAR MASTOIDECTOMY W/ COCHLEAR IMPLANT W/ LANDMARK      left ear    EXTRACORPOREAL SHOCK WAVE LITHOTRIPSY Left 12/2/2019    Procedure: LITHOTRIPSY, ESWL;  Surgeon: Gretchen Proctor MD;  Location: [facility] OR;  Service: Urology;  Laterality: Left;    FOREIGN BODY REMOVAL Right     glass removed from right foot/repair    FRACTURE SURGERY      right arm x2    LITHOTRIPSY      ROTATOR CUFF REPAIR      Right     SINUS SURGERY      TONSILLECTOMY      VASCULAR SURGERY         Review of patient's allergies indicates:   Allergen Reactions    Bee pollens Shortness Of Breath     WASP, BEE, ANT AND CATERPILLER STINGS    Hydrochlorothiazide Shortness Of Breath and Rash    Milk containing products Diarrhea    Metoprolol Rash       Current Facility-Administered Medications on File Prior to Encounter   Medication    lactated ringers infusion     Current Outpatient Medications on File Prior to Encounter   Medication  Sig    amLODIPine (NORVASC) 10 MG tablet Take 1 tablet (10 mg total) by mouth once daily.    aspirin (ECOTRIN) 81 MG EC tablet Take 81 mg by mouth once daily.    b complex vitamins tablet Take 1 tablet by mouth once daily.    buPROPion (WELLBUTRIN XL) 150 MG TB24 tablet Take 1 tablet (150 mg total) by mouth once daily. In the morning    carvedilol (COREG) 6.25 MG tablet Take 1 tablet (6.25 mg total) by mouth 2 (two) times daily with meals.    cefUROXime (CEFTIN) 500 MG tablet Take 1 tablet (500 mg total) by mouth 2 (two) times daily.    cholestyramine-aspartame (QUESTRAN LIGHT) 4 gram Powd Take 1 Scoop by mouth once daily.    colchicine (COLCRYS) 0.6 mg tablet Take 1 tablet (0.6 mg total) by mouth once daily.    EPINEPHrine (EPIPEN) 0.3 mg/0.3 mL AtIn Inject into the muscle once.    HYDROcodone-acetaminophen (NORCO) 5-325 mg per tablet Take 1 tablet by mouth every 4 to 6 hours as needed for Pain.    ibuprofen (ADVIL,MOTRIN) 600 MG tablet Take 1 tablet (600 mg total) by mouth every 6 (six) hours as needed for Pain.    metFORMIN (GLUCOPHAGE) 500 MG tablet TAKE 1 TABLET BY MOUTH TWICE DAILY WITH MEALS    nitroGLYCERIN (NITROSTAT) 0.4 MG SL tablet Place 1 tablet (0.4 mg total) under the tongue every 5 (five) minutes as needed for Chest pain.    phenazopyridine (PYRIDIUM) 100 MG tablet Take 2 tablets (200 mg total) by mouth 3 (three) times daily as needed for Pain.    rosuvastatin (CRESTOR) 40 MG Tab Take 1 tablet (40 mg total) by mouth once daily.    tamsulosin (FLOMAX) 0.4 mg Cap Take 1 capsule (0.4 mg total) by mouth once daily.     Family History     Problem Relation (Age of Onset)    Alcohol abuse Brother, Brother    Arthritis Father    Cancer Mother, Brother, Paternal Uncle    Early death Daughter    Gout Father    Heart disease Father, Maternal Grandmother, Paternal Grandmother    Hypertension Father    Kidney disease Father    No Known Problems Sister, Son, Son    Stroke Maternal Grandfather         Tobacco Use    Smoking status: Never Smoker    Smokeless tobacco: Never Used   Substance and Sexual Activity    Alcohol use: Yes     Comment: once every two months    Drug use: No    Sexual activity: Yes     Review of Systems   Constitutional: Positive for fatigue. Negative for activity change, appetite change, chills, diaphoresis, fever and unexpected weight change.   HENT: Negative.    Eyes: Negative.    Respiratory: Negative.    Cardiovascular: Negative.    Gastrointestinal: Negative.    Endocrine: Negative.    Genitourinary: Negative.    Musculoskeletal: Negative.    Skin: Negative.    Allergic/Immunologic: Negative.    Neurological: Positive for facial asymmetry, speech difficulty, weakness (Left upper and lower extremity) and numbness.   Hematological: Negative.    Psychiatric/Behavioral: Negative.      Objective:     Vital Signs (Most Recent):  Temp: 97.5 °F (36.4 °C) (02/01/20 1625)  Pulse: 86 (02/01/20 1625)  Resp: 18 (02/01/20 1625)  BP: 138/89 (02/01/20 1625)  SpO2: 97 % (02/01/20 1625) Vital Signs (24h Range):  Temp:  [97 °F (36.1 °C)-97.5 °F (36.4 °C)] 97.5 °F (36.4 °C)  Pulse:  [78-86] 86  Resp:  [18-20] 18  SpO2:  [97 %-98 %] 97 %  BP: (138-238)/() 138/89     Weight: 72.6 kg (160 lb)  Body mass index is 26.63 kg/m².    Physical Exam   Constitutional: He is oriented to person, place, and time. He appears well-developed and well-nourished.   Eyes: Pupils are equal, round, and reactive to light. EOM are normal.   Neck: Normal range of motion.   Cardiovascular: Normal rate and regular rhythm.   Pulmonary/Chest: Effort normal.   Abdominal: Soft.   Genitourinary:   Genitourinary Comments: deferred   Musculoskeletal: Normal range of motion.   Neurological: He is alert and oriented to person, place, and time.   Left side upper and lower extremity strenght 4/5  Has left side facial droop with partial loss of labial fold.         CRANIAL NERVES     CN III, IV, VI   Pupils are equal, round, and  reactive to light.  Extraocular motions are normal.        Significant Labs: All pertinent labs within the past 24 hours have been reviewed.    Significant Imaging: I have reviewed and interpreted all pertinent imaging results/findings within the past 24 hours.    Assessment/Plan:     Weakness of left upper and lower extremity  Clinical symptoms of stroke  CT head withlacunar infarct of the anterior limb of the right internal capsule.  No acute lesions seen.  Bilateral frontal lobe atrophy.  Cochlear implant noted in place on the left.  Neurology consulted. Appreciate recs  Likely cannot get MRI with h/o cochlear implants. Will defer CTA to neurology   Echo with bubble and carotid duplex ordered   NPO till speech evaluation   Telemetry monitoring for arrythmia.   PT/OT  Lipid profile and A1c    BMI 26.0-26.9,adult    Counseled regarding weight loss    Carotid disease, bilateral  S/p left CEA  Catrotid duplex with No evidence of a hemodynamically significant carotid bifurcation stenosis.  Significant improvement in the left carotid artery consistent with prior left carotid endarterectomy.  Continue ASA and statin.       Deafness  Has cochlear implants       HTN (hypertension)  Non compliant with medications , was found to be hypertensive with SBP >200 on presentation   Resumed norvasc  Overnight will try and not be overtly aggressive with BP control with concerns of stroke   Once stable from that perspective then can titrate medications      Hyperlipidemia  . Not compliant with medications   Statin restarted.           VTE Risk Mitigation (From admission, onward)         Ordered     IP VTE LOW RISK PATIENT  Once      02/01/20 1320                   Kirby Walter MD  Department of Hospital Medicine   [facility]

## 2020-02-01 NOTE — HPI
"Buddy Park is a 69 y.o. male with PMHx of HTN, HLD,  who presented to the ED for evaluation of left sided weakness and left facial droop that was noticed this morning by his spouse. However on discussion with patient he mentions he himself had started noticing symptoms of left arm weakness since yesterday morning and had difficulty writing. His speech as per him is baseline as he has a speech deficit from prio . Patient reports onset of fatigue x1 week ago. The spouse endorses noticing the patient "dragging his left foot" this morning, prompting her to bring him to the ED.He is a nonsmoker.He does endorse non compliance to his medications in the last 2 months including his antihypertensive.Patient mentions left carotid neck surgery ~x1-1.5 years ago. Patient has no other medical concerns or complaints at this moment. SHx includes Ear mastoidectomy w/ cochlear implant w/ landmark and Carotid angioplasty.  Patient admitted for work up of possible stroke with neurology consult.   "

## 2020-02-01 NOTE — PLAN OF CARE
Admitted to room with diagnosis of cva/tia. Awake and alert. VSS. Pt with hearing deficit. Hearing aid in place to right ear only. Speech  mild impedement noted.Family members here at bedside assisting with answering questions. Mild left weakness noted.Dysphagia assessment completed. No swallowing difficulties noted

## 2020-02-01 NOTE — ASSESSMENT & PLAN NOTE
Non compliant with medications , was found to be hypertensive with SBP >200 on presentation   Resumed norvasc  Overnight will try and not be overtly aggressive with BP control with concerns of stroke   Once stable from that perspective then can titrate medications

## 2020-02-01 NOTE — ED PROVIDER NOTES
"Encounter Date: 2/1/2020    SCRIBE #1 NOTE: Gracy HERNANDEZ, thanh scribing for, and in the presence of, Gus Alegria MD.       History     Chief Complaint   Patient presents with    Extremity Weakness     left side / started one week ago / slurred speech        Time seen by provider: 11:04 AM on 02/01/2020    Buddy Park is a 69 y.o. male with PMHx of HTN, HLD, and CHF who presents to the ED for evaluation of left sided weakness and left facial droop that was noticed this morning by his spouse. Patient reports onset of fatigue x1 week ago. The spouse endorses noticing the patient "dragging his left foot" this morning, prompting her to bring him to the ED. Patient denies changes in speech or vision, but reports history of baseline speech impediment. Patient is a nonsmoker. Spouse endorses patient is non compliant with medications. Patient mentions left carotid neck surgery ~x1-1.5 years ago. Patient has no other medical concerns or complaints at this moment. SHx includes Ear mastoidectomy w/ cochlear implant w/ landmark and Carotid angioplasty. Metoprolol allergy noted. Patient awoke this morning with these deficits.  Uncertain whether not present several days before.    The history is provided by the patient and the spouse.     Review of patient's allergies indicates:   Allergen Reactions    Bee pollens Shortness Of Breath     WASP, BEE, ANT AND CATERPILLER STINGS    Hydrochlorothiazide Shortness Of Breath and Rash    Milk containing products Diarrhea    Metoprolol Rash     Past Medical History:   Diagnosis Date    Angina pectoris     Anxiety     Biliary colic     Cochlear implant in place     Deaf     LEFT EAR    Depression     Heart failure     High cholesterol     History of colon polyps 2/20/2018    [place] (hard of hearing)     HEARING AID - RIGHT    Hyperlipidemia     Hypertension     Kidney stone     Kidney stones     Renal stones     Trouble in sleeping     Wears glasses      Past " Surgical History:   Procedure Laterality Date    [facility]  [facility]     CATARACT EXTRACTION Bilateral     CHOLECYSTECTOMY  2-6-2014    COLONOSCOPY  1/9/2013    Dr. Gillette, 5 year recheck (family history colon cancer)    COLONOSCOPY N/A 3/12/2018    Procedure: COLONOSCOPY;  Surgeon: Garett Go MD;  Location: [facility] ENDO;  Service: Endoscopy;  Laterality: N/A;    CYSTOSCOPY W/ RETROGRADES Bilateral 10/28/2019    Procedure: CYSTOSCOPY, WITH RETROGRADE PYELOGRAM;  Surgeon: Gretchen Proctor MD;  Location: [facility] OR;  Service: Urology;  Laterality: Bilateral;    CYSTOSCOPY W/ URETERAL STENT PLACEMENT Left 10/28/2019    Procedure: CYSTOSCOPY, WITH URETERAL STENT INSERTION;  Surgeon: Gretchen Proctor MD;  Location: [facility] OR;  Service: Urology;  Laterality: Left;    CYSTOSCOPY W/ URETERAL STENT REMOVAL Left 12/2/2019    Procedure: CYSTOSCOPY, WITH URETERAL STENT REMOVAL;  Surgeon: Gretchen Proctor MD;  Location: [facility] OR;  Service: Urology;  Laterality: Left;    EAR MASTOIDECTOMY W/ COCHLEAR IMPLANT W/ LANDMARK      left ear    EXTRACORPOREAL SHOCK WAVE LITHOTRIPSY Left 12/2/2019    Procedure: LITHOTRIPSY, ESWL;  Surgeon: Gretchen Proctor MD;  Location: [facility] OR;  Service: Urology;  Laterality: Left;    FOREIGN BODY REMOVAL Right     glass removed from right foot/repair    FRACTURE SURGERY      right arm x2    LITHOTRIPSY      ROTATOR CUFF REPAIR      Right     SINUS SURGERY      TONSILLECTOMY      VASCULAR SURGERY       Family History   Problem Relation Age of Onset    Cancer Mother         colon    Heart disease Father     Gout Father     Kidney disease Father     Arthritis Father     Hypertension Father     Early death Daughter         mva    Alcohol abuse Brother     Cancer Brother         mouth cancer w mets    No Known Problems Sister     Alcohol abuse Brother     No Known Problems Son     Heart disease Maternal Grandmother         MI    Stroke Maternal  Grandfather     Heart disease Paternal Grandmother         MI    No Known Problems Son     Cancer Paternal Uncle         lung cancer    Allergic rhinitis Neg Hx     Allergies Neg Hx     Angioedema Neg Hx     Asthma Neg Hx     Atopy Neg Hx     Eczema Neg Hx     Immunodeficiency Neg Hx     Rhinitis Neg Hx     Urticaria Neg Hx     Collagen disease Neg Hx      Social History     Tobacco Use    Smoking status: Never Smoker    Smokeless tobacco: Never Used   Substance Use Topics    Alcohol use: Yes     Comment: once every two months    Drug use: No     Review of Systems   Constitutional: Positive for fatigue.   Eyes: Negative for visual disturbance.   Respiratory: Negative for shortness of breath.    Cardiovascular: Negative for chest pain.   Gastrointestinal: Negative for nausea.   Neurological: Positive for facial asymmetry and weakness. Negative for speech difficulty.   All other systems reviewed and are negative.      Physical Exam     Initial Vitals [02/01/20 1019]   BP Pulse Resp Temp SpO2   (!) 213/109 78 20 97 °F (36.1 °C) 98 %      MAP       --         Physical Exam    Nursing note and vitals reviewed.  Constitutional: He appears well-developed and well-nourished. He is not diaphoretic.  Non-toxic appearance. He does not have a sickly appearance. He does not appear ill. No distress.   HENT:   Head: Normocephalic and atraumatic.   Eyes: EOM are normal.   Neck: Normal range of motion. Neck supple. Normal range of motion present. No neck rigidity.   Cardiovascular: Normal rate, regular rhythm and normal heart sounds. Exam reveals no gallop and no friction rub.    No murmur heard.  Pulmonary/Chest: Breath sounds normal. No respiratory distress. He has no wheezes. He has no rhonchi. He has no rales.   Abdominal: Soft. He exhibits no distension. There is no tenderness. There is no rebound.   Musculoskeletal: Normal range of motion.   Neurological: He is alert and oriented to person, place, and time.    Left sided facial droop, forehead is spared.   Left pronator drift.  4/5 strength and subjective decreased sensation in LUE and LLE.    Skin: Skin is warm and dry. No rash noted.   Psychiatric: He has a normal mood and affect. His behavior is normal. Judgment and thought content normal.         ED Course   Critical Care  Date/Time: 2/10/2020 9:52 AM  Performed by: Gus Alegria MD  Authorized by: Yomaira Foss MD   Direct patient critical care time: 25 minutes  Additional history critical care time: 10 minutes  Ordering / reviewing critical care time: 11 minutes  Documentation critical care time: 9 minutes  Consulting other physicians critical care time: 5 minutes  Total critical care time (exclusive of procedural time) : 60 minutes  Critical care was necessary to treat or prevent imminent or life-threatening deterioration of the following conditions: cva.  Critical care was time spent personally by me on the following activities: review of old charts, re-evaluation of patient's condition, ordering and review of radiographic studies, ordering and review of laboratory studies, pulse oximetry, ordering and performing treatments and interventions, obtaining history from patient or surrogate, examination of patient and evaluation of patient's response to treatment.        Labs Reviewed   CBC W/ AUTO DIFFERENTIAL - Abnormal; Notable for the following components:       Result Value    MPV 8.7 (*)     All other components within normal limits   COMPREHENSIVE METABOLIC PANEL - Abnormal; Notable for the following components:    eGFR if non  56 (*)     All other components within normal limits   LIPID PANEL - Abnormal; Notable for the following components:    Cholesterol 229 (*)     Triglycerides 182 (*)     All other components within normal limits   POCT GLUCOSE - Abnormal; Notable for the following components:    POCT Glucose 114 (*)     All other components within normal limits   PROTIME-INR   POCT  GLUCOSE, HAND-HELD DEVICE          Imaging Results          CT Head Without Contrast (Final result)  Result time 02/01/20 12:28:06    Final result by Andrea Holt Jr., MD (02/01/20 12:28:06)                 Impression:      Old lacunar infarct of the anterior limb of the right internal capsule.  No acute lesions seen.  Bilateral frontal lobe atrophy.  Cochlear implant noted in place on the left.      Electronically signed by: Andrea Holt MD  Date:    02/01/2020  Time:    12:28             Narrative:    EXAMINATION:  CT HEAD WITHOUT CONTRAST    CLINICAL HISTORY:  Stroke;    TECHNIQUE:  Low dose axial images were obtained through the head.  Coronal and sagittal reformations were also performed. Contrast was not administered.    COMPARISON:  Head CT of March 1, 2019.    FINDINGS:  The patient has a cochlear implant on the left which produces heavy metal artifact.  A cranial fracture is not identified.  The basal cisterns are clear and symmetric.  There is no mass effect or midline shift.  There is frontal lobe atrophy noted with increased subarachnoid space anteriorly.    There is a 1 cm CSF density identified in the anterior limb of the right internal capsule consistent with a lacunar infarct.  This is unchanged from the prior study.  A new focal area of increased or decreased CT density consistent with tumor, edema, CVA or hemorrhage is not seen.  Intracranial hemorrhage or hematoma is not noted.                               X-Ray Chest AP Portable (Final result)  Result time 02/01/20 11:21:00    Final result by Andrea Holt Jr., MD (02/01/20 11:21:00)                 Impression:      Borderline heart size otherwise negative chest.  Prior ORIF of the right humerus      Electronically signed by: Andrea Holt MD  Date:    02/01/2020  Time:    11:21             Narrative:    EXAMINATION:  XR CHEST AP PORTABLE    CLINICAL HISTORY:  stroke;    TECHNIQUE:  Single frontal view of the chest was  performed.    COMPARISON:  Prior chest of May 25, 2018.    FINDINGS:  The heart size is borderline.  No intrapulmonary mass or infiltrate is seen.  No pneumothorax or pleural effusion.  The patient has had prior ORIF of the right humerus.                                 Medical Decision Making:   History:   Old Medical Records: I decided to obtain old medical records.  Independently Interpreted Test(s):   I have ordered and independently interpreted EKG Reading(s) - see prior notes  Clinical Tests:   Lab Tests: Reviewed and Ordered  Radiological Study: Reviewed and Ordered  Medical Tests: Reviewed and Ordered  Other:   I have discussed this case with another health care provider.            Scribe Attestation:   Scribe #1: I performed the above scribed service and the documentation accurately describes the services I performed. I attest to the accuracy of the note.    Attending Attestation:             Attending ED Notes:   12:47 PM  Spoke with neurology, Dr. Clayton Whalen who recommends admission for further evaluation and will visit with the patient within the next hour.     12:49 PM  Spoke with hospital medicine,  who will admit the patient.     12:50 PM  Informed patient he will be admitted. I asked the patient if he has taken his BP medications, which he denies compliance recently.     I, Dr. Alegria, personally performed the services described in this documentation. All medical record entries made by the scribe were at my direction and in my presence.  I have reviewed the chart and agree that the record reflects my personal performance and is accurate and complete.3:05 PM 02/01/2020              ED Course as of Feb 01 1504   Sat Feb 01, 2020   1035 BP(!): 213/109 [EF]   1035 Temp: 97 °F (36.1 °C) [EF]   1035 Temp src: Oral [EF]   1035 Pulse: 78 [EF]   1035 Resp: 20 [EF]   1035 SpO2: 98 % [EF]   1118 69-year-old male presents to the ER from left-sided weakness. Uncertain onset of symptoms.  His wife  noticed it this morning after he awoke.  Patient reports fatigue and weakness for several days however.  Wife suspects he has been minimizing and hiding symptoms from her.  I think based on my interaction with the patient that this is probably accurate.  He does seem to be minimizing his symptoms.    [EF]   1134 X-Ray Chest AP Portable [EF]   1135 Sinus rhythm 73 beats per minute normal axis no ST segment elevation or depression or T-wave inversion    [EF]   1240 CT Head Without Contrast [EF]   1307 Patient has not taken his blood pressure medication yet today.  This will be ordered.    [EF]      ED Course User Index  [EF] Gus Alegria MD        Clinical Impression:       ICD-10-CM ICD-9-CM   1. Stroke I63.9 434.91         Disposition:   Disposition: Admitted      69-year-old male hypertension high cholesterol noncompliant with medications presents to the ER with left-sided weakness present since waking this morning and perhaps even earlier.  He describes malaise for several days.  Wife reports the patient minimizes his symptoms and is not readily forthcoming when he is ill.  She acknowledges that he does not take his medications regularly.  Patient also corroborates this.  He has left-sided weakness, left facial weakness with forehead spared making a CVA most likely.  He denies headache or neck pain.  Doubt carotid dissection.  Neurology has been consult from the emergency department.  He was given aspirin in the ED.  He will be admitted to Hospital Medicine with Neurology consult.  Not a candidate for tPA.  NIH score of about 4.               Gus Alegria MD  02/01/20 1507       Gus Alegria MD  02/01/20 1507       Gus Alegria MD  02/10/20 2324

## 2020-02-01 NOTE — ASSESSMENT & PLAN NOTE
S/p left CEA  Catrotid duplex with No evidence of a hemodynamically significant carotid bifurcation stenosis.  Significant improvement in the left carotid artery consistent with prior left carotid endarterectomy.  Continue ASA and statin.

## 2020-02-01 NOTE — ASSESSMENT & PLAN NOTE
Clinical symptoms of stroke  CT head withlacunar infarct of the anterior limb of the right internal capsule.  No acute lesions seen.  Bilateral frontal lobe atrophy.  Cochlear implant noted in place on the left.  Neurology consulted. Appreciate recs  Likely cannot get MRI with h/o cochlear implants. Will defer CTA to neurology   Echo with bubble and carotid duplex ordered   NPO till speech evaluation   Telemetry monitoring for arrythmia.   PT/OT  Lipid profile and A1c

## 2020-02-02 LAB
ALBUMIN SERPL BCP-MCNC: 4.1 G/DL (ref 3.5–5.2)
ALP SERPL-CCNC: 63 U/L (ref 55–135)
ALT SERPL W/O P-5'-P-CCNC: 21 U/L (ref 10–44)
ANION GAP SERPL CALC-SCNC: 11 MMOL/L (ref 8–16)
AST SERPL-CCNC: 18 U/L (ref 10–40)
BASOPHILS # BLD AUTO: 0.02 K/UL (ref 0–0.2)
BASOPHILS NFR BLD: 0.3 % (ref 0–1.9)
BILIRUB SERPL-MCNC: 0.9 MG/DL (ref 0.1–1)
BUN SERPL-MCNC: 12 MG/DL (ref 8–23)
CALCIUM SERPL-MCNC: 9.3 MG/DL (ref 8.7–10.5)
CHLORIDE SERPL-SCNC: 106 MMOL/L (ref 95–110)
CO2 SERPL-SCNC: 23 MMOL/L (ref 23–29)
CREAT SERPL-MCNC: 1.2 MG/DL (ref 0.5–1.4)
DIFFERENTIAL METHOD: ABNORMAL
EOSINOPHIL # BLD AUTO: 0 K/UL (ref 0–0.5)
EOSINOPHIL NFR BLD: 0.5 % (ref 0–8)
ERYTHROCYTE [DISTWIDTH] IN BLOOD BY AUTOMATED COUNT: 13.2 % (ref 11.5–14.5)
EST. GFR  (AFRICAN AMERICAN): >60 ML/MIN/1.73 M^2
EST. GFR  (NON AFRICAN AMERICAN): >60 ML/MIN/1.73 M^2
GLUCOSE SERPL-MCNC: 110 MG/DL (ref 70–110)
HCT VFR BLD AUTO: 43.1 % (ref 40–54)
HGB BLD-MCNC: 14.7 G/DL (ref 14–18)
IMM GRANULOCYTES # BLD AUTO: 0.04 K/UL (ref 0–0.04)
LYMPHOCYTES # BLD AUTO: 2.1 K/UL (ref 1–4.8)
LYMPHOCYTES NFR BLD: 33 % (ref 18–48)
MCH RBC QN AUTO: 29.2 PG (ref 27–31)
MCHC RBC AUTO-ENTMCNC: 34.1 G/DL (ref 32–36)
MCV RBC AUTO: 86 FL (ref 82–98)
MONOCYTES # BLD AUTO: 0.4 K/UL (ref 0.3–1)
MONOCYTES NFR BLD: 6.9 % (ref 4–15)
NEUTROPHILS # BLD AUTO: 3.7 K/UL (ref 1.8–7.7)
NEUTROPHILS NFR BLD: 58.7 % (ref 38–73)
NRBC BLD-RTO: 0 /100 WBC
PLATELET # BLD AUTO: 228 K/UL (ref 150–350)
PMV BLD AUTO: 9.1 FL (ref 9.2–12.9)
POCT GLUCOSE: 113 MG/DL (ref 70–110)
POCT GLUCOSE: 126 MG/DL (ref 70–110)
POCT GLUCOSE: 135 MG/DL (ref 70–110)
POTASSIUM SERPL-SCNC: 3.5 MMOL/L (ref 3.5–5.1)
PROT SERPL-MCNC: 7.2 G/DL (ref 6–8.4)
RBC # BLD AUTO: 5.04 M/UL (ref 4.6–6.2)
SODIUM SERPL-SCNC: 140 MMOL/L (ref 136–145)
WBC # BLD AUTO: 6.37 K/UL (ref 3.9–12.7)

## 2020-02-02 PROCEDURE — 93005 ELECTROCARDIOGRAM TRACING: CPT | Mod: HCNC

## 2020-02-02 PROCEDURE — 95816 EEG AWAKE AND DROWSY: CPT | Mod: HCNC

## 2020-02-02 PROCEDURE — 85025 COMPLETE CBC W/AUTO DIFF WBC: CPT | Mod: HCNC

## 2020-02-02 PROCEDURE — 97535 SELF CARE MNGMENT TRAINING: CPT | Mod: HCNC

## 2020-02-02 PROCEDURE — 97165 OT EVAL LOW COMPLEX 30 MIN: CPT | Mod: HCNC

## 2020-02-02 PROCEDURE — 25000003 PHARM REV CODE 250: Mod: HCNC | Performed by: INTERNAL MEDICINE

## 2020-02-02 PROCEDURE — 63600175 PHARM REV CODE 636 W HCPCS: Mod: HCNC | Performed by: INTERNAL MEDICINE

## 2020-02-02 PROCEDURE — 20000000 HC ICU ROOM: Mod: HCNC

## 2020-02-02 PROCEDURE — 80053 COMPREHEN METABOLIC PANEL: CPT | Mod: HCNC

## 2020-02-02 PROCEDURE — 97161 PT EVAL LOW COMPLEX 20 MIN: CPT | Mod: HCNC | Performed by: PHYSICAL THERAPIST

## 2020-02-02 PROCEDURE — G0378 HOSPITAL OBSERVATION PER HR: HCPCS | Mod: HCNC

## 2020-02-02 PROCEDURE — 94761 N-INVAS EAR/PLS OXIMETRY MLT: CPT | Mod: HCNC

## 2020-02-02 PROCEDURE — 96375 TX/PRO/DX INJ NEW DRUG ADDON: CPT

## 2020-02-02 PROCEDURE — 36415 COLL VENOUS BLD VENIPUNCTURE: CPT | Mod: HCNC

## 2020-02-02 PROCEDURE — 95816 PR EEG,W/AWAKE & DROWSY RECORD: ICD-10-PCS | Mod: 26,HCNC,, | Performed by: PSYCHIATRY & NEUROLOGY

## 2020-02-02 PROCEDURE — 97110 THERAPEUTIC EXERCISES: CPT | Mod: HCNC

## 2020-02-02 PROCEDURE — 95816 EEG AWAKE AND DROWSY: CPT | Mod: 26,HCNC,, | Performed by: PSYCHIATRY & NEUROLOGY

## 2020-02-02 PROCEDURE — 92610 EVALUATE SWALLOWING FUNCTION: CPT | Mod: HCNC

## 2020-02-02 PROCEDURE — 97530 THERAPEUTIC ACTIVITIES: CPT | Mod: HCNC

## 2020-02-02 PROCEDURE — 97116 GAIT TRAINING THERAPY: CPT | Mod: HCNC | Performed by: PHYSICAL THERAPIST

## 2020-02-02 RX ORDER — CARVEDILOL 6.25 MG/1
6.25 TABLET ORAL 2 TIMES DAILY
Status: DISCONTINUED | OUTPATIENT
Start: 2020-02-02 | End: 2020-02-18 | Stop reason: HOSPADM

## 2020-02-02 RX ORDER — ONDANSETRON 2 MG/ML
4 INJECTION INTRAMUSCULAR; INTRAVENOUS EVERY 6 HOURS PRN
Status: DISCONTINUED | OUTPATIENT
Start: 2020-02-02 | End: 2020-02-18 | Stop reason: HOSPADM

## 2020-02-02 RX ORDER — CLOPIDOGREL BISULFATE 75 MG/1
75 TABLET ORAL DAILY
Status: DISCONTINUED | OUTPATIENT
Start: 2020-02-03 | End: 2020-02-18 | Stop reason: HOSPADM

## 2020-02-02 RX ADMIN — AMLODIPINE BESYLATE 10 MG: 5 TABLET ORAL at 09:02

## 2020-02-02 RX ADMIN — CARVEDILOL 6.25 MG: 6.25 TABLET, FILM COATED ORAL at 08:02

## 2020-02-02 RX ADMIN — ROSUVASTATIN CALCIUM 40 MG: 20 TABLET, FILM COATED ORAL at 09:02

## 2020-02-02 RX ADMIN — COLCHICINE 0.6 MG: 0.6 CAPSULE ORAL at 09:02

## 2020-02-02 RX ADMIN — ONDANSETRON 4 MG: 2 INJECTION INTRAMUSCULAR; INTRAVENOUS at 12:02

## 2020-02-02 RX ADMIN — TAMSULOSIN HYDROCHLORIDE 0.4 MG: 0.4 CAPSULE ORAL at 09:02

## 2020-02-02 RX ADMIN — ASPIRIN 81 MG: 81 TABLET, COATED ORAL at 09:02

## 2020-02-02 RX ADMIN — CARVEDILOL 6.25 MG: 6.25 TABLET, FILM COATED ORAL at 09:02

## 2020-02-02 RX ADMIN — BUPROPION HYDROCHLORIDE 150 MG: 150 TABLET, FILM COATED, EXTENDED RELEASE ORAL at 09:02

## 2020-02-02 NOTE — ASSESSMENT & PLAN NOTE
Non compliant with medications , was found to be hypertensive with SBP >200 on presentation   Continue norvasc. Resumed coreg today.   Will try not be overtly aggressive with BP control with concerns of stroke   Once stable from that perspective then can titrate medications

## 2020-02-02 NOTE — PT/OT/SLP EVAL
Occupational Therapy   Evaluation    Name: Buddy Park  MRN: [id number]  Admitting Diagnosis:  <principal problem not specified>      Recommendations:     Discharge Recommendations: rehabilitation facility  Discharge Equipment Recommendations:  bath bench  Barriers to discharge:  Decreased caregiver support    Assessment:     Buddy Park is a 69 y.o. male who is left hand dominant with a medical diagnosis of CVA with left hemiparesis.  He presents with a decline in functional status due to the listed impairments, impacting ADLs and functional mobility. 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  Pt was alert and oriented x 4 and able to follow all commands but was impulsive with decreased safety awareness noted. He displayed decreased L UE strength throughout with mild subluxation noted at glenohumeral joint. He also displayed impaired L hand coordination, impacting all self care tasks. He fatigued quickly and became nauseated at end of OT session and requested to return to bed. Recommend OT treatment to maximize endurance, safety & independence with ADL's & functional mobility.  Performance deficits affecting function: weakness, impaired self care skills, impaired balance, decreased safety awareness, decreased ROM, impaired endurance, impaired functional mobilty, decreased upper extremity function, impaired coordination, decreased lower extremity function, gait instability, impaired fine motor.    Pt will benefit from inpatient rehabilitation due to high prior level of functioning in order to facilitate return to prior level of function before returning home with family.    Rehab Prognosis: Good; patient would benefit from acute skilled OT services to address these deficits and reach maximum level of function.       Plan:     Patient to be seen 3 x/week to address the above listed problems via self-care/home management, therapeutic activities, therapeutic  "exercises, neuromuscular re-education  · Plan of Care Expires: 02/16/20  · Plan of Care Reviewed with: patient, spouse    Subjective     Chief Complaint: Nausea (Amrita, nurse informed and came to check BG.)  Patient/Family Comments/goals: "I am a self motivator. Can I be back to normal in 3 weeks? "    Occupational Profile:  Living Environment: Pt lives with his wife in a [facility] with 1 LIAN and a tub/shower. She works 20 minutes away.  Previous level of function: Patient was Independent with all I/ADL's at home and in the community and driving.  Roles and Routines: Retired contractor;still very active doing building projects at home.  Equipment Used at Home:  none(Has a cane and standard walker from years ago)  Assistance upon Discharge: Patient will need 24 hour assistance and supervision for safety. He is very impulsive.    Pain/Comfort:  · Pain Rating 1: 0/10  · Pain Rating Post-Intervention 1: (nausea)    Patients cultural, spiritual, Episcopal conflicts given the current situation:      Objective:     Communicated with: nurse Amrita prior to session.  Patient found up in chair with telemetry, SCD upon OT entry to room. Pt noted to have had PT evaluation shortly before OT evaluation.    General Precautions: Standard, fall, hearing impaired, aspiration   Orthopedic Precautions:N/A   Braces: N/A     Occupational Performance:    Bed Mobility:    · Patient completed Sit to Supine with contact guard assistance and with side rail    Functional Mobility/Transfers:  · Patient completed Sit <> Stand Transfer with minimum assistance and cues for hand placement and safety  with  rolling walker   · Patient completed Bed <> Chair Transfer using Stand Pivot technique with minimum assistance and cues for safety with rolling walker  · Patient completed Toilet Transfer Stand Pivot technique with minimum assistance with  rolling walker, grab bars and cues for hand placement and safety  · Functional Mobility: Pt walked from bedside to " bathroom,back to bedside using walker with Min A to guide walker and keep L hand on walker and cues for safety.    Activities of Daily Living:  · Feeding:  set-up to open container and occasional assist using his right (non-dominant) hand    · Upper Body Dressing: maximal assistance to don gown seated in chair  · Lower Body Dressing: maximal assistance to don boxer briefs seated on toilet with ongoing cues needed for safety and technique  · Toileting: maximal assistance and cues for luis hygiene using R hand seated on toilet    Cognitive/Visual Perceptual:  Cognitive/Psychosocial Skills:     -       Oriented to: Person, Place, Time and Situation   -       Follows Commands/attention:Follows multistep  commands  -       Communication: dysarthria and has baseline speech impairment 2/2 hearing impairment but wife stated that it is now worse; 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 R hearing aid in place  -       Safety awareness/insight to disability: impaired   -       Mood/Affect/Coping skills/emotional control: Appropriate to situation, Cooperative, Pleasant and Impulsive  Visual/Perceptual:      -Intact  tracking, acuity, R/L discrimination and visual field      Physical Exam:  Balance:    -       static standing SBA, dynamic CGA  Postural examination/scapula alignment:    -       Rounded shoulders  -       Forward head  -       L UE in dependent position  Skin integrity: Visible skin intact  Edema:  None noted  Sensation:    -       Intact  light/touch L UE throughout  Dominant hand:    -       Left  Upper Extremity Range of Motion:     -       Right Upper Extremity: WNL  -       Left Upper Extremity: shoulder flexion 40*, abduction 80*, extension WFL, elbow flexion and extension WFL, pronation WFL, supination 50*, wrist flexion WFL, extension 20*, finger flexion WFL, extension -20*, ad/abduction impaired, opposition impaired  Upper Extremity Strength:    -       Right Upper Extremity: WNL  -       Left  Upper Extremity: grossly 2 to 2+/5 throughout   Strength:    -       Right Upper Extremity: WNL  -       Left Upper Extremity: 3+/5  Fine Motor Coordination:    -       Impaired  Left hand, finger to nose  , Left hand thumb/finger opposition skills  , Left hand, manipulation of objects   and Left hand, graphomotor skills    Gross motor coordination:   drift hemiplegia/paresis    AMPAC 6 Click ADL:  AMPA Total Score: 14    Treatment & Education:  OT ed pt on OT role & POC as well as discharge recommendations on inpatient rehabilitation.  OT ed pt on B shoulder flexion/elbow flexion/extension SROM exercise with hands clasped together and pt completing 5 reps with cues for use of pursed lip breathing & activity pacing  to prevent SOB & fatigue with activity. Pt became fatigued and nauseated after 5 reps and had to stop. OT encouraged pt to complete exercise up to 10 reps twice daily as able. Wife stated she would remind pt to do so.  OT ed patient on safety with walker use for functional mobility with cues for hand placement & sequencing.   OT educated patient on toilet transfer techniques using rolling walker and grab bars.  OT ed pt on use of albin technique for pulling pants over hips and for donning a shirt/gown. More education indicated as pt was impulsive and fatigued.  OT ed pt & family on keeping HOB raised and sitting up in chair as pt will tolerate to counteract effects of bedrest.  OT ed pt on fall risk and strongly advised pt to call for help for all OOB mobility.  OT assisted pt back to bed and placed a cold cloth on his forehead and provided and emesis bag.  Wife present assisting pt as well.    Education:    Patient left HOB elevated with all lines intact, call button in reach, bed alarm on, Amrita, nurse notified and wife present    GOALS:   Multidisciplinary Problems     Occupational Therapy Goals        Problem: Occupational Therapy Goal    Goal Priority Disciplines Outcome Interventions    Occupational Therapy Goal     OT, PT/OT Ongoing, Progressing    Description:  Goals to be met by: 2/16/20     Patient will increase functional independence with ADLs by performing:    UE Dressing with Set-up Assistance and Minimal Assistance.  LE Dressing with Set-up Assistance, Contact Guard Assistance and Assistive Devices as needed.  Grooming while seated at sink with Set-up Assistance, Supervision and Assistive Devices as needed.  Toileting from toilet with Set-up Assistance, Minimal Assistance and Assistive Devices as needed for hygiene and clothing management.   Toilet transfer to toilet with Contact Guard Assistance.                      History:     Past Medical History:   Diagnosis Date    Angina pectoris     Anxiety     Arthritis     Biliary colic     Cochlear implant in place     Deaf     LEFT EAR    Depression     Heart failure     High cholesterol     History of colon polyps 2/20/2018    [place] (hard of hearing[place]     HEARING AID - RIGHT    Hyperlipidemia     Hypertension     Kidney stone     Kidney stones     Renal stones     Stroke     Trouble in sleeping     Wears glasses        Past Surgical History:   Procedure Laterality Date    [facility]  06/2018    [facility]     CATARACT EXTRACTION Bilateral     CHOLECYSTECTOMY  2-6-2014    COLONOSCOPY  1/9/2013    Dr. Gillette, 5 year recheck (family history colon cancer)    COLONOSCOPY N/A 3/12/2018    Procedure: COLONOSCOPY;  Surgeon: Garett Go MD;  Location: [facility];  Service: Endoscopy;  Laterality: N/A;    CYSTOSCOPY W/ RETROGRADES Bilateral 10/28/2019    Procedure: CYSTOSCOPY, WITH RETROGRADE PYELOGRAM;  Surgeon: Gretchen Proctor MD;  [facility];  Service: Urology;  Laterality: Bilateral;    CYSTOSCOPY W/ URETERAL STENT PLACEMENT Left 10/28/2019    Procedure: CYSTOSCOPY, WITH URETERAL STENT INSERTION;  Surgeon: Gretchen Proctor MD;  Location: [facility] OR;  Service: Urology;  Laterality: Left;    CYSTOSCOPY W/  URETERAL STENT REMOVAL Left 12/2/2019    Procedure: CYSTOSCOPY, WITH URETERAL STENT REMOVAL;  Surgeon: Gretchen Proctor MD;  Location: [facility] OR;  Service: Urology;  Laterality: Left;    EAR MASTOIDECTOMY W/ COCHLEAR IMPLANT W/ LANDMARK      left ear    EXTRACORPOREAL SHOCK WAVE LITHOTRIPSY Left 12/2/2019    Procedure: LITHOTRIPSY, ESWL;  Surgeon: Gretchen Proctor MD;  Location: [facility] OR;  Service: Urology;  Laterality: Left;    EYE SURGERY      FOREIGN BODY REMOVAL Right     glass removed from right foot/repair    FRACTURE SURGERY      right arm x2    LITHOTRIPSY      ROTATOR CUFF REPAIR      Right     SINUS SURGERY      TONSILLECTOMY      VASCULAR SURGERY         Time Tracking:     OT Date of Treatment: 02/02/20  OT Start Time: 1039  OT Stop Time: 1128  OT Total Time (min): 49 min    Billable Minutes:Evaluation 10  Self Care/Home Management 15  Therapeutic Activity 15  Therapeutic Exercise 9    ELIGIO Ballesteros  2/2/2020

## 2020-02-02 NOTE — SIGNIFICANT EVENT
Code blue called on patient. He was on the toilet seat trying to have a BM and appears to have passed out. The last thing he remembers is passing out.  No loss of pulse . No CPR needed. BS check was 137 , RAYMUNDO 130/80, HR - 79 on being put on the monitor. Tele with sinus rhythm. Stat labs sent including CBC, chem 7 and lactate. Patient continues to have left sided weakness which is persistent. As per report during the event some jerking of the upper extremities was also noted with frothing of the mouth. Will get EEG to evaluate. Will transfer the patient to the ICU for closer monitoring overnight. CT head just prior to the event did show developing areas of hypodensity in the deep white matter of the right parietal lobe may represent ischemic injury. Patient cannot get MRI due to cohclear implants so will consider repeating a CT head in am.

## 2020-02-02 NOTE — ASSESSMENT & PLAN NOTE
Clinical symptoms of stroke  CT head withlacunar infarct of the anterior limb of the right internal capsule.  No acute lesions seen.  Bilateral frontal lobe atrophy.  Cochlear implant noted in place on the left.  Neurology consulted. Appreciate recs  CTA with no stenosis. Likely cannot get MRI with h/o cochlear implants.   Carotid duplex completed.   Echo with bubble pending  NPO till speech evaluation   Telemetry monitoring for arrythmia.   PT/OT  A1c- 5.5   Lipid as above

## 2020-02-02 NOTE — NURSING
Entered room found patient nodding out while sitting on toilet with family member in br with him. 2 person max assist into a w/c assisted back to bed 3 person max assist into bed. Patient able to answer questions. No loss of bp or pulse at this time. Code team arrives . md instructs to move to icu. Orders followed and carried out

## 2020-02-02 NOTE — SUBJECTIVE & OBJECTIVE
Interval History: Doing well this morning. No new complaints.   Review of Systems   Constitutional: Positive for fatigue. Negative for activity change, appetite change, chills, diaphoresis, fever and unexpected weight change.   HENT: Negative.    Eyes: Negative.    Respiratory: Negative.    Cardiovascular: Negative.    Gastrointestinal: Negative.    Endocrine: Negative.    Genitourinary: Negative.    Musculoskeletal: Negative.    Skin: Negative.    Allergic/Immunologic: Negative.    Neurological: Positive for facial asymmetry, speech difficulty, weakness (Left upper and lower extremity) and numbness.   Hematological: Negative.    Psychiatric/Behavioral: Negative.    Objective:     Vital Signs (Most Recent):  Temp: 96.4 °F (35.8 °C) (02/02/20 0732)  Pulse: 85 (02/02/20 0745)  Resp: 18 (02/02/20 0745)  BP: (!) 185/90 (02/02/20 0732)  SpO2: 97 % (02/02/20 0745) Vital Signs (24h Range):  Temp:  [96.4 °F (35.8 °C)-98 °F (36.7 °C)] 96.4 °F (35.8 °C)  Pulse:  [81-93] 85  Resp:  [16-18] 18  SpO2:  [96 %-97 %] 97 %  BP: (132-238)/() 185/90     Weight: 71.8 kg (158 lb 3 oz)  Body mass index is 26.32 kg/m².  No intake or output data in the 24 hours ending 02/02/20 1019   Physical Exam  Constitutional: He is oriented to person, place, and time. He appears well-developed and well-nourished.   Eyes: Pupils are equal, round, and reactive to light. EOM are normal.   Neck: Normal range of motion.   Cardiovascular: Normal rate and regular rhythm.   Pulmonary/Chest: Effort normal.   Abdominal: Soft.   Genitourinary:   Genitourinary Comments: deferred   Musculoskeletal: Normal range of motion.   Neurological: He is alert and oriented to person, place, and time.   Left side upper and lower extremity strenght 4/5  Has left side facial droop with partial loss of labial fold.         Significant Labs: All pertinent labs within the past 24 hours have been reviewed.    Significant Imaging: I have reviewed all pertinent imaging  results/findings within the past 24 hours.

## 2020-02-02 NOTE — CARE UPDATE
02/02/20 0745   Patient Assessment/Suction   Level of Consciousness (AVPU) alert   Respiratory Effort Normal;Unlabored   PRE-TX-O2   O2 Device (Oxygen Therapy) room air   SpO2 97 %   Pulse Oximetry Type Intermittent   $ Pulse Oximetry - Multiple Charge Pulse Oximetry - Multiple   Pulse 85   Resp 18

## 2020-02-02 NOTE — PLAN OF CARE
Pt in ICU for closer observation.  A/o.  Unable to squeeze hand on first arrival, now with a week/slight delay .  EEG complete.  CT of head tomorrow.  Safety maintained.  Pt and family updated on POC

## 2020-02-02 NOTE — PLAN OF CARE
Problem: Physical Therapy Goal  Goal: Physical Therapy Goal  Description  Goals to be met by: 2020     Patient will increase functional independence with mobility by performin. Supine to sit with Contact Guard Assistance  2. Sit to stand transfer with Contact Guard Assistance  3. Gait  x 250 feet with Contact Guard Assistance using Rolling Walker.      Outcome: Ongoing, Progressing

## 2020-02-02 NOTE — PROGRESS NOTES
SW met with patient to complete a discharge planning assessment. Patient presents as alert and oriented x 4, pleasant, good eye contact, well groomed, recall good, concentration/judgement good, average intelligence, calm, communicative, cooperative and asking and answering questions appropriately. SW verified all information on demographic sheet is correct. Patient reports living with wife and that he is independent with ADLs at this time and does drive. Patient reports wife will transport pt home.    Patient reports does not have home health.  Patient reports that he is not receiving outside resources.  Patient reports having no medical equipment. Patient reports Buddy Chatman MD as pt's PCP and has [employer] as their insurance. Patient reports receiving medications from [facility] in [place], [place] and reports that he is able to afford medications at this time and at time of discharge. Patient reports that he is not on dialysis at this time.  Patient reports that he is not receiving services with the coumadin clinic. Patient reports has not been admitted to the hospital within the last 30 days.    Patient reports does have a Living Will or Healthcare Power of . Patient denies mental health issues.    Patient expressed no other needs at this time. SW remains available.       02/02/20 4432   Discharge Assessment   Assessment Type Discharge Planning Assessment   Confirmed/corrected address and phone number on facesheet? Yes   Assessment information obtained from? Patient   Prior to hospitilization cognitive status: Alert/Oriented   Prior to hospitalization functional status: Independent;Assistive Equipment   Current cognitive status: Alert/Oriented   Current Functional Status: Independent;Assistive Equipment   Lives With spouse   Able to Return to Prior Arrangements yes   Is patient able to care for self after discharge? Yes   Who are your caregiver(s) and their phone number(s)? Yari Park  "(wife) [phone]; Brii Stephen (mother-in-law) [phone]   Patient's perception of discharge disposition home or selfcare   Readmission Within the Last 30 Days no previous admission in last 30 days   Patient currently being followed by outpatient case management? No   Patient currently receives any other outside agency services? No   Equipment Currently Used at Home none   Part D Coverage Pt has managed medicare   Do you have any problems affording any of your prescribed medications? No   Is the patient taking medications as prescribed? no   If no, which medications is patient not taking? Pt reports that he moved and his medications "got all mixed up" Pt reports plans to take all medications from now on.   Does the patient have transportation home? Yes   Transportation Anticipated car, drives self;family or friend will provide   Dialysis Name and Scheduled days N/A   Does the patient receive services at the Coumadin Clinic? No   Discharge Plan A Home;Home with family   DME Needed Upon Discharge  none   Patient/Family in Agreement with Plan yes     "

## 2020-02-02 NOTE — PROGRESS NOTES
"[facility] [place]  Progress Note    Patient Name: Buddy Park  MRN: [id number]  Patient Class: OP- Observation   Admission Date: 2/1/2020  Length of Stay: 0 days  Attending Physician: Kirby Walter MD  Primary Care Provider: Buddy Chatman MD        Subjective:     Principal Problem:<principal problem not specified>        HPI:  Buddy Park is a 69 y.o. male with PMHx of HTN, HLD,  who presented to the ED for evaluation of left sided weakness and left facial droop that was noticed this morning by his spouse. However on discussion with patient he mentions he himself had started noticing symptoms of left arm weakness since yesterday morning and had difficulty writing. His speech as per him is baseline as he has a speech deficit from prio . Patient reports onset of fatigue x1 week ago. The spouse endorses noticing the patient "dragging his left foot" this morning, prompting her to bring him to the ED.He is a nonsmoker.He does endorse non compliance to his medications in the last 2 months including his antihypertensive.Patient mentions left carotid neck surgery ~x1-1.5 years ago. Patient has no other medical concerns or complaints at this moment. SHx includes Ear mastoidectomy w/ cochlear implant w/ landmark and Carotid angioplasty.  Patient admitted for work up of possible stroke with neurology consult.     Overview/Hospital Course:  No notes on file    Interval History: Doing well this morning. No new complaints.   Review of Systems   Constitutional: Positive for fatigue. Negative for activity change, appetite change, chills, diaphoresis, fever and unexpected weight change.   HENT: Negative.    Eyes: Negative.    Respiratory: Negative.    Cardiovascular: Negative.    Gastrointestinal: Negative.    Endocrine: Negative.    Genitourinary: Negative.    Musculoskeletal: Negative.    Skin: Negative.    Allergic/Immunologic: Negative.    Neurological: Positive for facial asymmetry, " speech difficulty, weakness (Left upper and lower extremity) and numbness.   Hematological: Negative.    Psychiatric/Behavioral: Negative.    Objective:     Vital Signs (Most Recent):  Temp: 96.4 °F (35.8 °C) (02/02/20 0732)  Pulse: 85 (02/02/20 0745)  Resp: 18 (02/02/20 0745)  BP: (!) 185/90 (02/02/20 0732)  SpO2: 97 % (02/02/20 0745) Vital Signs (24h Range):  Temp:  [96.4 °F (35.8 °C)-98 °F (36.7 °C)] 96.4 °F (35.8 °C)  Pulse:  [81-93] 85  Resp:  [16-18] 18  SpO2:  [96 %-97 %] 97 %  BP: (132-238)/() 185/90     Weight: 71.8 kg (158 lb 3 oz)  Body mass index is 26.32 kg/m².  No intake or output data in the 24 hours ending 02/02/20 1019   Physical Exam  Constitutional: He is oriented to person, place, and time. He appears well-developed and well-nourished.   Eyes: Pupils are equal, round, and reactive to light. EOM are normal.   Neck: Normal range of motion.   Cardiovascular: Normal rate and regular rhythm.   Pulmonary/Chest: Effort normal.   Abdominal: Soft.   Genitourinary:   Genitourinary Comments: deferred   Musculoskeletal: Normal range of motion.   Neurological: He is alert and oriented to person, place, and time.   Left side upper and lower extremity strenght 4/5  Has left side facial droop with partial loss of labial fold.         Significant Labs: All pertinent labs within the past 24 hours have been reviewed.    Significant Imaging: I have reviewed all pertinent imaging results/findings within the past 24 hours.      Assessment/Plan:      Weakness of left upper and lower extremity  Clinical symptoms of stroke  CT head withlacunar infarct of the anterior limb of the right internal capsule.  No acute lesions seen.  Bilateral frontal lobe atrophy.  Cochlear implant noted in place on the left.  Neurology consulted. Appreciate recs  CTA with no stenosis. Likely cannot get MRI with h/o cochlear implants.   Carotid duplex completed.   Echo with bubble pending  NPO till speech evaluation   Telemetry  monitoring for arrythmia.   PT/OT  A1c- 5.5   Lipid as above     BMI 26.0-26.9,adult  Counseled regarding weight loss    Carotid disease, bilateral  S/p left CEA  Catrotid duplex with No evidence of a hemodynamically significant carotid bifurcation stenosis.  Significant improvement in the left carotid artery consistent with prior left carotid endarterectomy.  Continue ASA and statin.        Deafness  Has cochlear implants        HTN (hypertension)  Non compliant with medications , was found to be hypertensive with SBP >200 on presentation   Continue norvasc. Resumed coreg today.   Will try not be overtly aggressive with BP control with concerns of stroke   Once stable from that perspective then can titrate medications      Hyperlipidemia  . Not compliant with medications   Statin restarted.            VTE Risk Mitigation (From admission, onward)         Ordered     IP VTE LOW RISK PATIENT  Once      02/01/20 1320                      Kirby Walter MD  Department of Hospital Medicine   [facility]

## 2020-02-02 NOTE — PLAN OF CARE
Problem: SLP Goal  Goal: SLP Goal  Description    1) Participate in cognitive-communication evaluation   Outcome: Ongoing, Progressing       Clinical swallowing evaluation completed. All po trials tolerated with functional oral phase no overt s/s penetration/aspiration. REC Regular textures with thin liquids. No f/u indicated ATT. Please reconsult PRN.

## 2020-02-02 NOTE — PLAN OF CARE
Problem: Occupational Therapy Goal  Goal: Occupational Therapy Goal  Description  Goals to be met by: 2/16/20     Patient will increase functional independence with ADLs by performing:    UE Dressing with Set-up Assistance and Minimal Assistance.  LE Dressing with Set-up Assistance, Contact Guard Assistance and Assistive Devices as needed.  Grooming while seated at sink with Set-up Assistance, Supervision and Assistive Devices as needed.  Toileting from toilet with Set-up Assistance, Minimal Assistance and Assistive Devices as needed for hygiene and clothing management.   Toilet transfer to toilet with Contact Guard Assistance.     Outcome: Ongoing, Progressing  OT evaluation completed today. Goals & care plan established. Recommend inpatient rehabilitation.     ELIGIO Baker  2/2/2020

## 2020-02-02 NOTE — PLAN OF CARE
Alert, oriented, fall prevention ongoing. Some weakness on left side. [place] cochlear implant in left ear. Med teaching ongoing. Stroke teaching provided. Bed alarm in use at all times. Cont of b/b

## 2020-02-02 NOTE — NURSING
1500: pt transferred to 509 after code initiated.  Pt up to toilet in bathroom and became unresponsive.   Settled in ICU bed and connected to monitor.  Pt now awake and alert.  [place] even with cochlear implant.  Able to squeeze hand to right but not left.  Left leg weaker than right, able to to lift and wiggle toes.  This is a change from earlier this morning.  Neurology NP at bedside for follow up and aware.  EEG tech at bedside.  EKG complete.  Family updated on POC verbalized understanding.

## 2020-02-02 NOTE — PROGRESS NOTES
"[facility]  Neurology  Consult Note    Patient Name: Buddy Park  MRN: [id number]  Admission Date: 2/1/2020  Hospital Length of Stay: 0 days  Code Status: Full Code   Attending Provider: Kirby Walter MD   Consulting Provider: Dr. Clayton Whalen  Consulting Practitioner: Char Prabhakar [facility]  Primary Care Physician: Buddy Chatman MD  Principal Problem:<principal problem not specified>    Consults  Subjective:     Chief Complaint:  Extremity Weakness    HPI:  Buddy Park is a 69 y.o. male with PMHx of HTN, HLD,  who presented to the ED for evaluation of left sided weakness and left facial droop that was noticed this morning by his spouse. However on discussion with patient he mentions he himself had started noticing symptoms of left arm weakness since yesterday morning and had difficulty writing. His speech as per him is baseline as he has a speech deficit from prio . Patient reports onset of fatigue x1 week ago. The spouse endorses noticing the patient "dragging his left foot" this morning, prompting her to bring him to the ED.He is a nonsmoker.He does endorse non compliance to his medications in the last 2 months including his antihypertensive.Patient mentions left carotid neck surgery ~x1-1.5 years ago. Patient has no other medical concerns or complaints at this moment. SHx includes Ear mastoidectomy w/ cochlear implant w/ landmark and Carotid angioplasty.  Patient admitted for work up of possible stroke with neurology consult.     Neurology Consult Note: Patient seen, examined, and plan of care discussed with Dr. Clayton Whalen and Dr. Alegria (ER Physician).  68yo male presented to ER with c/o fatigue that started one week ago, LUE weakness yesterday, and LLE weakness and left facial droop that started this morning. According to patient and wife he was last seen normal yesterday morning. Patient with PMH of HTN, HLD, Heart Failure. He reports he was not taking any blood thinners nor ASA. " Patient reports initially he noticed he had difficulty signing his name when he went to write a check and the next day his wife noticed the facial droop and dragging of his left leg which prompted him to come to the ER. CT obtained in ER shows old lacunar infarct; no acute changes. Patient cannot have MRI as he has cochlear implant. On exam patient noted to have left facial droop, LUE Drift. He is alert and oriented to person, place, time, and situation. He is able to follow commands and responds appropriately. Initially, this morning patient's symptoms were stable. Dr. Clayton Whalen and I did not appreciate any worsening of symptoms. Later in the morning, patient's LUE weakness worsened. Hospital Medicine ordered repeat CT which showed no hemorrhage but area representing ischemic injury. Additionally, patient had syncopal episode while attempting to have bowel movement.  Dr. Clayton Whalen aware. We will repeat CT in the morning and patient is currently receiving EEG. We will also add Plavix 75mg daily in addition to the ASA 81mg daily. Patient instructed to remain hydrated. Will continue to monitor.       Past Medical History:   Diagnosis Date    Angina pectoris     Anxiety     Arthritis     Biliary colic     Cochlear implant in place     Deaf     LEFT EAR    Depression     Heart failure     High cholesterol     History of colon polyps 2/20/2018    [place] (hard of hearing)     HEARING AID - RIGHT    Hyperlipidemia     Hypertension     Kidney stone     Kidney stones     Renal stones     Stroke     Trouble in sleeping     Wears glasses        Past Surgical History:   Procedure Laterality Date    CAROTID ARTERY ANGIOPLASTY  06/2018    [facility]     CATARACT EXTRACTION Bilateral     CHOLECYSTECTOMY  2-6-2014    COLONOSCOPY  1/9/2013    Dr. Gillette, 5 year recheck (family history colon cancer)    COLONOSCOPY N/A 3/12/2018    Procedure: COLONOSCOPY;  Surgeon: Garett Go MD;  Location: [facility];  Service:  Endoscopy;  Laterality: N/A;    CYSTOSCOPY W/ RETROGRADES Bilateral 10/28/2019    Procedure: CYSTOSCOPY, WITH RETROGRADE PYELOGRAM;  Surgeon: Gretchen Proctor MD;  Location: [facility] OR;  Service: Urology;  Laterality: Bilateral;    CYSTOSCOPY W/ URETERAL STENT PLACEMENT Left 10/28/2019    Procedure: CYSTOSCOPY, WITH URETERAL STENT INSERTION;  Surgeon: Gretchen Proctor MD;  Location: [facility] OR;  Service: Urology;  Laterality: Left;    CYSTOSCOPY W/ URETERAL STENT REMOVAL Left 12/2/2019    Procedure: CYSTOSCOPY, WITH URETERAL STENT REMOVAL;  Surgeon: Gretchen Proctor MD;  Location: [facility] OR;  Service: Urology;  Laterality: Left;    EAR MASTOIDECTOMY W/ COCHLEAR IMPLANT W/ LANDMARK      left ear    EXTRACORPOREAL SHOCK WAVE LITHOTRIPSY Left 12/2/2019    Procedure: LITHOTRIPSY, ESWL;  Surgeon: Gretchen Proctor MD;  Location: [facility] OR;  Service: Urology;  Laterality: Left;    EYE SURGERY      FOREIGN BODY REMOVAL Right     glass removed from right foot/repair    FRACTURE SURGERY      right arm x2    LITHOTRIPSY      ROTATOR CUFF REPAIR      Right     SINUS SURGERY      TONSILLECTOMY      VASCULAR SURGERY         Review of patient's allergies indicates:   Allergen Reactions    Bee pollens Shortness Of Breath     WASP, BEE, ANT AND CATERPILLER STINGS    Hydrochlorothiazide Shortness Of Breath and Rash    Milk containing products Diarrhea    Metoprolol Rash       Current Neurological Medications: None    Current Facility-Administered Medications on File Prior to Encounter   Medication    lactated ringers infusion     Current Outpatient Medications on File Prior to Encounter   Medication Sig    amLODIPine (NORVASC) 10 MG tablet Take 1 tablet (10 mg total) by mouth once daily.    aspirin (ECOTRIN) 81 MG EC tablet Take 81 mg by mouth once daily.    b complex vitamins tablet Take 1 tablet by mouth once daily.    buPROPion (WELLBUTRIN XL) 150 MG TB24 tablet Take 1 tablet (150 mg total) by mouth once daily. In the  morning    carvedilol (COREG) 6.25 MG tablet Take 1 tablet (6.25 mg total) by mouth 2 (two) times daily with meals.    cefUROXime (CEFTIN) 500 MG tablet Take 1 tablet (500 mg total) by mouth 2 (two) times daily.    cholestyramine-aspartame (QUESTRAN LIGHT) 4 gram Powd Take 1 Scoop by mouth once daily.    colchicine (COLCRYS) 0.6 mg tablet Take 1 tablet (0.6 mg total) by mouth once daily.    EPINEPHrine (EPIPEN) 0.3 mg/0.3 mL AtIn Inject into the muscle once.    HYDROcodone-acetaminophen (NORCO) 5-325 mg per tablet Take 1 tablet by mouth every 4 to 6 hours as needed for Pain.    ibuprofen (ADVIL,MOTRIN) 600 MG tablet Take 1 tablet (600 mg total) by mouth every 6 (six) hours as needed for Pain.    metFORMIN (GLUCOPHAGE) 500 MG tablet TAKE 1 TABLET BY MOUTH TWICE DAILY WITH MEALS    nitroGLYCERIN (NITROSTAT) 0.4 MG SL tablet Place 1 tablet (0.4 mg total) under the tongue every 5 (five) minutes as needed for Chest pain.    phenazopyridine (PYRIDIUM) 100 MG tablet Take 2 tablets (200 mg total) by mouth 3 (three) times daily as needed for Pain.    rosuvastatin (CRESTOR) 40 MG Tab Take 1 tablet (40 mg total) by mouth once daily.    tamsulosin (FLOMAX) 0.4 mg Cap Take 1 capsule (0.4 mg total) by mouth once daily.      Family History     Problem Relation (Age of Onset)    Alcohol abuse Brother, Brother    Arthritis Father    Cancer Mother, Brother, Paternal Uncle    Early death Daughter    Gout Father    Heart disease Father, Maternal Grandmother, Paternal Grandmother    Hypertension Father    Kidney disease Father    No Known Problems Sister, Son, Son    Stroke Maternal Grandfather        Tobacco Use    Smoking status: Never Smoker    Smokeless tobacco: Never Used   Substance and Sexual Activity    Alcohol use: Yes     Comment: once every two months    Drug use: No    Sexual activity: Yes     Review of Systems   Constitutional: Positive for fatigue.   HENT: Positive for hearing loss.    Eyes: Negative.     Respiratory: Negative.    Cardiovascular: Negative.    Gastrointestinal: Negative.    Endocrine: Negative.    Genitourinary: Negative.    Musculoskeletal: Negative.    Skin: Negative.    Allergic/Immunologic: Negative.    Neurological: Positive for facial asymmetry, weakness and numbness.        Right Side   Hematological: Negative.    Psychiatric/Behavioral: Negative.         Objective:     Vital Signs (Most Recent):  Temp: 96.4 °F (35.8 °C) (02/02/20 0732)  Pulse: 78 (02/02/20 1441)  Resp: 18 (02/02/20 1441)  BP: 137/66 (02/02/20 1441)  SpO2: (!) 94 % (02/02/20 1441) Vital Signs (24h Range):  Temp:  [96.4 °F (35.8 °C)-98 °F (36.7 °C)] 96.4 °F (35.8 °C)  Pulse:  [78-93] 78  Resp:  [16-18] 18  SpO2:  [93 %-97 %] 94 %  BP: (132-194)/(63-94) 137/66     Weight: 71.8 kg (158 lb 3 oz)  Body mass index is 26.32 kg/m².    Physical Exam   Constitutional: He is oriented to person, place, and time. He appears well-developed and well-nourished.   HENT:   Head: Normocephalic and atraumatic.   Eyes: Pupils are equal, round, and reactive to light. EOM are normal.   Neck: Normal range of motion. Neck supple.   Cardiovascular: Normal rate, regular rhythm, normal heart sounds and intact distal pulses.   Pulmonary/Chest: Effort normal and breath sounds normal.   Abdominal: Soft. Bowel sounds are normal.   Musculoskeletal: Normal range of motion.   Neurological: He is alert and oriented to person, place, and time. A cranial nerve deficit is present. He has a normal Finger-Nose-Finger Test.   Reflex Scores:       Tricep reflexes are 2+ on the right side and 2+ on the left side.       Bicep reflexes are 2+ on the right side and 2+ on the left side.       Brachioradialis reflexes are 2+ on the right side and 2+ on the left side.       Patellar reflexes are 2+ on the right side and 2+ on the left side.       Achilles reflexes are 2+ on the right side and 2+ on the left side.  Skin: Skin is warm. Capillary refill takes less than 2  seconds.   Psychiatric: He has a normal mood and affect. His behavior is normal. Judgment and thought content normal. His speech is slurred.       NEUROLOGICAL EXAMINATION:     MENTAL STATUS   Oriented to person, place, and time.   Follows 1 step commands.   Attention: normal. Concentration: normal.   Speech: slurred (Patient reports this is his baseline)  Level of consciousness: alert  Knowledge: good.   Able to name object. Able to repeat.     CRANIAL NERVES     CN II   Visual fields full to confrontation.   Right visual field deficit: none  Left visual field deficit: none     CN III, IV, VI   Pupils are equal, round, and reactive to light.  Extraocular motions are normal.   CN III: no CN III palsy  CN VI: no CN VI palsy  Diplopia: none    CN V   Left facial sensation deficit: forehead and cheeks    CN VII   Left facial weakness: central    CN VIII   Hearing: impaired (Has hearing aid in left ear and cochlear implant in right ear)    CN IX, X   CN IX normal.     CN XI   CN XI normal.     CN XII   CN XII normal.     MOTOR EXAM   Muscle bulk: normal  Overall muscle tone: normal  Right arm tone: normal  Left arm tone: decreased  Right arm pronator drift: absent  Left arm pronator drift: present  Right leg tone: normal  Left leg tone: normal    Strength   Strength 5/5 except as noted.   Left deltoid: 4/5  Left biceps: 4/5  Left triceps: 4/5    REFLEXES     Reflexes   Right brachioradialis: 2+  Left brachioradialis: 2+  Right biceps: 2+  Left biceps: 2+  Right triceps: 2+  Left triceps: 2+  Right patellar: 2+  Left patellar: 2+  Right achilles: 2+  Left achilles: 2+  Right plantar: normal  Left plantar: normal    SENSORY EXAM   Left arm light touch: decreased from elbow  Left leg light touch: decreased from knee    GAIT AND COORDINATION      Coordination   Finger to nose coordination: normal    Tremor   Resting tremor: absent  Intention tremor: absent  Action tremor: absent    NIH Stroke Scale:    Level of  Consciousness: 0 - alert  LOC Questions: 0 - answers both correctly    Best Gaze: 0 - normal  Visual: 0 - no visual loss  Facial Palsy: 1 - minor  Motor Left Arm: 2 - can't resist gravity  Motor Right Arm: 0 - no drift  Motor Left Le - no drift  Motor Right Le - no drift  Limb Ataxia: 1 - present in one limb  Sensory: 1 - mild to moderate loss  Best Language: 1 - mild to moderate aphasia  Dysarthria: 0 - normal articulation  Extinction and Inattention: 0 - no neglect  NIH Stroke Scale Total: 6        Significant Labs:   CMP  Sodium   Date Value Ref Range Status   2020 140 136 - 145 mmol/L Final     Potassium   Date Value Ref Range Status   2020 3.5 3.5 - 5.1 mmol/L Final     Chloride   Date Value Ref Range Status   2020 106 95 - 110 mmol/L Final     CO2   Date Value Ref Range Status   2020 23 23 - 29 mmol/L Final     Glucose   Date Value Ref Range Status   2020 110 70 - 110 mg/dL Final     BUN, Bld   Date Value Ref Range Status   2020 12 8 - 23 mg/dL Final     Creatinine   Date Value Ref Range Status   2020 1.2 0.5 - 1.4 mg/dL Final   2012 1.1 0.2 - 1.4 mg/dl Final     Calcium   Date Value Ref Range Status   2020 9.3 8.7 - 10.5 mg/dL Final   2012 8.9 8.6 - 10.2 mg/dl Final     Total Protein   Date Value Ref Range Status   2020 7.2 6.0 - 8.4 g/dL Final     Albumin   Date Value Ref Range Status   2020 4.1 3.5 - 5.2 g/dL Final     Total Bilirubin   Date Value Ref Range Status   2020 0.9 0.1 - 1.0 mg/dL Final     Comment:     For infants and newborns, interpretation of results should be based  on gestational age, weight and in agreement with clinical  observations.  Premature Infant recommended reference ranges:  Up to 24 hours.............<8.0 mg/dL  Up to 48 hours............<12.0 mg/dL  3-5 days..................<15.0 mg/dL  6-29 days.................<15.0 mg/dL       Alkaline Phosphatase   Date Value Ref Range Status   2020  63 55 - 135 U/L Final   03/19/2012 68 23 - 119 UNIT/L Final     AST   Date Value Ref Range Status   02/02/2020 18 10 - 40 U/L Final   03/19/2012 46 (H) 10 - 34 UNIT/L Final     ALT   Date Value Ref Range Status   02/02/2020 21 10 - 44 U/L Final     Anion Gap   Date Value Ref Range Status   02/02/2020 11 8 - 16 mmol/L Final   03/19/2012 11 5 - 15 meq/L Final     eGFR if    Date Value Ref Range Status   02/02/2020 >60 >60 mL/min/1.73 m^2 Final     eGFR if non    Date Value Ref Range Status   02/02/2020 >60 >60 mL/min/1.73 m^2 Final     Comment:     Calculation used to obtain the estimated glomerular filtration  rate (eGFR) is the CKD-EPI equation.        Lab Results   Component Value Date    WBC 5.22 02/01/2020    HGB 15.2 02/01/2020    HCT 44.3 02/01/2020    MCV 86 02/01/2020     02/01/2020       Lab Results   Component Value Date    TSH 2.285 02/01/2020     Lab Results   Component Value Date    LDLCALC 146.6 02/01/2020     Lab Results   Component Value Date    HGBA1C 5.5 02/01/2020     Lab Results   Component Value Date    INR 1.0 02/01/2020    INR 1.0 10/25/2019     Lab Results   Component Value Date    APTT 30.4 02/01/2020         Significant Imaging:   Ct Head Without Contrast 2/1/2020  Old lacunar infarct of the anterior limb of the right internal capsule.  No acute lesions seen.  Bilateral frontal lobe atrophy.  Cochlear implant noted in place on the left.     X-ray Chest Ap Portable 2/1/2020  Borderline heart size otherwise negative chest.  Prior ORIF of the right humerus Electronically signed by: Andrea Holt MD Date:    02/01/2020 Time:    11:21    Us Carotid Bilateral 2/1/2020  No evidence of a hemodynamically significant carotid bifurcation stenosis.  Significant improvement in the left carotid artery consistent with prior left carotid endarterectomy.     Cta Head And Neck (xpd): 2/1/2020  40% stenosis identified at the origin of the right internal carotid artery  secondary to plaque.  Significant stenosis is not seen on the left.  Otherwise negative CTA of the head and neck.    Ct Head Without Contrast 2/2/2020  Developing areas of hypodensity in the deep white matter of the right parietal lobe may represent ischemic injury.  No hemorrhage or mass effect is seen.  An old lacunar infarct of the right basal ganglion is noted.  There is mild brain atrophy particularly of the frontal lobes.  A cochlear implant is noted in position.      TTE with Bubble 2/1/2020  Pending    Assessment and Plan:    1. CVA   -CT Head w/o Contrast  -Repeat CT in AM 2/3/2020  -CTA Head and Neck   -TTE with bubble study  -Continue ASA 81mg daily  -Start Plavix 75mg daily  -Recommend Statin for secondary stroke prevention  -PT/OT/ST  -Frequent Neuro Checks  -Fall Precautions    2. Syncope  -CT Head w/o Contrast  -CTA Head and Neck  -EEG  -Frequent Neuro checks  -Fall Precautions    3. HTN  -IM to Manage    4. Hyperlipidemia  -Recommend statin for secondary stroke prevention  -IM to Manage    [phone] 2 weeks from discharge.     Stroke education was provided including stroke risk factors modification and any acute neurological changes including weakness, confusion, visual changes to come straight to the ER. Patient instructed no driving until follow up appointment in clinic.      All side effects of new medications were discussed with patient and/or next of kin and all questions were answered.                                                                  Active Diagnoses:    Diagnosis Date Noted POA    Weakness of left upper and lower extremity [R29.898] 02/01/2020 Yes    BMI 26.0-26.9,adult [Z68.26] 03/19/2019 Not Applicable    Carotid disease, bilateral [I73.9] 02/09/2017 Yes    Deafness [H91.90] 04/12/2016 Yes    HTN (hypertension) [I10] 11/03/2014 Yes    Hyperlipidemia [E78.5]  Yes      Problems Resolved During this Admission:        VTE Risk Mitigation (From admission, onward)         Ordered     IP VTE LOW RISK PATIENT  Once      02/01/20 1320                Thank you for your consult. I will follow-up with patient. Please contact us if you have any additional questions.    Char Prabhakar, CHRISTA  Neurology  [facility] I, Dr. Clayton Whalen, have personally seen and examined the patient with my advanced provider and agree with above. I personally did a focused exam, and reviewed all necessary clinical information. I discussed my management plan with my NP and agree with above.  Stable.

## 2020-02-02 NOTE — RESPIRATORY THERAPY
02/01/20 2027   Patient Assessment/Suction   Level of Consciousness (AVPU) alert   PRE-TX-O2   O2 Device (Oxygen Therapy) room air   SpO2 97 %   Pulse Oximetry Type Intermittent        02/01/20 2027   Patient Assessment/Suction   Level of Consciousness (AVPU) alert   PRE-TX-O2   O2 Device (Oxygen Therapy) room air   SpO2 97 %   Pulse Oximetry Type Intermittent        02/01/20 2027   Patient Assessment/Suction   Level of Consciousness (AVPU) alert   PRE-TX-O2   O2 Device (Oxygen Therapy) room air   SpO2 97 %   Pulse Oximetry Type Intermittent

## 2020-02-02 NOTE — PROCEDURES
DATE: 2/2/20    EEG NUMBER:  ON     REFERRING PHYSICIAN:  Dr. Ferraro      This EEG was performed to assess for evidence of underlying epilepsy.     ELECTROENCEPHALOGRAM REPORT     METHODOLOGY:  Electroencephalographic (EEG) is recorded with electrodes placed according to the International 10-20 placement system.  Thirty two (32) channels of digital signal (sampling rate of 512/sec), including T1 and T2, were simultaneously recorded from the scalp and may include EKG, EMG, and/or eye monitors.  Recording band pass was 0.1 to 512 Hz.  Digital video recording of the patient is simultaneously recorded with the EEG.  The patient is instructed to report clinical symptoms which may occur during the recording session.  EEG   and video recording are stored and archived in digital format.  Activation procedures, which include photic stimulation, hyperventilation and instructing patients to perform simple tasks, are done in selected patients.     The EEG is displayed on a monitor screen and can be reviewed using different montages.  Computer-assisted analysis is employed to detect spike and electrographic seizure activity.  The entire record is submitted for computer analysis.  The entire recording is visually reviewed, and the times identified by computer analysis as being spikes or seizures are reviewed again.     Compressed spectral analysis (CSA) is also performed on the activity recorded from each individual channel.  This is displayed as a power display of frequencies from 0 to 30 Hz over time.  The CSA is reviewed looking for asymmetries in power between homologous areas of the scalp, then compared with the original EEG recording.     [employer] software was also utilized in the review of this study.  This software suite analyzes the EEG recording in multiple domains.  Coherence and rhythmicity are computed to identify EEG sections which may contain organized seizures.  Each channel undergoes analysis to detect the  presence of spike and sharp waves which have special and morphological characteristics of epileptic activity.  The routine EEG recording is converted from special into frequency domain.  This is then displayed comparing homologous areas to identify areas of significant asymmetry.  Algorithm to identify non-cortically generated artifact is used to separate artifact from the EEG.     EEG FINDINGS:  The recording was obtained with a number of standard bipolar and referential montages during wakefulness and drowsiness.  In the alert state, the posterior background rhythm was a symmetric, well-modulated 9 to 10 Hz alpha rhythm, which reacted symmetrically to eye opening.  Intermittent photic stimulation failed to evoke symmetric posterior driving responses. No abnormalities were activated by photic stimulation.  During drowsiness, the background rhythm waxed and waned and there were periods of slowing. There were no focal abnormalities.  There were no interictal epileptiform abnormalities and no clinical or electrographic seizures were recorded.   Excessive beta activity was noted throughout the record which was diffuse.     The EKG channel revealed a sinus rhythm.Rare PVCs noted      IMPRESSION:  This is a normal EEG during wakefulness and drowsiness.      CLINICAL CORRELATION:  The patient is a 69  -year-old male who is being evaluated for episodes of loss of consciousness.  The patient is currently not maintained on any anti-seizure medications.  This is a normal EEG during wakefulness and drowsiness.  There is no evidence for neither cortical dysfunction nor an epileptic process on this recording.  No seizures were recorded during this study.

## 2020-02-02 NOTE — PT/OT/SLP EVAL
Speech Language Pathology Evaluation  Bedside Swallow    Patient Name:  Buddy Park   MRN:  [id number]  Admitting Diagnosis: <principal problem not specified>    Recommendations:                 General Recommendations:  Speech language evaluation and Cognitive-linguistic evaluation  Diet recommendations:  Regular, Thin   Aspiration Precautions: Standard aspiration precautions   General Precautions: Standard, hearing impaired, fall  Communication strategies:  none    History:     Past Medical History:   Diagnosis Date    Angina pectoris     Anxiety     Arthritis     Biliary colic     Cochlear implant in place     Deaf     LEFT EAR    Depression     Heart failure     High cholesterol     History of colon polyps 2/20/2018    [place] (hard of hearing)     HEARING AID - RIGHT    Hyperlipidemia     Hypertension     Kidney stone     Kidney stones     Renal stones     Stroke     Trouble in sleeping     Wears glasses        Past Surgical History:   Procedure Laterality Date    [facility]     CATARACT EXTRACTION Bilateral     CHOLECYSTECTOMY  2-6-2014    COLONOSCOPY  1/9/2013    Dr. Gillette, 5 year recheck (family history colon cancer)    COLONOSCOPY N/A 3/12/2018    Procedure: COLONOSCOPY;  Surgeon: Garett Go MD;  Location: [facility];  Service: Endoscopy;  Laterality: N/A;    CYSTOSCOPY W/ RETROGRADES Bilateral 10/28/2019    Procedure: CYSTOSCOPY, WITH RETROGRADE PYELOGRAM;  Surgeon: Gretchen Proctor MD;  Location: [facility] OR;  Service: Urology;  Laterality: Bilateral;    CYSTOSCOPY W/ URETERAL STENT PLACEMENT Left 10/28/2019    Procedure: CYSTOSCOPY, WITH URETERAL STENT INSERTION;  Surgeon: Gretchen Proctor MD;  Location: [facility] OR;  Service: Urology;  Laterality: Left;    CYSTOSCOPY W/ URETERAL STENT REMOVAL Left 12/2/2019    Procedure: CYSTOSCOPY, WITH URETERAL STENT REMOVAL;  Surgeon: Gretchen Proctor MD;  Location: [facility] OR;  Service: Urology;  Laterality: Left;     EAR MASTOIDECTOMY W/ COCHLEAR IMPLANT W/ LANDMARK      left ear    EXTRACORPOREAL SHOCK WAVE LITHOTRIPSY Left 12/2/2019    Procedure: LITHOTRIPSY, ESWL;  Surgeon: Gretchen Proctor MD;  Location: [facility];  Service: Urology;  Laterality: Left;    EYE SURGERY      FOREIGN BODY REMOVAL Right     glass removed from right foot/repair    FRACTURE SURGERY      right arm x2    LITHOTRIPSY      ROTATOR CUFF REPAIR      Right     SINUS SURGERY      TONSILLECTOMY      VASCULAR SURGERY         Social History: Patient lives with wife.    Prior Intubation HX:  none    Modified Barium Swallow: none    Imaging:  CTA Head and Neck (xpd)   Final Result      40% stenosis identified at the origin of the right internal carotid artery secondary to plaque.  Significant stenosis is not seen on the left.  Otherwise negative CTA of the head and neck.         Electronically signed by: Andrea Holt MD   Date:    02/01/2020   Time:    15:59      US Carotid Bilateral   Final Result      No evidence of a hemodynamically significant carotid bifurcation stenosis.  Significant improvement in the left carotid artery consistent with prior left carotid endarterectomy.         Electronically signed by: Andrea Holt MD   Date:    02/01/2020   Time:    15:30      CT Head Without Contrast   Final Result      Old lacunar infarct of the anterior limb of the right internal capsule.  No acute lesions seen.  Bilateral frontal lobe atrophy.  Cochlear implant noted in place on the left.         Electronically signed by: Andrea Holt MD   Date:    02/01/2020   Time:    12:28      X-Ray Chest AP Portable   Final Result      Borderline heart size otherwise negative chest.  Prior ORIF of the right humerus         Electronically signed by: Andrea Holt MD   Date:    02/01/2020   Time:    11:21      CT Head Without Contrast    (Results Pending)        Prior diet: Regular/thin.    Occupation/hobbies/homemaking: PLOF:  "independent.    Subjective   "I'm optimistic."     Objective:   Pt seen for clinical swallow evaluation. He is AAOx4, pleasant and cooperative. Follows commands and answers questions appropriately.  Baseline speech impairment since childhood. +cochlear implant.     Oral Musculature Evaluation  · Oral Musculature: left weakness  · Dentition: present and adequate  · Secretion Management: adequate  · Mucosal Quality: adequate  · Mandibular Strength and Mobility: WFL  · Oral Labial Strength and Mobility: impaired retraction  · Lingual Strength and Mobility: WFL  · Buccal Strength and Mobility: WFL  · Volitional Cough: adequate  · Volitional Swallow: able to palpate larygneal rise    Bedside Swallow Eval:   Consistencies Assessed:  · Thin liquids --via cup and straw  · Puree --pudding  · Soft solids --macaroni  · Solids --cookie     Oral Phase:   · WFL    Pharyngeal Phase:   · no overt clinical signs/symptoms of aspiration    Compensatory Strategies  · None    Treatment: Pt/family educated re: results/recs of evaluation, SLP role and POC. Receptive to information provided.     Assessment:   Clinical swallowing evaluation completed. All po trials tolerated with functional oral phase no overt s/s penetration/aspiration. REC Regular textures with thin liquids. No f/u indicated ATT. Please re consult PRN.     Goals:   Multidisciplinary Problems     SLP Goals        Problem: SLP Goal    Goal Priority Disciplines Outcome   SLP Goal     SLP Ongoing, Progressing   Description:    1) Participate in cognitive-communication evaluation                    Plan:     · Patient to be seen:  (pendign results of cogitive-communication evaluation)   · Plan of Care expires:     · Plan of Care reviewed with:  patient, spouse   · SLP Follow-Up:  Yes       Discharge recommendations:  rehabilitation facility   Barriers to Discharge:  None    Time Tracking:     SLP Treatment Date:   02/02/20  Speech Start Time:  1230  Speech Stop Time:  1241   "   Speech Total Time (min):  11 min    Billable Minutes: Eval Swallow and Oral Function 11 and Total Time 11    Judy Carroll CCC-SLP  02/02/2020

## 2020-02-02 NOTE — PT/OT/SLP EVAL
Physical Therapy Evaluation    Patient Name:  Buddy Park   MRN:  [id number]    Recommendations:     Discharge Recommendations:  home health PT   Discharge Equipment Recommendations: walker, rolling   Barriers to discharge: None    Assessment:     Buddy Park is a 69 y.o. male admitted with a medical diagnosis of <principal problem not specified>.  He presents with the following impairments/functional limitations:  weakness, impaired endurance, impaired self care skills, gait instability, impaired functional mobilty, decreased safety awareness .    Rehab Prognosis: Good; patient would benefit from acute skilled PT services to address these deficits and reach maximum level of function.    Recent Surgery: * No surgery found *      Plan:     During this hospitalization, patient to be seen 6 x/week to address the identified rehab impairments via gait training, therapeutic activities, therapeutic exercises and progress toward the following goals:    · Plan of Care Expires:       Subjective     Chief Complaint: No complaints  Patient/Family Comments/goals: Patient wants to go home  Pain/Comfort:  · Pain Rating 1: 0/10    Patients cultural, spiritual, Restorationist conflicts given the current situation:      Living Environment:    Prior to admission, patients level of function was independent.  Equipment used at home: walker, standard.  DME owned (not currently used): standard walker.  Upon discharge, patient will have assistance from family.    Objective:     Communicated with nursing prior to session.  Patient found supine with    upon PT entry to room.    General Precautions: Standard,     Orthopedic Precautions:    Braces:       Exams:  · RLE ROM: WFL  · RLE Strength: WFL  · LLE ROM: WFL  · LLE Strength: WFL    Functional Mobility:  · Bed Mobility:     · Supine to Sit: minimum assistance  · Transfers:     · Sit to Stand:  minimum assistance with 4 wheeled walker  · Gait: Patient ambulated 110 feet with FWW and CGA.  Some unsteadiness with turning      Therapeutic Activities and Exercises:       AM-PAC 6 CLICK MOBILITY  Total Score:19     Patient left supine with call button in reach.    GOALS:   Multidisciplinary Problems     Physical Therapy Goals        Problem: Physical Therapy Goal    Goal Priority Disciplines Outcome Goal Variances Interventions   Physical Therapy Goal     PT, PT/OT Ongoing, Progressing     Description:  Goals to be met by: 2020     Patient will increase functional independence with mobility by performin. Supine to sit with Contact Guard Assistance  2. Sit to stand transfer with Contact Guard Assistance  3. Gait  x 250 feet with Contact Guard Assistance using Rolling Walker.                       History:     Past Medical History:   Diagnosis Date    Angina pectoris     Anxiety     Arthritis     Biliary colic     Cochlear implant in place     Deaf     LEFT EAR    Depression     Heart failure     High cholesterol     History of colon polyps 2018    [place] (hard of hearing)     HEARING AID - RIGHT    Hyperlipidemia     Hypertension     Kidney stone     Kidney stones     Renal stones     Stroke     Trouble in sleeping     Wears glasses        Past Surgical History:   Procedure Laterality Date    CAROTID [facility]ERY [facility]GIOPLASTY  2018    [facility]     CATARACT EXTRACTION Bilateral     CHOLECYSTECTOMY  2014    COLONOSCOPY  2013    Dr. Gillette, 5 year recheck (family history colon cancer)    COLONOSCOPY N/A 3/12/2018    Procedure: COLONOSCOPY;  Surgeon: Garett Go MD;  Location: [facility];  Service: Endoscopy;  Laterality: N/A;    CYSTOSCOPY W/ RETROGRADES Bilateral 10/28/2019    Procedure: CYSTOSCOPY, WITH RETROGRADE PYELOGRAM;  Surgeon: Gretchen Proctor MD;  Location: [facility] OR;  Service: Urology;  Laterality: Bilateral;    CYSTOSCOPY W/ URETERAL STENT PLACEMENT Left 10/28/2019    Procedure: CYSTOSCOPY, WITH URETERAL STENT INSERTION;  Surgeon: Gretchen Proctor MD;   Location[facility];  Service: Urology;  Laterality: Left;    CYSTOSCOPY W/ URETERAL STENT REMOVAL Left 12/2/2019    Procedure: CYSTOSCOPY, WITH URETERAL STENT REMOVAL;  Surgeon: Gretchen Proctor MD;  Location: [facility] OR;  Service: Urology;  Laterality: Left;    EAR MASTOIDECTOMY W/ COCHLEAR IMPLANT W/ LANDMARK      left ear    EXTRACORPOREAL SHOCK WAVE LITHOTRIPSY Left 12/2/2019    Procedure: LITHOTRIPSY, ESWL;  Surgeon: Gretchen Proctor MD;  Location: [facility] OR;  Service: Urology;  Laterality: Left;    EYE SURGERY      FOREIGN BODY REMOVAL Right     glass removed from right foot/repair    FRACTURE SURGERY      right arm x2    LITHOTRIPSY      ROTATOR CUFF REPAIR      Right     SINUS SURGERY      TONSILLECTOMY      VASCULAR SURGERY         Time Tracking:     PT Received On: 02/02/20  PT Start Time: 0930     PT Stop Time: 0955  PT Total Time (min): 25 min     Billable Minutes: Evaluation 10 and Gait Training 15      Vikash Boykin, PT  02/02/2020

## 2020-02-03 PROBLEM — I63.031 CEREBROVASCULAR ACCIDENT (CVA) DUE TO THROMBOSIS OF RIGHT CAROTID ARTERY: Status: ACTIVE | Noted: 2020-02-03

## 2020-02-03 LAB
ALBUMIN SERPL BCP-MCNC: 4 G/DL (ref 3.5–5.2)
ALP SERPL-CCNC: 66 U/L (ref 55–135)
ALT SERPL W/O P-5'-P-CCNC: 16 U/L (ref 10–44)
ANION GAP SERPL CALC-SCNC: 10 MMOL/L (ref 8–16)
AORTIC ROOT ANNULUS: 2.81 CM
AORTIC VALVE CUSP SEPERATION: 1.7 CM
AST SERPL-CCNC: 16 U/L (ref 10–40)
AV INDEX (PROSTH): 0.65
AV MEAN GRADIENT: 6 MMHG
AV PEAK GRADIENT: 10 MMHG
AV VALVE AREA: 2.58 CM2
AV VELOCITY RATIO: 0.56
BASOPHILS # BLD AUTO: 0.02 K/UL (ref 0–0.2)
BASOPHILS NFR BLD: 0.3 % (ref 0–1.9)
BILIRUB SERPL-MCNC: 0.9 MG/DL (ref 0.1–1)
BSA FOR ECHO PROCEDURE: 1.81 M2
BUN SERPL-MCNC: 17 MG/DL (ref 8–23)
CALCIUM SERPL-MCNC: 9.5 MG/DL (ref 8.7–10.5)
CHLORIDE SERPL-SCNC: 107 MMOL/L (ref 95–110)
CO2 SERPL-SCNC: 24 MMOL/L (ref 23–29)
CREAT SERPL-MCNC: 1.5 MG/DL (ref 0.5–1.4)
CV ECHO LV RWT: 0.58 CM
DIFFERENTIAL METHOD: NORMAL
DOP CALC AO PEAK VEL: 1.56 M/S
DOP CALC AO VTI: 28.19 CM
DOP CALC LVOT AREA: 4 CM2
DOP CALC LVOT DIAMETER: 2.25 CM
DOP CALC LVOT PEAK VEL: 0.87 M/S
DOP CALC LVOT STROKE VOLUME: 72.61 CM3
DOP CALCLVOT PEAK VEL VTI: 18.27 CM
E WAVE DECELERATION TIME: 224.81 MSEC
E/A RATIO: 0.77
E/E' RATIO: 13.4 M/S
ECHO LV POSTERIOR WALL: 1.16 CM (ref 0.6–1.1)
EOSINOPHIL # BLD AUTO: 0.1 K/UL (ref 0–0.5)
EOSINOPHIL NFR BLD: 1.4 % (ref 0–8)
ERYTHROCYTE [DISTWIDTH] IN BLOOD BY AUTOMATED COUNT: 13.2 % (ref 11.5–14.5)
EST. GFR  (AFRICAN AMERICAN): 55 ML/MIN/1.73 M^2
EST. GFR  (NON AFRICAN AMERICAN): 47 ML/MIN/1.73 M^2
FRACTIONAL SHORTENING: 33 % (ref 28–44)
GLUCOSE SERPL-MCNC: 130 MG/DL (ref 70–110)
HCT VFR BLD AUTO: 43.3 % (ref 40–54)
HGB BLD-MCNC: 14.1 G/DL (ref 14–18)
HR TR ECHO: 72 BPM
IMM GRANULOCYTES # BLD AUTO: 0.04 K/UL (ref 0–0.04)
INTERVENTRICULAR SEPTUM: 1.18 CM (ref 0.6–1.1)
IVC PROX: 1.6 CM
IVRT: 0.09 MSEC
LEFT ATRIUM SIZE: 3.3 CM
LEFT INTERNAL DIMENSION IN SYSTOLE: 2.67 CM (ref 2.1–4)
LEFT VENTRICLE MASS INDEX: 89 G/M2
LEFT VENTRICULAR INTERNAL DIMENSION IN DIASTOLE: 4 CM (ref 3.5–6)
LEFT VENTRICULAR MASS: 159.38 G
LV LATERAL E/E' RATIO: 13.4 M/S
LV SEPTAL E/E' RATIO: 13.4 M/S
LYMPHOCYTES # BLD AUTO: 2.8 K/UL (ref 1–4.8)
LYMPHOCYTES NFR BLD: 36.7 % (ref 18–48)
MCH RBC QN AUTO: 28.4 PG (ref 27–31)
MCHC RBC AUTO-ENTMCNC: 32.6 G/DL (ref 32–36)
MCV RBC AUTO: 87 FL (ref 82–98)
MONOCYTES # BLD AUTO: 0.7 K/UL (ref 0.3–1)
MONOCYTES NFR BLD: 9.3 % (ref 4–15)
MV A" WAVE DURATION": 159 MSEC
MV MEAN GRADIENT: 1 MMHG
MV PEAK A VEL: 0.87 M/S
MV PEAK E VEL: 0.67 M/S
MV STENOSIS PRESSURE HALF TIME: 53 MS
MV VALVE AREA P 1/2 METHOD: 4.15 CM2
NEUTROPHILS # BLD AUTO: 4 K/UL (ref 1.8–7.7)
NEUTROPHILS NFR BLD: 51.8 % (ref 38–73)
NRBC BLD-RTO: 0 /100 WBC
PLATELET # BLD AUTO: 230 K/UL (ref 150–350)
PMV BLD AUTO: 9.2 FL (ref 9.2–12.9)
POCT GLUCOSE: 104 MG/DL (ref 70–110)
POCT GLUCOSE: 104 MG/DL (ref 70–110)
POTASSIUM SERPL-SCNC: 3.7 MMOL/L (ref 3.5–5.1)
PROT SERPL-MCNC: 7.2 G/DL (ref 6–8.4)
PULM VEIN A" WAVE DURATION": 111 MSEC
PULM VEIN S/D RATIO: 1.57
PV PEAK D VEL: 0.28 M/S
PV PEAK S VEL: 0.44 M/S
PV PEAK VELOCITY: 0.82 CM/S
RA PRESSURE: 3 MMHG
RBC # BLD AUTO: 4.97 M/UL (ref 4.6–6.2)
RIGHT VENTRICULAR END-DIASTOLIC DIMENSION: 4.4 CM
SINUS: 3 CM
SODIUM SERPL-SCNC: 141 MMOL/L (ref 136–145)
TDI LATERAL: 0.05 M/S
TDI SEPTAL: 0.05 M/S
TDI: 0.05 M/S
TRICUSPID ANNULAR PLANE SYSTOLIC EXCURSION: 2.66 CM
WBC # BLD AUTO: 7.72 K/UL (ref 3.9–12.7)

## 2020-02-03 PROCEDURE — 85025 COMPLETE CBC W/AUTO DIFF WBC: CPT | Mod: HCNC

## 2020-02-03 PROCEDURE — 36415 COLL VENOUS BLD VENIPUNCTURE: CPT | Mod: HCNC

## 2020-02-03 PROCEDURE — 80053 COMPREHEN METABOLIC PANEL: CPT | Mod: HCNC

## 2020-02-03 PROCEDURE — 95816 EEG AWAKE AND DROWSY: CPT | Mod: HCNC

## 2020-02-03 PROCEDURE — 20000000 HC ICU ROOM: Mod: HCNC

## 2020-02-03 PROCEDURE — 94761 N-INVAS EAR/PLS OXIMETRY MLT: CPT | Mod: HCNC

## 2020-02-03 PROCEDURE — 25000003 PHARM REV CODE 250: Mod: HCNC | Performed by: NURSE PRACTITIONER

## 2020-02-03 PROCEDURE — 25000003 PHARM REV CODE 250: Mod: HCNC | Performed by: INTERNAL MEDICINE

## 2020-02-03 RX ADMIN — ROSUVASTATIN CALCIUM 40 MG: 20 TABLET, FILM COATED ORAL at 09:02

## 2020-02-03 RX ADMIN — COLCHICINE 0.6 MG: 0.6 CAPSULE ORAL at 09:02

## 2020-02-03 RX ADMIN — CARVEDILOL 6.25 MG: 6.25 TABLET, FILM COATED ORAL at 08:02

## 2020-02-03 RX ADMIN — AMLODIPINE BESYLATE 10 MG: 5 TABLET ORAL at 09:02

## 2020-02-03 RX ADMIN — TAMSULOSIN HYDROCHLORIDE 0.4 MG: 0.4 CAPSULE ORAL at 09:02

## 2020-02-03 RX ADMIN — CARVEDILOL 6.25 MG: 6.25 TABLET, FILM COATED ORAL at 09:02

## 2020-02-03 RX ADMIN — CLOPIDOGREL BISULFATE 75 MG: 75 TABLET ORAL at 09:02

## 2020-02-03 RX ADMIN — ASPIRIN 81 MG: 81 TABLET, COATED ORAL at 09:02

## 2020-02-03 RX ADMIN — BUPROPION HYDROCHLORIDE 150 MG: 150 TABLET, FILM COATED, EXTENDED RELEASE ORAL at 09:02

## 2020-02-03 NOTE — PROGRESS NOTES
"[facility]  Neurology  Progress Note     Patient Name: Buddy Park  MRN: [id number]  Admission Date: 2/1/2020  Hospital Length of Stay: 0 days  Code Status: Full Code   Attending Provider: Jose Flaherty MD   Consulting Provider: Mandy Goddard DNP  Primary Care Physician: Buddy Chatman MD  Principal Problem:<principal problem not specified>    Consults  Subjective:         HPI: Buddy Park is a 69 y.o. male with PMHx of HTN, HLD,  who presented to the ED for evaluation of left sided weakness and left facial droop that was noticed this morning by his spouse. However on discussion with patient he mentions he himself had started noticing symptoms of left arm weakness since yesterday morning and had difficulty writing. His speech as per him is baseline as he has a speech deficit from prio . Patient reports onset of fatigue x1 week ago. The spouse endorses noticing the patient "dragging his left foot" this morning, prompting her to bring him to the ED.He is a nonsmoker.He does endorse non compliance to his medications in the last 2 months including his antihypertensive.Patient mentions left carotid neck surgery ~x1-1.5 years ago. Patient has no other medical concerns or complaints at this moment. SHx includes Ear mastoidectomy w/ cochlear implant w/ landmark and Carotid angioplasty.  Patient admitted for work up of possible stroke with neurology consult.     INTERVAL HISTORY:  Patient seen and examined in room this morning with Dr. Clayton Whalen. On exam patient noted to have left facial droop, LUE and LLE Drift (LLE drift is new). He is alert and oriented to person, place, time, and situation. He is ab  On exam patient noted to have left facial droop, LUE Drift. He is alert and oriented to person, place, time, and situation. He is able to follow commands and responds appropriately. Initially, this morning patient's symptoms were stable. Dr. Clayton Whalen and I did not appreciate any worsening of " symptoms. Later in the morning, patient's LUE weakness worsened. Hospital Medicine ordered repeat CT which showed no hemorrhage but area representing ischemic injury. Additionally, patient had syncopal episode while attempting to have bowel movement.  Dr. Clayton Whalen aware. We will repeat CT in the morning and patient is currently receiving EEG. We will also add Plavix 75mg daily in addition to the ASA 81mg daily. Patient instructed to remain hydrated. Will continue to monitor.     EEG: normal     CTH w/o contrast:  1.   There is no intracranial hemorrhage.  There has been mild interval progression of indistinct hypodensity in the right frontal white matter extending to the corona radiata consistent with interval progression of nonhemorrhagic ischemic change when compared to the recent studies.  There is no mass effect or midline shift.  2.  There is a chronic infarction in the right basal ganglia.  3.  There is significant artifact related to left-sided cochlear implant.    CUS   No evidence of a hemodynamically significant carotid bifurcation stenosis.  Significant improvement in the left carotid artery consistent with prior left carotid endarterectomy.    CTA head and neck:   40% stenosis identified at the origin of the right internal carotid artery secondary to plaque.  Significant stenosis is not seen on the left.  Otherwise negative CTA of the head and neck.    TTE with bubble: preserved LVEF, normal sized left atrium and negative bubble study.     Past Medical History:   Diagnosis Date    Angina pectoris     Anxiety     Arthritis     Biliary colic     Cochlear implant in place     Deaf     LEFT EAR    Depression     Heart failure     High cholesterol     History of colon polyps 2/20/2018    [place] (hard of hearing)     HEARING AID - RIGHT    Hyperlipidemia     Hypertension     Kidney stone     Kidney stones     Renal stones     Stroke     Trouble in sleeping     Wears glasses        Past  Surgical History:   Procedure Laterality Date    CAROTID [facility]  06/2018    [facility]     CATARACT EXTRACTION Bilateral     CHOLECYSTECTOMY  2-6-2014    COLONOSCOPY  1/9/2013    Dr. Gillette, 5 year recheck (family history colon cancer)    COLONOSCOPY N/A 3/12/2018    Procedure: COLONOSCOPY;  Surgeon: Garett Go MD;  Location: [facility] ENDO;  Service: Endoscopy;  Laterality: N/A;    CYSTOSCOPY W/ RETROGRADES Bilateral 10/28/2019    Procedure: CYSTOSCOPY, WITH RETROGRADE PYELOGRAM;  Surgeon: Gretchen Proctor MD;  Location: [facility] OR;  Service: Urology;  Laterality: Bilateral;    CYSTOSCOPY W/ URETERAL STENT PLACEMENT Left 10/28/2019    Procedure: CYSTOSCOPY, WITH URETERAL STENT INSERTION;  Surgeon: Gretchen Proctor MD;  Location: [facility] OR;  Service: Urology;  Laterality: Left;    CYSTOSCOPY W/ URETERAL STENT REMOVAL Left 12/2/2019    Procedure: CYSTOSCOPY, WITH URETERAL STENT REMOVAL;  Surgeon: Gretchen Proctor MD;  Location: [facility] OR;  Service: Urology;  Laterality: Left;    EAR MASTOIDECTOMY W/ COCHLEAR IMPLANT W/ LANDMARK      left ear    EXTRACORPOREAL SHOCK WAVE LITHOTRIPSY Left 12/2/2019    Procedure: LITHOTRIPSY, ESWL;  Surgeon: Gretchen Proctor MD;  Location: [facility] OR;  Service: Urology;  Laterality: Left;    EYE SURGERY      FOREIGN BODY REMOVAL Right     glass removed from right foot/repair    FRACTURE SURGERY      right arm x2    LITHOTRIPSY      ROTATOR CUFF REPAIR      Right     SINUS SURGERY      TONSILLECTOMY      VASCULAR SURGERY         Review of patient's allergies indicates:   Allergen Reactions    Bee pollens Shortness Of Breath     WASP, BEE, ANT AND CATERPILLER STINGS    Hydrochlorothiazide Shortness Of Breath and Rash    Milk containing products Diarrhea    Metoprolol Rash       Current Medications:     Current Facility-Administered Medications on File Prior to Encounter   Medication    lactated ringers infusion     Current Outpatient Medications on File Prior to  Encounter   Medication Sig    amLODIPine (NORVASC) 10 MG tablet Take 1 tablet (10 mg total) by mouth once daily.    aspirin (ECOTRIN) 81 MG EC tablet Take 81 mg by mouth once daily.    b complex vitamins tablet Take 1 tablet by mouth once daily.    buPROPion (WELLBUTRIN XL) 150 MG TB24 tablet Take 1 tablet (150 mg total) by mouth once daily. In the morning    carvedilol (COREG) 6.25 MG tablet Take 1 tablet (6.25 mg total) by mouth 2 (two) times daily with meals.    cefUROXime (CEFTIN) 500 MG tablet Take 1 tablet (500 mg total) by mouth 2 (two) times daily.    cholestyramine-aspartame (QUESTRAN LIGHT) 4 gram Powd Take 1 Scoop by mouth once daily.    colchicine (COLCRYS) 0.6 mg tablet Take 1 tablet (0.6 mg total) by mouth once daily.    EPINEPHrine (EPIPEN) 0.3 mg/0.3 mL AtIn Inject into the muscle once.    HYDROcodone-acetaminophen (NORCO) 5-325 mg per tablet Take 1 tablet by mouth every 4 to 6 hours as needed for Pain.    ibuprofen (ADVIL,MOTRIN) 600 MG tablet Take 1 tablet (600 mg total) by mouth every 6 (six) hours as needed for Pain.    metFORMIN (GLUCOPHAGE) 500 MG tablet TAKE 1 TABLET BY MOUTH TWICE DAILY WITH MEALS    nitroGLYCERIN (NITROSTAT) 0.4 MG SL tablet Place 1 tablet (0.4 mg total) under the tongue every 5 (five) minutes as needed for Chest pain.    phenazopyridine (PYRIDIUM) 100 MG tablet Take 2 tablets (200 mg total) by mouth 3 (three) times daily as needed for Pain.    rosuvastatin (CRESTOR) 40 MG Tab Take 1 tablet (40 mg total) by mouth once daily.    tamsulosin (FLOMAX) 0.4 mg Cap Take 1 capsule (0.4 mg total) by mouth once daily.      Family History     Problem Relation (Age of Onset)    Alcohol abuse Brother, Brother    Arthritis Father    Cancer Mother, Brother, Paternal Uncle    Early death Daughter    Gout Father    Heart disease Father, Maternal Grandmother, Paternal Grandmother    Hypertension Father    Kidney disease Father    No Known Problems Sister, Son, Son    Stroke  Maternal Grandfather        Tobacco Use    Smoking status: Never Smoker    Smokeless tobacco: Never Used   Substance and Sexual Activity    Alcohol use: Yes     Comment: once every two months    Drug use: No    Sexual activity: Yes     Review of Systems   As per HPI   Objective:     Vital Signs (Most Recent):  Temp: 97.9 °F (36.6 °C) (02/03/20 0400)  Pulse: 66 (02/03/20 0600)  Resp: 15 (02/03/20 0600)  BP: 132/67 (02/03/20 0600)  SpO2: 95 % (02/03/20 0600) Vital Signs (24h Range):  Temp:  [97.4 °F (36.3 °C)-97.9 °F (36.6 °C)] 97.9 °F (36.6 °C)  Pulse:  [64-95] 66  Resp:  [15-43] 15  SpO2:  [93 %-99 %] 95 %  BP: (116-168)/(55-77) 132/67     Weight: 71.8 kg (158 lb 3 oz)  Body mass index is 26.32 kg/m².    Physical Exam  Constitutional: He is oriented to person, place, and time. He appears well-developed and well-nourished.   HENT:   Head: Normocephalic and atraumatic.   Eyes: Pupils are equal, round, and reactive to light. EOM are normal.   Neck: Normal range of motion. Neck supple.   Cardiovascular: Normal rate, regular rhythm, normal heart sounds and intact distal pulses.   Pulmonary/Chest: Effort normal and breath sounds normal.   Abdominal: Soft. Bowel sounds are normal.   Musculoskeletal: Normal range of motion.   Neurological: He is alert and oriented to person, place, and time. A cranial nerve deficit is present. He has a normal Finger-Nose-Finger Test.   Reflex Scores:       Tricep reflexes are 2+ on the right side and 2+ on the left side.       Bicep reflexes are 2+ on the right side and 2+ on the left side.       Brachioradialis reflexes are 2+ on the right side and 2+ on the left side.       Patellar reflexes are 2+ on the right side and 2+ on the left side.       Achilles reflexes are 2+ on the right side and 2+ on the left side.  Skin: Skin is warm. Capillary refill takes less than 2 seconds.   Psychiatric: He has a normal mood and affect. His behavior is normal. Judgment and thought content normal.  His speech is slurred.         NEUROLOGICAL EXAMINATION:      MENTAL STATUS   Oriented to person, place, and time.   Follows 1 step commands.   Attention: normal. Concentration: normal.   Speech: slurred (Patient reports this is his baseline)  Level of consciousness: alert  Knowledge: good.   Able to name object. Able to repeat.      CRANIAL NERVES      CN II   Visual fields full to confrontation.   Right visual field deficit: none  Left visual field deficit: none      CN III, IV, VI   Pupils are equal, round, and reactive to light.  Extraocular motions are normal.   CN III: no CN III palsy  CN VI: no CN VI palsy  Diplopia: none     CN V   Left facial sensation deficit: forehead and cheeks     CN VII   Left facial weakness: central     CN VIII   Hearing: impaired (Has hearing aid in left ear and cochlear implant in right ear)     CN IX, X   CN IX normal.      CN XI   CN XI normal.      CN XII   CN XII normal.      MOTOR EXAM   Muscle bulk: normal  Overall muscle tone: normal  Right arm tone: normal  Left arm tone: decreased  Right arm pronator drift: absent  Left arm pronator drift: present  Right leg tone: normal  Left leg tone:decreased    Strength   Strength 5/5 except as noted.   Left deltoid: 4/5  Left biceps: 4/5  Left triceps: 4/5  LLE: 4/5     REFLEXES      Reflexes   Right brachioradialis: 2+  Left brachioradialis: 2+  Right biceps: 2+  Left biceps: 2+  Right triceps: 2+  Left triceps: 2+  Right patellar: 2+  Left patellar: 2+  Right achilles: 2+  Left achilles: 2+  Right plantar: normal  Left plantar: normal     SENSORY EXAM   Left arm light touch: decreased from elbow  Left leg light touch: decreased from knee     GAIT AND COORDINATION      Coordination   Finger to nose coordination: normal     Tremor   Resting tremor: absent  Intention tremor: absent  Action tremor: absent     NIH Stroke Scale:     Level of Consciousness: 0 - alert  LOC Questions: 0 - answers both correctly     Best Gaze: 0 -  normal  Visual: 0 - no visual loss  Facial Palsy: 1 - minor  Motor Left Arm: 2 - can't resist gravity  Motor Right Arm: 0 - no drift  Motor Left Le - no drift  Motor Right Le - no drift  Limb Ataxia: 0 - present in one limb  Sensory: 0 - mild to moderate loss  Best Language: 1 - mild to moderate aphasia  Dysarthria: 0 - normal articulation  Extinction and Inattention: 0 - no neglect  NIH Stroke Scale Total: 6       Significant Labs:   Hemoglobin A1c:   Recent Labs   Lab 20  1122   HGBA1C 5.5     CBC:   Recent Labs   Lab 20  0338   WBC 6.37 7.72   HGB 14.7 14.1   HCT 43.1 43.3    230     CMP:   Recent Labs   Lab 20  0338    130*    141   K 3.5 3.7    107   CO2 23 24   BUN 12 17   CREATININE 1.2 1.5*   CALCIUM 9.3 9.5   PROT 7.2 7.2   ALBUMIN 4.1 4.0   BILITOT 0.9 0.9   ALKPHOS 63 66   AST 18 16   ALT 21 16   ANIONGAP 11 10   EGFRNONAA >60 47*     All pertinent lab results from the past 24 hours have been reviewed.    Significant Imaging: I have reviewed all pertinent imaging results/findings within the past 24 hours.    Assessment and Plan:    1. CVA   -Repeat CT in AM 2/3/2020- noted   -CTA Head and Neck - noted   -TTE with bubble study- noted   -Continue ASA 81mg daily  -Start Plavix 75mg daily  -Recommend Statin for secondary stroke prevention  -PT/OT/ST  -Frequent Neuro Checks  -Fall Precautions     2. Syncope  -as noted above   -EEG normal study   -Frequent Neuro checks  -Fall Precautions     3. HTN  -IM to Manage     4. Hyperlipidemia  -Recommend statin for secondary stroke prevention  -IM to Manage     Patient to follow up with [facility] at [phone] within 2 weeks from discharge.     Stroke education was provided including stroke risk factors modification and any acute neurological changes including weakness, confusion, visual changes to come straight to the ER. Patient instructed no driving until follow up  appointment in clinic.      All side effects of new medications were discussed with patient and/or next of kin and all questions were answered.                     Active Diagnoses:    Diagnosis Date Noted POA    Weakness of left upper and lower extremity [R29.898] 02/01/2020 Yes    BMI 26.0-26.9,adult [Z68.26] 03/19/2019 Not Applicable    Carotid disease, bilateral [I73.9] 02/09/2017 Yes    Deafness [H91.90] 04/12/2016 Yes    HTN (hypertension) [I10] 11/03/2014 Yes    Hyperlipidemia [E78.5]  Yes      Problems Resolved During this Admission:       VTE Risk Mitigation (From admission, onward)         Ordered     IP VTE LOW RISK PATIENT  Once      02/01/20 1320                Thank you for your consult. I will follow-up with patient. Please contact us if you have any additional questions.    Mandy Goddard, BETTY  Neurology  [facility]    I, Dr. Clayton Whalen, have personally seen and examined the patient with my advanced provider and agree with above. I personally did a focused exam, and reviewed all necessary clinical information. I discussed my management plan with my NP and agree with above. Stable f/u EEG. Likely subcortical fluctuations.

## 2020-02-03 NOTE — PT/OT/SLP PROGRESS
Speech Language Pathology      Buddy Park  MRN: [id number]    Speech Language Pathology/Discharge    Pt transferred to ICU. SLP will need new orders given change in status. Please re consult when appropriate.    Judy Carroll, NITESH-SLP

## 2020-02-03 NOTE — NURSING
"Code blue called to floor.Upon arriving to room staff was assisting patient back to bed from [facility]. Zoll monitor applied. VSS. SR with HR 70s. SBP 130s. .  Patient appeared drowsy but asking "did I pass out?" No CPR initiated. Transferred to ICU via bed with cardiac monitor. Family is at bedside.   "

## 2020-02-03 NOTE — PROGRESS NOTES
Patient stepped down and transported to CT by CT staff x1 via bed. Family at bedside for transfer.

## 2020-02-03 NOTE — PT/OT/SLP DISCHARGE
Physical Therapy Discharge Summary    Name: Buddy Park  MRN: [id number]   Principal Problem: <principal problem not specified>     Patient Discharged from acute Physical Therapy on 2020.  Please refer to prior PT noted date on 2020 for functional status.     Assessment:     pt transferred to ICU    Objective:     GOALS:   Multidisciplinary Problems     Physical Therapy Goals        Problem: Physical Therapy Goal    Goal Priority Disciplines Outcome Goal Variances Interventions   Physical Therapy Goal     PT, PT/OT Ongoing, Progressing     Description:  Goals to be met by: 2020     Patient will increase functional independence with mobility by performin. Supine to sit with Contact Guard Assistance  2. Sit to stand transfer with Contact Guard Assistance  3. Gait  x 250 feet with Contact Guard Assistance using Rolling Walker.                       Reasons for Discontinuation of Therapy Services  transferred to ICU      Plan:     Patient Discharged to: Please reconsult once appropriate.    Chelita Cordon, PT  2/3/2020

## 2020-02-03 NOTE — PLAN OF CARE
Shift goals discussed with patient with time given for questions and answers. Safety maintained, up to bedside chair with assist. BM X 1 abdiel well. Returned to bed with assist.

## 2020-02-04 LAB
ALBUMIN SERPL BCP-MCNC: 3.9 G/DL (ref 3.5–5.2)
ALP SERPL-CCNC: 64 U/L (ref 55–135)
ALT SERPL W/O P-5'-P-CCNC: 15 U/L (ref 10–44)
ANION GAP SERPL CALC-SCNC: 8 MMOL/L (ref 8–16)
AST SERPL-CCNC: 15 U/L (ref 10–40)
BASOPHILS # BLD AUTO: 0.03 K/UL (ref 0–0.2)
BASOPHILS NFR BLD: 0.4 % (ref 0–1.9)
BILIRUB SERPL-MCNC: 0.9 MG/DL (ref 0.1–1)
BUN SERPL-MCNC: 18 MG/DL (ref 8–23)
CALCIUM SERPL-MCNC: 9.2 MG/DL (ref 8.7–10.5)
CHLORIDE SERPL-SCNC: 108 MMOL/L (ref 95–110)
CO2 SERPL-SCNC: 24 MMOL/L (ref 23–29)
CREAT SERPL-MCNC: 1.5 MG/DL (ref 0.5–1.4)
DIFFERENTIAL METHOD: ABNORMAL
EOSINOPHIL # BLD AUTO: 0.1 K/UL (ref 0–0.5)
EOSINOPHIL NFR BLD: 1.7 % (ref 0–8)
ERYTHROCYTE [DISTWIDTH] IN BLOOD BY AUTOMATED COUNT: 13 % (ref 11.5–14.5)
EST. GFR  (AFRICAN AMERICAN): 55 ML/MIN/1.73 M^2
EST. GFR  (NON AFRICAN AMERICAN): 47 ML/MIN/1.73 M^2
GLUCOSE SERPL-MCNC: 110 MG/DL (ref 70–110)
HCT VFR BLD AUTO: 43.1 % (ref 40–54)
HGB BLD-MCNC: 14.4 G/DL (ref 14–18)
IMM GRANULOCYTES # BLD AUTO: 0.02 K/UL (ref 0–0.04)
LYMPHOCYTES # BLD AUTO: 3 K/UL (ref 1–4.8)
LYMPHOCYTES NFR BLD: 42.9 % (ref 18–48)
MCH RBC QN AUTO: 29.3 PG (ref 27–31)
MCHC RBC AUTO-ENTMCNC: 33.4 G/DL (ref 32–36)
MCV RBC AUTO: 88 FL (ref 82–98)
MONOCYTES # BLD AUTO: 0.7 K/UL (ref 0.3–1)
MONOCYTES NFR BLD: 9.8 % (ref 4–15)
NEUTROPHILS # BLD AUTO: 3.2 K/UL (ref 1.8–7.7)
NEUTROPHILS NFR BLD: 44.9 % (ref 38–73)
NRBC BLD-RTO: 0 /100 WBC
PLATELET # BLD AUTO: 201 K/UL (ref 150–350)
PMV BLD AUTO: 8.7 FL (ref 9.2–12.9)
POTASSIUM SERPL-SCNC: 3.6 MMOL/L (ref 3.5–5.1)
PROT SERPL-MCNC: 7 G/DL (ref 6–8.4)
RBC # BLD AUTO: 4.91 M/UL (ref 4.6–6.2)
SODIUM SERPL-SCNC: 140 MMOL/L (ref 136–145)
WBC # BLD AUTO: 7.06 K/UL (ref 3.9–12.7)

## 2020-02-04 PROCEDURE — 63600175 PHARM REV CODE 636 W HCPCS: Mod: HCNC | Performed by: INTERNAL MEDICINE

## 2020-02-04 PROCEDURE — 97168 OT RE-EVAL EST PLAN CARE: CPT | Mod: HCNC

## 2020-02-04 PROCEDURE — 25000003 PHARM REV CODE 250: Mod: HCNC | Performed by: NURSE PRACTITIONER

## 2020-02-04 PROCEDURE — 80053 COMPREHEN METABOLIC PANEL: CPT | Mod: HCNC

## 2020-02-04 PROCEDURE — 85025 COMPLETE CBC W/AUTO DIFF WBC: CPT | Mod: HCNC

## 2020-02-04 PROCEDURE — 97164 PT RE-EVAL EST PLAN CARE: CPT | Mod: HCNC

## 2020-02-04 PROCEDURE — 20000000 HC ICU ROOM: Mod: HCNC

## 2020-02-04 PROCEDURE — 25000003 PHARM REV CODE 250: Mod: HCNC | Performed by: INTERNAL MEDICINE

## 2020-02-04 PROCEDURE — A4216 STERILE WATER/SALINE, 10 ML: HCPCS | Mod: HCNC | Performed by: INTERNAL MEDICINE

## 2020-02-04 PROCEDURE — 97165 OT EVAL LOW COMPLEX 30 MIN: CPT | Mod: HCNC

## 2020-02-04 PROCEDURE — 25000003 PHARM REV CODE 250: Mod: HCNC | Performed by: HOSPITALIST

## 2020-02-04 PROCEDURE — 36415 COLL VENOUS BLD VENIPUNCTURE: CPT | Mod: HCNC

## 2020-02-04 PROCEDURE — 97530 THERAPEUTIC ACTIVITIES: CPT | Mod: HCNC

## 2020-02-04 RX ADMIN — COLCHICINE 0.6 MG: 0.6 CAPSULE ORAL at 08:02

## 2020-02-04 RX ADMIN — AMLODIPINE BESYLATE 10 MG: 5 TABLET ORAL at 08:02

## 2020-02-04 RX ADMIN — CARVEDILOL 6.25 MG: 6.25 TABLET, FILM COATED ORAL at 08:02

## 2020-02-04 RX ADMIN — ROSUVASTATIN CALCIUM 40 MG: 20 TABLET, FILM COATED ORAL at 08:02

## 2020-02-04 RX ADMIN — ONDANSETRON 4 MG: 2 INJECTION INTRAMUSCULAR; INTRAVENOUS at 04:02

## 2020-02-04 RX ADMIN — BUPROPION HYDROCHLORIDE 150 MG: 150 TABLET, FILM COATED, EXTENDED RELEASE ORAL at 08:02

## 2020-02-04 RX ADMIN — ASPIRIN 81 MG: 81 TABLET, COATED ORAL at 08:02

## 2020-02-04 RX ADMIN — CLOPIDOGREL BISULFATE 75 MG: 75 TABLET ORAL at 08:02

## 2020-02-04 RX ADMIN — TAMSULOSIN HYDROCHLORIDE 0.4 MG: 0.4 CAPSULE ORAL at 08:02

## 2020-02-04 RX ADMIN — Medication 10 ML: at 08:02

## 2020-02-04 NOTE — PLAN OF CARE
Problem: Occupational Therapy Goal  Goal: Occupational Therapy Goal  Description  Goals to be met by: 2/16/20     Patient will increase functional independence with ADLs by performing:    Patient will tolerate sitting EOB x 10 minutes with CGA to SBA to maintain midline  Grooming EOB with Min (A)  UB dressing EOB with Mod (A)      Outcome: Ongoing, Progressing  OT POC initiated and established in collaboration with patient and spouse.

## 2020-02-04 NOTE — PLAN OF CARE
Tolerating diet. CT done today. Unable to move left arm. Moves left leg some but weak. Left facial droop present. Alert and oriented and reports being bored with being in the hospital.Is pleasant and likes to joke with staff and family. Very pleasant family at bedside throughout the day. Voids per urinal with minimal assistance. PT/OT scheduled for AM. Skin intact. Safety maintained.

## 2020-02-04 NOTE — PT/OT/SLP RE-EVAL
"Physical Therapy Re-evaluation    Patient Name:  Buddy Park   MRN:  [id number]    Recommendations:     Discharge Recommendations:  rehabilitation facility   Discharge Equipment Recommendations: (TBD at next level of care)   Barriers to discharge: None    Assessment:     Buddy Park is a 69 y.o. male admitted with a medical diagnosis of Cerebrovascular accident (CVA) due to thrombosis of right carotid artery.  He presents with the following impairments/functional limitations:  weakness, impaired self care skills, impaired balance, decreased coordination, decreased safety awareness, impaired coordination, decreased upper extremity function, impaired functional mobilty, impaired endurance, decreased lower extremity function. Patient is very anxious to participate with PT. MMT 2+/5 knee flexion and 0/5 with PF/DF and hip flexion. He requires MaxA x2 for supine <> sit transfer. He requires Mod-MaxA for sitting balance. Instructed patient to use his right UE to support himself, but patient was unable to adhere. He will benefit from rehab placement upon D/C.     Rehab Prognosis:  Fair; patient would benefit from acute skilled PT services to address these deficits and reach maximum level of function.      Recent Surgery: * No surgery found *      Plan:     During this hospitalization, patient to be seen daily to address the above listed problems via gait training, therapeutic activities, therapeutic exercises  · Plan of Care Expires:  03/04/20   Plan of Care Reviewed with: patient, spouse    Subjective     Communicated with LAURA Freitas prior to session.  Patient found HOB elevated with blood pressure cuff, telemetry, pulse ox (continuous) upon PT entry to room, agreeable to evaluation.      Chief Complaint: "I've been waiting for you. I told them to put me first on your list."  Patient comments/goals: walk  Pain/Comfort:  · Pain Rating 1: 0/10    Patients cultural, spiritual, Quaker conflicts given the current " situation:        Objective:     Patient found with: blood pressure cuff, telemetry, pulse ox (continuous)     General Precautions: Standard, fall   Orthopedic Precautions:N/A   Braces: N/A     Exams:  · Cognitive Exam:  Patient is oriented to Person, Place, Time and Situation  · RUE ROM: WFL  · LUE ROM: Deficits: flaccid with no UE activation noted with  strength or shoulder flexion  · RLE ROM: WNL  · RLE Strength: WNL  · LLE Strength: Deficits: 2+/5 knee flexion, no activation of PF/DF, hip flexors    Functional Mobility:  · Bed Mobility:     · Supine to Sit: maximal assistance and of 2 persons  · Sit to Supine: maximal assistance and of 2 persons  · Balance: sitting: poor    AM-PAC 6 CLICK MOBILITY  Total Score:8       Therapeutic Activities and Exercises:   Patient was educated on the importance of safety with transfers, WB through L upper and lower extremities, D/C planning    Patient left HOB elevated with all lines intact, call button in reach, bed alarm on, RN notified and wife present.    GOALS:   Multidisciplinary Problems     Physical Therapy Goals        Problem: Physical Therapy Goal    Goal Priority Disciplines Outcome Goal Variances Interventions   Physical Therapy Goal     PT, PT/OT Ongoing, Progressing     Description:  Goals to be met by: 2020     Patient will increase functional independence with mobility by performin. Supine to sit with Minimal Assistance  2. Sit to stand transfer with Moderate assistance with albin walker   3. Gait  x 10 feet with Moderate assistance using albin walker.   4. Bed to chair transfer with Moderate assistance using albin walker                        History:     Past Medical History:   Diagnosis Date    Angina pectoris     Anxiety     Arthritis     Biliary colic     Cochlear implant in place     Deaf     LEFT EAR    Depression     Heart failure     High cholesterol     History of colon polyps 2018    [place] (hard of hearing)     HEARING AID  - RIGHT    Hyperlipidemia     Hypertension     Kidney stone     Kidney stones     Renal stones     Stroke     Trouble in sleeping     Wears glasses        Past Surgical History:   Procedure Laterality Date    [facility]  [facility]     CATARACT EXTRACTION Bilateral     CHOLECYSTECTOMY  2-6-2014    COLONOSCOPY  1/9/2013    Dr. Gillette, 5 year recheck (family history colon cancer)    COLONOSCOPY N/A 3/12/2018    Procedure: COLONOSCOPY;  Surgeon: Garett Go MD;  Location: [facility] ENDO;  Service: Endoscopy;  Laterality: N/A;    CYSTOSCOPY W/ RETROGRADES Bilateral 10/28/2019    Procedure: CYSTOSCOPY, WITH RETROGRADE PYELOGRAM;  Surgeon: Gretchen Proctor MD;  Location: [facility] OR;  Service: Urology;  Laterality: Bilateral;    CYSTOSCOPY W/ URETERAL STENT PLACEMENT Left 10/28/2019    Procedure: CYSTOSCOPY, WITH URETERAL STENT INSERTION;  Surgeon: Gretchen Proctor MD;  Location: [facility] OR;  Service: Urology;  Laterality: Left;    CYSTOSCOPY W/ URETERAL STENT REMOVAL Left 12/2/2019    Procedure: CYSTOSCOPY, WITH URETERAL STENT REMOVAL;  Surgeon: Gretchen Proctor MD;  Location: [facility] OR;  Service: Urology;  Laterality: Left;    EAR MASTOIDECTOMY W/ COCHLEAR IMPLANT W/ LANDMARK      left ear    EXTRACORPOREAL SHOCK WAVE LITHOTRIPSY Left 12/2/2019    Procedure: LITHOTRIPSY, ESWL;  Surgeon: Gretchen Proctor MD;  Location: [facility] OR;  Service: Urology;  Laterality: Left;    EYE SURGERY      FOREIGN BODY REMOVAL Right     glass removed from right foot/repair    FRACTURE SURGERY      right arm x2    LITHOTRIPSY      ROTATOR CUFF REPAIR      Right     SINUS SURGERY      TONSILLECTOMY      VASCULAR SURGERY         Time Tracking:     PT Received On: 02/04/20  PT Start Time: 0954     PT Stop Time: 1016  PT Total Time (min): 22 min     Billable Minutes: Evaluation 10 and Therapeutic Activity 12      Emmy Trejo, PT  02/04/2020

## 2020-02-04 NOTE — PT/OT/SLP EVAL
Occupational Therapy  Re- Evaluation    Name: Buddy Park  MRN: [id number]  Admitting Diagnosis:  Cerebrovascular accident (CVA) due to thrombosis of right carotid artery      Recommendations:     Discharge Recommendations: rehabilitation facility  Discharge Equipment Recommendations:  other (see comments)(TBD at next level of care)  Barriers to discharge:  Decreased caregiver support, Other (Comment)(spouse works)    Assessment:     Buddy Park is a 69 y.o. male with a medical diagnosis of Cerebrovascular accident (CVA) due to thrombosis of right carotid artery.  He presents with significantly decreased independence with ADL/mobility compared to OT evaluation performed on 2/2/2020. Patient currently demonstrates inability to functional use L UE with strength of 0/5 to 1/5 (proximally) of L UE. Patient able to feed himself with close SBA/CGA with bed in chair position and max verbal cues to clear left pocketing of food (minimal) and to attend to and retrieve food fallen onto lap from left side of mouth. Patient requires Max/Total (A) for toileting and dressing tasks at this time. Performance deficits affecting function: weakness, impaired endurance, impaired sensation, impaired self care skills, impaired functional mobilty, impaired balance, decreased coordination, decreased upper extremity function, decreased lower extremity function, decreased safety awareness, decreased ROM.      Rehab Prognosis: Good; patient would benefit from acute skilled OT services to address these deficits and reach maximum level of function.       Plan:     Patient to be seen 6 x/week to address the above listed problems via self-care/home management, therapeutic activities, therapeutic exercises, neuromuscular re-education  · Plan of Care Expires: 02/25/20  · Plan of Care Reviewed with: patient, spouse    Subjective     Chief Complaint: Left sided weakness   Patient/Family Comments/goals: Patient's spouse would like patient to go to  a facility for intense therapy once discharged from here but does not patient to go to SNF based on what she saw when placing her parents in the past     Occupational Profile:  Living Environment: Patient and spouse just moved into new single story home in [place]; house has threshold step to enter and tub/shower combo  Previous level of function: Independent for all ADL and mobility; no AD; driving   Roles and Routines: 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  Patient found HOB elevated with bed alarm, blood pressure cuff, telemetry, pulse ox (continuous) upon OT entry to room.    General Precautions: Standard, fall, hearing impaired, other (see comments), aspiration(Left inattention; hard of hearing )   Orthopedic Precautions:N/A   Braces: N/A     Occupational Performance:    Bed Mobility:    · N/T    Functional Mobility/Transfers:  · N/T  · Functional Mobility: N/T    Activities of Daily Living:  · Feeding:  stand by assistance and contact guard assistance with bed in chair position to feed himself approximately 75% of meal; patient does require verbal cues to clear left cheek of pocketing (which was minimal) and verbal cues to attend to left side of meal tray   · Grooming: Min (A) with verbal cues as needed for left attention     · Lower Body Dressing: maximal assistance and total assistance from bed level  · Toileting: maximal assistance and total assistance from bed level    Cognitive/Visual Perceptual:  Cognitive/Psychosocial Skills:     -       Oriented to: Person  and Situation   -       Follows Commands/attention:Follows one-step commands  -       Communication: dysarthria  -       Safety awareness/insight to disability: impaired - patient demonstrates poor insight and impulsivity   Visual/Perceptual:      -Impaired  tracking and visual field left    Physical Exam:  Skin integrity: Visible skin intact  Sensation:    -       Impaired  light/touch L UE and proprioception L UE  Motor Planning:    -       Severely impaired  Dominant hand:    -       L UE  Upper Extremity Strength:    -       Right Upper Extremity: AROM WNL  -       Left Upper Extremity: PROM WNL    AMPAC 6 Click ADL:  AMPAC Total Score: 13    Treatment & Education:  - OTR providing education/instruction regarding OT role/POC, safety awareness/fall prevention and use of call button for all OOB activity, use of bed/chair alarms - patient and spouse verbalize/demonstrate understanding and in agreement when appropriate  - Gentle mobilization of metacarpals, carpals, radius and ulna with stretching of the surrounding soft tissues to decrease edema and improve PROM, potentially allowing for increases in active movement, Gentle mobilization and stretching of soft-tissues throughout the shoulder including anterior complex, pec major, and combined internal rotators to allow for increased PROM.  - OTR providing education/instruction regarding proper positioning of L UE (shoulder in 45* ABD and wrist/digits in extension) as well as importance of attention to L environment and L UE/LE especially due to impaired sensation/proprioception - pt/spouse verbalize/demonstrate understanding; OTR providing education/instruction regarding HEP: shoulder shrugs, scapular retraction, self PROM elbow flex/extension, and importance of weight bearing through L UE to facilitate neuromuscular re-education - patient perform 2 sets x 5 reps with rest breaks as needed throughout  - Attempting weight bearing through left palm x 5 from bed in  chair position as 2 ppl required for sitting EOB  - Initiation of discharge planning - discussing discharge options - recommending inpatient rehabilitation  Education:    Patient left HOB elevated with all lines intact, call button in reach, bed alarm on, nurse notified and neurologist  present    GOALS:   Multidisciplinary Problems     Occupational Therapy Goals        Problem: Occupational Therapy Goal    Goal Priority Disciplines Outcome Interventions   Occupational Therapy Goal     OT, PT/OT Ongoing, Progressing    Description:  Goals to be met by: 2/16/20     Patient will increase functional independence with ADLs by performing:    Patient will tolerate sitting EOB x 10 minutes with CGA to SBA to maintain midline  Grooming EOB with Min (A)  UB dressing EOB with Mod (A)                       History:     Past Medical History:   Diagnosis Date    Angina pectoris     Anxiety     Arthritis     Biliary colic     Cochlear implant in place     Deaf     LEFT EAR    Depression     Heart failure     High cholesterol     History of colon polyps 2/20/2018    [place] (hard of hearing)     HEARING AID - RIGHT    Hyperlipidemia     Hypertension     Kidney stone     Kidney stones     Renal stones     Stroke     Trouble in sleeping     Wears glasses        Past Surgical History:   Procedure Laterality Date    CAROTID ARTERY [facility]GIOPLASTY  06/2018    [facility]     CATARACT EXTRACTION Bilateral     CHOLECYSTECTOMY  2-6-2014    COLONOSCOPY  1/9/2013    Dr. Gillette, 5 year recheck (family history colon cancer)    COLONOSCOPY N/A 3/12/2018    Procedure: COLONOSCOPY;  Surgeon: Garett Go MD;  Location: [facility] ENDO;  Service: Endoscopy;  Laterality: N/A;    CYSTOSCOPY W/ RETROGRADES Bilateral 10/28/2019    Procedure: CYSTOSCOPY, WITH RETROGRADE PYELOGRAM;  Surgeon: Gretchen Proctor MD;  Location: [facility] OR;  Service: Urology;  Laterality: Bilateral;    CYSTOSCOPY W/ URETERAL STENT PLACEMENT Left 10/28/2019     Procedure: CYSTOSCOPY, WITH URETERAL STENT INSERTION;  Surgeon: Gretchen Proctor MD;  Location: [facility] OR;  Service: Urology;  Laterality: Left;    CYSTOSCOPY W/ URETERAL STENT REMOVAL Left 12/2/2019    Procedure: CYSTOSCOPY, WITH URETERAL STENT REMOVAL;  Surgeon: Gretchen Protcor MD;  Location: [facility] OR;  Service: Urology;  Laterality: Left;    EAR MASTOIDECTOMY W/ COCHLEAR IMPLANT W/ LANDMARK      left ear    EXTRACORPOREAL SHOCK WAVE LITHOTRIPSY Left 12/2/2019    Procedure: LITHOTRIPSY, ESWL;  Surgeon: Gretchen Proctor MD;  Location: [facility] OR;  Service: Urology;  Laterality: Left;    EYE SURGERY      FOREIGN BODY REMOVAL Right     glass removed from right foot/repair    FRACTURE SURGERY      right arm x2    LITHOTRIPSY      ROTATOR CUFF REPAIR      Right     SINUS SURGERY      TONSILLECTOMY      VASCULAR SURGERY         Time Tracking:     OT Date of Treatment: 02/04/20  OT Start Time: 1145  OT Stop Time: 1216  OT Total Time (min): 31 min    Billable Minutes:Evaluation 20  Self Care/Home Management 11    COLE Vasquez LOTR  2/4/2020

## 2020-02-04 NOTE — SUBJECTIVE & OBJECTIVE
Interval History:  Still with left hemiparesis.  Left arm has no strength.  Left leg, he can lift off the bed but can't hold in mid-air.  CT brain shows expansion of infarction in deep white matter.    Review of Systems   Constitutional: Negative for chills and fever.   Respiratory: Negative for cough and shortness of breath.    Cardiovascular: Negative for chest pain.   Gastrointestinal: Negative for abdominal pain.     Objective:     Vital Signs (Most Recent):  Temp: 98.1 °F (36.7 °C) (02/03/20 1944)  Pulse: 75 (02/03/20 2100)  Resp: (!) 25 (02/03/20 2100)  BP: (!) 156/76 (02/03/20 2100)  SpO2: 97 % (02/03/20 2100) Vital Signs (24h Range):  Temp:  [97.4 °F (36.3 °C)-98.1 °F (36.7 °C)] 98.1 °F (36.7 °C)  Pulse:  [64-86] 75  Resp:  [15-37] 25  SpO2:  [93 %-98 %] 97 %  BP: (116-168)/(55-84) 156/76     Weight: 71.7 kg (158 lb)  Body mass index is 26.29 kg/m².    Intake/Output Summary (Last 24 hours) at 2/3/2020 2233  Last data filed at 2/3/2020 1810  Gross per 24 hour   Intake 600 ml   Output 600 ml   Net 0 ml      Physical Exam   Constitutional: He is oriented to person, place, and time. No distress.   HENT:   Head: Atraumatic.   Mouth/Throat: Oropharynx is clear and moist.   Eyes: Right eye exhibits no discharge. Left eye exhibits no discharge.   Neck: No JVD present.   Cardiovascular: Normal rate and regular rhythm.   Pulmonary/Chest: Effort normal and breath sounds normal.   Abdominal: Soft. Bowel sounds are normal. He exhibits no distension. There is no tenderness.   Musculoskeletal: He exhibits no edema.   Neurological: He is alert and oriented to person, place, and time. A cranial nerve deficit (left facial droop) is present. He displays Babinski's sign on the left side.   Has 0-1/5 strength throughout left upper extremity.  3/5 in left lower extremity.  Left pronator drift.  Right side strength is 5/5.   Skin: Skin is warm and dry. No rash noted. He is not diaphoretic.       Significant Labs: All pertinent  labs within the past 24 hours have been reviewed.    Significant Imaging: I have reviewed all pertinent imaging results/findings within the past 24 hours.

## 2020-02-04 NOTE — ASSESSMENT & PLAN NOTE
Chronic, controlled.  Latest blood pressure and vitals reviewed-   Temp:  [97.4 °F (36.3 °C)-98.1 °F (36.7 °C)]   Pulse:  [64-86]   Resp:  [15-37]   BP: (116-168)/(55-84)   SpO2:  [93 %-98 %] .   Home meds for hypertension were reviewed and noted below. Hospital anti-hypertensive changes were made as shown below.  Outpt Hypertension Medications             amLODIPine (NORVASC) 10 MG tablet Take 1 tablet (10 mg total) by mouth once daily.    carvedilol (COREG) 6.25 MG tablet Take 1 tablet (6.25 mg total) by mouth 2 (two) times daily with meals.    nitroGLYCERIN (NITROSTAT) 0.4 MG SL tablet Place 1 tablet (0.4 mg total) under the tongue every 5 (five) minutes as needed for Chest pain.      Hospital Medications             amLODIPine tablet 10 mg 10 mg, Oral, Daily    carvediloL tablet 6.25 mg 6.25 mg, Oral, 2 times daily        Will utilize p.r.n. blood pressure medication only if patient's blood pressure greater than  180/110 and he develops symptoms such as worsening chest pain or shortness of breath.

## 2020-02-04 NOTE — PLAN OF CARE
Remains in ICU for observation.    Able to move RUE and RLE without difficulty.  LUE flaccid  LLE--pt can slightly lift off bed.  Unable to bend knee.  Unable to perform plantar flexion or extension.  Denies headache; blood pressure stable.  Pt is eager to participate wit PT, asking often to get up and walk with walker.  Some nausea this am without emesis; treated with prn zofran.  BM this am.

## 2020-02-04 NOTE — PROGRESS NOTES
"[facility] Medicine  Progress Note    Patient Name: Buddy Park  MRN: [id number]  Patient Class: IP- Inpatient   Admission Date: 2/1/2020  Length of Stay: 0 days  Attending Physician: Jose Flaherty MD  Primary Care Provider: Buddy Chatman MD        Subjective:     Principal Problem:Cerebrovascular accident (CVA) due to thrombosis of right carotid artery        HPI:  Buddy Park is a 69 y.o. male with PMHx of HTN, HLD,  who presented to the ED for evaluation of left sided weakness and left facial droop that was noticed this morning by his spouse. However on discussion with patient he mentions he himself had started noticing symptoms of left arm weakness since yesterday morning and had difficulty writing. His speech as per him is baseline as he has a speech deficit from prio . Patient reports onset of fatigue x1 week ago. The spouse endorses noticing the patient "dragging his left foot" this morning, prompting her to bring him to the ED.He is a nonsmoker.He does endorse non compliance to his medications in the last 2 months including his antihypertensive.Patient mentions left carotid neck surgery ~x1-1.5 years ago. Patient has no other medical concerns or complaints at this moment. SHx includes Ear mastoidectomy w/ cochlear implant w/ landmark and Carotid angioplasty.  Patient admitted for work up of possible stroke with neurology consult.     Overview/Hospital Course:  No notes on file    Interval History:  Still with left hemiparesis.  Left arm has no strength.  Left leg, he can lift off the bed but can't hold in mid-air.  CT brain shows expansion of infarction in deep white matter.    Review of Systems   Constitutional: Negative for chills and fever.   Respiratory: Negative for cough and shortness of breath.    Cardiovascular: Negative for chest pain.   Gastrointestinal: Negative for abdominal pain.     Objective:     Vital Signs (Most Recent):  Temp: 98.1 °F (36.7 °C) " (02/03/20 1944)  Pulse: 75 (02/03/20 2100)  Resp: (!) 25 (02/03/20 2100)  BP: (!) 156/76 (02/03/20 2100)  SpO2: 97 % (02/03/20 2100) Vital Signs (24h Range):  Temp:  [97.4 °F (36.3 °C)-98.1 °F (36.7 °C)] 98.1 °F (36.7 °C)  Pulse:  [64-86] 75  Resp:  [15-37] 25  SpO2:  [93 %-98 %] 97 %  BP: (116-168)/(55-84) 156/76     Weight: 71.7 kg (158 lb)  Body mass index is 26.29 kg/m².    Intake/Output Summary (Last 24 hours) at 2/3/2020 2233  Last data filed at 2/3/2020 1810  Gross per 24 hour   Intake 600 ml   Output 600 ml   Net 0 ml      Physical Exam   Constitutional: He is oriented to person, place, and time. No distress.   HENT:   Head: Atraumatic.   Mouth/Throat: Oropharynx is clear and moist.   Eyes: Right eye exhibits no discharge. Left eye exhibits no discharge.   Neck: No JVD present.   Cardiovascular: Normal rate and regular rhythm.   Pulmonary/Chest: Effort normal and breath sounds normal.   Abdominal: Soft. Bowel sounds are normal. He exhibits no distension. There is no tenderness.   Musculoskeletal: He exhibits no edema.   Neurological: He is alert and oriented to person, place, and time. A cranial nerve deficit (left facial droop) is present. He displays Babinski's sign on the left side.   Has 0-1/5 strength throughout left upper extremity.  3/5 in left lower extremity.  Left pronator drift.  Right side strength is 5/5.   Skin: Skin is warm and dry. No rash noted. He is not diaphoretic.       Significant Labs: All pertinent labs within the past 24 hours have been reviewed.    Significant Imaging: I have reviewed all pertinent imaging results/findings within the past 24 hours.      Assessment/Plan:      * Cerebrovascular accident (CVA) due to thrombosis of right carotid artery  Continue statin and antiplt's.  Neurology following.  Echo with bubble done; report pending.  Continue rehab services while admitted.  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      BMI 26.0-26.9,adult  Counseled regarding weight  loss    Carotid disease, bilateral  S/p left CEA  CTA shows 40% NITZA.  Continue antiplatelets.  Defer decision on surgical intervention to neurology.    Deafness  Has cochlear implants        HTN (hypertension)  Chronic, controlled.  Latest blood pressure and vitals reviewed-   Temp:  [97.4 °F (36.3 °C)-98.1 °F (36.7 °C)]   Pulse:  [64-86]   Resp:  [15-37]   BP: (116-168)/(55-84)   SpO2:  [93 %-98 %] .   Home meds for hypertension were reviewed and noted below. Hospital anti-hypertensive changes were made as shown below.  Outpt Hypertension Medications             amLODIPine (NORVASC) 10 MG tablet Take 1 tablet (10 mg total) by mouth once daily.    carvedilol (COREG) 6.25 MG tablet Take 1 tablet (6.25 mg total) by mouth 2 (two) times daily with meals.    nitroGLYCERIN (NITROSTAT) 0.4 MG SL tablet Place 1 tablet (0.4 mg total) under the tongue every 5 (five) minutes as needed for Chest pain.      Hospital Medications             amLODIPine tablet 10 mg 10 mg, Oral, Daily    carvediloL tablet 6.25 mg 6.25 mg, Oral, 2 times daily        Will utilize p.r.n. blood pressure medication only if patient's blood pressure greater than  180/110 and he develops symptoms such as worsening chest pain or shortness of breath.      Hyperlipidemia  Continue on statin.          VTE Risk Mitigation (From admission, onward)         Ordered     IP VTE LOW RISK PATIENT  Once      02/01/20 1320                  Jose Flaherty MD  Department of Hospital Medicine   [facility]

## 2020-02-04 NOTE — PROGRESS NOTES
[facility]  Neurology  Progress Note    Patient Name: Buddy Park  MRN: [id number]  Admission Date: 2/1/2020  Hospital Length of Stay: 1 days  Code Status: Full Code   Attending Provider: Jose Flaherty MD  Primary Care Physician: Buddy Chatman MD   Principal Problem:Cerebrovascular accident (CVA) due to thrombosis of right carotid artery    Subjective:     Interval History: Patient seen and examined in room this morning, wife at bedside. Dr. Clayton Whalen consulted on POC of patient. Patient denies acute events overnight. Denies any worsening of symptoms. Neuro exam unchanged from yesterday, however, patient's symptoms did worsen between yesterday afternoon and yesterday evening, progressing to him being unable to lift left arm up from bed. Also, patient had PT eval today and wife states he was unable to maintain trunk control in sitting position. Will obtain repeat CT head wo contrast to assess for stability of infarction.     Current Medications:     Current Facility-Administered Medications   Medication Dose Route Frequency Provider Last Rate Last Dose    amLODIPine tablet 10 mg  10 mg Oral Daily Kirby Walter MD   10 mg at 02/03/20 0958    aspirin EC tablet 81 mg  81 mg Oral Daily Kirby Walter MD   81 mg at 02/03/20 0958    buPROPion TB24 tablet 150 mg  150 mg Oral Daily Kirby Walter MD   150 mg at 02/03/20 0958    carvediloL tablet 6.25 mg  6.25 mg Oral BID Kirby Walter MD   6.25 mg at 02/03/20 2040    clopidogreL tablet 75 mg  75 mg Oral Daily Char z, NP   75 mg at 02/03/20 0958    colchicine capsule/tablet 0.6 mg  0.6 mg Oral Daily Kirby Walter MD   0.6 mg at 02/03/20 0959    ondansetron injection 4 mg  4 mg Intravenous Q6H PRN Kirby Walter MD   4 mg at 02/04/20 0406    rosuvastatin tablet 40 mg  40 mg Oral Daily Kirby Walter MD   40 mg at 02/03/20 0959    sodium chloride 0.9% flush 10 mL  10 mL Intravenous PRN Kirby Walter MD         tamsulosin 24 hr capsule 0.4 mg  1 capsule Oral Daily Kirby Walter MD   0.4 mg at 02/03/20 0920     Facility-Administered Medications Ordered in Other Encounters   Medication Dose Route Frequency Provider Last Rate Last Dose    lactated ringers infusion   Intravenous Continuous Demario Yates MD 0 mL/hr at 10/28/19 1405         Review of Systems   As per HPI  Objective:     Vital Signs (Most Recent):  Temp: 97.8 °F (36.6 °C) (02/04/20 0400)  Pulse: 64 (02/04/20 0500)  Resp: 14 (02/04/20 0500)  BP: 123/66 (02/04/20 0500)  SpO2: (!) 94 % (02/04/20 0500) Vital Signs (24h Range):  Temp:  [97.6 °F (36.4 °C)-98.1 °F (36.7 °C)] 97.8 °F (36.6 °C)  Pulse:  [64-81] 64  Resp:  [14-37] 14  SpO2:  [93 %-98 %] 94 %  BP: (118-162)/(58-86) 123/66     Weight: 71.4 kg (157 lb 6.5 oz)  Body mass index is 26.19 kg/m².    Physical Exam  Constitutional: He is oriented to person, place, and time. He appears well-developed and well-nourished.   HENT:   Head: Normocephalic and atraumatic.   Eyes: Pupils are equal, round, and reactive to light. EOM are normal.   Neck: Normal range of motion. Neck supple.   Cardiovascular: Normal rate, regular rhythm, normal heart sounds and intact distal pulses.   Pulmonary/Chest: Effort normal and breath sounds normal.   Abdominal: Soft. Bowel sounds are normal.   Musculoskeletal: Normal range of motion.   Neurological: He is alert and oriented to person, place, and time. A cranial nerve deficit is present. He has a normal Finger-Nose-Finger Test to RUE but unable to perform with LUE  Reflex Scores:       Tricep reflexes are 2+ on the right side and 2+ on the left side.       Bicep reflexes are 2+ on the right side and 2+ on the left side.       Brachioradialis reflexes are 2+ on the right side and 2+ on the left side.       Patellar reflexes are 1+ on the right side and 1+ on the left side.       Achilles reflexes are 1+ on the right side and 1+ on the left side.  Skin: Skin is warm. Capillary  refill takes less than 2 seconds.   Psychiatric: He has a normal mood and affect. His behavior is normal. Judgment and thought content normal. His speech is mildly slurred.         NEUROLOGICAL EXAMINATION:      MENTAL STATUS   Oriented to person, place, and time.   Follows 2 step commands.   Attention: normal. Concentration: normal.   Speech: slurred (Patient reports this is his baseline)  Level of consciousness: alert  Knowledge: good.   Able to name object. Able to repeat.      CRANIAL NERVES      CN II   Visual fields full to confrontation.   Right visual field deficit: none  Left visual field deficit: none      CN III, IV, VI   Pupils are equal, round, and reactive to light.  Extraocular motions are normal.   CN III: no CN III palsy  CN VI: no CN VI palsy  Diplopia: none     CN V   Denies facial sensation deficits      CN VII   Left facial weakness: denies     CN VIII   Hearing: impaired (Has hearing aid in left ear and cochlear implant in right ear)     CN IX, X   CN IX normal.      CN XI   CN XI normal.      CN XII   CN XII normal.      MOTOR EXAM   Muscle bulk: normal  Overall muscle tone: normal  Right arm tone: normal  Left arm tone: decreased  Right arm pronator drift: absent  Left arm pronator drift: present- unable to lift from bed   Right leg tone: normal  Left leg tone:decreased    Strength   Strength 5/5 except as noted.   Left deltoid: 4/5  Left biceps: 4/5  Left triceps: 4/5  LLE: 3-4/5     REFLEXES      Reflexes   Right brachioradialis: 2+  Left brachioradialis: 2+  Right biceps: 2+  Left biceps: 2+  Right triceps: 2+  Left triceps: 2+  Right patellar: 1+  Left patellar: 1+  Right achilles: 1+  Left achilles: 1+       SENSORY EXAM   Sensation intact     GAIT AND COORDINATION      Coordination   Finger to nose coordination: normal RUE, unable to perform LUE      Tremor   Resting tremor: absent  Intention tremor: absent  Action tremor: absent                Significant Labs:   Hemoglobin A1c:    Recent Labs   Lab 02/01/20  1122   HGBA1C 5.5     CBC:   Recent Labs   Lab 02/03/20  0338 02/04/20  0415   WBC 7.72 7.06   HGB 14.1 14.4   HCT 43.3 43.1    201     CMP:   Recent Labs   Lab 02/03/20  0338 02/04/20  0415   * 110    140   K 3.7 3.6    108   CO2 24 24   BUN 17 18   CREATININE 1.5* 1.5*   CALCIUM 9.5 9.2   PROT 7.2 7.0   ALBUMIN 4.0 3.9   BILITOT 0.9 0.9   ALKPHOS 66 64   AST 16 15   ALT 16 15   ANIONGAP 10 8   EGFRNONAA 47* 47*     All pertinent lab results from the past 24 hours have been reviewed.    Significant Imaging: I have reviewed all pertinent imaging results/findings within the past 24 hours.    Assessment and Plan:  1. CVA   -Repeat CT in AM 2/3/2020- noted   Repeat CT today   -CTA Head and Neck - noted   -TTE with bubble study- noted   -Continue ASA 81mg and Plavix 75mg daily  -Recommend Statin for secondary stroke prevention  -PT/OT/ST eval and treat   -Patient would benefit from brief inpatient rehab stay for extensive physical and occupational therapy   -Frequent Neuro Checks  -Fall Precautions     2. Syncope  -as noted above   -EEG normal study   -Frequent Neuro checks  -Fall Precautions     3. HTN  -IM to Manage     4. Hyperlipidemia  -Recommend statin for secondary stroke prevention  -IM to Manage     [phone] 2 weeks from discharge.     Stroke education was provided including stroke risk factors modification and any acute neurological changes including weakness, confusion, visual changes to come straight to the ER. Patient instructed no driving until follow up appointment in clinic.      All side effects of new medications were discussed with patient and/or next of kin and all questions were answered.                   Active Diagnoses:    Diagnosis Date Noted POA    PRINCIPAL PROBLEM:  Cerebrovascular accident (CVA) due to thrombosis of right carotid artery [I63.031] 02/03/2020 Yes    BMI  26.0-26.9,adult [Z68.26] 03/19/2019 Not Applicable    Carotid disease, bilateral [I73.9] 02/09/2017 Yes    Deafness [H91.90] 04/12/2016 Yes    HTN (hypertension) [I10] 11/03/2014 Yes    Hyperlipidemia [E78.5]  Yes      Problems Resolved During this Admission:       VTE Risk Mitigation (From admission, onward)         Ordered     IP VTE LOW RISK PATIENT  Once      02/01/20 1320                Mandy Goddard DNP  Neurology  [facility]    I, Dr. Clayton Whalen, discussed care with my advanced practitioner and agree with above. I have reviewed patient clinical presentation, work up, impression and plan. F/u CT please. Good IPR candidate.

## 2020-02-04 NOTE — ASSESSMENT & PLAN NOTE
Continue statin and antiplt's.  Neurology following.  Echo with bubble done; report pending.  Continue rehab services while admitted.  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

## 2020-02-04 NOTE — ASSESSMENT & PLAN NOTE
S/p left CEA  CTA shows 40% NITZA.  Continue antiplatelets.  Defer decision on surgical intervention to neurology.

## 2020-02-04 NOTE — PLAN OF CARE
02/04/20 1708   Post-Acute Status   Post-Acute Authorization Placement   Post-Acute Placement Status Referrals Sent   Patient choice form signed by patient/caregiver List with quality metrics by geographic area provided

## 2020-02-04 NOTE — PROGRESS NOTES
Rounding on patient and family today- discussed that when pt is ready he would prefer to go to [facility] if his insurance allows.

## 2020-02-05 PROBLEM — I63.9 STROKE: Status: ACTIVE | Noted: 2020-02-05

## 2020-02-05 PROBLEM — I63.81 CEREBROVASCULAR ACCIDENT (CVA) DUE TO OCCLUSION OF SMALL ARTERY: Status: ACTIVE | Noted: 2020-02-03

## 2020-02-05 LAB
ALBUMIN SERPL BCP-MCNC: 3.8 G/DL (ref 3.5–5.2)
ALP SERPL-CCNC: 63 U/L (ref 55–135)
ALT SERPL W/O P-5'-P-CCNC: 15 U/L (ref 10–44)
ANION GAP SERPL CALC-SCNC: 10 MMOL/L (ref 8–16)
AST SERPL-CCNC: 16 U/L (ref 10–40)
BASOPHILS # BLD AUTO: 0.03 K/UL (ref 0–0.2)
BASOPHILS NFR BLD: 0.4 % (ref 0–1.9)
BILIRUB SERPL-MCNC: 0.9 MG/DL (ref 0.1–1)
BUN SERPL-MCNC: 20 MG/DL (ref 8–23)
CALCIUM SERPL-MCNC: 9.4 MG/DL (ref 8.7–10.5)
CHLORIDE SERPL-SCNC: 106 MMOL/L (ref 95–110)
CO2 SERPL-SCNC: 25 MMOL/L (ref 23–29)
CREAT SERPL-MCNC: 1.4 MG/DL (ref 0.5–1.4)
DIFFERENTIAL METHOD: ABNORMAL
EOSINOPHIL # BLD AUTO: 0.2 K/UL (ref 0–0.5)
EOSINOPHIL NFR BLD: 2.2 % (ref 0–8)
ERYTHROCYTE [DISTWIDTH] IN BLOOD BY AUTOMATED COUNT: 12.9 % (ref 11.5–14.5)
EST. GFR  (AFRICAN AMERICAN): 60 ML/MIN/1.73 M^2
EST. GFR  (NON AFRICAN AMERICAN): 52 ML/MIN/1.73 M^2
GLUCOSE SERPL-MCNC: 96 MG/DL (ref 70–110)
HCT VFR BLD AUTO: 42.4 % (ref 40–54)
HGB BLD-MCNC: 14.4 G/DL (ref 14–18)
IMM GRANULOCYTES # BLD AUTO: 0.03 K/UL (ref 0–0.04)
LYMPHOCYTES # BLD AUTO: 2.8 K/UL (ref 1–4.8)
LYMPHOCYTES NFR BLD: 41.5 % (ref 18–48)
MCH RBC QN AUTO: 29.4 PG (ref 27–31)
MCHC RBC AUTO-ENTMCNC: 34 G/DL (ref 32–36)
MCV RBC AUTO: 87 FL (ref 82–98)
MONOCYTES # BLD AUTO: 0.7 K/UL (ref 0.3–1)
MONOCYTES NFR BLD: 10.2 % (ref 4–15)
NEUTROPHILS # BLD AUTO: 3 K/UL (ref 1.8–7.7)
NEUTROPHILS NFR BLD: 45.3 % (ref 38–73)
NRBC BLD-RTO: 0 /100 WBC
PLATELET # BLD AUTO: 199 K/UL (ref 150–350)
PMV BLD AUTO: 8.7 FL (ref 9.2–12.9)
POTASSIUM SERPL-SCNC: 3.4 MMOL/L (ref 3.5–5.1)
PROT SERPL-MCNC: 6.8 G/DL (ref 6–8.4)
RBC # BLD AUTO: 4.89 M/UL (ref 4.6–6.2)
SODIUM SERPL-SCNC: 141 MMOL/L (ref 136–145)
WBC # BLD AUTO: 6.67 K/UL (ref 3.9–12.7)

## 2020-02-05 PROCEDURE — 80053 COMPREHEN METABOLIC PANEL: CPT | Mod: HCNC

## 2020-02-05 PROCEDURE — 25000003 PHARM REV CODE 250: Mod: HCNC | Performed by: HOSPITALIST

## 2020-02-05 PROCEDURE — 85025 COMPLETE CBC W/AUTO DIFF WBC: CPT | Mod: HCNC

## 2020-02-05 PROCEDURE — 97530 THERAPEUTIC ACTIVITIES: CPT | Mod: HCNC,CO

## 2020-02-05 PROCEDURE — 97530 THERAPEUTIC ACTIVITIES: CPT | Mod: HCNC

## 2020-02-05 PROCEDURE — 36415 COLL VENOUS BLD VENIPUNCTURE: CPT | Mod: HCNC

## 2020-02-05 PROCEDURE — 12000002 HC ACUTE/MED SURGE SEMI-PRIVATE ROOM: Mod: HCNC

## 2020-02-05 PROCEDURE — 94761 N-INVAS EAR/PLS OXIMETRY MLT: CPT | Mod: HCNC

## 2020-02-05 RX ADMIN — BUPROPION HYDROCHLORIDE 150 MG: 150 TABLET, FILM COATED, EXTENDED RELEASE ORAL at 10:02

## 2020-02-05 RX ADMIN — CARVEDILOL 6.25 MG: 6.25 TABLET, FILM COATED ORAL at 10:02

## 2020-02-05 RX ADMIN — COLCHICINE 0.6 MG: 0.6 CAPSULE ORAL at 10:02

## 2020-02-05 RX ADMIN — ASPIRIN 81 MG: 81 TABLET, COATED ORAL at 10:02

## 2020-02-05 RX ADMIN — ROSUVASTATIN CALCIUM 40 MG: 20 TABLET, FILM COATED ORAL at 10:02

## 2020-02-05 RX ADMIN — TAMSULOSIN HYDROCHLORIDE 0.4 MG: 0.4 CAPSULE ORAL at 10:02

## 2020-02-05 RX ADMIN — AMLODIPINE BESYLATE 10 MG: 5 TABLET ORAL at 10:02

## 2020-02-05 RX ADMIN — CLOPIDOGREL BISULFATE 75 MG: 75 TABLET ORAL at 10:02

## 2020-02-05 NOTE — PLAN OF CARE
Report has been called to LAURA Tiwari for transfer to room 324. Has denied painall this shift. Seen by PT and OT with aggressive therapy continuing

## 2020-02-05 NOTE — PROGRESS NOTES
"[facility] Medicine  Progress Note    Patient Name: Buddy Park  MRN: [id number]  Patient Class: IP- Inpatient   Admission Date: 2/1/2020  Length of Stay: 1 days  Attending Physician: Jose Flaherty MD  Primary Care Provider: Buddy Chatman MD        Subjective:     Principal Problem:Cerebrovascular accident (CVA) due to thrombosis of right carotid artery        HPI:  Buddy Park is a 69 y.o. male with PMHx of HTN, HLD,  who presented to the ED for evaluation of left sided weakness and left facial droop that was noticed this morning by his spouse. However on discussion with patient he mentions he himself had started noticing symptoms of left arm weakness since yesterday morning and had difficulty writing. His speech as per him is baseline as he has a speech deficit from prio . Patient reports onset of fatigue x1 week ago. The spouse endorses noticing the patient "dragging his left foot" this morning, prompting her to bring him to the ED.He is a nonsmoker.He does endorse non compliance to his medications in the last 2 months including his antihypertensive.Patient mentions left carotid neck surgery ~x1-1.5 years ago. Patient has no other medical concerns or complaints at this moment. SHx includes Ear mastoidectomy w/ cochlear implant w/ landmark and Carotid angioplasty.  Patient admitted for work up of possible stroke with neurology consult.     Overview/Hospital Course:  No notes on file    Interval History:   No new issues.  Evaluated by PT and OT.  No new complaints.    Review of Systems   Constitutional: Negative for chills and fever.   Respiratory: Negative for cough and shortness of breath.    Cardiovascular: Negative for chest pain.   Gastrointestinal: Negative for abdominal pain.     Objective:     Vital Signs (Most Recent):  Temp: 98.4 °F (36.9 °C) (02/04/20 1909)  Pulse: 69 (02/04/20 2153)  Resp: 19 (02/04/20 2153)  BP: (!) 139/111 (02/04/20 2100)  SpO2: (!) 94 % " (02/04/20 2153) Vital Signs (24h Range):  Temp:  [97.7 °F (36.5 °C)-99 °F (37.2 °C)] 98.4 °F (36.9 °C)  Pulse:  [64-80] 69  Resp:  [13-32] 19  SpO2:  [93 %-97 %] 94 %  BP: (113-161)/() 139/111     Weight: 71.4 kg (157 lb 6.5 oz)  Body mass index is 26.19 kg/m².    Intake/Output Summary (Last 24 hours) at 2/4/2020 2241  Last data filed at 2/4/2020 2013  Gross per 24 hour   Intake 440 ml   Output 775 ml   Net -335 ml      Physical Exam   Constitutional: He is oriented to person, place, and time. No distress.   HENT:   Head: Atraumatic.   Mouth/Throat: Oropharynx is clear and moist.   Eyes: Right eye exhibits no discharge. Left eye exhibits no discharge.   Neck: No JVD present.   Cardiovascular: Normal rate and regular rhythm.   Pulmonary/Chest: Effort normal and breath sounds normal.   Abdominal: Soft. Bowel sounds are normal. He exhibits no distension. There is no tenderness.   Musculoskeletal: He exhibits no edema.   Neurological: He is alert and oriented to person, place, and time. A cranial nerve deficit (left facial droop) is present. He displays Babinski's sign on the left side.   Has 0-1/5 strength throughout left upper extremity.  3/5 in left lower extremity.  Left pronator drift.  Right side strength is 5/5.   Skin: Skin is warm and dry. No rash noted. He is not diaphoretic.       Significant Labs: All pertinent labs within the past 24 hours have been reviewed.    Significant Imaging: I have reviewed all pertinent imaging results/findings within the past 24 hours.      Assessment/Plan:      * Cerebrovascular accident (CVA) due to thrombosis of right carotid artery  Continue statin and antiplt's.  Neurology following.  Echo with bubble done--- good EF.  No thrombus.  No intracardiac shunts.  Continue rehab services while admitted.  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  Will consult case mgmt for rehab referral.      BMI 26.0-26.9,adult  Counseled regarding weight loss    Carotid disease, bilateral  S/p  left CEA  CTA shows 40% NITZA.  Continue antiplatelets.  Defer decision on surgical intervention to neurology.    Deafness  Has cochlear implants        HTN (hypertension)  Chronic, controlled.  Latest blood pressure and vitals reviewed-   Temp:  [97.7 °F (36.5 °C)-99 °F (37.2 °C)]   Pulse:  [64-80]   Resp:  [13-32]   BP: (113-161)/()   SpO2:  [93 %-97 %] .   Home meds for hypertension were reviewed and noted below. Hospital anti-hypertensive changes were made as shown below.  Outpt Hypertension Medications             amLODIPine (NORVASC) 10 MG tablet Take 1 tablet (10 mg total) by mouth once daily.    carvedilol (COREG) 6.25 MG tablet Take 1 tablet (6.25 mg total) by mouth 2 (two) times daily with meals.    nitroGLYCERIN (NITROSTAT) 0.4 MG SL tablet Place 1 tablet (0.4 mg total) under the tongue every 5 (five) minutes as needed for Chest pain.      Hospital Medications             amLODIPine tablet 10 mg 10 mg, Oral, Daily    carvediloL tablet 6.25 mg 6.25 mg, Oral, 2 times daily        Will utilize p.r.n. blood pressure medication only if patient's blood pressure greater than  180/110 and he develops symptoms such as worsening chest pain or shortness of breath.      Hyperlipidemia  Continue on statin.          VTE Risk Mitigation (From admission, onward)         Ordered     IP VTE LOW RISK PATIENT  Once      02/04/20 1120     Place sequential compression device  Until discontinued      02/04/20 1120                Jose Flaherty MD  Department of Hospital Medicine   [facility]

## 2020-02-05 NOTE — SUBJECTIVE & OBJECTIVE
Interval History:   No new issues.  Evaluated by PT and OT.  No new complaints.    Review of Systems   Constitutional: Negative for chills and fever.   Respiratory: Negative for cough and shortness of breath.    Cardiovascular: Negative for chest pain.   Gastrointestinal: Negative for abdominal pain.     Objective:     Vital Signs (Most Recent):  Temp: 98.4 °F (36.9 °C) (02/04/20 1909)  Pulse: 69 (02/04/20 2153)  Resp: 19 (02/04/20 2153)  BP: (!) 139/111 (02/04/20 2100)  SpO2: (!) 94 % (02/04/20 2153) Vital Signs (24h Range):  Temp:  [97.7 °F (36.5 °C)-99 °F (37.2 °C)] 98.4 °F (36.9 °C)  Pulse:  [64-80] 69  Resp:  [13-32] 19  SpO2:  [93 %-97 %] 94 %  BP: (113-161)/() 139/111     Weight: 71.4 kg (157 lb 6.5 oz)  Body mass index is 26.19 kg/m².    Intake/Output Summary (Last 24 hours) at 2/4/2020 2241  Last data filed at 2/4/2020 2013  Gross per 24 hour   Intake 440 ml   Output 775 ml   Net -335 ml      Physical Exam   Constitutional: He is oriented to person, place, and time. No distress.   HENT:   Head: Atraumatic.   Mouth/Throat: Oropharynx is clear and moist.   Eyes: Right eye exhibits no discharge. Left eye exhibits no discharge.   Neck: No JVD present.   Cardiovascular: Normal rate and regular rhythm.   Pulmonary/Chest: Effort normal and breath sounds normal.   Abdominal: Soft. Bowel sounds are normal. He exhibits no distension. There is no tenderness.   Musculoskeletal: He exhibits no edema.   Neurological: He is alert and oriented to person, place, and time. A cranial nerve deficit (left facial droop) is present. He displays Babinski's sign on the left side.   Has 0-1/5 strength throughout left upper extremity.  3/5 in left lower extremity.  Left pronator drift.  Right side strength is 5/5.   Skin: Skin is warm and dry. No rash noted. He is not diaphoretic.       Significant Labs: All pertinent labs within the past 24 hours have been reviewed.    Significant Imaging: I have reviewed all pertinent imaging  results/findings within the past 24 hours.

## 2020-02-05 NOTE — PT/OT/SLP PROGRESS
Physical Therapy Treatment    Patient Name:  Buddy Park   MRN:  [id number]    Recommendations:     Discharge Recommendations:  rehabilitation facility   Discharge Equipment Recommendations: (TBD at next level of care)   Barriers to discharge: None    Assessment:     Buddy Park is a 69 y.o. male admitted with a medical diagnosis of Cerebrovascular accident (CVA) due to thrombosis of right carotid artery.  He presents with the following impairments/functional limitations:  weakness, impaired self care skills, impaired balance, decreased safety awareness, impaired endurance, impaired functional mobilty, decreased upper extremity function, impaired sensation. Patient finishing lunch upon entering room. He requires ModA for supine to sit transfer. He is able to scoot to the EOB with Joyce. PT, OT, and TAYLOR worked on seated weight shifts and hip hiking with ModAx2. When patient was instructed to scoot back in the bed he stood up x3 trials with ModAx2 from PT and OT.     Rehab Prognosis: Fair; patient would benefit from acute skilled PT services to address these deficits and reach maximum level of function.    Recent Surgery: * No surgery found *      Plan:     During this hospitalization, patient to be seen daily to address the identified rehab impairments via gait training, therapeutic activities, therapeutic exercises and progress toward the following goals:    · Plan of Care Expires:  03/04/20    Subjective     Chief Complaint: I want to walk  Patient/Family Comments/goals: walk  Pain/Comfort:  · Pain Rating 1: 0/10      Objective:     Communicated with LAURA Mendez prior to session.  Patient found HOB elevated with blood pressure cuff, telemetry, pulse ox (continuous) upon PT entry to room.     General Precautions: Standard, fall, hearing impaired, aspiration   Orthopedic Precautions:N/A   Braces: N/A     Functional Mobility:  · Bed Mobility:     · Supine to Sit: moderate assistance  · Transfers:     · Sit to Stand:   moderate assistance, of 2 persons and 3 trials with no AD  · Balance: sitting: fair to poor with patient intermittently pushing through the right UE to to left. Patient reoriented to midline with MaxA from OT. Patient required repeated cues to keep the right hand on the bed to support himself, but not to push through the hand.       AM-PAC 6 CLICK MOBILITY          Therapeutic Activities and Exercises:   Patient was educated on the importance of OOB activity during hospital stay, safe transfers and ambulation, midline orientation, importance of improving sitting balance prior to working on standing, safety, D/C planning    Patient left HOB elevated with all lines intact, call button in reach and wife present..    GOALS:   Multidisciplinary Problems     Physical Therapy Goals        Problem: Physical Therapy Goal    Goal Priority Disciplines Outcome Goal Variances Interventions   Physical Therapy Goal     PT, PT/OT Ongoing, Progressing     Description:  Goals to be met by: 2020     Patient will increase functional independence with mobility by performin. Supine to sit with Minimal Assistance  2. Sit to stand transfer with Moderate assistance with albin walker   3. Gait  x 10 feet with Moderate assistance using albin walker.   4. Bed to chair transfer with Moderate assistance using albin walker                        Time Tracking:     PT Received On: 20  PT Start Time: 1140     PT Stop Time: 1222  PT Total Time (min): 42 min     Billable Minutes: Therapeutic Activity 15    Treatment Type: Treatment  PT/PTA: PT     PTA Visit Number: 0     Emmy Trejo, PT  2020

## 2020-02-05 NOTE — CARE UPDATE
02/05/20 0715   Patient Assessment/Suction   Level of Consciousness (AVPU) alert   Respiratory Effort Normal;Unlabored   PRE-TX-O2   O2 Device (Oxygen Therapy) room air   SpO2 96 %   Pulse Oximetry Type Continuous   $ Pulse Oximetry - Multiple Charge Pulse Oximetry - Multiple   Pulse 69   Resp 17

## 2020-02-05 NOTE — PLAN OF CARE
Assessment unchanged.    No movement to LUE.  Lifts LLE minimally off of bed.    No plantar flexion or extension to LLE.    Full ROM to RUE and RLE.    Left facial droop remains.    Denies headaches.    No complaints of nausea overnight.    Pt is wanting to go to regular room today.    Safety maintained.  Bed alarm engaged.

## 2020-02-05 NOTE — PLAN OF CARE
Problem: Physical Therapy Goal  Goal: Physical Therapy Goal  Description  Goals to be met by: 2020     Patient will increase functional independence with mobility by performin. Supine to sit with Minimal Assistance  2. Sit to stand transfer with Moderate assistance with albin walker   3. Gait  x 10 feet with Moderate assistance using albin walker.   4. Bed to chair transfer with Moderate assistance using albin walker       Outcome: Ongoing, Progressing

## 2020-02-05 NOTE — ASSESSMENT & PLAN NOTE
Chronic, controlled.  Latest blood pressure and vitals reviewed-   Temp:  [97.7 °F (36.5 °C)-99 °F (37.2 °C)]   Pulse:  [64-80]   Resp:  [13-32]   BP: (113-161)/()   SpO2:  [93 %-97 %] .   Home meds for hypertension were reviewed and noted below. Hospital anti-hypertensive changes were made as shown below.  Outpt Hypertension Medications             amLODIPine (NORVASC) 10 MG tablet Take 1 tablet (10 mg total) by mouth once daily.    carvedilol (COREG) 6.25 MG tablet Take 1 tablet (6.25 mg total) by mouth 2 (two) times daily with meals.    nitroGLYCERIN (NITROSTAT) 0.4 MG SL tablet Place 1 tablet (0.4 mg total) under the tongue every 5 (five) minutes as needed for Chest pain.      Hospital Medications             amLODIPine tablet 10 mg 10 mg, Oral, Daily    carvediloL tablet 6.25 mg 6.25 mg, Oral, 2 times daily        Will utilize p.r.n. blood pressure medication only if patient's blood pressure greater than  180/110 and he develops symptoms such as worsening chest pain or shortness of breath.

## 2020-02-05 NOTE — PLAN OF CARE
NEFTALI spoke to Briseyda Rain with [facility] who state authorization submitted to [employer].       02/05/20 1515   Post-Acute Status   Post-Acute Authorization Placement   Post-Acute Placement Status Pending Payor Review

## 2020-02-05 NOTE — ASSESSMENT & PLAN NOTE
Continue statin and antiplt's.  Neurology following.  Echo with bubble done--- good EF.  No thrombus.  No intracardiac shunts.  Continue rehab services while admitted.  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  Will consult case mgmt for rehab referral.

## 2020-02-05 NOTE — PLAN OF CARE
Problem: Occupational Therapy Goal  Goal: Occupational Therapy Goal  Description  Goals to be met by: 2/16/20     Patient will increase functional independence with ADLs by performing:    Patient will tolerate sitting EOB x 10 minutes with CGA to SBA to maintain midline  Grooming EOB with Min (A)  UB dressing EOB with Mod (A)      Outcome: Ongoing, Progressing; EOB x 20 mins Mod/Max A x 2

## 2020-02-05 NOTE — PROGRESS NOTES
[facility]  Neurology  Progress Note    Patient Name: Buddy Park  MRN: [id number]  Admission Date: 2/1/2020  Hospital Length of Stay: 2 days  Code Status: Full Code   Attending Provider: Jose Flaherty MD  Primary Care Physician: Buddy Chatman MD   Principal Problem:Cerebrovascular accident (CVA) due to thrombosis of right carotid artery    Subjective:     Interval History: Patient seen and examined in room. Dr. Clayton Whalen consulted on the patient's POC. He denies acute events overnight, expresses he is anxious to begin inpatient rehab. In good spirits.     Current Medications:     Current Facility-Administered Medications   Medication Dose Route Frequency Provider Last Rate Last Dose    amLODIPine tablet 10 mg  10 mg Oral Daily Jose Flaherty MD   10 mg at 02/05/20 1015    aspirin EC tablet 81 mg  81 mg Oral Daily Jose Flaherty MD   81 mg at 02/05/20 1014    buPROPion TB24 tablet 150 mg  150 mg Oral Daily Jose Flaherty MD   150 mg at 02/05/20 1012    carvediloL tablet 6.25 mg  6.25 mg Oral BID Jose Flaherty MD   6.25 mg at 02/05/20 1014    clopidogreL tablet 75 mg  75 mg Oral Daily Jose Flaherty MD   75 mg at 02/05/20 1015    colchicine capsule/tablet 0.6 mg  0.6 mg Oral Daily Jose Flaherty MD   0.6 mg at 02/05/20 1015    ondansetron injection 4 mg  4 mg Intravenous Q6H PRN Jose Flaherty MD   4 mg at 02/04/20 0406    rosuvastatin tablet 40 mg  40 mg Oral Daily Jose Flaherty MD   40 mg at 02/05/20 1013    sodium chloride 0.9% flush 10 mL  10 mL Intravenous PRN Jose Flaherty MD   10 mL at 02/04/20 0812    tamsulosin 24 hr capsule 0.4 mg  1 capsule Oral Daily Jose Flaherty MD   0.4 mg at 02/05/20 1013     Facility-Administered Medications Ordered in Other Encounters   Medication Dose Route Frequency Provider Last Rate Last Dose    lactated ringers infusion   Intravenous Continuous Demario Yates MD 0 mL/hr at 10/28/19 1405         Review of  Systems   As per HPI  Objective:     Vital Signs (Most Recent):  Temp: 97.7 °F (36.5 °C) (02/05/20 0418)  Pulse: 69 (02/05/20 0715)  Resp: 17 (02/05/20 0715)  BP: (!) 153/72 (02/05/20 0500)  SpO2: 96 % (02/05/20 0715) Vital Signs (24h Range):  Temp:  [97.6 °F (36.4 °C)-98.4 °F (36.9 °C)] 97.7 °F (36.5 °C)  Pulse:  [58-75] 69  Resp:  [13-37] 17  SpO2:  [92 %-99 %] 96 %  BP: (111-159)/() 153/72     Weight: 71.9 kg (158 lb 8.2 oz)  Body mass index is 26.38 kg/m².    Physical Exam  Constitutional: He is oriented to person, place, and time. He appears well-developed and well-nourished.   HENT:   Head: Normocephalic and atraumatic.   Eyes: Pupils are equal, round, and reactive to light. EOM are normal.   Neck: Normal range of motion. Neck supple.   Cardiovascular: Normal rate, regular rhythm, normal heart sounds and intact distal pulses.   Pulmonary/Chest: Effort normal and breath sounds normal.   Abdominal: Soft. Bowel sounds are normal.   Musculoskeletal: Normal range of motion.   Neurological: He is alert and oriented to person, place, and time. A cranial nerve deficit is present. He has a normal Finger-Nose-Finger Test to RUE but unable to perform with LUE  Reflex Scores:       Tricep reflexes are 2+ on the right side and 2+ on the left side.       Bicep reflexes are 2+ on the right side and 2+ on the left side.       Brachioradialis reflexes are 2+ on the right side and 2+ on the left side.       Patellar reflexes are 1+ on the right side and 1+ on the left side.       Achilles reflexes are 1+ on the right side and 1+ on the left side.  Skin: Skin is warm. Capillary refill takes less than 2 seconds.   Psychiatric: He has a normal mood and affect. His behavior is normal. Judgment and thought content normal. His speech is mildly slurred.         NEUROLOGICAL EXAMINATION:      MENTAL STATUS   Oriented to person, place, and time.   Follows 2 step commands.   Attention: normal. Concentration: normal.    Speech: slurred (Patient reports this is his baseline)  Level of consciousness: alert  Knowledge: good.   Able to name object. Able to repeat.      CRANIAL NERVES      CN II   Visual fields full to confrontation.   Right visual field deficit: none  Left visual field deficit: none      CN III, IV, VI   Pupils are equal, round, and reactive to light.  Extraocular motions are normal.   CN III: no CN III palsy  CN VI: no CN VI palsy  Diplopia: none     CN V   Denies facial sensation deficits      CN VII   Left facial weakness: denies     CN VIII   Hearing: impaired (Has hearing aid in left ear and cochlear implant in right ear)     CN IX, X   CN IX normal.      CN XI   CN XI normal.      CN XII   CN XII normal.      MOTOR EXAM   Muscle bulk: normal  Overall muscle tone: normal  Right arm tone: normal  Left arm tone: decreased  Right arm pronator drift: absent  Left arm pronator drift: present- unable to lift from bed   Right leg tone: normal  Left leg tone:decreased    LLE:   3/5     REFLEXES      Reflexes   Right brachioradialis: 2+  Left brachioradialis: 2+  Right biceps: 2+  Left biceps: 2+  Right triceps: 2+  Left triceps: 2+  Right patellar: 1+  Left patellar: 1+  Right achilles: 1+  Left achilles: 1+       SENSORY EXAM   Sensation intact     GAIT AND COORDINATION      Coordination   Finger to nose coordination: normal RUE, unable to perform LUE      Tremor   Resting tremor: absent  Intention tremor: absent  Action tremor: absent                Significant Labs:   Hemoglobin A1c:   Recent Labs   Lab 02/01/20  1122   HGBA1C 5.5     CBC:   Recent Labs   Lab 02/04/20  0415 02/05/20  0413   WBC 7.06 6.67   HGB 14.4 14.4   HCT 43.1 42.4    199     CMP:   Recent Labs   Lab 02/04/20  0415 02/05/20  0413    96    141   K 3.6 3.4*    106   CO2 24 25   BUN 18 20   CREATININE 1.5* 1.4   CALCIUM 9.2 9.4   PROT 7.0 6.8   ALBUMIN 3.9 3.8   BILITOT 0.9 0.9   ALKPHOS 64 63   AST 15 16   ALT 15 15    ANIONGAP 8 10   EGFRNONAA 47* 52*     All pertinent lab results from the past 24 hours have been reviewed.    Significant Imaging: I have reviewed all pertinent imaging results/findings within the past 24 hours.    Assessment and Plan:  1. CVA   -Repeat CT in AM 2/3/2020- noted   Repeat CT 2/4/2020  -CTA Head and Neck - noted   -TTE with bubble study- noted   -Continue ASA 81mg and Plavix 75mg daily  -Recommend Statin for secondary stroke prevention  -PT/OT/ST eval and treat   -Patient would benefit from brief inpatient rehab stay for extensive physical and occupational therapy   -Frequent Neuro Checks  -Fall Precautions     2. Syncope  -as noted above   -EEG normal study   -Frequent Neuro checks  -Fall Precautions     3. HTN  -IM to Manage     4. Hyperlipidemia  -Recommend statin for secondary stroke prevention  -IM to Manage     Patient to follow up with [facility] at [phone] within 2 weeks from discharge.     Stroke education was provided including stroke risk factors modification and any acute neurological changes including weakness, confusion, visual changes to come straight to the ER. Patient instructed no driving until follow up appointment in clinic.      All side effects of new medications were discussed with patient and/or next of kin and all questions were answered.                   Active Diagnoses:    Diagnosis Date Noted POA    PRINCIPAL PROBLEM:  Cerebrovascular accident (CVA) due to thrombosis of right carotid artery [I63.031] 02/03/2020 Yes    Stroke [I63.9] 02/05/2020 Yes    BMI 26.0-26.9,adult [Z68.26] 03/19/2019 Not Applicable    Carotid disease, bilateral [I73.9] 02/09/2017 Yes    Deafness [H91.90] 04/12/2016 Yes    HTN (hypertension) [I10] 11/03/2014 Yes    Hyperlipidemia [E78.5]  Yes      Problems Resolved During this Admission:       VTE Risk Mitigation (From admission, onward)         Ordered     IP VTE LOW RISK PATIENT  Once      02/04/20 1120     Place sequential  compression device  Until discontinued      02/04/20 1120                Mandy Goddard DNP  Neurology  [facility]    I, Dr. Clayton Whalen, discussed care with my advanced practitioner and agree with above. I have reviewed patient clinical presentation, work up, impression and plan. Stable.

## 2020-02-05 NOTE — PT/OT/SLP PROGRESS
Occupational Therapy   Treatment    Name: Buddy Park  MRN: [id number]  Admitting Diagnosis:  Cerebrovascular accident (CVA) due to thrombosis of right carotid artery       Recommendations:     Discharge Recommendations: rehabilitation facility  Discharge Equipment Recommendations:  other (see comments)(TBD at next level of care)  Barriers to discharge:       Assessment:     Buddy Park is a 69 y.o. male with a medical diagnosis of Cerebrovascular accident (CVA) due to thrombosis of right carotid artery.  He presents with willingness to participate, impulsive, decreased insight, judgement, and awareness into deficit, strong family support, low activity tolerance and increased fatigue with 20 mins EOB Mod/Max A with flat paddle donned on L hand. Pt displays left innattention, and overall decreased attention;  Pt is pocketing food in L cheek. Performance deficits affecting function are weakness, impaired endurance, impaired sensation, impaired self care skills, impaired functional mobilty, gait instability, impaired balance, impaired cognition, decreased upper extremity function, decreased lower extremity function, abnormal tone, decreased safety awareness, decreased ROM, impaired fine motor, impaired coordination, other (comment)(left inattention).     Rehab Prognosis:  Good; patient would benefit from acute skilled OT services to address these deficits and reach maximum level of function.       Plan:     Patient to be seen 6 x/week to address the above listed problems via self-care/home management, therapeutic activities, therapeutic exercises  · Plan of Care Expires: 02/25/20  · Plan of Care Reviewed with: patient, spouse    Subjective     Pain/Comfort:  · Pain Rating 1: 0/10    Objective:     Communicated with: RNAndrea, PT MALIK Booth prior to and during session.  Patient found HOB elevated and eating lunch with assist from wife with bed alarm, blood pressure cuff, telemetry, pulse ox (continuous),  peripheral IV, SCD upon OT entry to room.    General Precautions: Standard, hearing impaired, aspiration(left innatention s/p stroke)   Orthopedic Precautions:N/A   Braces: N/A     Occupational Performance:   -pt requires max VC for sequencing, follow through, pt highly distractable requiring constant redirecting to task.    Bed Mobility:    · Patient completed Scooting/Bridging with maximal assistance and 2 persons; pt was able to assist supine bridge/scoot to HOB with R LE once it was bent and placed in position.  · Patient completed Supine to Sit with maximal assistance and 2 persons  · Patient completed Sit to Supine with maximal assistance and 2 persons     Functional Mobility/Transfers:  · Patient completed Sit <> Stand Transfer with maximal assistance and of 2 persons  with  no assistive device and against instructions of PT and OTR.   · Functional Mobility: POOR; pt is impulsive and does not have sufficient insight into his deficit; requests to stand (and then attempted x3) when he cannot sit up unsupported and is sliding off bed with knees and feet being blocked by both OTR and PT.     Treatment & Education:  -with flat paddled donned by OTR on L hand, and sitting EOB with Mod/Max A x 2ppl, pt engaged in weight shifting through hips with hands by sides in position of support requiring max verbal and tactile cues from TAYLOR to facilitate lateral pelvic tilt. Pt performed 10x each side with Mod/Max A; began to close eyes and loose stability in his LE which were weight bearing on floor. Pt agreed he was fatigued; cessation of activity and back to bed.    Patient left HOB elevated with all lines intact, call button in reach, RN Andrea notified and wife presentEducation:      GOALS:   Multidisciplinary Problems     Occupational Therapy Goals        Problem: Occupational Therapy Goal    Goal Priority Disciplines Outcome Interventions   Occupational Therapy Goal     OT, PT/OT Ongoing, Progressing    Description:   Goals to be met by: 2/16/20     Patient will increase functional independence with ADLs by performing:    Patient will tolerate sitting EOB x 10 minutes with CGA to SBA to maintain midline  Grooming EOB with Min (A)  UB dressing EOB with Mod (A)                       Time Tracking:     OT Date of Treatment: 02/05/20  OT Start Time: 1140  OT Stop Time: 1223  OT Total Time (min): 43 min    Billable Minutes:Therapeutic Activity 23min    AMADOU Brizuela  2/5/2020

## 2020-02-06 LAB
ALBUMIN SERPL BCP-MCNC: 3.9 G/DL (ref 3.5–5.2)
ALP SERPL-CCNC: 64 U/L (ref 55–135)
ALT SERPL W/O P-5'-P-CCNC: 15 U/L (ref 10–44)
ANION GAP SERPL CALC-SCNC: 9 MMOL/L (ref 8–16)
AST SERPL-CCNC: 14 U/L (ref 10–40)
BASOPHILS # BLD AUTO: 0.03 K/UL (ref 0–0.2)
BASOPHILS NFR BLD: 0.4 % (ref 0–1.9)
BILIRUB SERPL-MCNC: 0.9 MG/DL (ref 0.1–1)
BUN SERPL-MCNC: 21 MG/DL (ref 8–23)
CALCIUM SERPL-MCNC: 9.3 MG/DL (ref 8.7–10.5)
CHLORIDE SERPL-SCNC: 105 MMOL/L (ref 95–110)
CO2 SERPL-SCNC: 26 MMOL/L (ref 23–29)
CREAT SERPL-MCNC: 1.5 MG/DL (ref 0.5–1.4)
DIFFERENTIAL METHOD: NORMAL
EOSINOPHIL # BLD AUTO: 0.1 K/UL (ref 0–0.5)
EOSINOPHIL NFR BLD: 2 % (ref 0–8)
ERYTHROCYTE [DISTWIDTH] IN BLOOD BY AUTOMATED COUNT: 12.6 % (ref 11.5–14.5)
EST. GFR  (AFRICAN AMERICAN): 55 ML/MIN/1.73 M^2
EST. GFR  (NON AFRICAN AMERICAN): 47 ML/MIN/1.73 M^2
GLUCOSE SERPL-MCNC: 106 MG/DL (ref 70–110)
HCT VFR BLD AUTO: 42.5 % (ref 40–54)
HGB BLD-MCNC: 14.6 G/DL (ref 14–18)
IMM GRANULOCYTES # BLD AUTO: 0.03 K/UL (ref 0–0.04)
LYMPHOCYTES # BLD AUTO: 2.4 K/UL (ref 1–4.8)
LYMPHOCYTES NFR BLD: 33.6 % (ref 18–48)
MCH RBC QN AUTO: 29.3 PG (ref 27–31)
MCHC RBC AUTO-ENTMCNC: 34.4 G/DL (ref 32–36)
MCV RBC AUTO: 85 FL (ref 82–98)
MONOCYTES # BLD AUTO: 0.7 K/UL (ref 0.3–1)
MONOCYTES NFR BLD: 10.2 % (ref 4–15)
NEUTROPHILS # BLD AUTO: 3.8 K/UL (ref 1.8–7.7)
NEUTROPHILS NFR BLD: 53.4 % (ref 38–73)
NRBC BLD-RTO: 0 /100 WBC
PLATELET # BLD AUTO: 232 K/UL (ref 150–350)
PMV BLD AUTO: 9.2 FL (ref 9.2–12.9)
POTASSIUM SERPL-SCNC: 3.5 MMOL/L (ref 3.5–5.1)
PROT SERPL-MCNC: 6.9 G/DL (ref 6–8.4)
RBC # BLD AUTO: 4.99 M/UL (ref 4.6–6.2)
SODIUM SERPL-SCNC: 140 MMOL/L (ref 136–145)
WBC # BLD AUTO: 7.09 K/UL (ref 3.9–12.7)

## 2020-02-06 PROCEDURE — 80053 COMPREHEN METABOLIC PANEL: CPT | Mod: HCNC

## 2020-02-06 PROCEDURE — 85025 COMPLETE CBC W/AUTO DIFF WBC: CPT | Mod: HCNC

## 2020-02-06 PROCEDURE — 97530 THERAPEUTIC ACTIVITIES: CPT | Mod: HCNC,CO

## 2020-02-06 PROCEDURE — 25000003 PHARM REV CODE 250: Mod: HCNC | Performed by: HOSPITALIST

## 2020-02-06 PROCEDURE — 97530 THERAPEUTIC ACTIVITIES: CPT | Mod: HCNC

## 2020-02-06 PROCEDURE — 94761 N-INVAS EAR/PLS OXIMETRY MLT: CPT | Mod: HCNC

## 2020-02-06 PROCEDURE — 12000002 HC ACUTE/MED SURGE SEMI-PRIVATE ROOM: Mod: HCNC

## 2020-02-06 PROCEDURE — 36415 COLL VENOUS BLD VENIPUNCTURE: CPT | Mod: HCNC

## 2020-02-06 RX ADMIN — CARVEDILOL 6.25 MG: 6.25 TABLET, FILM COATED ORAL at 08:02

## 2020-02-06 RX ADMIN — BUPROPION HYDROCHLORIDE 150 MG: 150 TABLET, FILM COATED, EXTENDED RELEASE ORAL at 09:02

## 2020-02-06 RX ADMIN — TAMSULOSIN HYDROCHLORIDE 0.4 MG: 0.4 CAPSULE ORAL at 09:02

## 2020-02-06 RX ADMIN — ASPIRIN 81 MG: 81 TABLET, COATED ORAL at 09:02

## 2020-02-06 RX ADMIN — ROSUVASTATIN CALCIUM 40 MG: 20 TABLET, FILM COATED ORAL at 09:02

## 2020-02-06 RX ADMIN — AMLODIPINE BESYLATE 10 MG: 5 TABLET ORAL at 09:02

## 2020-02-06 RX ADMIN — CARVEDILOL 6.25 MG: 6.25 TABLET, FILM COATED ORAL at 09:02

## 2020-02-06 RX ADMIN — CLOPIDOGREL BISULFATE 75 MG: 75 TABLET ORAL at 09:02

## 2020-02-06 RX ADMIN — COLCHICINE 0.6 MG: 0.6 CAPSULE ORAL at 09:02

## 2020-02-06 NOTE — ASSESSMENT & PLAN NOTE
Chronic, controlled.  Latest blood pressure and vitals reviewed-   Temp:  [96.2 °F (35.7 °C)-97.7 °F (36.5 °C)]   Pulse:  [58-74]   Resp:  [13-37]   BP: (111-158)/(57-88)   SpO2:  [92 %-99 %] .   Home meds for hypertension were reviewed and noted below. Hospital anti-hypertensive changes were made as shown below.  Outpt Hypertension Medications             amLODIPine (NORVASC) 10 MG tablet Take 1 tablet (10 mg total) by mouth once daily.    carvedilol (COREG) 6.25 MG tablet Take 1 tablet (6.25 mg total) by mouth 2 (two) times daily with meals.    nitroGLYCERIN (NITROSTAT) 0.4 MG SL tablet Place 1 tablet (0.4 mg total) under the tongue every 5 (five) minutes as needed for Chest pain.      Hospital Medications             amLODIPine tablet 10 mg 10 mg, Oral, Daily    carvediloL tablet 6.25 mg 6.25 mg, Oral, 2 times daily        Will utilize p.r.n. blood pressure medication only if patient's blood pressure greater than  180/110 and he develops symptoms such as worsening chest pain or shortness of breath.

## 2020-02-06 NOTE — PLAN OF CARE
NEFTALI attempted to contact Evon with [employer] [phone] opt 2 ext. [phone] regarding authorization for Rehab admission with no answer.  NEFTALI left voice message for return call with status update.MERI scott

## 2020-02-06 NOTE — PROGRESS NOTES
"[facility] Medicine  Progress Note    Patient Name: Buddy Park  MRN: [id number]  Patient Class: IP- Inpatient   Admission Date: 2/1/2020  Length of Stay: 2 days  Attending Physician: Jose Flaherty MD  Primary Care Provider: Buddy Chatman MD        Subjective:     Principal Problem:Cerebrovascular accident (CVA) due to occlusion of small artery        HPI:  Buddy Park is a 69 y.o. male with PMHx of HTN, HLD,  who presented to the ED for evaluation of left sided weakness and left facial droop that was noticed this morning by his spouse. However on discussion with patient he mentions he himself had started noticing symptoms of left arm weakness since yesterday morning and had difficulty writing. His speech as per him is baseline as he has a speech deficit from prio . Patient reports onset of fatigue x1 week ago. The spouse endorses noticing the patient "dragging his left foot" this morning, prompting her to bring him to the ED.He is a nonsmoker.He does endorse non compliance to his medications in the last 2 months including his antihypertensive.Patient mentions left carotid neck surgery ~x1-1.5 years ago. Patient has no other medical concerns or complaints at this moment. SHx includes Ear mastoidectomy w/ cochlear implant w/ landmark and Carotid angioplasty.  Patient admitted for work up of possible stroke with neurology consult.     Overview/Hospital Course:  No notes on file    Interval History:   No new complaints.  Working well with PT and OT.  Case mgmt working on rehab referral.    Review of Systems   Constitutional: Negative for chills and fever.   Respiratory: Negative for cough and shortness of breath.    Cardiovascular: Negative for chest pain.   Gastrointestinal: Negative for abdominal pain.     Objective:     Vital Signs (Most Recent):  Temp: 96.2 °F (35.7 °C) (02/05/20 2211)  Pulse: 74 (02/05/20 2211)  Resp: 18 (02/05/20 2211)  BP: 123/67 (02/05/20 2211)  SpO2: 97 " % (02/05/20 2211) Vital Signs (24h Range):  Temp:  [96.2 °F (35.7 °C)-97.7 °F (36.5 °C)] 96.2 °F (35.7 °C)  Pulse:  [58-74] 74  Resp:  [13-37] 18  SpO2:  [92 %-99 %] 97 %  BP: (111-158)/(57-88) 123/67     Weight: 71.9 kg (158 lb 8.2 oz)  Body mass index is 26.38 kg/m².    Intake/Output Summary (Last 24 hours) at 2/5/2020 2311  Last data filed at 2/5/2020 2215  Gross per 24 hour   Intake 100 ml   Output 650 ml   Net -550 ml      Physical Exam   Constitutional: He is oriented to person, place, and time. No distress.   HENT:   Head: Atraumatic.   Mouth/Throat: Oropharynx is clear and moist.   Eyes: Right eye exhibits no discharge. Left eye exhibits no discharge.   Neck: No JVD present.   Cardiovascular: Normal rate and regular rhythm.   Pulmonary/Chest: Effort normal and breath sounds normal.   Abdominal: Soft. Bowel sounds are normal. He exhibits no distension. There is no tenderness.   Musculoskeletal: He exhibits no edema.   Neurological: He is alert and oriented to person, place, and time. A cranial nerve deficit (left facial droop) is present. He displays Babinski's sign on the left side.   Has 0-1/5 strength throughout left upper extremity.  3/5 in left lower extremity.  Left pronator drift.  Right side strength is 5/5.   Skin: Skin is warm and dry. No rash noted. He is not diaphoretic.       Significant Labs: All pertinent labs within the past 24 hours have been reviewed.    Significant Imaging: I have reviewed all pertinent imaging results/findings within the past 24 hours.      Assessment/Plan:      * Cerebrovascular accident (CVA) due to occlusion of small artery  Continue statin and antiplt's.  Neurology following.  Echo with bubble done--- good EF.  No thrombus.  No intracardiac shunts.  It appears to be occlusion of a small artery in right hemispheric white matter.  Continue rehab services while admitted.  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  Consulting case mgmt for rehab referral.  He is stable enough  to be moved out of ICU.      BMI 26.0-26.9,adult  Counseled regarding weight loss    Carotid disease, bilateral  S/p left CEA  CTA shows 40% NITZA.  Continue antiplatelets.  Defer decision on surgical intervention to neurology.    Deafness  Has cochlear implants        HTN (hypertension)  Chronic, controlled.  Latest blood pressure and vitals reviewed-   Temp:  [96.2 °F (35.7 °C)-97.7 °F (36.5 °C)]   Pulse:  [58-74]   Resp:  [13-37]   BP: (111-158)/(57-88)   SpO2:  [92 %-99 %] .   Home meds for hypertension were reviewed and noted below. Hospital anti-hypertensive changes were made as shown below.  Outpt Hypertension Medications             amLODIPine (NORVASC) 10 MG tablet Take 1 tablet (10 mg total) by mouth once daily.    carvedilol (COREG) 6.25 MG tablet Take 1 tablet (6.25 mg total) by mouth 2 (two) times daily with meals.    nitroGLYCERIN (NITROSTAT) 0.4 MG SL tablet Place 1 tablet (0.4 mg total) under the tongue every 5 (five) minutes as needed for Chest pain.      Hospital Medications             amLODIPine tablet 10 mg 10 mg, Oral, Daily    carvediloL tablet 6.25 mg 6.25 mg, Oral, 2 times daily        Will utilize p.r.n. blood pressure medication only if patient's blood pressure greater than  180/110 and he develops symptoms such as worsening chest pain or shortness of breath.      Hyperlipidemia  Continue on statin.          VTE Risk Mitigation (From admission, onward)         Ordered     IP VTE LOW RISK PATIENT  Once      02/04/20 1120     Place sequential compression device  Until discontinued      02/04/20 1120                      Jose Flaherty MD  Department of Hospital Medicine   [facility]

## 2020-02-06 NOTE — PT/OT/SLP PROGRESS
"Physical Therapy Treatment    Patient Name:  Buddy Park   MRN:  [id number]    Recommendations:     Discharge Recommendations:  rehabilitation facility   Discharge Equipment Recommendations: (TBD at next level of care)   Barriers to discharge: None    Assessment:     Buddy Park is a 69 y.o. male admitted with a medical diagnosis of Cerebrovascular accident (CVA) due to occlusion of small artery.  He presents with the following impairments/functional limitations:  weakness, impaired self care skills, impaired balance, decreased coordination, decreased safety awareness, impaired coordination, decreased upper extremity function, impaired functional mobilty, impaired endurance, impaired cognition, decreased lower extremity function. PT/OT co-treat performed. Patient requires MaxA for supine to sit transfer. Continued focus on sitting balance during treatment with VC and TC for patient to keep right hand in his lap to avoid pushing to the left. OT facilitated WB through LUE on the bed. Patient requires Min-MaxA to maintain sitting balance at midline. He requires MaxAx2 for sit to stand transfer with albin walker. Patient maintained all weight on the right with slight weight shift through the left great toe x2 trials of sit to stand. He stood for 2-3 minutes each trial. He rolls to the left with SBA for placement of bedpan and to be cleaned. Patient was tired throughout the session and every time he yawned it facilitated left elbow flexion.     Rehab Prognosis: Fair; patient would benefit from acute skilled PT services to address these deficits and reach maximum level of function.    Recent Surgery: * No surgery found *      Plan:     During this hospitalization, patient to be seen daily to address the identified rehab impairments via gait training, therapeutic activities, therapeutic exercises and progress toward the following goals:    · Plan of Care Expires:  03/04/20    Subjective     Chief Complaint: "I want to " "walk"  Patient/Family Comments/goals: walk  Pain/Comfort:  · Pain Rating 1: 0/10      Objective:     Communicated with LAURA Calvo prior to session.  Patient found HOB elevated with telemetry upon PT entry to room.     General Precautions: Standard, aspiration, hearing impaired   Orthopedic Precautions:N/A   Braces: N/A     Functional Mobility:  · Bed Mobility:     · Rolling Left:  stand by assistance  · Supine to Sit: maximal assistance  · Sit to Supine: maximal assistance and of 2 persons  · Transfers:     · Sit to Stand:  maximal assistance and of 2 persons with hemiwalker  · Balance: poor      AM-PAC 6 CLICK MOBILITY  Turning over in bed (including adjusting bedclothes, sheets and blankets)?: 2  Sitting down on and standing up from a chair with arms (e.g., wheelchair, bedside commode, etc.): 2  Moving from lying on back to sitting on the side of the bed?: 2  Moving to and from a bed to a chair (including a wheelchair)?: 1  Need to walk in hospital room?: 1  Climbing 3-5 steps with a railing?: 1  Basic Mobility Total Score: 9       Therapeutic Activities and Exercises:   Patient was educated on the importance of balance and core strength for transfers, safety, importance of giving attention to his left side    Patient left HOB elevated with call button in reach, bed alarm on, RN notified and wife present..    GOALS:   Multidisciplinary Problems     Physical Therapy Goals        Problem: Physical Therapy Goal    Goal Priority Disciplines Outcome Goal Variances Interventions   Physical Therapy Goal     PT, PT/OT Ongoing, Progressing     Description:  Goals to be met by: 2020     Patient will increase functional independence with mobility by performin. Supine to sit with Minimal Assistance  2. Sit to stand transfer with Moderate assistance with albin walker   3. Gait  x 10 feet with Moderate assistance using albin walker.   4. Bed to chair transfer with Moderate assistance using albin walker                  "       Time Tracking:     PT Received On: 02/06/20  PT Start Time: 1333     PT Stop Time: 1406  PT Total Time (min): 33 min     Billable Minutes: Therapeutic Activity 15    Treatment Type: Treatment  PT/PTA: PT     PTA Visit Number: 0     Emmy Trejo, PT  02/06/2020

## 2020-02-06 NOTE — PLAN OF CARE
Problem: Occupational Therapy Goal  Goal: Occupational Therapy Goal  Description  Goals to be met by: 2/16/20     Patient will increase functional independence with ADLs by performing:    Patient will tolerate sitting EOB x 10 minutes with CGA to SBA to maintain midline  Grooming EOB with Min (A)  UB dressing EOB with Mod (A)      Outcome: Ongoing, Progressing

## 2020-02-06 NOTE — PLAN OF CARE
Patient transferred from ICU this shift. Awake, alert, and oriented. Comfort level established. No complaints of pain. Bed in low position, brakes locked, side rails up x 2, call light w/in reach. Family at BS. Verbalized understanding of POC. Open communication facilitated. Will continue to monitor.

## 2020-02-06 NOTE — PLAN OF CARE
POC reviewed with pt who verbalized understanding, pt AAOX4, PIV 20G to right hand, site clean dry and intact no redness or swelling noted, remains afebrile during shift, remains free of falls/injury during shift, denies pain, pt noted to have left side paralysis, left side facial droop, pt is deaf, has implant to left side and hearing aid to right side, refused PINKY/SCD, bed in low and locked position with side rails X2, safety maintained, personal items and call light in reach at bedside, will cont to monitor for safety

## 2020-02-06 NOTE — SUBJECTIVE & OBJECTIVE
Interval History:   No new complaints.  Working well with PT and OT.  Case mgmt working on rehab referral.    Review of Systems   Constitutional: Negative for chills and fever.   Respiratory: Negative for cough and shortness of breath.    Cardiovascular: Negative for chest pain.   Gastrointestinal: Negative for abdominal pain.     Objective:     Vital Signs (Most Recent):  Temp: 96.2 °F (35.7 °C) (02/05/20 2211)  Pulse: 74 (02/05/20 2211)  Resp: 18 (02/05/20 2211)  BP: 123/67 (02/05/20 2211)  SpO2: 97 % (02/05/20 2211) Vital Signs (24h Range):  Temp:  [96.2 °F (35.7 °C)-97.7 °F (36.5 °C)] 96.2 °F (35.7 °C)  Pulse:  [58-74] 74  Resp:  [13-37] 18  SpO2:  [92 %-99 %] 97 %  BP: (111-158)/(57-88) 123/67     Weight: 71.9 kg (158 lb 8.2 oz)  Body mass index is 26.38 kg/m².    Intake/Output Summary (Last 24 hours) at 2/5/2020 2311  Last data filed at 2/5/2020 2215  Gross per 24 hour   Intake 100 ml   Output 650 ml   Net -550 ml      Physical Exam   Constitutional: He is oriented to person, place, and time. No distress.   HENT:   Head: Atraumatic.   Mouth/Throat: Oropharynx is clear and moist.   Eyes: Right eye exhibits no discharge. Left eye exhibits no discharge.   Neck: No JVD present.   Cardiovascular: Normal rate and regular rhythm.   Pulmonary/Chest: Effort normal and breath sounds normal.   Abdominal: Soft. Bowel sounds are normal. He exhibits no distension. There is no tenderness.   Musculoskeletal: He exhibits no edema.   Neurological: He is alert and oriented to person, place, and time. A cranial nerve deficit (left facial droop) is present. He displays Babinski's sign on the left side.   Has 0-1/5 strength throughout left upper extremity.  3/5 in left lower extremity.  Left pronator drift.  Right side strength is 5/5.   Skin: Skin is warm and dry. No rash noted. He is not diaphoretic.       Significant Labs: All pertinent labs within the past 24 hours have been reviewed.    Significant Imaging: I have reviewed all  pertinent imaging results/findings within the past 24 hours.

## 2020-02-06 NOTE — PROGRESS NOTES
[facility]  Neurology  Progress Note    Patient Name: Buddy Park  MRN: [id number]  Admission Date: 2/1/2020  Hospital Length of Stay: 3 days  Code Status: Full Code   Attending Provider: Jose Flaherty MD  Primary Care Physician: Bdudy Chatman MD   Principal Problem:Cerebrovascular accident (CVA) due to occlusion of small artery    Subjective:     Interval History: Patient seen and examined in room, wife is at bedside. Dr. Clayton Whalen consulted on the patient's POC. Patient reports not events overnight.    Current Medications:     Current Facility-Administered Medications   Medication Dose Route Frequency Provider Last Rate Last Dose    amLODIPine tablet 10 mg  10 mg Oral Daily Jose Flaherty MD   10 mg at 02/06/20 0942    aspirin EC tablet 81 mg  81 mg Oral Daily Jose Flaherty MD   81 mg at 02/06/20 0942    buPROPion TB24 tablet 150 mg  150 mg Oral Daily Jose Flaherty MD   150 mg at 02/06/20 0942    carvediloL tablet 6.25 mg  6.25 mg Oral BID Jose Flaherty MD   6.25 mg at 02/06/20 0942    clopidogreL tablet 75 mg  75 mg Oral Daily Jose Flaherty MD   75 mg at 02/06/20 0942    colchicine capsule/tablet 0.6 mg  0.6 mg Oral Daily Jose Flaherty MD   0.6 mg at 02/06/20 0942    ondansetron injection 4 mg  4 mg Intravenous Q6H PRN Jose Flaherty MD   4 mg at 02/04/20 0406    rosuvastatin tablet 40 mg  40 mg Oral Daily Jose Flaherty MD   40 mg at 02/06/20 0942    sodium chloride 0.9% flush 10 mL  10 mL Intravenous PRN Jose Flaherty MD   10 mL at 02/04/20 0812    tamsulosin 24 hr capsule 0.4 mg  1 capsule Oral Daily Jose Flaherty MD   0.4 mg at 02/06/20 0942     Facility-Administered Medications Ordered in Other Encounters   Medication Dose Route Frequency Provider Last Rate Last Dose    lactated ringers infusion   Intravenous Continuous Demario Yates MD 0 mL/hr at 10/28/19 1405         Review of Systems   As per HPI  Objective:     Vital Signs  (Most Recent):  Temp: 96.7 °F (35.9 °C) (02/06/20 1159)  Pulse: 71 (02/06/20 1159)  Resp: 18 (02/06/20 1159)  BP: (!) 134/57 (02/06/20 1159)  SpO2: 96 % (02/06/20 1159) Vital Signs (24h Range):  Temp:  [96.2 °F (35.7 °C)-98.1 °F (36.7 °C)] 96.7 °F (35.9 °C)  Pulse:  [68-79] 71  Resp:  [16-18] 18  SpO2:  [94 %-97 %] 96 %  BP: (104-139)/(57-78) 134/57     Weight: 71.9 kg (158 lb 8.2 oz)  Body mass index is 26.38 kg/m².    Physical Exam  Constitutional: He is oriented to person, place, and time. He appears well-developed and well-nourished.   HENT:   Head: Normocephalic and atraumatic.   Eyes: Pupils are equal, round, and reactive to light. EOM are normal.   Neck: Normal range of motion. Neck supple.   Cardiovascular: Normal rate, regular rhythm, normal heart sounds and intact distal pulses.   Pulmonary/Chest: Effort normal and breath sounds normal.   Abdominal: Soft. Bowel sounds are normal.   Musculoskeletal: Normal range of motion.   Neurological: He is alert and oriented to person, place, and time. A cranial nerve deficit is present. He has a normal Finger-Nose-Finger Test to RUE but unable to perform with LUE  Reflex Scores:       Tricep reflexes are 2+ on the right side and 2+ on the left side.       Bicep reflexes are 2+ on the right side and 2+ on the left side.       Brachioradialis reflexes are 2+ on the right side and 2+ on the left side.       Patellar reflexes are 1+ on the right side and 1+ on the left side.       Achilles reflexes are 1+ on the right side and 1+ on the left side.  Skin: Skin is warm. Capillary refill takes less than 2 seconds.   Psychiatric: He has a normal mood and affect. His behavior is normal. Judgment and thought content normal. His speech is mildly slurred.         NEUROLOGICAL EXAMINATION:      MENTAL STATUS   Oriented to person, place, and time.   Follows 2 step commands.   Attention: normal. Concentration: normal.   Speech: slurred (Patient reports this is his baseline)  Level  of consciousness: alert  Knowledge: good.   Able to name object. Able to repeat.      CRANIAL NERVES      CN II   Visual fields full to confrontation.   Right visual field deficit: none  Left visual field deficit: none      CN III, IV, VI   Pupils are equal, round, and reactive to light.  Extraocular motions are normal.   CN III: no CN III palsy  CN VI: no CN VI palsy  Diplopia: none     CN V   Denies facial sensation deficits      CN VII   Left facial weakness: present     CN VIII   Hearing: impaired (Has hearing aid in left ear and cochlear implant in right ear)     CN IX, X   CN IX normal.      CN XI   CN XI normal.      CN XII   CN XII normal.      MOTOR EXAM   Muscle bulk: normal  Overall muscle tone: normal  Right arm tone: normal  Left arm tone: decreased  Right arm pronator drift: absent  Left arm pronator drift: present- unable to lift from bed   Right leg tone: normal  Left leg tone:decreased    LLE:   3/5     REFLEXES      Reflexes   Right brachioradialis: 2+  Left brachioradialis: 2+  Right biceps: 2+  Left biceps: 2+  Right triceps: 2+  Left triceps: 2+  Right patellar: 1+  Left patellar: 1+  Right achilles: 1+  Left achilles: 1+       SENSORY EXAM   Sensation intact     GAIT AND COORDINATION      Coordination   Finger to nose coordination: normal RUE, unable to perform LUE      Tremor   Resting tremor: absent  Intention tremor: absent  Action tremor: absent              Significant Labs:   Hemoglobin A1c:   Recent Labs   Lab 02/01/20  1122   HGBA1C 5.5     CBC:   Recent Labs   Lab 02/05/20  0413 02/06/20  0425   WBC 6.67 7.09   HGB 14.4 14.6   HCT 42.4 42.5    232     CMP:   Recent Labs   Lab 02/05/20  0413 02/06/20  0425   GLU 96 106    140   K 3.4* 3.5    105   CO2 25 26   BUN 20 21   CREATININE 1.4 1.5*   CALCIUM 9.4 9.3   PROT 6.8 6.9   ALBUMIN 3.8 3.9   BILITOT 0.9 0.9   ALKPHOS 63 64   AST 16 14   ALT 15 15   ANIONGAP 10 9   EGFRNONAA 52* 47*     All pertinent lab results from  the past 24 hours have been reviewed.    Significant Imaging: I have reviewed all pertinent imaging results/findings within the past 24 hours.    Assessment and Plan:  1. CVA   -Repeat CT in AM 2/3/2020- noted   Repeat CT 2/4/2020  -CTA Head and Neck - noted   -TTE with bubble study- noted   -Continue ASA 81mg and Plavix 75mg daily  -Recommend Statin for secondary stroke prevention  -PT/OT/STreat   -Patient would benefit from brief inpatient rehab stay for extensive physical and occupational therapy. Patient is currently waiting for placement.   -Frequent Neuro Checks  -Fall Precautions     2. Syncope  -as noted above   -EEG normal study   -Frequent Neuro checks  -Fall Precautions    3. Carotid artery disease   -s/p left CEA  -CTA shows 40% stenosis identified at the origin of the right internal carotid artery secondary to plaque.   -patient to f/u in clinic      3. HTN  -IM to Manage     4. Hyperlipidemia  -Recommend statin for secondary stroke prevention  -IM to Manage     [phone] 2 weeks from discharge.     Stroke education was provided including stroke risk factors modification and any acute neurological changes including weakness, confusion, visual changes to come straight to the ER. Patient instructed no driving until follow up appointment in clinic.      All side effects of new medications were discussed with patient and/or next of kin and all questions were answered.                   Active Diagnoses:    Diagnosis Date Noted POA    PRINCIPAL PROBLEM:  Cerebrovascular accident (CVA) due to occlusion of small artery [I63.81] 02/03/2020 Yes    BMI 26.0-26.9,adult [Z68.26] 03/19/2019 Not Applicable    Carotid disease, bilateral [I73.9] 02/09/2017 Yes    Deafness [H91.90] 04/12/2016 Yes    HTN (hypertension) [I10] 11/03/2014 Yes    Hyperlipidemia [E78.5]  Yes      Problems Resolved During this Admission:       VTE Risk Mitigation (From  admission, onward)         Ordered     IP VTE LOW RISK PATIENT  Once      02/04/20 1120     Place sequential compression device  Until discontinued      02/04/20 1120                Mandy Goddard DNP  Neurology  [facility]    I, Dr. Clayton Whalen, discussed care with my advanced practitioner and agree with above. I have reviewed patient clinical presentation, work up, impression and plan. Stable.

## 2020-02-06 NOTE — PT/OT/SLP PROGRESS
Occupational Therapy   Treatment    Name: Buddy Park  MRN: [id number]  Admitting Diagnosis:  Cerebrovascular accident (CVA) due to occlusion of small artery       Recommendations:     Discharge Recommendations: rehabilitation facility  Discharge Equipment Recommendations:  other (see comments)(TBD at next level of care)  Barriers to discharge:       Assessment:     Buddy Park is a 69 y.o. male with a medical diagnosis of Cerebrovascular accident (CVA) due to occlusion of small artery.  He presents with impulsivity, decreased activity tolerance, attention deficit,impaired judgement, impaired insight, Max A toileting for BM with bed pan, and Max A x 2 sit<>stand w/ hemiwalker and Max A x 2 supine<>sit. Pt I engaging and assisting with R UE and LE and is greatly impaired by trunk instability. Performance deficits affecting function are weakness, impaired endurance, impaired sensation, impaired self care skills, impaired functional mobilty, gait instability, impaired balance, impaired cognition, decreased upper extremity function, decreased lower extremity function, decreased safety awareness, decreased ROM, abnormal tone, impaired fine motor, impaired coordination, other (comment)(left inattention).     Rehab Prognosis:  Good; patient would benefit from acute skilled OT services to address these deficits and reach maximum level of function.       Plan:     Patient to be seen 6 x/week to address the above listed problems via self-care/home management, therapeutic activities, therapeutic exercises, neuromuscular re-education  · Plan of Care Expires: 02/25/20  · Plan of Care Reviewed with: patient, spouse    Subjective     Pain/Comfort:  · Pain Rating 1: 0/10    Objective:     Communicated with: Lubna SANCHEZ, prior to session.  Patient found HOB elevated with telemetry, peripheral IV upon OT entry to room.    General Precautions: Standard, aspiration, hearing impaired   Orthopedic Precautions:N/A   Braces: N/A  "    Occupational Performance:     Bed Mobility:    · Patient completed Rolling/Turning to Left with SBA  · Patient scoots to EOB with Joyce for L LE once sitting EOB.  · Patient completed Supine to Sit with maximal assistance and 2 persons (pt requires Max A with L LE and L UE as well as L torso)  · Patient completed Sit to Supine with maximal assistance and 2 persons     Functional Mobility/Transfers:  · Patient completed Sit <> Stand Transfer with maximal assistance and of 2 persons  with  hemiwalker and VC to push trhough R arm and bear weight through R leg to maintain standing. Pt standing ~3mins x 2 trials before fatiguing. During standing pt working on shifting weight into his L LE and was able to put ~2% of his body weight through the L LE.   · Functional Mobility: POOR; pt has static sitting balance that is greatly impaired by the pushing he is performing with his R hand when it is placed beside him on the bed. TAYLOR redirecting pt multiple times to keep R hand on R knee while sitting EOB to facilitate trunk engagement and encourage true midline while TAYLOR positioned L hand into a position of support; extended digits, wrist and elbow with tactile assist at distal humerus for full extension while keeping L hand flat with [place] A.  · Tone is beginning to increase in the L hand. When pt yawns the levators automatically  his L arm 6in into the air and flex his hand into a fist. This happened 5 times during tx and pt was guided to look at the L hand when he yawns.    Treatment & Education:  -PROM L Gh flexion x 10 and L elbow flexion x 10.  -L hand exercise; self ROM to extend all digits with a 5 second hold and repeat often through out the day TAYLOR ed that the more pt does this the better his chances are of having a functional hand. Pt and wife v/u and pt demonstrating the flexing of all digits that he has been doing to "try to get it moving". TAYLOR advised against doing this excessively 2* increased tone of " the L hand occurring.  -TAYLOR educated on possible resting hand splint or splint that will keep his L hand in extension for a period of the day and pt and wife v/u.    Patient left HOB elevated with call button in reach, bed alarm on, RNLubna notified and wife presentEducation:      GOALS:   Multidisciplinary Problems     Occupational Therapy Goals        Problem: Occupational Therapy Goal    Goal Priority Disciplines Outcome Interventions   Occupational Therapy Goal     OT, PT/OT Ongoing, Progressing    Description:  Goals to be met by: 2/16/20     Patient will increase functional independence with ADLs by performing:    Patient will tolerate sitting EOB x 10 minutes with CGA to SBA to maintain midline  Grooming EOB with Min (A)  UB dressing EOB with Mod (A)                       Time Tracking:     OT Date of Treatment: 02/06/20  OT Start Time: 1332  OT Stop Time: 1405  OT Total Time (min): 33 min    Billable Minutes:Therapeutic Activity 16min    AMADOU Brizuela  2/6/2020

## 2020-02-06 NOTE — ASSESSMENT & PLAN NOTE
Continue statin and antiplt's.  Neurology following.  Echo with bubble done--- good EF.  No thrombus.  No intracardiac shunts.  It appears to be occlusion of a small artery in right hemispheric white matter.  Continue rehab services while admitted.  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  Consulting case mgmt for rehab referral.  He is stable enough to be moved out of ICU.

## 2020-02-06 NOTE — PLAN OF CARE
02/05/20 1913   PRE-TX-O2   O2 Device (Oxygen Therapy) room air   SpO2 (!) 94 %   Pulse Oximetry Type Intermittent   Pulse 70   Resp 18

## 2020-02-07 PROCEDURE — 94761 N-INVAS EAR/PLS OXIMETRY MLT: CPT | Mod: HCNC

## 2020-02-07 PROCEDURE — 25000003 PHARM REV CODE 250: Mod: HCNC | Performed by: HOSPITALIST

## 2020-02-07 PROCEDURE — 97530 THERAPEUTIC ACTIVITIES: CPT | Mod: HCNC,CO

## 2020-02-07 PROCEDURE — 97530 THERAPEUTIC ACTIVITIES: CPT | Mod: HCNC

## 2020-02-07 PROCEDURE — 12000002 HC ACUTE/MED SURGE SEMI-PRIVATE ROOM: Mod: HCNC

## 2020-02-07 PROCEDURE — 97112 NEUROMUSCULAR REEDUCATION: CPT | Mod: HCNC,CO

## 2020-02-07 RX ORDER — CLOPIDOGREL BISULFATE 75 MG/1
75 TABLET ORAL DAILY
Qty: 30 TABLET | Refills: 11
Start: 2020-02-08 | End: 2020-04-03 | Stop reason: SDUPTHER

## 2020-02-07 RX ADMIN — CARVEDILOL 6.25 MG: 6.25 TABLET, FILM COATED ORAL at 08:02

## 2020-02-07 RX ADMIN — COLCHICINE 0.6 MG: 0.6 CAPSULE ORAL at 09:02

## 2020-02-07 RX ADMIN — TAMSULOSIN HYDROCHLORIDE 0.4 MG: 0.4 CAPSULE ORAL at 08:02

## 2020-02-07 RX ADMIN — CARVEDILOL 6.25 MG: 6.25 TABLET, FILM COATED ORAL at 09:02

## 2020-02-07 RX ADMIN — BUPROPION HYDROCHLORIDE 150 MG: 150 TABLET, FILM COATED, EXTENDED RELEASE ORAL at 08:02

## 2020-02-07 RX ADMIN — ASPIRIN 81 MG: 81 TABLET, COATED ORAL at 08:02

## 2020-02-07 RX ADMIN — AMLODIPINE BESYLATE 10 MG: 5 TABLET ORAL at 09:02

## 2020-02-07 RX ADMIN — CLOPIDOGREL BISULFATE 75 MG: 75 TABLET ORAL at 09:02

## 2020-02-07 RX ADMIN — ROSUVASTATIN CALCIUM 40 MG: 20 TABLET, FILM COATED ORAL at 08:02

## 2020-02-07 NOTE — ASSESSMENT & PLAN NOTE
Continue statin and antiplt's.  Neurology following.  Echo with bubble done--- good EF.  No thrombus.  No intracardiac shunts.  It appears to be occlusion of a small artery in right hemispheric white matter.  Will treat with antiplatelets.  Consulting case mgmt for rehab referral.

## 2020-02-07 NOTE — ASSESSMENT & PLAN NOTE
Chronic, controlled.  Latest blood pressure and vitals reviewed-   Temp:  [96.1 °F (35.6 °C)-98.1 °F (36.7 °C)]   Pulse:  [70-80]   Resp:  [16-18]   BP: (104-140)/(57-89)   SpO2:  [95 %-97 %] .   Home meds for hypertension were reviewed and noted below. Hospital anti-hypertensive changes were made as shown below.  Outpt Hypertension Medications             amLODIPine (NORVASC) 10 MG tablet Take 1 tablet (10 mg total) by mouth once daily.    carvedilol (COREG) 6.25 MG tablet Take 1 tablet (6.25 mg total) by mouth 2 (two) times daily with meals.    nitroGLYCERIN (NITROSTAT) 0.4 MG SL tablet Place 1 tablet (0.4 mg total) under the tongue every 5 (five) minutes as needed for Chest pain.      Hospital Medications             amLODIPine tablet 10 mg 10 mg, Oral, Daily    carvediloL tablet 6.25 mg 6.25 mg, Oral, 2 times daily        Will utilize p.r.n. blood pressure medication only if patient's blood pressure greater than  180/110 and he develops symptoms such as worsening chest pain or shortness of breath.

## 2020-02-07 NOTE — CARE UPDATE
02/07/20 0819   Patient Assessment/Suction   Level of Consciousness (AVPU) alert   Respiratory Effort Normal;Unlabored   PRE-TX-O2   O2 Device (Oxygen Therapy) room air   SpO2 (!) 93 %   Pulse Oximetry Type Intermittent   $ Pulse Oximetry - Multiple Charge Pulse Oximetry - Multiple   Pulse 67   Resp 16

## 2020-02-07 NOTE — PLAN OF CARE
Patient AAO, VSS. Pt verbalized understanding of POC. Left-sided weakness noted. Left-sided facial drooping noted. Patient deaf in bilateral ears. Hearing aid noted to right ear. Cochlear implant noted to left ear. Purposeful hourly/q2hr rounding done during shift to promote patient safety. Patient free from falls and injury during shift. Will continue to monitor.

## 2020-02-07 NOTE — PHYSICIAN QUERY
"PT Name: Buddy Park  MR #: [id number]    Physician Query Form - Heart  Condition Clarification     CDS: Anjana Michele RN  Contact information: [email or web address]  This form is a permanent document in the medical record.     Query Date: February 7, 2020  By submitting this query, we are merely seeking further clarification of documentation. Please utilize your independent clinical judgment when addressing the question(s) below.    The medical record contains the following   Indicators     Supporting Clinical Findings Location in Medical Record    BNP      EF     x Radiology findings Impression    Borderline heart size otherwise negative chest.  CXR 2/1   x Echo Results Conclusion   ·Concentric left ventricular remodeling. Normal left ventricular systolic function. The estimated ejection fraction is 60%. Grade 1 diastolic dysfunction.  ·Mild right ventricular enlargement. Normal right ventricular systolic function.  ·No significant valvular disorder observed.  ·Bubble study negative for R-L intra cardiac shunt. Echo 2/3    "Ascites" documented      "SOB" or "SESAY" documented      "Hypoxia" documented     x Heart Failure documented Buddy Park is a 69 y.o. male with PMHx of HTN, HLD, and CHF who presents to the ED for evaluation of left sided weakness and left facial droop that was noticed this morning by his spouse    Past Medical History:  Heart failure   ED provider note 2/1          H&P 2/1    "Edema" documented     x Diuretics/Meds Home meds for hypertension were reviewed and noted below.   amLODIPine (NORVASC) 10 MG   carvedilol (COREG) 6.25 MG   nitroGLYCERIN (NITROSTAT) 0.4 MG SL  HM PN 2/6    Treatment:      Other:      Heart failure (HF) can be acute, chronic or both. It is generally further specificed as systolic, diastolic, or combined. Lastly, it is important to identify an underlying etiology if known or suspected.     Common clues to acute exacerbation:  Rapidly progressive symptoms (w/in 2 weeks " of presentation), using IV diuretics to treat, using supplemental O2, pulmonary edema on Xray, MI w/in 4 weeks, and/or BNP >500    Systolic Heart Failure: is defined as chart documentation of a left ventricular ejection fraction (LVEF) less than 40%     Diastolic Heart Failure: is defined as a left ventricular ejection fraction (LVEF) greater than 40%   +      Evidence of diastolic dysfunction on echocardiography OR    Right heart catheterization wedge pressure above 12 mm Hg OR    Left heart catheterization left ventricular end diastolic pressure 18 mm Hg or above.    References: *American Heart Association    The clinical guidelines noted below are only system guidelines, and do not replace the providers clinical judgment.     Provider, please further specify the diagnosis associated with above clinical findings.    [ x  ] Chronic Diastolic Heart Failure - Pre-existing diastolic HF diagnosis.  EF > 40%  without  acute HF symptoms documented   [   ] Heart Failure Ruled Out   [   ] Other (please specify):   [  ] Clinically Undetermined                           Please document in your progress notes daily for the duration of treatment until resolved and include in your discharge summary.

## 2020-02-07 NOTE — PLAN OF CARE
Per Luis Enrique with [facility], authorization not submitted to patient's insurance.  NEFTALI explained to Luis Enrique that we were told that authorization was submitted by [facility] and that if/when [employer] denied our MD would do peer to peer.  Luis Enirque again stated that they did not submit authorization to [employer].  NEFTALI again asked why no one contact this SW as [facility] stated they did and was waiting on [employer] to deny/approved.  Per Luis Enrique he was told that they did not submit authorization and are NOT going to submit patient case to [employer].  NEFTALI met with patient and patient's spouse at bedside to inform that [facility] did not submit for authorization for patient discharge from this facility to theirs.  NEFTALI explained conversation with Luis Enrique to patient and family.  Patient' s spouse contacted Luis Enrique with this NEFTALI in the room.  Luis Enrique told this SW and patient's spouse that they ([facility] will not be submitting for authorization.  SW very apologetic to patient and family.  NEFTALI explained that patient information has been sent to [address] [place] [id number].  Both verbalized understanding.  Patient's spouse stated she will contact administration at [facility].  NEFTALI verbalized understanding.      02/07/20 3844   Post-Acute Status   Post-Acute Authorization Placement   Post-Acute Placement Status (!) Delay between Facility and Payor

## 2020-02-07 NOTE — SUBJECTIVE & OBJECTIVE
Interval History:   Nothing new in past 24 hoursl.    Review of Systems   Constitutional: Negative for chills and fever.   Respiratory: Negative for cough and shortness of breath.    Cardiovascular: Negative for chest pain.   Gastrointestinal: Negative for abdominal pain.     Objective:     Vital Signs (Most Recent):  Temp: 96.1 °F (35.6 °C) (02/06/20 1938)  Pulse: 80 (02/06/20 2000)  Resp: 18 (02/06/20 2000)  BP: 132/62 (02/06/20 1938)  SpO2: 95 % (02/06/20 2000) Vital Signs (24h Range):  Temp:  [96.1 °F (35.6 °C)-98.1 °F (36.7 °C)] 96.1 °F (35.6 °C)  Pulse:  [70-80] 80  Resp:  [16-18] 18  SpO2:  [95 %-97 %] 95 %  BP: (104-140)/(57-89) 132/62     Weight: 71.9 kg (158 lb 8.2 oz)  Body mass index is 26.38 kg/m².    Intake/Output Summary (Last 24 hours) at 2/6/2020 2245  Last data filed at 2/6/2020 1800  Gross per 24 hour   Intake 1310 ml   Output 625 ml   Net 685 ml      Physical Exam   Constitutional: He is oriented to person, place, and time. No distress.   HENT:   Head: Atraumatic.   Mouth/Throat: Oropharynx is clear and moist.   Eyes: Right eye exhibits no discharge. Left eye exhibits no discharge.   Neck: No JVD present.   Cardiovascular: Normal rate and regular rhythm.   Pulmonary/Chest: Effort normal and breath sounds normal.   Abdominal: Soft. Bowel sounds are normal. He exhibits no distension. There is no tenderness.   Musculoskeletal: He exhibits no edema.   Neurological: He is alert and oriented to person, place, and time. A cranial nerve deficit (left facial droop) is present. He displays Babinski's sign on the left side.   Has 0-1/5 strength throughout left upper extremity.  3/5 in left lower extremity.  Left pronator drift.  Right side strength is 5/5.   Skin: Skin is warm and dry. No rash noted. He is not diaphoretic.       Significant Labs: All pertinent labs within the past 24 hours have been reviewed.    Significant Imaging: I have reviewed all pertinent imaging results/findings within the past 24  hours.

## 2020-02-07 NOTE — PROGRESS NOTES
"[facility] Medicine  Progress Note    Patient Name: Buddy Park  MRN: [id number]  Patient Class: IP- Inpatient   Admission Date: 2/1/2020  Length of Stay: 3 days  Attending Physician: Jose Flaherty MD  Primary Care Provider: Buddy Chatman MD        Subjective:     Principal Problem:Cerebrovascular accident (CVA) due to occlusion of small artery        HPI:  Buddy Park is a 69 y.o. male with PMHx of HTN, HLD,  who presented to the ED for evaluation of left sided weakness and left facial droop that was noticed this morning by his spouse. However on discussion with patient he mentions he himself had started noticing symptoms of left arm weakness since yesterday morning and had difficulty writing. His speech as per him is baseline as he has a speech deficit from prio . Patient reports onset of fatigue x1 week ago. The spouse endorses noticing the patient "dragging his left foot" this morning, prompting her to bring him to the ED.He is a nonsmoker.He does endorse non compliance to his medications in the last 2 months including his antihypertensive.Patient mentions left carotid neck surgery ~x1-1.5 years ago. Patient has no other medical concerns or complaints at this moment. SHx includes Ear mastoidectomy w/ cochlear implant w/ landmark and Carotid angioplasty.  Patient admitted for work up of possible stroke with neurology consult.     Overview/Hospital Course:  No notes on file    Interval History:   Nothing new in past 24 hoursl.    Review of Systems   Constitutional: Negative for chills and fever.   Respiratory: Negative for cough and shortness of breath.    Cardiovascular: Negative for chest pain.   Gastrointestinal: Negative for abdominal pain.     Objective:     Vital Signs (Most Recent):  Temp: 96.1 °F (35.6 °C) (02/06/20 1938)  Pulse: 80 (02/06/20 2000)  Resp: 18 (02/06/20 2000)  BP: 132/62 (02/06/20 1938)  SpO2: 95 % (02/06/20 2000) Vital Signs (24h Range):  Temp:  " [96.1 °F (35.6 °C)-98.1 °F (36.7 °C)] 96.1 °F (35.6 °C)  Pulse:  [70-80] 80  Resp:  [16-18] 18  SpO2:  [95 %-97 %] 95 %  BP: (104-140)/(57-89) 132/62     Weight: 71.9 kg (158 lb 8.2 oz)  Body mass index is 26.38 kg/m².    Intake/Output Summary (Last 24 hours) at 2/6/2020 2245  Last data filed at 2/6/2020 1800  Gross per 24 hour   Intake 1310 ml   Output 625 ml   Net 685 ml      Physical Exam   Constitutional: He is oriented to person, place, and time. No distress.   HENT:   Head: Atraumatic.   Mouth/Throat: Oropharynx is clear and moist.   Eyes: Right eye exhibits no discharge. Left eye exhibits no discharge.   Neck: No JVD present.   Cardiovascular: Normal rate and regular rhythm.   Pulmonary/Chest: Effort normal and breath sounds normal.   Abdominal: Soft. Bowel sounds are normal. He exhibits no distension. There is no tenderness.   Musculoskeletal: He exhibits no edema.   Neurological: He is alert and oriented to person, place, and time. A cranial nerve deficit (left facial droop) is present. He displays Babinski's sign on the left side.   Has 0-1/5 strength throughout left upper extremity.  3/5 in left lower extremity.  Left pronator drift.  Right side strength is 5/5.   Skin: Skin is warm and dry. No rash noted. He is not diaphoretic.       Significant Labs: All pertinent labs within the past 24 hours have been reviewed.    Significant Imaging: I have reviewed all pertinent imaging results/findings within the past 24 hours.      Assessment/Plan:      * Cerebrovascular accident (CVA) due to occlusion of small artery  Continue statin and antiplt's.  Neurology following.  Echo with bubble done--- good EF.  No thrombus.  No intracardiac shunts.  It appears to be occlusion of a small artery in right hemispheric white matter.  Will treat with antiplatelets.  Consulting case mgmt for rehab referral.      BMI 26.0-26.9,adult  Counseled regarding weight loss    Carotid disease, bilateral  S/p left CEA  CTA shows 40%  NITZA.  Continue antiplatelets.  Defer decision on surgical intervention to neurology.    Deafness  Has cochlear implants        HTN (hypertension)  Chronic, controlled.  Latest blood pressure and vitals reviewed-   Temp:  [96.1 °F (35.6 °C)-98.1 °F (36.7 °C)]   Pulse:  [70-80]   Resp:  [16-18]   BP: (104-140)/(57-89)   SpO2:  [95 %-97 %] .   Home meds for hypertension were reviewed and noted below. Hospital anti-hypertensive changes were made as shown below.  Outpt Hypertension Medications             amLODIPine (NORVASC) 10 MG tablet Take 1 tablet (10 mg total) by mouth once daily.    carvedilol (COREG) 6.25 MG tablet Take 1 tablet (6.25 mg total) by mouth 2 (two) times daily with meals.    nitroGLYCERIN (NITROSTAT) 0.4 MG SL tablet Place 1 tablet (0.4 mg total) under the tongue every 5 (five) minutes as needed for Chest pain.      Hospital Medications             amLODIPine tablet 10 mg 10 mg, Oral, Daily    carvediloL tablet 6.25 mg 6.25 mg, Oral, 2 times daily        Will utilize p.r.n. blood pressure medication only if patient's blood pressure greater than  180/110 and he develops symptoms such as worsening chest pain or shortness of breath.      Hyperlipidemia  Continue on statin.          VTE Risk Mitigation (From admission, onward)         Ordered     IP VTE LOW RISK PATIENT  Once      02/04/20 1120     Place sequential compression device  Until discontinued      02/04/20 1120                      Jose Flaherty MD  Department of Hospital Medicine   [facility]

## 2020-02-07 NOTE — PT/OT/SLP PROGRESS
Physical Therapy Treatment    Patient Name:  Buddy Park   MRN:  [id number]    Recommendations:     Discharge Recommendations:  rehabilitation facility   Discharge Equipment Recommendations: (TBD at next level of care)   Barriers to discharge: None    Assessment:     Buddy Park is a 69 y.o. male admitted with a medical diagnosis of Cerebrovascular accident (CVA) due to occlusion of small artery.  He presents with the following impairments/functional limitations:  weakness, impaired self care skills, impaired balance, decreased coordination, decreased safety awareness, impaired endurance, impaired functional mobilty, visual deficits, decreased upper extremity function, impaired coordination, impaired sensation, gait instability, decreased lower extremity function, impaired fine motor. Flat paddle donned on left hand by OT. Attempted to teach patient to use RLE to assist LLE in supine to sit transfer without success. He requires ModA for supine to sit transfer. He sat EOB for ~10 minutes with mirror placed in front of him to assist with orientation to midline. Focus on keeping right hand in his lap to avoid pushing to the left. TAYLOR facilitated WB through LUE and LE. He requires ModA for sit to stand transfer with albin walker. Gave the patient VC to slowly shift weight on to the left foot. At one point he shifted weight to the left foot using the albin walker on the right and picked the right foot off of the ground for ~2 seconds. He became fatigued and requested to lie down. BP taken in supine of 135/83.     Rehab Prognosis: Good; patient would benefit from acute skilled PT services to address these deficits and reach maximum level of function.    Recent Surgery: * No surgery found *      Plan:     During this hospitalization, patient to be seen daily to address the identified rehab impairments via gait training, therapeutic activities, therapeutic exercises and progress toward the following goals:    · Plan of  Care Expires:  20    Subjective     Chief Complaint: none  Patient/Family Comments/goals: go watch his grandson run  Pain/Comfort:  · Pain Rating 1: 0/10      Objective:     Communicated with LAURA Caldera prior to session.  Patient found HOB elevated with telemetry, peripheral IV upon PT entry to room.     General Precautions: Standard, aspiration, fall, hearing impaired   Orthopedic Precautions:N/A   Braces: N/A     Functional Mobility:  · Bed Mobility:     · Supine to Sit: moderate assistance  · Sit to Supine: total assistance and of 2 persons; VC to go down on right elbow with patient keeping elbow extended  · Transfers:     · Sit to Stand:  moderate assistance and of 2 persons with hemiwalker  · Balance: fair      AM-PAC 6 CLICK MOBILITY          Therapeutic Activities and Exercises:   Patient was educated on the importance of OOB activity during hospital stay, safe transfers, flat paddle use    Patient left HOB elevated with call button in reach, bed alarm on and RN notified..    GOALS:   Multidisciplinary Problems     Physical Therapy Goals        Problem: Physical Therapy Goal    Goal Priority Disciplines Outcome Goal Variances Interventions   Physical Therapy Goal     PT, PT/OT Ongoing, Progressing     Description:  Goals to be met by: 2020     Patient will increase functional independence with mobility by performin. Supine to sit with Minimal Assistance  2. Sit to stand transfer with Moderate assistance with albin walker   3. Gait  x 10 feet with Moderate assistance using albin walker.   4. Bed to chair transfer with Moderate assistance using albin walker                        Time Tracking:     PT Received On: 20  PT Start Time: 1059     PT Stop Time: 1135  PT Total Time (min): 36 min     Billable Minutes: Therapeutic Activity 15    Treatment Type: Treatment  PT/PTA: PT     PTA Visit Number: 0     Emmy Trejo, PT  2020

## 2020-02-07 NOTE — PT/OT/SLP PROGRESS
Occupational Therapy   Treatment    Name: Buddy Park  MRN: [id number]  Admitting Diagnosis:  Cerebrovascular accident (CVA) due to occlusion of small artery       Recommendations:     Discharge Recommendations: rehabilitation facility  Discharge Equipment Recommendations:  other (see comments)(TBD at next level of care)  Barriers to discharge:       Assessment:     Buddy Park is a 69 y.o. male with a medical diagnosis of Cerebrovascular accident (CVA) due to occlusion of small artery.  He presents with increased insight to deficits, decreased impulsivity, decreased activity tolerance and acute onset fatigue with exertion/therapy; and highly motivated to rehab. ModAx2 sup<>sit and sit<>stand w/ hemiwalker and mirror in front of pt to facilitate self correcting midline. When R hand is in lap (resting on R knee) pt has more trunk engagement and control of midline because he isn't R pushing.     Performance deficits affecting function are weakness, impaired endurance, impaired sensation, impaired self care skills, impaired functional mobilty, gait instability, impaired balance, impaired cognition, decreased upper extremity function, decreased lower extremity function, decreased ROM, decreased safety awareness, impaired coordination, impaired fine motor.     Rehab Prognosis:  Good; patient would benefit from acute skilled OT services to address these deficits and reach maximum level of function.       Plan:     Patient to be seen 6 x/week to address the above listed problems via self-care/home management, therapeutic activities, therapeutic exercises, neuromuscular re-education  · Plan of Care Expires: 02/25/20  · Plan of Care Reviewed with: patient    Subjective     Pain/Comfort:  · Pain Rating 1: 0/10    Objective:     Communicated with: RNVerenice and PT, MALIK Booth prior to session- all 3 co-treating today.  Patient found HOB elevated with telemetry, peripheral IV upon OT entry to room.    General  Precautions: Standard, aspiration, fall, hearing impaired   Orthopedic Precautions:N/A   Braces: N/A     Occupational Performance:     Bed Mobility:    · Patient completed Scooting to EOB once upright with minimum assistance and 2 persons  · Patient completed Supine to Sit with moderate assistance and 2 persons VC for R hand to remain on R knee to terminate pushing behavior and engage trunk to facilitate midline. Introducing albin technique of crossing R LE under L LE to self assist lowering L LE off EOB to complete sitting EOB. Pt unable to complete.TAYLOR with POC a L QL to facilitate anterior pelvic tilt in sitting.  · Patient completed Sit to Supine with maximal assistance and 2 persons 2* acute onset fatigue after 4 min stand and weight shifting in standing.    Functional Mobility/Transfers:  · Patient completed Sit <> Stand Transfer with moderate assistance and of 2 persons with PT at R side w/ hemiwalker and TAYLOR at L side to support subluxed L UE and facilitate erect standing through L LE. Pt standing in front of mirror ~4mins and following VC for weight shifting into L LE. Pt was able to bear weight through L LE and R UE and lift R LE an inch from the floor ~2sec. OTR working the L LE with tactile cueing to facilitate weight shifting and correct standing technique.  · Functional Mobility: pt has improved sitting and standing balance since yesterday, has more sensation in L LE and reports feeling extension in MCPs when TAYLOR holds wrist and MCPs to full extension simultaneously.     Treatment & Education: all treatment at EOB and standing completed with full length mirror in front of pt for cognitive perceptual training and to facilitate self correcting of midline.  -Neuromuscular re-education pt sitting EOB with flat paddle donned to L hand and TAYLOR placing it on bed at his side for constant support and weight bearing through it; requiring constant assist at elbow to facilitate extension. Pt tolerated ~20mins  EOB throughout tx.   -pt tolerated 2hr 15 mins flat paddled donned. At 1315 TAYLOR removed flat paddle and stretched L hand into flexion, extension, abd, add and pt reported that he felt nothing in his hand the whole time the paddle was donned.  -pt has good recall this session of previous hand exercises for full extension of L hand.    Patient left HOB elevated and pillow under L UE in a position of support with call button in reach, bed alarm on and RN Verenice notifiedEducation:      GOALS:   Multidisciplinary Problems     Occupational Therapy Goals        Problem: Occupational Therapy Goal    Goal Priority Disciplines Outcome Interventions   Occupational Therapy Goal     OT, PT/OT Ongoing, Progressing    Description:  Goals to be met by: 2/16/20     Patient will increase functional independence with ADLs by performing:    Patient will tolerate sitting EOB x 10 minutes with CGA to SBA to maintain midline  Grooming EOB with Min (A)  UB dressing EOB with Mod (A)                       Time Tracking:     OT Date of Treatment: 02/07/20  OT Start Time: 1057  OT Stop Time: 1315  OT Total Time (min): 138 min    Billable Minutes:Therapeutic Activity 15min  Neuromuscular Re-education 15min    CLAUDIA Brizuela/YAMILET  2/7/2020

## 2020-02-08 LAB — POCT GLUCOSE: 115 MG/DL (ref 70–110)

## 2020-02-08 PROCEDURE — 94761 N-INVAS EAR/PLS OXIMETRY MLT: CPT | Mod: HCNC

## 2020-02-08 PROCEDURE — 12000002 HC ACUTE/MED SURGE SEMI-PRIVATE ROOM: Mod: HCNC

## 2020-02-08 PROCEDURE — 25000003 PHARM REV CODE 250: Mod: HCNC | Performed by: HOSPITALIST

## 2020-02-08 PROCEDURE — 97530 THERAPEUTIC ACTIVITIES: CPT | Mod: HCNC,CQ

## 2020-02-08 RX ADMIN — BUPROPION HYDROCHLORIDE 150 MG: 150 TABLET, FILM COATED, EXTENDED RELEASE ORAL at 08:02

## 2020-02-08 RX ADMIN — ASPIRIN 81 MG: 81 TABLET, COATED ORAL at 08:02

## 2020-02-08 RX ADMIN — COLCHICINE 0.6 MG: 0.6 CAPSULE ORAL at 08:02

## 2020-02-08 RX ADMIN — TAMSULOSIN HYDROCHLORIDE 0.4 MG: 0.4 CAPSULE ORAL at 08:02

## 2020-02-08 RX ADMIN — AMLODIPINE BESYLATE 10 MG: 5 TABLET ORAL at 08:02

## 2020-02-08 RX ADMIN — CARVEDILOL 6.25 MG: 6.25 TABLET, FILM COATED ORAL at 08:02

## 2020-02-08 RX ADMIN — CLOPIDOGREL BISULFATE 75 MG: 75 TABLET ORAL at 08:02

## 2020-02-08 RX ADMIN — ROSUVASTATIN CALCIUM 40 MG: 20 TABLET, FILM COATED ORAL at 08:02

## 2020-02-08 NOTE — RESPIRATORY THERAPY
02/08/20 0744   PRE-TX-O2   O2 Device (Oxygen Therapy) room air   SpO2 100 %   Pulse Oximetry Type Intermittent   $ Pulse Oximetry - Multiple Charge Pulse Oximetry - Multiple

## 2020-02-08 NOTE — SUBJECTIVE & OBJECTIVE
Interval History:   No new issues clinically.  Case mgmt having difficulty with rehab referral.    Review of Systems   Constitutional: Negative for chills and fever.   Respiratory: Negative for cough and shortness of breath.    Cardiovascular: Negative for chest pain.   Gastrointestinal: Negative for abdominal pain.     Objective:     Vital Signs (Most Recent):  Temp: 96.4 °F (35.8 °C) (02/07/20 1945)  Pulse: 94 (02/07/20 2000)  Resp: 18 (02/07/20 2000)  BP: 133/76 (02/07/20 1945)  SpO2: (!) 94 % (02/07/20 2000) Vital Signs (24h Range):  Temp:  [96.4 °F (35.8 °C)-97.7 °F (36.5 °C)] 96.4 °F (35.8 °C)  Pulse:  [67-94] 94  Resp:  [14-20] 18  SpO2:  [91 %-95 %] 94 %  BP: (124-142)/(56-76) 133/76     Weight: 71.9 kg (158 lb 8.2 oz)  Body mass index is 26.38 kg/m².    Intake/Output Summary (Last 24 hours) at 2/7/2020 2221  Last data filed at 2/7/2020 0619  Gross per 24 hour   Intake 240 ml   Output 224 ml   Net 16 ml      Physical Exam   Constitutional: He is oriented to person, place, and time. No distress.   HENT:   Head: Atraumatic.   Mouth/Throat: Oropharynx is clear and moist.   Eyes: Right eye exhibits no discharge. Left eye exhibits no discharge.   Neck: No JVD present.   Cardiovascular: Normal rate and regular rhythm.   Pulmonary/Chest: Effort normal and breath sounds normal.   Abdominal: Soft. Bowel sounds are normal. He exhibits no distension. There is no tenderness.   Musculoskeletal: He exhibits no edema.   Neurological: He is alert and oriented to person, place, and time. A cranial nerve deficit (left facial droop) is present. He displays Babinski's sign on the left side.   Has 0-1/5 strength throughout left upper extremity.  3/5 in left lower extremity.  Left pronator drift.  Right side strength is 5/5.   Skin: Skin is warm and dry. No rash noted. He is not diaphoretic.       Significant Labs: All pertinent labs within the past 24 hours have been reviewed.    Significant Imaging: I have reviewed all  pertinent imaging results/findings within the past 24 hours.

## 2020-02-08 NOTE — SUBJECTIVE & OBJECTIVE
Interval History:  Stable awaiting placement    Review of Systems   Constitutional: Negative for appetite change and fever.   HENT: Negative for sinus pressure and sore throat.    Eyes: Negative for photophobia and redness.   Respiratory: Negative for cough and shortness of breath.    Cardiovascular: Negative for chest pain and leg swelling.   Gastrointestinal: Negative for abdominal distention and abdominal pain.   Genitourinary: Negative for difficulty urinating and dysuria.   Musculoskeletal: Positive for arthralgias and gait problem.   Skin: Negative for color change and pallor.   Neurological: Positive for facial asymmetry and weakness.   Psychiatric/Behavioral: Negative for agitation and behavioral problems.     Objective:     Vital Signs (Most Recent):  Temp: 96.6 °F (35.9 °C) (02/08/20 1143)  Pulse: 68 (02/08/20 1143)  Resp: 18 (02/08/20 1143)  BP: 126/69 (02/08/20 1143)  SpO2: 98 % (02/08/20 1143) Vital Signs (24h Range):  Temp:  [96.4 °F (35.8 °C)-98 °F (36.7 °C)] 96.6 °F (35.9 °C)  Pulse:  [68-94] 68  Resp:  [14-18] 18  SpO2:  [93 %-100 %] 98 %  BP: (107-141)/(60-76) 126/69     Weight: 71.9 kg (158 lb 8.2 oz)  Body mass index is 26.38 kg/m².  No intake or output data in the 24 hours ending 02/08/20 1319   Physical Exam   Constitutional: He is oriented to person, place, and time. He appears well-developed and well-nourished.   HENT:   Head: Normocephalic and atraumatic.   Eyes: Pupils are equal, round, and reactive to light. Conjunctivae are normal.   Neck: Normal range of motion. Neck supple.   Cardiovascular: Normal rate and regular rhythm.   Pulmonary/Chest: Effort normal and breath sounds normal.   Abdominal: Soft. He exhibits no distension.   Musculoskeletal: He exhibits no deformity.   Neurological: He is alert and oriented to person, place, and time. A cranial nerve deficit is present. He exhibits abnormal muscle tone.   Skin: Skin is warm and dry.   Psychiatric: He has a normal mood and affect. His  behavior is normal.       Significant Labs: All pertinent labs within the past 24 hours have been reviewed.    Significant Imaging: I have reviewed all pertinent imaging results/findings within the past 24 hours.

## 2020-02-08 NOTE — NURSING
Pt is  AAOX4. POC reviewed with pt. Pt verbalized understanding. VSS. Remains afebrile. Pain controlled with PRN medications. Remains free of injury. Bed low, breaks locked, call light in reach. Will monitor.

## 2020-02-08 NOTE — PLAN OF CARE
POC reviewed with patient and patient's family, understanding verbalized. AAOX4. Up with assist plus two. Room air. Glucose monitoring/coverage ACHS as needed. Mechanical soft diet. Telemetry monitoring maintained. VS stable throughout shift. Safety maintained. Will continue to monitor.

## 2020-02-08 NOTE — PLAN OF CARE
Patient AAO X 4. Patient free from injury during shift. Pain managed pharmacologically. Provided full relief. Patient free from N/V during shift. IV access has Ivf running. Patient placed on cardiac monitoring. Remained afebrile during shift. Pt on room air tolerating well.   Restroom offered. Repositioned as needed. Safety maintained with bed alarm. Bed in lowest position, call light and personal items within reach. Patient verbalized understanding of care. Will continue to monitor with every 2 hour rounding.  .

## 2020-02-08 NOTE — PT/OT/SLP PROGRESS
Physical Therapy Treatment    Patient Name:  Buddy Park   MRN:  [id number]    Recommendations:     Discharge Recommendations:  rehabilitation facility   Discharge Equipment Recommendations: (TBD at next level of care)   Barriers to discharge: None    Assessment:     Buddy Park is a 69 y.o. male admitted with a medical diagnosis of Cerebrovascular accident (CVA) due to occlusion of small artery.  He presents with the following impairments/functional limitations:  weakness, impaired sensation, gait instability, impaired functional mobilty, impaired endurance, impaired self care skills, impaired balance, decreased lower extremity function, decreased upper extremity function, decreased coordination, decreased safety awareness, abnormal tone, impaired fine motor, impaired coordination, decreased ROM. Patient sat EOB for sitting balance activity and was transferred to chair using albin-walker. He was able to take 3 small staggering steps with RLE from EOB for stand pivot transfer with MOD A x 2 with good UE strength on right side. Can perform hip Ab/add with both R/LLE's. Rehab facility at [place]. Continue with PT and POC.    Rehab Prognosis: Fair; patient would benefit from acute skilled PT services to address these deficits and reach maximum level of function.    Recent Surgery: * No surgery found *      Plan:     During this hospitalization, patient to be seen daily to address the identified rehab impairments via gait training, therapeutic activities, therapeutic exercises and progress toward the following goals:    · Plan of Care Expires:  03/04/20    Subjective     Chief Complaint: he feels so weak and old  Patient/Family Comments/goals: to get better  Pain/Comfort:  · Pain Rating 1: 0/10  · Pain Rating Post-Intervention 1: 0/10      Objective:     Communicated with LAURA Evans prior to session.  Patient found HOB elevated with peripheral IV, telemetry upon PT entry to room.     General Precautions: Standard,  aspiration, fall, hearing impaired   Orthopedic Precautions:N/A   Braces: N/A     Functional Mobility:  · Bed Mobility:     · Scooting: minimum assistance  · Supine to Sit: moderate assistance  · Transfers:     · Sit to Stand:  moderate assistance and of 2 persons with hemiwalker  · Bed to Chair: moderate assistance and of 2 persons with  hemiwalker  using  Stand Pivot      AM-PAC 6 CLICK MOBILITY          Therapeutic Activities and Exercises:   Sat EOB for balance activity x 10 min. Transferred from supine to sit with mod assist and VC's but rolling and getting to edge of bed with only minimal assist and VC's. Standing along side of bed with albin walker for 1' and no LOB. Transfer EOB to chair with stand pivot and 3 small staggering steps with RLE and has some use of LLE in supine with hip ab/add x 5.    Patient left up in chair with all lines intact, call button in reach, chair alarm on and RN Cristina notified..    GOALS:   Multidisciplinary Problems     Physical Therapy Goals        Problem: Physical Therapy Goal    Goal Priority Disciplines Outcome Goal Variances Interventions   Physical Therapy Goal     PT, PT/OT Ongoing, Progressing     Description:  Goals to be met by: 2020     Patient will increase functional independence with mobility by performin. Supine to sit with Minimal Assistance  2. Sit to stand transfer with Moderate assistance with albin walker   3. Gait  x 10 feet with Moderate assistance using albin walker.   4. Bed to chair transfer with Moderate assistance using albin walker                        Time Tracking:     PT Received On: 20  PT Start Time: 1105     PT Stop Time: 1122  PT Total Time (min): 17 min     Billable Minutes: Therapeutic Activity 17    Treatment Type: Treatment  PT/PTA: PTA     PTA Visit Number: 1     Mckenna Vazquez, LEXX  2020

## 2020-02-08 NOTE — ASSESSMENT & PLAN NOTE
Continue statin and antiplt's.  Neurology following.  Echo with bubble done--- good EF.  No thrombus.  No intracardiac shunts.  It appears to be occlusion of a small artery in right hemispheric white matter.  Will treat with antiplatelets.  Consulting case mgmt for rehab referral.  For some unknown reason, the rehab in [place], [place] does not want to submit a request for authorization to patient's insurance [employer].  We will need to send referrals to either SNF or another inpatient rehab.

## 2020-02-08 NOTE — ASSESSMENT & PLAN NOTE
Chronic, controlled.  Latest blood pressure and vitals reviewed-   Temp:  [96.4 °F (35.8 °C)-98 °F (36.7 °C)]   Pulse:  [68-94]   Resp:  [14-18]   BP: (107-141)/(60-76)   SpO2:  [93 %-100 %] .   Home meds for hypertension were reviewed and noted below. Hospital anti-hypertensive changes were made as shown below.  Outpt Hypertension Medications             amLODIPine (NORVASC) 10 MG tablet Take 1 tablet (10 mg total) by mouth once daily.    carvedilol (COREG) 6.25 MG tablet Take 1 tablet (6.25 mg total) by mouth 2 (two) times daily with meals.    nitroGLYCERIN (NITROSTAT) 0.4 MG SL tablet Place 1 tablet (0.4 mg total) under the tongue every 5 (five) minutes as needed for Chest pain.      Hospital Medications             amLODIPine tablet 10 mg 10 mg, Oral, Daily    carvediloL tablet 6.25 mg 6.25 mg, Oral, 2 times daily        Will utilize p.r.n. blood pressure medication only if patient's blood pressure greater than  180/110 and he develops symptoms such as worsening chest pain or shortness of breath.

## 2020-02-08 NOTE — ASSESSMENT & PLAN NOTE
Continue statin and antiplt's.  Neurology following.  Echo with bubble done--- good EF.  No thrombus.  No intracardiac shunts.  It appears to be occlusion of a small artery in right hemispheric white matter.  Will treat with antiplatelets.  Consulting case mgmt for rehab referral.  For some unknown reason, the rehab in [place], [place] does not want to submit a request for authorization to patient's insurance [employer].  We will need to send referrals to either SNF or another inpatient rehab.

## 2020-02-08 NOTE — PROGRESS NOTES
"[facility] Medicine  Progress Note    Patient Name: Buddy Park  MRN: [id number]  Patient Class: IP- Inpatient   Admission Date: 2/1/2020  Length of Stay: 5 days  Attending Physician: Rudi Mayers MD  Primary Care Provider: Buddy Chatman MD        Subjective:     Principal Problem:Cerebrovascular accident (CVA) due to occlusion of small artery        HPI:  Buddy Park is a 69 y.o. male with PMHx of HTN, HLD,  who presented to the ED for evaluation of left sided weakness and left facial droop that was noticed this morning by his spouse. However on discussion with patient he mentions he himself had started noticing symptoms of left arm weakness since yesterday morning and had difficulty writing. His speech as per him is baseline as he has a speech deficit from prio . Patient reports onset of fatigue x1 week ago. The spouse endorses noticing the patient "dragging his left foot" this morning, prompting her to bring him to the ED.He is a nonsmoker.He does endorse non compliance to his medications in the last 2 months including his antihypertensive.Patient mentions left carotid neck surgery ~x1-1.5 years ago. Patient has no other medical concerns or complaints at this moment. SHx includes Ear mastoidectomy w/ cochlear implant w/ landmark and Carotid angioplasty.  Patient admitted for work up of possible stroke with neurology consult.     Overview/Hospital Course:  No notes on file    Interval History:  Stable awaiting placement    Review of Systems   Constitutional: Negative for appetite change and fever.   HENT: Negative for sinus pressure and sore throat.    Eyes: Negative for photophobia and redness.   Respiratory: Negative for cough and shortness of breath.    Cardiovascular: Negative for chest pain and leg swelling.   Gastrointestinal: Negative for abdominal distention and abdominal pain.   Genitourinary: Negative for difficulty urinating and dysuria.   Musculoskeletal: " Positive for arthralgias and gait problem.   Skin: Negative for color change and pallor.   Neurological: Positive for facial asymmetry and weakness.   Psychiatric/Behavioral: Negative for agitation and behavioral problems.     Objective:     Vital Signs (Most Recent):  Temp: 96.6 °F (35.9 °C) (02/08/20 1143)  Pulse: 68 (02/08/20 1143)  Resp: 18 (02/08/20 1143)  BP: 126/69 (02/08/20 1143)  SpO2: 98 % (02/08/20 1143) Vital Signs (24h Range):  Temp:  [96.4 °F (35.8 °C)-98 °F (36.7 °C)] 96.6 °F (35.9 °C)  Pulse:  [68-94] 68  Resp:  [14-18] 18  SpO2:  [93 %-100 %] 98 %  BP: (107-141)/(60-76) 126/69     Weight: 71.9 kg (158 lb 8.2 oz)  Body mass index is 26.38 kg/m².  No intake or output data in the 24 hours ending 02/08/20 1319   Physical Exam   Constitutional: He is oriented to person, place, and time. He appears well-developed and well-nourished.   HENT:   Head: Normocephalic and atraumatic.   Eyes: Pupils are equal, round, and reactive to light. Conjunctivae are normal.   Neck: Normal range of motion. Neck supple.   Cardiovascular: Normal rate and regular rhythm.   Pulmonary/Chest: Effort normal and breath sounds normal.   Abdominal: Soft. He exhibits no distension.   Musculoskeletal: He exhibits no deformity.   Neurological: He is alert and oriented to person, place, and time. A cranial nerve deficit is present. He exhibits abnormal muscle tone.   Skin: Skin is warm and dry.   Psychiatric: He has a normal mood and affect. His behavior is normal.       Significant Labs: All pertinent labs within the past 24 hours have been reviewed.    Significant Imaging: I have reviewed all pertinent imaging results/findings within the past 24 hours.      Assessment/Plan:      * Cerebrovascular accident (CVA) due to occlusion of small artery  Continue statin and antiplt's.  Neurology following.  Echo with bubble done--- good EF.  No thrombus.  No intracardiac shunts.  It appears to be occlusion of a small artery in right hemispheric  white matter.  Will treat with antiplatelets.  Consulting case mgmt for rehab referral.  For some unknown reason, the rehab in [place], [place] does not want to submit a request for authorization to patient's insurance [employer].  We will need to send referrals to either SNF or another inpatient rehab.      BMI 26.0-26.9,adult  Counseled regarding weight loss    Carotid disease, bilateral  S/p left CEA  CTA shows 40% NITZA.  Continue antiplatelets.  Follow-up Neurology    Deafness  Has cochlear implants        HTN (hypertension)  Chronic, controlled.  Latest blood pressure and vitals reviewed-   Temp:  [96.4 °F (35.8 °C)-98 °F (36.7 °C)]   Pulse:  [68-94]   Resp:  [14-18]   BP: (107-141)/(60-76)   SpO2:  [93 %-100 %] .   Home meds for hypertension were reviewed and noted below. Hospital anti-hypertensive changes were made as shown below.  Outpt Hypertension Medications             amLODIPine (NORVASC) 10 MG tablet Take 1 tablet (10 mg total) by mouth once daily.    carvedilol (COREG) 6.25 MG tablet Take 1 tablet (6.25 mg total) by mouth 2 (two) times daily with meals.    nitroGLYCERIN (NITROSTAT) 0.4 MG SL tablet Place 1 tablet (0.4 mg total) under the tongue every 5 (five) minutes as needed for Chest pain.      Hospital Medications             amLODIPine tablet 10 mg 10 mg, Oral, Daily    carvediloL tablet 6.25 mg 6.25 mg, Oral, 2 times daily        Will utilize p.r.n. blood pressure medication only if patient's blood pressure greater than  180/110 and he develops symptoms such as worsening chest pain or shortness of breath.      Hyperlipidemia  Continue on statin.          VTE Risk Mitigation (From admission, onward)         Ordered     IP VTE LOW RISK PATIENT  Once      02/04/20 1120     Place sequential compression device  Until discontinued      02/04/20 1120                      Rudi Mayers MD  Department of Hospital Medicine   [facility]

## 2020-02-08 NOTE — PROGRESS NOTES
"[facility] Medicine  Progress Note    Patient Name: Buddy Park  MRN: [id number]  Patient Class: IP- Inpatient   Admission Date: 2/1/2020  Length of Stay: 4 days  Attending Physician: Jose Flaherty MD  Primary Care Provider: Buddy Chatman MD        Subjective:     Principal Problem:Cerebrovascular accident (CVA) due to occlusion of small artery        HPI:  Buddy Park is a 69 y.o. male with PMHx of HTN, HLD,  who presented to the ED for evaluation of left sided weakness and left facial droop that was noticed this morning by his spouse. However on discussion with patient he mentions he himself had started noticing symptoms of left arm weakness since yesterday morning and had difficulty writing. His speech as per him is baseline as he has a speech deficit from prio . Patient reports onset of fatigue x1 week ago. The spouse endorses noticing the patient "dragging his left foot" this morning, prompting her to bring him to the ED.He is a nonsmoker.He does endorse non compliance to his medications in the last 2 months including his antihypertensive.Patient mentions left carotid neck surgery ~x1-1.5 years ago. Patient has no other medical concerns or complaints at this moment. SHx includes Ear mastoidectomy w/ cochlear implant w/ landmark and Carotid angioplasty.  Patient admitted for work up of possible stroke with neurology consult.     Overview/Hospital Course:  No notes on file    Interval History:   No new issues clinically.  Case mgmt having difficulty with rehab referral.    Review of Systems   Constitutional: Negative for chills and fever.   Respiratory: Negative for cough and shortness of breath.    Cardiovascular: Negative for chest pain.   Gastrointestinal: Negative for abdominal pain.     Objective:     Vital Signs (Most Recent):  Temp: 96.4 °F (35.8 °C) (02/07/20 1945)  Pulse: 94 (02/07/20 2000)  Resp: 18 (02/07/20 2000)  BP: 133/76 (02/07/20 1945)  SpO2: (!) 94 % " (02/07/20 2000) Vital Signs (24h Range):  Temp:  [96.4 °F (35.8 °C)-97.7 °F (36.5 °C)] 96.4 °F (35.8 °C)  Pulse:  [67-94] 94  Resp:  [14-20] 18  SpO2:  [91 %-95 %] 94 %  BP: (124-142)/(56-76) 133/76     Weight: 71.9 kg (158 lb 8.2 oz)  Body mass index is 26.38 kg/m².    Intake/Output Summary (Last 24 hours) at 2/7/2020 2221  Last data filed at 2/7/2020 0619  Gross per 24 hour   Intake 240 ml   Output 224 ml   Net 16 ml      Physical Exam   Constitutional: He is oriented to person, place, and time. No distress.   HENT:   Head: Atraumatic.   Mouth/Throat: Oropharynx is clear and moist.   Eyes: Right eye exhibits no discharge. Left eye exhibits no discharge.   Neck: No JVD present.   Cardiovascular: Normal rate and regular rhythm.   Pulmonary/Chest: Effort normal and breath sounds normal.   Abdominal: Soft. Bowel sounds are normal. He exhibits no distension. There is no tenderness.   Musculoskeletal: He exhibits no edema.   Neurological: He is alert and oriented to person, place, and time. A cranial nerve deficit (left facial droop) is present. He displays Babinski's sign on the left side.   Has 0-1/5 strength throughout left upper extremity.  3/5 in left lower extremity.  Left pronator drift.  Right side strength is 5/5.   Skin: Skin is warm and dry. No rash noted. He is not diaphoretic.       Significant Labs: All pertinent labs within the past 24 hours have been reviewed.    Significant Imaging: I have reviewed all pertinent imaging results/findings within the past 24 hours.      Assessment/Plan:      * Cerebrovascular accident (CVA) due to occlusion of small artery  Continue statin and antiplt's.  Neurology following.  Echo with bubble done--- good EF.  No thrombus.  No intracardiac shunts.  It appears to be occlusion of a small artery in right hemispheric white matter.  Will treat with antiplatelets.  Consulting case mgmt for rehab referral.  For some unknown reason, the rehab in [place], [place] does not want to  submit a request for authorization to patient's insurance [employer].  We will need to send referrals to either SNF or another inpatient rehab.      BMI 26.0-26.9,adult  Counseled regarding weight loss    Carotid disease, bilateral  S/p left CEA  CTA shows 40% NITZA.  Continue antiplatelets.  Defer decision on surgical intervention to neurology.    Deafness  Has cochlear implants        HTN (hypertension)  Chronic, controlled.  Latest blood pressure and vitals reviewed-   Temp:  [96.1 °F (35.6 °C)-98.1 °F (36.7 °C)]   Pulse:  [70-80]   Resp:  [16-18]   BP: (104-140)/(57-89)   SpO2:  [95 %-97 %] .   Home meds for hypertension were reviewed and noted below. Hospital anti-hypertensive changes were made as shown below.  Outpt Hypertension Medications             amLODIPine (NORVASC) 10 MG tablet Take 1 tablet (10 mg total) by mouth once daily.    carvedilol (COREG) 6.25 MG tablet Take 1 tablet (6.25 mg total) by mouth 2 (two) times daily with meals.    nitroGLYCERIN (NITROSTAT) 0.4 MG SL tablet Place 1 tablet (0.4 mg total) under the tongue every 5 (five) minutes as needed for Chest pain.      Hospital Medications             amLODIPine tablet 10 mg 10 mg, Oral, Daily    carvediloL tablet 6.25 mg 6.25 mg, Oral, 2 times daily        Will utilize p.r.n. blood pressure medication only if patient's blood pressure greater than  180/110 and he develops symptoms such as worsening chest pain or shortness of breath.      Hyperlipidemia  Continue on statin.          VTE Risk Mitigation (From admission, onward)         Ordered     IP VTE LOW RISK PATIENT  Once      02/04/20 1120     Place sequential compression device  Until discontinued      02/04/20 1120                      Jose Flaherty MD  Department of Hospital Medicine   [facility]

## 2020-02-09 PROBLEM — I50.32 CHRONIC DIASTOLIC HEART FAILURE: Status: ACTIVE | Noted: 2020-02-09

## 2020-02-09 PROCEDURE — 97530 THERAPEUTIC ACTIVITIES: CPT | Mod: HCNC,CQ

## 2020-02-09 PROCEDURE — 25000003 PHARM REV CODE 250: Mod: HCNC | Performed by: HOSPITALIST

## 2020-02-09 PROCEDURE — 94761 N-INVAS EAR/PLS OXIMETRY MLT: CPT | Mod: HCNC

## 2020-02-09 PROCEDURE — 12000002 HC ACUTE/MED SURGE SEMI-PRIVATE ROOM: Mod: HCNC

## 2020-02-09 RX ADMIN — CARVEDILOL 6.25 MG: 6.25 TABLET, FILM COATED ORAL at 08:02

## 2020-02-09 RX ADMIN — CLOPIDOGREL BISULFATE 75 MG: 75 TABLET ORAL at 08:02

## 2020-02-09 RX ADMIN — TAMSULOSIN HYDROCHLORIDE 0.4 MG: 0.4 CAPSULE ORAL at 08:02

## 2020-02-09 RX ADMIN — ROSUVASTATIN CALCIUM 40 MG: 20 TABLET, FILM COATED ORAL at 08:02

## 2020-02-09 RX ADMIN — COLCHICINE 0.6 MG: 0.6 CAPSULE ORAL at 08:02

## 2020-02-09 RX ADMIN — AMLODIPINE BESYLATE 10 MG: 5 TABLET ORAL at 08:02

## 2020-02-09 RX ADMIN — BUPROPION HYDROCHLORIDE 150 MG: 150 TABLET, FILM COATED, EXTENDED RELEASE ORAL at 08:02

## 2020-02-09 RX ADMIN — ASPIRIN 81 MG: 81 TABLET, COATED ORAL at 08:02

## 2020-02-09 NOTE — SUBJECTIVE & OBJECTIVE
Interval History:  Stable awaiting placement    Review of Systems   Constitutional: Negative for appetite change and fever.   HENT: Negative for sinus pressure and sore throat.    Eyes: Negative for photophobia and redness.   Respiratory: Negative for cough and shortness of breath.    Cardiovascular: Negative for chest pain and leg swelling.   Gastrointestinal: Negative for abdominal distention and abdominal pain.   Genitourinary: Negative for difficulty urinating and dysuria.   Musculoskeletal: Positive for arthralgias and gait problem.   Skin: Negative for color change and pallor.   Neurological: Positive for facial asymmetry and weakness.   Psychiatric/Behavioral: Negative for agitation and behavioral problems.     Objective:     Vital Signs (Most Recent):  Temp: 96.9 °F (36.1 °C) (02/09/20 1204)  Pulse: 72 (02/09/20 1204)  Resp: 18 (02/09/20 1204)  BP: 138/70 (02/09/20 1204)  SpO2: 95 % (02/09/20 1204) Vital Signs (24h Range):  Temp:  [96.1 °F (35.6 °C)-98 °F (36.7 °C)] 96.9 °F (36.1 °C)  Pulse:  [68-72] 72  Resp:  [16-18] 18  SpO2:  [94 %-98 %] 95 %  BP: (124-144)/(68-77) 138/70     Weight: 71.9 kg (158 lb 8.2 oz)  Body mass index is 26.38 kg/m².    Intake/Output Summary (Last 24 hours) at 2/9/2020 1349  Last data filed at 2/9/2020 0642  Gross per 24 hour   Intake 240 ml   Output 450 ml   Net -210 ml      Physical Exam   Constitutional: He is oriented to person, place, and time. He appears well-developed and well-nourished.   HENT:   Head: Normocephalic and atraumatic.   Eyes: Pupils are equal, round, and reactive to light. Conjunctivae are normal.   Neck: Normal range of motion. Neck supple.   Cardiovascular: Normal rate and regular rhythm.   Pulmonary/Chest: Effort normal and breath sounds normal.   Abdominal: Soft. He exhibits no distension.   Musculoskeletal: He exhibits no deformity.   Neurological: He is alert and oriented to person, place, and time. A cranial nerve deficit is present. He exhibits  abnormal muscle tone.   Skin: Skin is warm and dry.   Psychiatric: He has a normal mood and affect. His behavior is normal.       Significant Labs: All pertinent labs within the past 24 hours have been reviewed.    Significant Imaging: I have reviewed all pertinent imaging results/findings within the past 24 hours.

## 2020-02-09 NOTE — PLAN OF CARE
Patient AAO, VSS. PIV CDI/ saline locked. Pt has weak left side. Cochlear implant in place. Pt verbalized understanding of POC. Purposeful hourly/q2hr rounding done during shift to promote patient safety. Patient free from falls and injury during shift.  Bed in lowest position, brakes locked, and call light within reach.  Will continue to monitor.

## 2020-02-09 NOTE — SUBJECTIVE & OBJECTIVE
Interval History:  Stable awaiting placement    Review of Systems   Constitutional: Negative for appetite change and fever.   HENT: Negative for sinus pressure and sore throat.    Eyes: Negative for photophobia and redness.   Respiratory: Negative for cough and shortness of breath.    Cardiovascular: Negative for chest pain and leg swelling.   Gastrointestinal: Negative for abdominal distention and abdominal pain.   Genitourinary: Negative for difficulty urinating and dysuria.   Musculoskeletal: Positive for arthralgias and gait problem.   Skin: Negative for color change and pallor.   Neurological: Positive for facial asymmetry and weakness.   Psychiatric/Behavioral: Negative for agitation and behavioral problems.     Objective:     Vital Signs (Most Recent):  Temp: 96.9 °F (36.1 °C) (02/09/20 1204)  Pulse: 72 (02/09/20 1204)  Resp: 18 (02/09/20 1204)  BP: 138/70 (02/09/20 1204)  SpO2: 95 % (02/09/20 1204) Vital Signs (24h Range):  Temp:  [96.1 °F (35.6 °C)-98 °F (36.7 °C)] 96.9 °F (36.1 °C)  Pulse:  [68-72] 72  Resp:  [16-18] 18  SpO2:  [94 %-98 %] 95 %  BP: (124-144)/(68-77) 138/70     Weight: 71.9 kg (158 lb 8.2 oz)  Body mass index is 26.38 kg/m².    Intake/Output Summary (Last 24 hours) at 2/9/2020 1409  Last data filed at 2/9/2020 0642  Gross per 24 hour   Intake 240 ml   Output 450 ml   Net -210 ml      Physical Exam   Constitutional: He is oriented to person, place, and time. He appears well-developed and well-nourished.   HENT:   Head: Normocephalic and atraumatic.   Eyes: Pupils are equal, round, and reactive to light. Conjunctivae are normal.   Neck: Normal range of motion. Neck supple.   Cardiovascular: Normal rate and regular rhythm.   Pulmonary/Chest: Effort normal and breath sounds normal.   Abdominal: Soft. He exhibits no distension.   Musculoskeletal: He exhibits no deformity.   Neurological: He is alert and oriented to person, place, and time. A cranial nerve deficit is present. He exhibits  abnormal muscle tone.   Skin: Skin is warm and dry.   Psychiatric: He has a normal mood and affect. His behavior is normal.       Significant Labs: All pertinent labs within the past 24 hours have been reviewed.    Significant Imaging: I have reviewed all pertinent imaging results/findings within the past 24 hours.

## 2020-02-09 NOTE — PROGRESS NOTES
"[facility] Medicine  Progress Note    Patient Name: Buddy Park  MRN: [id number]  Patient Class: IP- Inpatient   Admission Date: 2/1/2020  Length of Stay: 6 days  Attending Physician: Rudi Mayers MD  Primary Care Provider: Buddy Chatman MD        Subjective:     Principal Problem:Cerebrovascular accident (CVA) due to occlusion of small artery        HPI:  Buddy Park is a 69 y.o. male with PMHx of HTN, HLD,  who presented to the ED for evaluation of left sided weakness and left facial droop that was noticed this morning by his spouse. However on discussion with patient he mentions he himself had started noticing symptoms of left arm weakness since yesterday morning and had difficulty writing. His speech as per him is baseline as he has a speech deficit from prio . Patient reports onset of fatigue x1 week ago. The spouse endorses noticing the patient "dragging his left foot" this morning, prompting her to bring him to the ED.He is a nonsmoker.He does endorse non compliance to his medications in the last 2 months including his antihypertensive.Patient mentions left carotid neck surgery ~x1-1.5 years ago. Patient has no other medical concerns or complaints at this moment. SHx includes Ear mastoidectomy w/ cochlear implant w/ landmark and Carotid angioplasty.  Patient admitted for work up of possible stroke with neurology consult.     Overview/Hospital Course:  No notes on file    Interval History:  Stable awaiting placement    Review of Systems   Constitutional: Negative for appetite change and fever.   HENT: Negative for sinus pressure and sore throat.    Eyes: Negative for photophobia and redness.   Respiratory: Negative for cough and shortness of breath.    Cardiovascular: Negative for chest pain and leg swelling.   Gastrointestinal: Negative for abdominal distention and abdominal pain.   Genitourinary: Negative for difficulty urinating and dysuria.   Musculoskeletal: " Positive for arthralgias and gait problem.   Skin: Negative for color change and pallor.   Neurological: Positive for facial asymmetry and weakness.   Psychiatric/Behavioral: Negative for agitation and behavioral problems.     Objective:     Vital Signs (Most Recent):  Temp: 96.9 °F (36.1 °C) (02/09/20 1204)  Pulse: 72 (02/09/20 1204)  Resp: 18 (02/09/20 1204)  BP: 138/70 (02/09/20 1204)  SpO2: 95 % (02/09/20 1204) Vital Signs (24h Range):  Temp:  [96.1 °F (35.6 °C)-98 °F (36.7 °C)] 96.9 °F (36.1 °C)  Pulse:  [68-72] 72  Resp:  [16-18] 18  SpO2:  [94 %-98 %] 95 %  BP: (124-144)/(68-77) 138/70     Weight: 71.9 kg (158 lb 8.2 oz)  Body mass index is 26.38 kg/m².    Intake/Output Summary (Last 24 hours) at 2/9/2020 1409  Last data filed at 2/9/2020 0642  Gross per 24 hour   Intake 240 ml   Output 450 ml   Net -210 ml      Physical Exam   Constitutional: He is oriented to person, place, and time. He appears well-developed and well-nourished.   HENT:   Head: Normocephalic and atraumatic.   Eyes: Pupils are equal, round, and reactive to light. Conjunctivae are normal.   Neck: Normal range of motion. Neck supple.   Cardiovascular: Normal rate and regular rhythm.   Pulmonary/Chest: Effort normal and breath sounds normal.   Abdominal: Soft. He exhibits no distension.   Musculoskeletal: He exhibits no deformity.   Neurological: He is alert and oriented to person, place, and time. A cranial nerve deficit is present. He exhibits abnormal muscle tone.   Skin: Skin is warm and dry.   Psychiatric: He has a normal mood and affect. His behavior is normal.       Significant Labs: All pertinent labs within the past 24 hours have been reviewed.    Significant Imaging: I have reviewed all pertinent imaging results/findings within the past 24 hours.      Assessment/Plan:      * Cerebrovascular accident (CVA) due to occlusion of small artery  Continue statin and antiplt's.  Neurology following.  Echo with bubble done--- good EF.  No  thrombus.  No intracardiac shunts.  It appears to be occlusion of a small artery in right hemispheric white matter.  Will treat with antiplatelets.  Consulting case mgmt for rehab referral.  For some unknown reason, the rehab in [place], [place] does not want to submit a request for authorization to patient's insurance [employer].  We will need to send referrals to either SNF or another inpatient rehab.      BMI 26.0-26.9,adult  Counseled regarding weight loss    Carotid disease, bilateral  S/p left CEA  CTA shows 40% NITZA.  Continue antiplatelets.  Follow-up Neurology    Deafness  Has cochlear implants        HTN (hypertension)  Chronic, controlled.  Latest blood pressure and vitals reviewed-   Temp:  [96.4 °F (35.8 °C)-98 °F (36.7 °C)]   Pulse:  [68-94]   Resp:  [14-18]   BP: (107-141)/(60-76)   SpO2:  [93 %-100 %] .   Home meds for hypertension were reviewed and noted below. Hospital anti-hypertensive changes were made as shown below.  Outpt Hypertension Medications             amLODIPine (NORVASC) 10 MG tablet Take 1 tablet (10 mg total) by mouth once daily.    carvedilol (COREG) 6.25 MG tablet Take 1 tablet (6.25 mg total) by mouth 2 (two) times daily with meals.    nitroGLYCERIN (NITROSTAT) 0.4 MG SL tablet Place 1 tablet (0.4 mg total) under the tongue every 5 (five) minutes as needed for Chest pain.      Hospital Medications             amLODIPine tablet 10 mg 10 mg, Oral, Daily    carvediloL tablet 6.25 mg 6.25 mg, Oral, 2 times daily        Will utilize p.r.n. blood pressure medication only if patient's blood pressure greater than  180/110 and he develops symptoms such as worsening chest pain or shortness of breath.      Hyperlipidemia  Continue on statin.          VTE Risk Mitigation (From admission, onward)         Ordered     IP VTE LOW RISK PATIENT  Once      02/04/20 1120     Place sequential compression device  Until discontinued      02/04/20 1120                      Rudi Mayers MD  Department of  Hospital Medicine [facility]

## 2020-02-09 NOTE — PT/OT/SLP PROGRESS
"Physical Therapy Treatment    Patient Name:  Buddy Park   MRN:  [id number]    Recommendations:     Discharge Recommendations:  rehabilitation facility   Discharge Equipment Recommendations: (TBD at next level of care)   Barriers to discharge: None    Assessment:     Buddy Park is a 69 y.o. male admitted with a medical diagnosis of Cerebrovascular accident (CVA) due to occlusion of small artery.  He presents with the following impairments/functional limitations:  weakness, impaired endurance, impaired sensation, impaired self care skills, impaired functional mobilty, decreased coordination, impaired balance, gait instability, decreased upper extremity function, decreased lower extremity function, decreased safety awareness, abnormal tone, impaired fine motor, impaired coordination, decreased ROM. Patient had more use of LLE today and was able to sit on EOB with mod A but not able to maintain sitting balance without holding onto EOB rail longer than 6" and this was attempted for 10 min. He is able to sit to stand with min A using albin walker with good strength in LLE and LUE but is impulsive and balance is poor. He attempted to side step along EOB and was not able so stand pivot transfer was made from EOB to chair today. He is able to scoot himself back in the chair for self positioning. Yesterday he was somewhat depressed talking about death is better than his current condition and today he is laughing and sexually inappropriate wanting to show off his butt and advising that the best way to get feeling back in his hand would be by touching women's boobs. Rehab facility at [place]. Continue with PT and POC.    Rehab Prognosis: Good; patient would benefit from acute skilled PT services to address these deficits and reach maximum level of function.    Recent Surgery: * No surgery found *      Plan:     During this hospitalization, patient to be seen daily to address the identified rehab impairments via gait training, " therapeutic activities, therapeutic exercises and progress toward the following goals:    · Plan of Care Expires:  20    Subjective     Chief Complaint: none  Patient/Family Comments/goals: to get recovery  Pain/Comfort:  · Pain Rating 1: 0/10  · Pain Rating Post-Intervention 1: 0/10      Objective:     Communicated with LAURA Camarillo prior to session.  Patient found HOB elevated with telemetry, peripheral IV upon PT entry to room.     General Precautions: Standard, fall, hearing impaired, aspiration   Orthopedic Precautions:N/A   Braces: N/A     Functional Mobility:  · Bed Mobility:     · Scooting: minimum assistance  · Supine to Sit: minimum assistance  · Transfers:     · Sit to Stand:  minimum assistance with hemiwalker  · Bed to Chair: minimum assistance with  hemiwalker  using  Stand Pivot      AM-PAC 6 CLICK MOBILITY          Therapeutic Activities and Exercises:   transfer, sitting balance activities x 10', sit to stand x 6 from EOB, standing tolerance    Patient left up in chair with all lines intact, call button in reach and LAURA Camarillo notified..    GOALS:   Multidisciplinary Problems     Physical Therapy Goals        Problem: Physical Therapy Goal    Goal Priority Disciplines Outcome Goal Variances Interventions   Physical Therapy Goal     PT, PT/OT Ongoing, Progressing     Description:  Goals to be met by: 2020     Patient will increase functional independence with mobility by performin. Supine to sit with Minimal Assistance  2. Sit to stand transfer with Moderate assistance with albin walker   3. Gait  x 10 feet with Moderate assistance using albin walker.   4. Bed to chair transfer with Moderate assistance using albin walker                        Time Tracking:     PT Received On: 20  PT Start Time: 1033     PT Stop Time: 1050  PT Total Time (min): 17 min     Billable Minutes: Therapeutic Activity 17    Treatment Type: Treatment  PT/PTA: PTA     PTA Visit Number: 2     Mckenna Vazquez  PTA  02/09/2020

## 2020-02-09 NOTE — RESPIRATORY THERAPY
02/09/20 0658   PRE-TX-O2   O2 Device (Oxygen Therapy) room air   SpO2 95 %   Pulse Oximetry Type Intermittent   $ Pulse Oximetry - Multiple Charge Pulse Oximetry - Multiple

## 2020-02-10 LAB
POCT GLUCOSE: 106 MG/DL (ref 70–110)
POCT GLUCOSE: 121 MG/DL (ref 70–110)

## 2020-02-10 PROCEDURE — 25000003 PHARM REV CODE 250: Mod: HCNC | Performed by: HOSPITALIST

## 2020-02-10 PROCEDURE — 63600175 PHARM REV CODE 636 W HCPCS: Mod: HCNC | Performed by: HOSPITALIST

## 2020-02-10 PROCEDURE — 92523 SPEECH SOUND LANG COMPREHEN: CPT | Mod: HCNC

## 2020-02-10 PROCEDURE — 97110 THERAPEUTIC EXERCISES: CPT | Mod: HCNC,CO

## 2020-02-10 PROCEDURE — 94761 N-INVAS EAR/PLS OXIMETRY MLT: CPT | Mod: HCNC

## 2020-02-10 PROCEDURE — 97530 THERAPEUTIC ACTIVITIES: CPT | Mod: HCNC,CQ

## 2020-02-10 PROCEDURE — 97535 SELF CARE MNGMENT TRAINING: CPT | Mod: HCNC,CO

## 2020-02-10 PROCEDURE — 12000002 HC ACUTE/MED SURGE SEMI-PRIVATE ROOM: Mod: HCNC

## 2020-02-10 RX ADMIN — CLOPIDOGREL BISULFATE 75 MG: 75 TABLET ORAL at 09:02

## 2020-02-10 RX ADMIN — COLCHICINE 0.6 MG: 0.6 CAPSULE ORAL at 09:02

## 2020-02-10 RX ADMIN — CARVEDILOL 6.25 MG: 6.25 TABLET, FILM COATED ORAL at 09:02

## 2020-02-10 RX ADMIN — ONDANSETRON 4 MG: 2 INJECTION INTRAMUSCULAR; INTRAVENOUS at 09:02

## 2020-02-10 RX ADMIN — AMLODIPINE BESYLATE 10 MG: 5 TABLET ORAL at 09:02

## 2020-02-10 RX ADMIN — CARVEDILOL 6.25 MG: 6.25 TABLET, FILM COATED ORAL at 10:02

## 2020-02-10 RX ADMIN — ROSUVASTATIN CALCIUM 40 MG: 20 TABLET, FILM COATED ORAL at 09:02

## 2020-02-10 RX ADMIN — BUPROPION HYDROCHLORIDE 150 MG: 150 TABLET, FILM COATED, EXTENDED RELEASE ORAL at 09:02

## 2020-02-10 RX ADMIN — TAMSULOSIN HYDROCHLORIDE 0.4 MG: 0.4 CAPSULE ORAL at 09:02

## 2020-02-10 RX ADMIN — ASPIRIN 81 MG: 81 TABLET, COATED ORAL at 09:02

## 2020-02-10 NOTE — PLAN OF CARE
NEFTALI spoke to Anjana with [facility] [phone] regarding acceptance to inpatient rehab.  Per Anjana, their physician has to review patient information and she will return call.       02/10/20 0840   Post-Acute Status   Post-Acute Authorization Placement   Post-Acute Placement Status Pending Post-Acute Clinical Review        · 2/10/2020 9:50:06 AM Note: Sent packets to [facility], can you send us clinical and therapy updates to send them this am?  [email or web address] from [facility]    SW sent updated information to both facilities listed via [facility]--MERI scott    2:00pm Per Anjana at [facility], their physician has declined patient.MERI Scott    2:20pm NEFTALI met with patient at bedside regarding EJ denial and awaiting auth/denial from [facility] for [facility].  NEFTALI explained that [facility] requested updated information to send to request authorization from [facility].  NEFTALI explained that if/when [employer] deny inpatient rehab, other options could be skilled nursing facility placement.  Patient verbalized understanding and stated that this SW need to speak with his spouse.  SW attempted to contact patient's spouse with no answer. SW left message for spouse to return call.MERI Scott

## 2020-02-10 NOTE — PROGRESS NOTES
"[facility] Medicine  Progress Note    Patient Name: Buddy Park  MRN: [id number]  Patient Class: IP- Inpatient   Admission Date: 2/1/2020  Length of Stay: 7 days  Attending Physician: Yomaira Foss MD  Primary Care Provider: Buddy Chatman MD        Subjective:     Principal Problem:Cerebrovascular accident (CVA) due to occlusion of small artery        HPI:  Buddy Park is a 69 y.o. male with PMHx of HTN, HLD,  who presented to the ED for evaluation of left sided weakness and left facial droop that was noticed this morning by his spouse. However on discussion with patient he mentions he himself had started noticing symptoms of left arm weakness since yesterday morning and had difficulty writing. His speech as per him is baseline as he has a speech deficit from prio . Patient reports onset of fatigue x1 week ago. The spouse endorses noticing the patient "dragging his left foot" this morning, prompting her to bring him to the ED.He is a nonsmoker.He does endorse non compliance to his medications in the last 2 months including his antihypertensive.Patient mentions left carotid neck surgery ~x1-1.5 years ago. Patient has no other medical concerns or complaints at this moment. SHx includes Ear mastoidectomy w/ cochlear implant w/ landmark and Carotid angioplasty.  Patient admitted for work up of possible stroke with neurology consult.     Overview/Hospital Course:  No notes on file    Interval History:  Stable awaiting placement    Review of Systems   Constitutional: Negative for appetite change and fever.   HENT: Negative for sinus pressure and sore throat.    Eyes: Negative for photophobia and redness.   Respiratory: Negative for cough and shortness of breath.    Cardiovascular: Negative for chest pain and leg swelling.   Gastrointestinal: Negative for abdominal distention and abdominal pain.   Genitourinary: Negative for difficulty urinating and dysuria.   Musculoskeletal: Positive " for arthralgias and gait problem.   Skin: Negative for color change and pallor.   Neurological: Positive for facial asymmetry and weakness.   Psychiatric/Behavioral: Negative for agitation and behavioral problems.     Objective:     Vital Signs (Most Recent):  Temp: 98.2 °F (36.8 °C) (02/10/20 0806)  Pulse: 73 (02/10/20 0806)  Resp: 17 (02/10/20 0806)  BP: (!) 140/77 (02/10/20 0806)  SpO2: 100 % (02/10/20 0810) Vital Signs (24h Range):  Temp:  [96.3 °F (35.7 °C)-98.2 °F (36.8 °C)] 98.2 °F (36.8 °C)  Pulse:  [72-81] 73  Resp:  [16-20] 17  SpO2:  [94 %-100 %] 100 %  BP: (131-151)/(67-77) 140/77     Weight: 71.9 kg (158 lb 8.2 oz)  Body mass index is 26.38 kg/m².  No intake or output data in the 24 hours ending 02/10/20 0832   Physical Exam   Constitutional: He is oriented to person, place, and time. He appears well-developed and well-nourished.   HENT:   Head: Normocephalic and atraumatic.   Eyes: Pupils are equal, round, and reactive to light. Conjunctivae are normal.   Neck: Normal range of motion. Neck supple.   Cardiovascular: Normal rate and regular rhythm.   Pulmonary/Chest: Effort normal and breath sounds normal.   Abdominal: Soft. He exhibits no distension.   Musculoskeletal: He exhibits no deformity.   Neurological: He is alert and oriented to person, place, and time. A cranial nerve deficit is present. He exhibits abnormal muscle tone.   Skin: Skin is warm and dry.   Psychiatric: He has a normal mood and affect. His behavior is normal.       Significant Labs:   Lab Results   Component Value Date    WBC 7.09 02/06/2020    HGB 14.6 02/06/2020    HCT 42.5 02/06/2020    MCV 85 02/06/2020     02/06/2020     BMP  Lab Results   Component Value Date     02/06/2020    K 3.5 02/06/2020     02/06/2020    CO2 26 02/06/2020    BUN 21 02/06/2020    CREATININE 1.5 (H) 02/06/2020    CALCIUM 9.3 02/06/2020    ANIONGAP 9 02/06/2020    ESTGFRAFRICA 55 (A) 02/06/2020    EGFRNONAA 47 (A) 02/06/2020        Significant Imaging:  CXR:  Borderline heart size otherwise negative chest.  Prior ORIF of the right humerus    CT head without contrast:  Old lacunar infarct of the anterior limb of the right internal capsule.  No acute lesions seen.  Bilateral frontal lobe atrophy.  Cochlear implant noted in place on the left.    Bilateral carotid ultrasound:  No evidence of a hemodynamically significant carotid bifurcation stenosis.  Significant improvement in the left carotid artery consistent with prior left carotid endarterectomy.    CTA of head and neck:  40% stenosis identified at the origin of the right internal carotid artery secondary to plaque.  Significant stenosis is not seen on the left.  Otherwise negative CTA of the head and neck.    CT head without contrast:  Developing areas of hypodensity in the deep white matter of the right parietal lobe may represent ischemic injury.  No hemorrhage or mass effect is seen.  An old lacunar infarct of the right basal ganglion is noted.  There is mild brain atrophy particularly of the frontal lobes.  A cochlear implant is noted in position.    CT head without contrast:  1.   There is no intracranial hemorrhage.  There has been mild interval progression of indistinct hypodensity in the right frontal white matter extending to the corona radiata consistent with interval progression of nonhemorrhagic ischemic change when compared to the recent studies.  There is no mass effect or midline shift.  2.  There is a chronic infarction in the right basal ganglia.  3.  There is significant artifact related to left-sided cochlear implant.    CT head without contrast:  1. Mild interval progression of the right frontal white matter hypoattenuation most consistent with temporal evolution of a recent infarct.  No acute intracranial hemorrhage, significant mass effect, or midline shift.  2. Stable small chronic right basal ganglia infarct.    ECHO:  · Concentric left ventricular remodeling.  Normal left ventricular systolic function. The estimated ejection fraction is 60%. Grade 1 diastolic dysfunction.  · Mild right ventricular enlargement. Normal right ventricular systolic function.  · No significant valvular disorder observed.  · Bubble study negative for R-L intra cardiac shunt.      Assessment/Plan:      * Cerebrovascular accident (CVA) due to occlusion of small artery  Continue statin and antiplt's.  Neurology following.  Echo with bubble done--- good EF.  No thrombus.  No intracardiac shunts.  It appears to be occlusion of a small artery in right hemispheric white matter.  Will treat with antiplatelets.  Awaiting placement to inpatient rehab versus skilled nursing facility.   and  working with family and medical insurance for disposition.      Chronic diastolic heart failure  Stable.      BMI 26.0-26.9,adult  Body mass index is 26.38 kg/m². Morbid obesity complicates all aspects of disease management from diagnostic modalities to treatment. Weight loss encouraged and health benefits explained to patient.        Carotid disease, bilateral  S/p left CEA  CTA shows 40% NITZA.  Continue antiplatelets.  Follow-up Neurology    Deafness  Has cochlear implants        HTN (hypertension)  Chronic, controlled.  Latest blood pressure and vitals reviewed-   Temp:  [96.3 °F (35.7 °C)-98.2 °F (36.8 °C)]   Pulse:  [72-81]   Resp:  [16-20]   BP: (131-151)/(67-77)   SpO2:  [94 %-100 %] .   Home meds for hypertension were reviewed and noted below. Hospital anti-hypertensive changes were made as shown below.  Outpt Hypertension Medications             amLODIPine (NORVASC) 10 MG tablet Take 1 tablet (10 mg total) by mouth once daily.    carvedilol (COREG) 6.25 MG tablet Take 1 tablet (6.25 mg total) by mouth 2 (two) times daily with meals.    nitroGLYCERIN (NITROSTAT) 0.4 MG SL tablet Place 1 tablet (0.4 mg total) under the tongue every 5 (five) minutes as needed for Chest pain.      Hospital  Medications             amLODIPine tablet 10 mg 10 mg, Oral, Daily    carvediloL tablet 6.25 mg 6.25 mg, Oral, 2 times daily        Will utilize p.r.n. blood pressure medication only if patient's blood pressure greater than  180/110 and he develops symptoms such as worsening chest pain or shortness of breath.      Hyperlipidemia  Continue on statin.          VTE Risk Mitigation (From admission, onward)         Ordered     IP VTE LOW RISK PATIENT  Once      02/04/20 1120     Place sequential compression device  Until discontinued      02/04/20 1120                      Yomaira Foss MD  Department of Hospital Medicine   [facility]

## 2020-02-10 NOTE — SUBJECTIVE & OBJECTIVE
Interval History:  Stable awaiting placement    Review of Systems   Constitutional: Negative for appetite change and fever.   HENT: Negative for sinus pressure and sore throat.    Eyes: Negative for photophobia and redness.   Respiratory: Negative for cough and shortness of breath.    Cardiovascular: Negative for chest pain and leg swelling.   Gastrointestinal: Negative for abdominal distention and abdominal pain.   Genitourinary: Negative for difficulty urinating and dysuria.   Musculoskeletal: Positive for arthralgias and gait problem.   Skin: Negative for color change and pallor.   Neurological: Positive for facial asymmetry and weakness.   Psychiatric/Behavioral: Negative for agitation and behavioral problems.     Objective:     Vital Signs (Most Recent):  Temp: 98.2 °F (36.8 °C) (02/10/20 0806)  Pulse: 73 (02/10/20 0806)  Resp: 17 (02/10/20 0806)  BP: (!) 140/77 (02/10/20 0806)  SpO2: 100 % (02/10/20 0810) Vital Signs (24h Range):  Temp:  [96.3 °F (35.7 °C)-98.2 °F (36.8 °C)] 98.2 °F (36.8 °C)  Pulse:  [72-81] 73  Resp:  [16-20] 17  SpO2:  [94 %-100 %] 100 %  BP: (131-151)/(67-77) 140/77     Weight: 71.9 kg (158 lb 8.2 oz)  Body mass index is 26.38 kg/m².  No intake or output data in the 24 hours ending 02/10/20 0832   Physical Exam   Constitutional: He is oriented to person, place, and time. He appears well-developed and well-nourished.   HENT:   Head: Normocephalic and atraumatic.   Eyes: Pupils are equal, round, and reactive to light. Conjunctivae are normal.   Neck: Normal range of motion. Neck supple.   Cardiovascular: Normal rate and regular rhythm.   Pulmonary/Chest: Effort normal and breath sounds normal.   Abdominal: Soft. He exhibits no distension.   Musculoskeletal: He exhibits no deformity.   Neurological: He is alert and oriented to person, place, and time. A cranial nerve deficit is present. He exhibits abnormal muscle tone.   Skin: Skin is warm and dry.   Psychiatric: He has a normal mood and  affect. His behavior is normal.       Significant Labs:   Lab Results   Component Value Date    WBC 7.09 02/06/2020    HGB 14.6 02/06/2020    HCT 42.5 02/06/2020    MCV 85 02/06/2020     02/06/2020     BMP  Lab Results   Component Value Date     02/06/2020    K 3.5 02/06/2020     02/06/2020    CO2 26 02/06/2020    BUN 21 02/06/2020    CREATININE 1.5 (H) 02/06/2020    CALCIUM 9.3 02/06/2020    ANIONGAP 9 02/06/2020    ESTGFRAFRICA 55 (A) 02/06/2020    EGFRNONAA 47 (A) 02/06/2020       Significant Imaging:  CXR:  Borderline heart size otherwise negative chest.  Prior ORIF of the right humerus    CT head without contrast:  Old lacunar infarct of the anterior limb of the right internal capsule.  No acute lesions seen.  Bilateral frontal lobe atrophy.  Cochlear implant noted in place on the left.    Bilateral carotid ultrasound:  No evidence of a hemodynamically significant carotid bifurcation stenosis.  Significant improvement in the left carotid artery consistent with prior left carotid endarterectomy.    CTA of head and neck:  40% stenosis identified at the origin of the right internal carotid artery secondary to plaque.  Significant stenosis is not seen on the left.  Otherwise negative CTA of the head and neck.    CT head without contrast:  Developing areas of hypodensity in the deep white matter of the right parietal lobe may represent ischemic injury.  No hemorrhage or mass effect is seen.  An old lacunar infarct of the right basal ganglion is noted.  There is mild brain atrophy particularly of the frontal lobes.  A cochlear implant is noted in position.    CT head without contrast:  1.   There is no intracranial hemorrhage.  There has been mild interval progression of indistinct hypodensity in the right frontal white matter extending to the corona radiata consistent with interval progression of nonhemorrhagic ischemic change when compared to the recent studies.  There is no mass effect or  midline shift.  2.  There is a chronic infarction in the right basal ganglia.  3.  There is significant artifact related to left-sided cochlear implant.    CT head without contrast:  1. Mild interval progression of the right frontal white matter hypoattenuation most consistent with temporal evolution of a recent infarct.  No acute intracranial hemorrhage, significant mass effect, or midline shift.  2. Stable small chronic right basal ganglia infarct.    ECHO:  · Concentric left ventricular remodeling. Normal left ventricular systolic function. The estimated ejection fraction is 60%. Grade 1 diastolic dysfunction.  · Mild right ventricular enlargement. Normal right ventricular systolic function.  · No significant valvular disorder observed.  · Bubble study negative for R-L intra cardiac shunt.

## 2020-02-10 NOTE — ASSESSMENT & PLAN NOTE
Chronic, controlled.  Latest blood pressure and vitals reviewed-   Temp:  [96.3 °F (35.7 °C)-98.2 °F (36.8 °C)]   Pulse:  [72-81]   Resp:  [16-20]   BP: (131-151)/(67-77)   SpO2:  [94 %-100 %] .   Home meds for hypertension were reviewed and noted below. Hospital anti-hypertensive changes were made as shown below.  Outpt Hypertension Medications             amLODIPine (NORVASC) 10 MG tablet Take 1 tablet (10 mg total) by mouth once daily.    carvedilol (COREG) 6.25 MG tablet Take 1 tablet (6.25 mg total) by mouth 2 (two) times daily with meals.    nitroGLYCERIN (NITROSTAT) 0.4 MG SL tablet Place 1 tablet (0.4 mg total) under the tongue every 5 (five) minutes as needed for Chest pain.      Hospital Medications             amLODIPine tablet 10 mg 10 mg, Oral, Daily    carvediloL tablet 6.25 mg 6.25 mg, Oral, 2 times daily        Will utilize p.r.n. blood pressure medication only if patient's blood pressure greater than  180/110 and he develops symptoms such as worsening chest pain or shortness of breath.

## 2020-02-10 NOTE — PLAN OF CARE
Problem: Physical Therapy Goal  Goal: Physical Therapy Goal  Description  Goals to be met by: 2020     Patient will increase functional independence with mobility by performin. Supine to sit with Minimal Assistance  2. Sit to stand transfer with Moderate assistance with albin walker   3. Gait  x 10 feet with Moderate assistance using albin walker.   4. Bed to chair transfer with Moderate assistance using albin walker       Outcome: Ongoing, Progressing   Transfers performed to increase weightbearing and improve awareness of midline.

## 2020-02-10 NOTE — PT/OT/SLP PROGRESS
"Occupational Therapy   Treatment    Name: Buddy Park  MRN: [id number]  Admitting Diagnosis:  Cerebrovascular accident (CVA) due to occlusion of small artery       Recommendations:     Discharge Recommendations: rehabilitation facility  Discharge Equipment Recommendations:  other (see comments)(TBD at next level of care)  Barriers to discharge:       Assessment:     Buddy Park is a 69 y.o. male with a medical diagnosis of Cerebrovascular accident (CVA) due to occlusion of small artery.  He presents with decreased impulsivity, increased awareness into his deficits, left inattention, decreased activity tolerance and acute onset fatigue with prolonged activity, improved memory recall of therapeutic techniques and exercises for self ROM. Performance deficits affecting function are weakness, impaired endurance, impaired sensation, impaired self care skills, impaired functional mobilty, gait instability, impaired balance, visual deficits, impaired cognition, decreased coordination, decreased upper extremity function, decreased lower extremity function, decreased safety awareness, abnormal tone, decreased ROM, impaired coordination, impaired fine motor.     Rehab Prognosis:  Good; patient would benefit from acute skilled OT services to address these deficits and reach maximum level of function.       Plan:     Patient to be seen 6 x/week to address the above listed problems via self-care/home management, therapeutic activities, therapeutic exercises, neuromuscular re-education, wheelchair management/training  · Plan of Care Expires: 02/25/20  · Plan of Care Reviewed with: patient, son    Subjective   "I can't use that bed pan anymore. I need to get up and move. I need to be able to type again."    Pain/Comfort:  · Pain Rating 1: 0/10    Objective:     Communicated with: Jose SANCHEZ and OTR Dulce prior to session.  Patient found up in chair with recliner in use with telemetry, peripheral IV(chair alarm) upon OT entry to " room.    General Precautions: Standard, aspiration, hearing impaired(left inattention)   Orthopedic Precautions:N/A   Braces: N/A     Occupational Performance:     Functional Mobility/Transfers:  · Patient completed Sit <> Stand Transfer with moderate assistance and of 2 persons  with  hemiwalker    · Chair<>BSC transfer with Mod A x 2 w/ albin-walker and drop arm commode with VC for all sequencing and L LE lift and placement for step transfer.  · Functional Mobility: pt will need w/c management and training during IP rehab. L Lateral trunk has diminished sensation within 6in of shoulder girdle and  Diminished sensation throughout scapula within 3 inches of shoulder girdle. Subluxation of L shoulder noted; pt reports no pain as he has no sensation in the shoulder and through the L UE except it being heavy.     Activities of Daily Living:  · Toileting did not occur though pt made a good attempt. TAYLOR ed process for being able to maintain standing and perform luis hygiene. Pt V/U and is motivated to progress through therapy and become (I) with toileting. Pt desired to sit longer on BSC; fatigue was setting in and he needed to transfer back to chair before getting too fatigue.    Treatment & Education:  -TherEx; hand/wrist full extentsion [id number]; pt reports sensation across dorsal wrist and MCPs. TAYLOR ed to pt and son that this stretch is the most important one for pt L hand at the moment, to continue creating the nerve regeneration to the brain from the L UE movement. They v/u and pt demos stretch appropriately.  -Self ROM L shoulder via chest press reclined in recliner x 10 with VC for slow, purposeful movement.  -TAYLOR ed the importance of as much purposeful movement with verbalizations/commands as possible to the entire L side. Pt stating that when he is laying in bed he feels like he is paralyzed. TAYLOR affirming that the L half of his body IS mostly paralyzed. Pt received information as though it was the first time  he was realizing the severity of his condition.    Patient left up in chair with recliner in use with all lines intact, call button in reach, chair alarm on and sonEmile presentEducation:      GOALS:   Multidisciplinary Problems     Occupational Therapy Goals        Problem: Occupational Therapy Goal    Goal Priority Disciplines Outcome Interventions   Occupational Therapy Goal     OT, PT/OT Ongoing, Progressing    Description:  Goals to be met by: 2/16/20     Patient will increase functional independence with ADLs by performing:    Patient will tolerate sitting EOB x 10 minutes with CGA to SBA to maintain midline  Grooming EOB with Min (A)  UB dressing EOB with Mod (A)                       Time Tracking:     OT Date of Treatment: 02/10/20  OT Start Time: 1118  OT Stop Time: 1158  OT Total Time (min): 40 min    Billable Minutes:Self Care/Home Management 25 min  Therapeutic Exercise 15 min    AMADOU Brizuela  2/10/2020

## 2020-02-10 NOTE — ASSESSMENT & PLAN NOTE
Continue statin and antiplt's.  Neurology following.  Echo with bubble done--- good EF.  No thrombus.  No intracardiac shunts.  It appears to be occlusion of a small artery in right hemispheric white matter.  Will treat with antiplatelets.  Awaiting placement to inpatient rehab versus skilled nursing facility.   and  working with family and medical insurance for disposition.

## 2020-02-10 NOTE — PLAN OF CARE
Awaiting placement auth from insurance. No acute events throughout shift. BS monitored as ordered. No need for SSI coverage. 2 person assist to transfer to bed and back to chair. Puposeful q2 hour rounding utilized to ensure pt needs met and safety maintained. Denies needs or complaints at this time.

## 2020-02-10 NOTE — PLAN OF CARE
Problem: SLP Goal  Goal: SLP Goal  Description    1) Participate in cognitive-communication evaluation--MET; new orders received 2/10/2020  2) Recall 5 of 5 unrelated words, using learned memory strategies, with MOD I  3) Sustained attention tasks x3 minutes with 90% accuracy and no more than 1 redirection  4) Functional problem solving tasks with MOD I  5) Follow 3 step verbal commands with 100% accuracy   Outcome: Ongoing, Progressing    SLP evaluation completed. Pt presents with mild cognitive-linguistic impairment. Initiate skilled ST to address deficits.

## 2020-02-10 NOTE — PLAN OF CARE
Problem: Occupational Therapy Goal  Goal: Occupational Therapy Goal  Description  Goals to be met by: 2/16/20     Patient will increase functional independence with ADLs by performing:    Patient will tolerate sitting EOB x 10 minutes with CGA to SBA to maintain midline  Grooming EOB with Min (A)  UB dressing EOB with Mod (A)      Outcome: Ongoing, Progressing; chair<>BSC ModA x 2 w/ albin walker and VC for sequencing and L LE lift and placement.

## 2020-02-10 NOTE — ASSESSMENT & PLAN NOTE
Body mass index is 26.38 kg/m². Morbid obesity complicates all aspects of disease management from diagnostic modalities to treatment. Weight loss encouraged and health benefits explained to patient.

## 2020-02-10 NOTE — ASSESSMENT & PLAN NOTE
2nd attempt. LVM.   S/p left CEA  CTA shows 40% NITZA.  Continue antiplatelets.  Follow-up Neurology

## 2020-02-10 NOTE — PT/OT/SLP PROGRESS
Physical Therapy Treatment    Patient Name:  Buddy Park   MRN:  [id number]    Recommendations:     Discharge Recommendations:  rehabilitation facility   Discharge Equipment Recommendations: (TBD at next level of care)   Barriers to discharge: None    Assessment:     Buddy Park is a 69 y.o. male admitted with a medical diagnosis of Cerebrovascular accident (CVA) due to occlusion of small artery.  He presents with the following impairments/functional limitations:  weakness, impaired self care skills, impaired balance, decreased coordination, decreased safety awareness, decreased lower extremity function, decreased upper extremity function, impaired functional mobilty, impaired endurance, impaired sensation, gait instability, impaired coordination, impaired fine motor, decreased ROM. Pt pleasant and motivated to work with PT this morning. Pt tolerated transfer to BSC with + void and then transferred to bedside chair, taking 3 steps with hemiwalker. Pt presents with poor balance on BSC with L lean and unable to find midline himself, required min A while sitting on BSC. Pt able to advance LLE during transfer to bedside chair.     Rehab Prognosis: Fair; patient would benefit from acute skilled PT services to address these deficits and reach maximum level of function.    Recent Surgery: * No surgery found *      Plan:     During this hospitalization, patient to be seen daily to address the identified rehab impairments via gait training, therapeutic activities, therapeutic exercises and progress toward the following goals:    · Plan of Care Expires:  03/04/20    Subjective     Chief Complaint: Does not feel the L side.   Patient/Family Comments/goals: To go to rehab soon.   Pain/Comfort:  · Pain Rating 1: 0/10      Objective:     Communicated with nurse Garcia prior to session.  Patient found supine with bed alarm, telemetry upon PT entry to room.     General Precautions: Standard, fall, hearing impaired, aspiration    Orthopedic Precautions:N/A   Braces:       Functional Mobility:  · Bed Mobility:     · Rolling Left:  stand by assistance  · Scooting: stand by assistance  · Supine to Sit: moderate assistance  · Transfers:     · Sit to Stand:  maximal assistance with hemiwalker. While blocking Left knee.   · BSC to Chair: moderate assistance with  hemiwalker  using  Step Transfer. VC to hold albin walker with R hand during t/fs.   · Toilet Transfer: maximal assistance with  hemiwalker  using  Step Transfer  · Balance: Sitting: Requires Min A to maintain midline. Left lean.       AM-PAC 6 CLICK MOBILITY          Therapeutic Activities and Exercises:   reviewed safety awareness and needing VC during t/f to maintain hold of albin walker with R hand.   Unable to DF/PF/ knee flex LLE while seated in chair. PROM to LLE.     Patient left up in chair with all lines intact, call button in reach, chair alarm on and nurse Jose notified..    GOALS:   Multidisciplinary Problems     Physical Therapy Goals        Problem: Physical Therapy Goal    Goal Priority Disciplines Outcome Goal Variances Interventions   Physical Therapy Goal     PT, PT/OT Ongoing, Progressing     Description:  Goals to be met by: 2020     Patient will increase functional independence with mobility by performin. Supine to sit with Minimal Assistance  2. Sit to stand transfer with Moderate assistance with albin walker   3. Gait  x 10 feet with Moderate assistance using albin walker.   4. Bed to chair transfer with Moderate assistance using albin walker                        Time Tracking:     PT Received On: 02/10/20  PT Start Time: 847     PT Stop Time: 914  PT Total Time (min): 27 min     Billable Minutes: Therapeutic Activity 27 minutes    Treatment Type: Treatment  PT/PTA: PTA     PTA Visit Number: 3     Deisi Yap, PTA  02/10/2020

## 2020-02-11 PROCEDURE — 12000002 HC ACUTE/MED SURGE SEMI-PRIVATE ROOM: Mod: HCNC

## 2020-02-11 PROCEDURE — 25000003 PHARM REV CODE 250: Mod: HCNC | Performed by: HOSPITALIST

## 2020-02-11 PROCEDURE — 97112 NEUROMUSCULAR REEDUCATION: CPT | Mod: HCNC,CO

## 2020-02-11 PROCEDURE — 25000003 PHARM REV CODE 250: Mod: HCNC | Performed by: INTERNAL MEDICINE

## 2020-02-11 PROCEDURE — 97535 SELF CARE MNGMENT TRAINING: CPT | Mod: HCNC,CO

## 2020-02-11 PROCEDURE — 94761 N-INVAS EAR/PLS OXIMETRY MLT: CPT | Mod: HCNC

## 2020-02-11 PROCEDURE — 97110 THERAPEUTIC EXERCISES: CPT | Mod: HCNC,CO

## 2020-02-11 PROCEDURE — 97116 GAIT TRAINING THERAPY: CPT | Mod: HCNC,CQ

## 2020-02-11 RX ORDER — ALPRAZOLAM 0.25 MG/1
0.25 TABLET ORAL 2 TIMES DAILY PRN
Status: DISCONTINUED | OUTPATIENT
Start: 2020-02-11 | End: 2020-02-18 | Stop reason: HOSPADM

## 2020-02-11 RX ADMIN — AMLODIPINE BESYLATE 10 MG: 5 TABLET ORAL at 08:02

## 2020-02-11 RX ADMIN — CLOPIDOGREL BISULFATE 75 MG: 75 TABLET ORAL at 08:02

## 2020-02-11 RX ADMIN — CARVEDILOL 6.25 MG: 6.25 TABLET, FILM COATED ORAL at 08:02

## 2020-02-11 RX ADMIN — BUPROPION HYDROCHLORIDE 150 MG: 150 TABLET, FILM COATED, EXTENDED RELEASE ORAL at 08:02

## 2020-02-11 RX ADMIN — CARVEDILOL 6.25 MG: 6.25 TABLET, FILM COATED ORAL at 09:02

## 2020-02-11 RX ADMIN — ROSUVASTATIN CALCIUM 40 MG: 20 TABLET, FILM COATED ORAL at 08:02

## 2020-02-11 RX ADMIN — ALPRAZOLAM 0.25 MG: 0.25 TABLET ORAL at 06:02

## 2020-02-11 RX ADMIN — TAMSULOSIN HYDROCHLORIDE 0.4 MG: 0.4 CAPSULE ORAL at 08:02

## 2020-02-11 RX ADMIN — COLCHICINE 0.6 MG: 0.6 CAPSULE ORAL at 08:02

## 2020-02-11 RX ADMIN — ASPIRIN 81 MG: 81 TABLET, COATED ORAL at 08:02

## 2020-02-11 NOTE — PROGRESS NOTES
"[facility] Medicine  Progress Note    Patient Name: Buddy Park  MRN: [id number]  Patient Class: IP- Inpatient   Admission Date: 2/1/2020  Length of Stay: 8 days  Attending Physician: Yomaira Foss MD  Primary Care Provider: Buddy Chatman MD        Subjective:     Principal Problem:Cerebrovascular accident (CVA) due to occlusion of small artery        HPI:  Buddy Park is a 69 y.o. male with PMHx of HTN, HLD,  who presented to the ED for evaluation of left sided weakness and left facial droop that was noticed this morning by his spouse. However on discussion with patient he mentions he himself had started noticing symptoms of left arm weakness since yesterday morning and had difficulty writing. His speech as per him is baseline as he has a speech deficit from prio . Patient reports onset of fatigue x1 week ago. The spouse endorses noticing the patient "dragging his left foot" this morning, prompting her to bring him to the ED.He is a nonsmoker.He does endorse non compliance to his medications in the last 2 months including his antihypertensive.Patient mentions left carotid neck surgery ~x1-1.5 years ago. Patient has no other medical concerns or complaints at this moment. SHx includes Ear mastoidectomy w/ cochlear implant w/ landmark and Carotid angioplasty.  Patient admitted for work up of possible stroke with neurology consult.     Overview/Hospital Course:  No notes on file    Interval History:  Stable awaiting placement.  Patient is continuing to experience left facial weakness and left upper and lower extremity weakness which is more pronounced in left upper extremity.  Patient is actively participating with PT/OT.  Patient denies any headache or vision changes or any new focal neuro deficits.    Review of Systems   Constitutional: Negative for appetite change and fever.   HENT: Negative for sinus pressure and sore throat.    Eyes: Negative for photophobia and redness. "   Respiratory: Negative for cough and shortness of breath.    Cardiovascular: Negative for chest pain and leg swelling.   Gastrointestinal: Negative for abdominal distention and abdominal pain.   Genitourinary: Negative for difficulty urinating and dysuria.   Musculoskeletal: Positive for arthralgias and gait problem.   Skin: Negative for color change and pallor.   Neurological: Positive for facial asymmetry and weakness.   Psychiatric/Behavioral: Negative for agitation and behavioral problems.     Objective:     Vital Signs (Most Recent):  Temp: 96.2 °F (35.7 °C) (02/11/20 1131)  Pulse: 70 (02/11/20 1131)  Resp: 18 (02/11/20 1131)  BP: 116/61 (02/11/20 1131)  SpO2: 97 % (02/11/20 1131) Vital Signs (24h Range):  Temp:  [96.2 °F (35.7 °C)-98.5 °F (36.9 °C)] 96.2 °F (35.7 °C)  Pulse:  [69-76] 70  Resp:  [14-18] 18  SpO2:  [94 %-97 %] 97 %  BP: (116-140)/(61-82) 116/61     Weight: 71.9 kg (158 lb 8.2 oz)  Body mass index is 26.38 kg/m².    Intake/Output Summary (Last 24 hours) at 2/11/2020 1419  Last data filed at 2/11/2020 0600  Gross per 24 hour   Intake 480 ml   Output 600 ml   Net -120 ml      Physical Exam   Constitutional: He is oriented to person, place, and time. He appears well-developed and well-nourished.   HENT:   Head: Normocephalic and atraumatic.   Eyes: Pupils are equal, round, and reactive to light. Conjunctivae are normal.   Neck: Normal range of motion. Neck supple.   Cardiovascular: Normal rate and regular rhythm.   Pulmonary/Chest: Effort normal and breath sounds normal.   Abdominal: Soft. He exhibits no distension.   Musculoskeletal: He exhibits no deformity.   Neurological: He is alert and oriented to person, place, and time. A cranial nerve deficit is present. He exhibits abnormal muscle tone.   Skin: Skin is warm and dry.   Psychiatric: He has a normal mood and affect. His behavior is normal.       Significant Labs:   Lab Results   Component Value Date    WBC 7.09 02/06/2020    HGB 14.6  02/06/2020    HCT 42.5 02/06/2020    MCV 85 02/06/2020     02/06/2020     BMP  Lab Results   Component Value Date     02/06/2020    K 3.5 02/06/2020     02/06/2020    CO2 26 02/06/2020    BUN 21 02/06/2020    CREATININE 1.5 (H) 02/06/2020    CALCIUM 9.3 02/06/2020    ANIONGAP 9 02/06/2020    ESTGFRAFRICA 55 (A) 02/06/2020    EGFRNONAA 47 (A) 02/06/2020       Significant Imaging:  CXR:  Borderline heart size otherwise negative chest.  Prior ORIF of the right humerus    CT head without contrast:  Old lacunar infarct of the anterior limb of the right internal capsule.  No acute lesions seen.  Bilateral frontal lobe atrophy.  Cochlear implant noted in place on the left.    Bilateral carotid ultrasound:  No evidence of a hemodynamically significant carotid bifurcation stenosis.  Significant improvement in the left carotid artery consistent with prior left carotid endarterectomy.    CTA of head and neck:  40% stenosis identified at the origin of the right internal carotid artery secondary to plaque.  Significant stenosis is not seen on the left.  Otherwise negative CTA of the head and neck.    CT head without contrast:  Developing areas of hypodensity in the deep white matter of the right parietal lobe may represent ischemic injury.  No hemorrhage or mass effect is seen.  An old lacunar infarct of the right basal ganglion is noted.  There is mild brain atrophy particularly of the frontal lobes.  A cochlear implant is noted in position.    CT head without contrast:  1.   There is no intracranial hemorrhage.  There has been mild interval progression of indistinct hypodensity in the right frontal white matter extending to the corona radiata consistent with interval progression of nonhemorrhagic ischemic change when compared to the recent studies.  There is no mass effect or midline shift.  2.  There is a chronic infarction in the right basal ganglia.  3.  There is significant artifact related to left-sided  cochlear implant.    CT head without contrast:  1. Mild interval progression of the right frontal white matter hypoattenuation most consistent with temporal evolution of a recent infarct.  No acute intracranial hemorrhage, significant mass effect, or midline shift.  2. Stable small chronic right basal ganglia infarct.    ECHO:  · Concentric left ventricular remodeling. Normal left ventricular systolic function. The estimated ejection fraction is 60%. Grade 1 diastolic dysfunction.  · Mild right ventricular enlargement. Normal right ventricular systolic function.  · No significant valvular disorder observed.  · Bubble study negative for R-L intra cardiac shunt.      Assessment/Plan:      * Cerebrovascular accident (CVA) due to occlusion of small artery  Continue statin and antiplt's.  Neurology following.  Echo with bubble done--- good EF.  No thrombus.  No intracardiac shunts.  It appears to be occlusion of a small artery in right hemispheric white matter.  Will treat with antiplatelets.  Awaiting placement to inpatient rehab versus skilled nursing facility.   and  working with family and medical insurance for disposition.      Chronic diastolic heart failure  Stable.      BMI 26.0-26.9,adult  Body mass index is 26.38 kg/m². Morbid obesity complicates all aspects of disease management from diagnostic modalities to treatment. Weight loss encouraged and health benefits explained to patient.        Carotid disease, bilateral  S/p left CEA  CTA shows 40% NITZA.  Continue antiplatelets.  Follow-up Neurology    Deafness  Has cochlear implants        HTN (hypertension)  Chronic, controlled.  Latest blood pressure and vitals reviewed-   Temp:  [96.3 °F (35.7 °C)-98.2 °F (36.8 °C)]   Pulse:  [72-81]   Resp:  [16-20]   BP: (131-151)/(67-77)   SpO2:  [94 %-100 %] .   Home meds for hypertension were reviewed and noted below. Hospital anti-hypertensive changes were made as shown below.  Outpt Hypertension  Medications             amLODIPine (NORVASC) 10 MG tablet Take 1 tablet (10 mg total) by mouth once daily.    carvedilol (COREG) 6.25 MG tablet Take 1 tablet (6.25 mg total) by mouth 2 (two) times daily with meals.    nitroGLYCERIN (NITROSTAT) 0.4 MG SL tablet Place 1 tablet (0.4 mg total) under the tongue every 5 (five) minutes as needed for Chest pain.      Hospital Medications             amLODIPine tablet 10 mg 10 mg, Oral, Daily    carvediloL tablet 6.25 mg 6.25 mg, Oral, 2 times daily        Will utilize p.r.n. blood pressure medication only if patient's blood pressure greater than  180/110 and he develops symptoms such as worsening chest pain or shortness of breath.      Hyperlipidemia  Continue on statin.         Discussed with  and  who are assisting family with placement to inpatient rehab.    VTE Risk Mitigation (From admission, onward)         Ordered     IP VTE LOW RISK PATIENT  Once      02/04/20 1120     Place sequential compression device  Until discontinued      02/04/20 1120                Yomaira Foss MD  Department of Hospital Medicine   [facility]    Addendum 3:10 p.m.:  Appear to peer discussion done with patient's medical insurance medical director, they recommended skilled nursing facility placement instead of inpatient rehab due to patient's diminished endurance.  At this time.

## 2020-02-11 NOTE — PLAN OF CARE
02/11/20 0850   PRE-TX-O2   O2 Device (Oxygen Therapy) room air   SpO2 97 %   Pulse Oximetry Type Intermittent

## 2020-02-11 NOTE — PT/OT/SLP PROGRESS
Physical Therapy Treatment    Patient Name:  Buddy Park   MRN:  [id number]    Recommendations:     Discharge Recommendations:  rehabilitation facility   Discharge Equipment Recommendations: (TBD at next level of care)   Barriers to discharge: None    Assessment:     Buddy Park is a 69 y.o. male admitted with a medical diagnosis of Cerebrovascular accident (CVA) due to occlusion of small artery.  He presents with the following impairments/functional limitations:  weakness, impaired endurance, impaired functional mobilty, gait instability, impaired balance, impaired self care skills, impaired sensation, decreased coordination, decreased upper extremity function, decreased lower extremity function, decreased safety awareness, decreased ROM, impaired coordination, impaired fine motor. Pt resting in bed after eating his breakfast. Pt agreeable to PT and has an increase in muscle facilitation in his LLE with knee extension and hip flexion during transfers and gait. Pt presents with left neglect during gait, requiring VC to advance LLE fwd. Pt fatigues quickly and after a few minutes of rest is eager to participate in more therapy. Pt will continue to benefit from PT to improve muscle facilitation and endurance to increase awareness and mobility for transfers and gait.     Rehab Prognosis: Good; patient would benefit from acute skilled PT services to address these deficits and reach maximum level of function.    Recent Surgery: * No surgery found *      Plan:     During this hospitalization, patient to be seen daily to address the identified rehab impairments via gait training, therapeutic activities, therapeutic exercises and progress toward the following goals:    · Plan of Care Expires:  03/04/20    Subjective     Chief Complaint: Wanting to rest.   Patient/Family Comments/goals: To go to rehab.   Pain/Comfort:  · Pain Rating 1: 0/10      Objective:     Communicated with nurse Henson prior to session.  Patient  found HOB elevated with bed alarm, telemetry upon PT entry to room.     General Precautions: Standard, fall, hearing impaired, aspiration   Orthopedic Precautions:N/A   Braces:       Functional Mobility:  · Bed Mobility:     · Rolling Left:  stand by assistance  · Scooting: minimum assistance  · Supine to Sit: moderate assistance  · Transfers:     · Sit to Stand:  moderate assistance with hemiwalker x 2 reps  · Bed to Chair: moderate assistance with  hemiwalker  using  Step Transfer  · Gait: 5' Mod A with hemiwalker. Left neglect. VC to advance LLE during gait.   · Balance: Sitting: Fair. Min A for midline. Standing: Fair, Left lean. VC to stand erect.       AM-PAC 6 CLICK MOBILITY          Therapeutic Activities and Exercises:   bed mobility, transfers, and gait.   Educated on importance of AROM and AAROM with LLE and LUE throughout the day    Patient left up in chair with all lines intact, call button in reach, chair alarm on and nurse Natividad notified..    GOALS:   Multidisciplinary Problems     Physical Therapy Goals        Problem: Physical Therapy Goal    Goal Priority Disciplines Outcome Goal Variances Interventions   Physical Therapy Goal     PT, PT/OT Ongoing, Progressing     Description:  Goals to be met by: 2020     Patient will increase functional independence with mobility by performin. Supine to sit with Minimal Assistance  2. Sit to stand transfer with Moderate assistance with albin walker   3. Gait  x 10 feet with Moderate assistance using albin walker.   4. Bed to chair transfer with Moderate assistance using albin walker                        Time Tracking:     PT Received On: 20  PT Start Time: 841     PT Stop Time: 0902  PT Total Time (min): 21 min     Billable Minutes: Gait Training 13 minutes and Therapeutic Activity 8 minutes    Treatment Type: Treatment  PT/PTA: PTA     PTA Visit Number: 4     Deisi Yap, PTA  2020

## 2020-02-11 NOTE — PLAN OF CARE
02/11/20 1659   Post-Acute Status   Post-Acute Authorization Placement   Post-Acute Placement Status Discharge Plan Changed

## 2020-02-11 NOTE — PT/OT/SLP PROGRESS
"Occupational Therapy   Treatment    Name: Buddy Park  MRN: [id number]  Admitting Diagnosis:  Cerebrovascular accident (CVA) due to occlusion of small artery       Recommendations:     Discharge Recommendations: rehabilitation facility  Discharge Equipment Recommendations:  other (see comments)(TBD at next level of care)  Barriers to discharge:       Assessment:     Buddy Park is a 69 y.o. male with a medical diagnosis of Cerebrovascular accident (CVA) due to occlusion of small artery.  He presents with increased static balance at EOB sitting with intermittent Joyce to maintain and HOHA at L hand and elbow to facilitate weight bearing through the L UE. Pt gains more insight to his deficits daily, has decreased impulsivity, and increased awareness and attention. Today stated "I'm giving myself 3 months for full recovery. I know I can do it. I'm going to work hard." pt is awaiting placement at  rehab 2* inssues with insurance.     Performance deficits affecting function are weakness, impaired endurance, impaired sensation, impaired self care skills, impaired functional mobilty, gait instability, impaired balance, impaired cognition, decreased upper extremity function, decreased lower extremity function, decreased safety awareness, decreased ROM, impaired fine motor, impaired coordination.     Rehab Prognosis:  Good; patient would benefit from acute skilled OT services to address these deficits and reach maximum level of function.       Plan:     Patient to be seen 6 x/week to address the above listed problems via self-care/home management, therapeutic activities, therapeutic exercises, neuromuscular re-education  · Plan of Care Expires: 02/25/20  · Plan of Care Reviewed with: patient, son    Subjective     "yeah I did move my left leg today. I kicked it out. It took everything I had to move it too."  Pain/Comfort:  · Pain Rating 1: 0/10  · Pain Rating Post-Intervention 1: 0/10    Objective:     Communicated " with: Natividad SANCHEZ prior to session.  Patient found HOB elevated and sleeping with bed alarm, peripheral IV, telemetry upon OT entry to room.    General Precautions: Standard, fall, hearing impaired, aspiration   Orthopedic Precautions:N/A   Braces: N/A     Occupational Performance:     Bed Mobility:    · Patient completed Scooting/Bridging with minimum assistance VC to aim for the HOB as he bridged, requires cues to dig heel into bed and push through it to facilitate scoot/bridge.  · Patient completed Supine to Sit with minimum assistance and hand held  · Patient completed Sit to Supine with minimum assistance and positioning of R LE under L LE to lift it up to the bed.     Functional Mobility/Transfers:  · Functional Mobility: static sitting balance increasing daily, minor LOB forward and to the L side, able to right with CGA to Min A. EOB x 20 mins including grooming.    Activities of Daily Living:  · Grooming: minimum assistance sitting at EOB using L hand for support on bed and R UE for oral hygiene. pt requires assist to set up.    Treatment & Education:  -extensor stretch to L hand and wrist 5 reps x 1 min. Each and pt stating sensation at dorsal surface of wrist and MCPs.  - Self ROM L GH flexion, elbow extension x 10 with VC for slow purposeful movement.  - pt demos extensor stretch of hand and wrist but is unable to keep all digits extended at the same time for a prolonged period of time.  - pt continues to experience acute onset fatigue with prolonged activity which set in after 10 mins EOB. Pt requiring constant VC to look up, open eyes, sit tall, and tactile cues at L QLs. However, engaging in brushing teeth sitting EOB roused pt and he was able to sit EOB for a total of 20 mins unsupported except through [place] A of TAYLOR on his L hand and elbow to facilitate weight bearing through L UE.  -pt has ~10 min activity threshold before requiring rest break.    Patient left HOB elevated, L UE positioned on pillow  and L LE with pillow under knee  with all lines intact, call button in reach and bed alarm onEducation:      GOALS:   Multidisciplinary Problems     Occupational Therapy Goals        Problem: Occupational Therapy Goal    Goal Priority Disciplines Outcome Interventions   Occupational Therapy Goal     OT, PT/OT Ongoing, Progressing    Description:  Goals to be met by: 2/16/20     Patient will increase functional independence with ADLs by performing:    Patient will tolerate sitting EOB x 10 minutes with CGA to SBA to maintain midline  Grooming EOB with Min (A)  UB dressing EOB with Mod (A)                       Time Tracking:     OT Date of Treatment: 02/11/20  OT Start Time: 1430  OT Stop Time: 1512  OT Total Time (min): 42 min    Billable Minutes:Self Care/Home Management 8 min  Therapeutic Exercise 20min  Neuromuscular Re-education 14 min    CLAUDIA Brizuela/YAMILET  2/11/2020

## 2020-02-11 NOTE — PLAN OF CARE
Pt AAO, VSS. Plan of care reviewed with pt at beginning of shift.  Pt/family verbalized understanding. Pt denies pain. BP monitored. Waiting for placement.  Hourly/ Q2 hourly rounds completed on this pt throughout shift.  Pain monitored, restroom offered, repositions with assistance, safety maintained.  Patient has remained free from fall/injury, no skin breakdown noted.  Side rails up x2, bed in locked and lowest position, call light kept within reach.  Needs attended to, will continue to monitor/ update as indicated.

## 2020-02-11 NOTE — PLAN OF CARE
Problem: Physical Therapy Goal  Goal: Physical Therapy Goal  Description  Goals to be met by: 2020     Patient will increase functional independence with mobility by performin. Supine to sit with Minimal Assistance  2. Sit to stand transfer with Moderate assistance with albin walker   3. Gait  x 10 feet with Moderate assistance using albin walker.   4. Bed to chair transfer with Moderate assistance using albin walker       Outcome: Ongoing, Progressing   Bed mobility, transfers, and gait performed to improve mobility and muscle facilitation.

## 2020-02-11 NOTE — PLAN OF CARE
Problem: Occupational Therapy Goal  Goal: Occupational Therapy Goal  Description  Goals to be met by: 2/16/20     Patient will increase functional independence with ADLs by performing:    Patient will tolerate sitting EOB x 10 minutes with CGA to SBA to maintain midline  Grooming EOB with Min (A)  UB dressing EOB with Mod (A)      Outcome: Ongoing, Progressing; EOB x 20 mins this session and for oral hygiene with Min A to maintain sitting balance using L UE for weight bearing.

## 2020-02-11 NOTE — PLAN OF CARE
3:30pm Peer to Peer complete.  [facility] denied inpatient rehab.  SW spoke to patient's spouse who stated 1st choice [facility] and 2nd choice is [facility].

## 2020-02-11 NOTE — PLAN OF CARE
NEFTALI sent patient information information to both facility for authorization and acceptance via [facility] system.MERI scott       02/11/20 1700   Post-Acute Status   Post-Acute Authorization Placement   Post-Acute Placement Status Referrals Sent

## 2020-02-11 NOTE — PT/OT/SLP EVAL
Speech Language Pathology Evaluation  Cognitive Communication    Patient Name:  Buddy Park   MRN:  [id number]  Admitting Diagnosis: Cerebrovascular accident (CVA) due to occlusion of small artery    Recommendations:     Recommendations:                General Recommendations:  Cognitive-linguistic therapy  Diet recommendations:  Regular, Thin   Aspiration Precautions: Standard aspiration precautions   General Precautions: Standard, fall, hearing impaired, vision impaired  Communication strategies:  hearing aids and pt is hearing imparied, look at pt when speaking to him    History:     Past Medical History:   Diagnosis Date    Angina pectoris     Anxiety     Arthritis     Biliary colic     Cochlear implant in place     Deaf     LEFT EAR    Depression     Heart failure     High cholesterol     History of colon polyps 2/20/2018    Kaw (hard of hearing)     HEARING AID - RIGHT    Hyperlipidemia     Hypertension     Kidney stone     Kidney stones     Renal stones     Stroke     Trouble in sleeping     Wears glasses        Past Surgical History:   Procedure Laterality Date    CAROT[facility]OP[facility]  06/2018    [facility]     CATARACT EXTRACTION Bilateral     CHOLECYSTECTOMY  2-6-2014    COLONOSCOPY  1/9/2013    Dr. Gillette, 5 year recheck (family history colon cancer)    COLONOSCOPY N/A 3/12/2018    Procedure: COLONOSCOPY;  Surgeon: Garett Go MD;  Location: [facility];  Service: Endoscopy;  Laterality: N/A;    CYSTOSCOPY W/ RETROGRADES Bilateral 10/28/2019    Procedure: CYSTOSCOPY, WITH RETROGRADE PYELOGRAM;  Surgeon: Gretchen Proctor MD;  Location: [facility] OR;  Service: Urology;  Laterality: Bilateral;    CYSTOSCOPY W/ URETERAL STENT PLACEMENT Left 10/28/2019    Procedure: CYSTOSCOPY, WITH URETERAL STENT INSERTION;  Surgeon: Gretchen Proctor MD;  Location: [facility] OR;  Service: Urology;  Laterality: Left;    CYSTOSCOPY W/ URETERAL STENT REMOVAL Left 12/2/2019    Procedure: CYSTOSCOPY, WITH  "URETERAL STENT REMOVAL;  Surgeon: Gretchen Proctor MD;  Location: [facility] OR;  Service: Urology;  Laterality: Left;    EAR MASTOIDECTOMY W/ COCHLEAR IMPLANT W/ LANDMARK      left ear    EXTRACORPOREAL SHOCK WAVE LITHOTRIPSY Left 12/2/2019    Procedure: LITHOTRIPSY, ESWL;  Surgeon: Gretchen Proctor MD;  Location: [facility] OR;  Service: Urology;  Laterality: Left;    EYE SURGERY      FOREIGN BODY REMOVAL Right     glass removed from right foot/repair    FRACTURE SURGERY      right arm x2    LITHOTRIPSY      ROTATOR CUFF REPAIR      Right     SINUS SURGERY      TONSILLECTOMY      VASCULAR SURGERY         Social History: Patient lives with wife.    Prior Intubation HX:  none    Modified Barium Swallow: none    CT head 2/2/2020: :Developing areas of hypodensity in the deep white matter of the right parietal lobe may represent ischemic injury.  No hemorrhage or mass effect is seen.  An old lacunar infarct of the right basal ganglion is noted.  There is mild brain atrophy particularly of the frontal lobes.  A cochlear implant is noted in position."    Prior diet: Regular/thin.    Occupation/hobbies/homemaking: PLOF: independent. Baseline speech impairment since childhood. L cochlear implant, R hearing aid      Subjective     "I'm doing better."    Objective:   Pt seen for cognitive communication evaluation. He is AAOx4. He was last seen by SLP 2/2/2020 for clinical swallow evaluation. At that time, regular textures and thin liquids were recommended. Pt was then transferred to ICU following syncopal episode while on BSC.    Cognitive Status:    Arousal/Alertness Appropriate response to stimuli  Attention Sustained attention deficit --impaired. Pt required occasional redirection to task. Difficulty with digit repetition, focused attention task and serial subtractions  Orientation Oriented x4  Memory Immediate Recall --Difficulty with immediate recall of 5 digits and 5 numbers. This does not appear to be related to hearing " loss but rather attention and Delayed--Recalled 2 of 5 unrelated words following 5 minute filled delay. Fair-good recall of salient details of story presented verbally. Pt did tend to elaborate on story  Safety awareness --Per PT/OT, pt demonstrate poor safety awareness  Simple calculation --WFL for simple math word problems related to money. Difficulty with time word problems across single trial.  He demonstrates decreased insight into current deficits     Receptive Language:   Comprehension:      Questions Simple yes/no --WFL  Complex yes/no --WFL  Commands  One step --WFL  two step basic commands --WFL. Difficulty following 3 step body part commands  Object identifications WFL in Fo .    Expressive Language:  Verbal:    Automatic Speech  Counting WFL  Initiation --WFL  Naming Confrontation --WFL and Divergent responsive --Mildly reduced divergent naming  Sentence formulation --WFL      Motor Speech: Pt with baseline speech impairment since childhood. L cochlear implant. R hearing aid. Pt denies any changes in speech.     Visual-Spatial:  DNT    Reading:   DNT     Written Expression:   DNT 2' L hand dominant, L albin    Treatment: MOCA administered with pt achieving a score of 18/25 (visusopatial/executive portion eliminated 2' L dominant, L albin) suggesting mild cognitive-linguistic impairment. Pt earned 3 of 3 points on naming, 2 of 6 points for attention, 3 of 3 points for language, 2 of 2 points for abstraction, 2 of 5 points for delayed recall and 6 of 6 points for orientation.      Assessment:   SLP evaluation completed. Pt presents with mild cognitive-linguistic impairment. Initiate skilled ST to address deficits.     Goals:   Multidisciplinary Problems     SLP Goals        Problem: SLP Goal    Goal Priority Disciplines Outcome   SLP Goal     SLP Ongoing, Progressing   Description:    1) Participate in cognitive-communication evaluation--MET; new orders received 2/10/2020  2) Recall 5 of 5 unrelated words,  using learned memory strategies, with MOD I  3) Sustained attention tasks x3 minutes with 90% accuracy and no more than 1 redirection  4) Functional problem solving tasks with MOD I  5) Follow 3 step verbal commands with 100% accuracy                    Plan:   · Patient to be seen:  5 x/week   · Plan of Care expires:  02/18/20  · Plan of Care reviewed with:  patient   · SLP Follow-Up:  Yes       Discharge recommendations:  Discharge Facility/Level of Care Needs: rehabilitation facility     Time Tracking:   SLP Treatment Date:   02/10/20  Speech Start Time:  1551  Speech Stop Time:  1622     Speech Total Time (min):  31 min    Billable Minutes: Eval 31  and Total Time 31    Judy Carroll CCC-SLP  02/11/2020

## 2020-02-11 NOTE — PLAN OF CARE
Pt alert and oriented. No new symptoms. Bending left leg, cant move left arm. Tolerating diet. Feeding self. Prn xanax ordered for anxiety. Plan of care reviewed with patient at bedside. Safety maintained. Will continue to monitor.

## 2020-02-11 NOTE — SUBJECTIVE & OBJECTIVE
Interval History:  Stable awaiting placement.  Patient is continuing to experience left facial weakness and left upper and lower extremity weakness which is more pronounced in left upper extremity.  Patient is actively participating with PT/OT.  Patient denies any headache or vision changes or any new focal neuro deficits.    Review of Systems   Constitutional: Negative for appetite change and fever.   HENT: Negative for sinus pressure and sore throat.    Eyes: Negative for photophobia and redness.   Respiratory: Negative for cough and shortness of breath.    Cardiovascular: Negative for chest pain and leg swelling.   Gastrointestinal: Negative for abdominal distention and abdominal pain.   Genitourinary: Negative for difficulty urinating and dysuria.   Musculoskeletal: Positive for arthralgias and gait problem.   Skin: Negative for color change and pallor.   Neurological: Positive for facial asymmetry and weakness.   Psychiatric/Behavioral: Negative for agitation and behavioral problems.     Objective:     Vital Signs (Most Recent):  Temp: 96.2 °F (35.7 °C) (02/11/20 1131)  Pulse: 70 (02/11/20 1131)  Resp: 18 (02/11/20 1131)  BP: 116/61 (02/11/20 1131)  SpO2: 97 % (02/11/20 1131) Vital Signs (24h Range):  Temp:  [96.2 °F (35.7 °C)-98.5 °F (36.9 °C)] 96.2 °F (35.7 °C)  Pulse:  [69-76] 70  Resp:  [14-18] 18  SpO2:  [94 %-97 %] 97 %  BP: (116-140)/(61-82) 116/61     Weight: 71.9 kg (158 lb 8.2 oz)  Body mass index is 26.38 kg/m².    Intake/Output Summary (Last 24 hours) at 2/11/2020 1419  Last data filed at 2/11/2020 0600  Gross per 24 hour   Intake 480 ml   Output 600 ml   Net -120 ml      Physical Exam   Constitutional: He is oriented to person, place, and time. He appears well-developed and well-nourished.   HENT:   Head: Normocephalic and atraumatic.   Eyes: Pupils are equal, round, and reactive to light. Conjunctivae are normal.   Neck: Normal range of motion. Neck supple.   Cardiovascular: Normal rate and  regular rhythm.   Pulmonary/Chest: Effort normal and breath sounds normal.   Abdominal: Soft. He exhibits no distension.   Musculoskeletal: He exhibits no deformity.   Neurological: He is alert and oriented to person, place, and time. A cranial nerve deficit is present. He exhibits abnormal muscle tone.   Skin: Skin is warm and dry.   Psychiatric: He has a normal mood and affect. His behavior is normal.       Significant Labs:   Lab Results   Component Value Date    WBC 7.09 02/06/2020    HGB 14.6 02/06/2020    HCT 42.5 02/06/2020    MCV 85 02/06/2020     02/06/2020     BMP  Lab Results   Component Value Date     02/06/2020    K 3.5 02/06/2020     02/06/2020    CO2 26 02/06/2020    BUN 21 02/06/2020    CREATININE 1.5 (H) 02/06/2020    CALCIUM 9.3 02/06/2020    ANIONGAP 9 02/06/2020    ESTGFRAFRICA 55 (A) 02/06/2020    EGFRNONAA 47 (A) 02/06/2020       Significant Imaging:  CXR:  Borderline heart size otherwise negative chest.  Prior ORIF of the right humerus    CT head without contrast:  Old lacunar infarct of the anterior limb of the right internal capsule.  No acute lesions seen.  Bilateral frontal lobe atrophy.  Cochlear implant noted in place on the left.    Bilateral carotid ultrasound:  No evidence of a hemodynamically significant carotid bifurcation stenosis.  Significant improvement in the left carotid artery consistent with prior left carotid endarterectomy.    CTA of head and neck:  40% stenosis identified at the origin of the right internal carotid artery secondary to plaque.  Significant stenosis is not seen on the left.  Otherwise negative CTA of the head and neck.    CT head without contrast:  Developing areas of hypodensity in the deep white matter of the right parietal lobe may represent ischemic injury.  No hemorrhage or mass effect is seen.  An old lacunar infarct of the right basal ganglion is noted.  There is mild brain atrophy particularly of the frontal lobes.  A cochlear  implant is noted in position.    CT head without contrast:  1.   There is no intracranial hemorrhage.  There has been mild interval progression of indistinct hypodensity in the right frontal white matter extending to the corona radiata consistent with interval progression of nonhemorrhagic ischemic change when compared to the recent studies.  There is no mass effect or midline shift.  2.  There is a chronic infarction in the right basal ganglia.  3.  There is significant artifact related to left-sided cochlear implant.    CT head without contrast:  1. Mild interval progression of the right frontal white matter hypoattenuation most consistent with temporal evolution of a recent infarct.  No acute intracranial hemorrhage, significant mass effect, or midline shift.  2. Stable small chronic right basal ganglia infarct.    ECHO:  · Concentric left ventricular remodeling. Normal left ventricular systolic function. The estimated ejection fraction is 60%. Grade 1 diastolic dysfunction.  · Mild right ventricular enlargement. Normal right ventricular systolic function.  · No significant valvular disorder observed.  · Bubble study negative for R-L intra cardiac shunt.

## 2020-02-12 PROCEDURE — 12000002 HC ACUTE/MED SURGE SEMI-PRIVATE ROOM: Mod: HCNC

## 2020-02-12 PROCEDURE — 97129 THER IVNTJ 1ST 15 MIN: CPT | Mod: HCNC

## 2020-02-12 PROCEDURE — 25000003 PHARM REV CODE 250: Mod: HCNC | Performed by: HOSPITALIST

## 2020-02-12 PROCEDURE — 86580 TB INTRADERMAL TEST: CPT | Mod: HCNC | Performed by: INTERNAL MEDICINE

## 2020-02-12 PROCEDURE — 97110 THERAPEUTIC EXERCISES: CPT | Mod: HCNC,CO

## 2020-02-12 PROCEDURE — 97535 SELF CARE MNGMENT TRAINING: CPT | Mod: HCNC,CO

## 2020-02-12 PROCEDURE — 97116 GAIT TRAINING THERAPY: CPT | Mod: HCNC,CQ

## 2020-02-12 PROCEDURE — 94761 N-INVAS EAR/PLS OXIMETRY MLT: CPT | Mod: HCNC

## 2020-02-12 PROCEDURE — 30200315 PPD INTRADERMAL TEST REV CODE 302: Mod: HCNC | Performed by: INTERNAL MEDICINE

## 2020-02-12 PROCEDURE — 25000003 PHARM REV CODE 250: Mod: HCNC | Performed by: INTERNAL MEDICINE

## 2020-02-12 RX ORDER — ALPRAZOLAM 0.25 MG/1
0.25 TABLET ORAL 2 TIMES DAILY PRN
Qty: 10 TABLET | Refills: 0 | Status: SHIPPED | OUTPATIENT
Start: 2020-02-12 | End: 2020-04-03 | Stop reason: ALTCHOICE

## 2020-02-12 RX ADMIN — CARVEDILOL 6.25 MG: 6.25 TABLET, FILM COATED ORAL at 08:02

## 2020-02-12 RX ADMIN — ALPRAZOLAM 0.25 MG: 0.25 TABLET ORAL at 01:02

## 2020-02-12 RX ADMIN — AMLODIPINE BESYLATE 10 MG: 5 TABLET ORAL at 09:02

## 2020-02-12 RX ADMIN — TUBERCULIN PURIFIED PROTEIN DERIVATIVE 5 UNITS: 5 INJECTION, SOLUTION INTRADERMAL at 09:02

## 2020-02-12 RX ADMIN — CARVEDILOL 6.25 MG: 6.25 TABLET, FILM COATED ORAL at 09:02

## 2020-02-12 RX ADMIN — COLCHICINE 0.6 MG: 0.6 CAPSULE ORAL at 09:02

## 2020-02-12 RX ADMIN — ROSUVASTATIN CALCIUM 40 MG: 20 TABLET, FILM COATED ORAL at 09:02

## 2020-02-12 RX ADMIN — TAMSULOSIN HYDROCHLORIDE 0.4 MG: 0.4 CAPSULE ORAL at 09:02

## 2020-02-12 RX ADMIN — ALPRAZOLAM 0.25 MG: 0.25 TABLET ORAL at 11:02

## 2020-02-12 RX ADMIN — BUPROPION HYDROCHLORIDE 150 MG: 150 TABLET, FILM COATED, EXTENDED RELEASE ORAL at 09:02

## 2020-02-12 RX ADMIN — ASPIRIN 81 MG: 81 TABLET, COATED ORAL at 09:02

## 2020-02-12 RX ADMIN — CLOPIDOGREL BISULFATE 75 MG: 75 TABLET ORAL at 09:02

## 2020-02-12 NOTE — PLAN OF CARE
Problem: Physical Therapy Goal  Goal: Physical Therapy Goal  Description  Goals to be met by: 2020     Patient will increase functional independence with mobility by performin. Supine to sit with Minimal Assistance  2. Sit to stand transfer with Moderate assistance with albin walker   3. Gait  x 10 feet with Moderate assistance using albin walker.   4. Bed to chair transfer with Moderate assistance using albin walker       Outcome: Ongoing, Progressing   Bed mobility, transfers, and gait training performed with improved tolerance to OOB activity.

## 2020-02-12 NOTE — PLAN OF CARE
02/12/20 1634   Post-Acute Status   Post-Acute Authorization Placement   Post-Acute Placement Status Family Barriers   ASHLEY received a call from daughter in law, Sophie. She asked a lot of questions regarding the denial from [employer]. She verbalized that the family wants to file a fast appeal and is asking about reason for denial and denial letter. ASHLEY informed her that thus CM will speak to supervisor and call her back with some answers. ASHLEY spoke to Mckenna WILKS Then conveyed the following information to the daughter in law. Family can call [email or web address] and file fast appeal and do not need the denial letter to do so per mckenna and the reason for denial was that patient was not believed to be able to tolerate 3 hours of therapy daily at this time and that skilled placement would be more appropriate and rehab could be reconsidered at a future date. Sophie was appreciative of the information. ASHLEY sent email to CC at [facility] to inform. CM attempted to call her but gone for the day. CM will follow.

## 2020-02-12 NOTE — PLAN OF CARE
02/12/20 1309   Post-Acute Status   Post-Acute Authorization Placement   Post-Acute Placement Status Pending Post-Acute Medical Review   CM spoke to CC in admissions at [facility] @ [phone], she says that patient is accepted, she will reach out to family to see if they can come and sign papers and she will submit for [employer] auth. CM will follow.     234pm CM sent AVS for review. Scrip for xanax. Will send TB s/s as soon as completed by Dr. Foss.     300pm TB S/S and TB placement sent via [employer] to [facility]. CM spoke to CC, she has tried to reach spouse but unable to reach her. CM reviewed contact numbers. CC asking that info mentioned above be sent via email to [email or web address], she is unable to get info from [employer]. CM sent via email. CM also informed CC that patient can sign for himself if spouse can not be reached., [employer] auth pending.

## 2020-02-12 NOTE — PLAN OF CARE
Problem: Occupational Therapy Goal  Goal: Occupational Therapy Goal  Description  Goals to be met by: 2/16/20     Patient will increase functional independence with ADLs by performing:    Patient will tolerate sitting EOB x 10 minutes with CGA to SBA to maintain midline  Grooming EOB with Min (A)  UB dressing EOB with Mod (A)      Outcome: Ongoing, Progressing; albin techniques for hand washing and oral hygiene introduced and pt performed with SBA. Pt displays no signs of fatigue after session today.

## 2020-02-12 NOTE — PLAN OF CARE
Plan of care reviewed with pt,verbalized understanding.pt didn't sleep well, calling all night. Waiting for replacement. Hourly and Q2h rounds completed on this pt throughout shift.  Call light kept within reach, bed in locked and lowest position, side rails up x2.

## 2020-02-12 NOTE — PT/OT/SLP PROGRESS
Physical Therapy Treatment    Patient Name:  Buddy Park   MRN:  [id number]    Recommendations:     Discharge Recommendations:  rehabilitation facility   Discharge Equipment Recommendations: (TBD at next level of care)   Barriers to discharge: None    Assessment:     Buddy Park is a 69 y.o. male admitted with a medical diagnosis of Cerebrovascular accident (CVA) due to occlusion of small artery.  He presents with the following impairments/functional limitations:  weakness, impaired self care skills, impaired functional mobilty, impaired endurance, gait instability, decreased coordination, impaired balance, decreased lower extremity function, decreased upper extremity function, decreased ROM, impaired fine motor. Pt resting in bed upon entry to room and motivated to work with PT. Pt presents with an increase in motivation and tolerance to therapy this session. Pt performed gait training with focus on weightbearing through LLE during stance phase with L knee blocked and proper weight shifting to advance LLE fwd. Pt able to maintain midline with SBA while sitting EOB with LUE extended on bed to increase weightbearing and muscle facilitation. Pt will continue to benefit from PT to improve gait and transfer training while decreasing risk for falls.     Rehab Prognosis: Good; patient would benefit from acute skilled PT services to address these deficits and reach maximum level of function.    Recent Surgery: * No surgery found *      Plan:     During this hospitalization, patient to be seen daily to address the identified rehab impairments via gait training, therapeutic activities, therapeutic exercises and progress toward the following goals:    · Plan of Care Expires:  03/04/20    Subjective     Chief Complaint: Not being able to sleep last night  Patient/Family Comments/goals: To get better!  Pain/Comfort:  · Pain Rating 1: 0/10      Objective:     Communicated with nurse Henson prior to session.  Patient found  supine with bed alarm upon PT entry to room.     General Precautions: Standard, fall, hearing impaired, aspiration   Orthopedic Precautions:N/A   Braces:       Functional Mobility:  · Bed Mobility:     · Rolling Left:  stand by assistance  · Scooting: contact guard assistance  · Supine to Sit: minimum assistance  · Transfers:     · Sit to Stand:  minimum assistance with hemiwalker  · Gait: 8' x 3 with hemiwalker, Mod A. L knee blocked during stance. VC for hemiwalker placement and standing erect. Chair following.   · Balance: Sitting: Good with LUE extended and weightbearing on bed. CGA for LUE extension maintained.       AM-PAC 6 CLICK MOBILITY          Therapeutic Activities and Exercises:   bed mobility, transfers, and gait with improved tolerance.   Educated on exercises throughout the day to improve ROM and muscle facilitation.     Patient left up in chair with all lines intact, call button in reach and occupational therapist  present..    GOALS:   Multidisciplinary Problems     Physical Therapy Goals        Problem: Physical Therapy Goal    Goal Priority Disciplines Outcome Goal Variances Interventions   Physical Therapy Goal     PT, PT/OT Ongoing, Progressing     Description:  Goals to be met by: 2020     Patient will increase functional independence with mobility by performin. Supine to sit with Minimal Assistance  2. Sit to stand transfer with Moderate assistance with albin walker   3. Gait  x 10 feet with Moderate assistance using albin walker.   4. Bed to chair transfer with Moderate assistance using albin walker                        Time Tracking:     PT Received On: 20  PT Start Time: 921     PT Stop Time: 952  PT Total Time (min): 31 min     Billable Minutes: Gait Training 31 minutes    Treatment Type: Treatment  PT/PTA: PTA     PTA Visit Number: 5     Deisi Yap, PTA  2020

## 2020-02-12 NOTE — PLAN OF CARE
02/12/20 0853   Patient Assessment/Suction   Respiratory Effort Unlabored   Expansion/Accessory Muscles/Retractions no use of accessory muscles   All Lung Fields Breath Sounds clear   PRE-TX-O2   O2 Device (Oxygen Therapy) room air   SpO2 (!) 94 %   Pulse Oximetry Type Intermittent

## 2020-02-13 PROCEDURE — 97130 THER IVNTJ EA ADDL 15 MIN: CPT | Mod: HCNC

## 2020-02-13 PROCEDURE — 25000003 PHARM REV CODE 250: Mod: HCNC | Performed by: HOSPITALIST

## 2020-02-13 PROCEDURE — 94761 N-INVAS EAR/PLS OXIMETRY MLT: CPT | Mod: HCNC

## 2020-02-13 PROCEDURE — 97116 GAIT TRAINING THERAPY: CPT | Mod: HCNC

## 2020-02-13 PROCEDURE — 97802 MEDICAL NUTRITION INDIV IN: CPT | Mod: HCNC

## 2020-02-13 PROCEDURE — 97535 SELF CARE MNGMENT TRAINING: CPT | Mod: HCNC,CO

## 2020-02-13 PROCEDURE — 12000002 HC ACUTE/MED SURGE SEMI-PRIVATE ROOM: Mod: HCNC

## 2020-02-13 PROCEDURE — 97112 NEUROMUSCULAR REEDUCATION: CPT | Mod: HCNC,CO

## 2020-02-13 RX ADMIN — COLCHICINE 0.6 MG: 0.6 CAPSULE ORAL at 11:02

## 2020-02-13 RX ADMIN — BUPROPION HYDROCHLORIDE 150 MG: 150 TABLET, FILM COATED, EXTENDED RELEASE ORAL at 11:02

## 2020-02-13 RX ADMIN — CARVEDILOL 6.25 MG: 6.25 TABLET, FILM COATED ORAL at 11:02

## 2020-02-13 RX ADMIN — CARVEDILOL 6.25 MG: 6.25 TABLET, FILM COATED ORAL at 08:02

## 2020-02-13 RX ADMIN — TAMSULOSIN HYDROCHLORIDE 0.4 MG: 0.4 CAPSULE ORAL at 11:02

## 2020-02-13 RX ADMIN — AMLODIPINE BESYLATE 10 MG: 5 TABLET ORAL at 11:02

## 2020-02-13 RX ADMIN — ROSUVASTATIN CALCIUM 40 MG: 20 TABLET, FILM COATED ORAL at 11:02

## 2020-02-13 RX ADMIN — ASPIRIN 81 MG: 81 TABLET, COATED ORAL at 11:02

## 2020-02-13 RX ADMIN — CLOPIDOGREL BISULFATE 75 MG: 75 TABLET ORAL at 11:02

## 2020-02-13 NOTE — PROGRESS NOTES
"[facility] Medicine  Progress Note    Patient Name: Buddy Park  MRN: [id number]  Patient Class: IP- Inpatient   Admission Date: 2/1/2020  Length of Stay: 9 days  Attending Physician: Yomaira Foss MD  Primary Care Provider: Buddy Chatman MD        Subjective:     Principal Problem:Cerebrovascular accident (CVA) due to occlusion of small artery        HPI:  Buddy Park is a 69 y.o. male with PMHx of HTN, HLD,  who presented to the ED for evaluation of left sided weakness and left facial droop that was noticed this morning by his spouse. However on discussion with patient he mentions he himself had started noticing symptoms of left arm weakness since yesterday morning and had difficulty writing. His speech as per him is baseline as he has a speech deficit from prio . Patient reports onset of fatigue x1 week ago. The spouse endorses noticing the patient "dragging his left foot" this morning, prompting her to bring him to the ED.He is a nonsmoker.He does endorse non compliance to his medications in the last 2 months including his antihypertensive.Patient mentions left carotid neck surgery ~x1-1.5 years ago. Patient has no other medical concerns or complaints at this moment. SHx includes Ear mastoidectomy w/ cochlear implant w/ landmark and Carotid angioplasty.  Patient admitted for work up of possible stroke with neurology consult.     Overview/Hospital Course:  No notes on file    Interval History:  Stable awaiting placement.  Patient is continuing to experience left facial weakness and left upper and lower extremity weakness which is more pronounced in left upper extremity.  Patient is actively participating with PT/OT.  Patient denies any headache or vision changes or any new focal neuro deficits.    Review of Systems   Constitutional: Negative for appetite change and fever.   HENT: Negative for sinus pressure and sore throat.    Eyes: Negative for photophobia and redness. "   Respiratory: Negative for cough and shortness of breath.    Cardiovascular: Negative for chest pain and leg swelling.   Gastrointestinal: Negative for abdominal distention and abdominal pain.   Genitourinary: Negative for difficulty urinating and dysuria.   Musculoskeletal: Positive for arthralgias and gait problem.   Skin: Negative for color change and pallor.   Neurological: Positive for facial asymmetry and weakness.   Psychiatric/Behavioral: Negative for agitation and behavioral problems.     Objective:     Vital Signs (Most Recent):  Temp: 97.1 °F (36.2 °C) (02/12/20 1621)  Pulse: 73 (02/12/20 1621)  Resp: 18 (02/12/20 1621)  BP: 128/71 (02/12/20 1621)  SpO2: 98 % (02/12/20 1621) Vital Signs (24h Range):  Temp:  [96.6 °F (35.9 °C)-98.5 °F (36.9 °C)] 97.1 °F (36.2 °C)  Pulse:  [71-88] 73  Resp:  [16-18] 18  SpO2:  [94 %-98 %] 98 %  BP: (117-144)/(55-78) 128/71     Weight: 71.9 kg (158 lb 8.2 oz)  Body mass index is 26.38 kg/m².    Intake/Output Summary (Last 24 hours) at 2/12/2020 1823  Last data filed at 2/12/2020 0600  Gross per 24 hour   Intake --   Output 400 ml   Net -400 ml      Physical Exam   Constitutional: He is oriented to person, place, and time. He appears well-developed and well-nourished.   HENT:   Head: Normocephalic and atraumatic.   Eyes: Pupils are equal, round, and reactive to light. Conjunctivae are normal.   Neck: Normal range of motion. Neck supple.   Cardiovascular: Normal rate and regular rhythm.   Pulmonary/Chest: Effort normal and breath sounds normal.   Abdominal: Soft. He exhibits no distension.   Musculoskeletal: He exhibits no deformity.   Neurological: He is alert and oriented to person, place, and time. A cranial nerve deficit is present. He exhibits abnormal muscle tone.   Skin: Skin is warm and dry.   Psychiatric: He has a normal mood and affect. His behavior is normal.       Significant Labs:   Lab Results   Component Value Date    WBC 7.09 02/06/2020    HGB 14.6 02/06/2020     HCT 42.5 02/06/2020    MCV 85 02/06/2020     02/06/2020     BMP  Lab Results   Component Value Date     02/06/2020    K 3.5 02/06/2020     02/06/2020    CO2 26 02/06/2020    BUN 21 02/06/2020    CREATININE 1.5 (H) 02/06/2020    CALCIUM 9.3 02/06/2020    ANIONGAP 9 02/06/2020    ESTGFRAFRICA 55 (A) 02/06/2020    EGFRNONAA 47 (A) 02/06/2020       Significant Imaging:  CXR:  Borderline heart size otherwise negative chest.  Prior ORIF of the right humerus    CT head without contrast:  Old lacunar infarct of the anterior limb of the right internal capsule.  No acute lesions seen.  Bilateral frontal lobe atrophy.  Cochlear implant noted in place on the left.    Bilateral carotid ultrasound:  No evidence of a hemodynamically significant carotid bifurcation stenosis.  Significant improvement in the left carotid artery consistent with prior left carotid endarterectomy.    CTA of head and neck:  40% stenosis identified at the origin of the right internal carotid artery secondary to plaque.  Significant stenosis is not seen on the left.  Otherwise negative CTA of the head and neck.    CT head without contrast:  Developing areas of hypodensity in the deep white matter of the right parietal lobe may represent ischemic injury.  No hemorrhage or mass effect is seen.  An old lacunar infarct of the right basal ganglion is noted.  There is mild brain atrophy particularly of the frontal lobes.  A cochlear implant is noted in position.    CT head without contrast:  1.   There is no intracranial hemorrhage.  There has been mild interval progression of indistinct hypodensity in the right frontal white matter extending to the corona radiata consistent with interval progression of nonhemorrhagic ischemic change when compared to the recent studies.  There is no mass effect or midline shift.  2.  There is a chronic infarction in the right basal ganglia.  3.  There is significant artifact related to left-sided cochlear  implant.    CT head without contrast:  1. Mild interval progression of the right frontal white matter hypoattenuation most consistent with temporal evolution of a recent infarct.  No acute intracranial hemorrhage, significant mass effect, or midline shift.  2. Stable small chronic right basal ganglia infarct.    ECHO:  · Concentric left ventricular remodeling. Normal left ventricular systolic function. The estimated ejection fraction is 60%. Grade 1 diastolic dysfunction.  · Mild right ventricular enlargement. Normal right ventricular systolic function.  · No significant valvular disorder observed.  · Bubble study negative for R-L intra cardiac shunt.      Assessment/Plan:      * Cerebrovascular accident (CVA) due to occlusion of small artery  Continue statin and antiplt's.  Neurology following.  Echo with bubble done--- good EF.  No thrombus.  No intracardiac shunts.  It appears to be occlusion of a small artery in right hemispheric white matter.  Will treat with antiplatelets.  Awaiting placement to inpatient rehab versus skilled nursing facility.   and  working with family and medical insurance for disposition.      Chronic diastolic heart failure  Stable.      BMI 26.0-26.9,adult  Body mass index is 26.38 kg/m². Morbid obesity complicates all aspects of disease management from diagnostic modalities to treatment. Weight loss encouraged and health benefits explained to patient.        Carotid disease, bilateral  S/p left CEA  CTA shows 40% INTZA.  Continue antiplatelets.  Follow-up Neurology    Deafness  Has cochlear implants        HTN (hypertension)  Chronic, controlled.  Latest blood pressure and vitals reviewed-   Temp:  [96.3 °F (35.7 °C)-98.2 °F (36.8 °C)]   Pulse:  [72-81]   Resp:  [16-20]   BP: (131-151)/(67-77)   SpO2:  [94 %-100 %] .   Home meds for hypertension were reviewed and noted below. Hospital anti-hypertensive changes were made as shown below.  Outpt Hypertension  Medications             amLODIPine (NORVASC) 10 MG tablet Take 1 tablet (10 mg total) by mouth once daily.    carvedilol (COREG) 6.25 MG tablet Take 1 tablet (6.25 mg total) by mouth 2 (two) times daily with meals.    nitroGLYCERIN (NITROSTAT) 0.4 MG SL tablet Place 1 tablet (0.4 mg total) under the tongue every 5 (five) minutes as needed for Chest pain.      Hospital Medications             amLODIPine tablet 10 mg 10 mg, Oral, Daily    carvediloL tablet 6.25 mg 6.25 mg, Oral, 2 times daily        Will utilize p.r.n. blood pressure medication only if patient's blood pressure greater than  180/110 and he develops symptoms such as worsening chest pain or shortness of breath.      Hyperlipidemia  Continue on statin.         Await placement at skilled nursing facility.  VTE Risk Mitigation (From admission, onward)         Ordered     IP VTE LOW RISK PATIENT  Once      02/04/20 1120     Place sequential compression device  Until discontinued      02/04/20 1120                      Ymoaira Foss MD  Department of Hospital Medicine   [facility]

## 2020-02-13 NOTE — PLAN OF CARE
Intervention: texture modified diet and nutrition education- cardiac diet/ tips for diarrhea      Recommendation:  1) Continue Dysphagia 6 diet - add cardiac restriction once appetite improves   2) Nutrition education and handouts given   3) Weigh pt weekly   4) Add boost glucose control ( low sugar) BID , preferences taken     Goals: 1) PO intakes > 50% of meals and supplements at f/u   Nutrition Goal Status: new  Communication of RD Recs: (POC, sticky note, second sign)

## 2020-02-13 NOTE — SUBJECTIVE & OBJECTIVE
Interval History:  Stable awaiting placement.  Patient is continuing to experience left facial weakness and left upper and lower extremity weakness which is more pronounced in left upper extremity.  Patient is actively participating with PT/OT.  Patient denies any headache or vision changes or any new focal neuro deficits.    Review of Systems   Constitutional: Negative for appetite change and fever.   HENT: Negative for sinus pressure and sore throat.    Eyes: Negative for photophobia and redness.   Respiratory: Negative for cough and shortness of breath.    Cardiovascular: Negative for chest pain and leg swelling.   Gastrointestinal: Negative for abdominal distention and abdominal pain.   Genitourinary: Negative for difficulty urinating and dysuria.   Musculoskeletal: Positive for arthralgias and gait problem.   Skin: Negative for color change and pallor.   Neurological: Positive for facial asymmetry and weakness.   Psychiatric/Behavioral: Negative for agitation and behavioral problems.     Objective:     Vital Signs (Most Recent):  Temp: 96.5 °F (35.8 °C) (02/13/20 1519)  Pulse: 71 (02/13/20 1519)  Resp: 18 (02/13/20 1519)  BP: 120/62 (02/13/20 1519)  SpO2: 96 % (02/13/20 1519) Vital Signs (24h Range):  Temp:  [96.5 °F (35.8 °C)-97.9 °F (36.6 °C)] 96.5 °F (35.8 °C)  Pulse:  [66-77] 71  Resp:  [12-18] 18  SpO2:  [95 %-98 %] 96 %  BP: (105-148)/(59-79) 120/62     Weight: 71.9 kg (158 lb 8.2 oz)  Body mass index is 26.38 kg/m².    Intake/Output Summary (Last 24 hours) at 2/13/2020 1658  Last data filed at 2/13/2020 0600  Gross per 24 hour   Intake 600 ml   Output 250 ml   Net 350 ml      Physical Exam   Constitutional: He is oriented to person, place, and time. He appears well-developed and well-nourished.   HENT:   Head: Normocephalic and atraumatic.   Eyes: Pupils are equal, round, and reactive to light. Conjunctivae are normal.   Neck: Normal range of motion. Neck supple.   Cardiovascular: Normal rate and regular  rhythm.   Pulmonary/Chest: Effort normal and breath sounds normal.   Abdominal: Soft. He exhibits no distension.   Musculoskeletal: He exhibits no deformity.   Neurological: He is alert and oriented to person, place, and time. A cranial nerve deficit is present. He exhibits abnormal muscle tone.   Skin: Skin is warm and dry.   Psychiatric: He has a normal mood and affect. His behavior is normal.       Significant Labs:   Lab Results   Component Value Date    WBC 7.09 02/06/2020    HGB 14.6 02/06/2020    HCT 42.5 02/06/2020    MCV 85 02/06/2020     02/06/2020     BMP  Lab Results   Component Value Date     02/06/2020    K 3.5 02/06/2020     02/06/2020    CO2 26 02/06/2020    BUN 21 02/06/2020    CREATININE 1.5 (H) 02/06/2020    CALCIUM 9.3 02/06/2020    ANIONGAP 9 02/06/2020    ESTGFRAFRICA 55 (A) 02/06/2020    EGFRNONAA 47 (A) 02/06/2020       Significant Imaging:  CXR:  Borderline heart size otherwise negative chest.  Prior ORIF of the right humerus    CT head without contrast:  Old lacunar infarct of the anterior limb of the right internal capsule.  No acute lesions seen.  Bilateral frontal lobe atrophy.  Cochlear implant noted in place on the left.    Bilateral carotid ultrasound:  No evidence of a hemodynamically significant carotid bifurcation stenosis.  Significant improvement in the left carotid artery consistent with prior left carotid endarterectomy.    CTA of head and neck:  40% stenosis identified at the origin of the right internal carotid artery secondary to plaque.  Significant stenosis is not seen on the left.  Otherwise negative CTA of the head and neck.    CT head without contrast:  Developing areas of hypodensity in the deep white matter of the right parietal lobe may represent ischemic injury.  No hemorrhage or mass effect is seen.  An old lacunar infarct of the right basal ganglion is noted.  There is mild brain atrophy particularly of the frontal lobes.  A cochlear implant is  noted in position.    CT head without contrast:  1.   There is no intracranial hemorrhage.  There has been mild interval progression of indistinct hypodensity in the right frontal white matter extending to the corona radiata consistent with interval progression of nonhemorrhagic ischemic change when compared to the recent studies.  There is no mass effect or midline shift.  2.  There is a chronic infarction in the right basal ganglia.  3.  There is significant artifact related to left-sided cochlear implant.    CT head without contrast:  1. Mild interval progression of the right frontal white matter hypoattenuation most consistent with temporal evolution of a recent infarct.  No acute intracranial hemorrhage, significant mass effect, or midline shift.  2. Stable small chronic right basal ganglia infarct.    ECHO:  · Concentric left ventricular remodeling. Normal left ventricular systolic function. The estimated ejection fraction is 60%. Grade 1 diastolic dysfunction.  · Mild right ventricular enlargement. Normal right ventricular systolic function.  · No significant valvular disorder observed.  · Bubble study negative for R-L intra cardiac shunt.

## 2020-02-13 NOTE — PROGRESS NOTES
"[facility] Medicine  Progress Note    Patient Name: Buddy Park  MRN: [id number]  Patient Class: IP- Inpatient   Admission Date: 2/1/2020  Length of Stay: 10 days  Attending Physician: Yomaira Foss MD  Primary Care Provider: Buddy Chatman MD        Subjective:     Principal Problem:Cerebrovascular accident (CVA) due to occlusion of small artery        HPI:  Buddy Park is a 69 y.o. male with PMHx of HTN, HLD,  who presented to the ED for evaluation of left sided weakness and left facial droop that was noticed this morning by his spouse. However on discussion with patient he mentions he himself had started noticing symptoms of left arm weakness since yesterday morning and had difficulty writing. His speech as per him is baseline as he has a speech deficit from prio . Patient reports onset of fatigue x1 week ago. The spouse endorses noticing the patient "dragging his left foot" this morning, prompting her to bring him to the ED.He is a nonsmoker.He does endorse non compliance to his medications in the last 2 months including his antihypertensive.Patient mentions left carotid neck surgery ~x1-1.5 years ago. Patient has no other medical concerns or complaints at this moment. SHx includes Ear mastoidectomy w/ cochlear implant w/ landmark and Carotid angioplasty.  Patient admitted for work up of possible stroke with neurology consult.     Overview/Hospital Course:  No notes on file    Interval History:  Stable awaiting placement.  Patient is continuing to experience left facial weakness and left upper and lower extremity weakness which is more pronounced in left upper extremity.  Patient is actively participating with PT/OT.  Patient denies any headache or vision changes or any new focal neuro deficits.    Review of Systems   Constitutional: Negative for appetite change and fever.   HENT: Negative for sinus pressure and sore throat.    Eyes: Negative for photophobia and redness. "   Respiratory: Negative for cough and shortness of breath.    Cardiovascular: Negative for chest pain and leg swelling.   Gastrointestinal: Negative for abdominal distention and abdominal pain.   Genitourinary: Negative for difficulty urinating and dysuria.   Musculoskeletal: Positive for arthralgias and gait problem.   Skin: Negative for color change and pallor.   Neurological: Positive for facial asymmetry and weakness.   Psychiatric/Behavioral: Negative for agitation and behavioral problems.     Objective:     Vital Signs (Most Recent):  Temp: 96.5 °F (35.8 °C) (02/13/20 1519)  Pulse: 71 (02/13/20 1519)  Resp: 18 (02/13/20 1519)  BP: 120/62 (02/13/20 1519)  SpO2: 96 % (02/13/20 1519) Vital Signs (24h Range):  Temp:  [96.5 °F (35.8 °C)-97.9 °F (36.6 °C)] 96.5 °F (35.8 °C)  Pulse:  [66-77] 71  Resp:  [12-18] 18  SpO2:  [95 %-98 %] 96 %  BP: (105-148)/(59-79) 120/62     Weight: 71.9 kg (158 lb 8.2 oz)  Body mass index is 26.38 kg/m².    Intake/Output Summary (Last 24 hours) at 2/13/2020 1658  Last data filed at 2/13/2020 0600  Gross per 24 hour   Intake 600 ml   Output 250 ml   Net 350 ml      Physical Exam   Constitutional: He is oriented to person, place, and time. He appears well-developed and well-nourished.   HENT:   Head: Normocephalic and atraumatic.   Eyes: Pupils are equal, round, and reactive to light. Conjunctivae are normal.   Neck: Normal range of motion. Neck supple.   Cardiovascular: Normal rate and regular rhythm.   Pulmonary/Chest: Effort normal and breath sounds normal.   Abdominal: Soft. He exhibits no distension.   Musculoskeletal: He exhibits no deformity.   Neurological: He is alert and oriented to person, place, and time. A cranial nerve deficit is present. He exhibits abnormal muscle tone.   Skin: Skin is warm and dry.   Psychiatric: He has a normal mood and affect. His behavior is normal.       Significant Labs:   Lab Results   Component Value Date    WBC 7.09 02/06/2020    HGB 14.6  02/06/2020    HCT 42.5 02/06/2020    MCV 85 02/06/2020     02/06/2020     BMP  Lab Results   Component Value Date     02/06/2020    K 3.5 02/06/2020     02/06/2020    CO2 26 02/06/2020    BUN 21 02/06/2020    CREATININE 1.5 (H) 02/06/2020    CALCIUM 9.3 02/06/2020    ANIONGAP 9 02/06/2020    ESTGFRAFRICA 55 (A) 02/06/2020    EGFRNONAA 47 (A) 02/06/2020       Significant Imaging:  CXR:  Borderline heart size otherwise negative chest.  Prior ORIF of the right humerus    CT head without contrast:  Old lacunar infarct of the anterior limb of the right internal capsule.  No acute lesions seen.  Bilateral frontal lobe atrophy.  Cochlear implant noted in place on the left.    Bilateral carotid ultrasound:  No evidence of a hemodynamically significant carotid bifurcation stenosis.  Significant improvement in the left carotid artery consistent with prior left carotid endarterectomy.    CTA of head and neck:  40% stenosis identified at the origin of the right internal carotid artery secondary to plaque.  Significant stenosis is not seen on the left.  Otherwise negative CTA of the head and neck.    CT head without contrast:  Developing areas of hypodensity in the deep white matter of the right parietal lobe may represent ischemic injury.  No hemorrhage or mass effect is seen.  An old lacunar infarct of the right basal ganglion is noted.  There is mild brain atrophy particularly of the frontal lobes.  A cochlear implant is noted in position.    CT head without contrast:  1.   There is no intracranial hemorrhage.  There has been mild interval progression of indistinct hypodensity in the right frontal white matter extending to the corona radiata consistent with interval progression of nonhemorrhagic ischemic change when compared to the recent studies.  There is no mass effect or midline shift.  2.  There is a chronic infarction in the right basal ganglia.  3.  There is significant artifact related to left-sided  cochlear implant.    CT head without contrast:  1. Mild interval progression of the right frontal white matter hypoattenuation most consistent with temporal evolution of a recent infarct.  No acute intracranial hemorrhage, significant mass effect, or midline shift.  2. Stable small chronic right basal ganglia infarct.    ECHO:  · Concentric left ventricular remodeling. Normal left ventricular systolic function. The estimated ejection fraction is 60%. Grade 1 diastolic dysfunction.  · Mild right ventricular enlargement. Normal right ventricular systolic function.  · No significant valvular disorder observed.  · Bubble study negative for R-L intra cardiac shunt.      Assessment/Plan:      * Cerebrovascular accident (CVA) due to occlusion of small artery  Continue statin and antiplt's.  Neurology following.  Echo with bubble done--- good EF.  No thrombus.  No intracardiac shunts.  It appears to be occlusion of a small artery in right hemispheric white matter.  Will treat with antiplatelets.  Awaiting placement to inpatient rehab versus skilled nursing facility.   and  working with family and medical insurance for disposition.      Chronic diastolic heart failure  Stable.      BMI 26.0-26.9,adult  Body mass index is 26.38 kg/m². Morbid obesity complicates all aspects of disease management from diagnostic modalities to treatment. Weight loss encouraged and health benefits explained to patient.        Carotid disease, bilateral  S/p left CEA  CTA shows 40% NITZA.  Continue antiplatelets.  Follow-up Neurology    Deafness  Has cochlear implants        HTN (hypertension)  Chronic, controlled.  Latest blood pressure and vitals reviewed-   Temp:  [96.3 °F (35.7 °C)-98.2 °F (36.8 °C)]   Pulse:  [72-81]   Resp:  [16-20]   BP: (131-151)/(67-77)   SpO2:  [94 %-100 %] .   Home meds for hypertension were reviewed and noted below. Hospital anti-hypertensive changes were made as shown below.  Outpt Hypertension  Medications             amLODIPine (NORVASC) 10 MG tablet Take 1 tablet (10 mg total) by mouth once daily.    carvedilol (COREG) 6.25 MG tablet Take 1 tablet (6.25 mg total) by mouth 2 (two) times daily with meals.    nitroGLYCERIN (NITROSTAT) 0.4 MG SL tablet Place 1 tablet (0.4 mg total) under the tongue every 5 (five) minutes as needed for Chest pain.      Hospital Medications             amLODIPine tablet 10 mg 10 mg, Oral, Daily    carvediloL tablet 6.25 mg 6.25 mg, Oral, 2 times daily        Will utilize p.r.n. blood pressure medication only if patient's blood pressure greater than  180/110 and he develops symptoms such as worsening chest pain or shortness of breath.      Hyperlipidemia  Continue on statin.         Awaiting placement.  Per  family has appealed medical insurance denial for inpatient rehab placement.  VTE Risk Mitigation (From admission, onward)         Ordered     IP VTE LOW RISK PATIENT  Once      02/04/20 1120     Place sequential compression device  Until discontinued      02/04/20 1120                      Yomaira Foss MD  Department of Hospital Medicine   [facility]

## 2020-02-13 NOTE — PT/OT/SLP PROGRESS
"Speech Language Pathology Treatment    Patient Name:  Buddy Park   MRN:  [id number]  Admitting Diagnosis: Cerebrovascular accident (CVA) due to occlusion of small artery    Recommendations:                 General Recommendations:  Cognitive-linguistic therapy  Diet recommendations:  Dental Soft(IDDSI 6; allow bread), Liquid Diet Level: Thin   Aspiration Precautions: Check for pocketing/oral residue and Standard aspiration precautions   General Precautions: Standard, fall, hearing impaired  Communication strategies:  Look at pt when speaking to him; [place]    Subjective     "I'm just making sure I"m doing what I'm supposed to do."  "You're Judy. You work on my brain."    Objective:   Pt seen for therapy. He is AAOx4, pleasant, motivated and cooperative. Provided solutions to functional problems, presented verbally, with MIN A for elaboration. Sustained attention task x3 minutes independently. Alternating attention tasks with 92% accuracy given MIN A.     Has the patient been evaluated by SLP for swallowing?   Yes  Keep patient NPO? No   Current Respiratory Status: room air        Assessment:     Buddy Park is a 69 y.o. male with an SLP diagnosis of Cognitive-Linguistic Impairment.  He presents with excellent effort and remains motivated.    Goals:   Multidisciplinary Problems     SLP Goals        Problem: SLP Goal    Goal Priority Disciplines Outcome   SLP Goal     SLP Ongoing, Progressing   Description:    1) Participate in cognitive-communication evaluation--MET; new orders received 2/10/2020  2) Recall 5 of 5 unrelated words, using learned memory strategies, with MOD I  3) Alternating attention tasks x3 minutes with 90% accuracy and no more than 1 redirection--edit  4) Functional problem solving tasks with MOD I  5) Follow 3 step verbal commands with 100% accuracy                      Plan:     · Patient to be seen:  5 x/week   · Plan of Care expires:  02/18/20  · Plan of Care reviewed with:  patient "   · SLP Follow-Up:  Yes       Discharge recommendations:  rehabilitation facility   Barriers to Discharge:  None    Time Tracking:     SLP Treatment Date:   02/13/20  Speech Start Time:  1530  Speech Stop Time:  1545     Speech Total Time (min):  15 min    Billable Minutes: Speech Therapy Individual 15 and Total Time 15    Judy Carroll CCC-SLP  02/13/2020

## 2020-02-13 NOTE — PLAN OF CARE
Plan of care reviewed with pt. Pt verbalizes understanding. AAOx4. VSS. Remains free of falls and injury. No acute events. No distress noted. Will continue to monitor.

## 2020-02-13 NOTE — PROGRESS NOTES
"[facility]  Adult Nutrition  Progress Note    SUMMARY    Intervention: texture modified diet and nutrition education- cardiac diet/ tips for diarrhea     Recommendation:  1) Continue Dysphagia 6 diet - add cardiac restriction once appetite improves   2) Nutrition education and handouts given   3) Weigh pt weekly   4) Add boost glucose control ( low sugar) BID , preferences taken    Goals: 1) PO intakes > 50% of meals and supplements at f/u   Nutrition Goal Status: new  Communication of RD Recs: (POC, sticky note, second sign)    Reason for Assessment    Reason For Assessment: consult  Diagnosis: (CVA)  Relevant Medical History: HTN, HLD, CHF  Interdisciplinary Rounds: attended    General Information Comments: 70 y/o male admitted with CVA, tolerating Dysphagia soft diet. Pt wase eating well 2/4-2/8 but is now tired of the soft textures and eating 25-50% for the last 5 days. NFPE done 2/13/20, no wasting seen. No significant wt changes. Educated pt on diet texture and heart healthy diet. Pt brining in multiple sodas and candy bars, discouraged this. Pt c/o chronic diarrhea, discussed tips to magae diarrhea encouraged pt to avoid lactose and sugary drinks.     Nutrition Discharge Planning: Placement today- cardiac diet, texture per SLP + boost glucose control as needed ( less sugar) as needed    Nutrition Risk Screen    Nutrition Risk Screen: dysphagia or difficulty swallowing(just recently added to list)    Nutrition/Diet History    Patient Reported Diet/Restrictions/Preferences: general  Spiritual, Cultural Beliefs, Hindu Practices, Values that Affect Care: no  Food Allergies: other (see comments)(lactose)  Factors Affecting Nutritional Intake: decreased appetite    Anthropometrics    Temp: 97.8 °F (36.6 °C)  Height Method: Measured(office 10/25/19)  Height: 5' 5"  Height (inches): 65 in  Weight Method: Bed Scale  Weight: 71.9 kg (158 lb 8.2 oz)  Weight (lb): 158.51 lb  Ideal Body Weight " (IBW), Male: 136 lb  % Ideal Body Weight, Male (lb): 116.55 %  BMI (Calculated): 26.4  BMI Grade: 25 - 29.9 - overweight  Weight Loss: unintentional  Usual Body Weight (UBW), k.9 kg(2019)  % Usual Body Weight: 93.69  % Weight Change From Usual Weight: -6.5 %       Lab/Procedures/Meds    Pertinent Labs Reviewed: reviewed  BMP  Lab Results   Component Value Date     2020    K 3.5 2020     2020    CO2 26 2020    BUN 21 2020    CREATININE 1.5 (H) 2020    CALCIUM 9.3 2020    ANIONGAP 9 2020    ESTGFRAFRICA 55 (A) 2020    EGFRNONAA 47 (A) 2020     Lab Results   Component Value Date    CHOL 229 (H) 2020    CHOL 196 2019    CHOL 160 2018     Lab Results   Component Value Date    HDL 46 2020    HDL 38 (L) 2019    HDL 36 (L) 2018     Lab Results   Component Value Date    LDLCALC 146.6 2020    LDLCALC 117.0 2019    LDLCALC 70.6 2018     Lab Results   Component Value Date    TRIG 182 (H) 2020    TRIG 205 (H) 2019    TRIG 267 (H) 2018     Lab Results   Component Value Date    CHOLHDL 20.1 2020    CHOLHDL 19.4 (L) 2019    CHOLHDL 22.5 2018       Pertinent Medications Reviewed: reviewed  Pertinent Medications Comments: statin, zofran    Estimated/Assessed Needs    Weight Used For Calorie Calculations: 71.9 kg (158 lb 8.2 oz)  Energy Calorie Requirements (kcal): [place] St Jeor ( x 1.2) = 1705 kcal  Energy Need Method: [place]St Jeor  Protein Requirements: 0.8-1.0 g protein/kg ( 57-72 g protein)  Weight Used For Protein Calculations: 71.9 kg (158 lb 8.2 oz)  Fluid Requirements (mL): 1700 ml  Estimated Fluid Requirement Method: RDA Method  CHO Requirement: N/A      Nutrition Prescription Ordered    Current Diet Order: Dysphagia 6    Evaluation of Received Nutrient/Fluid Intake    Energy Calories Required: not meeting needs  Protein Required: not meeting needs  Fluid  Required: meeting needs  Tolerance: tolerating  % Intake of Estimated Energy Needs: 45-75%  % Meal Intake: 25-50% + junk food from home    Nutrition Risk    Level of Risk/Frequency of Follow-up: moderate 2 x weekly    Assessment and Plan    Inadequate energy intake  R/t decreased appetite  As evidenced by PO intakes < 75% most meals x 5 days  new       Monitor and Evaluation    Food and Nutrient Intake: energy intake, food and beverage intake  Food and Nutrient Adminstration: diet order  Anthropometric Measurements: weight  Biochemical Data, Medical Tests and Procedures: electrolyte and renal panel, gastrointestinal profile  Nutrition-Focused Physical Findings: overall appearance     Malnutrition Assessment     Skin (Micronutrient): (Ramiro = 15)  Teeth (Micronutrient): (WDL)   Micronutrient Evaluation: no deficiencies               Edema (Fluid Accumulation): 0-->no edema present   Subcutaneous Fat Loss (Final Summary): well nourished  Muscle Loss Evaluation (Final Summary): well nourished         Nutrition Follow-Up    RD Follow-up?: Yes

## 2020-02-13 NOTE — PLAN OF CARE
02/13/20 0832   PRE-TX-O2   O2 Device (Oxygen Therapy) room air   SpO2 96 %   Pulse Oximetry Type Intermittent   $ Pulse Oximetry - Multiple Charge Pulse Oximetry - Multiple

## 2020-02-13 NOTE — SUBJECTIVE & OBJECTIVE
Interval History:  Stable awaiting placement.  Patient is continuing to experience left facial weakness and left upper and lower extremity weakness which is more pronounced in left upper extremity.  Patient is actively participating with PT/OT.  Patient denies any headache or vision changes or any new focal neuro deficits.    Review of Systems   Constitutional: Negative for appetite change and fever.   HENT: Negative for sinus pressure and sore throat.    Eyes: Negative for photophobia and redness.   Respiratory: Negative for cough and shortness of breath.    Cardiovascular: Negative for chest pain and leg swelling.   Gastrointestinal: Negative for abdominal distention and abdominal pain.   Genitourinary: Negative for difficulty urinating and dysuria.   Musculoskeletal: Positive for arthralgias and gait problem.   Skin: Negative for color change and pallor.   Neurological: Positive for facial asymmetry and weakness.   Psychiatric/Behavioral: Negative for agitation and behavioral problems.     Objective:     Vital Signs (Most Recent):  Temp: 97.1 °F (36.2 °C) (02/12/20 1621)  Pulse: 73 (02/12/20 1621)  Resp: 18 (02/12/20 1621)  BP: 128/71 (02/12/20 1621)  SpO2: 98 % (02/12/20 1621) Vital Signs (24h Range):  Temp:  [96.6 °F (35.9 °C)-98.5 °F (36.9 °C)] 97.1 °F (36.2 °C)  Pulse:  [71-88] 73  Resp:  [16-18] 18  SpO2:  [94 %-98 %] 98 %  BP: (117-144)/(55-78) 128/71     Weight: 71.9 kg (158 lb 8.2 oz)  Body mass index is 26.38 kg/m².    Intake/Output Summary (Last 24 hours) at 2/12/2020 1823  Last data filed at 2/12/2020 0600  Gross per 24 hour   Intake --   Output 400 ml   Net -400 ml      Physical Exam   Constitutional: He is oriented to person, place, and time. He appears well-developed and well-nourished.   HENT:   Head: Normocephalic and atraumatic.   Eyes: Pupils are equal, round, and reactive to light. Conjunctivae are normal.   Neck: Normal range of motion. Neck supple.   Cardiovascular: Normal rate and regular  rhythm.   Pulmonary/Chest: Effort normal and breath sounds normal.   Abdominal: Soft. He exhibits no distension.   Musculoskeletal: He exhibits no deformity.   Neurological: He is alert and oriented to person, place, and time. A cranial nerve deficit is present. He exhibits abnormal muscle tone.   Skin: Skin is warm and dry.   Psychiatric: He has a normal mood and affect. His behavior is normal.       Significant Labs:   Lab Results   Component Value Date    WBC 7.09 02/06/2020    HGB 14.6 02/06/2020    HCT 42.5 02/06/2020    MCV 85 02/06/2020     02/06/2020     BMP  Lab Results   Component Value Date     02/06/2020    K 3.5 02/06/2020     02/06/2020    CO2 26 02/06/2020    BUN 21 02/06/2020    CREATININE 1.5 (H) 02/06/2020    CALCIUM 9.3 02/06/2020    ANIONGAP 9 02/06/2020    ESTGFRAFRICA 55 (A) 02/06/2020    EGFRNONAA 47 (A) 02/06/2020       Significant Imaging:  CXR:  Borderline heart size otherwise negative chest.  Prior ORIF of the right humerus    CT head without contrast:  Old lacunar infarct of the anterior limb of the right internal capsule.  No acute lesions seen.  Bilateral frontal lobe atrophy.  Cochlear implant noted in place on the left.    Bilateral carotid ultrasound:  No evidence of a hemodynamically significant carotid bifurcation stenosis.  Significant improvement in the left carotid artery consistent with prior left carotid endarterectomy.    CTA of head and neck:  40% stenosis identified at the origin of the right internal carotid artery secondary to plaque.  Significant stenosis is not seen on the left.  Otherwise negative CTA of the head and neck.    CT head without contrast:  Developing areas of hypodensity in the deep white matter of the right parietal lobe may represent ischemic injury.  No hemorrhage or mass effect is seen.  An old lacunar infarct of the right basal ganglion is noted.  There is mild brain atrophy particularly of the frontal lobes.  A cochlear implant is  noted in position.    CT head without contrast:  1.   There is no intracranial hemorrhage.  There has been mild interval progression of indistinct hypodensity in the right frontal white matter extending to the corona radiata consistent with interval progression of nonhemorrhagic ischemic change when compared to the recent studies.  There is no mass effect or midline shift.  2.  There is a chronic infarction in the right basal ganglia.  3.  There is significant artifact related to left-sided cochlear implant.    CT head without contrast:  1. Mild interval progression of the right frontal white matter hypoattenuation most consistent with temporal evolution of a recent infarct.  No acute intracranial hemorrhage, significant mass effect, or midline shift.  2. Stable small chronic right basal ganglia infarct.    ECHO:  · Concentric left ventricular remodeling. Normal left ventricular systolic function. The estimated ejection fraction is 60%. Grade 1 diastolic dysfunction.  · Mild right ventricular enlargement. Normal right ventricular systolic function.  · No significant valvular disorder observed.  · Bubble study negative for R-L intra cardiac shunt.

## 2020-02-13 NOTE — PLAN OF CARE
SW received call from patient's daughter in law Sophie regarding [employer] fast appeal.  Per Sophie, this SW faxed patient information for fast appeal to [phone] ref #[id number].  Fax confirmation received.         02/13/20 1114   Post-Acute Status   Post-Acute Authorization Other   Post-Acute Placement Status Pending Payor Medical Review       3:40pm SW spoke to Sara [phone] opt 2 ext. [phone] with [employer] regarding fast appeal.  Per Sara, appeal at the resolution department with no approval/denial at this time.  CM to follow up.MERI Prince

## 2020-02-13 NOTE — PLAN OF CARE
Pt alert and oriented. No new symptoms. Left sided weakness still. BM today. Tolerating diet. Tb skin test done. Denies pain and denies nausea. Updated pt and wife on plan of care. Safety maintained. Will continue to montior

## 2020-02-13 NOTE — PLAN OF CARE
Problem: SLP Goal  Goal: SLP Goal  Description    1) Participate in cognitive-communication evaluation--MET; new orders received 2/10/2020  2) Recall 5 of 5 unrelated words, using learned memory strategies, with MOD I  3) Alternating attention tasks x3 minutes with 90% accuracy and no more than 1 redirection--edit  4) Functional problem solving tasks with MOD I  5) Follow 3 step verbal commands with 100% accuracy     2/13/2020 1604 by Judy Carroll CCC-SLP  Outcome: Ongoing, Progressing  2/13/2020 1604 by Judy Carroll CCC-SLP  Outcome: Ongoing, Progressing  2/13/2020 1603 by Judy Carroll CCC-SLP  Reactivated    Participated in therapy. Progressing towards goals. POC updated to reflect progress. Continue POC.

## 2020-02-13 NOTE — PLAN OF CARE
02/12/20 1854   PRE-TX-O2   O2 Device (Oxygen Therapy) room air   SpO2 98 %   Pulse Oximetry Type Intermittent   Pulse 74   Resp 18

## 2020-02-13 NOTE — PLAN OF CARE
Problem: Occupational Therapy Goal  Goal: Occupational Therapy Goal  Description  Goals to be met by: 2/16/20     Patient will increase functional independence with ADLs by performing:    Patient will tolerate sitting EOB x 10 minutes with CGA to SBA to maintain midline  Grooming EOB with Min (A)  UB dressing EOB with Mod (A)       2/13/2020 1609 by CLAUDIA Brizuela/L  Outcome: Ongoing, Progressing  2/13/2020 1609 by CLAUDIA Brizuela/YAMILET  Reactivated

## 2020-02-13 NOTE — PT/OT/SLP PROGRESS
Physical Therapy Treatment    Patient Name:  uBddy Park   MRN:  [id number]    Recommendations:     Discharge Recommendations:  rehabilitation facility   Discharge Equipment Recommendations: (TBD at next level of care)   Barriers to discharge: None    Assessment:     Buddy Park is a 69 y.o. male admitted with a medical diagnosis of Cerebrovascular accident (CVA) due to occlusion of small artery.  He presents with the following impairments/functional limitations:  weakness, gait instability, decreased upper extremity function, impaired endurance, impaired balance, decreased lower extremity function, decreased safety awareness, impaired functional mobilty, decreased coordination. Patient is sitting up in the chair upon entering the room. He notes he has been sitting up since OT visit before lunch. He practiced dressing with OT while sitting EOB. He requires Joyce for sit to stand transfer with pushing off of right hand rail and then transferring his right hand to the albin walker. He requires verbal cues for gait pattern with albin walker moving walker, then left LE, followed by RLE. He walked 12' x2 with albin walker, MaxA to advance LLE, and Min-ModA for balance. Patient is very motivated to get his left sided strength back. He would benefit from IPR to allow aggressive physical therapy for functional recovery.     Rehab Prognosis: Good; patient would benefit from acute skilled PT services to address these deficits and reach maximum level of function.    Recent Surgery: * No surgery found *      Plan:     During this hospitalization, patient to be seen daily to address the identified rehab impairments via therapeutic activities, gait training, therapeutic exercises and progress toward the following goals:    · Plan of Care Expires:  03/04/20    Subjective     Chief Complaint: wants his left hand to get stronger   Patient/Family Comments/goals: get stronger  Pain/Comfort:  · Pain Rating 1: 0/10      Objective:      Communicated with RN prior to session.  Patient found up in chair with telemetry, peripheral IV upon PT entry to room.     General Precautions: Standard, aspiration, fall, hearing impaired   Orthopedic Precautions:N/A   Braces: N/A     Functional Mobility:  · Bed Mobility:     · Sit to Supine: moderate assistance  · Transfers:     · Sit to Stand:  minimum assistance with hemiwalker  · Gait: 15' x2 albin walker, MaxA to advance LLE, Min-ModA for balance  · Balance: fair      AM-PAC 6 CLICK MOBILITY  Turning over in bed (including adjusting bedclothes, sheets and blankets)?: 3  Sitting down on and standing up from a chair with arms (e.g., wheelchair, bedside commode, etc.): 3  Moving from lying on back to sitting on the side of the bed?: 3  Moving to and from a bed to a chair (including a wheelchair)?: 3  Need to walk in hospital room?: 2  Climbing 3-5 steps with a railing?: 1  Basic Mobility Total Score: 15       Therapeutic Activities and Exercises:   Patient was educated on the importance of OOB activity during hospital stay, safe transfers and ambulation, gait pattern, D/C planning, functional recovery, compensation strategies    Patient left HOB elevated with call button in reach and RN notified..    GOALS:   Multidisciplinary Problems     Physical Therapy Goals     Not on file          Multidisciplinary Problems (Resolved)        Problem: Physical Therapy Goal    Goal Priority Disciplines Outcome Goal Variances Interventions   Physical Therapy Goal   (Resolved)     PT, PT/OT Met     Description:  Goals to be met by: 2020     Patient will increase functional independence with mobility by performin. Supine to sit with Minimal Assistance  2. Sit to stand transfer with Moderate assistance with albin walker   3. Gait  x 10 feet with Moderate assistance using albin walker.   4. Bed to chair transfer with Moderate assistance using albin walker                        Time Tracking:     PT Received On:  02/13/20  PT Start Time: 1300     PT Stop Time: 1334  PT Total Time (min): 34 min     Billable Minutes: Gait Training 34    Treatment Type: Treatment  PT/PTA: PT     PTA Visit Number: 0     Emmy Trejo, PT  02/13/2020

## 2020-02-13 NOTE — PT/OT/SLP PROGRESS
Occupational Therapy   Treatment    Name: Buddy Park  MRN: [id number]  Admitting Diagnosis:  Cerebrovascular accident (CVA) due to occlusion of small artery       Recommendations:     Discharge Recommendations: rehabilitation facility  Discharge Equipment Recommendations:  other (see comments)(TBD at next level of care)  Barriers to discharge:       Assessment:     Buddy Park is a 69 y.o. male with a medical diagnosis of Cerebrovascular accident (CVA) due to occlusion of small artery.  He presents with increased activity tolerance, impulsivity when presented with the new task of UB and LB dressing requiring cognitive training and utilizing albin techniques, increased motivation to fight fatigue this session. Pt receptive and returns demo of all postural commands and was sitting EOB x 50mins consecutively for session.     Performance deficits affecting function are weakness, impaired endurance, impaired sensation, impaired self care skills, gait instability, impaired balance, impaired functional mobilty, impaired cognition, decreased upper extremity function, decreased lower extremity function, decreased ROM, decreased safety awareness, abnormal tone, impaired fine motor, decreased coordination.     Rehab Prognosis:  Good; patient would benefit from acute skilled OT services to address these deficits and reach maximum level of function.       Plan:     Patient to be seen 6 x/week to address the above listed problems via self-care/home management, therapeutic activities, therapeutic exercises, neuromuscular re-education  · Plan of Care Expires: 02/25/20  · Plan of Care Reviewed with: patient    Subjective     Pain/Comfort:  · Pain Rating 1: 0/10    Objective:     Communicated with: RNLynne prior to session.  Patient found HOB elevated with bed alarm, peripheral IV, telemetry upon OT entry to room.    General Precautions: Standard, fall, aspiration, hearing impaired   Orthopedic Precautions:N/A   Braces: N/A      Occupational Performance:     Bed Mobility:    · Patient completed Rolling/Turning to Left with  supervision and with side rail  · Patient completed Scooting/Bridging with moderate assistance and and VC to initiate L hip scooting to EOB.  · Patient completed Supine to Sit with minimum assistance and with side rail     Functional Mobility/Transfers:  · Patient completed Sit <> Stand Transfer with minimum assistance  with  hemiwalker and 2 ppl for safety and facilitation of balance.   · Patient completed Bed <> Chair Transfer using Stand Pivot technique with minimum assistance and of 2 persons with hand-held assist  · Functional Mobility: FAIR; pt is strong enough through his R side to compensate for L side weakness during standing transfers. L UE remains without AROM and requires support while standing.    Activities of Daily Living:  · Upper Body Dressing: performed sitting EOB with full length mirror; moderate assistance and VC to train albin dressing technique with t-shirt.  · Lower Body Dressing: performed sitting EOB with full length mirror; moderate assistance and VC for training with reacher and using albin dressing techniques. TAYLOR providing [place] A for weight bearing through L UE while sitting EOB throughout dressing.   · Pt requires cues to take his time, think about what he is doing, and redirection when the limbs are not approximated to the clothing effectively.     Treatment & Education:  -neuromuscular re-education; postural cues both verbal and tactile while sitting EOB while weight bearing through L hand and maintaining midline with SBA/Joyce; POC for L shoulder stretching superior L shoulder and at L levator. While sitting EOB, POC at  L anterior and posterior shoulder as well as approximating the L humeral head into the Gh joint to facilitate up tall posture and decrease subluxation. Pt performs anterior pelvic tilt with VC and then demos without further VC.    Patient left up in chair with recliner  in use with call button in reach, chair alarm on and PCT presentEducation:      GOALS:   Multidisciplinary Problems     Occupational Therapy Goals        Problem: Occupational Therapy Goal    Goal Priority Disciplines Outcome Interventions   Occupational Therapy Goal     OT, PT/OT Ongoing, Progressing    Description:  Goals to be met by: 2/16/20     Patient will increase functional independence with ADLs by performing:    Patient will tolerate sitting EOB x 10 minutes with CGA to SBA to maintain midline  Grooming EOB with Min (A)  UB dressing EOB with Mod (A)                        Time Tracking:     OT Date of Treatment: 02/13/20  OT Start Time: 1118  OT Stop Time: 1220  OT Total Time (min): 62 min    Billable Minutes:Self Care/Home Management 45min  Neuromuscular Re-education 17min    CLAUDIA Brizuela/YAMILET  2/13/2020

## 2020-02-14 LAB
GLUCOSE SERPL-MCNC: 0 MG/DL (ref 70–110)
TB INDURATION 48 - 72 HR READ: 0 MM

## 2020-02-14 PROCEDURE — 97116 GAIT TRAINING THERAPY: CPT | Mod: HCNC

## 2020-02-14 PROCEDURE — 97129 THER IVNTJ 1ST 15 MIN: CPT | Mod: HCNC

## 2020-02-14 PROCEDURE — 94761 N-INVAS EAR/PLS OXIMETRY MLT: CPT | Mod: HCNC

## 2020-02-14 PROCEDURE — 97535 SELF CARE MNGMENT TRAINING: CPT | Mod: HCNC

## 2020-02-14 PROCEDURE — 25000003 PHARM REV CODE 250: Mod: HCNC | Performed by: HOSPITALIST

## 2020-02-14 PROCEDURE — 97530 THERAPEUTIC ACTIVITIES: CPT | Mod: HCNC

## 2020-02-14 PROCEDURE — 25000003 PHARM REV CODE 250: Mod: HCNC | Performed by: INTERNAL MEDICINE

## 2020-02-14 PROCEDURE — 97110 THERAPEUTIC EXERCISES: CPT | Mod: HCNC,CO

## 2020-02-14 PROCEDURE — 12000002 HC ACUTE/MED SURGE SEMI-PRIVATE ROOM: Mod: HCNC

## 2020-02-14 PROCEDURE — 97112 NEUROMUSCULAR REEDUCATION: CPT | Mod: HCNC,CO

## 2020-02-14 PROCEDURE — 92610 EVALUATE SWALLOWING FUNCTION: CPT | Mod: HCNC

## 2020-02-14 RX ORDER — SIMETHICONE 80 MG
1 TABLET,CHEWABLE ORAL EVERY 6 HOURS PRN
Status: DISCONTINUED | OUTPATIENT
Start: 2020-02-14 | End: 2020-02-18 | Stop reason: HOSPADM

## 2020-02-14 RX ADMIN — BUPROPION HYDROCHLORIDE 150 MG: 150 TABLET, FILM COATED, EXTENDED RELEASE ORAL at 09:02

## 2020-02-14 RX ADMIN — TAMSULOSIN HYDROCHLORIDE 0.4 MG: 0.4 CAPSULE ORAL at 09:02

## 2020-02-14 RX ADMIN — COLCHICINE 0.6 MG: 0.6 CAPSULE ORAL at 09:02

## 2020-02-14 RX ADMIN — ROSUVASTATIN CALCIUM 40 MG: 20 TABLET, FILM COATED ORAL at 09:02

## 2020-02-14 RX ADMIN — AMLODIPINE BESYLATE 10 MG: 5 TABLET ORAL at 09:02

## 2020-02-14 RX ADMIN — CARVEDILOL 6.25 MG: 6.25 TABLET, FILM COATED ORAL at 09:02

## 2020-02-14 RX ADMIN — SIMETHICONE CHEW TAB 80 MG 80 MG: 80 TABLET ORAL at 05:02

## 2020-02-14 RX ADMIN — CLOPIDOGREL BISULFATE 75 MG: 75 TABLET ORAL at 09:02

## 2020-02-14 RX ADMIN — ASPIRIN 81 MG: 81 TABLET, COATED ORAL at 09:02

## 2020-02-14 NOTE — PT/OT/SLP PROGRESS
Occupational Therapy   Treatment    Name: Buddy Park  MRN: [id number]  Admitting Diagnosis:  Cerebrovascular accident (CVA) due to occlusion of small artery       Recommendations:     Discharge Recommendations: rehabilitation facility  Discharge Equipment Recommendations:  other (see comments)(TBD at next level of care)  Barriers to discharge:       Assessment:     Buddy Park is a 69 y.o. male with a medical diagnosis of Cerebrovascular accident (CVA) due to occlusion of small artery.  He presents with L hemiparesis, decreased appetite (he attests that he has no desire to eat the food here,  But ate a subway type sandwich his wife brought), dizziness at sitting at sink for ADLs (possibly attributed to not eating his last 3 meals presented), ability to advance his L LE during ambulation with hemiwalker and Mod A for BSC to sink taking 4 steps, increased attention to L UE, displays improved breathing during Self ROM, and continues to progress daily using ablin techniques to facilitate independence.     Performance deficits affecting function are weakness, impaired endurance, impaired sensation, impaired self care skills, impaired functional mobilty, gait instability, impaired balance, decreased upper extremity function, decreased lower extremity function, decreased safety awareness, decreased ROM, abnormal tone, impaired fine motor, impaired coordination.     Rehab Prognosis:  EXCELLENT; patient would benefit from acute skilled OT services to address these deficits and reach maximum level of function.       Plan:     Patient to be seen 6 x/week to address the above listed problems via self-care/home management, therapeutic activities, therapeutic exercises, neuromuscular re-education  · Plan of Care Expires: 02/25/20  · Plan of Care Reviewed with: patient    Subjective     Pain/Comfort:  · Pain Rating 1: 0/10    Objective:     Communicated with: Rosaura SANCHEZ and OTYAZ Cardona prior to session.  Patient found HOB  elevated with peripheral IV upon OT entry to room.    General Precautions: Standard, fall, hearing impaired   Orthopedic Precautions:N/A   Braces: N/A     Occupational Performance:     Bed Mobility:    · Patient completed Rolling/Turning to Left with  modified independence with side rail  · Patient completed Scooting/Bridging with stand by assistance and with side rail  · Patient completed Supine to Sit with minimum assistance and with side rail     Functional Mobility/Transfers:  · Patient completed Sit <> Stand Transfer with minimum assistance  with  hemiwalker   · Functional Mobility: FAIR; pt cannot take challenges in static standing but is able to use his R UE and R LE to position his L limbs during bed mobility requiring less physical assistance.     Activities of Daily Living:  · Grooming: contact guard assistance for L hand and L UE in a position of support during seated oral hygiene at sink.    Treatment & Education:  - Gentle mobilization of metacarpals, carpals, radius and ulna with stretching of the surrounding soft tissues to decrease edema and improve PROM, potentially allowing for increases in active movement. Positioning L UE in a position of support through out transfers, sitting EOB, seated sinkside ADLs, and reclined.   -PROM simultaneous wrist/hand extension 5 x 1min.  -Self ROM L UE; focusing on Gh flexion and elbow extension with a full slow breath in and out while L UE held in flexion and then releasing slowly to lap x 10 reps with rest break to allow for proper technique.      Patient left up in chair with recliner in use with call button in reach and chair alarm onEducation:      GOALS:   Multidisciplinary Problems     Occupational Therapy Goals        Problem: Occupational Therapy Goal    Goal Priority Disciplines Outcome Interventions   Occupational Therapy Goal     OT, PT/OT Ongoing, Progressing    Description:  Goals to be met by: 2/16/20     Patient will increase functional independence  with ADLs by performing:    Patient will tolerate sitting EOB x 10 minutes with CGA to SBA to maintain midline  Grooming EOB with Min (A)  UB dressing EOB with Mod (A)                        Time Tracking:     OT Date of Treatment: 02/14/20  OT Start Time: 1054  OT Stop Time: 1137  OT Total Time (min): 43 min    Billable Minutes:Self Care/Home Management 28  Therapeutic Exercise 15    CLAUDIA Brizuela/YAMILET  2/14/2020

## 2020-02-14 NOTE — PLAN OF CARE
Problem: SLP Goal  Goal: SLP Goal  Description    1) Participate in cognitive-communication evaluation--MET; new orders received 2/10/2020  2) Recall 5 of 5 unrelated words, using learned memory strategies, with MOD I  3) Alternating attention tasks x3 minutes with 90% accuracy and no more than 1 redirection--edit  4) Functional problem solving tasks with MOD I  5) Follow 3 step verbal commands with 100% accuracy     Outcome: Ongoing, Progressing    Participated in therapy. Remains motivated. Continue POC.

## 2020-02-14 NOTE — PLAN OF CARE
Pt is calm and cooperative, pt denies any pain, pt is aaox3, pt dose have left facial droop with slurred speech with no issue swallowing, left arm movement is absent and left leg movement is severely limited with no feeling noted in the left leg or arm, pt turned every 2 hours with help, pt tolerated po intake well, pt used urinal and bed pan as needed with assistance, fall precautions maintained,  will continue to monitor.

## 2020-02-14 NOTE — PLAN OF CARE
Problem: Physical Therapy Goal  Goal: Physical Therapy Goal  Description  Goals to be met by: 2/28/2020     Patient will increase functional independence with mobility by performing:    NEW GOALS (2/14/20)  1. Supine to sit with SBA  2. Sit to stand transfer with CGA with albin walker   3. Gait  x 50 feet with Joyce using albin walker.   4. Bed to chair transfer with Joyce using albin walker     GOALS MET (2/13/20)  1. Supine to sit with Minimal Assistance  2. Sit to stand transfer with Moderate assistance with albin walker   3. Gait  x 10 feet with Moderate assistance using albin walker.   4. Bed to chair transfer with Moderate assistance using albin walker        2/14/2020 1339 by Emmy Trejo, PT  Outcome: Ongoing, Progressing  2/14/2020 1337 by Emmy Trejo, PT  Reactivated

## 2020-02-14 NOTE — CARE UPDATE
02/14/20 0800   Patient Assessment/Suction   Level of Consciousness (AVPU) alert   Respiratory Effort Normal;Unlabored   PRE-TX-O2   O2 Device (Oxygen Therapy) room air   SpO2 96 %   Pulse Oximetry Type Intermittent   $ Pulse Oximetry - Multiple Charge Pulse Oximetry - Multiple   Pulse 60   Resp 18

## 2020-02-14 NOTE — PT/OT/SLP PROGRESS
Physical Therapy Treatment    Patient Name:  Buddy Park   MRN:  [id number]    Recommendations:     Discharge Recommendations:  rehabilitation facility   Discharge Equipment Recommendations: (TBD at next level of care)   Barriers to discharge: None    Assessment:     Buddy Park is a 69 y.o. male admitted with a medical diagnosis of Cerebrovascular accident (CVA) due to occlusion of small artery.  He presents with the following impairments/functional limitations:  weakness, gait instability, decreased upper extremity function, impaired endurance, impaired balance, decreased lower extremity function, impaired functional mobilty. Patient continues to improve greatly with each PT session. Upon entering the room patient sitting up in chair requesting to be cleaned. He requires Joyce for sit to stand transfer. 5 minutes of dynamic standing balance with weight shifting to get cleaned up. He ambulated 30' with hemiwalker, Joyce-ModA for standing balance, and Min-no assist to progress LLE for gait. Patient was unable to progress LLE during gait yesterday and required MaxA from PT. Today patient was able to progress his LLE independently with 50% of gait!! He did not needing a sitting rest break. He is greatly motivated for physical therapy. He needs IPR upon discharge for aggressive physical therapy to allow continued progress following his CVA.     Rehab Prognosis: Good; patient would benefit from acute skilled PT services to address these deficits and reach maximum level of function.    Recent Surgery: * No surgery found *      Plan:     During this hospitalization, patient to be seen daily to address the identified rehab impairments via gait training, therapeutic activities, therapeutic exercises and progress toward the following goals:    · Plan of Care Expires:  03/04/20    Subjective     Chief Complaint: wants to be cleaned up  Patient/Family Comments/goals: go to rehab!!  Pain/Comfort:  · Pain Rating 1:  0/10      Objective:     Communicated with LAURA Kaplan prior to session.  Patient found up in chair with telemetry upon PT entry to room.     General Precautions: Standard, hearing impaired, fall   Orthopedic Precautions:N/A, RLE anterior precautions   Braces: N/A     Functional Mobility:  · Bed Mobility:     · Sit to Supine: minimum assistance  · Transfers:     · Sit to Stand:  minimum assistance with hemiwalker  · Gait: 30' albin walker, Min-no assist to progress LLE, Min-ModA for standing balance  · Balance: fair      AM-PAC 6 CLICK MOBILITY          Therapeutic Activities and Exercises:   Patient was educated on the importance of OOB activity during hospital stay, safe transfers and ambulation, importance of eating his meals for energy, D/C planning    Patient left HOB elevated with call button in reach and bed alarm on..    GOALS:   Multidisciplinary Problems     Physical Therapy Goals        Problem: Physical Therapy Goal    Goal Priority Disciplines Outcome Goal Variances Interventions   Physical Therapy Goal     PT, PT/OT Ongoing, Progressing     Description:  Goals to be met by: 2/28/2020     Patient will increase functional independence with mobility by performing:    NEW GOALS (2/14/20)  1. Supine to sit with SBA  2. Sit to stand transfer with CGA with albin walker   3. Gait  x 50 feet with Joyce using albin walker.   4. Bed to chair transfer with Joyce using albin walker     GOALS MET (2/13/20)  1. Supine to sit with Minimal Assistance  2. Sit to stand transfer with Moderate assistance with albin walker   3. Gait  x 10 feet with Moderate assistance using albin walker.   4. Bed to chair transfer with Moderate assistance using albin walker                         Time Tracking:     PT Received On: 02/14/20  PT Start Time: 1244     PT Stop Time: 1308  PT Total Time (min): 24 min     Billable Minutes: Gait Training 12 and Therapeutic Activity 12    Treatment Type: Treatment  PT/PTA: PT     PTA Visit Number: 0      Emmy Trejo, PT  02/14/2020

## 2020-02-14 NOTE — PROGRESS NOTES
"[facility] Medicine  Progress Note    Patient Name: Buddy Park  MRN: [id number]  Patient Class: IP- Inpatient   Admission Date: 2/1/2020  Length of Stay: 11 days  Attending Physician: Yomaira Foss MD  Primary Care Provider: Buddy Chatman MD        Subjective:     Principal Problem:Cerebrovascular accident (CVA) due to occlusion of small artery        HPI:  Buddy Park is a 69 y.o. male with PMHx of HTN, HLD,  who presented to the ED for evaluation of left sided weakness and left facial droop that was noticed this morning by his spouse. However on discussion with patient he mentions he himself had started noticing symptoms of left arm weakness since yesterday morning and had difficulty writing. His speech as per him is baseline as he has a speech deficit from prio . Patient reports onset of fatigue x1 week ago. The spouse endorses noticing the patient "dragging his left foot" this morning, prompting her to bring him to the ED.He is a nonsmoker.He does endorse non compliance to his medications in the last 2 months including his antihypertensive.Patient mentions left carotid neck surgery ~x1-1.5 years ago. Patient has no other medical concerns or complaints at this moment. SHx includes Ear mastoidectomy w/ cochlear implant w/ landmark and Carotid angioplasty.  Patient admitted for work up of possible stroke with neurology consult.     Overview/Hospital Course:  No notes on file    Interval History:  Stable awaiting placement.  Patient is continuing to experience left facial weakness and left upper and lower extremity weakness which is more pronounced in left upper extremity.  Patient is actively participating with PT/OT.  Patient denies any headache or vision changes or any new focal neuro deficits.    Review of Systems   Constitutional: Negative for appetite change and fever.   HENT: Negative for sinus pressure and sore throat.    Eyes: Negative for photophobia and redness. "   Respiratory: Negative for cough and shortness of breath.    Cardiovascular: Negative for chest pain and leg swelling.   Gastrointestinal: Negative for abdominal distention and abdominal pain.   Genitourinary: Negative for difficulty urinating and dysuria.   Musculoskeletal: Positive for arthralgias and gait problem.   Skin: Negative for color change and pallor.   Neurological: Positive for facial asymmetry and weakness.   Psychiatric/Behavioral: Negative for agitation and behavioral problems.     Objective:     Vital Signs (Most Recent):  Temp: 98.7 °F (37.1 °C) (02/14/20 0400)  Pulse: 60 (02/14/20 0800)  Resp: 18 (02/14/20 0800)  BP: (!) 177/79(nurse noti) (02/14/20 0400)  SpO2: 96 % (02/14/20 0800) Vital Signs (24h Range):  Temp:  [96.3 °F (35.7 °C)-98.7 °F (37.1 °C)] 98.7 °F (37.1 °C)  Pulse:  [60-78] 60  Resp:  [18] 18  SpO2:  [94 %-96 %] 96 %  BP: (120-177)/(62-79) 177/79     Weight: 71.9 kg (158 lb 8.2 oz)  Body mass index is 26.38 kg/m².    Intake/Output Summary (Last 24 hours) at 2/14/2020 1405  Last data filed at 2/14/2020 0100  Gross per 24 hour   Intake 170 ml   Output 200 ml   Net -30 ml      Physical Exam   Constitutional: He is oriented to person, place, and time. He appears well-developed and well-nourished.   HENT:   Head: Normocephalic and atraumatic.   Eyes: Pupils are equal, round, and reactive to light. Conjunctivae are normal.   Neck: Normal range of motion. Neck supple.   Cardiovascular: Normal rate and regular rhythm.   Pulmonary/Chest: Effort normal and breath sounds normal.   Abdominal: Soft. He exhibits no distension.   Musculoskeletal: He exhibits no deformity.   Neurological: He is alert and oriented to person, place, and time. A cranial nerve deficit is present. He exhibits abnormal muscle tone.   Skin: Skin is warm and dry.   Psychiatric: He has a normal mood and affect. His behavior is normal.       Significant Labs:   Lab Results   Component Value Date    WBC 7.09 02/06/2020    HGB  14.6 02/06/2020    HCT 42.5 02/06/2020    MCV 85 02/06/2020     02/06/2020     BMP  Lab Results   Component Value Date     02/06/2020    K 3.5 02/06/2020     02/06/2020    CO2 26 02/06/2020    BUN 21 02/06/2020    CREATININE 1.5 (H) 02/06/2020    CALCIUM 9.3 02/06/2020    ANIONGAP 9 02/06/2020    ESTGFRAFRICA 55 (A) 02/06/2020    EGFRNONAA 47 (A) 02/06/2020       Significant Imaging:  CXR:  Borderline heart size otherwise negative chest.  Prior ORIF of the right humerus    CT head without contrast:  Old lacunar infarct of the anterior limb of the right internal capsule.  No acute lesions seen.  Bilateral frontal lobe atrophy.  Cochlear implant noted in place on the left.    Bilateral carotid ultrasound:  No evidence of a hemodynamically significant carotid bifurcation stenosis.  Significant improvement in the left carotid artery consistent with prior left carotid endarterectomy.    CTA of head and neck:  40% stenosis identified at the origin of the right internal carotid artery secondary to plaque.  Significant stenosis is not seen on the left.  Otherwise negative CTA of the head and neck.    CT head without contrast:  Developing areas of hypodensity in the deep white matter of the right parietal lobe may represent ischemic injury.  No hemorrhage or mass effect is seen.  An old lacunar infarct of the right basal ganglion is noted.  There is mild brain atrophy particularly of the frontal lobes.  A cochlear implant is noted in position.    CT head without contrast:  1.   There is no intracranial hemorrhage.  There has been mild interval progression of indistinct hypodensity in the right frontal white matter extending to the corona radiata consistent with interval progression of nonhemorrhagic ischemic change when compared to the recent studies.  There is no mass effect or midline shift.  2.  There is a chronic infarction in the right basal ganglia.  3.  There is significant artifact related to  left-sided cochlear implant.    CT head without contrast:  1. Mild interval progression of the right frontal white matter hypoattenuation most consistent with temporal evolution of a recent infarct.  No acute intracranial hemorrhage, significant mass effect, or midline shift.  2. Stable small chronic right basal ganglia infarct.    ECHO:  · Concentric left ventricular remodeling. Normal left ventricular systolic function. The estimated ejection fraction is 60%. Grade 1 diastolic dysfunction.  · Mild right ventricular enlargement. Normal right ventricular systolic function.  · No significant valvular disorder observed.  · Bubble study negative for R-L intra cardiac shunt.      Assessment/Plan:      * Cerebrovascular accident (CVA) due to occlusion of small artery  Continue statin and antiplt's.  Neurology following.  Echo with bubble done--- good EF.  No thrombus.  No intracardiac shunts.  It appears to be occlusion of a small artery in right hemispheric white matter.  Will treat with antiplatelets.  Awaiting placement to inpatient rehab versus skilled nursing facility.   and  working with family and medical insurance for disposition.      Chronic diastolic heart failure  Stable.      BMI 26.0-26.9,adult  Body mass index is 26.38 kg/m². Morbid obesity complicates all aspects of disease management from diagnostic modalities to treatment. Weight loss encouraged and health benefits explained to patient.        Carotid disease, bilateral  S/p left CEA  CTA shows 40% NITZA.  Continue antiplatelets.  Follow-up Neurology    Deafness  Has cochlear implants        HTN (hypertension)  Chronic, controlled.  Latest blood pressure and vitals reviewed-   Temp:  [96.3 °F (35.7 °C)-98.2 °F (36.8 °C)]   Pulse:  [72-81]   Resp:  [16-20]   BP: (131-151)/(67-77)   SpO2:  [94 %-100 %] .   Home meds for hypertension were reviewed and noted below. Hospital anti-hypertensive changes were made as shown below.  Outpt  Hypertension Medications             amLODIPine (NORVASC) 10 MG tablet Take 1 tablet (10 mg total) by mouth once daily.    carvedilol (COREG) 6.25 MG tablet Take 1 tablet (6.25 mg total) by mouth 2 (two) times daily with meals.    nitroGLYCERIN (NITROSTAT) 0.4 MG SL tablet Place 1 tablet (0.4 mg total) under the tongue every 5 (five) minutes as needed for Chest pain.      Hospital Medications             amLODIPine tablet 10 mg 10 mg, Oral, Daily    carvediloL tablet 6.25 mg 6.25 mg, Oral, 2 times daily        Will utilize p.r.n. blood pressure medication only if patient's blood pressure greater than  180/110 and he develops symptoms such as worsening chest pain or shortness of breath.      Hyperlipidemia  Continue on statin.         Await placement to inpatient rehab versus skilled nursing facility pending insurance clearance.  Family has appealed medical insurance decision for denial of inpatient rehab placement.  VTE Risk Mitigation (From admission, onward)         Ordered     IP VTE LOW RISK PATIENT  Once      02/04/20 1120     Place sequential compression device  Until discontinued      02/04/20 1120                      Yomaira Foss MD  Department of Hospital Medicine   [facility]

## 2020-02-14 NOTE — PT/OT/SLP PROGRESS
"Speech Language Pathology Treatment    Patient Name:  Buddy Park   MRN:  [id number]  Admitting Diagnosis: Cerebrovascular accident (CVA) due to occlusion of small artery    Recommendations:                 General Recommendations:  Cognitive-linguistic therapy  Diet recommendations:  Dental Soft(IDDSI 6; allow bread), Liquid Diet Level: Thin   Aspiration Precautions: Check for pocketing/oral residue and Standard aspiration precautions   General Precautions: Standard, hearing impaired  Communication strategies:  hearing aids and look at pt when speaking to him; [place]    Subjective   "I know, I need to focus."    Objective:   Agreed to therapy. He is AAOx4, very pleasant and cooperative. Answered functional math word problems with 80% accuracy given MOD A, redirection x2 and rest x1..     Has the patient been evaluated by SLP for swallowing?   Yes  Keep patient NPO? No   Current Respiratory Status: room air      Assessment:     Buddy Park is a 69 y.o. male with an SLP diagnosis of Cognitive-Linguistic Impairment and baseline speech impairment.  He presents with excellent effort. Progressing towards goals. Continue POC.     Goals:   Multidisciplinary Problems     SLP Goals        Problem: SLP Goal    Goal Priority Disciplines Outcome   SLP Goal     SLP Ongoing, Progressing   Description:    1) Participate in cognitive-communication evaluation--MET; new orders received 2/10/2020  2) Recall 5 of 5 unrelated words, using learned memory strategies, with MOD I  3) Alternating attention tasks x3 minutes with 90% accuracy and no more than 1 redirection--edit  4) Functional problem solving tasks with MOD I  5) Follow 3 step verbal commands with 100% accuracy                      Plan:     · Patient to be seen:  5 x/week   · Plan of Care expires:  02/18/20  · Plan of Care reviewed with:  patient   · SLP Follow-Up:  Yes       Discharge recommendations:  rehabilitation facility   Barriers to Discharge:  None    Time Tracking: "     SLP Treatment Date:   02/14/20  Speech Start Time:  1527  Speech Stop Time:  1548     Speech Total Time (min):  21 min    Billable Minutes: Speech Therapy Individual 21 and Total Time 21    Judy Carroll CCC-SLP  02/14/2020

## 2020-02-14 NOTE — SUBJECTIVE & OBJECTIVE
Interval History:  Stable awaiting placement.  Patient is continuing to experience left facial weakness and left upper and lower extremity weakness which is more pronounced in left upper extremity.  Patient is actively participating with PT/OT.  Patient denies any headache or vision changes or any new focal neuro deficits.    Review of Systems   Constitutional: Negative for appetite change and fever.   HENT: Negative for sinus pressure and sore throat.    Eyes: Negative for photophobia and redness.   Respiratory: Negative for cough and shortness of breath.    Cardiovascular: Negative for chest pain and leg swelling.   Gastrointestinal: Negative for abdominal distention and abdominal pain.   Genitourinary: Negative for difficulty urinating and dysuria.   Musculoskeletal: Positive for arthralgias and gait problem.   Skin: Negative for color change and pallor.   Neurological: Positive for facial asymmetry and weakness.   Psychiatric/Behavioral: Negative for agitation and behavioral problems.     Objective:     Vital Signs (Most Recent):  Temp: 98.7 °F (37.1 °C) (02/14/20 0400)  Pulse: 60 (02/14/20 0800)  Resp: 18 (02/14/20 0800)  BP: (!) 177/79(nurse noti) (02/14/20 0400)  SpO2: 96 % (02/14/20 0800) Vital Signs (24h Range):  Temp:  [96.3 °F (35.7 °C)-98.7 °F (37.1 °C)] 98.7 °F (37.1 °C)  Pulse:  [60-78] 60  Resp:  [18] 18  SpO2:  [94 %-96 %] 96 %  BP: (120-177)/(62-79) 177/79     Weight: 71.9 kg (158 lb 8.2 oz)  Body mass index is 26.38 kg/m².    Intake/Output Summary (Last 24 hours) at 2/14/2020 1405  Last data filed at 2/14/2020 0100  Gross per 24 hour   Intake 170 ml   Output 200 ml   Net -30 ml      Physical Exam   Constitutional: He is oriented to person, place, and time. He appears well-developed and well-nourished.   HENT:   Head: Normocephalic and atraumatic.   Eyes: Pupils are equal, round, and reactive to light. Conjunctivae are normal.   Neck: Normal range of motion. Neck supple.   Cardiovascular: Normal  rate and regular rhythm.   Pulmonary/Chest: Effort normal and breath sounds normal.   Abdominal: Soft. He exhibits no distension.   Musculoskeletal: He exhibits no deformity.   Neurological: He is alert and oriented to person, place, and time. A cranial nerve deficit is present. He exhibits abnormal muscle tone.   Skin: Skin is warm and dry.   Psychiatric: He has a normal mood and affect. His behavior is normal.       Significant Labs:   Lab Results   Component Value Date    WBC 7.09 02/06/2020    HGB 14.6 02/06/2020    HCT 42.5 02/06/2020    MCV 85 02/06/2020     02/06/2020     BMP  Lab Results   Component Value Date     02/06/2020    K 3.5 02/06/2020     02/06/2020    CO2 26 02/06/2020    BUN 21 02/06/2020    CREATININE 1.5 (H) 02/06/2020    CALCIUM 9.3 02/06/2020    ANIONGAP 9 02/06/2020    ESTGFRAFRICA 55 (A) 02/06/2020    EGFRNONAA 47 (A) 02/06/2020       Significant Imaging:  CXR:  Borderline heart size otherwise negative chest.  Prior ORIF of the right humerus    CT head without contrast:  Old lacunar infarct of the anterior limb of the right internal capsule.  No acute lesions seen.  Bilateral frontal lobe atrophy.  Cochlear implant noted in place on the left.    Bilateral carotid ultrasound:  No evidence of a hemodynamically significant carotid bifurcation stenosis.  Significant improvement in the left carotid artery consistent with prior left carotid endarterectomy.    CTA of head and neck:  40% stenosis identified at the origin of the right internal carotid artery secondary to plaque.  Significant stenosis is not seen on the left.  Otherwise negative CTA of the head and neck.    CT head without contrast:  Developing areas of hypodensity in the deep white matter of the right parietal lobe may represent ischemic injury.  No hemorrhage or mass effect is seen.  An old lacunar infarct of the right basal ganglion is noted.  There is mild brain atrophy particularly of the frontal lobes.  A  cochlear implant is noted in position.    CT head without contrast:  1.   There is no intracranial hemorrhage.  There has been mild interval progression of indistinct hypodensity in the right frontal white matter extending to the corona radiata consistent with interval progression of nonhemorrhagic ischemic change when compared to the recent studies.  There is no mass effect or midline shift.  2.  There is a chronic infarction in the right basal ganglia.  3.  There is significant artifact related to left-sided cochlear implant.    CT head without contrast:  1. Mild interval progression of the right frontal white matter hypoattenuation most consistent with temporal evolution of a recent infarct.  No acute intracranial hemorrhage, significant mass effect, or midline shift.  2. Stable small chronic right basal ganglia infarct.    ECHO:  · Concentric left ventricular remodeling. Normal left ventricular systolic function. The estimated ejection fraction is 60%. Grade 1 diastolic dysfunction.  · Mild right ventricular enlargement. Normal right ventricular systolic function.  · No significant valvular disorder observed.  · Bubble study negative for R-L intra cardiac shunt.

## 2020-02-15 PROCEDURE — 94761 N-INVAS EAR/PLS OXIMETRY MLT: CPT | Mod: HCNC

## 2020-02-15 PROCEDURE — 97535 SELF CARE MNGMENT TRAINING: CPT | Mod: HCNC,CO

## 2020-02-15 PROCEDURE — 97116 GAIT TRAINING THERAPY: CPT | Mod: HCNC | Performed by: PHYSICAL THERAPIST

## 2020-02-15 PROCEDURE — 25000003 PHARM REV CODE 250: Mod: HCNC | Performed by: HOSPITALIST

## 2020-02-15 PROCEDURE — 12000002 HC ACUTE/MED SURGE SEMI-PRIVATE ROOM: Mod: HCNC

## 2020-02-15 PROCEDURE — 97112 NEUROMUSCULAR REEDUCATION: CPT | Mod: HCNC,CO

## 2020-02-15 PROCEDURE — 97530 THERAPEUTIC ACTIVITIES: CPT | Mod: HCNC | Performed by: PHYSICAL THERAPIST

## 2020-02-15 PROCEDURE — 25000003 PHARM REV CODE 250: Mod: HCNC | Performed by: INTERNAL MEDICINE

## 2020-02-15 RX ORDER — LOPERAMIDE HYDROCHLORIDE 2 MG/1
2 CAPSULE ORAL 4 TIMES DAILY PRN
Status: DISCONTINUED | OUTPATIENT
Start: 2020-02-15 | End: 2020-02-18 | Stop reason: HOSPADM

## 2020-02-15 RX ADMIN — LOPERAMIDE HYDROCHLORIDE 2 MG: 2 CAPSULE ORAL at 04:02

## 2020-02-15 RX ADMIN — ASPIRIN 81 MG: 81 TABLET, COATED ORAL at 09:02

## 2020-02-15 RX ADMIN — COLCHICINE 0.6 MG: 0.6 CAPSULE ORAL at 09:02

## 2020-02-15 RX ADMIN — CLOPIDOGREL BISULFATE 75 MG: 75 TABLET ORAL at 09:02

## 2020-02-15 RX ADMIN — AMLODIPINE BESYLATE 10 MG: 5 TABLET ORAL at 09:02

## 2020-02-15 RX ADMIN — BUPROPION HYDROCHLORIDE 150 MG: 150 TABLET, FILM COATED, EXTENDED RELEASE ORAL at 09:02

## 2020-02-15 RX ADMIN — CARVEDILOL 6.25 MG: 6.25 TABLET, FILM COATED ORAL at 09:02

## 2020-02-15 RX ADMIN — CARVEDILOL 6.25 MG: 6.25 TABLET, FILM COATED ORAL at 08:02

## 2020-02-15 RX ADMIN — TAMSULOSIN HYDROCHLORIDE 0.4 MG: 0.4 CAPSULE ORAL at 09:02

## 2020-02-15 RX ADMIN — ROSUVASTATIN CALCIUM 40 MG: 20 TABLET, FILM COATED ORAL at 09:02

## 2020-02-15 NOTE — PROGRESS NOTES
"[facility] Medicine  Progress Note    Patient Name: Buddy Park  MRN: [id number]  Patient Class: IP- Inpatient   Admission Date: 2/1/2020  Length of Stay: 12 days  Attending Physician: Aime Pantoja MD  Primary Care Provider: Buddy Chatman MD        Subjective:     Principal Problem:Cerebrovascular accident (CVA) due to occlusion of small artery        HPI:  Buddy Park is a 69 y.o. male with PMHx of HTN, HLD,  who presented to the ED for evaluation of left sided weakness and left facial droop that was noticed this morning by his spouse. However on discussion with patient he mentions he himself had started noticing symptoms of left arm weakness since yesterday morning and had difficulty writing. His speech as per him is baseline as he has a speech deficit from prio . Patient reports onset of fatigue x1 week ago. The spouse endorses noticing the patient "dragging his left foot" this morning, prompting her to bring him to the ED.He is a nonsmoker.He does endorse non compliance to his medications in the last 2 months including his antihypertensive.Patient mentions left carotid neck surgery ~x1-1.5 years ago. Patient has no other medical concerns or complaints at this moment. SHx includes Ear mastoidectomy w/ cochlear implant w/ landmark and Carotid angioplasty.  Patient admitted for work up of possible stroke with neurology consult.     Overview/Hospital Course:  No notes on file    Interval History:  Stable awaiting placement. He c/o diarrhea and states it is better with bread during his meals which is cleared by SLP.     Review of Systems   Constitutional: Negative for appetite change and fever.   HENT: Negative for sinus pressure and sore throat.    Eyes: Negative for photophobia and redness.   Respiratory: Negative for cough and shortness of breath.    Cardiovascular: Negative for chest pain and leg swelling.   Gastrointestinal: Negative for abdominal distention and " abdominal pain.   Genitourinary: Negative for difficulty urinating and dysuria.   Musculoskeletal: Positive for arthralgias and gait problem.   Skin: Negative for color change and pallor.   Neurological: Positive for facial asymmetry and weakness.   Psychiatric/Behavioral: Negative for agitation and behavioral problems.     Objective:     Vital Signs (Most Recent):  Temp: 97.6 °F (36.4 °C) (02/15/20 1342)  Pulse: 72 (02/15/20 1342)  Resp: 20 (02/15/20 1342)  BP: 127/60 (02/15/20 1342)  SpO2: 96 % (02/15/20 1342) Vital Signs (24h Range):  Temp:  [96.8 °F (36 °C)-97.6 °F (36.4 °C)] 97.6 °F (36.4 °C)  Pulse:  [68-81] 72  Resp:  [16-20] 20  SpO2:  [94 %-96 %] 96 %  BP: (118-135)/(56-71) 127/60     Weight: 71.9 kg (158 lb 8.2 oz)  Body mass index is 26.38 kg/m².    Intake/Output Summary (Last 24 hours) at 2/15/2020 1504  Last data filed at 2/15/2020 0400  Gross per 24 hour   Intake 700 ml   Output 1000 ml   Net -300 ml      Physical Exam   Constitutional: He is oriented to person, place, and time. He appears well-developed and well-nourished.   HENT:   Head: Normocephalic and atraumatic.   Eyes: Pupils are equal, round, and reactive to light. Conjunctivae are normal.   Neck: Normal range of motion. Neck supple.   Cardiovascular: Normal rate and regular rhythm.   Pulmonary/Chest: Effort normal and breath sounds normal.   Abdominal: Soft. He exhibits no distension.   Musculoskeletal: He exhibits no deformity.   Neurological: He is alert and oriented to person, place, and time. A cranial nerve deficit is present. He exhibits abnormal muscle tone.   Skin: Skin is warm and dry.   Psychiatric: He has a normal mood and affect. His behavior is normal.       Significant Labs:   Lab Results   Component Value Date    WBC 7.09 02/06/2020    HGB 14.6 02/06/2020    HCT 42.5 02/06/2020    MCV 85 02/06/2020     02/06/2020     BMP  Lab Results   Component Value Date     02/06/2020    K 3.5 02/06/2020     02/06/2020     CO2 26 02/06/2020    BUN 21 02/06/2020    CREATININE 1.5 (H) 02/06/2020    CALCIUM 9.3 02/06/2020    ANIONGAP 9 02/06/2020    ESTGFRAFRICA 55 (A) 02/06/2020    EGFRNONAA 47 (A) 02/06/2020       Significant Imaging:  CXR:  Borderline heart size otherwise negative chest.  Prior ORIF of the right humerus    CT head without contrast:  Old lacunar infarct of the anterior limb of the right internal capsule.  No acute lesions seen.  Bilateral frontal lobe atrophy.  Cochlear implant noted in place on the left.    Bilateral carotid ultrasound:  No evidence of a hemodynamically significant carotid bifurcation stenosis.  Significant improvement in the left carotid artery consistent with prior left carotid endarterectomy.    CTA of head and neck:  40% stenosis identified at the origin of the right internal carotid artery secondary to plaque.  Significant stenosis is not seen on the left.  Otherwise negative CTA of the head and neck.    CT head without contrast:  Developing areas of hypodensity in the deep white matter of the right parietal lobe may represent ischemic injury.  No hemorrhage or mass effect is seen.  An old lacunar infarct of the right basal ganglion is noted.  There is mild brain atrophy particularly of the frontal lobes.  A cochlear implant is noted in position.    CT head without contrast:  1.   There is no intracranial hemorrhage.  There has been mild interval progression of indistinct hypodensity in the right frontal white matter extending to the corona radiata consistent with interval progression of nonhemorrhagic ischemic change when compared to the recent studies.  There is no mass effect or midline shift.  2.  There is a chronic infarction in the right basal ganglia.  3.  There is significant artifact related to left-sided cochlear implant.    CT head without contrast:  1. Mild interval progression of the right frontal white matter hypoattenuation most consistent with temporal evolution of a recent  infarct.  No acute intracranial hemorrhage, significant mass effect, or midline shift.  2. Stable small chronic right basal ganglia infarct.    ECHO:  · Concentric left ventricular remodeling. Normal left ventricular systolic function. The estimated ejection fraction is 60%. Grade 1 diastolic dysfunction.  · Mild right ventricular enlargement. Normal right ventricular systolic function.  · No significant valvular disorder observed.  · Bubble study negative for R-L intra cardiac shunt.      Assessment/Plan:      * Cerebrovascular accident (CVA) due to occlusion of small artery  Continue statin and antiplt's.  Neurology following.  Echo with bubble done--- good EF.  No thrombus.  No intracardiac shunts.  It appears to be occlusion of a small artery in right hemispheric white matter.  Will treat with antiplatelets.  Awaiting placement to inpatient rehab versus skilled nursing facility.   and  working with family and medical insurance for disposition.      Chronic diastolic heart failure  Stable.      BMI 26.0-26.9,adult  Body mass index is 26.38 kg/m². Morbid obesity complicates all aspects of disease management from diagnostic modalities to treatment. Weight loss encouraged and health benefits explained to patient.        Carotid disease, bilateral  S/p left CEA  CTA shows 40% NITZA.  Continue antiplatelets.  Follow-up Neurology    Deafness  Has cochlear implants        HTN (hypertension)  Chronic, controlled.  Latest blood pressure and vitals reviewed-   Temp:  [96.3 °F (35.7 °C)-98.2 °F (36.8 °C)]   Pulse:  [72-81]   Resp:  [16-20]   BP: (131-151)/(67-77)   SpO2:  [94 %-100 %] .   Home meds for hypertension were reviewed and noted below. Hospital anti-hypertensive changes were made as shown below.  Outpt Hypertension Medications             amLODIPine (NORVASC) 10 MG tablet Take 1 tablet (10 mg total) by mouth once daily.    carvedilol (COREG) 6.25 MG tablet Take 1 tablet (6.25 mg total) by  mouth 2 (two) times daily with meals.    nitroGLYCERIN (NITROSTAT) 0.4 MG SL tablet Place 1 tablet (0.4 mg total) under the tongue every 5 (five) minutes as needed for Chest pain.      Hospital Medications             amLODIPine tablet 10 mg 10 mg, Oral, Daily    carvediloL tablet 6.25 mg 6.25 mg, Oral, 2 times daily        Will utilize p.r.n. blood pressure medication only if patient's blood pressure greater than  180/110 and he develops symptoms such as worsening chest pain or shortness of breath.      Hyperlipidemia  Continue on statin.          VTE Risk Mitigation (From admission, onward)         Ordered     IP VTE LOW RISK PATIENT  Once      02/04/20 1120     Place sequential compression device  Until discontinued      02/04/20 1120                      Aime Pantoja MD  Department of Hospital Medicine   [facility]

## 2020-02-15 NOTE — NURSING
Pt is calm and cooperative, pt denies any pain, pt is aaox3, pt has left facial droop with slurred speech with no issue swallowing, left arm movement is absent and left leg movement is severely limited with no feeling noted in the left leg or arm, pt turned every 2 hours with help, pt tolerated po intake well, pt used urinal and bed pan as needed with assistance, fall precautions maintained,  will continue to monitor. Pt up to chair with pt and did use urinal with assist called for bedpan with consistency and is in good spirits throughout shift is anxious for transfer for rehab facility.

## 2020-02-15 NOTE — PLAN OF CARE
Problem: Occupational Therapy Goal  Goal: Occupational Therapy Goal  Description  Goals to be met by: 2/16/20     Patient will increase functional independence with ADLs by performing:  Toilet transfer to/from bedside commode with CGA with use of AD as needed.  Toileting from bedside commode with Min (A).  Grooming while seated sink side with SBA.  UB dressing EOB with Mod (A).        Outcome: Plan Revised  OT POC revised as patient met previous goals. Frequency upgraded from 6x/wk to daily as pt progressing.

## 2020-02-15 NOTE — CARE UPDATE
02/15/20 0910   Patient Assessment/Suction   Level of Consciousness (AVPU) alert   Respiratory Effort Normal;Unlabored   PRE-TX-O2   O2 Device (Oxygen Therapy) room air   SpO2 95 %   Pulse Oximetry Type Intermittent   $ Pulse Oximetry - Multiple Charge Pulse Oximetry - Multiple   Pulse 68   Resp 18

## 2020-02-15 NOTE — PT/OT/SLP PROGRESS
Physical Therapy Treatment    Patient Name:  Buddy Park   MRN:  [id number]    Recommendations:     Discharge Recommendations:  rehabilitation facility   Discharge Equipment Recommendations: (TBD at next level of care)   Barriers to discharge: None    Assessment:     Buddy Park is a 69 y.o. male admitted with a medical diagnosis of Cerebrovascular accident (CVA) due to occlusion of small artery.  He presents with the following impairments/functional limitations:  weakness, impaired endurance, impaired self care skills, gait instability, impaired functional mobilty, decreased safety awareness .    Rehab Prognosis: Fair; patient would benefit from acute skilled PT services to address these deficits and reach maximum level of function.    Recent Surgery: * No surgery found *      Plan:     During this hospitalization, patient to be seen daily to address the identified rehab impairments via gait training, therapeutic activities, therapeutic exercises and progress toward the following goals:    · Plan of Care Expires:  03/04/20    Subjective     Chief Complaint: No complaints  Patient/Family Comments/goals:   Pain/Comfort:  ·        Objective:     Communicated with nursing prior to session.  Patient found supine with   upon PT entry to room.     General Precautions: Standard, hearing impaired, fall   Orthopedic Precautions:N/A   Braces:       Functional Mobility:  · Bed Mobility:     · Supine to Sit: minimum assistance  · Transfers:     · Sit to Stand:  minimum assistance with hemiwalker  · Gait: Patient ambulated 30 feet with hemiwalker and min A      AM-PAC 6 CLICK MOBILITY          Therapeutic Activities and Exercises:       Patient left supine with call button in reach..    GOALS:   Multidisciplinary Problems     Physical Therapy Goals        Problem: Physical Therapy Goal    Goal Priority Disciplines Outcome Goal Variances Interventions   Physical Therapy Goal     PT, PT/OT Ongoing, Progressing     Description:   Goals to be met by: 2/28/2020     Patient will increase functional independence with mobility by performing:    NEW GOALS (2/14/20)  1. Supine to sit with SBA  2. Sit to stand transfer with CGA with albin walker   3. Gait  x 50 feet with Joyce using albin walker.   4. Bed to chair transfer with Joyce using albin walker     GOALS MET (2/13/20)  1. Supine to sit with Minimal Assistance  2. Sit to stand transfer with Moderate assistance with albin walker   3. Gait  x 10 feet with Moderate assistance using albin walker.   4. Bed to chair transfer with Moderate assistance using albin walker                         Time Tracking:     PT Received On: 02/15/20  PT Start Time: 1030     PT Stop Time: 1055  PT Total Time (min): 25 min     Billable Minutes: Gait Training 15 and Therapeutic Activity 10      PT/PTA: PT     PTA Visit Number: 0     Vikash Boykin, PT  02/15/2020

## 2020-02-15 NOTE — SUBJECTIVE & OBJECTIVE
Interval History:  Stable awaiting placement. He c/o diarrhea and states it is better with bread during his meals which is cleared by SLP.     Review of Systems   Constitutional: Negative for appetite change and fever.   HENT: Negative for sinus pressure and sore throat.    Eyes: Negative for photophobia and redness.   Respiratory: Negative for cough and shortness of breath.    Cardiovascular: Negative for chest pain and leg swelling.   Gastrointestinal: Negative for abdominal distention and abdominal pain.   Genitourinary: Negative for difficulty urinating and dysuria.   Musculoskeletal: Positive for arthralgias and gait problem.   Skin: Negative for color change and pallor.   Neurological: Positive for facial asymmetry and weakness.   Psychiatric/Behavioral: Negative for agitation and behavioral problems.     Objective:     Vital Signs (Most Recent):  Temp: 97.6 °F (36.4 °C) (02/15/20 1342)  Pulse: 72 (02/15/20 1342)  Resp: 20 (02/15/20 1342)  BP: 127/60 (02/15/20 1342)  SpO2: 96 % (02/15/20 1342) Vital Signs (24h Range):  Temp:  [96.8 °F (36 °C)-97.6 °F (36.4 °C)] 97.6 °F (36.4 °C)  Pulse:  [68-81] 72  Resp:  [16-20] 20  SpO2:  [94 %-96 %] 96 %  BP: (118-135)/(56-71) 127/60     Weight: 71.9 kg (158 lb 8.2 oz)  Body mass index is 26.38 kg/m².    Intake/Output Summary (Last 24 hours) at 2/15/2020 1504  Last data filed at 2/15/2020 0400  Gross per 24 hour   Intake 700 ml   Output 1000 ml   Net -300 ml      Physical Exam   Constitutional: He is oriented to person, place, and time. He appears well-developed and well-nourished.   HENT:   Head: Normocephalic and atraumatic.   Eyes: Pupils are equal, round, and reactive to light. Conjunctivae are normal.   Neck: Normal range of motion. Neck supple.   Cardiovascular: Normal rate and regular rhythm.   Pulmonary/Chest: Effort normal and breath sounds normal.   Abdominal: Soft. He exhibits no distension.   Musculoskeletal: He exhibits no deformity.   Neurological: He is  alert and oriented to person, place, and time. A cranial nerve deficit is present. He exhibits abnormal muscle tone.   Skin: Skin is warm and dry.   Psychiatric: He has a normal mood and affect. His behavior is normal.       Significant Labs:   Lab Results   Component Value Date    WBC 7.09 02/06/2020    HGB 14.6 02/06/2020    HCT 42.5 02/06/2020    MCV 85 02/06/2020     02/06/2020     BMP  Lab Results   Component Value Date     02/06/2020    K 3.5 02/06/2020     02/06/2020    CO2 26 02/06/2020    BUN 21 02/06/2020    CREATININE 1.5 (H) 02/06/2020    CALCIUM 9.3 02/06/2020    ANIONGAP 9 02/06/2020    ESTGFRAFRICA 55 (A) 02/06/2020    EGFRNONAA 47 (A) 02/06/2020       Significant Imaging:  CXR:  Borderline heart size otherwise negative chest.  Prior ORIF of the right humerus    CT head without contrast:  Old lacunar infarct of the anterior limb of the right internal capsule.  No acute lesions seen.  Bilateral frontal lobe atrophy.  Cochlear implant noted in place on the left.    Bilateral carotid ultrasound:  No evidence of a hemodynamically significant carotid bifurcation stenosis.  Significant improvement in the left carotid artery consistent with prior left carotid endarterectomy.    CTA of head and neck:  40% stenosis identified at the origin of the right internal carotid artery secondary to plaque.  Significant stenosis is not seen on the left.  Otherwise negative CTA of the head and neck.    CT head without contrast:  Developing areas of hypodensity in the deep white matter of the right parietal lobe may represent ischemic injury.  No hemorrhage or mass effect is seen.  An old lacunar infarct of the right basal ganglion is noted.  There is mild brain atrophy particularly of the frontal lobes.  A cochlear implant is noted in position.    CT head without contrast:  1.   There is no intracranial hemorrhage.  There has been mild interval progression of indistinct hypodensity in the right frontal  white matter extending to the corona radiata consistent with interval progression of nonhemorrhagic ischemic change when compared to the recent studies.  There is no mass effect or midline shift.  2.  There is a chronic infarction in the right basal ganglia.  3.  There is significant artifact related to left-sided cochlear implant.    CT head without contrast:  1. Mild interval progression of the right frontal white matter hypoattenuation most consistent with temporal evolution of a recent infarct.  No acute intracranial hemorrhage, significant mass effect, or midline shift.  2. Stable small chronic right basal ganglia infarct.    ECHO:  · Concentric left ventricular remodeling. Normal left ventricular systolic function. The estimated ejection fraction is 60%. Grade 1 diastolic dysfunction.  · Mild right ventricular enlargement. Normal right ventricular systolic function.  · No significant valvular disorder observed.  · Bubble study negative for R-L intra cardiac shunt.

## 2020-02-15 NOTE — PLAN OF CARE
Problem: Occupational Therapy Goal  Goal: Occupational Therapy Goal  Description  Goals to be met by: 2/16/20     Patient will increase functional independence with ADLs by performing:  Toilet transfer to/from bedside commode with CGA with use of AD as needed.  Toileting from bedside commode with Min (A).  Grooming while seated sink side with SBA.  UB dressing EOB with Mod (A).        Outcome: Ongoing, Progressing

## 2020-02-15 NOTE — PLAN OF CARE
Problem: Physical Therapy Goal  Goal: Physical Therapy Goal  Description  Goals to be met by: 2/28/2020     Patient will increase functional independence with mobility by performing:    NEW GOALS (2/14/20)  1. Supine to sit with SBA  2. Sit to stand transfer with CGA with albin walker   3. Gait  x 50 feet with Joyce using albin walker.   4. Bed to chair transfer with Joyce using albin walker     GOALS MET (2/13/20)  1. Supine to sit with Minimal Assistance  2. Sit to stand transfer with Moderate assistance with albin walker   3. Gait  x 10 feet with Moderate assistance using albin walker.   4. Bed to chair transfer with Moderate assistance using albin walker        Outcome: Ongoing, Progressing

## 2020-02-15 NOTE — PT/OT/SLP PROGRESS
"Occupational Therapy  Treatment    Buddy Park   MRN: [id number]   Admitting Diagnosis: Cerebrovascular accident (CVA) due to occlusion of small artery    OT Date of Treatment: 02/14/20   OT Start Time: 1045  OT Stop Time: 1053(OTR present from 10:53 to 11:37 for direct treatment supervision of TAYLOR)  OT Total Time (min): 8 min    Billable Minutes:  Self Care/Home Management 8    General Precautions: Standard, fall, hearing impaired, aspiration, dental soft, other (see comments)(L inattention)  Orthopedic Precautions: N/A  Braces: N/A    Subjective:  Communicated with nurse prior to session.    Objective:  Patient found with: bed alarm     Functional Mobility:  Bed Mobility:  Supine > sit Min (A)    Transfers:  Bed>BSC with Min (A) without AD - verbal cues to attend to LLE/UE positioning prior to attempting to stand ; LLE block    Activities of Daily Living:  Toileting from bedside commode with Max (A) to manage clothing/hygiene    Balance:   Static Sit: SBA to maintain midline while seated on bedside commode    Therapeutic Activities and Exercises:  OTR reinforcing importance of attention to LLE/UE with all bed mobility and prior to attempting transfers to prevent injury to joints - pt will require daily reinforcement    AM-PAC 6 CLICK ADL   How much help from another person does this patient currently need?   1 = Unable, Total/Dependent Assistance  2 = A lot, Maximum/Moderate Assistance  3 = A little, Minimum/Contact Guard/Supervision  4 = None, Modified [place]/Independent    Putting on and taking off regular lower body clothing? : 2  Bathing (including washing, rinsing, drying)?: 2  Toileting, which includes using toilet, bedpan, or urinal? : 2  Putting on and taking off regular upper body clothing?: 2  Taking care of personal grooming such as brushing teeth?: 3  Eating meals?: 3  Daily Activity Total Score: 14     AM-PAC Raw Score CMS "G-Code Modifier Level of Impairment Assistance   6 % Total / " Unable   7 - 8 CM 80 - 100% Maximal Assist   9-13 CL 60 - 80% Moderate Assist   14 - 19 CK 40 - 60% Moderate Assist   20 - 22 CJ 20 - 40% Minimal Assist   23 CI 1-20% SBA / CGA   24 CH 0% Independent/ Mod I       Patient left seated in bedside chair with call button within reach, chair alarm, and nurse notified    ASSESSMENT:  Buddy Park is a 69 y.o. male with a medical diagnosis of Cerebrovascular accident (CVA) due to occlusion of small artery - patient met all original OT functional goals as patient is able to tolerate sitting EOB > 10 mins with assistance ranging from CGA to occasional Max (A), performing functional transfers to/from bedside commode with Min (A), and demonstrates improved insight and awareness of some deficits. Patient is highly motivated.    Rehab identified problem list/impairments: Rehab identified problem list/impairments: weakness, impaired endurance, impaired sensation, impaired self care skills, impaired functional mobilty, gait instability, impaired balance, impaired cognition, decreased coordination, decreased upper extremity function, decreased lower extremity function, decreased safety awareness, decreased ROM    Rehab potential is excellent.    Activity tolerance: Good    Discharge recommendations: Discharge Facility/Level of Care Needs: rehabilitation facility     Barriers to discharge: Barriers to Discharge: Decreased caregiver support, Other (Comment)(spouse works)    Equipment recommendations: none     GOALS:   Multidisciplinary Problems     Occupational Therapy Goals        Problem: Occupational Therapy Goal    Goal Priority Disciplines Outcome Interventions   Occupational Therapy Goal     OT, PT/OT  Plan Revised    Description:  Goals to be met by: 2/25/20     Patient will increase functional independence with ADLs by performing:  Toilet transfer to/from bedside commode with CGA with use of AD as needed.  Toileting from bedside commode with Min (A).  Grooming while seated  sink side with SBA.  UB dressing EOB with Mod (A).                         Plan:  Patient to be seen daily to address the above listed problems via self-care/home management, therapeutic activities, therapeutic exercises, neuromuscular re-education, cognitive retraining, sensory integration  Plan of Care expires: 02/25/20  Plan of Care reviewed with: patient    Dulce COLE Escobar, LOTR  02/14/2020

## 2020-02-15 NOTE — PT/OT/SLP PROGRESS
Occupational Therapy   Treatment    Name: Buddy Park  MRN: [id number]  Admitting Diagnosis:  Cerebrovascular accident (CVA) due to occlusion of small artery       Recommendations:     Discharge Recommendations: rehabilitation facility  Discharge Equipment Recommendations:  none  Barriers to discharge:  Decreased caregiver support    Assessment:     Buddy Park is a 69 y.o. male with a medical diagnosis of Cerebrovascular accident (CVA) due to occlusion of small artery.  He presents hearing impairment from PLOF, L albin 2* CVA, and with increased activity tolerance, increased sitting and standing balance, increased attention to L UE and L LE and requiring Min VC to prevent impulsivity. He actively completed weight shifting in standing with Min A and hemiwalker and verbalized his needs to sit after standing and weightshifting ~5mins. Slow controlled balanced descent to sit and he reached back with R UE to sit surface!     Performance deficits affecting function are weakness, impaired endurance, impaired sensation, impaired self care skills, impaired functional mobilty, gait instability, impaired balance, decreased upper extremity function, decreased lower extremity function, abnormal tone, decreased ROM, decreased safety awareness, impaired fine motor, visual deficits.     Rehab Prognosis:  EXCELLENT; patient would benefit from acute skilled OT services to address these deficits and reach maximum level of function.       Plan:     Patient to be seen daily to address the above listed problems via self-care/home management, therapeutic activities, therapeutic exercises, neuromuscular re-education, cognitive retraining, sensory integration  · Plan of Care Expires: 02/25/20  · Plan of Care Reviewed with: patient    Subjective     Pain/Comfort:  · Pain Rating 1: 0/10    Objective:     Communicated with: Rosaura SANCHEZ prior to session.  Patient found HOB elevated with peripheral IV upon OT entry to room.    General  Precautions: Standard, fall, hearing impaired, dental soft(L inattention)   Orthopedic Precautions:N/A   Braces: N/A     Occupational Performance:     Bed Mobility:    · Patient completed supine Bridging with stand by assistance and min VC, TAYLOR bending L knee to facilitate bridge with B LE.  · Pt completed scooting to EOB with Min VC and supervision.  · Patient completed Supine to Sit with Joyce with hand held assist and using albin technique for lowering of L LE.  · Patient completed Sit to Supine with supervision and using albin technique to raise L LE on to bed.    Functional Mobility/Transfers:  · Patient completed Sit <> Stand Transfer with minimum assistance  with  hemiwalker and TAYLOR on L side to support L UE at elbow and approximate Gh Joint.   · Functional Mobility: increasing in balance and strength daily, however requiring VC for L UE hand placement in a position of support and safety at his side during sitting and VC and tactile cues to L foot in a wide LISA during standing.    Activities of Daily Living:  · Grooming: stand by assistance while sittine EOB pt only requires retreival of items to tray table and then sets up and performs oral hygien unassisted.    Treatment & Education:  -Maintaining Anterior pelvic alignment in midline while sitting; scapular retraction x 15 with a 3 second hold in retraction to allow for possible AROM of L shoulder. Levator stretch L/R 2 x 10 sec. Chin depression x 10 to decrease postural flexion occurring at the neck. L UE approximated to Gh joint and hand and wrist extended in a position of support on bed by TAYLOR during exercises.  -L UE wrist/hand extension 5 x 1 min; pt reports sensation in the dorsal MCPs, wrist, and forearm.    Patient left HOB elevated with pillows supporting L UE and L LE.Education:      GOALS:   Multidisciplinary Problems     Occupational Therapy Goals        Problem: Occupational Therapy Goal    Goal Priority Disciplines Outcome Interventions    Occupational Therapy Goal     OT, PT/OT Ongoing, Progressing    Description:  Goals to be met by: 2/16/20     Patient will increase functional independence with ADLs by performing:  Toilet transfer to/from bedside commode with CGA with use of AD as needed.  Toileting from bedside commode with Min (A).  Grooming while seated sink side with SBA.  UB dressing EOB with Mod (A).                         Time Tracking:     OT Date of Treatment: 02/15/20  OT Start Time: 1412  OT Stop Time: 1445  OT Total Time (min): 33 min    Billable Minutes:Self Care/Home Management 15min  Neuromuscular Re-education 18 min    CLAUDIA Brizuela/YAMILET  2/15/2020

## 2020-02-15 NOTE — PLAN OF CARE
Pt is calm and cooperative, agrees with plan of care, loss of function to the left arm still present with no feeling present in left arm, left leg has improved feeling and movement, pt turned self through out night with minimal help, no complaints of pain, pt used urinal and bed pan as needed, pt tolerated po intake well with no signs poor tolerance, will continue to monitor.

## 2020-02-16 LAB
POCT GLUCOSE: 114 MG/DL (ref 70–110)
POCT GLUCOSE: 82 MG/DL (ref 70–110)

## 2020-02-16 PROCEDURE — 97116 GAIT TRAINING THERAPY: CPT | Mod: HCNC,CQ

## 2020-02-16 PROCEDURE — 12000002 HC ACUTE/MED SURGE SEMI-PRIVATE ROOM: Mod: HCNC

## 2020-02-16 PROCEDURE — 25000003 PHARM REV CODE 250: Mod: HCNC | Performed by: HOSPITALIST

## 2020-02-16 PROCEDURE — 94761 N-INVAS EAR/PLS OXIMETRY MLT: CPT | Mod: HCNC

## 2020-02-16 PROCEDURE — 25000003 PHARM REV CODE 250: Mod: HCNC | Performed by: INTERNAL MEDICINE

## 2020-02-16 PROCEDURE — 97530 THERAPEUTIC ACTIVITIES: CPT | Mod: HCNC,CQ

## 2020-02-16 PROCEDURE — 25000003 PHARM REV CODE 250: Mod: HCNC | Performed by: NURSE PRACTITIONER

## 2020-02-16 RX ORDER — DIPHENHYDRAMINE HCL 25 MG
25 CAPSULE ORAL ONCE
Status: COMPLETED | OUTPATIENT
Start: 2020-02-16 | End: 2020-02-16

## 2020-02-16 RX ADMIN — DIPHENHYDRAMINE HYDROCHLORIDE 25 MG: 25 CAPSULE ORAL at 09:02

## 2020-02-16 RX ADMIN — BUPROPION HYDROCHLORIDE 150 MG: 150 TABLET, FILM COATED, EXTENDED RELEASE ORAL at 09:02

## 2020-02-16 RX ADMIN — CARVEDILOL 6.25 MG: 6.25 TABLET, FILM COATED ORAL at 09:02

## 2020-02-16 RX ADMIN — AMLODIPINE BESYLATE 10 MG: 5 TABLET ORAL at 09:02

## 2020-02-16 RX ADMIN — CLOPIDOGREL BISULFATE 75 MG: 75 TABLET ORAL at 09:02

## 2020-02-16 RX ADMIN — ALPRAZOLAM 0.25 MG: 0.25 TABLET ORAL at 04:02

## 2020-02-16 RX ADMIN — TAMSULOSIN HYDROCHLORIDE 0.4 MG: 0.4 CAPSULE ORAL at 09:02

## 2020-02-16 RX ADMIN — COLCHICINE 0.6 MG: 0.6 CAPSULE ORAL at 09:02

## 2020-02-16 RX ADMIN — LOPERAMIDE HYDROCHLORIDE 2 MG: 2 CAPSULE ORAL at 09:02

## 2020-02-16 RX ADMIN — ASPIRIN 81 MG: 81 TABLET, COATED ORAL at 09:02

## 2020-02-16 RX ADMIN — ROSUVASTATIN CALCIUM 40 MG: 20 TABLET, FILM COATED ORAL at 09:02

## 2020-02-16 NOTE — SUBJECTIVE & OBJECTIVE
Interval History:  Stable awaiting placement. Diarrhea resolved.     Review of Systems   Constitutional: Negative for appetite change and fever.   HENT: Negative for sinus pressure and sore throat.    Eyes: Negative for photophobia and redness.   Respiratory: Negative for cough and shortness of breath.    Cardiovascular: Negative for chest pain and leg swelling.   Gastrointestinal: Negative for abdominal distention and abdominal pain.   Genitourinary: Negative for difficulty urinating and dysuria.   Musculoskeletal: Positive for arthralgias and gait problem.   Skin: Negative for color change and pallor.   Neurological: Positive for facial asymmetry and weakness.   Psychiatric/Behavioral: Negative for agitation and behavioral problems.     Objective:     Vital Signs (Most Recent):  Temp: 97.5 °F (36.4 °C) (02/16/20 1301)  Pulse: 70 (02/16/20 1301)  Resp: 20 (02/16/20 1301)  BP: 124/63 (02/16/20 1301)  SpO2: 96 % (02/16/20 1301) Vital Signs (24h Range):  Temp:  [96.5 °F (35.8 °C)-97.9 °F (36.6 °C)] 97.5 °F (36.4 °C)  Pulse:  [70-88] 70  Resp:  [18-20] 20  SpO2:  [94 %-98 %] 96 %  BP: (111-143)/(58-77) 124/63     Weight: 71.9 kg (158 lb 8.2 oz)  Body mass index is 26.38 kg/m².    Intake/Output Summary (Last 24 hours) at 2/16/2020 1354  Last data filed at 2/16/2020 0000  Gross per 24 hour   Intake 290 ml   Output 400 ml   Net -110 ml      Physical Exam   Constitutional: He is oriented to person, place, and time. He appears well-developed and well-nourished.   HENT:   Head: Normocephalic and atraumatic.   Eyes: Pupils are equal, round, and reactive to light. Conjunctivae are normal.   Neck: Normal range of motion. Neck supple.   Cardiovascular: Normal rate and regular rhythm.   Pulmonary/Chest: Effort normal and breath sounds normal.   Abdominal: Soft. He exhibits no distension.   Musculoskeletal: He exhibits no deformity.   Neurological: He is alert and oriented to person, place, and time. A cranial nerve deficit is  present. He exhibits abnormal muscle tone.   Skin: Skin is warm and dry.   Psychiatric: He has a normal mood and affect. His behavior is normal.       Significant Labs:   Lab Results   Component Value Date    WBC 7.09 02/06/2020    HGB 14.6 02/06/2020    HCT 42.5 02/06/2020    MCV 85 02/06/2020     02/06/2020     BMP  Lab Results   Component Value Date     02/06/2020    K 3.5 02/06/2020     02/06/2020    CO2 26 02/06/2020    BUN 21 02/06/2020    CREATININE 1.5 (H) 02/06/2020    CALCIUM 9.3 02/06/2020    ANIONGAP 9 02/06/2020    ESTGFRAFRICA 55 (A) 02/06/2020    EGFRNONAA 47 (A) 02/06/2020       Significant Imaging:  CXR:  Borderline heart size otherwise negative chest.  Prior ORIF of the right humerus    CT head without contrast:  Old lacunar infarct of the anterior limb of the right internal capsule.  No acute lesions seen.  Bilateral frontal lobe atrophy.  Cochlear implant noted in place on the left.    Bilateral carotid ultrasound:  No evidence of a hemodynamically significant carotid bifurcation stenosis.  Significant improvement in the left carotid artery consistent with prior left carotid endarterectomy.    CTA of head and neck:  40% stenosis identified at the origin of the right internal carotid artery secondary to plaque.  Significant stenosis is not seen on the left.  Otherwise negative CTA of the head and neck.    CT head without contrast:  Developing areas of hypodensity in the deep white matter of the right parietal lobe may represent ischemic injury.  No hemorrhage or mass effect is seen.  An old lacunar infarct of the right basal ganglion is noted.  There is mild brain atrophy particularly of the frontal lobes.  A cochlear implant is noted in position.    CT head without contrast:  1.   There is no intracranial hemorrhage.  There has been mild interval progression of indistinct hypodensity in the right frontal white matter extending to the corona radiata consistent with interval  progression of nonhemorrhagic ischemic change when compared to the recent studies.  There is no mass effect or midline shift.  2.  There is a chronic infarction in the right basal ganglia.  3.  There is significant artifact related to left-sided cochlear implant.    CT head without contrast:  1. Mild interval progression of the right frontal white matter hypoattenuation most consistent with temporal evolution of a recent infarct.  No acute intracranial hemorrhage, significant mass effect, or midline shift.  2. Stable small chronic right basal ganglia infarct.    ECHO:  · Concentric left ventricular remodeling. Normal left ventricular systolic function. The estimated ejection fraction is 60%. Grade 1 diastolic dysfunction.  · Mild right ventricular enlargement. Normal right ventricular systolic function.  · No significant valvular disorder observed.  · Bubble study negative for R-L intra cardiac shunt.

## 2020-02-16 NOTE — PLAN OF CARE
Pt is calm and cooperative, pt is aaox3, pt agrees with plan of care, pt voices he is ready to get d/c to his rehab facility, passive and active range of motion performed to all extremities, feeling and movement to the left arm is absent, feeling and some movement to the left present still, pt used urinal as needed, pt used bed ban as needed, pt turned self with some assistance q2hrs, pt rested well, will continue to monitor.

## 2020-02-16 NOTE — PLAN OF CARE
Pt aaox4 pt has left side hemiparesis . Max assist with one person. Family at bedside today IV patent with no redness or drainage. Pt had OT today and dc planning for rehab this week. Pt bed low position, call bell with in reach, hourly rounds maintained

## 2020-02-16 NOTE — PLAN OF CARE
Problem: Physical Therapy Goal  Goal: Physical Therapy Goal  Description  Goals to be met by: 2/28/2020     Patient will increase functional independence with mobility by performing:    NEW GOALS (2/14/20)  1. Supine to sit with SBA  2. Sit to stand transfer with CGA with albin walker   3. Gait  x 50 feet with Joyce using albin walker.   4. Bed to chair transfer with Joyce using albin walker     GOALS MET (2/13/20)  1. Supine to sit with Minimal Assistance  2. Sit to stand transfer with Moderate assistance with albin walker   3. Gait  x 10 feet with Moderate assistance using albin walker.   4. Bed to chair transfer with Moderate assistance using albin walker        Outcome: Ongoing, Progressing   Bed mobility, transfers, and gait performed

## 2020-02-16 NOTE — PROGRESS NOTES
"[facility] Medicine  Progress Note    Patient Name: Buddy Park  MRN: [id number]  Patient Class: IP- Inpatient   Admission Date: 2/1/2020  Length of Stay: 13 days  Attending Physician: Aime Pantoja MD  Primary Care Provider: Buddy Chatman MD        Subjective:     Principal Problem:Cerebrovascular accident (CVA) due to occlusion of small artery        HPI:  Buddy Park is a 69 y.o. male with PMHx of HTN, HLD,  who presented to the ED for evaluation of left sided weakness and left facial droop that was noticed this morning by his spouse. However on discussion with patient he mentions he himself had started noticing symptoms of left arm weakness since yesterday morning and had difficulty writing. His speech as per him is baseline as he has a speech deficit from prio . Patient reports onset of fatigue x1 week ago. The spouse endorses noticing the patient "dragging his left foot" this morning, prompting her to bring him to the ED.He is a nonsmoker.He does endorse non compliance to his medications in the last 2 months including his antihypertensive.Patient mentions left carotid neck surgery ~x1-1.5 years ago. Patient has no other medical concerns or complaints at this moment. SHx includes Ear mastoidectomy w/ cochlear implant w/ landmark and Carotid angioplasty.  Patient admitted for work up of possible stroke with neurology consult.     Overview/Hospital Course:  No notes on file    Interval History:  Stable awaiting placement. Diarrhea resolved.     Review of Systems   Constitutional: Negative for appetite change and fever.   HENT: Negative for sinus pressure and sore throat.    Eyes: Negative for photophobia and redness.   Respiratory: Negative for cough and shortness of breath.    Cardiovascular: Negative for chest pain and leg swelling.   Gastrointestinal: Negative for abdominal distention and abdominal pain.   Genitourinary: Negative for difficulty urinating and dysuria. "   Musculoskeletal: Positive for arthralgias and gait problem.   Skin: Negative for color change and pallor.   Neurological: Positive for facial asymmetry and weakness.   Psychiatric/Behavioral: Negative for agitation and behavioral problems.     Objective:     Vital Signs (Most Recent):  Temp: 97.5 °F (36.4 °C) (02/16/20 1301)  Pulse: 70 (02/16/20 1301)  Resp: 20 (02/16/20 1301)  BP: 124/63 (02/16/20 1301)  SpO2: 96 % (02/16/20 1301) Vital Signs (24h Range):  Temp:  [96.5 °F (35.8 °C)-97.9 °F (36.6 °C)] 97.5 °F (36.4 °C)  Pulse:  [70-88] 70  Resp:  [18-20] 20  SpO2:  [94 %-98 %] 96 %  BP: (111-143)/(58-77) 124/63     Weight: 71.9 kg (158 lb 8.2 oz)  Body mass index is 26.38 kg/m².    Intake/Output Summary (Last 24 hours) at 2/16/2020 1354  Last data filed at 2/16/2020 0000  Gross per 24 hour   Intake 290 ml   Output 400 ml   Net -110 ml      Physical Exam   Constitutional: He is oriented to person, place, and time. He appears well-developed and well-nourished.   HENT:   Head: Normocephalic and atraumatic.   Eyes: Pupils are equal, round, and reactive to light. Conjunctivae are normal.   Neck: Normal range of motion. Neck supple.   Cardiovascular: Normal rate and regular rhythm.   Pulmonary/Chest: Effort normal and breath sounds normal.   Abdominal: Soft. He exhibits no distension.   Musculoskeletal: He exhibits no deformity.   Neurological: He is alert and oriented to person, place, and time. A cranial nerve deficit is present. He exhibits abnormal muscle tone.   Skin: Skin is warm and dry.   Psychiatric: He has a normal mood and affect. His behavior is normal.       Significant Labs:   Lab Results   Component Value Date    WBC 7.09 02/06/2020    HGB 14.6 02/06/2020    HCT 42.5 02/06/2020    MCV 85 02/06/2020     02/06/2020     BMP  Lab Results   Component Value Date     02/06/2020    K 3.5 02/06/2020     02/06/2020    CO2 26 02/06/2020    BUN 21 02/06/2020    CREATININE 1.5 (H) 02/06/2020     CALCIUM 9.3 02/06/2020    ANIONGAP 9 02/06/2020    ESTGFRAFRICA 55 (A) 02/06/2020    EGFRNONAA 47 (A) 02/06/2020       Significant Imaging:  CXR:  Borderline heart size otherwise negative chest.  Prior ORIF of the right humerus    CT head without contrast:  Old lacunar infarct of the anterior limb of the right internal capsule.  No acute lesions seen.  Bilateral frontal lobe atrophy.  Cochlear implant noted in place on the left.    Bilateral carotid ultrasound:  No evidence of a hemodynamically significant carotid bifurcation stenosis.  Significant improvement in the left carotid artery consistent with prior left carotid endarterectomy.    CTA of head and neck:  40% stenosis identified at the origin of the right internal carotid artery secondary to plaque.  Significant stenosis is not seen on the left.  Otherwise negative CTA of the head and neck.    CT head without contrast:  Developing areas of hypodensity in the deep white matter of the right parietal lobe may represent ischemic injury.  No hemorrhage or mass effect is seen.  An old lacunar infarct of the right basal ganglion is noted.  There is mild brain atrophy particularly of the frontal lobes.  A cochlear implant is noted in position.    CT head without contrast:  1.   There is no intracranial hemorrhage.  There has been mild interval progression of indistinct hypodensity in the right frontal white matter extending to the corona radiata consistent with interval progression of nonhemorrhagic ischemic change when compared to the recent studies.  There is no mass effect or midline shift.  2.  There is a chronic infarction in the right basal ganglia.  3.  There is significant artifact related to left-sided cochlear implant.    CT head without contrast:  1. Mild interval progression of the right frontal white matter hypoattenuation most consistent with temporal evolution of a recent infarct.  No acute intracranial hemorrhage, significant mass effect, or midline  shift.  2. Stable small chronic right basal ganglia infarct.    ECHO:  · Concentric left ventricular remodeling. Normal left ventricular systolic function. The estimated ejection fraction is 60%. Grade 1 diastolic dysfunction.  · Mild right ventricular enlargement. Normal right ventricular systolic function.  · No significant valvular disorder observed.  · Bubble study negative for R-L intra cardiac shunt.      Assessment/Plan:      * Cerebrovascular accident (CVA) due to occlusion of small artery  Continue statin and antiplt's.  Neurology following.  Echo with bubble done--- good EF.  No thrombus.  No intracardiac shunts.  It appears to be occlusion of a small artery in right hemispheric white matter.  Will treat with antiplatelets.  Awaiting placement to inpatient rehab versus skilled nursing facility.   and  working with family and medical insurance for disposition.      Chronic diastolic heart failure  Stable.      BMI 26.0-26.9,adult  Body mass index is 26.38 kg/m². Morbid obesity complicates all aspects of disease management from diagnostic modalities to treatment. Weight loss encouraged and health benefits explained to patient.        Carotid disease, bilateral  S/p left CEA  CTA shows 40% NITZA.  Continue antiplatelets.  Follow-up Neurology    Deafness  Has cochlear implants        HTN (hypertension)  Chronic, controlled.  Latest blood pressure and vitals reviewed-   Temp:  [96.3 °F (35.7 °C)-98.2 °F (36.8 °C)]   Pulse:  [72-81]   Resp:  [16-20]   BP: (131-151)/(67-77)   SpO2:  [94 %-100 %] .   Home meds for hypertension were reviewed and noted below. Hospital anti-hypertensive changes were made as shown below.  Outpt Hypertension Medications             amLODIPine (NORVASC) 10 MG tablet Take 1 tablet (10 mg total) by mouth once daily.    carvedilol (COREG) 6.25 MG tablet Take 1 tablet (6.25 mg total) by mouth 2 (two) times daily with meals.    nitroGLYCERIN (NITROSTAT) 0.4 MG SL  tablet Place 1 tablet (0.4 mg total) under the tongue every 5 (five) minutes as needed for Chest pain.      Hospital Medications             amLODIPine tablet 10 mg 10 mg, Oral, Daily    carvediloL tablet 6.25 mg 6.25 mg, Oral, 2 times daily        Will utilize p.r.n. blood pressure medication only if patient's blood pressure greater than  180/110 and he develops symptoms such as worsening chest pain or shortness of breath.      Hyperlipidemia  Continue on statin.          VTE Risk Mitigation (From admission, onward)         Ordered     IP VTE LOW RISK PATIENT  Once      02/04/20 1120     Place sequential compression device  Until discontinued      02/04/20 1120                      Aime Pantoja MD  Department of Hospital Medicine   [facility]

## 2020-02-16 NOTE — PT/OT/SLP PROGRESS
Physical Therapy Treatment    Patient Name:  Buddy Park   MRN:  [id number]    Recommendations:     Discharge Recommendations:  rehabilitation facility   Discharge Equipment Recommendations: (TBD at next level of care)   Barriers to discharge: None    Assessment:     Buddy Park is a 69 y.o. male admitted with a medical diagnosis of Cerebrovascular accident (CVA) due to occlusion of small artery.  He presents with the following impairments/functional limitations:  weakness, impaired functional mobilty, gait instability, impaired balance, decreased coordination, decreased ROM, decreased lower extremity function, decreased upper extremity function, impaired fine motor. Pt pleasant and eager to work with PT to improve his mobility. Pt tolerated bed mobility, transfers, and gait with improved tolerance and ability to maintain midline while also focusing on weight shifting and increasing weight bearing on his left side. Pt will benefit from aggressive PT at inpatient rehab following discharge from hospital.     Rehab Prognosis: Good; patient would benefit from acute skilled PT services to address these deficits and reach maximum level of function.    Recent Surgery: * No surgery found *      Plan:     During this hospitalization, patient to be seen daily to address the identified rehab impairments via gait training, therapeutic activities, therapeutic exercises and progress toward the following goals:    · Plan of Care Expires:  03/04/20    Subjective     Chief Complaint: Wanting to go to rehab soon  Patient/Family Comments/goals: To get stronger  Pain/Comfort:  · Pain Rating 1: 0/10      Objective:     Communicated with nurse Donovan prior to session.  Patient found supine with bed alarm upon PT entry to room.     General Precautions: Standard, fall, hearing impaired   Orthopedic Precautions:N/A   Braces:       Functional Mobility:  · Bed Mobility:     · Rolling Left:  stand by assistance  · Rolling Right: stand by  assistance  · Bridging: minimum assistance  · Supine to Sit: minimum assistance  · Sit to Supine: contact guard assistance  · Transfers:     · Sit to Stand:  minimum assistance with hemiwalker  · Toilet Transfer: minimum assistance with  no AD  using  Step Transfer  · Gait: 30' Min A with hemiwalker.   · Balance: Sitting: Good. Standing: Fair with focus on midline and increasing weighbearing on LLE.       AM-PAC 6 CLICK MOBILITY          Therapeutic Activities and Exercises:   bed mobility, t/f to BSC (sitting for awhile to try and have bowel movement), t/f to bed, gait performed.     Patient left HOB elevated with all lines intact, call button in reach and nurse Venus notified..    GOALS:   Multidisciplinary Problems     Physical Therapy Goals        Problem: Physical Therapy Goal    Goal Priority Disciplines Outcome Goal Variances Interventions   Physical Therapy Goal     PT, PT/OT Ongoing, Progressing     Description:  Goals to be met by: 2/28/2020     Patient will increase functional independence with mobility by performing:    NEW GOALS (2/14/20)  1. Supine to sit with SBA  2. Sit to stand transfer with CGA with albin walker   3. Gait  x 50 feet with Joyce using albin walker.   4. Bed to chair transfer with Joyce using albin walker     GOALS MET (2/13/20)  1. Supine to sit with Minimal Assistance  2. Sit to stand transfer with Moderate assistance with albin walker   3. Gait  x 10 feet with Moderate assistance using albin walker.   4. Bed to chair transfer with Moderate assistance using albin walker                         Time Tracking:     PT Received On: 02/16/20  PT Start Time: 0917     PT Stop Time: 1002  PT Total Time (min): 45 min     Billable Minutes: Gait Training 15 minutes and Therapeutic Activity 30 minutes    Treatment Type: Treatment  PT/PTA: PTA     PTA Visit Number: 1     Deisi Yap, PTA  02/16/2020

## 2020-02-17 LAB — POCT GLUCOSE: 94 MG/DL (ref 70–110)

## 2020-02-17 PROCEDURE — 25000003 PHARM REV CODE 250: Mod: HCNC | Performed by: HOSPITALIST

## 2020-02-17 PROCEDURE — 94761 N-INVAS EAR/PLS OXIMETRY MLT: CPT | Mod: HCNC

## 2020-02-17 PROCEDURE — 97130 THER IVNTJ EA ADDL 15 MIN: CPT | Mod: HCNC

## 2020-02-17 PROCEDURE — 25000003 PHARM REV CODE 250: Mod: HCNC | Performed by: INTERNAL MEDICINE

## 2020-02-17 PROCEDURE — 12000002 HC ACUTE/MED SURGE SEMI-PRIVATE ROOM: Mod: HCNC

## 2020-02-17 PROCEDURE — 97535 SELF CARE MNGMENT TRAINING: CPT | Mod: HCNC,CO

## 2020-02-17 PROCEDURE — 97116 GAIT TRAINING THERAPY: CPT | Mod: HCNC

## 2020-02-17 PROCEDURE — 97129 THER IVNTJ 1ST 15 MIN: CPT | Mod: HCNC

## 2020-02-17 PROCEDURE — 97110 THERAPEUTIC EXERCISES: CPT | Mod: HCNC,CO

## 2020-02-17 RX ADMIN — COLCHICINE 0.6 MG: 0.6 CAPSULE ORAL at 09:02

## 2020-02-17 RX ADMIN — BUPROPION HYDROCHLORIDE 150 MG: 150 TABLET, FILM COATED, EXTENDED RELEASE ORAL at 09:02

## 2020-02-17 RX ADMIN — ROSUVASTATIN CALCIUM 40 MG: 20 TABLET, FILM COATED ORAL at 09:02

## 2020-02-17 RX ADMIN — ALPRAZOLAM 0.25 MG: 0.25 TABLET ORAL at 09:02

## 2020-02-17 RX ADMIN — CARVEDILOL 6.25 MG: 6.25 TABLET, FILM COATED ORAL at 09:02

## 2020-02-17 RX ADMIN — TAMSULOSIN HYDROCHLORIDE 0.4 MG: 0.4 CAPSULE ORAL at 09:02

## 2020-02-17 RX ADMIN — ASPIRIN 81 MG: 81 TABLET, COATED ORAL at 09:02

## 2020-02-17 RX ADMIN — AMLODIPINE BESYLATE 10 MG: 5 TABLET ORAL at 09:02

## 2020-02-17 RX ADMIN — CLOPIDOGREL BISULFATE 75 MG: 75 TABLET ORAL at 09:02

## 2020-02-17 NOTE — PLAN OF CARE
NEFTALI spoke to Sara [phone] opt [phone]. [phone] with [facility] regarding fast appeal.  Per Sara, appeal denied for inpatient rehab.  NEFTALI spoke to patient and patient's daughter in law Sophie regarding denial.  Per Sophie, ok to pursue SNF with [facility].  NEFTALI spoke to Sara who stated she has request for SNF and working on patient's account.  NEFTALI spoke to Bony with [facility] [phone] regarding SNF and patient to discharge to their facility.  Per Bony, they will contact patient's spouse to sign paperwork and can accept patient tomorrow.  CM to follow up.MERI Prince       02/17/20 1531   Post-Acute Status   Post-Acute Authorization Placement   Post-Acute Placement Status Insurance Denial

## 2020-02-17 NOTE — PLAN OF CARE
Pt alert & cooperative, demo'd use of Words With riends & made new entry on game with B-I-L then checked for text from wife, while engaging in conversation with therapist re prior SLP services e participated in, with only need for 1 cue to recall topic prior to interruption from phone.      Reviewed Memory Strategies & types of attention deficits, with pt return demo given SBA.  Pt stated he prefers to write info down in order to remember it.

## 2020-02-17 NOTE — PLAN OF CARE
Patient to discharge inpatient rehab vs skilled nursing facility.  Peer to peer complete and inpatient rehab denied.  Family completed fast appeal.  SW met with patient at bedside regarding Rehab vs. SNF placement. Patient with questions regarding discharge plan.  NEFTALI updated patient and explained that fast appeal was pending as of Friday 02/14/2020.  NEFTALI informed patient that she will contact [employer] regarding status update.  NEFTALI spoke to Tommy with [employer] [phone] regarding status of appeal.  Tommy request return call from this SW due to system down.  NEFTALI spoke to Sophie (patient's daughter in law) [phone] regarding fast appeal.  Per Sophie she contacted [employer] yesterday with rep stating appeals department was closed on Sunday.  Per Sophie, rep stated someone will contact her on Monday regarding status update.  Per Sophie, she has not received call at this point from [employer].  NEFTALI informed that per Tommy, [employer] system down.  NEFTALI updated patient regarding status of discharge from facility.       02/17/20 1030   Discharge Reassessment   Assessment Type Discharge Planning Reassessment   Provided patient/caregiver education on the expected discharge date and the discharge plan Yes   Do you have any problems affording any of your prescribed medications? No   Discharge Plan A Rehab   Discharge Plan B Skilled Nursing Facility   DME Needed Upon Discharge  none   Patient choice form signed by patient/caregiver Yes   Can the patient/caregiver answer the patient profile reliably? Yes, cognitively intact   How does the patient rate their overall health at the present time? Good   Describe the patient's ability to walk at the present time. Major restrictions/daily assistance from another person   How often would a person be available to care for the patient? Infrequently   Post-Acute Status   Post-Acute Authorization Placement   Post-Acute Placement Status Pending Payor Medical Review

## 2020-02-17 NOTE — SUBJECTIVE & OBJECTIVE
Interval History:  Stable awaiting placement. Diarrhea resolved.     Review of Systems   Constitutional: Negative for appetite change and fever.   HENT: Negative for sinus pressure and sore throat.    Eyes: Negative for photophobia and redness.   Respiratory: Negative for cough and shortness of breath.    Cardiovascular: Negative for chest pain and leg swelling.   Gastrointestinal: Negative for abdominal distention and abdominal pain.   Genitourinary: Negative for difficulty urinating and dysuria.   Musculoskeletal: Positive for arthralgias and gait problem.   Skin: Negative for color change and pallor.   Neurological: Positive for facial asymmetry and weakness.   Psychiatric/Behavioral: Negative for agitation and behavioral problems.     Objective:     Vital Signs (Most Recent):  Temp: 98 °F (36.7 °C) (02/17/20 0754)  Pulse: 69 (02/17/20 0754)  Resp: 18 (02/17/20 0754)  BP: 138/68 (02/17/20 0754)  SpO2: 95 % (02/17/20 0754) Vital Signs (24h Range):  Temp:  [97 °F (36.1 °C)-98.6 °F (37 °C)] 98 °F (36.7 °C)  Pulse:  [67-77] 69  Resp:  [16-20] 18  SpO2:  [94 %-97 %] 95 %  BP: (120-142)/(59-74) 138/68     Weight: 71.9 kg (158 lb 8.2 oz)  Body mass index is 26.38 kg/m².    Intake/Output Summary (Last 24 hours) at 2/17/2020 0927  Last data filed at 2/16/2020 1800  Gross per 24 hour   Intake 470 ml   Output 2450 ml   Net -1980 ml      Physical Exam   Constitutional: He is oriented to person, place, and time. He appears well-developed and well-nourished.   HENT:   Head: Normocephalic and atraumatic.   Eyes: Pupils are equal, round, and reactive to light. Conjunctivae are normal.   Neck: Normal range of motion. Neck supple.   Cardiovascular: Normal rate and regular rhythm.   Pulmonary/Chest: Effort normal and breath sounds normal.   Abdominal: Soft. He exhibits no distension.   Musculoskeletal: He exhibits no deformity.   Neurological: He is alert and oriented to person, place, and time. A cranial nerve deficit is present.  He exhibits abnormal muscle tone.   Skin: Skin is warm and dry.   Psychiatric: He has a normal mood and affect. His behavior is normal.       Significant Labs:   Lab Results   Component Value Date    WBC 7.09 02/06/2020    HGB 14.6 02/06/2020    HCT 42.5 02/06/2020    MCV 85 02/06/2020     02/06/2020     BMP  Lab Results   Component Value Date     02/06/2020    K 3.5 02/06/2020     02/06/2020    CO2 26 02/06/2020    BUN 21 02/06/2020    CREATININE 1.5 (H) 02/06/2020    CALCIUM 9.3 02/06/2020    ANIONGAP 9 02/06/2020    ESTGFRAFRICA 55 (A) 02/06/2020    EGFRNONAA 47 (A) 02/06/2020       Significant Imaging:  CXR:  Borderline heart size otherwise negative chest.  Prior ORIF of the right humerus    CT head without contrast:  Old lacunar infarct of the anterior limb of the right internal capsule.  No acute lesions seen.  Bilateral frontal lobe atrophy.  Cochlear implant noted in place on the left.    Bilateral carotid ultrasound:  No evidence of a hemodynamically significant carotid bifurcation stenosis.  Significant improvement in the left carotid artery consistent with prior left carotid endarterectomy.    CTA of head and neck:  40% stenosis identified at the origin of the right internal carotid artery secondary to plaque.  Significant stenosis is not seen on the left.  Otherwise negative CTA of the head and neck.    CT head without contrast:  Developing areas of hypodensity in the deep white matter of the right parietal lobe may represent ischemic injury.  No hemorrhage or mass effect is seen.  An old lacunar infarct of the right basal ganglion is noted.  There is mild brain atrophy particularly of the frontal lobes.  A cochlear implant is noted in position.    CT head without contrast:  1.   There is no intracranial hemorrhage.  There has been mild interval progression of indistinct hypodensity in the right frontal white matter extending to the corona radiata consistent with interval progression  of nonhemorrhagic ischemic change when compared to the recent studies.  There is no mass effect or midline shift.  2.  There is a chronic infarction in the right basal ganglia.  3.  There is significant artifact related to left-sided cochlear implant.    CT head without contrast:  1. Mild interval progression of the right frontal white matter hypoattenuation most consistent with temporal evolution of a recent infarct.  No acute intracranial hemorrhage, significant mass effect, or midline shift.  2. Stable small chronic right basal ganglia infarct.    ECHO:  · Concentric left ventricular remodeling. Normal left ventricular systolic function. The estimated ejection fraction is 60%. Grade 1 diastolic dysfunction.  · Mild right ventricular enlargement. Normal right ventricular systolic function.  · No significant valvular disorder observed.  · Bubble study negative for R-L intra cardiac shunt.

## 2020-02-17 NOTE — PT/OT/SLP PROGRESS
Occupational Therapy   Treatment    Name: Buddy Park  MRN: [id number]  Admitting Diagnosis:  Cerebrovascular accident (CVA) due to occlusion of small artery       Recommendations:     Discharge Recommendations: rehabilitation facility  Discharge Equipment Recommendations:  none  Barriers to discharge:  Decreased caregiver support    Assessment:     Buddy Park is a 69 y.o. male with a medical diagnosis of Cerebrovascular accident (CVA) due to occlusion of small artery.  He presents with increased endurance, increased strengthening through B UE and B LE, increased activity tolerance, increased attention to L UE and L LE, decreased impulsivity, good recall of albin dressing techniques, and posture/weight bearing techniques. Performance deficits affecting function are weakness, impaired endurance, impaired sensation, impaired self care skills, impaired functional mobilty, gait instability, impaired balance, visual deficits, decreased upper extremity function, decreased lower extremity function, decreased ROM, decreased safety awareness, abnormal tone, impaired coordination, impaired fine motor, edema.     Rehab Prognosis:  Excellent; patient would benefit from acute skilled OT services to address these deficits and reach maximum level of function.       Plan:     Patient to be seen daily to address the above listed problems via self-care/home management, therapeutic activities, therapeutic exercises, neuromuscular re-education, cognitive retraining, sensory integration  · Plan of Care Expires: 02/25/20  · Plan of Care Reviewed with: patient    Subjective     Pain/Comfort:  · Pain Rating 1: 0/10    Objective:     Communicated with: Je SANCHEZ, prior to session.  Patient found up in chair with recliner in use with (chair alarm) upon OT entry to room.  BP checked while resting in chair 88/54 and 94/56    General Precautions: Standard, fall, hearing impaired   Orthopedic Precautions:N/A   Braces: N/A      Occupational Performance:     Bed Mobility:    · Patient completed Scooting/Bridging with minimum assistance for L LE placement before bridging  · Patient completed Sit to Supine with contact guard assistance     Functional Mobility/Transfers:  · Patient completed Sit <> Stand Transfer with minimum assistance  with  hemiwalker   · Functional Mobility: Physical assist must be present on pt's L UE and L LE during transfers; his strength and accuracy through R side is intact and appropriate    Activities of Daily Living:  · Grooming: stand by assistance oral hygiene seated with set up  · Upper Body Dressing: moderate assistance with VC for albin dressing techniques and PLB cues to overcome frustration; pt donned and doffed t shirt and donned gown.  · Lower Body Dressing: maximal assistance with reacher and VC for albin dressing techniques and pt unable to assist in pulling up shorts once in standing. NO AROM of L UE.    Treatment & Education:  - Gentle mobilization of joints involving L UE phalanges, metacarpals, carpals, radius and ulna. Gentle stretching of the soft tissues of the L wrist and hand to decrease edema and improve PROM, potentially allowing for increases in active movement.   -OTR Dulce Placed flat paddle on pt's hand @ 1225 and pt was able to tolerate it for almost a 2 hr duration, including active treatment time ending at 1417, to inhibit flexor tone and provide LLPS to long finger flexors.  -pt completing 5 sit<>stand this session with min VC for balance and decreased impulsivity, Max cues for weight shifting through each LE; pt fatigue at end of session was cause for concern and BP checked while resting in chair 88/54 and 94/56. Cessation of tx. Pt transferred chair>bed Joyce w/o AE to lay down in bed for the evening.  -this day pt tolerated 4 hrs up in chair which is increased over any day thus far.      Patient left HOB elevated with pillows positioned to support L UE to decreased progression of  subluxation with call button in reach and RN Venus notifiedEducation:      GOALS:   Multidisciplinary Problems     Occupational Therapy Goals        Problem: Occupational Therapy Goal    Goal Priority Disciplines Outcome Interventions   Occupational Therapy Goal     OT, PT/OT Ongoing, Progressing    Description:  Goals to be met by: 2/16/20     Patient will increase functional independence with ADLs by performing:  Toilet transfer to/from bedside commode with CGA with use of AD as needed.  Toileting from bedside commode with Min (A).  Grooming while seated sink side with SBA.  UB dressing EOB with Mod (A).                         Time Tracking:     OT Date of Treatment: 02/17/20  OT Start Time: 1216  OT Stop Time: 1417  OT Total Time (min): 121 min    Billable Minutes:Self Care/Home Management 38min  Therapeutic Exercise 23min    CLAUDIA Brizuela/YAMILET  2/17/2020    I attest that directly following TAYLOR hand mobilization, OTR Placed flat paddle on pt's hand for duration of treatment to inhibit flexor tone and provide LLPS to long finger flexors. Removed at end of therapy session once lying in bed. COLE Vasquez LOTR

## 2020-02-17 NOTE — PT/OT/SLP PROGRESS
Speech Language Pathology Treatment    Patient Name:  Buddy Park   MRN:  [id number]  Admitting Diagnosis: Cerebrovascular accident (CVA) due to occlusion of small artery    Recommendations:                 General Recommendations:  Cognitive-linguistic therapy  Diet recommendations:  Dental Soft(IDDSI 6; allow bread), Liquid Diet Level: Thin   Aspiration Precautions: Standard aspiration precautions   General Precautions: Standard, hearing impaired  Communication strategies:  hearing aids    Subjective     I'm waiting for word about rehab today  Patient goals: get to rehab       Objective:     Has the patient been evaluated by SLP for swallowing?      Keep patient NPO?     Current Respiratory Status: room air      Pt seen for cognitive-linguistic ts.  See Care Plna for tx details    Assessment:     Buddy Park is a 69 y.o. male with an SLP diagnosis of Cognitive-Linguistic Impairment.  He presented with excellent attention & effort, and is making progress toward goals.  Cont POC.    Goals:   Multidisciplinary Problems     SLP Goals        Problem: SLP Goal    Goal Priority Disciplines Outcome   SLP Goal     SLP Ongoing, Progressing   Description:    1) Participate in cognitive-communication evaluation--MET; new orders received 2/10/2020  2) Recall 5 of 5 unrelated words, using learned memory strategies, with MOD I  3) Alternating attention tasks x3 minutes with 90% accuracy and no more than 1 redirection--edit  4) Functional problem solving tasks with MOD I  5) Follow 3 step verbal commands with 100% accuracy                      Plan:     · Patient to be seen:  5 x/week   · Plan of Care expires:  02/18/20  · Plan of Care reviewed with:  patient   · SLP Follow-Up:  Yes       Discharge recommendations:  rehabilitation facility   Barriers to Discharge:  None    Time Tracking:     SLP Treatment Date:   02/17/20  Speech Start Time:  1145  Speech Stop Time:  1210     Speech Total Time (min):  25 min    Billable  Minutes: Speech Therapy Individual 25    Yesenia Loo CCC-SLP/A  02/17/2020

## 2020-02-17 NOTE — PT/OT/SLP PROGRESS
Physical Therapy Treatment    Patient Name:  Buddy Park   MRN:  [id number]    Recommendations:     Discharge Recommendations:  rehabilitation facility   Discharge Equipment Recommendations: (TBD at next level of care)   Barriers to discharge: None    Assessment:     Buddy Park is a 69 y.o. male admitted with a medical diagnosis of Cerebrovascular accident (CVA) due to occlusion of small artery.  He presents with the following impairments/functional limitations:  weakness, gait instability, decreased upper extremity function, impaired endurance, impaired balance, impaired functional mobilty. Patient states he is ready to get to rehab and start working. He is tired of being in the hospital. He requires Joyce for supine to sit and sit to stand transfer. He took 4 steps to the BSC with the albin walker. He requires SBA while sitting on the BSC. He then ambulated 20' with the albin walker, ModA for standing balance, and ModA to progress LLE forward for step to gait pattern. He required 1 sitting rest break before ambulating 15 more feet. He is agreeable to sitting up in the chair until OT treatment. Patient is highly motivated for therapy to get his independence back. He needs aggressive inpatient rehab for PT/OT following his acute CVA to increase independence and functional recovery.     Rehab Prognosis: Good; patient would benefit from acute skilled PT services to address these deficits and reach maximum level of function.    Recent Surgery: * No surgery found *      Plan:     During this hospitalization, patient to be seen daily to address the identified rehab impairments via gait training, therapeutic activities, therapeutic exercises and progress toward the following goals:    · Plan of Care Expires:  03/04/20    Subjective     Chief Complaint: ready to go to rehab  Patient/Family Comments/goals: go to rehab  Pain/Comfort:  · Pain Rating 1: 0/10      Objective:     Communicated with LAURA Wooten prior to  session.  Patient found HOB elevated with bed alarm upon PT entry to room.     General Precautions: Standard, fall, hearing impaired   Orthopedic Precautions:N/A   Braces: N/A     Functional Mobility:  · Bed Mobility:     · Supine to Sit: minimum assistance  · Transfers:     · Sit to Stand:  minimum assistance with hemiwalker  · Gait: 20' hemiwalker, ModA for balance and L foot progression, 15' with hemiwalker and Min-ModA for balance and L foot progression  · Balance: fair      AM-PAC 6 CLICK MOBILITY          Therapeutic Activities and Exercises:   Patient was educated on the importance of OOB activity during hospital stay, safe transfers and ambulation, IPR, gait pattern with albin walker    Patient left up in chair with call button in reach, chair alarm on and RN notified..    GOALS:   Multidisciplinary Problems     Physical Therapy Goals        Problem: Physical Therapy Goal    Goal Priority Disciplines Outcome Goal Variances Interventions   Physical Therapy Goal     PT, PT/OT Ongoing, Progressing     Description:  Goals to be met by: 2/28/2020     Patient will increase functional independence with mobility by performing:    NEW GOALS (2/14/20)  1. Supine to sit with SBA  2. Sit to stand transfer with CGA with albin walker   3. Gait  x 50 feet with Joyce using albin walker.   4. Bed to chair transfer with Joyce using albin walker     GOALS MET (2/13/20)  1. Supine to sit with Minimal Assistance  2. Sit to stand transfer with Moderate assistance with albin walker   3. Gait  x 10 feet with Moderate assistance using albin walker.   4. Bed to chair transfer with Moderate assistance using albin walker                         Time Tracking:     PT Received On: 02/17/20  PT Start Time: 0952     PT Stop Time: 1021  PT Total Time (min): 29 min     Billable Minutes: Gait Training 29    Treatment Type: Treatment  PT/PTA: PT     PTA Visit Number: 0     Emmy Trejo, PT  02/17/2020

## 2020-02-17 NOTE — PROGRESS NOTES
"[facility] Medicine  Progress Note    Patient Name: Buddy Park  MRN: [id number]  Patient Class: IP- Inpatient   Admission Date: 2/1/2020  Length of Stay: 14 days  Attending Physician: Yomaira Foss MD  Primary Care Provider: Buddy Chatman MD        Subjective:     Principal Problem:Cerebrovascular accident (CVA) due to occlusion of small artery    HPI:  Buddy Park is a 69 y.o. male with PMHx of HTN, HLD,  who presented to the ED for evaluation of left sided weakness and left facial droop that was noticed this morning by his spouse. However on discussion with patient he mentions he himself had started noticing symptoms of left arm weakness since yesterday morning and had difficulty writing. His speech as per him is baseline as he has a speech deficit from prio . Patient reports onset of fatigue x1 week ago. The spouse endorses noticing the patient "dragging his left foot" this morning, prompting her to bring him to the ED.He is a nonsmoker.He does endorse non compliance to his medications in the last 2 months including his antihypertensive.Patient mentions left carotid neck surgery ~x1-1.5 years ago. Patient has no other medical concerns or complaints at this moment. SHx includes Ear mastoidectomy w/ cochlear implant w/ landmark and Carotid angioplasty.  Patient admitted for work up of possible stroke with neurology consult.     Overview/Hospital Course:  No notes on file    Interval History:  Stable awaiting placement.  No subjective complaints.  Frustrated with disposition issues.    Review of Systems   Constitutional: Negative for appetite change and fever.   HENT: Negative for sinus pressure and sore throat.    Eyes: Negative for photophobia and redness.   Respiratory: Negative for cough and shortness of breath.    Cardiovascular: Negative for chest pain and leg swelling.   Gastrointestinal: Negative for abdominal distention and abdominal pain.   Genitourinary: Negative for " difficulty urinating and dysuria.   Musculoskeletal: Positive for arthralgias and gait problem.   Skin: Negative for color change and pallor.   Neurological: Positive for facial asymmetry and weakness.   Psychiatric/Behavioral: Negative for agitation and behavioral problems.     Objective:     Vital Signs (Most Recent):  Temp: 98 °F (36.7 °C) (02/17/20 0754)  Pulse: 69 (02/17/20 0754)  Resp: 18 (02/17/20 0754)  BP: 138/68 (02/17/20 0754)  SpO2: 95 % (02/17/20 0754) Vital Signs (24h Range):  Temp:  [97 °F (36.1 °C)-98.6 °F (37 °C)] 98 °F (36.7 °C)  Pulse:  [67-77] 69  Resp:  [16-20] 18  SpO2:  [94 %-97 %] 95 %  BP: (120-142)/(59-74) 138/68     Weight: 71.9 kg (158 lb 8.2 oz)  Body mass index is 26.38 kg/m².    Intake/Output Summary (Last 24 hours) at 2/17/2020 0927  Last data filed at 2/16/2020 1800  Gross per 24 hour   Intake 470 ml   Output 2450 ml   Net -1980 ml      Physical Exam   Constitutional: He is oriented to person, place, and time. He appears well-developed and well-nourished.   HENT:   Head: Normocephalic and atraumatic.   Eyes: Pupils are equal, round, and reactive to light. Conjunctivae are normal.   Neck: Normal range of motion. Neck supple.   Cardiovascular: Normal rate and regular rhythm.   Pulmonary/Chest: Effort normal and breath sounds normal.   Abdominal: Soft. He exhibits no distension.   Musculoskeletal: He exhibits no deformity.   Neurological: He is alert and oriented to person, place, and time. A cranial nerve deficit is present. He exhibits abnormal muscle tone.   Skin: Skin is warm and dry.   Psychiatric: He has a normal mood and affect. His behavior is normal.       Significant Labs:   Lab Results   Component Value Date    WBC 7.09 02/06/2020    HGB 14.6 02/06/2020    HCT 42.5 02/06/2020    MCV 85 02/06/2020     02/06/2020     BMP  Lab Results   Component Value Date     02/06/2020    K 3.5 02/06/2020     02/06/2020    CO2 26 02/06/2020    BUN 21 02/06/2020     CREATININE 1.5 (H) 02/06/2020    CALCIUM 9.3 02/06/2020    ANIONGAP 9 02/06/2020    ESTGFRAFRICA 55 (A) 02/06/2020    EGFRNONAA 47 (A) 02/06/2020       Significant Imaging:  CXR:  Borderline heart size otherwise negative chest.  Prior ORIF of the right humerus    CT head without contrast:  Old lacunar infarct of the anterior limb of the right internal capsule.  No acute lesions seen.  Bilateral frontal lobe atrophy.  Cochlear implant noted in place on the left.    Bilateral carotid ultrasound:  No evidence of a hemodynamically significant carotid bifurcation stenosis.  Significant improvement in the left carotid artery consistent with prior left carotid endarterectomy.    CTA of head and neck:  40% stenosis identified at the origin of the right internal carotid artery secondary to plaque.  Significant stenosis is not seen on the left.  Otherwise negative CTA of the head and neck.    CT head without contrast:  Developing areas of hypodensity in the deep white matter of the right parietal lobe may represent ischemic injury.  No hemorrhage or mass effect is seen.  An old lacunar infarct of the right basal ganglion is noted.  There is mild brain atrophy particularly of the frontal lobes.  A cochlear implant is noted in position.    CT head without contrast:  1.   There is no intracranial hemorrhage.  There has been mild interval progression of indistinct hypodensity in the right frontal white matter extending to the corona radiata consistent with interval progression of nonhemorrhagic ischemic change when compared to the recent studies.  There is no mass effect or midline shift.  2.  There is a chronic infarction in the right basal ganglia.  3.  There is significant artifact related to left-sided cochlear implant.    CT head without contrast:  1. Mild interval progression of the right frontal white matter hypoattenuation most consistent with temporal evolution of a recent infarct.  No acute intracranial hemorrhage,  significant mass effect, or midline shift.  2. Stable small chronic right basal ganglia infarct.    ECHO:  · Concentric left ventricular remodeling. Normal left ventricular systolic function. The estimated ejection fraction is 60%. Grade 1 diastolic dysfunction.  · Mild right ventricular enlargement. Normal right ventricular systolic function.  · No significant valvular disorder observed.  · Bubble study negative for R-L intra cardiac shunt.      Assessment/Plan:      * Cerebrovascular accident (CVA) due to occlusion of small artery  Continue statin and antiplt's.  Neurology following.  Echo with bubble done--- good EF.  No thrombus.  No intracardiac shunts.  It appears to be occlusion of a small artery in right hemispheric white matter.  Will treat with antiplatelets.  Awaiting placement to inpatient rehab versus skilled nursing facility.   and  working with family and medical insurance for disposition.      Chronic diastolic heart failure  Stable.      BMI 26.0-26.9,adult  Body mass index is 26.38 kg/m². Morbid obesity complicates all aspects of disease management from diagnostic modalities to treatment. Weight loss encouraged and health benefits explained to patient.        Carotid disease, bilateral  S/p left CEA  CTA shows 40% NITZA.  Continue antiplatelets.  Follow-up Neurology    Deafness  Has cochlear implants        HTN (hypertension)  Chronic, controlled.  Latest blood pressure and vitals reviewed-   Temp:  [96.3 °F (35.7 °C)-98.2 °F (36.8 °C)]   Pulse:  [72-81]   Resp:  [16-20]   BP: (131-151)/(67-77)   SpO2:  [94 %-100 %] .   Home meds for hypertension were reviewed and noted below. Hospital anti-hypertensive changes were made as shown below.  Outpt Hypertension Medications             amLODIPine (NORVASC) 10 MG tablet Take 1 tablet (10 mg total) by mouth once daily.    carvedilol (COREG) 6.25 MG tablet Take 1 tablet (6.25 mg total) by mouth 2 (two) times daily with meals.     nitroGLYCERIN (NITROSTAT) 0.4 MG SL tablet Place 1 tablet (0.4 mg total) under the tongue every 5 (five) minutes as needed for Chest pain.      Hospital Medications             amLODIPine tablet 10 mg 10 mg, Oral, Daily    carvediloL tablet 6.25 mg 6.25 mg, Oral, 2 times daily        Will utilize p.r.n. blood pressure medication only if patient's blood pressure greater than  180/110 and he develops symptoms such as worsening chest pain or shortness of breath.      Hyperlipidemia  Continue on statin.         Discussed with  and .  Awaiting placement inpatient rehab versus skilled nursing facility pending insurance approval.  VTE Risk Mitigation (From admission, onward)         Ordered     IP VTE LOW RISK PATIENT  Once      02/04/20 1120     Place sequential compression device  Until discontinued      02/04/20 1120                      Yomaira Foss MD  Department of Hospital Medicine   [facility]

## 2020-02-17 NOTE — PLAN OF CARE
Pt aaox 4 with left side hemiparesis noted. Pt is scheduled to go to outpt SNF unit in am pending all auth with insurance. Pt right ear with hearing aid noted left hand with iv patent. PT/OT working with the pt today. Hourly rounding maintained, call bell with in reach, and bed in low position. Emotional support given to pt.

## 2020-02-18 VITALS
HEIGHT: 65 IN | RESPIRATION RATE: 16 BRPM | DIASTOLIC BLOOD PRESSURE: 63 MMHG | HEART RATE: 67 BPM | BODY MASS INDEX: 26.41 KG/M2 | SYSTOLIC BLOOD PRESSURE: 110 MMHG | WEIGHT: 158.5 LBS | TEMPERATURE: 98 F | OXYGEN SATURATION: 95 %

## 2020-02-18 PROCEDURE — 94761 N-INVAS EAR/PLS OXIMETRY MLT: CPT | Mod: HCNC

## 2020-02-18 PROCEDURE — 25000003 PHARM REV CODE 250: Mod: HCNC | Performed by: HOSPITALIST

## 2020-02-18 PROCEDURE — 97110 THERAPEUTIC EXERCISES: CPT | Mod: HCNC,CQ

## 2020-02-18 PROCEDURE — 97116 GAIT TRAINING THERAPY: CPT | Mod: HCNC,CQ

## 2020-02-18 PROCEDURE — 94760 N-INVAS EAR/PLS OXIMETRY 1: CPT | Mod: HCNC

## 2020-02-18 RX ADMIN — AMLODIPINE BESYLATE 10 MG: 5 TABLET ORAL at 10:02

## 2020-02-18 RX ADMIN — CLOPIDOGREL BISULFATE 75 MG: 75 TABLET ORAL at 10:02

## 2020-02-18 RX ADMIN — BUPROPION HYDROCHLORIDE 150 MG: 150 TABLET, FILM COATED, EXTENDED RELEASE ORAL at 10:02

## 2020-02-18 RX ADMIN — TAMSULOSIN HYDROCHLORIDE 0.4 MG: 0.4 CAPSULE ORAL at 10:02

## 2020-02-18 RX ADMIN — ROSUVASTATIN CALCIUM 40 MG: 20 TABLET, FILM COATED ORAL at 10:02

## 2020-02-18 RX ADMIN — ASPIRIN 81 MG: 81 TABLET, COATED ORAL at 10:02

## 2020-02-18 RX ADMIN — COLCHICINE 0.6 MG: 0.6 CAPSULE ORAL at 10:02

## 2020-02-18 RX ADMIN — CARVEDILOL 6.25 MG: 6.25 TABLET, FILM COATED ORAL at 10:02

## 2020-02-18 NOTE — PLAN OF CARE
02/18/20 1255   Final Note   Assessment Type Final Discharge Note   Anticipated Discharge Disposition SNF   Right Care Referral Info   Post Acute Recommendation SNF / Sub-Acute Rehab   Facility Name [facility]

## 2020-02-18 NOTE — PT/OT/SLP PROGRESS
Physical Therapy Treatment    Patient Name:  Buddy Park   MRN:  [id number]    Recommendations:     Discharge Recommendations:  rehabilitation facility   Discharge Equipment Recommendations: (TBD at next level of care)   Barriers to discharge: None    Assessment:     Buddy Park is a 69 y.o. male admitted with a medical diagnosis of Cerebrovascular accident (CVA) due to occlusion of small artery.  He presents with the following impairments/functional limitations:  weakness, impaired endurance, impaired self care skills, impaired functional mobilty, gait instability, impaired balance, decreased upper extremity function, decreased lower extremity function, decreased ROM, decreased safety awareness .Expressed eagerness to discharge from hospital. Tolerated treatment. Amb'd with HW and assistance for safety. Performed LE exercises, AAROM LLE.     Rehab Prognosis: Good; patient would benefit from acute skilled PT services to address these deficits and reach maximum level of function.    Recent Surgery: * No surgery found *      Plan:     During this hospitalization, patient to be seen daily to address the identified rehab impairments via gait training, therapeutic exercises and progress toward the following goals:    · Plan of Care Expires:  03/04/20    Subjective     Chief Complaint: ready to go to facility.  Patient/Family Comments/goals: to be able to walk.  Pain/Comfort:  · Pain Rating 1: 0/10      Objective:     Communicated with nurse Novak prior to session.  Patient found supine with bed alarm upon PT entry to room.     General Precautions: Standard, fall, hearing impaired   Orthopedic Precautions:N/A   Braces: N/A     Functional Mobility:  · Bed Mobility:     · Rolling Left:  minimum assistance  · Supine to Sit: moderate assistance  · Transfers:     · Sit to Stand:  moderate assistance with hemiwalker  · Gait: 10' x 2 with rw and Mod A with seated rest between each.      AM-PAC 6 CLICK MOBILITY           Therapeutic Activities and Exercises:   Transferred to sitting EOB with Mod A for push up. Required CGA as patient leaning L side. Min A to scoot to EOB with verbal cues for technique.   Stood with Mod A and verbal cues for hand placement.   Amb'd 10 ' with Mod A and occasional A to advance LLE. Seated rest required then amb'd 10' additional.   BLE exercises at 10 reps each : AAROM LLE : TKE's, Hip abd/add/ flexion, AP's.           Patient left up in chair with all lines intact, call button in reach, chair alarm on and nurse Kishore notified..    GOALS:   Multidisciplinary Problems     Physical Therapy Goals        Problem: Physical Therapy Goal    Goal Priority Disciplines Outcome Goal Variances Interventions   Physical Therapy Goal     PT, PT/OT Ongoing, Progressing     Description:  Goals to be met by: 2/28/2020     Patient will increase functional independence with mobility by performing:    NEW GOALS (2/14/20)  1. Supine to sit with SBA  2. Sit to stand transfer with CGA with albin walker   3. Gait  x 50 feet with Joyce using albin walker.   4. Bed to chair transfer with Joyce using albin walker     GOALS MET (2/13/20)  1. Supine to sit with Minimal Assistance  2. Sit to stand transfer with Moderate assistance with albin walker   3. Gait  x 10 feet with Moderate assistance using alibn walker.   4. Bed to chair transfer with Moderate assistance using albin walker                         Time Tracking:     PT Received On: 02/18/20  PT Start Time: 0900     PT Stop Time: 0925  PT Total Time (min): 25 min     Billable Minutes: Gait Training 15min and Therapeutic Exercise 10min    Treatment Type: Treatment  PT/PTA: PTA     PTA Visit Number: 1     Shabana Shah PTA  02/18/2020

## 2020-02-18 NOTE — PLAN OF CARE
Plan of care reviewed with pt,verbalized understanding. Call light kept within reach, bed in locked and lowest position, side rails up x2.

## 2020-02-18 NOTE — PLAN OF CARE
Per Ceeci with [facility], patient accepted.  Patient's nurse can call report to [phone].  Patient going to room 216.  Patient will transport by ambulance.  [facility] ref# for non-emergent ambulance transport [id number].         02/18/20 1251   Post-Acute Status   Post-Acute Authorization Placement   Post-Acute Placement Status Set-up Complete

## 2020-02-18 NOTE — PLAN OF CARE
NEFTALI left voice message for Blanca at [facility] [phone] regarding patient discharge today to their services SNF.  NEFTALI awaiting return call.       02/18/20 6978   Post-Acute Status   Post-Acute Authorization Placement   Post-Acute Placement Status Pending Payor Review

## 2020-02-18 NOTE — PLAN OF CARE
Problem: Physical Therapy Goal  Goal: Physical Therapy Goal  Description  Goals to be met by: 2/28/2020     Patient will increase functional independence with mobility by performing:    NEW GOALS (2/14/20)  1. Supine to sit with SBA  2. Sit to stand transfer with CGA with albin walker   3. Gait  x 50 feet with Joyce using albin walker.   4. Bed to chair transfer with Joyce using albin walker     GOALS MET (2/13/20)  1. Supine to sit with Minimal Assistance  2. Sit to stand transfer with Moderate assistance with albin walker   3. Gait  x 10 feet with Moderate assistance using albin walker.   4. Bed to chair transfer with Moderate assistance using albin walker        Outcome: Ongoing, Progressing   Therapeutic activity to improve functional mobility : Bed mobility, transfers, gait with HW and Mod A, BLE exercises.

## 2020-02-18 NOTE — PLAN OF CARE
AAOx3 up in chair at bedside chair alarm activated brakes locked call light in easy reach.Every two hour monitoring continued VSS No acute distress noted at this time Verbalizes understanding of POC

## 2020-02-18 NOTE — DISCHARGE SUMMARY
"[facility] Medicine  Discharge Summary      Patient Name: Buddy Park  MRN: [id number]  Admission Date: 2/1/2020  Hospital Length of Stay: 15 days  Discharge Date and Time:  02/18/2020 8:45 AM  Attending Physician: Yomaira Foss MD   Discharging Provider: Yomaira Foss MD  Primary Care Provider: Buddy Chatman MD      HPI:   Buddy Park is a 69 y.o. male with PMHx of HTN, HLD,  who presented to the ED for evaluation of left sided weakness and left facial droop that was noticed this morning by his spouse. However on discussion with patient he mentions he himself had started noticing symptoms of left arm weakness since yesterday morning and had difficulty writing. His speech as per him is baseline as he has a speech deficit from prio . Patient reports onset of fatigue x1 week ago. The spouse endorses noticing the patient "dragging his left foot" this morning, prompting her to bring him to the ED.He is a nonsmoker.He does endorse non compliance to his medications in the last 2 months including his antihypertensive.Patient mentions left carotid neck surgery ~x1-1.5 years ago. Patient has no other medical concerns or complaints at this moment. SHx includes Ear mastoidectomy w/ cochlear implant w/ landmark and Carotid angioplasty.  Patient admitted for work up of possible stroke with neurology consult.     * No surgery found *      Hospital Course:   Patient placed on tele-monitoring and routine neuro-checks. Neuro-imaging reviewed, results below. Patient evaluated by neurology team.  Neurology workup confirmed presence of acute cerebrovascular accident.  Patient worked with ST/PT/OT.  Patient's left-sided deficits are improving with therapy.  Patient's medical insurance denied inpatient rehab placement.  Patient's family appealed the decision.  Upon review, inpatient rehab placement was again denied.  Patient has been accepted at skilled nursing facility as per family preference.  " Patient will continue therapy at skilled nursing facility and will be re-evaluated later on for possible future needs for inpatient rehab.  Patient to continue close follow-up with his doctors and neurologist upon discharge from skilled nursing facility.  Patient voiced understanding.      Consults:   Consults (From admission, onward)        Status Ordering Provider     Inpatient consult to Social Work/Case Management  Once     Provider:  (Not yet assigned)    SHANNAN Hughes        CXR:  Borderline heart size otherwise negative chest.  Prior ORIF of the right humerus     CT head without contrast:  Old lacunar infarct of the anterior limb of the right internal capsule.  No acute lesions seen.  Bilateral frontal lobe atrophy.  Cochlear implant noted in place on the left.     Bilateral carotid ultrasound:  No evidence of a hemodynamically significant carotid bifurcation stenosis.  Significant improvement in the left carotid artery consistent with prior left carotid endarterectomy.     CTA of head and neck:  40% stenosis identified at the origin of the right internal carotid artery secondary to plaque.  Significant stenosis is not seen on the left.  Otherwise negative CTA of the head and neck.     CT head without contrast:  Developing areas of hypodensity in the deep white matter of the right parietal lobe may represent ischemic injury.  No hemorrhage or mass effect is seen.  An old lacunar infarct of the right basal ganglion is noted.  There is mild brain atrophy particularly of the frontal lobes.  A cochlear implant is noted in position.     CT head without contrast:  1.   There is no intracranial hemorrhage.  There has been mild interval progression of indistinct hypodensity in the right frontal white matter extending to the corona radiata consistent with interval progression of nonhemorrhagic ischemic change when compared to the recent studies.  There is no mass effect or midline shift.  2.  There is a  chronic infarction in the right basal ganglia.  3.  There is significant artifact related to left-sided cochlear implant.     CT head without contrast:  1. Mild interval progression of the right frontal white matter hypoattenuation most consistent with temporal evolution of a recent infarct.  No acute intracranial hemorrhage, significant mass effect, or midline shift.  2. Stable small chronic right basal ganglia infarct.     ECHO:  · Concentric left ventricular remodeling. Normal left ventricular systolic function. The estimated ejection fraction is 60%. Grade 1 diastolic dysfunction.  · Mild right ventricular enlargement. Normal right ventricular systolic function.  · No significant valvular disorder observed.  · Bubble study negative for R-L intra cardiac shunt.    Final Active Diagnoses:    Diagnosis Date Noted POA    PRINCIPAL PROBLEM:  Cerebrovascular accident (CVA) due to occlusion of small artery [I63.81] 02/03/2020 Yes    Chronic diastolic heart failure [I50.32] 02/09/2020 Yes    BMI 26.0-26.9,adult [Z68.26] 03/19/2019 Not Applicable    Carotid disease, bilateral [I73.9] 02/09/2017 Yes    Deafness [H91.90] 04/12/2016 Yes    HTN (hypertension) [I10] 11/03/2014 Yes    Hyperlipidemia [E78.5]  Yes      Problems Resolved During this Admission:       Discharged Condition: good    Disposition: Skilled Nursing Facility    Follow Up:  [facility]-up Information     [facility]    Specialty:  Physical Therapy  Why:  to follow up with the neurology group  Contact information:  [address] [place] [address]  [phone]             Buddy Chatman MD In 2 weeks.    Specialty:  Family Medicine  Contact information:  [address] [place] [address]  [phone]                 Patient Instructions:      Diet Adult Regular   Order Comments: Chopped meats     Order Specific Question Answer Comments   Additional restrictions: Dental Soft      Diet Cardiac   Order Comments: Dysphagia diet.   Aspiration precaution     Diet diabetic     Other restrictions (specify):   Order Comments: PLEASE OBSERVE FALL PRECAUTIONS, Up with assist, rotate patient q 2 hrs     Call MD for:   Order Comments: For worsening symptoms, chest pain, shortness of breath, increased abdominal pain, high grade fever, stroke or stroke like symptoms, immediately go to the nearest Emergency Room or call 911 as soon as possible.     Activity as tolerated       Significant Diagnostic Studies: Labs: CMP No results for input(s): NA, K, CL, CO2, GLU, BUN, CREATININE, CALCIUM, PROT, ALBUMIN, BILITOT, ALKPHOS, AST, ALT, ANIONGAP, ESTGFRAFRICA, EGFRNONAA in the last 48 hours. and CBC No results for input(s): WBC, HGB, HCT, PLT in the last 48 hours.    Pending Diagnostic Studies:     Procedure Component Value Units Date/Time    EKG 12-lead [[id number]]     Order Status:  Sent Lab Status:  No result          Medications:  Reconciled Home Medications:      Medication List      START taking these medications    ALPRAZolam 0.25 MG tablet  Commonly known as:  XANAX  Take 1 tablet (0.25 mg total) by mouth 2 (two) times daily as needed for Anxiety.     clopidogreL 75 mg tablet  Commonly known as:  PLAVIX  Take 1 tablet (75 mg total) by mouth once daily.        CONTINUE taking these medications    amLODIPine 10 MG tablet  Commonly known as:  NORVASC  Take 1 tablet (10 mg total) by mouth once daily.     aspirin 81 MG EC tablet  Commonly known as:  ECOTRIN  Take 81 mg by mouth once daily.     b complex vitamins tablet  Take 1 tablet by mouth once daily.     buPROPion 150 MG TB24 tablet  Commonly known as:  WELLBUTRIN XL  Take 1 tablet (150 mg total) by mouth once daily. In the morning     carvediloL 6.25 MG tablet  Commonly known as:  COREG  Take 1 tablet (6.25 mg total) by mouth 2 (two) times daily with meals.     colchicine 0.6 mg tablet  Commonly known as:  COLCRYS  Take 1 tablet (0.6 mg total) by mouth once daily.     ibuprofen 600 MG tablet  Commonly  known as:  ADVIL,MOTRIN  Take 1 tablet (600 mg total) by mouth every 6 (six) hours as needed for Pain.     metFORMIN 500 MG tablet  Commonly known as:  GLUCOPHAGE  TAKE 1 TABLET BY MOUTH TWICE DAILY WITH MEALS     nitroGLYCERIN 0.4 MG SL tablet  Commonly known as:  NITROSTAT  Place 1 tablet (0.4 mg total) under the tongue every 5 (five) minutes as needed for Chest pain.     rosuvastatin 40 MG Tab  Commonly known as:  CRESTOR  Take 1 tablet (40 mg total) by mouth once daily.     tamsulosin 0.4 mg Cap  Commonly known as:  FLOMAX  Take 1 capsule (0.4 mg total) by mouth once daily.        STOP taking these medications    cefUROXime 500 MG tablet  Commonly known as:  CEFTIN     cholestyramine-aspartame 4 gram Powd  Commonly known as:  Questran Light     EPINEPHrine 0.3 mg/0.3 mL Atin  Commonly known as:  EPIPEN     HYDROcodone-acetaminophen 5-325 mg per tablet  Commonly known as:  NORCO     phenazopyridine 100 MG tablet  Commonly known as:  PYRIDIUM            Indwelling Lines/Drains at time of discharge:   Lines/Drains/Airways     Drain                 Ureteral Drain/Stent 10/28/19 1145 Left ureter 6 Fr. 106 days                Time spent on the discharge of patient: 36 minutes  Patient was seen and examined on the date of discharge and determined to be suitable for discharge.         Yomaira Foss MD  Department of Hospital Medicine  [facility]

## 2020-04-02 ENCOUNTER — TELEPHONE (OUTPATIENT)
Dept: FAMILY MEDICINE | Facility: CLINIC | Age: 70
End: 2020-04-02

## 2020-04-02 NOTE — TELEPHONE ENCOUNTER
----- Message from Cassi Aragon sent at 4/2/2020  2:30 PM CDT -----  Contact: Patient  Type: Needs Medical Advice    Who Called:  Patient  Best Call Back Number:   Additional Information: Calling to find out if his wife will be able to attend his appointment with him tomorrow.

## 2020-04-02 NOTE — TELEPHONE ENCOUNTER
----- Message from Maikol Ayala sent at 4/2/2020 10:03 AM CDT -----  Contact: same  Patient called in and stated he was released from nursing home last week 3/27/2020 and needed to speak to nurse or Dr. Chatman about getting all of his medications refilled that home had him on or to make sure Dr. Chatman still wanted him on these medications.    Patient call back number is [phone] or [phone]

## 2020-04-02 NOTE — TELEPHONE ENCOUNTER
Spoke to patient. Advised if wife not allowed to accompany him we could call her and Dr. Chatman would be able to speak to both.

## 2020-04-03 ENCOUNTER — OFFICE VISIT (OUTPATIENT)
Dept: FAMILY MEDICINE | Facility: CLINIC | Age: 70
End: 2020-04-03
Attending: FAMILY MEDICINE
Payer: MEDICARE

## 2020-04-03 VITALS
OXYGEN SATURATION: 97 % | WEIGHT: 158.5 LBS | HEART RATE: 81 BPM | BODY MASS INDEX: 26.41 KG/M2 | SYSTOLIC BLOOD PRESSURE: 108 MMHG | TEMPERATURE: 98 F | DIASTOLIC BLOOD PRESSURE: 56 MMHG | HEIGHT: 65 IN

## 2020-04-03 DIAGNOSIS — M10.072 ACUTE IDIOPATHIC GOUT INVOLVING TOE OF LEFT FOOT: ICD-10-CM

## 2020-04-03 DIAGNOSIS — I50.32 CHRONIC DIASTOLIC HEART FAILURE: ICD-10-CM

## 2020-04-03 DIAGNOSIS — M20.5X2 HALLUX LIMITUS OF LEFT FOOT: ICD-10-CM

## 2020-04-03 DIAGNOSIS — I63.81 CEREBROVASCULAR ACCIDENT (CVA) DUE TO OCCLUSION OF SMALL ARTERY: Primary | ICD-10-CM

## 2020-04-03 DIAGNOSIS — M19.072 PRIMARY OSTEOARTHRITIS OF LEFT FOOT: ICD-10-CM

## 2020-04-03 DIAGNOSIS — I70.0 ABDOMINAL AORTIC ATHEROSCLEROSIS: ICD-10-CM

## 2020-04-03 DIAGNOSIS — M79.672 PAIN IN LEFT FOOT: ICD-10-CM

## 2020-04-03 DIAGNOSIS — I65.21 STENOSIS OF RIGHT CAROTID ARTERY: ICD-10-CM

## 2020-04-03 DIAGNOSIS — E78.49 OTHER HYPERLIPIDEMIA: ICD-10-CM

## 2020-04-03 DIAGNOSIS — N18.30 CKD (CHRONIC KIDNEY DISEASE) STAGE 3, GFR 30-59 ML/MIN: ICD-10-CM

## 2020-04-03 DIAGNOSIS — F32.5 MAJOR DEPRESSIVE DISORDER WITH SINGLE EPISODE, IN FULL REMISSION: ICD-10-CM

## 2020-04-03 DIAGNOSIS — R73.03 PREDIABETES: ICD-10-CM

## 2020-04-03 DIAGNOSIS — I10 ESSENTIAL HYPERTENSION: ICD-10-CM

## 2020-04-03 DIAGNOSIS — I42.9 CARDIOMYOPATHY, UNSPECIFIED TYPE: ICD-10-CM

## 2020-04-03 PROBLEM — N17.9 AKI (ACUTE KIDNEY INJURY): Status: RESOLVED | Noted: 2019-10-22 | Resolved: 2020-04-03

## 2020-04-03 PROCEDURE — 1159F PR MEDICATION LIST DOCUMENTED IN MEDICAL RECORD: ICD-10-PCS | Mod: HCNC,S$GLB,, | Performed by: FAMILY MEDICINE

## 2020-04-03 PROCEDURE — 99999 PR PBB SHADOW E&M-EST. PATIENT-LVL III: ICD-10-PCS | Mod: PBBFAC,HCNC,, | Performed by: FAMILY MEDICINE

## 2020-04-03 PROCEDURE — 3078F PR MOST RECENT DIASTOLIC BLOOD PRESSURE < 80 MM HG: ICD-10-PCS | Mod: HCNC,CPTII,S$GLB, | Performed by: FAMILY MEDICINE

## 2020-04-03 PROCEDURE — 1101F PT FALLS ASSESS-DOCD LE1/YR: CPT | Mod: HCNC,CPTII,S$GLB, | Performed by: FAMILY MEDICINE

## 2020-04-03 PROCEDURE — 3078F DIAST BP <80 MM HG: CPT | Mod: HCNC,CPTII,S$GLB, | Performed by: FAMILY MEDICINE

## 2020-04-03 PROCEDURE — 1126F AMNT PAIN NOTED NONE PRSNT: CPT | Mod: HCNC,S$GLB,, | Performed by: FAMILY MEDICINE

## 2020-04-03 PROCEDURE — 99214 OFFICE O/P EST MOD 30 MIN: CPT | Mod: HCNC,S$GLB,, | Performed by: FAMILY MEDICINE

## 2020-04-03 PROCEDURE — 3074F PR MOST RECENT SYSTOLIC BLOOD PRESSURE < 130 MM HG: ICD-10-PCS | Mod: HCNC,CPTII,S$GLB, | Performed by: FAMILY MEDICINE

## 2020-04-03 PROCEDURE — 99214 PR OFFICE/OUTPT VISIT, EST, LEVL IV, 30-39 MIN: ICD-10-PCS | Mod: HCNC,S$GLB,, | Performed by: FAMILY MEDICINE

## 2020-04-03 PROCEDURE — 99499 RISK ADDL DX/OHS AUDIT: ICD-10-PCS | Mod: S$GLB,,, | Performed by: FAMILY MEDICINE

## 2020-04-03 PROCEDURE — 99999 PR PBB SHADOW E&M-EST. PATIENT-LVL III: CPT | Mod: PBBFAC,HCNC,, | Performed by: FAMILY MEDICINE

## 2020-04-03 PROCEDURE — 1101F PR PT FALLS ASSESS DOC 0-1 FALLS W/OUT INJ PAST YR: ICD-10-PCS | Mod: HCNC,CPTII,S$GLB, | Performed by: FAMILY MEDICINE

## 2020-04-03 PROCEDURE — 99499 UNLISTED E&M SERVICE: CPT | Mod: S$GLB,,, | Performed by: FAMILY MEDICINE

## 2020-04-03 PROCEDURE — 1159F MED LIST DOCD IN RCRD: CPT | Mod: HCNC,S$GLB,, | Performed by: FAMILY MEDICINE

## 2020-04-03 PROCEDURE — 3074F SYST BP LT 130 MM HG: CPT | Mod: HCNC,CPTII,S$GLB, | Performed by: FAMILY MEDICINE

## 2020-04-03 PROCEDURE — 1126F PR PAIN SEVERITY QUANTIFIED, NO PAIN PRESENT: ICD-10-PCS | Mod: HCNC,S$GLB,, | Performed by: FAMILY MEDICINE

## 2020-04-03 RX ORDER — CARVEDILOL 6.25 MG/1
6.25 TABLET ORAL 2 TIMES DAILY WITH MEALS
Qty: 180 TABLET | Refills: 3 | Status: SHIPPED | OUTPATIENT
Start: 2020-04-03 | End: 2020-04-03 | Stop reason: SDUPTHER

## 2020-04-03 RX ORDER — BUPROPION HYDROCHLORIDE 150 MG/1
150 TABLET ORAL DAILY
Qty: 90 TABLET | Refills: 1 | Status: SHIPPED | OUTPATIENT
Start: 2020-04-03 | End: 2020-08-28

## 2020-04-03 RX ORDER — AMLODIPINE BESYLATE 10 MG/1
10 TABLET ORAL DAILY
Qty: 90 TABLET | Refills: 3 | Status: SHIPPED | OUTPATIENT
Start: 2020-04-03 | End: 2020-04-03 | Stop reason: SDUPTHER

## 2020-04-03 RX ORDER — AMLODIPINE BESYLATE 10 MG/1
10 TABLET ORAL DAILY
Qty: 90 TABLET | Refills: 3 | Status: SHIPPED | OUTPATIENT
Start: 2020-04-03 | End: 2020-06-16

## 2020-04-03 RX ORDER — TRAMADOL HYDROCHLORIDE 50 MG/1
50 TABLET ORAL EVERY 6 HOURS PRN
Qty: 40 TABLET | Refills: 2 | Status: SHIPPED | OUTPATIENT
Start: 2020-04-03 | End: 2020-12-22 | Stop reason: SDUPTHER

## 2020-04-03 RX ORDER — BUPROPION HYDROCHLORIDE 150 MG/1
150 TABLET ORAL DAILY
Qty: 90 TABLET | Refills: 1 | Status: SHIPPED | OUTPATIENT
Start: 2020-04-03 | End: 2020-04-03 | Stop reason: SDUPTHER

## 2020-04-03 RX ORDER — CLOPIDOGREL BISULFATE 75 MG/1
75 TABLET ORAL DAILY
Qty: 30 TABLET | Refills: 11
Start: 2020-04-03 | End: 2020-04-06 | Stop reason: SDUPTHER

## 2020-04-03 RX ORDER — COLCHICINE 0.6 MG/1
0.6 TABLET ORAL DAILY
Qty: 30 TABLET | Refills: 11 | Status: SHIPPED | OUTPATIENT
Start: 2020-04-03 | End: 2021-03-31

## 2020-04-03 RX ORDER — CARVEDILOL 6.25 MG/1
6.25 TABLET ORAL 2 TIMES DAILY WITH MEALS
Qty: 180 TABLET | Refills: 3 | Status: SHIPPED | OUTPATIENT
Start: 2020-04-03 | End: 2020-06-16 | Stop reason: DRUGHIGH

## 2020-04-03 NOTE — PROGRESS NOTES
Subjective:       Patient ID: Buddy Park is a 69 y.o. male.    Chief Complaint: CVA    69-year-old male coming in for hospital follow-up from a stay at [facility] from February one through February 18.  He presented to the emergency room with left-sided weakness and dragging his left foot and was being evaluated when his symptoms suddenly worsened with progressive stroke in evolution.  Scanning revealed left side CVA without hemorrhage and during the workup he was found to have an ejection fraction of 60% on echocardiogram, a 40% right internal carotid stenosis on CTA.  He was kept in the hospital for a somewhat longer than usual time with efforts to get him into a rehab unit which was denied on multiple occasions by is insurance until he finally was discharged on the 18th to a skilled unit.  About a week ago he was discharged to home and is being followed by home health with home health physical therapy.  He is able to ambulate although his left leg remains weak.  His left arm is essentially flaccid and he has developed and adhesive capsulitis on the left side.  Physical therapy is trying to free up the motion in the shoulder as well as improve strength but as of now he has no  and no ability to raise the left arm against gravity.  He does have a bit of a footdrop on the left side but he is able to ambulate somewhat.  He cannot use a walker because is left arm is ineffective.  His main complaint right now is pain in the left arm associated with physical therapy and he would like something to help that he could take before therapy starts.  The patient has been having trouble with diarrhea, in the hospital day discontinued Questran which was controlling it.  On returning home his wife thought to reduce his metformin from two a day to one a day and that seemed to have solved the problem.  His blood sugars have been doing well, note he is prediabetic not diabetic.  It looks like he does not need to go  back on the Questran.  He was discharged on Xanax but has discontinued using it and was discharged on Lipitor but he went back to using his Crestor 40 which he had been off of prior to the stroke along with some of his blood pressure medications.    Past Medical History:  No date: Angina pectoris  No date: Anxiety  No date: Arthritis  No date: Biliary colic  No date: Cochlear implant in place  No date: Deaf      Comment:  LEFT EAR  No date: Depression  No date: Heart failure  No date: High cholesterol  2/20/2018: History of colon polyps  No date: [place] (hard of hearing)      Comment:  HEARING AID - RIGHT  No date: Hyperlipidemia  No date: Hypertension  No date: Kidney stone  No date: Kidney stones  No date: Renal stones  No date: Stroke  No date: Trouble in sleeping  No date: Wears glasses    Past Surgical History:  06/2018: CAROTID ARTERY ANGIOPLASTY      Comment:  [facility]   No date: CATARACT EXTRACTION; Bilateral  2-6-2014: CHOLECYSTECTOMY  1/9/2013: COLONOSCOPY      Comment:  Dr. Gillette, 5 year recheck (family history colon                cancer)  3/12/2018: COLONOSCOPY; N/A      Comment:  Procedure: COLONOSCOPY;  Surgeon: Garett Go MD;                Location: [facility];  Service: Endoscopy;  Laterality:                N/A;  10/28/2019: CYSTOSCOPY W/ RETROGRADES; Bilateral      Comment:  Procedure: CYSTOSCOPY, WITH RETROGRADE PYELOGRAM;                 Surgeon: Gretchen Proctor MD;  [facility];                 Service: Urology;  Laterality: Bilateral;  10/28/2019: CYSTOSCOPY W/ URETERAL STENT PLACEMENT; Left      Comment:  Procedure: CYSTOSCOPY, WITH URETERAL STENT INSERTION;                 Surgeon: Gretchen Proctor MD;  [facility];                 Service: Urology;  Laterality: Left;  12/2/2019: CYSTOSCOPY W/ URETERAL STENT REMOVAL; Left      Comment:  Procedure: CYSTOSCOPY, WITH URETERAL STENT REMOVAL;                 Surgeon: Gretchen Proctor MD;  [facility];                 Service:  Urology;  Laterality: Left;  No date: EAR MASTOIDECTOMY W/ COCHLEAR IMPLANT W/ LANDMARK      Comment:  left ear  12/2/2019: EXTRACORPOREAL SHOCK WAVE LITHOTRIPSY; Left      Comment:  Procedure: LITHOTRIPSY, ESWL;  Surgeon: Gretchen Proctor MD;  Location: [facility];  Service: Urology;  Laterality:                Left;  No date: EYE SURGERY  No date: FOREIGN BODY REMOVAL; Right      Comment:  glass removed from right foot/repair  No date: FRACTURE SURGERY      Comment:  right arm x2  No date: LITHOTRIPSY  No date: ROTATOR CUFF REPAIR      Comment:  Right   No date: SINUS SURGERY  No date: TONSILLECTOMY  No date: VASCULAR SURGERY    Current Outpatient Medications on File Prior to Visit:  aspirin (ECOTRIN) 81 MG EC tablet, Take 81 mg by mouth once daily., Disp: , Rfl:   b complex vitamins tablet, Take 1 tablet by mouth once daily., Disp: , Rfl:   metFORMIN (GLUCOPHAGE) 500 MG tablet, TAKE 1 TABLET BY MOUTH TWICE DAILY WITH MEALS (Patient taking differently: Take 500 mg by mouth daily with breakfast. ), Disp: 90 tablet, Rfl: 0  nitroGLYCERIN (NITROSTAT) 0.4 MG SL tablet, Place 1 tablet (0.4 mg total) under the tongue every 5 (five) minutes as needed for Chest pain., Disp: 25 tablet, Rfl: 11  rosuvastatin (CRESTOR) 40 MG Tab, Take 1 tablet (40 mg total) by mouth once daily., Disp: 90 tablet, Rfl: 3  (DISCONTINUED) amLODIPine (NORVASC) 10 MG tablet, Take 1 tablet (10 mg total) by mouth once daily., Disp: 90 tablet, Rfl: 3  (DISCONTINUED) buPROPion (WELLBUTRIN XL) 150 MG TB24 tablet, Take 1 tablet (150 mg total) by mouth once daily. In the morning, Disp: 90 tablet, Rfl: 1  (DISCONTINUED) carvedilol (COREG) 6.25 MG tablet, Take 1 tablet (6.25 mg total) by mouth 2 (two) times daily with meals., Disp: 180 tablet, Rfl: 3  (DISCONTINUED) clopidogreL (PLAVIX) 75 mg tablet, Take 1 tablet (75 mg total) by mouth once daily., Disp: 30 tablet, Rfl: 11  (DISCONTINUED) colchicine (COLCRYS) 0.6 mg tablet, Take 1 tablet (0.6  mg total) by mouth once daily., Disp: 30 tablet, Rfl: 11  ibuprofen (ADVIL,MOTRIN) 600 MG tablet, Take 1 tablet (600 mg total) by mouth every 6 (six) hours as needed for Pain. (Patient not taking: Reported on 4/3/2020), Disp: 20 tablet, Rfl: 1  (DISCONTINUED) ALPRAZolam (XANAX) 0.25 MG tablet, Take 1 tablet (0.25 mg total) by mouth 2 (two) times daily as needed for Anxiety., Disp: 10 tablet, Rfl: 0  (DISCONTINUED) tamsulosin (FLOMAX) 0.4 mg Cap, Take 1 capsule (0.4 mg total) by mouth once daily., Disp: 90 capsule, Rfl: 3    Current Facility-Administered Medications on File Prior to Visit:  lactated ringers infusion, , Intravenous, Continuous, Demario Yates MD, Last Rate: 0 mL/hr at 10/28/19 1405          Review of Systems   Constitutional: Negative for chills and fever.   Respiratory: Negative for cough, chest tightness and shortness of breath.    Cardiovascular: Negative for chest pain and palpitations.   Gastrointestinal: Positive for diarrhea. Negative for nausea and vomiting.   Musculoskeletal: Positive for gait problem.   Neurological: Positive for weakness. Negative for dizziness, light-headedness and headaches.       Objective:      Physical Exam   Constitutional: He is oriented to person, place, and time. He appears well-developed. No distress.   Good blood pressure control     HENT:   Head: Normocephalic and atraumatic.   Eyes: Pupils are equal, round, and reactive to light. EOM are normal. No scleral icterus.   Neck: Normal range of motion. Neck supple.   Cardiovascular: Normal rate, regular rhythm and normal heart sounds. Exam reveals no gallop and no friction rub.   No murmur heard.  Carotid pulses 2+ no bruit   Pulmonary/Chest: Effort normal and breath sounds normal. No stridor. No respiratory distress. He has no wheezes. He has no rales. He exhibits no tenderness.   Abdominal: Soft. Bowel sounds are normal. He exhibits no distension and no mass. There is no tenderness. There is no guarding.    Neurological: He is alert and oriented to person, place, and time. He is not disoriented. GCS eye subscore is 4. GCS verbal subscore is 5. GCS motor subscore is 6.   Strength 0/5 left upper extremity flexors and abductors, 0/5 forearm and 0/5  strength left hand  Strength two to 3/5 left thigh abductors and flexors, left knee flexors and extenders  All strength on right 5/5   Skin: Skin is warm and dry. No rash noted. He is not diaphoretic. No erythema.   Psychiatric: He has a normal mood and affect. His behavior is normal. Judgment and thought content normal.   Nursing note and vitals reviewed.      Assessment:       1. Cerebrovascular accident (CVA) due to occlusion of small artery    2. Essential hypertension    3. Other hyperlipidemia    4. Prediabetes    5. Acute idiopathic gout involving toe of left foot    6. Hallux limitus of left foot    7. Primary osteoarthritis of left foot    8. Pain in left foot    9. Major depressive disorder with single episode, in full remission    10. Cardiomyopathy, unspecified type    11. Abdominal aortic atherosclerosis    12. Stenosis of right carotid artery    13. CKD (chronic kidney disease) stage 3, GFR 30-59 ml/min    14. Chronic diastolic heart failure        Plan:       1. Cerebrovascular accident (CVA) due to occlusion of small artery    2. Essential hypertension  Good control with no changes needed  - carvediloL (COREG) 6.25 MG tablet; Take 1 tablet (6.25 mg total) by mouth 2 (two) times daily with meals.  Dispense: 180 tablet; Refill: 3  - amLODIPine (NORVASC) 10 MG tablet; Take 1 tablet (10 mg total) by mouth once daily.  Dispense: 90 tablet; Refill: 3    3. Other hyperlipidemia  Lab Results   Component Value Date    CHOL 229 (H) 02/01/2020    CHOL 196 02/01/2019    CHOL 160 02/26/2018     Lab Results   Component Value Date    HDL 46 02/01/2020    HDL 38 (L) 02/01/2019    HDL 36 (L) 02/26/2018     Lab Results   Component Value Date    LDLCALC 146.6 02/01/2020     LDLCALC 117.0 02/01/2019    LDLCALC 70.6 02/26/2018     Lab Results   Component Value Date    TRIG 182 (H) 02/01/2020    TRIG 205 (H) 02/01/2019    TRIG 267 (H) 02/26/2018     Lab Results   Component Value Date    CHOLHDL 20.1 02/01/2020    CHOLHDL 19.4 (L) 02/01/2019    CHOLHDL 22.5 02/26/2018     Resume Crestor 40 mg daily and recheck lab it recheck in three months    4. Prediabetes  Lab Results   Component Value Date    HGBA1C 5.5 02/01/2020         5. Acute idiopathic gout involving toe of left foot  Asymptomatic currently.  Patient would like to discontinue Colcrys, informed him that he could and use it as a p.r.n. in case he has a gout flare but he elected to remain on it for the time being  - colchicine (COLCRYS) 0.6 mg tablet; Take 1 tablet (0.6 mg total) by mouth once daily.  Dispense: 30 tablet; Refill: 11    6. Hallux limitus of left foot  - colchicine (COLCRYS) 0.6 mg tablet; Take 1 tablet (0.6 mg total) by mouth once daily.  Dispense: 30 tablet; Refill: 11    7. Primary osteoarthritis of left foot  - colchicine (COLCRYS) 0.6 mg tablet; Take 1 tablet (0.6 mg total) by mouth once daily.  Dispense: 30 tablet; Refill: 11    8. Pain in left foot  - colchicine (COLCRYS) 0.6 mg tablet; Take 1 tablet (0.6 mg total) by mouth once daily.  Dispense: 30 tablet; Refill: 11    9. Major depressive disorder with single episode, in full remission  Doing fairly well, continue Wellbutrin  - buPROPion (WELLBUTRIN XL) 150 MG TB24 tablet; Take 1 tablet (150 mg total) by mouth once daily. In the morning  Dispense: 90 tablet; Refill: 1    10. Cardiomyopathy, unspecified type    11. Abdominal aortic atherosclerosis    12. Stenosis of right carotid artery  40%, not bad enough for surgery.  Stay on Crestor to bring down cholesterol levels below an LDL of 70 and remain on Plavix and aspirin    13. CKD (chronic kidney disease) stage 3, GFR 30-59 ml/min  BMP  Lab Results   Component Value Date     02/06/2020    K 3.5  02/06/2020     02/06/2020    CO2 26 02/06/2020    BUN 21 02/06/2020    CREATININE 1.5 (H) 02/06/2020    CALCIUM 9.3 02/06/2020    ANIONGAP 9 02/06/2020    ESTGFRAFRICA 55 (A) 02/06/2020    EGFRNONAA 47 (A) 02/06/2020         14. Chronic diastolic heart failure

## 2020-04-06 ENCOUNTER — PATIENT MESSAGE (OUTPATIENT)
Dept: FAMILY MEDICINE | Facility: CLINIC | Age: 70
End: 2020-04-06

## 2020-04-06 RX ORDER — CLOPIDOGREL BISULFATE 75 MG/1
75 TABLET ORAL DAILY
Qty: 90 TABLET | Refills: 3 | Status: SHIPPED | OUTPATIENT
Start: 2020-04-06 | End: 2021-03-18

## 2020-04-13 ENCOUNTER — HOSPITAL ENCOUNTER (EMERGENCY)
Facility: HOSPITAL | Age: 70
Discharge: HOME | End: 2020-04-14
Attending: FAMILY MEDICINE
Payer: MEDICARE

## 2020-04-13 VITALS
SYSTOLIC BLOOD PRESSURE: 142 MMHG | HEART RATE: 101 BPM | TEMPERATURE: 98 F | DIASTOLIC BLOOD PRESSURE: 97 MMHG | HEIGHT: 65 IN | RESPIRATION RATE: 18 BRPM | OXYGEN SATURATION: 97 % | BODY MASS INDEX: 26.33 KG/M2 | WEIGHT: 158 LBS

## 2020-04-13 DIAGNOSIS — M25.512 ACUTE PAIN OF LEFT SHOULDER: ICD-10-CM

## 2020-04-13 DIAGNOSIS — T14.90XA TRAUMA: ICD-10-CM

## 2020-04-13 DIAGNOSIS — M25.522 LEFT ELBOW PAIN: Primary | ICD-10-CM

## 2020-04-13 PROCEDURE — 73080 X-RAY EXAM OF ELBOW: CPT | Mod: TC,HCWC,FY,RT

## 2020-04-13 PROCEDURE — 73080 XR ELBOW COMPLETE 3 VIEW RIGHT: ICD-10-PCS | Mod: 26,HCWC,RT, | Performed by: RADIOLOGY

## 2020-04-13 PROCEDURE — 73080 X-RAY EXAM OF ELBOW: CPT | Mod: 26,HCWC,RT, | Performed by: RADIOLOGY

## 2020-04-13 PROCEDURE — 99284 EMERGENCY DEPT VISIT MOD MDM: CPT | Mod: 25,HCWC

## 2020-04-14 PROCEDURE — 25000003 PHARM REV CODE 250: Mod: HCWC | Performed by: FAMILY MEDICINE

## 2020-04-14 RX ORDER — KETOROLAC TROMETHAMINE 10 MG/1
10 TABLET, FILM COATED ORAL EVERY 6 HOURS PRN
Qty: 28 TABLET | Refills: 0 | Status: SHIPPED | OUTPATIENT
Start: 2020-04-14 | End: 2023-02-06 | Stop reason: SDUPTHER

## 2020-04-14 RX ORDER — ACETAMINOPHEN 325 MG/1
650 TABLET ORAL
Status: COMPLETED | OUTPATIENT
Start: 2020-04-14 | End: 2020-04-14

## 2020-04-14 RX ORDER — HYDROCODONE BITARTRATE AND ACETAMINOPHEN 7.5; 325 MG/1; MG/1
1 TABLET ORAL EVERY 6 HOURS PRN
Qty: 16 TABLET | Refills: 0 | Status: SHIPPED | OUTPATIENT
Start: 2020-04-14 | End: 2022-11-05

## 2020-04-14 RX ADMIN — ACETAMINOPHEN 650 MG: 325 TABLET ORAL at 12:04

## 2020-04-14 NOTE — DISCHARGE INSTRUCTIONS
Return to ER or follow up with primary care physician if symptoms do not improve after 7-10 days.  You may need a 2nd x-ray for re-evaluation

## 2020-04-14 NOTE — ED PROVIDER NOTES
Encounter Date: 4/13/2020       History     Chief Complaint   Patient presents with    Arm Pain     fall with L elbow pain, reports L leg gave out and fell     Patient comes our facility status post a fall.  The patient has had a recent stroke affecting his left side with partial left hemiparesis.  The patient states his left leg gave out and he fell to his left side, landing on his elbow.  He now has elbow swelling with pain and decreased range of motion.  He does have focal weakness throughout his left upper extremity with some beginnings of flexion contracture.  There is no deformities noted.  He denies any changes to sensation distally.  He is unable to move his left hand due to his stroke.  The patient also describes some left shoulder soreness.        Review of patient's allergies indicates:   Allergen Reactions    Bee pollens Shortness Of Breath     WASP, BEE, ANT AND CATERPILLER STINGS    Hydrochlorothiazide Shortness Of Breath and Rash    Milk containing products Diarrhea    Metoprolol Rash     Past Medical History:   Diagnosis Date    Angina pectoris     Anxiety     Arthritis     Biliary colic     Cochlear implant in place     Deaf     LEFT EAR    Depression     Heart failure     High cholesterol     History of colon polyps 2/20/2018    [place] (hard of hearing)     HEARING AID - RIGHT    Hyperlipidemia     Hypertension     Kidney stone     Kidney stones     Renal stones     Stroke     Trouble in sleeping     Wears glasses      Past Surgical History:   Procedure Laterality Date    [facility]  [facility]     CATARACT EXTRACTION Bilateral     CHOLECYSTECTOMY  2-6-2014    COLONOSCOPY  1/9/2013    Dr. Gillette, 5 year recheck (family history colon cancer)    COLONOSCOPY N/A 3/12/2018    Procedure: COLONOSCOPY;  Surgeon: Garett Go MD;  Location: [facility];  Service: Endoscopy;  Laterality: N/A;    CYSTOSCOPY W/ RETROGRADES Bilateral 10/28/2019    Procedure:  CYSTOSCOPY, WITH RETROGRADE PYELOGRAM;  Surgeon: Gretchen Proctor MD;  Location: [facility] OR;  Service: Urology;  Laterality: Bilateral;    CYSTOSCOPY W/ URETERAL STENT PLACEMENT Left 10/28/2019    Procedure: CYSTOSCOPY, WITH URETERAL STENT INSERTION;  Surgeon: Gretchen Proctor MD;  Location: [facility] OR;  Service: Urology;  Laterality: Left;    CYSTOSCOPY W/ URETERAL STENT REMOVAL Left 12/2/2019    Procedure: CYSTOSCOPY, WITH URETERAL STENT REMOVAL;  Surgeon: Gretchen Proctor MD;  Location: [facility] OR;  Service: Urology;  Laterality: Left;    EAR MASTOIDECTOMY W/ COCHLEAR IMPLANT W/ LANDMARK      left ear    EXTRACORPOREAL SHOCK WAVE LITHOTRIPSY Left 12/2/2019    Procedure: LITHOTRIPSY, ESWL;  Surgeon: Gretchen Proctor MD;  Location: [facility] OR;  Service: Urology;  Laterality: Left;    EYE SURGERY      FOREIGN BODY REMOVAL Right     glass removed from right foot/repair    FRACTURE SURGERY      right arm x2    LITHOTRIPSY      ROTATOR CUFF REPAIR      Right     SINUS SURGERY      TONSILLECTOMY      VASCULAR SURGERY       Family History   Problem Relation Age of Onset    Cancer Mother         colon    Heart disease Father     Gout Father     Kidney disease Father     Arthritis Father     Hypertension Father     Early death Daughter         mva    Alcohol abuse Brother     Cancer Brother         mouth cancer w mets    No Known Problems Sister     Alcohol abuse Brother     No Known Problems Son     Heart disease Maternal Grandmother         MI    Stroke Maternal Grandfather     Heart disease Paternal Grandmother         MI    No Known Problems Son     Cancer Paternal Uncle         lung cancer    Allergic rhinitis Neg Hx     Allergies Neg Hx     Angioedema Neg Hx     Asthma Neg Hx     Atopy Neg Hx     Eczema Neg Hx     Immunodeficiency Neg Hx     Rhinitis Neg Hx     Urticaria Neg Hx     Collagen disease Neg Hx      Social History     Tobacco Use    Smoking status: Never Smoker    Smokeless  tobacco: Never Used   Substance Use Topics    Alcohol use: Yes     Comment: once every two months    Drug use: No     Review of Systems   Constitutional: Negative for chills, fatigue and fever.   HENT: Negative for sore throat.    Eyes: Negative for visual disturbance.   Respiratory: Negative for cough and shortness of breath.    Cardiovascular: Negative for chest pain.   Gastrointestinal: Negative for abdominal pain and nausea.   Genitourinary: Negative for dysuria and flank pain.   Musculoskeletal: Positive for arthralgias and joint swelling. Negative for myalgias and neck pain.   Skin: Negative for color change, pallor and wound.   Neurological: Positive for weakness. Negative for dizziness, seizures, syncope, light-headedness, numbness and headaches.        Weakness is subacute secondary to stroke   Hematological: Negative for adenopathy. Does not bruise/bleed easily.   Psychiatric/Behavioral: Negative for agitation and behavioral problems.       Physical Exam     Initial Vitals [04/13/20 2248]   BP Pulse Resp Temp SpO2   (!) 142/97 101 18 98.2 °F (36.8 °C) 97 %      MAP       --         Physical Exam    Nursing note and vitals reviewed.  Constitutional: He appears well-developed and well-nourished. He is not diaphoretic. No distress.   HENT:   Head: Normocephalic and atraumatic.   Nose: Nose normal.   Mouth/Throat: Oropharynx is clear and moist.   Eyes: Conjunctivae and EOM are normal. Right eye exhibits no discharge. Left eye exhibits no discharge. No scleral icterus.   Neck: Normal range of motion. Neck supple. No thyromegaly present.   Cardiovascular: Normal rate, regular rhythm, normal heart sounds and intact distal pulses.   No murmur heard.  Pulmonary/Chest: Breath sounds normal. No respiratory distress. He has no wheezes. He has no rales.   Abdominal: Soft. Bowel sounds are normal. He exhibits no distension. There is no tenderness. There is no rebound.   Musculoskeletal: He exhibits tenderness. He  exhibits no edema.   Patient has decreased range of motion to his left upper extremity secondary to a stroke, as well as some elbow pain.  His left elbow is swollen over the radial head with some mild ecchymosis.  Patient does have flexion and extension intact passively with minimal discomfort.  Supination and pronation are also intact passively without any significant pain.   Lymphadenopathy:     He has no cervical adenopathy.   Neurological: He is alert and oriented to person, place, and time. No sensory deficit. GCS score is 15. GCS eye subscore is 4. GCS verbal subscore is 5. GCS motor subscore is 6.   Skin: Skin is warm and dry. Capillary refill takes less than 2 seconds. No rash and no abscess noted. No erythema. No pallor.   Psychiatric: He has a normal mood and affect. His behavior is normal. Judgment and thought content normal.         ED Course   Procedures  Labs Reviewed - No data to display       Imaging Results    None       X-Rays:   Independently Interpreted Readings:   Other Readings:  Left elbow as well as left shoulder films show no acute fractures or dislocations.                                    Clinical Impression:       ICD-10-CM ICD-9-CM   1. Left elbow pain M25.522 719.42   2. Trauma T14.90XA 959.9   3. Acute pain of left shoulder M25.512 719.41         Disposition:   Disposition: Discharged  Condition: Stable                        Buddy Meehan MD  04/14/20 0126

## 2020-04-24 ENCOUNTER — OFFICE VISIT (OUTPATIENT)
Dept: FAMILY MEDICINE | Facility: CLINIC | Age: 70
End: 2020-04-24
Payer: MEDICARE

## 2020-04-24 ENCOUNTER — TELEPHONE (OUTPATIENT)
Dept: FAMILY MEDICINE | Facility: CLINIC | Age: 70
End: 2020-04-24

## 2020-04-24 ENCOUNTER — HOSPITAL ENCOUNTER (OUTPATIENT)
Dept: RADIOLOGY | Facility: CLINIC | Age: 70
Discharge: HOME | End: 2020-04-24
Attending: PHYSICIAN ASSISTANT
Payer: MEDICARE

## 2020-04-24 VITALS
HEART RATE: 86 BPM | SYSTOLIC BLOOD PRESSURE: 98 MMHG | BODY MASS INDEX: 22.7 KG/M2 | OXYGEN SATURATION: 98 % | WEIGHT: 136.25 LBS | TEMPERATURE: 99 F | DIASTOLIC BLOOD PRESSURE: 58 MMHG | HEIGHT: 65 IN

## 2020-04-24 DIAGNOSIS — M25.522 ELBOW PAIN, LEFT: ICD-10-CM

## 2020-04-24 DIAGNOSIS — M25.522 ELBOW PAIN, LEFT: Primary | ICD-10-CM

## 2020-04-24 PROCEDURE — 99999 PR PBB SHADOW E&M-EST. PATIENT-LVL V: CPT | Mod: PBBFAC,HCNC,, | Performed by: PHYSICIAN ASSISTANT

## 2020-04-24 PROCEDURE — 1126F PR PAIN SEVERITY QUANTIFIED, NO PAIN PRESENT: ICD-10-PCS | Mod: HCNC,S$GLB,, | Performed by: PHYSICIAN ASSISTANT

## 2020-04-24 PROCEDURE — 99214 OFFICE O/P EST MOD 30 MIN: CPT | Mod: HCNC,S$GLB,, | Performed by: PHYSICIAN ASSISTANT

## 2020-04-24 PROCEDURE — 1100F PR PT FALLS ASSESS DOC 2+ FALLS/FALL W/INJURY/YR: ICD-10-PCS | Mod: HCNC,CPTII,S$GLB, | Performed by: PHYSICIAN ASSISTANT

## 2020-04-24 PROCEDURE — 3078F PR MOST RECENT DIASTOLIC BLOOD PRESSURE < 80 MM HG: ICD-10-PCS | Mod: HCNC,CPTII,S$GLB, | Performed by: PHYSICIAN ASSISTANT

## 2020-04-24 PROCEDURE — 3078F DIAST BP <80 MM HG: CPT | Mod: HCNC,CPTII,S$GLB, | Performed by: PHYSICIAN ASSISTANT

## 2020-04-24 PROCEDURE — 3288F FALL RISK ASSESSMENT DOCD: CPT | Mod: HCNC,CPTII,S$GLB, | Performed by: PHYSICIAN ASSISTANT

## 2020-04-24 PROCEDURE — 1159F PR MEDICATION LIST DOCUMENTED IN MEDICAL RECORD: ICD-10-PCS | Mod: HCNC,S$GLB,, | Performed by: PHYSICIAN ASSISTANT

## 2020-04-24 PROCEDURE — 99214 PR OFFICE/OUTPT VISIT, EST, LEVL IV, 30-39 MIN: ICD-10-PCS | Mod: HCNC,S$GLB,, | Performed by: PHYSICIAN ASSISTANT

## 2020-04-24 PROCEDURE — 73080 XR ELBOW COMPLETE 3 VIEW LEFT: ICD-10-PCS | Mod: 26,HCNC,LT,S$GLB | Performed by: RADIOLOGY

## 2020-04-24 PROCEDURE — 1126F AMNT PAIN NOTED NONE PRSNT: CPT | Mod: HCNC,S$GLB,, | Performed by: PHYSICIAN ASSISTANT

## 2020-04-24 PROCEDURE — 73030 X-RAY EXAM OF SHOULDER: CPT | Mod: 26,HCNC,LT,S$GLB | Performed by: RADIOLOGY

## 2020-04-24 PROCEDURE — 3288F PR FALLS RISK ASSESSMENT DOCUMENTED: ICD-10-PCS | Mod: HCNC,CPTII,S$GLB, | Performed by: PHYSICIAN ASSISTANT

## 2020-04-24 PROCEDURE — 3074F PR MOST RECENT SYSTOLIC BLOOD PRESSURE < 130 MM HG: ICD-10-PCS | Mod: HCNC,CPTII,S$GLB, | Performed by: PHYSICIAN ASSISTANT

## 2020-04-24 PROCEDURE — 73080 X-RAY EXAM OF ELBOW: CPT | Mod: TC,HCNC,FY,PO,LT

## 2020-04-24 PROCEDURE — 73030 X-RAY EXAM OF SHOULDER: CPT | Mod: TC,HCNC,FY,PO,LT

## 2020-04-24 PROCEDURE — 99999 PR PBB SHADOW E&M-EST. PATIENT-LVL V: ICD-10-PCS | Mod: PBBFAC,HCNC,, | Performed by: PHYSICIAN ASSISTANT

## 2020-04-24 PROCEDURE — 1159F MED LIST DOCD IN RCRD: CPT | Mod: HCNC,S$GLB,, | Performed by: PHYSICIAN ASSISTANT

## 2020-04-24 PROCEDURE — 3074F SYST BP LT 130 MM HG: CPT | Mod: HCNC,CPTII,S$GLB, | Performed by: PHYSICIAN ASSISTANT

## 2020-04-24 PROCEDURE — 1100F PTFALLS ASSESS-DOCD GE2>/YR: CPT | Mod: HCNC,CPTII,S$GLB, | Performed by: PHYSICIAN ASSISTANT

## 2020-04-24 PROCEDURE — 73080 X-RAY EXAM OF ELBOW: CPT | Mod: 26,HCNC,LT,S$GLB | Performed by: RADIOLOGY

## 2020-04-24 PROCEDURE — 73030 XR SHOULDER TRAUMA 3 VIEW LEFT: ICD-10-PCS | Mod: 26,HCNC,LT,S$GLB | Performed by: RADIOLOGY

## 2020-04-24 NOTE — TELEPHONE ENCOUNTER
----- Message from Joanna Pantoja sent at 4/24/2020  1:21 PM CDT -----  Type: Needs Medical Advice  Who Called:  patient  Best Call Back Number: [phone]  Additional Information: patient will be arriving 5-10 minutes for his 1:20 appt. Please give call back. Skype/ called to pod no answer

## 2020-04-24 NOTE — PROGRESS NOTES
Subjective:       Patient ID: Buddy Park is a 69 y.o. male.    Chief Complaint: Shoulder Pain     The patient has had a recent stroke affecting his left side with partial left hemiparesis.  The patient states his left leg gave out and he fell to his left side, landing on his elbow.  He now has elbow swelling with pain and decreased range of motion.  He does have focal weakness throughout his left upper extremity with some beginnings of flexion contracture.  There is no deformities noted.  He denies any changes to sensation distally.  He is unable to move his left hand due to his stroke.  The patient also describes some left shoulder soreness.  He was seen at ER for same and he is today with persist ant symptoms of pain and swelling although minimally improved.  This injury is hindering his stroke rehab.   Patients patient medical/surgical, social and family histories have been reviewed .     Elbow Injury   This is a new problem. The current episode started 1 to 4 weeks ago. The problem occurs constantly. The problem has been unchanged. Associated symptoms include arthralgias and joint swelling. Pertinent negatives include no fever. Exacerbated by: bumping the area, sleeping. He has tried acetaminophen and ice for the symptoms. The treatment provided mild relief.     Review of Systems   Constitutional: Negative for fever.   Musculoskeletal: Positive for arthralgias and joint swelling.       Objective:      Physical Exam   Constitutional: He appears well-developed and well-nourished. No distress.   Musculoskeletal:        Left shoulder: He exhibits decreased range of motion and decreased strength. He exhibits no tenderness.        Left elbow: He exhibits decreased range of motion and swelling. Tenderness found.        Left wrist: He exhibits decreased range of motion and swelling.        Left forearm: He exhibits swelling. He exhibits no tenderness and no deformity.        Left hand: He exhibits decreased range  of motion and swelling. He exhibits normal capillary refill. Decreased strength noted.   There is moderate swelling to the left hand, forearm and elbow  Mild tenderness to palpation of the elbow with healing abrasions mild erythema but no warmth    Vitals reviewed.      Assessment:       1. Elbow pain, left        Plan:       Buddy was seen today for shoulder pain.    Diagnoses and all orders for this visit:    Elbow pain, left  -     -     Ambulatory referral/consult to Orthopedics; Future  -     X-Ray Elbow Complete Left; Future  -     X-Ray Shoulder Trauma 3 view Left; Future  Repeat xrays today due to persistent symptoms        continue ice and tylenol

## 2020-04-24 NOTE — TELEPHONE ENCOUNTER
----- Message from Lindsey Montemayor MA sent at 4/24/2020  9:24 AM CDT -----  Contact: pt   Left arm still swelling and painful   Call back  [phone]

## 2020-05-05 ENCOUNTER — PATIENT MESSAGE (OUTPATIENT)
Dept: FAMILY MEDICINE | Facility: CLINIC | Age: 70
End: 2020-05-05

## 2020-05-05 ENCOUNTER — PATIENT MESSAGE (OUTPATIENT)
Dept: ADMINISTRATIVE | Facility: HOSPITAL | Age: 70
End: 2020-05-05

## 2020-05-07 ENCOUNTER — TELEPHONE (OUTPATIENT)
Dept: FAMILY MEDICINE | Facility: CLINIC | Age: 70
End: 2020-05-07

## 2020-05-07 NOTE — TELEPHONE ENCOUNTER
----- Message from Lg Ryan sent at 5/7/2020 11:21 AM CDT -----  Contact: wife, vani  Patient want to know if you can fax patient last visit summary please fax to [phone], any question please call back at [phone] (home) [phone] (work)      Case number [id number]

## 2020-05-11 ENCOUNTER — TELEPHONE (OUTPATIENT)
Dept: FAMILY MEDICINE | Facility: CLINIC | Age: 70
End: 2020-05-11

## 2020-05-11 NOTE — TELEPHONE ENCOUNTER
----- Message from Monie Rutledge MA sent at 5/11/2020  1:52 PM CDT -----  Contact: [facility]   [facility] calling to inform office that patient  started care on Friday with homehealth ,  PT and Ot will start today 05/11    FYI per [facility] if Dr. Chatman have virtual health appt , nurse can coordinate with      Please call if question [facility]  [phone]

## 2020-05-20 ENCOUNTER — OFFICE VISIT (OUTPATIENT)
Dept: ORTHOPEDICS | Facility: CLINIC | Age: 70
End: 2020-05-20
Payer: MEDICARE

## 2020-05-20 VITALS
BODY MASS INDEX: 22.7 KG/M2 | WEIGHT: 136.25 LBS | HEIGHT: 65 IN | HEART RATE: 78 BPM | OXYGEN SATURATION: 98 % | TEMPERATURE: 99 F

## 2020-05-20 DIAGNOSIS — M25.522 ELBOW PAIN, LEFT: ICD-10-CM

## 2020-05-20 DIAGNOSIS — S46.012A TRAUMATIC COMPLETE TEAR OF LEFT ROTATOR CUFF, INITIAL ENCOUNTER: Primary | ICD-10-CM

## 2020-05-20 DIAGNOSIS — M75.02 ADHESIVE CAPSULITIS OF LEFT SHOULDER: ICD-10-CM

## 2020-05-20 DIAGNOSIS — M79.2 NEURALGIA OF LEFT UPPER EXTREMITY: ICD-10-CM

## 2020-05-20 PROCEDURE — 99999 PR PBB SHADOW E&M-EST. PATIENT-LVL IV: ICD-10-PCS | Mod: PBBFAC,,, | Performed by: ORTHOPAEDIC SURGERY

## 2020-05-20 PROCEDURE — 20610 LARGE JOINT ASPIRATION/INJECTION: L GLENOHUMERAL: ICD-10-PCS | Mod: S$PBB,LT,, | Performed by: ORTHOPAEDIC SURGERY

## 2020-05-20 PROCEDURE — 20610 DRAIN/INJ JOINT/BURSA W/O US: CPT | Mod: PBBFAC,PN | Performed by: ORTHOPAEDIC SURGERY

## 2020-05-20 PROCEDURE — 99204 PR OFFICE/OUTPT VISIT, NEW, LEVL IV, 45-59 MIN: ICD-10-PCS | Mod: 25,S$PBB,, | Performed by: ORTHOPAEDIC SURGERY

## 2020-05-20 PROCEDURE — 99999 PR PBB SHADOW E&M-EST. PATIENT-LVL IV: CPT | Mod: PBBFAC,,, | Performed by: ORTHOPAEDIC SURGERY

## 2020-05-20 PROCEDURE — 99204 OFFICE O/P NEW MOD 45 MIN: CPT | Mod: 25,S$PBB,, | Performed by: ORTHOPAEDIC SURGERY

## 2020-05-20 RX ADMIN — TRIAMCINOLONE ACETONIDE 40 MG: 40 INJECTION, SUSPENSION INTRA-ARTICULAR; INTRAMUSCULAR at 11:05

## 2020-05-20 NOTE — LETTER
May 21, 2020      CHAD Preston  [address] [address]           [facility]  [facility]  [address] [place] [address], [address]  [place] [address]  Phone: [phone]  Fax: [phone]          Patient: Buddy Park   MR Number: [id number]   YOB: 1950   Date of Visit: 5/20/2020       Dear Danelle Vanessa:    Thank you for referring Buddy Park to me for evaluation. Attached you will find relevant portions of my assessment and plan of care.    If you have questions, please do not hesitate to call me. I look forward to following Buddy Park along with you.    Sincerely,    Macario Broussard, DO    Enclosure  CC:  No Recipients    If you would like to receive this communication electronically, please contact [email or web address] or [phone] to request more information on [employer] access.    For providers and/or their staff who would like to refer a patient to [facility], please contact us through our one-stop-shop provider referral line, [facility], at [phone].    If you feel you have received this communication in error or would no longer like to receive these types of communications, please e-mail [email or web address]

## 2020-05-21 PROBLEM — M75.02 ADHESIVE CAPSULITIS OF LEFT SHOULDER: Status: ACTIVE | Noted: 2020-05-21

## 2020-05-21 PROBLEM — S46.012A TRAUMATIC COMPLETE TEAR OF LEFT ROTATOR CUFF: Status: ACTIVE | Noted: 2020-05-21

## 2020-05-21 PROBLEM — M79.2 NEURALGIA OF LEFT UPPER EXTREMITY: Status: ACTIVE | Noted: 2020-05-21

## 2020-05-21 RX ORDER — TRIAMCINOLONE ACETONIDE 40 MG/ML
40 INJECTION, SUSPENSION INTRA-ARTICULAR; INTRAMUSCULAR
Status: DISCONTINUED | OUTPATIENT
Start: 2020-05-20 | End: 2020-05-21 | Stop reason: HOSPADM

## 2020-05-21 NOTE — PROGRESS NOTES
Subjective:      Patient ID: Buddy Park is a 69 y.o. male.    Chief Complaint: Pain of the Left Elbow    Referring Provider: Pipo Preston  [address] [address]    HPI:  Mr. Park is a 69 left hand dominant male who presented today for evaluation of 1 and half months of left shoulder/arm pain which began on 04/13/2020 after he fell 1 his leg collapsed falling onto his elbow and shoulder.  He stated he no longer has elbow issues, but his shoulder is still causing him problems.  Forward flexion in abduction increases symptoms while rest improves them.  His symptoms awaken him at night.  He had a stroke on 02/01/2020 which he is now an incomplete hemiplegia on the left side primarily is lost motor but has maintained sensation.  His left lower extremity regained his strength but his left upper extremity did not.  He has taken NSAIDs with help.  He has not done physical therapy nor had injections.    Past Medical History:   Diagnosis Date    Angina pectoris     Anxiety     Arthritis     Biliary colic     Cochlear implant in place     Deaf left ear    * Stroke    Depression     Heart failure     High cholesterol     History of colon polyps 2/20/2018    [place] (hard of hearing)     HEARING AID - RIGHT    Hyperlipidemia     Hypertension     Kidney stone     Kidney stones     Renal stones     Stroke     Trouble in sleeping      *  *  * Wears glasses  Diarrhea  Gout  Chronic anticoagulation      Past Surgical History:   Procedure Laterality Date    CAROTID [facility]OP[facility]  06/2018    [facility]     CATARACT EXTRACTION Bilateral     CHOLECYSTECTOMY  2-6-2014    COLONOSCOPY  1/9/2013    Dr. Gillette, 5 year recheck (family history colon cancer)    COLONOSCOPY N/A 3/12/2018    Procedure: COLONOSCOPY;  Surgeon: Garett Go MD;  Location: [facility];  Service: Endoscopy;  Laterality: N/A;    CYSTOSCOPY W/ RETROGRADES Bilateral 10/28/2019    Procedure: CYSTOSCOPY, WITH  RETROGRADE PYELOGRAM;  Surgeon: Gretchen Proctor MD;  Location: [facility] OR;  Service: Urology;  Laterality: Bilateral;    CYSTOSCOPY W/ URETERAL STENT PLACEMENT Left 10/28/2019    Procedure: CYSTOSCOPY, WITH URETERAL STENT INSERTION;  Surgeon: Gretchen Proctor MD;  Location: [facility] OR;  Service: Urology;  Laterality: Left;    CYSTOSCOPY W/ URETERAL STENT REMOVAL Left 12/2/2019    Procedure: CYSTOSCOPY, WITH URETERAL STENT REMOVAL;  Surgeon: Gretchen Proctor MD;  Location: [facility] OR;  Service: Urology;  Laterality: Left;    EAR MASTOIDECTOMY W/ COCHLEAR IMPLANT W/ LANDMARK      left ear    EXTRACORPOREAL SHOCK WAVE LITHOTRIPSY Left 12/2/2019    Procedure: LITHOTRIPSY, ESWL;  Surgeon: Gretchen Proctor MD;  Location: [facility] OR;  Service: Urology;  Laterality: Left;    EYE SURGERY bilateral cataract      FOREIGN BODY REMOVAL Right     glass removed from right foot/repair    FRACTURE SURGERY      right arm x2    LITHOTRIPSY      ROTATOR CUFF REPAIR right     * VASECTOMY    SINUS SURGERY      TONSILLECTOMY      VASCULAR SURGERY LEFT CAROTID ENDARTERECTOMY         Review of patient's allergies indicates:   Allergen Reactions    Bee pollens Shortness Of Breath     WASP, BEE, ANT AND CATERPILLER STINGS    Hydrochlorothiazide Shortness Of Breath and Rash    Milk containing products Diarrhea    Metoprolol Rash       Social History     Occupational History    Retired contractor   Tobacco Use    Smoking status: Never Smoker    Smokeless tobacco: Never Used   Substance and Sexual Activity    Alcohol use: Yes     Comment: once every two months    Drug use: No    Sexual activity: Yes      Family History   Problem Relation Age of Onset    Cancer Mother         colon    Heart disease Father     Gout Father     Kidney disease Father     Arthritis Father     Hypertension Father     Early death Daughter         mva    Alcohol abuse Brother     Cancer Brother         mouth cancer w mets    No Known Problems Sister      Alcohol abuse Brother     No Known Problems Son     Heart disease Maternal Grandmother         MI    Stroke Maternal Grandfather     Heart disease Paternal Grandmother         MI    No Known Problems Son     Cancer Paternal Uncle         lung cancer    Allergic rhinitis Neg Hx     Allergies Neg Hx     Angioedema Neg Hx     Asthma Neg Hx     Atopy Neg Hx     Eczema Neg Hx     Immunodeficiency Neg Hx     Rhinitis Neg Hx     Urticaria Neg Hx     Collagen disease Neg Hx        Previous Hospitalizations:  Stroke     ROS:   Review of Systems   Constitution: Negative for chills and fever.   HENT: Negative for congestion.    Eyes: Negative for blurred vision.   Cardiovascular: Negative for chest pain.   Respiratory: Negative for cough.    Endocrine: Negative for polydipsia.   Hematologic/Lymphatic: Negative for adenopathy.   Skin: Negative for flushing and itching.   Musculoskeletal: Positive for gout and joint pain.   Gastrointestinal: Positive for diarrhea. Negative for constipation and heartburn.   Genitourinary: Negative for nocturia.   Neurological: Negative for headaches and seizures.   Psychiatric/Behavioral: Negative for depression and substance abuse. The patient is nervous/anxious.    Allergic/Immunologic: Negative for environmental allergies.           Objective:      Physical Exam:   General: AAOx3.  No acute distress  HEENT: Normocephalic, PEARLA EOMI, Good Dentition.  Slight left facial droop  Neck: Supple, No JVD  Chest: Symetric, equal excursion on inspiration  Abdomen: Soft NTND  Vascular:  Pulses intact and equal bilaterally.  Capillary refill less than 3 seconds and equal bilaterally  Neurologic:  Pinprick and soft touch intact and equal bilaterally.  Motor absent left upper extremity  Integment:  No ecchymosis, no errythema  Extremity:  Shoulder:  Active forward flexion/abduction left shoulder patient does not have motor passive forward flexion/abduction 0/89°, right shoulder 0/175  degrees.  Positive drop-arm left upper extremity.  Active internal rotation absent due to flaccid arm, passive motion L3, right intact T10.  Patient unable to do lift-off with left upper extremity.  Right lift-off intact.  External rotation left passive 0/5 degrees right 0/20 degrees.  Full can positive left shoulder.  Empty can positive left shoulder.  Pina/Neer positive left shoulder.  Cross-arm negative both shoulders.  Mild tenderness in the bicipital groove left shoulder.  Nontender over the AC joint bilaterally.  Josey's patient unable to perform due to flaccid arm.  Apprehension/relocation mildly positive left upper extremity.                      Elbow:  Passive pronation/supination 90/90 degrees left elbow.  Active pronation/supination right upper extremity 90/90 degrees.  Extension/flexion left elbow passive 0/128 degrees.  Right elbow active 0/128 degrees.  Nontender with elbow motion.  Nontender over the medial or lateral epicondyles both elbows.  Nontender over the radial head bilaterally.  Nontender over the olecranon bilaterally.  Nontender at the triceps insertion bilaterally.  Triceps palpable throughout full distribution bilaterally.  Radiography:  Personally reviewed x-rays of the left shoulder completed on 04/24/2020 was consistent with inferior subluxation of the humeral head in the glenoid AC arthritis glenohumeral arthritis and a type 3 acromion x-rays of the left elbow completed on 04/24/2020 showed lateral epicondyle spurring and radial humeral arthritis      Assessment:       Impression:      1. Adhesive capsulitis of left shoulder    2. Resolved left elbow pain   3. Traumatic complete tear of left rotator cuff, initial encounter    4. Neuralgia of left upper extremity          Plan:       1.  Discussed physical examination and radiographic findings with the patient. Buddy understands that he has what appears to be a rotator cuff tear but also appears to be having neuralgia secondary  to his stroke.  Discussed with the patient that there are multiple treatment options he has not completed conservative management recommend he trial conservative care before considering surgical intervention.  He also understands that much of his pain may be associated with the post stroke neuralgia.  Some of his pain may also be associated with arthrofibrosis since he has not been moving his shoulder he has become stiff which can cause significant pain  2.  Offered a steroid injection to the left shoulder, he elected to proceed.  3.  Refer to the physical/occupational therapy to start motion exercises for the patient's shoulder/arthrofibrosis.  4.  Home exercises were discussed with the patient to include shoulder motion exercises.  5.  Refer to pain management.  6.  Patient is already seen a neurologist for treatment of his post stroke issues  7.  [facility] portal was discussed with the patient and information was given.  The patient was encouraged to use the portal for future encounters.    8.  Follow up p.r.n..

## 2020-05-21 NOTE — PROCEDURES
Large Joint Aspiration/Injection: L glenohumeral  Date/Time: 5/20/2020 11:00 AM  Performed by: Macario Broussard DO  Authorized by: Macario Broussard, DO     Consent Done?:  Yes (Verbal)  Indications:  Diagnostic evaluation and pain  Site marked: the procedure site was marked    Timeout: prior to procedure the correct patient, procedure, and site was verified    Prep: patient was prepped and draped in usual sterile fashion      Details:  Needle Size:  22 G  Ultrasonic Guidance for needle placement?: No    Approach:  Posterior  Location:  Shoulder  Site:  L glenohumeral  Medications:  40 mg triamcinolone acetonide 40 mg/mL  Patient tolerance:  Patient tolerated the procedure well with no immediate complications

## 2020-05-22 ENCOUNTER — TELEPHONE (OUTPATIENT)
Dept: ORTHOPEDICS | Facility: CLINIC | Age: 70
End: 2020-05-22

## 2020-05-28 ENCOUNTER — TELEPHONE (OUTPATIENT)
Dept: FAMILY MEDICINE | Facility: CLINIC | Age: 70
End: 2020-05-28

## 2020-05-28 NOTE — TELEPHONE ENCOUNTER
Randa stated that she has some paperwork that needs to be signed. She stated she is going to send it into the office for us to get signed.

## 2020-05-28 NOTE — TELEPHONE ENCOUNTER
----- Message from Yulisa Francis sent at 5/28/2020  1:46 PM CDT -----  Contact: Randa vasques/  Medical  Type: Needs Medical Advice  Who Called:  Randa  Best Call Back Number: [phone]  Ext [phone]  Additional Information: Pls call Randa regarding status of chart note request/PT eval. For wheel chair for pt

## 2020-06-02 ENCOUNTER — TELEPHONE (OUTPATIENT)
Dept: FAMILY MEDICINE | Facility: CLINIC | Age: 70
End: 2020-06-02

## 2020-06-02 NOTE — TELEPHONE ENCOUNTER
----- Message from Leonila Mitchell sent at 6/2/2020  9:09 AM CDT -----  Contact: Nurse   Type:  Sooner Apoointment Request    Caller is requesting a sooner appointment.  Caller declined first available appointment listed below.  Caller will not accept being placed on the waitlist and is requesting a message be sent to doctor.    Name of Caller:  Nurse  When is the first available appointment?  7/8   Symptoms: Being discharged this Friday 6/5 for stroke needs follow up within 5 days  Best Call Back Number:    Additional Information:  Please advise-thank you

## 2020-06-02 NOTE — TELEPHONE ENCOUNTER
Patient in rehab in [place]-appt made 6/16 at 10:00-patient advised to bring al meds to visit.

## 2020-06-12 ENCOUNTER — DOCUMENTATION ONLY (OUTPATIENT)
Dept: FAMILY MEDICINE | Facility: CLINIC | Age: 70
End: 2020-06-12

## 2020-06-12 ENCOUNTER — TELEPHONE (OUTPATIENT)
Dept: FAMILY MEDICINE | Facility: CLINIC | Age: 70
End: 2020-06-12

## 2020-06-12 NOTE — PROGRESS NOTES
Pre-Visit Chart Review  For Appointment Scheduled on 6-16-20    Health Maintenance Due   Topic Date Due    Pneumococcal Vaccine (65+ High/Highest Risk) (1 of 2 - PCV13) 12/16/2017

## 2020-06-12 NOTE — TELEPHONE ENCOUNTER
Randa with [facility] called, requesting chart note on 04/24/20 to be addended reflecting that the patient is in need of a wheelchair.     Please fax to [phone]. Please call Randa if any questions. Thanks!

## 2020-06-12 NOTE — TELEPHONE ENCOUNTER
----- Message from Katherin Parks sent at 6/12/2020 11:02 AM CDT -----  Contact: Randa with [facility]  Type: Needs Medical Advice  Who Called:  Randa with [facility]  Symptoms (please be specific):    How long has patient had these symptoms:    Pharmacy name and phone #:    Best Call Back Number: [phone] ext [phone]  Additional Information: Randa is calling regarding the chart notes 04/24/20 relating to get patient's wheelchair request. She is asking if the notes can be amended to reflect the patient is in need of a wheel chair and they also need to be signed. Please fax to [phone]. Please call Randa if any questions. Thanks!

## 2020-06-16 ENCOUNTER — OFFICE VISIT (OUTPATIENT)
Dept: FAMILY MEDICINE | Facility: CLINIC | Age: 70
End: 2020-06-16
Attending: FAMILY MEDICINE
Payer: MEDICARE

## 2020-06-16 VITALS
DIASTOLIC BLOOD PRESSURE: 50 MMHG | HEIGHT: 65 IN | TEMPERATURE: 98 F | BODY MASS INDEX: 24.91 KG/M2 | WEIGHT: 149.5 LBS | SYSTOLIC BLOOD PRESSURE: 84 MMHG | RESPIRATION RATE: 16 BRPM | OXYGEN SATURATION: 98 % | HEART RATE: 77 BPM

## 2020-06-16 DIAGNOSIS — I63.9 CEREBROVASCULAR ACCIDENT (CVA), UNSPECIFIED MECHANISM: Primary | ICD-10-CM

## 2020-06-16 DIAGNOSIS — I10 ESSENTIAL HYPERTENSION: ICD-10-CM

## 2020-06-16 PROCEDURE — 99213 OFFICE O/P EST LOW 20 MIN: CPT | Mod: S$PBB,,, | Performed by: FAMILY MEDICINE

## 2020-06-16 PROCEDURE — 99999 PR PBB SHADOW E&M-EST. PATIENT-LVL V: CPT | Mod: PBBFAC,,, | Performed by: FAMILY MEDICINE

## 2020-06-16 PROCEDURE — 99215 OFFICE O/P EST HI 40 MIN: CPT | Mod: PBBFAC,PO | Performed by: FAMILY MEDICINE

## 2020-06-16 PROCEDURE — 99213 PR OFFICE/OUTPT VISIT, EST, LEVL III, 20-29 MIN: ICD-10-PCS | Mod: S$PBB,,, | Performed by: FAMILY MEDICINE

## 2020-06-16 PROCEDURE — 99999 PR PBB SHADOW E&M-EST. PATIENT-LVL V: ICD-10-PCS | Mod: PBBFAC,,, | Performed by: FAMILY MEDICINE

## 2020-06-16 RX ORDER — CARVEDILOL 12.5 MG/1
12.5 TABLET ORAL DAILY
COMMUNITY
Start: 2020-06-04 | End: 2020-08-26 | Stop reason: SDUPTHER

## 2020-06-16 RX ORDER — CHOLECALCIFEROL (VITAMIN D3) 25 MCG
TABLET ORAL
COMMUNITY
Start: 2020-06-04 | End: 2020-06-16

## 2020-06-16 RX ORDER — CALCIUM CARBONATE 600 MG
TABLET ORAL
COMMUNITY
Start: 2020-06-04 | End: 2020-06-16

## 2020-06-16 RX ORDER — CHOLESTYRAMINE 4 G/4.8G
4 POWDER, FOR SUSPENSION ORAL
COMMUNITY
Start: 2020-06-04 | End: 2020-11-30

## 2020-06-16 RX ORDER — ATORVASTATIN CALCIUM 40 MG/1
80 TABLET, FILM COATED ORAL NIGHTLY
COMMUNITY
Start: 2020-06-04 | End: 2020-07-06 | Stop reason: SDUPTHER

## 2020-06-16 RX ORDER — AMLODIPINE BESYLATE 10 MG/1
5 TABLET ORAL DAILY
Qty: 90 TABLET | Refills: 3
Start: 2020-06-16 | End: 2020-12-03

## 2020-06-16 NOTE — TELEPHONE ENCOUNTER
Patient was seen on that date for a elbow issue   He is seeing PCP today for hospital follow up   Can order for wheelchair  be addressed during that visit today?

## 2020-06-16 NOTE — PROGRESS NOTES
Subjective:       Patient ID: Buddy Park is a 69 y.o. male.    Chief Complaint: Hospital Follow Up    69-year-old male coming in for unclear reasons labeled as a hospital follow-up that occurred back in February.  The patient had an acute right-sided CVA with left-sided hemiparesis and persistent decrease use of the left hand.  Supposedly he needs to see a neurologist and has not done so since leaving the hospital.  As he is leaving after a referral was made he lets me know that he is going to a rehab unit in [facility] run by a doctor basia who is a neurologist and he is seeing him monthly.  We were told that he needs a wheelchair but he has one we were told and he needs a walker but he has one.  There is no clear reason for the patient to be here today.  His weight is down 9 lb since I last saw him in April and his blood pressure is running low.  He describes feeling fatigued and run down which may be due to the hypotension.  His stroke occurred on or about February 1st and followed a period of time when he was not taking his antihypertensives.  He has a previous CEA and did have a follow-up ultrasound of carotids as well as a CT a of the brain and neck while in the hospital following his stroke.  He had some 40% stenosis on the non operated side that did not require further intervention other than risk modification.  His history also includes anxiety and depression, hypertension, hyperlipidemia, cardiomyopathy with CHF, kidney stones and he is hard of hearing status post mastoidectomy with a cochlear implant.  His main complaint is persistent flaccid left upper extremity and he is working with occupational therapy and physical therapy.    Past Medical History:  No date: Angina pectoris  No date: Anxiety  No date: Arthritis  No date: Biliary colic  No date: Cochlear implant in place  No date: Deaf      Comment:  LEFT EAR  No date: Depression  No date: Heart failure  No date: High  cholesterol  2/20/2018: History of colon polyps  No date: [place] (hard of hearing)      Comment:  HEARING AID - RIGHT  No date: Hyperlipidemia  No date: Hypertension  No date: Kidney stone  No date: Kidney stones  No date: Renal stones  No date: Stroke  No date: Trouble in sleeping  No date: Wears glasses    Past Surgical History:  06/2018: CAROTID ARTERY ANGIOPLASTY      Comment:  [facility]   No date: CATARACT EXTRACTION; Bilateral  2-6-2014: CHOLECYSTECTOMY  1/9/2013: COLONOSCOPY      Comment:  Dr. Gillette, 5 year recheck (family history colon                cancer)  3/12/2018: COLONOSCOPY; N/A      Comment:  Procedure: COLONOSCOPY;  Surgeon: Garett Go MD;                Location: [facility];  Service: Endoscopy;  Laterality:                N/A;  10/28/2019: CYSTOSCOPY W/ RETROGRADES; Bilateral      Comment:  Procedure: CYSTOSCOPY, WITH RETROGRADE PYELOGRAM;                 Surgeon: Gretchen Proctor MD;  Location[facility];                 Service: Urology;  Laterality: Bilateral;  10/28/2019: CYSTOSCOPY W/ URETERAL STENT PLACEMENT; Left      Comment:  Procedure: CYSTOSCOPY, WITH URETERAL STENT INSERTION;                 Surgeon: Gretchen Proctor MD;  Location: [facility];                 Service: Urology;  Laterality: Left;  12/2/2019: CYSTOSCOPY W/ URETERAL STENT REMOVAL; Left      Comment:  Procedure: CYSTOSCOPY, WITH URETERAL STENT REMOVAL;                 Surgeon: Gretchen Proctor MD;  Location: [facility] OR;                 Service: Urology;  Laterality: Left;  No date: EAR MASTOIDECTOMY W/ COCHLEAR IMPLANT W/ LANDMARK      Comment:  left ear  12/2/2019: EXTRACORPOREAL SHOCK WAVE LITHOTRIPSY; Left      Comment:  Procedure: LITHOTRIPSY, ESWL;  Surgeon: Gretchen Proctor MD;  Location: [facility] OR;  Service: Urology;  Laterality:                Left;  No date: EYE SURGERY  No date: FOREIGN BODY REMOVAL; Right      Comment:  glass removed from right foot/repair  No date: FRACTURE SURGERY      Comment:  right arm  x2  No date: LITHOTRIPSY  No date: ROTATOR CUFF REPAIR      Comment:  Right   No date: SINUS SURGERY  No date: TONSILLECTOMY  No date: VASCULAR SURGERY    Current Outpatient Medications on File Prior to Visit:  aspirin (ECOTRIN) 81 MG EC tablet, Take 81 mg by mouth once daily., Disp: , Rfl:   atorvastatin (LIPITOR) 40 MG tablet, Take 80 mg by mouth every evening. , Disp: , Rfl:   b complex vitamins tablet, Take 1 tablet by mouth once daily., Disp: , Rfl:   buPROPion (WELLBUTRIN XL) 150 MG TB24 tablet, Take 1 tablet (150 mg total) by mouth once daily. In the morning, Disp: 90 tablet, Rfl: 1  carvediloL (COREG) 12.5 MG tablet, Take 12.5 mg by mouth once daily. , Disp: , Rfl:   cholestyramine-aspartame (QUESTRAN LIGHT) 4 gram PwPk, 4 g., Disp: , Rfl:   clopidogreL (PLAVIX) 75 mg tablet, Take 1 tablet (75 mg total) by mouth once daily., Disp: 90 tablet, Rfl: 3  colchicine (COLCRYS) 0.6 mg tablet, Take 1 tablet (0.6 mg total) by mouth once daily., Disp: 30 tablet, Rfl: 11  HYDROcodone-acetaminophen (NORCO) 7.5-325 mg per tablet, Take 1 tablet by mouth every 6 (six) hours as needed for Pain (breakthough pain)., Disp: 16 tablet, Rfl: 0  ketorolac (TORADOL) 10 mg tablet, Take 1 tablet (10 mg total) by mouth every 6 (six) hours as needed for Pain., Disp: 28 tablet, Rfl: 0  metFORMIN (GLUCOPHAGE) 500 MG tablet, TAKE 1 TABLET BY MOUTH TWICE DAILY WITH MEALS (Patient taking differently: Take 500 mg by mouth daily with breakfast. ), Disp: 90 tablet, Rfl: 0  nitroGLYCERIN (NITROSTAT) 0.4 MG SL tablet, Place 1 tablet (0.4 mg total) under the tongue every 5 (five) minutes as needed for Chest pain., Disp: 25 tablet, Rfl: 11  traMADoL (ULTRAM) 50 mg tablet, Take 1 tablet (50 mg total) by mouth every 6 (six) hours as needed for Pain., Disp: 40 tablet, Rfl: 2  (DISCONTINUED) amLODIPine (NORVASC) 10 MG tablet, Take 1 tablet (10 mg total) by mouth once daily., Disp: 90 tablet, Rfl: 3  (DISCONTINUED) calcium carbonate (OS-SUJEY) 600 mg  calcium (1,500 mg) Tab, , Disp: , Rfl:   (DISCONTINUED) carvediloL (COREG) 6.25 MG tablet, Take 1 tablet (6.25 mg total) by mouth 2 (two) times daily with meals. (Patient not taking: Reported on 6/16/2020), Disp: 180 tablet, Rfl: 3  (DISCONTINUED) mecobal/levomefolat Ca/B6 phos (METANX ORAL), , Disp: , Rfl:   (DISCONTINUED) rosuvastatin (CRESTOR) 40 MG Tab, Take 1 tablet (40 mg total) by mouth once daily. (Patient not taking: Reported on 6/16/2020), Disp: 90 tablet, Rfl: 3  (DISCONTINUED) VITAMIN B COMPLEX-FOLIC ACID ORAL, , Disp: , Rfl:   (DISCONTINUED) vitamin D (VITAMIN D3) 1000 units Tab, , Disp: , Rfl:     Current Facility-Administered Medications on File Prior to Visit:  lactated ringers infusion, , Intravenous, Continuous, Demario Yates MD, Last Rate: 0 mL/hr at 10/28/19 1405          Review of Systems   Constitutional: Positive for fatigue. Negative for chills and fever.   Respiratory: Negative for chest tightness and shortness of breath.    Cardiovascular: Negative for chest pain and palpitations.   Gastrointestinal: Negative for nausea and vomiting.   Neurological: Positive for weakness. Negative for dizziness, facial asymmetry and headaches.       Objective:      Physical Exam  Vitals signs and nursing note reviewed.   Constitutional:       General: He is not in acute distress.     Appearance: Normal appearance. He is normal weight.      Comments: Hypotensive  Normal weight with a BMI of 24.9 he is down 9 lb from his April 3, 2020 visit with me   Neurological:      Mental Status: He is alert.      Comments: Flaccid left upper extremity   Psychiatric:         Mood and Affect: Mood normal.         Behavior: Behavior normal.         Thought Content: Thought content normal.         Judgment: Judgment normal.         Assessment:       1. Cerebrovascular accident (CVA), unspecified mechanism    2. Essential hypertension        Plan:       1. Cerebrovascular accident (CVA), unspecified mechanism  Receiving  physical therapy and occupational therapy, has a wheelchair and walker and states he walks a mi a day.  He is under the care of a neurologist in [place] I was not able to fully identify    2. Essential hypertension  Reduce amlodipine to 1/2 of a 10 mg tablet daily.  Spoke to his wife on the phone, she says they have a large supply of the tens.  - amLODIPine (NORVASC) 10 MG tablet; Take 0.5 tablets (5 mg total) by mouth once daily.  Dispense: 90 tablet; Refill: 3

## 2020-06-22 ENCOUNTER — TELEPHONE (OUTPATIENT)
Dept: FAMILY MEDICINE | Facility: CLINIC | Age: 70
End: 2020-06-22

## 2020-06-22 NOTE — TELEPHONE ENCOUNTER
"Patient is wanting to get a new custom wheel chair. In order for him to get the wheelchair they need the note to be addended to say "The patient will benefit from having a new wheelchair"  Gertrude with [facility] called back to follow up for this.  Please advise if your most recent note can be addended so that the patient can get a new wheelchair  "

## 2020-06-22 NOTE — TELEPHONE ENCOUNTER
----- Message from Angelia Alexander sent at 6/22/2020  9:36 AM CDT -----  Regarding: home medical equipment  Type: Needs Medical Advice  Who Called:  Gertrude with [facility] [phone] ext [phone]  Symptoms (please be specific):    How long has patient had these symptoms:    Pharmacy name and phone #:    Best Call Back Number:   Additional Information: Gertrude with [facility] calling to follow up on  a fax that was submitted on June 12, for addendum for chart notes on his visit for April 24 need a call asap. thanks

## 2020-06-25 ENCOUNTER — TELEPHONE (OUTPATIENT)
Dept: FAMILY MEDICINE | Facility: CLINIC | Age: 70
End: 2020-06-25

## 2020-06-25 NOTE — TELEPHONE ENCOUNTER
----- Message from Laxmi Anderson sent at 6/25/2020 11:50 AM CDT -----  Regarding: artur from [facility] [phone] ext [phone]  Contact: Artur from [facility]  Shannon called back from [facility] called back   They are still waiting for the clinical note to process the patient request to   Get a wheelchair.     Call back to [phone] ext [phone]   and Fax is [phone]

## 2020-06-26 NOTE — TELEPHONE ENCOUNTER
We discussed this at that visit and I documented that the patient said he did not need a wheelchair or a walker.

## 2020-07-06 ENCOUNTER — TELEPHONE (OUTPATIENT)
Dept: FAMILY MEDICINE | Facility: CLINIC | Age: 70
End: 2020-07-06

## 2020-07-06 DIAGNOSIS — E78.5 HYPERLIPIDEMIA, UNSPECIFIED HYPERLIPIDEMIA TYPE: Primary | ICD-10-CM

## 2020-07-06 NOTE — TELEPHONE ENCOUNTER
----- Message from Alesia Carrasco sent at 7/6/2020  8:24 AM CDT -----  Regarding: order  Contact: [facility]/Sergio/[phone]  Type: Needs Medical Advice  Who Called:  [facility]/Sergio/[phone]    Additional Information: needs to get an extension on his physical therapy. They recommend continuation with physical therapy. Please call as he can take a verbal. Please call to advise.

## 2020-07-06 NOTE — TELEPHONE ENCOUNTER
----- Message from Rosaura Sorensen sent at 7/6/2020  2:45 PM CDT -----  Contact: pt  Type:  RX Refill Request    Who Called:  Pt    Refill or New Rx:  refill  RX Name and Strength:  atorvastatin (LIPITOR) 40 MG tablet  How is the patient currently taking it? (ex. 1XDay):  As Directed  Is this a 30 day or 90 day RX:  as Directed  Preferred Pharmacy with phone number:    [facility]/pharmacy #[id number] - [place], [place] - [address]  [address] [place] [address]  Phone: [phone] Fax: [phone]  Best Call Back Number:  [phone]  Additional Information:  Please Advise ---Thank you

## 2020-07-06 NOTE — TELEPHONE ENCOUNTER
[facility] would like a verbal for extension of physical therapy. I can not do that unless Dr. Chatman agrees. Please advise

## 2020-07-06 NOTE — TELEPHONE ENCOUNTER
Called Sergio with [facility]. Informed him that Dr. Chatman said we could extend orders for PT. Sergio stated that he will put the orders in and fax the paperwork to the clinic to be signed by Dr. Chatman.

## 2020-07-07 RX ORDER — ATORVASTATIN CALCIUM 80 MG/1
80 TABLET, FILM COATED ORAL NIGHTLY
Qty: 90 TABLET | Refills: 3 | Status: SHIPPED | OUTPATIENT
Start: 2020-07-07 | End: 2021-07-14

## 2020-07-27 ENCOUNTER — TELEPHONE (OUTPATIENT)
Dept: FAMILY MEDICINE | Facility: CLINIC | Age: 70
End: 2020-07-27

## 2020-07-27 ENCOUNTER — HOSPITAL ENCOUNTER (OUTPATIENT)
Dept: RADIOLOGY | Facility: HOSPITAL | Age: 70
Discharge: HOME | End: 2020-07-27
Attending: NURSE PRACTITIONER
Payer: MEDICARE

## 2020-07-27 ENCOUNTER — OFFICE VISIT (OUTPATIENT)
Dept: FAMILY MEDICINE | Facility: CLINIC | Age: 70
End: 2020-07-27
Payer: MEDICARE

## 2020-07-27 VITALS
SYSTOLIC BLOOD PRESSURE: 126 MMHG | DIASTOLIC BLOOD PRESSURE: 71 MMHG | TEMPERATURE: 98 F | OXYGEN SATURATION: 97 % | WEIGHT: 149 LBS | RESPIRATION RATE: 18 BRPM | HEIGHT: 65 IN | BODY MASS INDEX: 24.83 KG/M2 | HEART RATE: 82 BPM
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
## 2020-07-27 PROCEDURE — 73060 X-RAY EXAM OF HUMERUS: CPT | Mod: TC,PN,LT

## 2020-07-27 PROCEDURE — 73110 X-RAY EXAM OF WRIST: CPT | Mod: 26,LT,, | Performed by: RADIOLOGY

## 2020-07-27 PROCEDURE — 73110 XR WRIST COMPLETE 3 VIEWS LEFT: ICD-10-PCS | Mod: 26,LT,, | Performed by: RADIOLOGY

## 2020-07-27 PROCEDURE — 99213 PR OFFICE/OUTPT VISIT, EST, LEVL III, 20-29 MIN: ICD-10-PCS | Mod: S$PBB,,, | Performed by: NURSE PRACTITIONER

## 2020-07-27 PROCEDURE — 99999 PR PBB SHADOW E&M-EST. PATIENT-LVL IV: ICD-10-PCS | Mod: PBBFAC,,, | Performed by: NURSE PRACTITIONER

## 2020-07-27 PROCEDURE — 73060 X-RAY EXAM OF HUMERUS: CPT | Mod: 26,LT,, | Performed by: RADIOLOGY

## 2020-07-27 PROCEDURE — 99213 OFFICE O/P EST LOW 20 MIN: CPT | Mod: S$PBB,,, | Performed by: NURSE PRACTITIONER

## 2020-07-27 PROCEDURE — 73030 X-RAY EXAM OF SHOULDER: CPT | Mod: 26,LT,, | Performed by: RADIOLOGY

## 2020-07-27 PROCEDURE — 73060 XR HUMERUS 2 VIEW LEFT: ICD-10-PCS | Mod: 26,LT,, | Performed by: RADIOLOGY

## 2020-07-27 PROCEDURE — 99999 PR PBB SHADOW E&M-EST. PATIENT-LVL IV: CPT | Mod: PBBFAC,,, | Performed by: NURSE PRACTITIONER

## 2020-07-27 PROCEDURE — 73030 XR SHOULDER COMPLETE 2 OR MORE VIEWS LEFT: ICD-10-PCS | Mod: 26,LT,, | Performed by: RADIOLOGY

## 2020-07-27 PROCEDURE — 99214 OFFICE O/P EST MOD 30 MIN: CPT | Mod: PBBFAC,PN,25 | Performed by: NURSE PRACTITIONER

## 2020-07-27 PROCEDURE — 73030 X-RAY EXAM OF SHOULDER: CPT | Mod: TC,PN,LT

## 2020-07-27 PROCEDURE — 73110 X-RAY EXAM OF WRIST: CPT | Mod: TC,PN,LT

## 2020-07-27 NOTE — TELEPHONE ENCOUNTER
----- Message from Fausto Ambrose sent at 7/27/2020  4:47 PM CDT -----  Type: Needs Medical Advice    Who Called:  Patient  Best Call Back Number: [phone]  Additional Information: Patient would like to discuss test results. Please call to advise. Thanks!

## 2020-07-27 NOTE — PROGRESS NOTES
Subjective:       Patient ID: Buddy Park is a 69 y.o. male.    Chief Complaint: Fall    Mr. Buddy Park is a 69 year old male who is new to our clinic. Primarily, he sees Dr Chatman for PCP. He reports he fell last Thursday, and has been having left wrist and left shoulder pain since. He is afraid he broke it, and reports physical therapy will not complete his exercises until imaging shows no xray. Reports pain, swelling, and bruising. Hx of CVA, in wheelchair today, cane noted and brace to left lower leg    Fall  Incident onset: Thursday. The fall occurred while walking. He landed on hard floor. There was no blood loss. The point of impact was the left shoulder and left wrist. The pain is moderate. The symptoms are aggravated by movement. Pertinent negatives include no abdominal pain, bowel incontinence, fever, headaches, hearing loss, hematuria, loss of consciousness, nausea, numbness, tingling, visual change or vomiting. He has tried acetaminophen, ice, immobilization and rest for the symptoms. The treatment provided no relief.     Review of Systems   Constitutional: Negative for activity change, chills, diaphoresis and fever.   Respiratory: Negative for cough, chest tightness, shortness of breath and wheezing.    Cardiovascular: Negative for chest pain and palpitations.   Gastrointestinal: Negative for abdominal pain, bowel incontinence, nausea and vomiting.   Genitourinary: Negative for hematuria.   Musculoskeletal: Positive for arthralgias, gait problem and myalgias.   Skin: Positive for wound. Negative for color change.   Neurological: Positive for weakness. Negative for tingling, tremors, loss of consciousness, numbness and headaches.   Psychiatric/Behavioral: Negative for decreased concentration, dysphoric mood and sleep disturbance. The patient is not nervous/anxious.          Reviewed family, medical, surgical, and social history.    Objective:      /71 (BP Location: Left arm, Patient  "Position: Sitting, BP Method: Medium (Automatic))   Pulse 82   Temp 98.3 °F (36.8 °C) (Tympanic)   Resp 18   Ht 5' 5" (1.651 m)   Wt 67.6 kg (149 lb)   SpO2 97%   BMI 24.79 kg/m²   Physical Exam  Constitutional:       Appearance: He is not ill-appearing.   HENT:      Head: Normocephalic.      Right Ear: Tympanic membrane normal.      Left Ear: Tympanic membrane normal.      Nose: Nose normal.      Mouth/Throat:      Mouth: Mucous membranes are moist.      Pharynx: Oropharynx is clear.   Eyes:      Conjunctiva/sclera: Conjunctivae normal.      Pupils: Pupils are equal, round, and reactive to light.   Cardiovascular:      Rate and Rhythm: Normal rate and regular rhythm.      Heart sounds: No murmur.   Pulmonary:      Effort: Pulmonary effort is normal.      Breath sounds: Normal breath sounds. No wheezing.   Abdominal:      General: Bowel sounds are normal.   Musculoskeletal:      Comments: Patient requesting not to perform extremity movement because he is afraid it is broke   Skin:     Capillary Refill: Capillary refill takes less than 2 seconds.      Findings: Bruising present.   Neurological:      Mental Status: He is alert and oriented to person, place, and time.      Sensory: Sensory deficit present.   Psychiatric:         Mood and Affect: Mood normal.         Behavior: Behavior normal.         Thought Content: Thought content normal.         Judgment: Judgment normal.         Assessment:       1. Fall, initial encounter        Plan:       Fall, initial encounter  -     X-Ray Shoulder 2 or More Views Left; Future; Expected date: 07/27/2020  -     X-Ray Wrist Complete Left; Future; Expected date: 07/27/2020  -     X-Ray Humerus 2 View Left; Future; Expected date: 07/27/2020          PLAN  - Discussed with patient the plan of care  - obtain xray, will call with results  - Medications reviewed. Medication side effects discussed. Patient has no questions or concerns at this time. Informed patient to notify me " regarding any concerns.   - Continue monitoring   - Informed patient to please notify me with any questions or concerns at anytime  - Follow up with PCP as ordered      Risks, benefits, and side effects were discussed with the patient. All questions were answered to the fullest satisfaction of the patient, and pt verbalized understanding and agreement to treatment plan. Pt was to call with any new or worsening symptoms, or present to the ER.

## 2020-07-27 NOTE — PROGRESS NOTES
Please let Mr. Park know his Xrays did not show any fractures or dislocations. It shows mild changes of arthritis, with bone demineralization. I would recommend he f/u with his PCP. Continue his pain medication, and ice 3x daily. Rest as needed  Thank you

## 2020-07-28 ENCOUNTER — TELEPHONE (OUTPATIENT)
Dept: FAMILY MEDICINE | Facility: CLINIC | Age: 70
End: 2020-07-28

## 2020-07-28 NOTE — TELEPHONE ENCOUNTER
----- Message from Rea Yanes LPN sent at 7/28/2020 12:53 PM CDT -----  Regarding: FW: return call  Contact: Buddy pt    ----- Message -----  From: Yulisa Francis  Sent: 7/28/2020  12:31 PM CDT  To: [facility] Staff  Subject: return call                                      Type:  Patient Returning Call    Who Called:  Buddy  Who Left Message for Patient:  he doesn't know/but he spoke to someone  Does the patient know what this is regarding?:  regarding results of xrays  Best Call Back Number:  [phone]  Additional Information: Nothing noted in pt's choice about a call today

## 2020-07-28 NOTE — TELEPHONE ENCOUNTER
Pt notified of result notes that were sent by email to his portal and he voiced understanding. Pt has no additional questions at this time.

## 2020-07-28 NOTE — TELEPHONE ENCOUNTER
----- Message from Hue Luz sent at 7/28/2020 10:40 AM CDT -----  Type: Needs Medical Advice    Who Called:  Patient  Best Call Back Number: [phone]  Additional Information:  Handicapped patient requesting to speak with nurse concerning acquiring handicap parking tags for two vehicles if possible/please call back to advise.

## 2020-07-30 ENCOUNTER — TELEPHONE (OUTPATIENT)
Dept: FAMILY MEDICINE | Facility: CLINIC | Age: 70
End: 2020-07-30

## 2020-07-30 DIAGNOSIS — I63.9 CEREBROVASCULAR ACCIDENT (CVA), UNSPECIFIED MECHANISM: Primary | ICD-10-CM

## 2020-07-30 DIAGNOSIS — R53.1 LEFT-SIDED WEAKNESS: ICD-10-CM

## 2020-07-30 NOTE — TELEPHONE ENCOUNTER
----- Message from Anna Bennett sent at 7/30/2020  2:32 PM CDT -----  Contact: [phone]  pt    Type: Needs Medical Advice  Who Called:  pt   Best Call Back Number: [phone]  Additional Information:  pt is  calling to  get an order to  [facility]   for  physical  therapy and  discontinue  home  health  please call  for details

## 2020-08-05 NOTE — TELEPHONE ENCOUNTER
I cannot locate a facility named [facility] in [place].  Is this in internal or external facility?  Please get proper name of facility.

## 2020-08-06 NOTE — TELEPHONE ENCOUNTER
I spoke to the patient. It is [facility] at [address], [place] [address] Phone [phone].     Patient also stated he has another request pending for some handicap placards and asked me to remind you.

## 2020-08-06 NOTE — TELEPHONE ENCOUNTER
I put in orders for physical therapy in the [place] area but [facility] keeps do leading [place] from the order.  There are two orders already existing for physical therapy placed in May by Dr. Broussard who works at the clinic the patient is trying to go to.  There should be plenty of referrals.  Okay to discontinue home health

## 2020-08-10 ENCOUNTER — OFFICE VISIT (OUTPATIENT)
Dept: FAMILY MEDICINE | Facility: CLINIC | Age: 70
End: 2020-08-10
Payer: MEDICARE

## 2020-08-10 ENCOUNTER — HOSPITAL ENCOUNTER (OUTPATIENT)
Dept: RADIOLOGY | Facility: HOSPITAL | Age: 70
Discharge: HOME | End: 2020-08-10
Attending: NURSE PRACTITIONER
Payer: MEDICARE

## 2020-08-10 VITALS
OXYGEN SATURATION: 98 % | TEMPERATURE: 99 F | HEART RATE: 75 BPM | BODY MASS INDEX: 24.83 KG/M2 | SYSTOLIC BLOOD PRESSURE: 116 MMHG | RESPIRATION RATE: 18 BRPM | WEIGHT: 149 LBS | HEIGHT: 65 IN | DIASTOLIC BLOOD PRESSURE: 68 MMHG

## 2020-08-10 DIAGNOSIS — R73.03 PREDIABETES: ICD-10-CM
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
## 2020-08-10 DIAGNOSIS — I63.81 CEREBROVASCULAR ACCIDENT (CVA) DUE TO OCCLUSION OF SMALL ARTERY: ICD-10-CM

## 2020-08-10 PROCEDURE — 73110 XR WRIST COMPLETE 3 VIEWS LEFT: ICD-10-PCS | Mod: 26,LT,, | Performed by: RADIOLOGY

## 2020-08-10 PROCEDURE — 73030 XR SHOULDER COMPLETE 2 OR MORE VIEWS LEFT: ICD-10-PCS | Mod: 26,LT,, | Performed by: RADIOLOGY

## 2020-08-10 PROCEDURE — 73030 X-RAY EXAM OF SHOULDER: CPT | Mod: 26,LT,, | Performed by: RADIOLOGY

## 2020-08-10 PROCEDURE — 99999 PR PBB SHADOW E&M-EST. PATIENT-LVL IV: ICD-10-PCS | Mod: PBBFAC,,, | Performed by: NURSE PRACTITIONER

## 2020-08-10 PROCEDURE — 73030 X-RAY EXAM OF SHOULDER: CPT | Mod: TC,PN,LT

## 2020-08-10 PROCEDURE — 99213 OFFICE O/P EST LOW 20 MIN: CPT | Mod: S$PBB,,, | Performed by: NURSE PRACTITIONER

## 2020-08-10 PROCEDURE — 73110 X-RAY EXAM OF WRIST: CPT | Mod: TC,PN,LT

## 2020-08-10 PROCEDURE — 73110 X-RAY EXAM OF WRIST: CPT | Mod: 26,LT,, | Performed by: RADIOLOGY

## 2020-08-10 PROCEDURE — 99999 PR PBB SHADOW E&M-EST. PATIENT-LVL IV: CPT | Mod: PBBFAC,,, | Performed by: NURSE PRACTITIONER

## 2020-08-10 PROCEDURE — 99214 OFFICE O/P EST MOD 30 MIN: CPT | Mod: PBBFAC,25,PN | Performed by: NURSE PRACTITIONER

## 2020-08-10 PROCEDURE — 99213 PR OFFICE/OUTPT VISIT, EST, LEVL III, 20-29 MIN: ICD-10-PCS | Mod: S$PBB,,, | Performed by: NURSE PRACTITIONER

## 2020-08-10 RX ORDER — INSULIN PUMP SYRINGE, 3 ML
EACH MISCELLANEOUS
Qty: 1 EACH | Refills: 0 | Status: SHIPPED | OUTPATIENT
Start: 2020-08-10 | End: 2022-07-07

## 2020-08-10 RX ORDER — LANCETS
EACH MISCELLANEOUS
Qty: 90 EACH | Refills: 0 | Status: SHIPPED | OUTPATIENT
Start: 2020-08-10

## 2020-08-10 NOTE — PROGRESS NOTES
"Subjective:       Patient ID: Buddy Park is a 69 y.o. male.    Chief Complaint: Fall (friday evening at [employer] pt fell)    Mr. Buddy Park is a 69 year old male who presents to the clinic for a 2nd fall.  Primarily, he sees Dr Chatman for PCP. He reports he fell while at [place], and has been having left wrist and left shoulder pain since as previous. Also, reports physical therapy will not complete his exercises until imaging shows no xray. Reports pain, swelling, and bruising. Hx of CVA, in wheelchair today, cane noted and brace to left lower leg         Review of Systems   Constitutional: Negative for activity change, chills, diaphoresis and fever.   Respiratory: Negative for cough, chest tightness, shortness of breath and wheezing.    Cardiovascular: Negative for chest pain and palpitations.   Gastrointestinal: Negative for abdominal pain, nausea and vomiting.   Genitourinary: Negative for hematuria.   Musculoskeletal: Positive for arthralgias, gait problem and myalgias.   Skin: Negative for color change, pallor, rash and wound.   Neurological: Positive for weakness. Negative for tremors, numbness and headaches.   Psychiatric/Behavioral: Negative for decreased concentration, dysphoric mood and sleep disturbance. The patient is not nervous/anxious.          Reviewed family, medical, surgical, and social history.    Objective:      /68 (BP Location: Right arm, Patient Position: Sitting, BP Method: Medium (Automatic))   Pulse 75   Temp 98.7 °F (37.1 °C) (Tympanic)   Resp 18   Ht 5' 5" (1.651 m)   Wt 67.6 kg (149 lb)   SpO2 98%   BMI 24.79 kg/m²   Physical Exam  Constitutional:       Appearance: He is normal weight. He is not ill-appearing.   HENT:      Head: Normocephalic.      Right Ear: Tympanic membrane normal.      Left Ear: Tympanic membrane normal.      Nose: Nose normal.      Mouth/Throat:      Mouth: Mucous membranes are moist.      Pharynx: Oropharynx is clear.   Eyes:      " Conjunctiva/sclera: Conjunctivae normal.      Pupils: Pupils are equal, round, and reactive to light.   Neck:      Musculoskeletal: Normal range of motion.   Cardiovascular:      Rate and Rhythm: Normal rate and regular rhythm.      Pulses: Normal pulses.   Pulmonary:      Effort: Pulmonary effort is normal.      Breath sounds: Normal breath sounds. No wheezing.   Abdominal:      General: Bowel sounds are normal.      Tenderness: There is no abdominal tenderness.   Musculoskeletal: Normal range of motion.      Left upper arm: He exhibits tenderness.        Hands:    Skin:     General: Skin is warm.      Capillary Refill: Capillary refill takes less than 2 seconds.      Findings: No rash.   Neurological:      General: No focal deficit present.      Mental Status: He is alert.   Psychiatric:         Mood and Affect: Mood normal.         Behavior: Behavior normal.         Thought Content: Thought content normal.         Judgment: Judgment normal.         Assessment:       1. Fall, subsequent encounter    2. Cerebrovascular accident (CVA) due to occlusion of small artery    3. Prediabetes        Plan:       Fall, subsequent encounter  -     X-Ray Wrist Complete Left; Future; Expected date: 08/10/2020  -     X-Ray Shoulder 2 or More Views Left; Future; Expected date: 08/10/2020    Cerebrovascular accident (CVA) due to occlusion of small artery    Prediabetes  -     blood-glucose meter kit; To check BG 2-3 times daily, to use with insurance preferred meter  Dispense: 1 each; Refill: 0  -     lancets Misc; To check BG 2 times daily, to use with insurance preferred meter  Dispense: 90 each; Refill: 0  -     blood sugar diagnostic Strp; To check BG 2-3 times daily, to use with insurance preferred meter  Dispense: 100 each; Refill: 0          PLAN:  - Discussed with patient in length the plan of care  - Will repeat Xray per patient request  - Recommend gait training and strengthening - continue PT OT  - F/U with PCP for  f/u  - Medications reviewed. Medication side effects discussed. Patient has no questions or concerns at this time. Informed patient to notify me regarding any concerns.   - Patient is not checking CBG while on metformin. Reviewed hypoglycemia  - Informed patient to please notify me with any questions or concerns at anytime  - Follow up PRN      Risks, benefits, and side effects were discussed with the patient. All questions were answered to the fullest satisfaction of the patient, and pt verbalized understanding and agreement to treatment plan. Pt was to call with any new or worsening symptoms, or present to the ER.

## 2020-08-10 NOTE — PROGRESS NOTES
Please let Mr. Park know that his xray of his shoulder and wrist have no significantly changed when compared to his prior xray. It shows osteopenia and degenerative changes. Please have him f/u with Dr Chatman   Thank you

## 2020-08-11 ENCOUNTER — CLINICAL SUPPORT (OUTPATIENT)
Dept: REHABILITATION | Facility: HOSPITAL | Age: 70
End: 2020-08-11
Payer: MEDICARE

## 2020-08-11 DIAGNOSIS — I63.9 CEREBROVASCULAR ACCIDENT (CVA), UNSPECIFIED MECHANISM: ICD-10-CM

## 2020-08-11 DIAGNOSIS — I63.81 CEREBROVASCULAR ACCIDENT (CVA) DUE TO OCCLUSION OF SMALL ARTERY: Primary | ICD-10-CM

## 2020-08-11 DIAGNOSIS — R53.1 LEFT-SIDED WEAKNESS: ICD-10-CM

## 2020-08-11 PROCEDURE — 97166 OT EVAL MOD COMPLEX 45 MIN: CPT | Mod: PN

## 2020-08-11 PROCEDURE — 97032 APPL MODALITY 1+ESTIM EA 15: CPT | Mod: PN

## 2020-08-11 PROCEDURE — 97110 THERAPEUTIC EXERCISES: CPT | Mod: PN

## 2020-08-11 NOTE — PROGRESS NOTES
"TIME RECORD    Date: 08/11/2020    Start Time:  135  Stop Time:  220      Total Timed Minutes:  45 minutes        OCCUPATIONAL THERAPY INITIAL EVALUATION & PLAN OF TREATMENT    Patient Name: Buddy Park  Physician Name:  Buddy Chatman   Primary Diagnosis:  CVA   Treatment Diagnosis:  LUE hemiparesis  Onset Date:  2/1/2020  Eval Date:  8/11/2020  Certification Period:  8/11/2020 to 11/3/2020  Past Medical History:   Past Medical History:   Diagnosis Date    Angina pectoris     Anxiety     Arthritis     Biliary colic     Cochlear implant in place     Deaf     LEFT EAR    Depression     Heart failure     High cholesterol     History of colon polyps 2/20/2018    Mi'kma[place] (hard of hearing)     HEARING AID - RIGHT    Hyperlipidemia     Hypertension     Kidney stone     Kidney stones     Renal stones     Stroke     Trouble in sleeping     Wears glasses      Precautions:  Standard  Prior Therapy:  Yes   Signs of Abuse: No  Medications: Buddy Park has a current medication list which includes the following prescription(s): amlodipine, aspirin, atorvastatin, b complex vitamins, blood sugar diagnostic, blood-glucose meter, bupropion, carvedilol, cholestyramine-aspartame, clopidogrel, colchicine, hydrocodone-acetaminophen, ketorolac, lancets, metformin, nitroglycerin, and tramadol, and the following Facility-Administered Medications: lactated ringers.  Nutrition:  Regular diet   Prior Level of Function: Patient requires assistance with ADLs.  Place of Residence (steps/adaptations):  Patient lives in a single story home with two steps to enter.  Patient has a tub/shower combo.    Functional Deficits Leading to Referral/Nature of Injury:  Patient does not have any active movement of LUE which impedes with ADL performance.   Patient Therapy Goals:  "To get my left arm moving again."  Initial Assessment:  Patient tolerated evaluation well on this date with OT.  Patient does not exhibit any active movement " of LUE.  Patient reported that his wife has to assist with ADLs.  Patient noted with swelling on dorsal aspect of left hand.  Patient exhibited a RUE MMT grade of 5/5 at all joints in all planes of movement.  Patient was only able to tolerate LUE PROM to 90 degrees for forward flexion and shoulder abduction.  Patient noted with increased muscle tone at all joints in all planes of movement of LUE.      Rehab Potential: Good    Goals:  1. Patient will exhibit LUE AROM for forward flexion to 75 degrees prior to discharge.  2. Patient will be modified independent with UE dressing prior to discharge.  3. Patient will tolerate all modalities with no report or signs of discomfort prior to discharge.    4. Patient will be compliant with HEP daily prior to discharge.    Plan:  Patient will engage in current POC 2x per week.    Therapist's Name: MALIK Woodall/L  Date: 08/11/2020    I CERTIFY THE NEED FOR THESE SERVICES FURNISHED UNDER THIS PLAN OF TREATMENT AND WHILE UNDER MY CARE    Physician's comments: ________________________________________________________________________________________________________________________________________________      Physician's Name: ___________________________________

## 2020-08-18 ENCOUNTER — CLINICAL SUPPORT (OUTPATIENT)
Dept: REHABILITATION | Facility: HOSPITAL | Age: 70
End: 2020-08-18
Payer: MEDICARE

## 2020-08-18 DIAGNOSIS — I63.81 CEREBROVASCULAR ACCIDENT (CVA) DUE TO OCCLUSION OF SMALL ARTERY: Primary | ICD-10-CM

## 2020-08-18 PROCEDURE — 97110 THERAPEUTIC EXERCISES: CPT | Mod: PN

## 2020-08-18 PROCEDURE — 97032 APPL MODALITY 1+ESTIM EA 15: CPT | Mod: PN

## 2020-08-18 NOTE — PROGRESS NOTES
"Patient:  Buddy Park  [facility] #:  [id number]   Date of Note: 08/18/2020   Referring Physician:  Buddy Chatman MD  Diagnosis:  CVA  Encounter Diagnosis   Name Primary?    Cerebrovascular accident (CVA) due to occlusion of small artery Yes        Start Time: 200  End Time: 245  Total Time: 45 min        Subjective:   "Im ready to work hard."    Pain: 8 out of 10 in LUE     Objective:   Patient seen by OT this session. Patient was eager to engage in skilled outpatient therapy session.  Patient continues to be noted with no active movement in LUE.     Treatment:   -OT performed LUE PROM at all joints in all planes of movement 5x10 reps.  -Patient performed clasp technique 5x10 reps.  -OT provided PPS for elbow extension of LUE.  -Electrical stimulation was applied to left shoulder for 15 minutes.  -Patient was thoroughly educated on HEP to perform daily.     Assessment:  Patient tolerated skilled outpatient OT session well.  Patient is motivated to improve functional deficits.  Patient noted with no active movement of LUE.  OT provided LUE PROM at all joints in all planes of movement.  OT also provided PPS to LUE for elbow extension in effort to inhibit tone.              Plan:  Continue with current POC 2x per week.    Yamil Lopez OTR/L       "

## 2020-08-20 ENCOUNTER — CLINICAL SUPPORT (OUTPATIENT)
Dept: REHABILITATION | Facility: HOSPITAL | Age: 70
End: 2020-08-20
Payer: MEDICARE

## 2020-08-20 DIAGNOSIS — I63.81 CEREBROVASCULAR ACCIDENT (CVA) DUE TO OCCLUSION OF SMALL ARTERY: Primary | ICD-10-CM

## 2020-08-20 PROCEDURE — 97032 APPL MODALITY 1+ESTIM EA 15: CPT | Mod: PN

## 2020-08-20 PROCEDURE — 97010 HOT OR COLD PACKS THERAPY: CPT | Mod: PN

## 2020-08-20 PROCEDURE — 97110 THERAPEUTIC EXERCISES: CPT | Mod: PN

## 2020-08-20 NOTE — PROGRESS NOTES
"Patient:  Buddy Park  [facility] #:  [id number]   Date of Note: 08/20/2020   Referring Physician:  Buddy Chatman MD  Diagnosis:  CVA  Encounter Diagnosis   Name Primary?    Cerebrovascular accident (CVA) due to occlusion of small artery Yes        Start Time: 200  End Time: 245  Total Time: 45 min        Subjective: "I feel good today."    Pain: 8 out of 10 in LUE     Objective:   Patient seen by OT this session. Patient was eager to engage in skilled outpatient therapy session.  Patient continues to be noted with no active movement in LUE.     Treatment:   -Heat was applied to LUE at shoulder for 15 minutes.  -OT performed LUE PROM at all joints in all planes of movement 5x10 reps.  -OT provided PPS for elbow extension of LUE.  -Electrical stimulation was applied to left shoulder for 15 minutes.  -Patient was thoroughly educated on HEP to perform daily.     Assessment:  Patient tolerated skilled outpatient OT session well.  Patient is motivated to improve functional deficits.  Patient noted with no active movement of LUE.  OT provided LUE PROM at all joints in all planes of movement.  OT also provided PPS to LUE for elbow extension in effort to inhibit tone.              Plan:  Continue with current POC 2x per week.    Yamil Lopez OTR/L         "

## 2020-08-25 ENCOUNTER — CLINICAL SUPPORT (OUTPATIENT)
Dept: REHABILITATION | Facility: HOSPITAL | Age: 70
End: 2020-08-25
Payer: MEDICARE

## 2020-08-25 DIAGNOSIS — I63.81 CEREBROVASCULAR ACCIDENT (CVA) DUE TO OCCLUSION OF SMALL ARTERY: Primary | ICD-10-CM

## 2020-08-25 PROCEDURE — 97110 THERAPEUTIC EXERCISES: CPT | Mod: PN

## 2020-08-25 PROCEDURE — 97010 HOT OR COLD PACKS THERAPY: CPT | Mod: PN

## 2020-08-25 PROCEDURE — 97032 APPL MODALITY 1+ESTIM EA 15: CPT | Mod: PN

## 2020-08-25 NOTE — PROGRESS NOTES
"Patient:  Buddy Park  [facility] #:  [id number]   Date of Note: 08/25/2020   Referring Physician:  Buddy Chatman MD  Diagnosis:  CVA  Encounter Diagnosis   Name Primary?    Cerebrovascular accident (CVA) due to occlusion of small artery Yes        Start Time: 150  End Time: 235  Total Time: 45 min        Subjective: "I feel good today."    Pain: 8 out of 10 in LUE     Objective:   Patient seen by OT this session. Patient was eager to engage in skilled outpatient therapy session.  Patient continues to be noted with no active movement in LUE. Patient noted with swelling on dorsal aspect of left hand.    Treatment:   -Heat was applied to LUE at shoulder for 15 minutes.  -OT performed LUE PROM at all joints in all planes of movement 5x10 reps.  -OT provided PPS for elbow extension of LUE.  -Electrical stimulation was applied to left shoulder for 15 minutes.  -Patient was thoroughly educated on HEP to perform daily.  -Ice pack was applied to dorsal aspect of left hand for 15 minutes.     Assessment:  Patient tolerated skilled outpatient OT session well.  Patient is motivated to improve functional deficits.  Patient noted with no active movement of LUE.  OT provided LUE PROM at all joints in all planes of movement.  OT also provided PPS to LUE for elbow extension in effort to inhibit tone.              Plan:  Continue with current POC 2x per week.    MALIK Woodall/L           "

## 2020-08-26 NOTE — TELEPHONE ENCOUNTER
Care Due:                  Date            Visit Type   Department     Provider  --------------------------------------------------------------------------------                                [facility]  Last Visit: 06-      FOLLOW UP    Medicine       Buddy Chatman  Next Visit: None Scheduled  None         None Found                                                            Last  Test          Frequency    Reason                     Performed    Due Date  --------------------------------------------------------------------------------    HBA1C.......  6 months...  metFORMIN................  02- 07-    Uric Acid...  12 months..  colchicine...............  06- 06-    Powered by [employer]. Reference number: [id number]. 8/26/2020 3:18:54 PM CDT

## 2020-08-26 NOTE — TELEPHONE ENCOUNTER
----- Message from Rosaura Sorensen sent at 8/26/2020  2:41 PM CDT -----  Contact: pt  Type:  RX Refill Request    Who Called:  Pt    Refill or New Rx:  refill  RX Name and Strength:  carvediloL (COREG) 12.5 MG tablet  How is the patient currently taking it? (ex. 1XDay):  As Directed  Is this a 30 day or 90 day RX:  as Directed  Preferred Pharmacy with phone number:    [facility]/pharmacy #[id number] - [place], [place] - [address]  [address] [place] [address]  Phone: [phone] Fax: [phone]  Best Call Back Number:  [phone]  Additional Information:  Please Advise ---Thank you

## 2020-08-27 NOTE — TELEPHONE ENCOUNTER
We have been asked to refill Coreg 12.5 mg once daily.  Coreg is a twice a day medication, this dosage is inappropriate.  It looks like this was what he was given on discharge from the rehab hospital in [place].  According to their records, he was taking 25 mg twice a day while there so I can only assume that this was a mistake in his discharge paperwork.

## 2020-08-27 NOTE — TELEPHONE ENCOUNTER
Refill Routing Note   Medication(s) are not appropriate for processing by [facility]:       - Medication requested has undergone a recent dosage adjustment (<3 months)        Medication Therapy Plan: Carvedilol dose reduced to 12.5 mg once daily 6/4/2020. Defer to PCP for review  Medication reconciliation completed: No      Automatic Epic Generated Protocol Data:        Requested Prescriptions   Pending Prescriptions Disp Refills    carvediloL (COREG) 12.5 MG tablet 90 tablet 3     Sig: Take 1 tablet (12.5 mg total) by mouth once daily.       Cardiovascular:  Beta Blockers Passed - 8/26/2020  3:18 PM        Passed - Patient is at least 18 years old        Passed - Last BP in normal range within 360 days.     BP Readings from Last 3 Encounters:   08/10/20 116/68   07/27/20 126/71   06/16/20 (!) 84/50              Passed - Last Heart Rate in normal range within 360 days.     Pulse Readings from Last 3 Encounters:   08/10/20 75   07/27/20 82   06/16/20 77             Passed - Office visit in past 12 months or future 90 days.     Recent Outpatient Visits            2 weeks ago Fall, subsequent encounter    [facility] Britni Weeks NP    1 month ago Fall, initial encounter    [facility] Britni Weeks NP    2 months ago Cerebrovascular accident (CVA), unspecified mechanism    [facility] Buddy Chatman MD    3 months ago Traumatic complete tear of left rotator cuff, initial encounter    [facility] Macario Broussard DO    4 months ago Elbow pain, left    [facility] CHAD Preston          Future Appointments              Today Yamil Lopez OT [facility], [facility]                      Appointments  past 12m or future 3m with PCP    Date Provider   Last Visit   6/16/2020 Buddy GOFF  MD [place]   Next Visit   Visit date not found Buddy Chatman MD   ED visits in past 90 days: [unfilled]     Note composed:10:56 AM 08/27/2020

## 2020-08-28 ENCOUNTER — CLINICAL SUPPORT (OUTPATIENT)
Dept: REHABILITATION | Facility: HOSPITAL | Age: 70
End: 2020-08-28
Payer: MEDICARE

## 2020-08-28 DIAGNOSIS — I63.81 CEREBROVASCULAR ACCIDENT (CVA) DUE TO OCCLUSION OF SMALL ARTERY: Primary | ICD-10-CM

## 2020-08-28 DIAGNOSIS — F32.5 MAJOR DEPRESSIVE DISORDER WITH SINGLE EPISODE, IN FULL REMISSION: ICD-10-CM

## 2020-08-28 PROCEDURE — 97010 HOT OR COLD PACKS THERAPY: CPT | Mod: PN

## 2020-08-28 PROCEDURE — 97032 APPL MODALITY 1+ESTIM EA 15: CPT | Mod: PN

## 2020-08-28 PROCEDURE — 97110 THERAPEUTIC EXERCISES: CPT | Mod: PN

## 2020-08-28 RX ORDER — BUPROPION HYDROCHLORIDE 150 MG/1
TABLET ORAL
Qty: 90 TABLET | Refills: 0 | Status: SHIPPED | OUTPATIENT
Start: 2020-08-28 | End: 2020-12-29

## 2020-08-28 RX ORDER — CARVEDILOL 12.5 MG/1
12.5 TABLET ORAL DAILY
Qty: 90 TABLET | Refills: 3 | Status: SHIPPED | OUTPATIENT
Start: 2020-08-28 | End: 2021-10-17

## 2020-08-28 NOTE — PROGRESS NOTES
Refill Authorization Note    is requesting a refill authorization.    Brief assessment and rationale for refill: APPROVE: NA NON-INTENTIONAL     Medication-related problems identified: Non-adherence (knowledge deficit) non-intentional    Medication Therapy Plan: CDMR; PER EPIC DATA 79% ADHERENCE; APPROVE PER PROTOCOL    Medication reconciliation completed: No                    Comments:          Requested Prescriptions   Pending Prescriptions Disp Refills    buPROPion (WELLBUTRIN XL) 150 MG TB24 tablet [Pharmacy Med Name: BUPROPION HCL  MG TABLET] 90 tablet 0     Sig: TAKE 1 TABLET BY MOUTH EVERY MORNING       Psychiatry: Antidepressants - bupropion Passed - 8/28/2020 12:31 AM        Passed - Patient is at least 18 years old        Passed - Last BP in normal range within 360 days.     BP Readings from Last 3 Encounters:   08/10/20 116/68   07/27/20 126/71   06/16/20 (!) 84/50              Passed - Office visit in past 6 months or future 90 days.     Recent Outpatient Visits            2 weeks ago Fall, subsequent encounter    [facility] Britni Weeks NP    1 month ago Fall, initial encounter    [facility] Britni Weeks NP    2 months ago Cerebrovascular accident (CVA), unspecified mechanism    [facility] Buddy Chatman MD    3 months ago Traumatic complete tear of left rotator cuff, initial encounter    [facility] Macario Broussard DO    4 months ago Elbow pain, left    [facility] CHAD Preston          Future Appointments              In 4 days Yamil Lopez OT [facility], [facility]    In 6 days Yamil Lopez OT [facility]    In 6 days Tsering Mercado DO [facility],  [facility]                    Appointments  past 12m or future 3m with PCP    Date Provider   Last Visit   6/16/2020 Buddy Chatman MD   Next Visit   Visit date not found Buddy Chatman MD   ED visits in past 90 days: 0     Note composed:12:55 PM 08/28/2020

## 2020-08-28 NOTE — PROGRESS NOTES
"Patient:  Buddy Park  [facility] #:  [id number]   Date of Note: 08/28/2020   Referring Physician:  Buddy Chatman MD  Diagnosis:  CVA  Encounter Diagnosis   Name Primary?    Cerebrovascular accident (CVA) due to occlusion of small artery Yes        Start Time: 810  End Time: 855  Total Time: 45 min        Subjective: "I feel good today."    Pain: 5 out of 10 in LUE     Objective:   Patient seen by OT this session. Patient was eager to engage in skilled outpatient therapy session.  Patient continues to be noted with no active movement in LUE. Patient noted with swelling on dorsal aspect of left hand.    Treatment:   -Heat was applied to LUE at shoulder for 15 minutes.  -OT performed LUE PROM at all joints in all planes of movement 5x10 reps.  -OT provided PPS for elbow extension of LUE.  -Electrical stimulation was applied to left shoulder for 15 minutes.  -Patient was thoroughly educated on HEP to perform daily.       Assessment:  Patient tolerated skilled outpatient OT session well.  Patient is motivated to improve functional deficits.  Patient noted with no active movement of LUE.  OT provided LUE PROM at all joints in all planes of movement.  OT also provided PPS to LUE for elbow extension in effort to inhibit tone.              Plan:  Continue with current POC 2x per week.    Yamil Lopez OTR/L             "

## 2020-08-28 NOTE — TELEPHONE ENCOUNTER
No new care gaps identified.  Powered by [employer]. Reference number: [id number]. 8/28/2020 12:32:37 AM CHERYLT

## 2020-09-01 ENCOUNTER — CLINICAL SUPPORT (OUTPATIENT)
Dept: REHABILITATION | Facility: HOSPITAL | Age: 70
End: 2020-09-01
Payer: MEDICARE

## 2020-09-01 DIAGNOSIS — I63.81 CEREBROVASCULAR ACCIDENT (CVA) DUE TO OCCLUSION OF SMALL ARTERY: Primary | ICD-10-CM

## 2020-09-01 PROCEDURE — 97010 HOT OR COLD PACKS THERAPY: CPT | Mod: PN

## 2020-09-01 PROCEDURE — 97110 THERAPEUTIC EXERCISES: CPT | Mod: PN

## 2020-09-01 NOTE — PROGRESS NOTES
"Patient:  Buddy Park  [facility] #:  [id number]   Date of Note: 09/01/2020   Referring Physician:  Buddy Chatman MD  Diagnosis:  CVA  Encounter Diagnosis   Name Primary?    Cerebrovascular accident (CVA) due to occlusion of small artery Yes        Start Time: 1115  End Time: 1200  Total Time: 45 min        Subjective: "My left hand is swollen."    Pain: 8 out of 10 in LUE     Objective:   Patient seen by OT this session. Patient was eager to engage in skilled outpatient therapy session.  Patient continues to be noted with no active movement in LUE. Patient noted with swelling on dorsal aspect of left hand.    Treatment:   -Heat was applied to LUE at shoulder for 15 minutes.  -OT performed LUE PROM at all joints in all planes of movement 5x10 reps.  -OT provided PPS for elbow extension of LUE.  -Ice pack was applied to left hand for 15 minutes in effort to reduce swelling.  -Patient was thoroughly educated on HEP to perform daily.       Assessment:  Patient tolerated skilled outpatient OT session well.  Patient is motivated to improve functional deficits.  Patient noted with no active movement of LUE.  OT provided LUE PROM at all joints in all planes of movement.  OT also provided PPS to LUE for elbow extension in effort to inhibit tone.              Plan:  Continue with current POC 2x per week.    MALIK Woodall/L               "

## 2020-09-03 ENCOUNTER — OFFICE VISIT (OUTPATIENT)
Dept: FAMILY MEDICINE | Facility: CLINIC | Age: 70
End: 2020-09-03
Attending: FAMILY MEDICINE
Payer: MEDICARE

## 2020-09-03 VITALS
WEIGHT: 139 LBS | OXYGEN SATURATION: 98 % | BODY MASS INDEX: 23.16 KG/M2 | SYSTOLIC BLOOD PRESSURE: 128 MMHG | TEMPERATURE: 98 F | HEIGHT: 65 IN | HEART RATE: 80 BPM | DIASTOLIC BLOOD PRESSURE: 70 MMHG

## 2020-09-03 DIAGNOSIS — I10 ESSENTIAL HYPERTENSION: ICD-10-CM

## 2020-09-03 DIAGNOSIS — E78.49 OTHER HYPERLIPIDEMIA: ICD-10-CM

## 2020-09-03 DIAGNOSIS — M25.512 LEFT SHOULDER PAIN, UNSPECIFIED CHRONICITY: ICD-10-CM

## 2020-09-03 DIAGNOSIS — R73.03 PREDIABETES: Primary | ICD-10-CM

## 2020-09-03 PROCEDURE — 99214 PR OFFICE/OUTPT VISIT, EST, LEVL IV, 30-39 MIN: ICD-10-PCS | Mod: S$PBB,,, | Performed by: FAMILY MEDICINE

## 2020-09-03 PROCEDURE — 99214 OFFICE O/P EST MOD 30 MIN: CPT | Mod: S$PBB,,, | Performed by: FAMILY MEDICINE

## 2020-09-03 PROCEDURE — 99999 PR PBB SHADOW E&M-EST. PATIENT-LVL V: CPT | Mod: PBBFAC,,, | Performed by: FAMILY MEDICINE

## 2020-09-03 PROCEDURE — 99999 PR PBB SHADOW E&M-EST. PATIENT-LVL V: ICD-10-PCS | Mod: PBBFAC,,, | Performed by: FAMILY MEDICINE

## 2020-09-03 PROCEDURE — 99215 OFFICE O/P EST HI 40 MIN: CPT | Mod: PBBFAC,PN | Performed by: FAMILY MEDICINE

## 2020-09-03 NOTE — PROGRESS NOTES
"Subjective:       Patient ID: Buddy Park is a 69 y.o. male.    Chief Complaint: Diabetes (high blood sugars)    HPI   Mr. Park presents to establish with me, has been to this clinic before. Medical and surgical histories reviewed. Medication regimen reviewed. Had a stroke earlier this year that affected his left side. Was in a nursing home but now lives back at home and gets outpatient physical therapy. Had fall about a month ago and has had left shoulder pain ever since. States he fell on his left side. X-ray showed arthritis. Has decr ROM. Doesn't hurt when he's at rest but is very painful with physical therapy.     Hasn't had bloodwork in > 6 months. Takes metformin for "pre diabetes" but still eats sweets somewhat regularly.    Review of Systems   Constitutional: Negative for chills, diaphoresis, fatigue, fever and unexpected weight change.   Respiratory: Negative for cough, shortness of breath, wheezing and stridor.    Cardiovascular: Negative for chest pain, palpitations and leg swelling.   Musculoskeletal: Positive for arthralgias and myalgias. Negative for back pain and joint swelling.   Neurological: Positive for weakness. Negative for tremors, seizures and light-headedness.       Past Medical History:   Diagnosis Date    Angina pectoris     Anxiety     Arthritis     Biliary colic     Cochlear implant in place     Deaf     LEFT EAR    Depression     Heart failure     High cholesterol     History of colon polyps 2/20/2018    [place] (hard of hearing)     HEARING AID - RIGHT    Hyperlipidemia     Hypertension     Kidney stone     Kidney stones     Renal stones     Stroke     Trouble in sleeping     Wears glasses      Past Surgical History:   Procedure Laterality Date    CAROTID ARTERY [facility]GIOPLASTY  06/2018    [facility]     CATARACT EXTRACTION Bilateral     CHOLECYSTECTOMY  2-6-2014    COLONOSCOPY  1/9/2013    Dr. Gillette, 5 year recheck (family history colon cancer)    COLONOSCOPY N/A " 3/12/2018    Procedure: COLONOSCOPY;  Surgeon: Garett Go MD;  Location: [facility] ENDO;  Service: Endoscopy;  Laterality: N/A;    CYSTOSCOPY W/ RETROGRADES Bilateral 10/28/2019    Procedure: CYSTOSCOPY, WITH RETROGRADE PYELOGRAM;  Surgeon: Gretchen Proctor MD;  Location: [facility] OR;  Service: Urology;  Laterality: Bilateral;    CYSTOSCOPY W/ URETERAL STENT PLACEMENT Left 10/28/2019    Procedure: CYSTOSCOPY, WITH URETERAL STENT INSERTION;  Surgeon: Gretchen Proctor MD;  Location: [facility] OR;  Service: Urology;  Laterality: Left;    CYSTOSCOPY W/ URETERAL STENT REMOVAL Left 12/2/2019    Procedure: CYSTOSCOPY, WITH URETERAL STENT REMOVAL;  Surgeon: Gretchen Proctor MD;  Location: [facility] OR;  Service: Urology;  Laterality: Left;    EAR MASTOIDECTOMY W/ COCHLEAR IMPLANT W/ LANDMARK      left ear    EXTRACORPOREAL SHOCK WAVE LITHOTRIPSY Left 12/2/2019    Procedure: LITHOTRIPSY, ESWL;  Surgeon: Gretchen Proctor MD;  Location: [facility] OR;  Service: Urology;  Laterality: Left;    EYE SURGERY      FOREIGN BODY REMOVAL Right     glass removed from right foot/repair    FRACTURE SURGERY      right arm x2    LITHOTRIPSY      ROTATOR CUFF REPAIR      Right     SINUS SURGERY      TONSILLECTOMY      VASCULAR SURGERY       Social History     Socioeconomic History    Marital status:      Spouse name: Not on file    Number of children: Not on file    Years of education: Not on file    Highest education level: Not on file   Occupational History    Not on file   Social Needs    Financial resource strain: Not on file    Food insecurity     Worry: Not on file     Inability: Not on file    Transportation needs     Medical: Not on file     Non-medical: Not on file   Tobacco Use    Smoking status: Never Smoker    Smokeless tobacco: Never Used   Substance and Sexual Activity    Alcohol use: Yes     Comment: once every two months    Drug use: No    Sexual activity: Yes   Lifestyle    Physical activity     Days per  "week: Not on file     Minutes per session: Not on file    Stress: Not on file   Relationships    Social connections     Talks on phone: Not on file     Gets together: Not on file     Attends [employer] service: Not on file     Active member of club or organization: Not on file     Attends meetings of clubs or organizations: Not on file     Relationship status: Not on file   Other Topics Concern    Not on file   Social History Narrative    Not on file     Family History   Problem Relation Age of Onset    Cancer Mother         colon    Heart disease Father     Gout Father     Kidney disease Father     Arthritis Father     Hypertension Father     Early death Daughter         mva    Alcohol abuse Brother     Cancer Brother         mouth cancer w mets    No Known Problems Sister     Alcohol abuse Brother     No Known Problems Son     Heart disease Maternal Grandmother         MI    Stroke Maternal Grandfather     Heart disease Paternal Grandmother         MI    No Known Problems Son     Cancer Paternal Uncle         lung cancer    Allergic rhinitis Neg Hx     Allergies Neg Hx     Angioedema Neg Hx     Asthma Neg Hx     Atopy Neg Hx     Eczema Neg Hx     Immunodeficiency Neg Hx     Rhinitis Neg Hx     Urticaria Neg Hx     Collagen disease Neg Hx        Objective:      /70   Pulse 80   Temp 98.4 °F (36.9 °C) (Tympanic)   Ht 5' 5" (1.651 m)   Wt 63 kg (139 lb)   SpO2 98%   BMI 23.13 kg/m²   Physical Exam  Vitals signs reviewed.   Constitutional:       Appearance: He is normal weight. He is not ill-appearing or diaphoretic.   HENT:      Head: Normocephalic and atraumatic.   Cardiovascular:      Rate and Rhythm: Normal rate and regular rhythm.      Heart sounds: No murmur. No gallop.    Pulmonary:      Effort: Pulmonary effort is normal. No respiratory distress.      Breath sounds: Normal breath sounds. No stridor.   Musculoskeletal:         General: Tenderness present.      Comments: " decr ROM in left shoulder abduction and external rotation   Neurological:      Mental Status: He is alert.         Assessment:       1. Prediabetes    2. Essential hypertension    3. Other hyperlipidemia    4. Left shoulder pain, unspecified chronicity        Plan:       Prediabetes  -     Hemoglobin A1C; Future; Expected date: 09/03/2020    Essential hypertension  -     Lipid Panel; Future; Expected date: 09/03/2020  -     Comprehensive metabolic panel; Future; Expected date: 09/03/2020  -     CBC auto differential; Future; Expected date: 09/03/2020  -     T4, free; Future; Expected date: 09/03/2020  -     TSH; Future; Expected date: 09/03/2020    Other hyperlipidemia  -     Lipid Panel; Future; Expected date: 09/03/2020    Left shoulder pain, unspecified chronicity  -     MRI Shoulder Without Contrast Left; Future; Expected date: 09/03/2020  -     Ambulatory referral/consult to Orthopedics; Future; Expected date: 09/10/2020            Risks, benefits, and side effects were discussed with the patient. All questions were answered to the fullest satisfaction of the patient, and pt verbalized understanding and agreement to treatment plan. Pt was to call with any new or worsening symptoms, or present to the ER.

## 2020-09-05 ENCOUNTER — HOSPITAL ENCOUNTER (OUTPATIENT)
Dept: RADIOLOGY | Facility: HOSPITAL | Age: 70
Discharge: HOME | End: 2020-09-05
Attending: FAMILY MEDICINE
Payer: MEDICARE

## 2020-09-05 ENCOUNTER — LAB VISIT (OUTPATIENT)
Dept: LAB | Facility: HOSPITAL | Age: 70
End: 2020-09-05
Attending: FAMILY MEDICINE
Payer: MEDICARE

## 2020-09-05 DIAGNOSIS — E78.49 OTHER HYPERLIPIDEMIA: ICD-10-CM

## 2020-09-05 DIAGNOSIS — R73.03 PREDIABETES: ICD-10-CM

## 2020-09-05 DIAGNOSIS — M25.512 LEFT SHOULDER PAIN, UNSPECIFIED CHRONICITY: ICD-10-CM

## 2020-09-05 DIAGNOSIS — I10 ESSENTIAL HYPERTENSION: ICD-10-CM

## 2020-09-05 LAB
ALBUMIN SERPL BCP-MCNC: 3.9 G/DL (ref 3.5–5.2)
ALP SERPL-CCNC: 50 U/L (ref 55–135)
ALT SERPL W/O P-5'-P-CCNC: 17 U/L (ref 10–44)
ANION GAP SERPL CALC-SCNC: 10 MMOL/L (ref 8–16)
AST SERPL-CCNC: 16 U/L (ref 10–40)
BASOPHILS # BLD AUTO: 0.02 K/UL (ref 0–0.2)
BASOPHILS NFR BLD: 0.4 % (ref 0–1.9)
BILIRUB SERPL-MCNC: 1.8 MG/DL (ref 0.1–1)
BUN SERPL-MCNC: 12 MG/DL (ref 8–23)
CALCIUM SERPL-MCNC: 9.3 MG/DL (ref 8.7–10.5)
CHLORIDE SERPL-SCNC: 106 MMOL/L (ref 95–110)
CHOLEST SERPL-MCNC: 110 MG/DL (ref 120–199)
CHOLEST/HDLC SERPL: 2.3 {RATIO} (ref 2–5)
CO2 SERPL-SCNC: 26 MMOL/L (ref 23–29)
CREAT SERPL-MCNC: 1.1 MG/DL (ref 0.5–1.4)
DIFFERENTIAL METHOD: ABNORMAL
EOSINOPHIL # BLD AUTO: 0.1 K/UL (ref 0–0.5)
EOSINOPHIL NFR BLD: 1.7 % (ref 0–8)
ERYTHROCYTE [DISTWIDTH] IN BLOOD BY AUTOMATED COUNT: 13.3 % (ref 11.5–14.5)
EST. GFR  (AFRICAN AMERICAN): >60 ML/MIN/1.73 M^2
EST. GFR  (NON AFRICAN AMERICAN): >60 ML/MIN/1.73 M^2
GLUCOSE SERPL-MCNC: 93 MG/DL (ref 70–110)
HCT VFR BLD AUTO: 37.8 % (ref 40–54)
HDLC SERPL-MCNC: 48 MG/DL (ref 40–75)
HDLC SERPL: 43.6 % (ref 20–50)
HGB BLD-MCNC: 12.7 G/DL (ref 14–18)
IMM GRANULOCYTES # BLD AUTO: 0.01 K/UL (ref 0–0.04)
IMM GRANULOCYTES NFR BLD AUTO: 0.2 % (ref 0–0.5)
LDLC SERPL CALC-MCNC: 45.2 MG/DL (ref 63–159)
LYMPHOCYTES # BLD AUTO: 2.1 K/UL (ref 1–4.8)
LYMPHOCYTES NFR BLD: 44.7 % (ref 18–48)
MCH RBC QN AUTO: 30.1 PG (ref 27–31)
MCHC RBC AUTO-ENTMCNC: 33.6 G/DL (ref 32–36)
MCV RBC AUTO: 90 FL (ref 82–98)
MONOCYTES # BLD AUTO: 0.4 K/UL (ref 0.3–1)
MONOCYTES NFR BLD: 8.3 % (ref 4–15)
NEUTROPHILS # BLD AUTO: 2.1 K/UL (ref 1.8–7.7)
NEUTROPHILS NFR BLD: 44.7 % (ref 38–73)
NONHDLC SERPL-MCNC: 62 MG/DL
NRBC BLD-RTO: 0 /100 WBC
PLATELET # BLD AUTO: 173 K/UL (ref 150–350)
PMV BLD AUTO: 9.8 FL (ref 9.2–12.9)
POTASSIUM SERPL-SCNC: 3.4 MMOL/L (ref 3.5–5.1)
PROT SERPL-MCNC: 6.6 G/DL (ref 6–8.4)
RBC # BLD AUTO: 4.22 M/UL (ref 4.6–6.2)
SODIUM SERPL-SCNC: 142 MMOL/L (ref 136–145)
T4 FREE SERPL-MCNC: 0.77 NG/DL (ref 0.71–1.51)
TRIGL SERPL-MCNC: 84 MG/DL (ref 30–150)
TSH SERPL DL<=0.005 MIU/L-ACNC: 2.44 UIU/ML (ref 0.34–5.6)
WBC # BLD AUTO: 4.68 K/UL (ref 3.9–12.7)

## 2020-09-05 PROCEDURE — 83036 HEMOGLOBIN GLYCOSYLATED A1C: CPT

## 2020-09-05 PROCEDURE — 80061 LIPID PANEL: CPT

## 2020-09-05 PROCEDURE — 84439 ASSAY OF FREE THYROXINE: CPT

## 2020-09-05 PROCEDURE — 80053 COMPREHEN METABOLIC PANEL: CPT

## 2020-09-05 PROCEDURE — 84443 ASSAY THYROID STIM HORMONE: CPT

## 2020-09-05 PROCEDURE — 85025 COMPLETE CBC W/AUTO DIFF WBC: CPT

## 2020-09-05 PROCEDURE — 36415 COLL VENOUS BLD VENIPUNCTURE: CPT

## 2020-09-07 LAB
ESTIMATED AVG GLUCOSE: 103 MG/DL (ref 68–131)
HBA1C MFR BLD HPLC: 5.2 % (ref 4.5–6.2)

## 2020-09-08 ENCOUNTER — TELEPHONE (OUTPATIENT)
Dept: FAMILY MEDICINE | Facility: CLINIC | Age: 70
End: 2020-09-08

## 2020-09-08 DIAGNOSIS — M25.512 LEFT SHOULDER PAIN, UNSPECIFIED CHRONICITY: ICD-10-CM

## 2020-09-08 DIAGNOSIS — Z79.899 ENCOUNTER FOR LONG-TERM CURRENT USE OF MEDICATION: ICD-10-CM

## 2020-09-08 DIAGNOSIS — D64.9 ANEMIA, UNSPECIFIED TYPE: Primary | ICD-10-CM

## 2020-09-08 NOTE — PROGRESS NOTES
Please let Mr. Park know that his bloodwork looks good except for anemia, which is new. I'm ordering additional labs to work this up. Please schedule a lab appt for him, doesn't need to be fasting. Thanks

## 2020-09-09 ENCOUNTER — TELEPHONE (OUTPATIENT)
Dept: FAMILY MEDICINE | Facility: CLINIC | Age: 70
End: 2020-09-09

## 2020-09-09 ENCOUNTER — HOSPITAL ENCOUNTER (OUTPATIENT)
Dept: RADIOLOGY | Facility: HOSPITAL | Age: 70
Discharge: HOME | End: 2020-09-09
Attending: FAMILY MEDICINE
Payer: MEDICARE

## 2020-09-09 DIAGNOSIS — M25.512 LEFT SHOULDER PAIN, UNSPECIFIED CHRONICITY: ICD-10-CM

## 2020-09-09 PROCEDURE — 73200 CT SHOULDER WITHOUT CONTRAST LEFT: ICD-10-PCS | Mod: 26,LT,S$GLB, | Performed by: RADIOLOGY

## 2020-09-09 PROCEDURE — 73200 CT UPPER EXTREMITY W/O DYE: CPT | Mod: TC,PN,LT

## 2020-09-09 PROCEDURE — 73200 CT UPPER EXTREMITY W/O DYE: CPT | Mod: 26,LT,S$GLB, | Performed by: RADIOLOGY

## 2020-09-09 NOTE — TELEPHONE ENCOUNTER
----- Message from Stefany Pinedo sent at 9/9/2020  2:15 PM CDT -----  Regarding: Ct Scan Results  Contact: results  Type:  Test Results  Who Called:  patient  Name of Test (Lab/Mammo/Etc):  CT scan  Date of Test:  9/9/20  Ordering Provider:  bharath  Where the test was performed:  [facility]  Best Call Back Number:  [phone]  Additional Information:  please call to advise.  Thanks!

## 2020-09-10 ENCOUNTER — TELEPHONE (OUTPATIENT)
Dept: FAMILY MEDICINE | Facility: CLINIC | Age: 70
End: 2020-09-10

## 2020-09-10 NOTE — TELEPHONE ENCOUNTER
Please review CT result and advise. Pt wants to start therapy. Thanks.          ----- Message from Teetee Douglass sent at 9/10/2020  8:37 AM CDT -----  Type: Needs Medical Advice  Who Called:  Patient  Best Call Back Number: [phone] (home)   Additional Information: The patient wants to know his test result so he can know what to do going forward but therapy will not let him go on until he get his results please call to advise

## 2020-09-10 NOTE — TELEPHONE ENCOUNTER
Please advise. Thanks.                  ----- Message from Katherin Parks sent at 9/10/2020  3:34 PM CDT -----  Regarding: advice  Contact: patient  Type:  Patient Returning Call    Who Called:  self  Who Left Message for Patient:  Augustina  Does the patient know what this is regarding?:  yes  Best Call Back Number:  [phone] (home)   Additional Information:  Patient spoke to you this morning regarding his fracture. Patient is schedule for Botox on Tuesday. He ask questions about getting the injections and how it will affect him. Please call patient. Thanks!

## 2020-09-10 NOTE — TELEPHONE ENCOUNTER
Pt notified of results and to stop PT until he sees ortho.  Ortho appt scheduled 9.24.2020 in .  Pt voiced understanding. No other concerns at this time.

## 2020-09-10 NOTE — TELEPHONE ENCOUNTER
Please let him know that it shows he may have a small fracture of his acromion, which is in front of his shoulder. I don't want him to do any more physical therapy until he's seen ortho. Thanks

## 2020-09-11 NOTE — TELEPHONE ENCOUNTER
Pt gets Botox in his left arm to aid with physical therapy after a CVA that affected his left side. Has fractured his left arm recently. Botox is due next Tuesday. Instructed pt to contact Dr Bates, who gives the Botox, for further instructions on how to proceed. Thanks.

## 2020-09-17 ENCOUNTER — EXTERNAL HOME HEALTH (OUTPATIENT)
Dept: HOME HEALTH SERVICES | Facility: HOSPITAL | Age: 70
End: 2020-09-17

## 2020-09-23 ENCOUNTER — PATIENT OUTREACH (OUTPATIENT)
Dept: ADMINISTRATIVE | Facility: OTHER | Age: 70
End: 2020-09-23

## 2020-09-23 NOTE — PROGRESS NOTES
Health Maintenance Due   Topic Date Due    Shingles Vaccine (1 of 2) 12/16/2002    Pneumococcal Vaccine (65+ High/Highest Risk) (1 of 2 - PCV13) 12/16/2017    Influenza Vaccine (1) 08/01/2020     Updates were requested from care everywhere.  Chart was reviewed for overdue Proactive [facility] Encounters (EFRAÍN) topics (CRS, Breast Cancer Screening, Eye exam)  Health Maintenance has been updated.  LINKS immunization registry triggered.  Immunizations were not able to be reconciled. Patient not found in LINKS.

## 2020-09-24 ENCOUNTER — OFFICE VISIT (OUTPATIENT)
Dept: ORTHOPEDICS | Facility: CLINIC | Age: 70
End: 2020-09-24
Payer: MEDICARE

## 2020-09-24 VITALS
OXYGEN SATURATION: 98 % | BODY MASS INDEX: 23.16 KG/M2 | TEMPERATURE: 98 F | HEIGHT: 65 IN | WEIGHT: 139 LBS | RESPIRATION RATE: 18 BRPM | HEART RATE: 85 BPM

## 2020-09-24 DIAGNOSIS — R25.2 SPASTICITY: ICD-10-CM

## 2020-09-24 DIAGNOSIS — M25.512 LEFT SHOULDER PAIN, UNSPECIFIED CHRONICITY: ICD-10-CM

## 2020-09-24 DIAGNOSIS — M19.012 ARTHRITIS OF LEFT ACROMIOCLAVICULAR JOINT: ICD-10-CM

## 2020-09-24 DIAGNOSIS — M75.82 ROTATOR CUFF TENDINITIS, LEFT: Primary | ICD-10-CM

## 2020-09-24 DIAGNOSIS — S42.125A CLOSED NONDISPLACED FRACTURE OF LEFT ACROMIAL PROCESS, INITIAL ENCOUNTER: ICD-10-CM

## 2020-09-24 DIAGNOSIS — M19.012 GLENOHUMERAL ARTHRITIS, LEFT: ICD-10-CM

## 2020-09-24 PROCEDURE — 20610 DRAIN/INJ JOINT/BURSA W/O US: CPT | Mod: LT,S$GLB,, | Performed by: ORTHOPAEDIC SURGERY

## 2020-09-24 PROCEDURE — 20610 LARGE JOINT ASPIRATION/INJECTION: L GLENOHUMERAL: ICD-10-PCS | Mod: LT,S$GLB,, | Performed by: ORTHOPAEDIC SURGERY

## 2020-09-24 PROCEDURE — 3008F PR BODY MASS INDEX (BMI) DOCUMENTED: ICD-10-PCS | Mod: CPTII,S$GLB,, | Performed by: ORTHOPAEDIC SURGERY

## 2020-09-24 PROCEDURE — 99999 PR PBB SHADOW E&M-EST. PATIENT-LVL IV: ICD-10-PCS | Mod: PBBFAC,,, | Performed by: ORTHOPAEDIC SURGERY

## 2020-09-24 PROCEDURE — 1159F MED LIST DOCD IN RCRD: CPT | Mod: S$GLB,,, | Performed by: ORTHOPAEDIC SURGERY

## 2020-09-24 PROCEDURE — 99999 PR PBB SHADOW E&M-EST. PATIENT-LVL IV: CPT | Mod: PBBFAC,,, | Performed by: ORTHOPAEDIC SURGERY

## 2020-09-24 PROCEDURE — 1101F PR PT FALLS ASSESS DOC 0-1 FALLS W/OUT INJ PAST YR: ICD-10-PCS | Mod: CPTII,S$GLB,, | Performed by: ORTHOPAEDIC SURGERY

## 2020-09-24 PROCEDURE — 1159F PR MEDICATION LIST DOCUMENTED IN MEDICAL RECORD: ICD-10-PCS | Mod: S$GLB,,, | Performed by: ORTHOPAEDIC SURGERY

## 2020-09-24 PROCEDURE — 99213 PR OFFICE/OUTPT VISIT, EST, LEVL III, 20-29 MIN: ICD-10-PCS | Mod: 25,S$GLB,, | Performed by: ORTHOPAEDIC SURGERY

## 2020-09-24 PROCEDURE — 1126F PR PAIN SEVERITY QUANTIFIED, NO PAIN PRESENT: ICD-10-PCS | Mod: S$GLB,,, | Performed by: ORTHOPAEDIC SURGERY

## 2020-09-24 PROCEDURE — 1126F AMNT PAIN NOTED NONE PRSNT: CPT | Mod: S$GLB,,, | Performed by: ORTHOPAEDIC SURGERY

## 2020-09-24 PROCEDURE — 1101F PT FALLS ASSESS-DOCD LE1/YR: CPT | Mod: CPTII,S$GLB,, | Performed by: ORTHOPAEDIC SURGERY

## 2020-09-24 PROCEDURE — 3008F BODY MASS INDEX DOCD: CPT | Mod: CPTII,S$GLB,, | Performed by: ORTHOPAEDIC SURGERY

## 2020-09-24 PROCEDURE — 99213 OFFICE O/P EST LOW 20 MIN: CPT | Mod: 25,S$GLB,, | Performed by: ORTHOPAEDIC SURGERY

## 2020-09-24 RX ORDER — TRIAMCINOLONE ACETONIDE 40 MG/ML
40 INJECTION, SUSPENSION INTRA-ARTICULAR; INTRAMUSCULAR
Status: DISCONTINUED | OUTPATIENT
Start: 2020-09-24 | End: 2020-09-24 | Stop reason: HOSPADM

## 2020-09-24 RX ADMIN — TRIAMCINOLONE ACETONIDE 40 MG: 40 INJECTION, SUSPENSION INTRA-ARTICULAR; INTRAMUSCULAR at 02:09

## 2020-09-24 NOTE — PROCEDURES
Large Joint Aspiration/Injection: L glenohumeral    Date/Time: 9/24/2020 2:30 PM  Performed by: Macario Broussard DO  Authorized by: Macario Broussard, DO     Consent Done?:  Yes (Verbal)  Indications:  Diagnostic evaluation and pain  Site marked: the procedure site was marked    Timeout: prior to procedure the correct patient, procedure, and site was verified    Prep: patient was prepped and draped in usual sterile fashion      Details:  Needle Size:  22 G  Ultrasonic Guidance for needle placement?: No    Approach:  Posterior  Location:  Shoulder  Site:  L glenohumeral  Medications:  40 mg triamcinolone acetonide 40 mg/mL  Patient tolerance:  Patient tolerated the procedure well with no immediate complications

## 2020-09-24 NOTE — LETTER
September 24, 2020      Tsering Mercado DO  [address] [place] [address]           [facility]  [address]  [place] [address]  Phone: [phone]  Fax: [phone]          Patient: Buddy Park   MR Number: [id number]   YOB: 1950   Date of Visit: 9/24/2020       Dear Dr. Tsering Mercado:    Thank you for referring Buddy Park to me for evaluation. Attached you will find relevant portions of my assessment and plan of care.    If you have questions, please do not hesitate to call me. I look forward to following Buddy Park along with you.    Sincerely,    Macario Broussard,     Enclosure  CC:  No Recipients    If you would like to receive this communication electronically, please contact [email or web address] or [phone] to request more information on [employer] Link access.    For providers and/or their staff who would like to refer a patient to [facility], please contact us through our one-stop-shop provider referral line, [facility], at [phone].    If you feel you have received this communication in error or would no longer like to receive these types of communications, please e-mail [email or web address]

## 2020-09-24 NOTE — PROGRESS NOTES
Subjective:      Patient ID: Buddy Park is a 69 y.o. male.    Chief Complaint: Pain of the Left Shoulder      HPI:  Mr. Park returns today with complaints of left shoulder pay.  He stated that he fell approximately 5 weeks ago and landed on his shoulder.  When he goes to physical therapy and gets to approximately 90° of abduction or forward flexion he starts to have increased pain which inhibits his ability to move.  He has extensive muscle spasm on his left side.  He had a stroke that she year.  Yesterday he was at his neurologist to gave him Botox injections.  Abduction and forward flexion increases his symptoms while not moving his shoulder decreases them.  He has not taken NSAIDs nor had injections.  He has been going to physical therapy which does help but when he gets to a certain position he has pain.    ROS:  No new diagnosis/surgery/prescriptions since last office visit on 05/20/2020.  Constitution: Negative for chills and fever.   HENT: Negative for congestion.    Eyes: Negative for blurred vision.   Cardiovascular: Negative for chest pain.   Respiratory: Negative for cough.    Endocrine: Negative for polydipsia.   Hematologic/Lymphatic: Negative for adenopathy.   Skin: Negative for flushing and itching.   Musculoskeletal: Positive for gout and joint pain.   Gastrointestinal: Positive for diarrhea. Negative for constipation and heartburn.   Genitourinary: Negative for nocturia.   Neurological: Negative for headaches and seizures.   Psychiatric/Behavioral: Negative for depression and substance abuse. The patient is nervous/anxious.    Allergic/Immunologic: Negative for environmental allergies.       Objective:      Physical Exam:   General: AAOx3.  No acute distress  Vascular:  Pulses intact and equal bilaterally.  Capillary refill less than 3 seconds and equal bilaterally  Neurologic:  Pinprick and soft touch intact and equal bilaterally  Integment:  No ecchymosis, no errythema  Extremity:   Shoulder:  Active forward flexion/abduction 0/90 degrees.  Passive 0/120 degrees.  Tender with motion left shoulder.  Tender with palpation left shoulder.  Muscle spasming of the shoulder girdle left side.  Full can negative both shoulders.  Empty can negative both shoulders.  Pina/Neer positive left shoulder.  Cross-arm mildly positive left shoulder.  Minimal tenderness over the distal acromion left shoulder.  Nontender in the bicipital groove bilaterally.  Yergason's negative bilaterally.  Spasticity of the patient's left upper extremity to include maintaining his elbow in flexion and his wrist in volar flexion and his fingers in flexion.  Tender with extension of the fingers of the left upper extremity, wrist to the left upper and elbow.  Radiography:  Personally reviewed x-rays of the left shoulder completed on 08/10/2020 showed AC arthritis, glenohumeral arthritis and sclerosis of the greater tuberosity.  CT scan of the left shoulder completed on 09/09/2020 showed glenohumeral arthritis with cyst at the humeral head AC arthritis and an old anatomically aligned acromion fracture.      Assessment:       Impression:     1.  2.  3.  4.  5. Rotator cuff tendinitis, left shoulder.  AC arthritis, left shoulder.  Glenohumeral arthritis, left shoulder.  Old acromion fracture, left shoulder  Left upper extremities spasticity.         Plan:       1.  Discussed physical examination and radiographic findings with the patient. Buddy understands that he has rotator cuff tendinitis and has an old acromion fracture which at this point even if it was acute 5-6 weeks ago is healed enough where he should be able to start moving his arm.  He is extremely spastic in his left upper extremity and needs to start doing aggressive motion exercises so he does not get a flexion contracture of his fingers, wrist, elbow, and shoulder.  2.  Offered a steroid injection to the left shoulder, he elected to proceed.  3.  Refer to physical  therapy to do aggressive motion exercises of the patient's hand wrist elbow and shoulder along with work with the patient on ambulation and balance.  4.  Cock-up wrist splint to the left wrist to maintain a neutral position, 1 was fitted to the patient.  5.  Home exercises to include fingers extension, wrist extension, elbow extension and shoulder abduction exercises were discussed with the patient.  6.  Follow up in approximately 6 weeks x-ray left shoulder

## 2020-09-28 ENCOUNTER — PES CALL (OUTPATIENT)
Dept: ADMINISTRATIVE | Facility: CLINIC | Age: 70
End: 2020-09-28

## 2020-10-02 DIAGNOSIS — M25.512 LEFT SHOULDER PAIN, UNSPECIFIED CHRONICITY: ICD-10-CM

## 2020-10-02 DIAGNOSIS — M75.82 ROTATOR CUFF TENDINITIS, LEFT: Primary | ICD-10-CM

## 2020-10-06 ENCOUNTER — CLINICAL SUPPORT (OUTPATIENT)
Dept: REHABILITATION | Facility: HOSPITAL | Age: 70
End: 2020-10-06
Payer: MEDICARE

## 2020-10-06 DIAGNOSIS — M75.82 ROTATOR CUFF TENDINITIS, LEFT: ICD-10-CM

## 2020-10-06 DIAGNOSIS — S46.012A TRAUMATIC COMPLETE TEAR OF LEFT ROTATOR CUFF, INITIAL ENCOUNTER: Primary | ICD-10-CM

## 2020-10-06 DIAGNOSIS — M25.512 LEFT SHOULDER PAIN, UNSPECIFIED CHRONICITY: ICD-10-CM

## 2020-10-09 ENCOUNTER — DOCUMENT SCAN (OUTPATIENT)
Dept: HOME HEALTH SERVICES | Facility: HOSPITAL | Age: 70
End: 2020-10-09
Payer: MEDICARE

## 2020-10-12 ENCOUNTER — CLINICAL SUPPORT (OUTPATIENT)
Dept: REHABILITATION | Facility: HOSPITAL | Age: 70
End: 2020-10-12
Payer: MEDICARE

## 2020-10-12 DIAGNOSIS — I63.81 CEREBROVASCULAR ACCIDENT (CVA) DUE TO OCCLUSION OF SMALL ARTERY: Primary | ICD-10-CM

## 2020-10-12 PROCEDURE — 97110 THERAPEUTIC EXERCISES: CPT | Mod: PN

## 2020-10-12 PROCEDURE — 97010 HOT OR COLD PACKS THERAPY: CPT | Mod: PN

## 2020-10-12 PROCEDURE — 97166 OT EVAL MOD COMPLEX 45 MIN: CPT | Mod: PN

## 2020-10-16 ENCOUNTER — CLINICAL SUPPORT (OUTPATIENT)
Dept: REHABILITATION | Facility: HOSPITAL | Age: 70
End: 2020-10-16
Payer: MEDICARE

## 2020-10-16 DIAGNOSIS — I63.81 CEREBROVASCULAR ACCIDENT (CVA) DUE TO OCCLUSION OF SMALL ARTERY: Primary | ICD-10-CM

## 2020-10-16 PROCEDURE — 97032 APPL MODALITY 1+ESTIM EA 15: CPT | Mod: PN

## 2020-10-16 PROCEDURE — 97110 THERAPEUTIC EXERCISES: CPT | Mod: PN

## 2020-10-16 NOTE — PROGRESS NOTES
"Patient:  Buddy Park  [facility] #:  [id number]   Date of Note: 10/16/2020   Referring Physician:  Macario Broussard, DO  Diagnosis:  CVA  Encounter Diagnosis   Name Primary?    Cerebrovascular accident (CVA) due to occlusion of small artery Yes        Start Time: 855  End Time: 930  Total Time: 35 min        Subjective: "My left hand feels cold."    Pain: Patient did not report any pain during session.    Objective:   Patient seen by OT this session. Patient was eager to engage in skilled outpatient therapy session.  Patient continues to be noted with no active movement in LUE. Patient noted with swelling on dorsal aspect of left hand.    Treatment: s.  -OT performed LUE PROM at all joints in all planes of movement 5x10 reps.  -OT provided PPS for elbow extension of LUE.  -Electrical stimulation was applied to LUE for 15 minutes.  -Patient was thoroughly educated on HEP to perform daily.       Assessment:  Patient tolerated skilled outpatient OT session well.  Patient is motivated to improve functional deficits.  Patient noted with no active movement of LUE.  OT provided LUE PROM at all joints in all planes of movement.  OT also provided PPS to LUE for elbow extension in effort to inhibit tone.              Plan:  Continue with current POC 2x per week.    MALIK Woodall/L                 "

## 2020-10-19 ENCOUNTER — TELEPHONE (OUTPATIENT)
Dept: FAMILY MEDICINE | Facility: CLINIC | Age: 70
End: 2020-10-19

## 2020-10-19 ENCOUNTER — CLINICAL SUPPORT (OUTPATIENT)
Dept: REHABILITATION | Facility: HOSPITAL | Age: 70
End: 2020-10-19
Payer: MEDICARE

## 2020-10-19 DIAGNOSIS — I63.81 CEREBROVASCULAR ACCIDENT (CVA) DUE TO OCCLUSION OF SMALL ARTERY: Primary | ICD-10-CM

## 2020-10-19 PROCEDURE — 97110 THERAPEUTIC EXERCISES: CPT | Mod: PN

## 2020-10-19 NOTE — TELEPHONE ENCOUNTER
----- Message from Rani Anderson sent at 10/19/2020  3:58 PM CDT -----  Contact: Patient  Type:  Sooner Appointment Request    Name of Caller:  Patient  When is the first available appointment?  10/27  Symptoms:  dizzy spells &  cold sweats   Off and on for several weeks.   Best Call Back Number:  [phone]  Additional Information:   Pt not sure if this is blood sugar related, states he is pre-diabetic. Feels weak and is worried he may pass out again. Saw Dr. Mercado after he passed out about a month ago.  Requesting to be seen asap to have levels checked & get some lab work to see what is going on.

## 2020-10-19 NOTE — PROGRESS NOTES
"Patient:  Buddy Park  [facility] #:  [id number]   Date of Note: 10/19/2020   Referring Physician:  Macario Broussard, DO  Diagnosis:  CVA  Encounter Diagnosis   Name Primary?    Cerebrovascular accident (CVA) due to occlusion of small artery Yes        Start Time: 930  End Time: 1015  Total Time: 45 min        Subjective: "I feel ok."    Pain: Patient did not report any pain during session.    Objective:   Patient seen by OT this session. Patient was eager to engage in skilled outpatient therapy session.  Patient reported not feeling well during session.  Patient's BP was taken immediately and was 92/54.  Patient was instructed to rest during session and second BP reading was 123/63.  Patient continues to be noted with no active movement in LUE. Patient noted with swelling on dorsal aspect of left hand.    Treatment:   -OT performed LUE PROM at all joints in all planes of movement 5x10 reps.  -OT provided PPS for elbow extension of LUE.  -Patient performed UBE with moderate resistance with left hand strapped on for 5 minutes.       Assessment:  Patient tolerated skilled outpatient OT session well.  Patient is motivated to improve functional deficits.    Patient reported not feeling well during session.  Patient's BP was taken immediately and was 92/54.  Patient was instructed to rest during session and second BP reading was 123/63. Patient noted with no active movement of LUE.  OT provided LUE PROM at all joints in all planes of movement.  Plan:  Continue with current POC 2x per week.    Yamil Lopez OTR/L                   "

## 2020-10-21 ENCOUNTER — NURSE TRIAGE (OUTPATIENT)
Dept: ADMINISTRATIVE | Facility: CLINIC | Age: 70
End: 2020-10-21

## 2020-10-21 NOTE — TELEPHONE ENCOUNTER
If he feels fine now, he doesn't have to go the ED. Let's see if we can fit him in before his appointment.

## 2020-10-21 NOTE — TELEPHONE ENCOUNTER
Spoke with pt:  Pt had a stroke 8 months ago. Goes to therapy for arm. Was at therapy on Monday --became pale and presyncopal.On Monday BP= 90/62,recheck 95/87, last check 103/70.      had syncope 1 month ago. Called MD office Monday but did not talk with staff- calling again.   Right now states  feels good. Has therapy again tomorrow and wants to address prior to therapy. Does feel fatigued. Feel weaker than normal, can do regular activities but slower.     Today LY=165/50, unknown HR normally would be 120/80. BP Has been lower for 1 week. Protocol advice given and pt verbalizes understanding.       Reason for Disposition   Fall in systolic BP > 20 mm Hg from normal and feeling dizzy, lightheaded, or weak    Additional Information   Negative: Unconscious or difficult to awaken   Negative: Seizure occurs   Negative: Acting confused (e.g., disoriented, slurred speech)   Negative: Very weak (can't stand)   Negative: Sounds like a life-threatening emergency to the triager   Negative: Severe difficulty breathing (e.g., struggling for each breath, speaks in single words)   Negative: Shock suspected (e.g., cold/pale/clammy skin, too weak to stand, low BP, rapid pulse)   Negative: Difficult to awaken or acting confused (e.g., disoriented, slurred speech)   Negative: Fainted > 15 minutes ago and still feels too weak or dizzy to stand   Negative: SEVERE weakness (i.e., unable to walk or barely able to walk, requires support) and new onset or worsening   Negative: Sounds like a life-threatening emergency to the triager   Negative: Difficulty breathing   Negative: Heart beating < 50 beats per minute OR > 140 beats per minute   Negative: Extra heartbeats OR irregular heart beating (i.e., 'palpitations')   Negative: Follows bleeding (e.g., from vomiting, rectum, vagina) (Exception: small transient weakness from sight of a small amount blood)   Negative: Bloody, black, or tarry bowel movements (Exception:  "chronic-unchanged  black-grey bowel movements and is taking iron pills or Pepto-bismol)   Negative: MODERATE weakness from poor fluid intake with no improvement after 2 hours of rest and fluids   Negative: Drinking very little and dehydration suspected (e.g., no urine > 12 hours, very dry mouth, very lightheaded)   Negative: Systolic BP < 90 and feeling dizzy, lightheaded, or weak   Negative: Started suddenly after an allergic medicine, an allergic food, or bee sting   Negative: Shock suspected (e.g., cold/pale/clammy skin, too weak to stand, low BP, rapid pulse)   Negative: Difficult to awaken or acting confused  (e.g., disoriented, slurred speech)   Negative: Chest pain   Negative: Fainted   Negative: Bleeding (e.g., vomiting blood, rectal bleeding or tarry stools, severe vaginal bleeding)   Negative: Extra heart beats or heart is beating fast  (i.e., "palpitations")   Negative: Sounds like a life-threatening emergency to the triager   Negative: Systolic BP < 80 and NOT dizzy, lightheaded or weak (feels normal)   Negative: Abdominal pain   Negative: Major surgery in the past month   Negative: Fever > 100.4 F (38.0 C)   Negative: Drinking very little and has signs of dehydration (e.g., no urine > 12 hours, very dry mouth, very lightheaded)    Protocols used: LOW BLOOD PRESSURE-A-OH, DIABETES - LOW BLOOD SUGAR-A-OH, WEAKNESS (GENERALIZED) AND FATIGUE-A-OH      "

## 2020-10-21 NOTE — TELEPHONE ENCOUNTER
Pt states he is feeling good at this time.   Appt scheduled tomorrow morning at 10:40am with PCP.  No other concerns at this time.

## 2020-10-22 ENCOUNTER — LAB VISIT (OUTPATIENT)
Dept: LAB | Facility: HOSPITAL | Age: 70
End: 2020-10-22
Attending: FAMILY MEDICINE
Payer: MEDICARE

## 2020-10-22 ENCOUNTER — OFFICE VISIT (OUTPATIENT)
Dept: FAMILY MEDICINE | Facility: CLINIC | Age: 70
End: 2020-10-22
Payer: MEDICARE

## 2020-10-22 VITALS
DIASTOLIC BLOOD PRESSURE: 71 MMHG | TEMPERATURE: 99 F | WEIGHT: 135.38 LBS | HEIGHT: 65 IN | HEART RATE: 74 BPM | BODY MASS INDEX: 22.56 KG/M2 | OXYGEN SATURATION: 96 % | SYSTOLIC BLOOD PRESSURE: 119 MMHG

## 2020-10-22 DIAGNOSIS — Z79.899 ENCOUNTER FOR LONG-TERM CURRENT USE OF MEDICATION: ICD-10-CM

## 2020-10-22 DIAGNOSIS — D64.9 ANEMIA, UNSPECIFIED TYPE: ICD-10-CM

## 2020-10-22 DIAGNOSIS — I95.9 HYPOTENSION, UNSPECIFIED HYPOTENSION TYPE: ICD-10-CM

## 2020-10-22 DIAGNOSIS — I95.9 HYPOTENSION, UNSPECIFIED HYPOTENSION TYPE: Primary | ICD-10-CM

## 2020-10-22 LAB
BILIRUB DIRECT SERPL-MCNC: 0.3 MG/DL (ref 0.1–0.3)
IRON SERPL-MCNC: 76 UG/DL (ref 45–160)
LDH SERPL L TO P-CCNC: 132 U/L (ref 110–260)
SATURATED IRON: 20 % (ref 20–50)
TOTAL IRON BINDING CAPACITY: 371 UG/DL (ref 250–450)
TRANSFERRIN SERPL-MCNC: 251 MG/DL (ref 200–375)

## 2020-10-22 PROCEDURE — 3008F BODY MASS INDEX DOCD: CPT | Mod: CPTII,S$GLB,, | Performed by: FAMILY MEDICINE

## 2020-10-22 PROCEDURE — 1126F AMNT PAIN NOTED NONE PRSNT: CPT | Mod: S$GLB,,, | Performed by: FAMILY MEDICINE

## 2020-10-22 PROCEDURE — 3078F DIAST BP <80 MM HG: CPT | Mod: CPTII,S$GLB,, | Performed by: FAMILY MEDICINE

## 2020-10-22 PROCEDURE — 1159F PR MEDICATION LIST DOCUMENTED IN MEDICAL RECORD: ICD-10-PCS | Mod: S$GLB,,, | Performed by: FAMILY MEDICINE

## 2020-10-22 PROCEDURE — 82746 ASSAY OF FOLIC ACID SERUM: CPT

## 2020-10-22 PROCEDURE — 99214 OFFICE O/P EST MOD 30 MIN: CPT | Mod: S$GLB,,, | Performed by: FAMILY MEDICINE

## 2020-10-22 PROCEDURE — 82248 BILIRUBIN DIRECT: CPT

## 2020-10-22 PROCEDURE — 83540 ASSAY OF IRON: CPT

## 2020-10-22 PROCEDURE — 1126F PR PAIN SEVERITY QUANTIFIED, NO PAIN PRESENT: ICD-10-PCS | Mod: S$GLB,,, | Performed by: FAMILY MEDICINE

## 2020-10-22 PROCEDURE — 82607 VITAMIN B-12: CPT

## 2020-10-22 PROCEDURE — 1159F MED LIST DOCD IN RCRD: CPT | Mod: S$GLB,,, | Performed by: FAMILY MEDICINE

## 2020-10-22 PROCEDURE — 83615 LACTATE (LD) (LDH) ENZYME: CPT

## 2020-10-22 PROCEDURE — 1101F PR PT FALLS ASSESS DOC 0-1 FALLS W/OUT INJ PAST YR: ICD-10-PCS | Mod: CPTII,S$GLB,, | Performed by: FAMILY MEDICINE

## 2020-10-22 PROCEDURE — 99214 PR OFFICE/OUTPT VISIT, EST, LEVL IV, 30-39 MIN: ICD-10-PCS | Mod: S$GLB,,, | Performed by: FAMILY MEDICINE

## 2020-10-22 PROCEDURE — 3008F PR BODY MASS INDEX (BMI) DOCUMENTED: ICD-10-PCS | Mod: CPTII,S$GLB,, | Performed by: FAMILY MEDICINE

## 2020-10-22 PROCEDURE — 1101F PT FALLS ASSESS-DOCD LE1/YR: CPT | Mod: CPTII,S$GLB,, | Performed by: FAMILY MEDICINE

## 2020-10-22 PROCEDURE — 3078F PR MOST RECENT DIASTOLIC BLOOD PRESSURE < 80 MM HG: ICD-10-PCS | Mod: CPTII,S$GLB,, | Performed by: FAMILY MEDICINE

## 2020-10-22 PROCEDURE — 99999 PR PBB SHADOW E&M-EST. PATIENT-LVL IV: CPT | Mod: PBBFAC,,, | Performed by: FAMILY MEDICINE

## 2020-10-22 PROCEDURE — 36415 COLL VENOUS BLD VENIPUNCTURE: CPT

## 2020-10-22 PROCEDURE — 3074F SYST BP LT 130 MM HG: CPT | Mod: CPTII,S$GLB,, | Performed by: FAMILY MEDICINE

## 2020-10-22 PROCEDURE — 99999 PR PBB SHADOW E&M-EST. PATIENT-LVL IV: ICD-10-PCS | Mod: PBBFAC,,, | Performed by: FAMILY MEDICINE

## 2020-10-22 PROCEDURE — 3074F PR MOST RECENT SYSTOLIC BLOOD PRESSURE < 130 MM HG: ICD-10-PCS | Mod: CPTII,S$GLB,, | Performed by: FAMILY MEDICINE

## 2020-10-22 NOTE — PROGRESS NOTES
Subjective:       Patient ID: Buddy Park is a 69 y.o. male.    Chief Complaint: Follow-up (HTN, Wt loss, fatigue)    HPI   Patient presents for follow-up. Has had increased weakness and fatigue over the past several weeks. Passed out 2 weeks ago. Was hypotensive at physical therapy. Labs from last month show hgb/hct were low; b12/folate/iron ordered but haven't been done yet. No blood in his urine. Doesn't look at his stool, no blood on the toilet paper. Denies headaches, abdominal pain.    Review of Systems   Constitutional: Positive for fatigue and unexpected weight change. Negative for chills and fever.   Gastrointestinal: Negative for anal bleeding, diarrhea, nausea and vomiting.   Skin: Positive for pallor. Negative for color change, rash and wound.   Neurological: Positive for dizziness and syncope. Negative for tremors, seizures and headaches.       Past Medical History:   Diagnosis Date    Angina pectoris     Anxiety     Arthritis     Biliary colic     Cochlear implant in place     Deaf     LEFT EAR    Depression     Heart failure     High cholesterol     History of colon polyps 2/20/2018    [place] (hard of hearing)     HEARING AID - RIGHT    Hyperlipidemia     Hypertension     Kidney stone     Kidney stones     Renal stones     Stroke     Trouble in sleeping     Wears glasses      Past Surgical History:   Procedure Laterality Date    CAROTID [facility]OP[facility]  [facility]     CATARACT EXTRACTION Bilateral     CHOLECYSTECTOMY  2-6-2014    COLONOSCOPY  1/9/2013    Dr. Gillette, 5 year recheck (family history colon cancer)    COLONOSCOPY N/A 3/12/2018    Procedure: COLONOSCOPY;  Surgeon: Garett Go MD;  Location: [facility];  Service: Endoscopy;  Laterality: N/A;    CYSTOSCOPY W/ RETROGRADES Bilateral 10/28/2019    Procedure: CYSTOSCOPY, WITH RETROGRADE PYELOGRAM;  Surgeon: Gretchen Proctor MD;  Location: [facility] OR;  Service: Urology;  Laterality: Bilateral;    CYSTOSCOPY W/  URETERAL STENT PLACEMENT Left 10/28/2019    Procedure: CYSTOSCOPY, WITH URETERAL STENT INSERTION;  Surgeon: Gretchen Proctor MD;  Location: [facility] OR;  Service: Urology;  Laterality: Left;    CYSTOSCOPY W/ URETERAL STENT REMOVAL Left 12/2/2019    Procedure: CYSTOSCOPY, WITH URETERAL STENT REMOVAL;  Surgeon: Gretchen Proctor MD;  Location: [facility] OR;  Service: Urology;  Laterality: Left;    EAR MASTOIDECTOMY W/ COCHLEAR IMPLANT W/ LANDMARK      left ear    EXTRACORPOREAL SHOCK WAVE LITHOTRIPSY Left 12/2/2019    Procedure: LITHOTRIPSY, ESWL;  Surgeon: Gretchen Proctor MD;  Location: [facility] OR;  Service: Urology;  Laterality: Left;    EYE SURGERY      FOREIGN BODY REMOVAL Right     glass removed from right foot/repair    FRACTURE SURGERY      right arm x2    LITHOTRIPSY      ROTATOR CUFF REPAIR      Right     SINUS SURGERY      TONSILLECTOMY      VASCULAR SURGERY       Social History     Socioeconomic History    Marital status:      Spouse name: Not on file    Number of children: Not on file    Years of education: Not on file    Highest education level: Not on file   Occupational History    Not on file   Social Needs    Financial resource strain: Not on file    Food insecurity     Worry: Not on file     Inability: Not on file    Transportation needs     Medical: Not on file     Non-medical: Not on file   Tobacco Use    Smoking status: Never Smoker    Smokeless tobacco: Never Used   Substance and Sexual Activity    Alcohol use: Yes     Comment: once every two months    Drug use: No    Sexual activity: Yes   Lifestyle    Physical activity     Days per week: Not on file     Minutes per session: Not on file    Stress: Not on file   Relationships    Social connections     Talks on phone: Not on file     Gets together: Not on file     Attends Protestant service: Not on file     Active member of club or organization: Not on file     Attends meetings of clubs or organizations: Not on file      "Relationship status: Not on file   Other Topics Concern    Not on file   Social History Narrative    Not on file     Family History   Problem Relation Age of Onset    Cancer Mother         colon    Heart disease Father     Gout Father     Kidney disease Father     Arthritis Father     Hypertension Father     Early death Daughter         mva    Alcohol abuse Brother     Cancer Brother         mouth cancer w mets    No Known Problems Sister     Alcohol abuse Brother     No Known Problems Son     Heart disease Maternal Grandmother         MI    Stroke Maternal Grandfather     Heart disease Paternal Grandmother         MI    No Known Problems Son     Cancer Paternal Uncle         lung cancer    Allergic rhinitis Neg Hx     Allergies Neg Hx     Angioedema Neg Hx     Asthma Neg Hx     Atopy Neg Hx     Eczema Neg Hx     Immunodeficiency Neg Hx     Rhinitis Neg Hx     Urticaria Neg Hx     Collagen disease Neg Hx        Objective:      /71   Pulse 74   Temp 98.5 °F (36.9 °C) (Tympanic)   Ht 5' 5" (1.651 m)   Wt 61.4 kg (135 lb 6.4 oz)   SpO2 96%   BMI 22.53 kg/m²   Physical Exam  Vitals signs reviewed.   Constitutional:       General: He is not in acute distress.     Appearance: He is normal weight. He is not toxic-appearing.   HENT:      Head: Normocephalic and atraumatic.   Eyes:      General: No scleral icterus.        Right eye: No discharge.         Left eye: No discharge.      Conjunctiva/sclera: Conjunctivae normal.   Cardiovascular:      Rate and Rhythm: Normal rate and regular rhythm.      Heart sounds: No murmur. No gallop.    Neurological:      Mental Status: He is alert and oriented to person, place, and time.         Assessment:       1. Hypotension, unspecified hypotension type    2. Anemia, unspecified type    3. Encounter for long-term current use of medication        Plan:       Hypotension, unspecified hypotension type  -     Iron and TIBC; Future; Expected date: " 10/22/2020  -     Vitamin B12; Future; Expected date: 10/22/2020  -     Folate; Future; Expected date: 10/22/2020  -     Urinalysis, Reflex to Urine Culture Urine, Clean Catch; Future; Expected date: 10/22/2020  -     Occult blood x 1, stool; Future; Expected date: 10/22/2020  -     Lactate Dehydrogenase; Future; Expected date: 10/22/2020  -     Bilirubin, Direct; Future; Expected date: 10/22/2020    Anemia, unspecified type  -     Iron and TIBC; Future; Expected date: 10/22/2020  -     Vitamin B12; Future; Expected date: 10/22/2020  -     Folate; Future; Expected date: 10/22/2020  -     Urinalysis, Reflex to Urine Culture Urine, Clean Catch; Future; Expected date: 10/22/2020  -     Occult blood x 1, stool; Future; Expected date: 10/22/2020  -     Lactate Dehydrogenase; Future; Expected date: 10/22/2020  -     Bilirubin, Direct; Future; Expected date: 10/22/2020    Encounter for long-term current use of medication  -     Iron and TIBC; Future; Expected date: 10/22/2020  -     Vitamin B12; Future; Expected date: 10/22/2020  -     Folate; Future; Expected date: 10/22/2020  -     Urinalysis, Reflex to Urine Culture Urine, Clean Catch; Future; Expected date: 10/22/2020  -     Occult blood x 1, stool; Future; Expected date: 10/22/2020            Risks, benefits, and side effects were discussed with the patient. All questions were answered to the fullest satisfaction of the patient, and pt verbalized understanding and agreement to treatment plan. Pt was to call with any new or worsening symptoms, or present to the ER.

## 2020-10-23 LAB
FOLATE SERPL-MCNC: 17.9 NG/ML (ref 4–24)
VIT B12 SERPL-MCNC: 568 PG/ML (ref 210–950)

## 2020-10-26 ENCOUNTER — TELEPHONE (OUTPATIENT)
Dept: FAMILY MEDICINE | Facility: CLINIC | Age: 70
End: 2020-10-26

## 2020-10-26 ENCOUNTER — CLINICAL SUPPORT (OUTPATIENT)
Dept: REHABILITATION | Facility: HOSPITAL | Age: 70
End: 2020-10-26
Payer: MEDICARE

## 2020-10-26 DIAGNOSIS — I63.81 CEREBROVASCULAR ACCIDENT (CVA) DUE TO OCCLUSION OF SMALL ARTERY: Primary | ICD-10-CM

## 2020-10-26 PROCEDURE — 97110 THERAPEUTIC EXERCISES: CPT | Mod: PN

## 2020-10-26 PROCEDURE — 97032 APPL MODALITY 1+ESTIM EA 15: CPT | Mod: PN

## 2020-10-26 NOTE — PROGRESS NOTES
Please let Mr. Park know that his bloodwork showed normal levels of b12, folate, and iron. We're still waiting for the stool test to see if there's blood in it. In the meantime, in addition to stopping his amlodipine, I want him to stop the metformin.  Also, please ask him if he has a cardiologist and a neurologist. Thanks

## 2020-10-26 NOTE — PROGRESS NOTES
"Patient:  Buddy Park  [facility] #:  [id number]   Date of Note: 10/26/2020   Referring Physician:  Macario Broussard, DO  Diagnosis:  CVA  Encounter Diagnosis   Name Primary?    Cerebrovascular accident (CVA) due to occlusion of small artery Yes        Start Time: 1015  End Time: 1100  Total Time: 45 min        Subjective: "I feel ok."    Pain: Patient did not report any pain during session.    Objective:   Patient seen by OT this session. Patient was eager to engage in skilled outpatient therapy session.  Patient continues to be noted with no with no active movement of LUE.      Treatment:   -OT performed LUE PROM at all joints in all planes of movement 5x10 reps.  -OT provided PPS for elbow extension of LUE.  -Patient performed UBE with moderate resistance with left hand strapped on for 5 minutes.  -Electrical stimulation was applied to left shoulder for 15 minutes.      Assessment:  Patient tolerated skilled outpatient OT session well.  Patient is motivated to improve functional deficits. Patient continues to be noted with no active movement of LUE.  Patient was educated on the importance of performing HEP daily.      Plan:  Continue with current POC 2x per week.    Yamil Lopez, OTR/L                     "

## 2020-10-27 ENCOUNTER — TELEPHONE (OUTPATIENT)
Dept: PODIATRY | Facility: CLINIC | Age: 70
End: 2020-10-27

## 2020-10-27 DIAGNOSIS — I50.32 CHRONIC DIASTOLIC HEART FAILURE: Primary | ICD-10-CM

## 2020-10-27 NOTE — TELEPHONE ENCOUNTER
----- Message from Tsering Mercado DO sent at 10/26/2020  5:59 AM CDT -----  Please let Mr. Park know that his bloodwork showed normal levels of b12, folate, and iron. We're still waiting for the stool test to see if there's blood in it. In the meantime, in addition to stopping his amlodipine, I want him to stop the metformin.  Also, please ask him if he has a cardiologist and a neurologist. Thanks

## 2020-10-27 NOTE — TELEPHONE ENCOUNTER
Spoke to patient about lab results. Patient states he has not given a stool sample since he was not advised on what he needed to do for this. Patient was advised to stop norvasc and metformin, he verbalized understanding. He states he does not have a cardiologist and he has not seen an neurologist in over a year.

## 2020-10-28 PROBLEM — N13.9 ACUTE BILATERAL OBSTRUCTIVE UROPATHY: Status: RESOLVED | Noted: 2019-10-22 | Resolved: 2020-10-28

## 2020-10-28 PROBLEM — M10.072 ACUTE IDIOPATHIC GOUT INVOLVING TOE OF LEFT FOOT: Status: RESOLVED | Noted: 2019-08-04 | Resolved: 2020-10-28

## 2020-10-28 PROBLEM — M10.9 ACUTE GOUT OF LEFT FOOT: Status: RESOLVED | Noted: 2019-08-04 | Resolved: 2020-10-28

## 2020-10-28 NOTE — TELEPHONE ENCOUNTER
Please let him know I'm referring him to our cardiologist, Dr. Worthington. He should get a phone call within a week regarding being scheduled for an appt. Also, let's get him an appt with Hope or me in 2 weeks so we can see if his fatigue and weakness have improved with stopping metformin and amlodipine. Thanks

## 2020-11-02 ENCOUNTER — CLINICAL SUPPORT (OUTPATIENT)
Dept: REHABILITATION | Facility: HOSPITAL | Age: 70
End: 2020-11-02
Payer: MEDICARE

## 2020-11-02 DIAGNOSIS — I63.81 CEREBROVASCULAR ACCIDENT (CVA) DUE TO OCCLUSION OF SMALL ARTERY: Primary | ICD-10-CM

## 2020-11-02 PROCEDURE — 97010 HOT OR COLD PACKS THERAPY: CPT | Mod: PN

## 2020-11-02 PROCEDURE — 97032 APPL MODALITY 1+ESTIM EA 15: CPT | Mod: PN

## 2020-11-02 PROCEDURE — 97110 THERAPEUTIC EXERCISES: CPT | Mod: PN

## 2020-11-02 NOTE — PROGRESS NOTES
"Patient:  Buddy Park  [facility] #:  [id number]   Date of Note: 11/02/2020   Referring Physician:  Macario Broussard, DO  Diagnosis:  CVA  Encounter Diagnosis   Name Primary?    Cerebrovascular accident (CVA) due to occlusion of small artery Yes        Start Time: 1030  End Time: 1108  Total Time: 38 min        Subjective: "I want my arm to get better."    Pain: Patient did not report any pain during session.    Objective:   Patient seen by OT this session. Patient was eager to engage in skilled outpatient therapy session.  Patient continues to be noted with no with no active movement of LUE.      Treatment:   -OT provided education regarding HEP to perform daily.  -OT performed LUE PROM at all joints in all planes of movement 5x10 reps.  -OT provided PPS for elbow extension of LUE.  -Patient performed UBE with moderate resistance with left hand strapped on for 5 minutes.  -Electrical stimulation was applied to left shoulder for 15 minutes.      Assessment:  Patient tolerated skilled outpatient OT session well.  Patient is motivated to improve functional deficits. Patient continues to be noted with no active movement of LUE.  Patient was educated on the importance of performing HEP daily.      Plan:  Continue with current POC 2x per week.    Yamil Lopez OTR/L                       "

## 2020-11-09 ENCOUNTER — CLINICAL SUPPORT (OUTPATIENT)
Dept: REHABILITATION | Facility: HOSPITAL | Age: 70
End: 2020-11-09
Payer: MEDICARE

## 2020-11-09 DIAGNOSIS — I63.81 CEREBROVASCULAR ACCIDENT (CVA) DUE TO OCCLUSION OF SMALL ARTERY: Primary | ICD-10-CM

## 2020-11-09 PROCEDURE — 97032 APPL MODALITY 1+ESTIM EA 15: CPT | Mod: PN

## 2020-11-09 PROCEDURE — 97110 THERAPEUTIC EXERCISES: CPT | Mod: PN

## 2020-11-09 PROCEDURE — 97010 HOT OR COLD PACKS THERAPY: CPT | Mod: PN

## 2020-11-09 NOTE — PROGRESS NOTES
"Patient:  Buddy Park  [facility] #:  [id number]   Date of Note: 11/09/2020   Referring Physician:  Macario Broussard, DO  Diagnosis:  CVA  Encounter Diagnosis   Name Primary?    Cerebrovascular accident (CVA) due to occlusion of small artery Yes        Start Time: 845  End Time: 930  Total Time: 45 min        Subjective: "I want my arm to get better."    Pain: Patient did not report any pain during session.    Objective:   Patient seen by OT this session. Patient was eager to engage in skilled outpatient therapy session.  Patient continues to be noted with no with no active movement of LUE.      Treatment:   -Heat was applied to left shoulder for 15 minutes.  -OT provided education regarding HEP to perform daily.  -OT performed LUE PROM at all joints in all planes of movement 5x10 reps.  -OT provided PPS for elbow extension of LUE.s.  -Electrical stimulation was applied to left shoulder for 15 minutes.      Assessment:  Patient tolerated skilled outpatient OT session well.  Patient is motivated to improve functional deficits. Patient continues to be noted with no active movement of LUE.  Patient was educated on the importance of performing HEP daily.      Plan:  Continue with current POC 2x per week.    Yamil Lopez OTR/L                         "

## 2020-11-12 ENCOUNTER — CLINICAL SUPPORT (OUTPATIENT)
Dept: REHABILITATION | Facility: HOSPITAL | Age: 70
End: 2020-11-12
Payer: MEDICARE

## 2020-11-12 DIAGNOSIS — I63.81 CEREBROVASCULAR ACCIDENT (CVA) DUE TO OCCLUSION OF SMALL ARTERY: Primary | ICD-10-CM

## 2020-11-12 PROCEDURE — 97110 THERAPEUTIC EXERCISES: CPT | Mod: PN

## 2020-11-12 PROCEDURE — 97010 HOT OR COLD PACKS THERAPY: CPT | Mod: PN

## 2020-11-12 PROCEDURE — 97032 APPL MODALITY 1+ESTIM EA 15: CPT | Mod: PN

## 2020-11-12 NOTE — PROGRESS NOTES
"Patient:  Buddy Park  [facility] #:  [id number]   Date of Note: 11/12/2020   Referring Physician:  Macario Broussard, DO  Diagnosis:  CVA  Encounter Diagnosis   Name Primary?    Cerebrovascular accident (CVA) due to occlusion of small artery Yes        Start Time: 845  End Time: 930  Total Time: 45 min        Subjective: "I want my arm to get better."    Pain: Patient did not report any pain during session.    Objective:   Patient seen by OT this session. Patient was eager to engage in skilled outpatient therapy session.  Patient continues to be noted with no with no active movement of LUE.      Treatment:   -Heat was applied to left shoulder for 15 minutes.  -OT provided education regarding HEP to perform daily.  -OT performed LUE PROM at all joints in all planes of movement 5x10 reps.  -OT provided PPS for elbow extension of LUE.s.  -Electrical stimulation was applied to left shoulder for 15 minutes.      Assessment:  Patient tolerated skilled outpatient OT session well.  Patient is motivated to improve functional deficits. Patient continues to be noted with no active movement of LUE.  Patient was educated on the importance of performing HEP daily.      Plan:  Continue with current POC 2x per week.    Yamil Lopez OTR/L                           "

## 2020-11-16 ENCOUNTER — CLINICAL SUPPORT (OUTPATIENT)
Dept: REHABILITATION | Facility: HOSPITAL | Age: 70
End: 2020-11-16
Payer: MEDICARE

## 2020-11-16 DIAGNOSIS — I63.81 CEREBROVASCULAR ACCIDENT (CVA) DUE TO OCCLUSION OF SMALL ARTERY: Primary | ICD-10-CM

## 2020-11-16 PROCEDURE — 97010 HOT OR COLD PACKS THERAPY: CPT | Mod: PN

## 2020-11-16 PROCEDURE — 97032 APPL MODALITY 1+ESTIM EA 15: CPT | Mod: PN

## 2020-11-16 PROCEDURE — 97110 THERAPEUTIC EXERCISES: CPT | Mod: PN

## 2020-11-16 NOTE — PROGRESS NOTES
"Patient:  Buddy Park  [facility] #:  [id number]   Date of Note: 11/16/2020   Referring Physician:  Macario Broussard, DO  Diagnosis:  CVA  Encounter Diagnosis   Name Primary?    Cerebrovascular accident (CVA) due to occlusion of small artery Yes        Start Time: 1015  End Time: 1100  Total Time: 45 min        Subjective: "I want my arm to get better."    Pain: Patient did not report any pain during session.    Objective:   Patient seen by OT this session. Patient was eager to engage in skilled outpatient therapy session.  Patient continues to be noted with no with no active movement of LUE.      Treatment:   -Heat was applied to left shoulder for 15 minutes.  -OT provided education regarding HEP to perform daily.  -OT performed LUE PROM at all joints in all planes of movement 5x10 reps.  -OT provided PPS for elbow extension of LUE.  -Electrical stimulation was applied to left shoulder for 15 minutes.      Assessment:  Patient tolerated skilled outpatient OT session well.  Patient is motivated to improve functional deficits. Patient continues to be noted with no active movement of LUE.  Patient was educated on the importance of performing HEP daily.      Plan:  Continue with current POC 2x per week.    Yamil Lopez OTR/L                           "

## 2020-11-19 ENCOUNTER — CLINICAL SUPPORT (OUTPATIENT)
Dept: REHABILITATION | Facility: HOSPITAL | Age: 70
End: 2020-11-19
Payer: MEDICARE

## 2020-11-19 ENCOUNTER — TELEPHONE (OUTPATIENT)
Dept: GASTROENTEROLOGY | Facility: CLINIC | Age: 70
End: 2020-11-19

## 2020-11-19 DIAGNOSIS — I63.81 CEREBROVASCULAR ACCIDENT (CVA) DUE TO OCCLUSION OF SMALL ARTERY: Primary | ICD-10-CM

## 2020-11-19 PROCEDURE — 97110 THERAPEUTIC EXERCISES: CPT | Mod: PN

## 2020-11-19 PROCEDURE — 97010 HOT OR COLD PACKS THERAPY: CPT | Mod: PN

## 2020-11-19 PROCEDURE — 97032 APPL MODALITY 1+ESTIM EA 15: CPT | Mod: PN

## 2020-11-19 NOTE — PROGRESS NOTES
"Patient:  Buddy Park  [facility] #:  [id number]   Date of Note: 11/19/2020   Referring Physician:  Macario Broussard, DO  Diagnosis:  CVA  Encounter Diagnosis   Name Primary?    Cerebrovascular accident (CVA) due to occlusion of small artery Yes        Start Time: 1020  End Time: 1105  Total Time: 45 min        Subjective: "I want my arm to start moving better."    Pain: Patient did not report any pain during session.    Objective:   Patient seen by OT this session. Patient was eager to engage in skilled outpatient therapy session.  Patient continues to be noted with no with no active movement of LUE.      Treatment:   -Heat was applied to left shoulder for 15 minutes.  -OT provided education regarding HEP to perform daily.  -OT performed LUE PROM at all joints in all planes of movement 7x10 reps.  -OT provided PPS for elbow extension of LUE.  -Electrical stimulation was applied to left shoulder for 15 minutes.      Assessment:  Patient tolerated skilled outpatient OT session well.  Patient is motivated to improve functional deficits. Patient continues to be noted with no active movement of LUE.  Patient was educated on the importance of performing HEP daily.      Plan:  Continue with current POC 2x per week.    Yamil Lopez OTR/L                             "

## 2020-11-24 ENCOUNTER — CLINICAL SUPPORT (OUTPATIENT)
Dept: REHABILITATION | Facility: HOSPITAL | Age: 70
End: 2020-11-24
Payer: MEDICARE

## 2020-11-24 DIAGNOSIS — I63.81 CEREBROVASCULAR ACCIDENT (CVA) DUE TO OCCLUSION OF SMALL ARTERY: Primary | ICD-10-CM

## 2020-11-24 PROCEDURE — 97010 HOT OR COLD PACKS THERAPY: CPT | Mod: PN

## 2020-11-24 PROCEDURE — 97110 THERAPEUTIC EXERCISES: CPT | Mod: PN

## 2020-11-24 PROCEDURE — 97032 APPL MODALITY 1+ESTIM EA 15: CPT | Mod: PN

## 2020-11-24 NOTE — PROGRESS NOTES
"Patient:  Buddy Park  [facility] #:  [id number]   Date of Note: 11/24/2020   Referring Physician:  Macario Broussard, DO  Diagnosis:  CVA  Encounter Diagnosis   Name Primary?    Cerebrovascular accident (CVA) due to occlusion of small artery Yes        Start Time: 930  End Time: 1015  Total Time: 45 min        Subjective: "My arm is coming along."    Pain: Patient did not report any pain during session.    Objective:   Patient seen by OT this session. Patient was eager to engage in skilled outpatient therapy session.  Patient continues to be noted with no with no active movement of LUE.      Treatment:   -Heat was applied to left shoulder for 15 minutes.  -OT provided education regarding HEP to perform daily.  -OT performed LUE PROM at all joints in all planes of movement 5x10 reps.  -OT provided PPS for elbow extension of LUE.  -Electrical stimulation was applied to left shoulder for 15 minutes.      Assessment:  Patient tolerated skilled outpatient OT session well.  Patient is motivated to improve functional deficits. Patient continues to be noted with no active movement of LUE.  Patient was educated on the importance of performing HEP daily.      Plan:  Continue with current POC 2x per week.    Yamil Lopez, OTR/L                               "

## 2020-11-27 ENCOUNTER — CLINICAL SUPPORT (OUTPATIENT)
Dept: REHABILITATION | Facility: HOSPITAL | Age: 70
End: 2020-11-27
Payer: MEDICARE

## 2020-11-27 DIAGNOSIS — I63.81 CEREBROVASCULAR ACCIDENT (CVA) DUE TO OCCLUSION OF SMALL ARTERY: Primary | ICD-10-CM

## 2020-11-27 PROCEDURE — 97110 THERAPEUTIC EXERCISES: CPT | Mod: PN

## 2020-11-27 PROCEDURE — 97010 HOT OR COLD PACKS THERAPY: CPT | Mod: PN

## 2020-11-27 PROCEDURE — 97032 APPL MODALITY 1+ESTIM EA 15: CPT | Mod: PN

## 2020-11-27 NOTE — PROGRESS NOTES
"Patient:  Buddy Park  [facility] #:  [id number]   Date of Note: 11/27/2020   Referring Physician:  Macario Broussard, DO  Diagnosis:  CVA  Encounter Diagnosis   Name Primary?    Cerebrovascular accident (CVA) due to occlusion of small artery Yes        Start Time: 1015  End Time: 1100  Total Time: 45 min        Subjective: "My arm is coming along."    Pain: Patient did not report any pain during session.    Objective:   Patient seen by OT this session. Patient was eager to engage in skilled outpatient therapy session.  Patient continues to be noted with no with no active movement of LUE.      Treatment:   -Heat was applied to left shoulder for 15 minutes.  -OT performed LUE PROM at all joints in all planes of movement 7x10 reps.  -OT provided PPS for elbow extension of LUE.  -Electrical stimulation was applied to left shoulder for 15 minutes.      Assessment:  Patient tolerated skilled outpatient OT session well.  Patient is motivated to improve functional deficits. Patient continues to be noted with no active movement of LUE.  Patient was educated on the importance of performing HEP daily.      Plan:  Continue with current POC 2x per week.    Yamil Lopez, OTR/L                                 "

## 2020-11-30 ENCOUNTER — PATIENT MESSAGE (OUTPATIENT)
Dept: GASTROENTEROLOGY | Facility: CLINIC | Age: 70
End: 2020-11-30

## 2020-11-30 ENCOUNTER — OFFICE VISIT (OUTPATIENT)
Dept: GASTROENTEROLOGY | Facility: CLINIC | Age: 70
End: 2020-11-30
Payer: MEDICARE

## 2020-11-30 VITALS — HEIGHT: 65 IN | WEIGHT: 142.44 LBS | BODY MASS INDEX: 23.73 KG/M2

## 2020-11-30 DIAGNOSIS — R15.9 INCONTINENCE OF FECES WITH FECAL URGENCY: ICD-10-CM

## 2020-11-30 DIAGNOSIS — Z86.010 HISTORY OF COLON POLYPS: ICD-10-CM

## 2020-11-30 DIAGNOSIS — Z01.812 PRE-PROCEDURE LAB EXAM: ICD-10-CM

## 2020-11-30 DIAGNOSIS — R15.2 INCONTINENCE OF FECES WITH FECAL URGENCY: ICD-10-CM

## 2020-11-30 DIAGNOSIS — Z79.01 CURRENT USE OF ANTICOAGULANT THERAPY: ICD-10-CM

## 2020-11-30 DIAGNOSIS — Z80.0 FAMILY HISTORY OF COLON CANCER: ICD-10-CM

## 2020-11-30 DIAGNOSIS — K52.9 CHRONIC DIARRHEA: Primary | ICD-10-CM

## 2020-11-30 DIAGNOSIS — Z90.49 S/P CHOLECYSTECTOMY: ICD-10-CM

## 2020-11-30 DIAGNOSIS — R19.8 IRREGULAR BOWEL HABITS: ICD-10-CM

## 2020-11-30 PROCEDURE — 1157F ADVNC CARE PLAN IN RCRD: CPT | Mod: S$GLB,,, | Performed by: NURSE PRACTITIONER

## 2020-11-30 PROCEDURE — 3008F BODY MASS INDEX DOCD: CPT | Mod: CPTII,S$GLB,, | Performed by: NURSE PRACTITIONER

## 2020-11-30 PROCEDURE — 1159F MED LIST DOCD IN RCRD: CPT | Mod: S$GLB,,, | Performed by: NURSE PRACTITIONER

## 2020-11-30 PROCEDURE — 99214 OFFICE O/P EST MOD 30 MIN: CPT | Mod: S$GLB,,, | Performed by: NURSE PRACTITIONER

## 2020-11-30 PROCEDURE — 99214 PR OFFICE/OUTPT VISIT, EST, LEVL IV, 30-39 MIN: ICD-10-PCS | Mod: S$GLB,,, | Performed by: NURSE PRACTITIONER

## 2020-11-30 PROCEDURE — 99999 PR PBB SHADOW E&M-EST. PATIENT-LVL IV: ICD-10-PCS | Mod: PBBFAC,,, | Performed by: NURSE PRACTITIONER

## 2020-11-30 PROCEDURE — 99999 PR PBB SHADOW E&M-EST. PATIENT-LVL IV: CPT | Mod: PBBFAC,,, | Performed by: NURSE PRACTITIONER

## 2020-11-30 PROCEDURE — 3008F PR BODY MASS INDEX (BMI) DOCUMENTED: ICD-10-PCS | Mod: CPTII,S$GLB,, | Performed by: NURSE PRACTITIONER

## 2020-11-30 PROCEDURE — 1126F AMNT PAIN NOTED NONE PRSNT: CPT | Mod: S$GLB,,, | Performed by: NURSE PRACTITIONER

## 2020-11-30 PROCEDURE — 1159F PR MEDICATION LIST DOCUMENTED IN MEDICAL RECORD: ICD-10-PCS | Mod: S$GLB,,, | Performed by: NURSE PRACTITIONER

## 2020-11-30 PROCEDURE — 1157F PR ADVANCE CARE PLAN OR EQUIV PRESENT IN MEDICAL RECORD: ICD-10-PCS | Mod: S$GLB,,, | Performed by: NURSE PRACTITIONER

## 2020-11-30 PROCEDURE — 1126F PR PAIN SEVERITY QUANTIFIED, NO PAIN PRESENT: ICD-10-PCS | Mod: S$GLB,,, | Performed by: NURSE PRACTITIONER

## 2020-11-30 RX ORDER — MONTELUKAST SODIUM 4 MG/1
1 TABLET, CHEWABLE ORAL 2 TIMES DAILY
Qty: 60 TABLET | Refills: 2 | Status: SHIPPED | OUTPATIENT
Start: 2020-11-30 | End: 2021-02-25

## 2020-11-30 NOTE — PATIENT INSTRUCTIONS

## 2020-11-30 NOTE — PROGRESS NOTES
Subjective:       Patient ID: Buddy Park is a 69 y.o. male, Body mass index is 23.7 kg/m².    Chief Complaint: Diarrhea      Patient is new to me. Established patient of Dr. Go.    Diarrhea   This is a chronic problem. The current episode started more than 1 year ago (Started prior to cholecystectomy in 2016). The problem occurs 5 to 10 times per day (10 times per day). The problem has been gradually worsening. The stool consistency is described as watery (describes stool as type 4-7 on bristol scale; denies bloody). The patient states that diarrhea awakens him from sleep. Associated symptoms include increased flatus. Pertinent negatives include no abdominal pain, bloating, chills, coughing, fever, vomiting or weight loss. Exacerbated by: sugary foods aggravate. There are no known risk factors (denies recent antibiotics, hospitalization, or foreign travel). Treatments tried: Currently: Questran- ineffective. There is no history of bowel resection, inflammatory bowel disease, irritable bowel syndrome or a recent abdominal surgery.     Review of Systems   Constitutional: Negative for appetite change, chills, fever, unexpected weight change and weight loss.   HENT: Negative for trouble swallowing.    Respiratory: Negative for cough and shortness of breath.    Cardiovascular: Negative for chest pain.   Gastrointestinal: Positive for diarrhea and flatus. Negative for abdominal distention, abdominal pain, anal bleeding, bloating, blood in stool, constipation, nausea, rectal pain and vomiting.   Genitourinary: Negative for difficulty urinating and dysuria.   Musculoskeletal: Positive for gait problem.   Skin: Negative for rash.   Neurological: Negative for speech difficulty.   Psychiatric/Behavioral: Negative for confusion.       Past Medical History:   Diagnosis Date    Angina pectoris     Anxiety     Arthritis     Biliary colic     Cochlear implant in place     Deaf     LEFT EAR    Depression     Heart  failure     High cholesterol     History of colon polyps 2/20/2018    [place] (hard of hearing)     HEARING AID - RIGHT    Hyperlipidemia     Hypertension     Kidney stone     Kidney stones     Renal stones     Stroke     Trouble in sleeping     Wears glasses       Past Surgical History:   Procedure Laterality Date    CAROTID [facility]GIOPLASTY  06/2018    [facility]     CATARACT EXTRACTION Bilateral     CHOLECYSTECTOMY  2-6-2014    COLONOSCOPY  1/9/2013    Dr. Gillette, 5 year recheck (family history colon cancer)    COLONOSCOPY N/A 3/12/2018    Procedure: COLONOSCOPY;  Surgeon: Garett Go MD;  Location: [facility] ENDO;  Service: Endoscopy;  Laterality: N/A;    CYSTOSCOPY W/ RETROGRADES Bilateral 10/28/2019    Procedure: CYSTOSCOPY, WITH RETROGRADE PYELOGRAM;  Surgeon: Gretchen Proctor MD;  Location: [facility] OR;  Service: Urology;  Laterality: Bilateral;    CYSTOSCOPY W/ URETERAL STENT PLACEMENT Left 10/28/2019    Procedure: CYSTOSCOPY, WITH URETERAL STENT INSERTION;  Surgeon: Gretchen Proctor MD;  Location: [facility] OR;  Service: Urology;  Laterality: Left;    CYSTOSCOPY W/ URETERAL STENT REMOVAL Left 12/2/2019    Procedure: CYSTOSCOPY, WITH URETERAL STENT REMOVAL;  Surgeon: Gretchen Proctor MD;  Location: [facility] OR;  Service: Urology;  Laterality: Left;    EAR MASTOIDECTOMY W/ COCHLEAR IMPLANT W/ LANDMARK      left ear    EXTRACORPOREAL SHOCK WAVE LITHOTRIPSY Left 12/2/2019    Procedure: LITHOTRIPSY, ESWL;  Surgeon: Gretchen Proctor MD;  Location: [facility] OR;  Service: Urology;  Laterality: Left;    EYE SURGERY      FOREIGN BODY REMOVAL Right     glass removed from right foot/repair    FRACTURE SURGERY      right arm x2    LITHOTRIPSY      ROTATOR CUFF REPAIR      Right     SINUS SURGERY      TONSILLECTOMY      VASCULAR SURGERY        Family History   Problem Relation Age of Onset    Cancer Mother         colon    Heart disease Father     Gout Father     Kidney disease Father     Arthritis Father      Hypertension Father     Early death Daughter         mva    Alcohol abuse Brother     Cancer Brother         mouth cancer w mets    No Known Problems Sister     Alcohol abuse Brother     No Known Problems Son     Heart disease Maternal Grandmother         MI    Stroke Maternal Grandfather     Heart disease Paternal Grandmother         MI    No Known Problems Son     Cancer Paternal Uncle         lung cancer    Allergic rhinitis Neg Hx     Allergies Neg Hx     Angioedema Neg Hx     Asthma Neg Hx     Atopy Neg Hx     Eczema Neg Hx     Immunodeficiency Neg Hx     Rhinitis Neg Hx     Urticaria Neg Hx     Collagen disease Neg Hx       Wt Readings from Last 10 Encounters:   11/30/20 64.6 kg (142 lb 6.7 oz)   10/22/20 61.4 kg (135 lb 6.4 oz)   09/24/20 63 kg (139 lb)   09/03/20 63 kg (139 lb)   08/10/20 67.6 kg (149 lb)   07/27/20 67.6 kg (149 lb)   06/16/20 67.8 kg (149 lb 7.6 oz)   05/20/20 61.8 kg (136 lb 3.9 oz)   04/24/20 61.8 kg (136 lb 3.9 oz)   04/13/20 71.7 kg (158 lb)     Lab Results   Component Value Date    WBC 4.68 09/05/2020    HGB 12.7 (L) 09/05/2020    HCT 37.8 (L) 09/05/2020    MCV 90 09/05/2020     09/05/2020     CMP  Sodium   Date Value Ref Range Status   09/05/2020 142 136 - 145 mmol/L Final     Potassium   Date Value Ref Range Status   09/05/2020 3.4 (L) 3.5 - 5.1 mmol/L Final     Chloride   Date Value Ref Range Status   09/05/2020 106 95 - 110 mmol/L Final     CO2   Date Value Ref Range Status   09/05/2020 26 23 - 29 mmol/L Final     Glucose   Date Value Ref Range Status   09/05/2020 93 70 - 110 mg/dL Final     BUN   Date Value Ref Range Status   09/05/2020 12 8 - 23 mg/dL Final     Creatinine   Date Value Ref Range Status   09/05/2020 1.1 0.5 - 1.4 mg/dL Final   03/19/2012 1.1 0.2 - 1.4 mg/dl Final     Calcium   Date Value Ref Range Status   09/05/2020 9.3 8.7 - 10.5 mg/dL Final   03/19/2012 8.9 8.6 - 10.2 mg/dl Final     Total Protein   Date Value Ref Range Status    09/05/2020 6.6 6.0 - 8.4 g/dL Final     Albumin   Date Value Ref Range Status   09/05/2020 3.9 3.5 - 5.2 g/dL Final     Total Bilirubin   Date Value Ref Range Status   09/05/2020 1.8 (H) 0.1 - 1.0 mg/dL Final     Comment:     For infants and newborns, interpretation of results should be based  on gestational age, weight and in agreement with clinical  observations.  Premature Infant recommended reference ranges:  Up to 24 hours.............<8.0 mg/dL  Up to 48 hours............<12.0 mg/dL  3-5 days..................<15.0 mg/dL  6-29 days.................<15.0 mg/dL       Alkaline Phosphatase   Date Value Ref Range Status   09/05/2020 50 (L) 55 - 135 U/L Final   03/19/2012 68 23 - 119 UNIT/L Final     AST   Date Value Ref Range Status   09/05/2020 16 10 - 40 U/L Final   03/19/2012 46 (H) 10 - 34 UNIT/L Final     ALT   Date Value Ref Range Status   09/05/2020 17 10 - 44 U/L Final     Anion Gap   Date Value Ref Range Status   09/05/2020 10 8 - 16 mmol/L Final   03/19/2012 11 5 - 15 meq/L Final     eGFR if    Date Value Ref Range Status   09/05/2020 >60.0 >60 mL/min/1.73 m^2 Final     eGFR if non    Date Value Ref Range Status   09/05/2020 >60.0 >60 mL/min/1.73 m^2 Final     Comment:     Calculation used to obtain the estimated glomerular filtration  rate (eGFR) is the CKD-EPI equation.        Lab Results   Component Value Date    TSH 2.441 09/05/2020            Reviewed prior medical records including endoscopy history (see surgical history).     Objective:      Physical Exam  Constitutional:       General: He is not in acute distress.     Appearance: He is well-developed.   HENT:      Head: Normocephalic.      Right Ear: Decreased hearing noted.      Left Ear: Decreased hearing noted.      Nose: Nose normal.      Mouth/Throat:      Comments: Pt wearing mask due to COVID concerns  Eyes:      General: Lids are normal.      Conjunctiva/sclera: Conjunctivae normal.      Pupils: Pupils are  equal, round, and reactive to light.   Neck:      Musculoskeletal: Normal range of motion.      Trachea: Trachea normal.   Cardiovascular:      Rate and Rhythm: Normal rate and regular rhythm.      Heart sounds: Normal heart sounds. No murmur.   Pulmonary:      Effort: Pulmonary effort is normal. No respiratory distress.      Breath sounds: Normal breath sounds. No stridor. No wheezing.   Abdominal:      General: Bowel sounds are normal. There is no distension.      Palpations: Abdomen is soft. There is no mass.      Tenderness: There is no abdominal tenderness. There is no guarding or rebound.   Musculoskeletal: Normal range of motion.      Comments: Ambulates with cane; limited movement of left upper arm   Skin:     General: Skin is warm and dry.      Findings: No rash.      Comments: Non jaundiced   Neurological:      Mental Status: He is alert and oriented to person, place, and time.   Psychiatric:         Speech: Speech normal.         Behavior: Behavior normal. Behavior is cooperative.           Assessment:       1. Chronic diarrhea    2. Irregular bowel habits    3. Incontinence of feces with fecal urgency    4. S/P cholecystectomy    5. History of colon polyps    6. Family history of colon cancer    7. Current use of anticoagulant therapy           Plan:   All diagnoses and orders for this visit:    Chronic diarrhea, Irregular bowel habits, & S/P cholecystectomy  - Stool Exam-Ova,Cysts,Parasites; Future; Expected date: 11/30/2020  - Stool culture; Future; Expected date: 11/30/2020  - Giardia / Cryptosporidum, EIA; Future; Expected date: 11/30/2020  - Occult blood x 1, stool; Future; Expected date: 11/30/2020  - WBC, Stool; Future; Expected date: 11/30/2020  - pH, stool; Future; Expected date: 11/30/2020  - Pancreatic elastase, fecal; Future; Expected date: 11/30/2020  - Clostridium difficile EIA; Future; Expected date: 11/30/2020  - Start: colestipoL (COLESTID) 1 gram Tab; Take 1 tablet (1 g total) by mouth  2 (two) times daily.  Dispense: 60 tablet; Refill: 2  - Discontinue Questran  - Recommended increase fiber in diet, especially soluble fiber since this can help bulk up the stool consistency and may help to slow down how fast the stool goes through the colon and can prevent diarrhea  - Start Imodium OTC PRN  - Schedule Colonoscopy    Incontinence of feces with fecal urgency  - Start: colestipoL (COLESTID) 1 gram Tab; Take 1 tablet (1 g total) by mouth 2 (two) times daily.  Dispense: 60 tablet; Refill: 2  - Recommend high fiber diet to help bulk up the stool, especially soluble fiber since this can help bulk up the stool consistency and may help to slow down how fast the stool goes through the colon and can prevent diarrhea  - Start Imodium OTC PRN  - Possible referral to general surgery and/or pelvic floor physical therapy, if symptoms persist despite use of above recommendations    History of colon polyps    Family history of colon cancer    Current use of anticoagulant therapy   - Informed patient that the anticoagulant(s) will likely need to be held for endoscopy, nurse will confirm with cardiologist/PCP.    If no improvement in symptoms or symptoms worsen, call/follow-up at clinic or go to ER

## 2020-12-01 ENCOUNTER — PATIENT MESSAGE (OUTPATIENT)
Dept: GASTROENTEROLOGY | Facility: CLINIC | Age: 70
End: 2020-12-01

## 2020-12-02 ENCOUNTER — CLINICAL SUPPORT (OUTPATIENT)
Dept: REHABILITATION | Facility: HOSPITAL | Age: 70
End: 2020-12-02
Payer: MEDICARE

## 2020-12-02 ENCOUNTER — LAB VISIT (OUTPATIENT)
Dept: LAB | Facility: HOSPITAL | Age: 70
End: 2020-12-02
Attending: NURSE PRACTITIONER
Payer: MEDICARE

## 2020-12-02 DIAGNOSIS — K52.9 CHRONIC DIARRHEA: ICD-10-CM

## 2020-12-02 DIAGNOSIS — I63.81 CEREBROVASCULAR ACCIDENT (CVA) DUE TO OCCLUSION OF SMALL ARTERY: Primary | ICD-10-CM

## 2020-12-02 LAB
OB PNL STL: POSITIVE
WBC #/AREA STL HPF: NORMAL /[HPF]

## 2020-12-02 PROCEDURE — 87427 SHIGA-LIKE TOXIN AG IA: CPT | Mod: 59

## 2020-12-02 PROCEDURE — 97110 THERAPEUTIC EXERCISES: CPT | Mod: PN

## 2020-12-02 PROCEDURE — 87045 FECES CULTURE AEROBIC BACT: CPT

## 2020-12-02 PROCEDURE — 87046 STOOL CULTR AEROBIC BACT EA: CPT

## 2020-12-02 PROCEDURE — 82272 OCCULT BLD FECES 1-3 TESTS: CPT

## 2020-12-02 PROCEDURE — 89055 LEUKOCYTE ASSESSMENT FECAL: CPT

## 2020-12-02 PROCEDURE — 97032 APPL MODALITY 1+ESTIM EA 15: CPT | Mod: PN

## 2020-12-02 PROCEDURE — 97010 HOT OR COLD PACKS THERAPY: CPT | Mod: PN

## 2020-12-02 PROCEDURE — 87329 GIARDIA AG IA: CPT

## 2020-12-02 PROCEDURE — 87209 SMEAR COMPLEX STAIN: CPT

## 2020-12-02 NOTE — PROGRESS NOTES
"Patient:  Buddy Park  [facility] #:  [id number]   Date of Note: 12/02/2020   Referring Physician:  Macario Broussard, DO  Diagnosis:  CVA  Encounter Diagnosis   Name Primary?    Cerebrovascular accident (CVA) due to occlusion of small artery Yes        Start Time: 1100  End Time: 1145  Total Time: 45 min        Subjective: "My arm is getting better."    Pain: Patient did not report any pain during session.    Objective:   Patient seen by OT this session. Patient was eager to engage in skilled outpatient therapy session.  Patient continues to be noted with no with no active movement of LUE.      Treatment:   -Heat was applied to left shoulder for 15 minutes.  -OT performed LUE PROM at all joints in all planes of movement 5x10 reps.  -OT provided PPS for elbow extension of LUE.  -Electrical stimulation was applied to left shoulder for 15 minutes.      Assessment:  Patient tolerated skilled outpatient OT session well.  Patient is motivated to improve functional deficits. Patient continues to be noted with no active movement of LUE.  Patient was educated on the importance of performing HEP daily.      Plan:  Continue with current POC 2x per week.    Yamil Lopez OTR/L                                   "

## 2020-12-03 ENCOUNTER — OFFICE VISIT (OUTPATIENT)
Dept: FAMILY MEDICINE | Facility: CLINIC | Age: 70
End: 2020-12-03
Payer: MEDICARE

## 2020-12-03 VITALS
HEART RATE: 87 BPM | BODY MASS INDEX: 23.66 KG/M2 | HEIGHT: 65 IN | SYSTOLIC BLOOD PRESSURE: 159 MMHG | WEIGHT: 142 LBS | RESPIRATION RATE: 18 BRPM | TEMPERATURE: 98 F | DIASTOLIC BLOOD PRESSURE: 94 MMHG

## 2020-12-03 DIAGNOSIS — R73.03 PREDIABETES: Primary | ICD-10-CM

## 2020-12-03 DIAGNOSIS — I10 ESSENTIAL HYPERTENSION: ICD-10-CM

## 2020-12-03 LAB
CRYPTOSP AG STL QL IA: NEGATIVE
G LAMBLIA AG STL QL IA: NEGATIVE

## 2020-12-03 PROCEDURE — 99999 PR PBB SHADOW E&M-EST. PATIENT-LVL III: ICD-10-PCS | Mod: PBBFAC,,, | Performed by: FAMILY MEDICINE

## 2020-12-03 PROCEDURE — 3008F BODY MASS INDEX DOCD: CPT | Mod: CPTII,S$GLB,, | Performed by: FAMILY MEDICINE

## 2020-12-03 PROCEDURE — 99999 PR PBB SHADOW E&M-EST. PATIENT-LVL III: CPT | Mod: PBBFAC,,, | Performed by: FAMILY MEDICINE

## 2020-12-03 PROCEDURE — 99214 OFFICE O/P EST MOD 30 MIN: CPT | Mod: S$GLB,,, | Performed by: FAMILY MEDICINE

## 2020-12-03 PROCEDURE — 1157F PR ADVANCE CARE PLAN OR EQUIV PRESENT IN MEDICAL RECORD: ICD-10-PCS | Mod: S$GLB,,, | Performed by: FAMILY MEDICINE

## 2020-12-03 PROCEDURE — 1159F MED LIST DOCD IN RCRD: CPT | Mod: S$GLB,,, | Performed by: FAMILY MEDICINE

## 2020-12-03 PROCEDURE — 1101F PR PT FALLS ASSESS DOC 0-1 FALLS W/OUT INJ PAST YR: ICD-10-PCS | Mod: CPTII,S$GLB,, | Performed by: FAMILY MEDICINE

## 2020-12-03 PROCEDURE — 3077F SYST BP >= 140 MM HG: CPT | Mod: CPTII,S$GLB,, | Performed by: FAMILY MEDICINE

## 2020-12-03 PROCEDURE — 3288F PR FALLS RISK ASSESSMENT DOCUMENTED: ICD-10-PCS | Mod: CPTII,S$GLB,, | Performed by: FAMILY MEDICINE

## 2020-12-03 PROCEDURE — 1159F PR MEDICATION LIST DOCUMENTED IN MEDICAL RECORD: ICD-10-PCS | Mod: S$GLB,,, | Performed by: FAMILY MEDICINE

## 2020-12-03 PROCEDURE — 3008F PR BODY MASS INDEX (BMI) DOCUMENTED: ICD-10-PCS | Mod: CPTII,S$GLB,, | Performed by: FAMILY MEDICINE

## 2020-12-03 PROCEDURE — 1157F ADVNC CARE PLAN IN RCRD: CPT | Mod: S$GLB,,, | Performed by: FAMILY MEDICINE

## 2020-12-03 PROCEDURE — 1101F PT FALLS ASSESS-DOCD LE1/YR: CPT | Mod: CPTII,S$GLB,, | Performed by: FAMILY MEDICINE

## 2020-12-03 PROCEDURE — 3077F PR MOST RECENT SYSTOLIC BLOOD PRESSURE >= 140 MM HG: ICD-10-PCS | Mod: CPTII,S$GLB,, | Performed by: FAMILY MEDICINE

## 2020-12-03 PROCEDURE — 3080F DIAST BP >= 90 MM HG: CPT | Mod: CPTII,S$GLB,, | Performed by: FAMILY MEDICINE

## 2020-12-03 PROCEDURE — 3080F PR MOST RECENT DIASTOLIC BLOOD PRESSURE >= 90 MM HG: ICD-10-PCS | Mod: CPTII,S$GLB,, | Performed by: FAMILY MEDICINE

## 2020-12-03 PROCEDURE — 3288F FALL RISK ASSESSMENT DOCD: CPT | Mod: CPTII,S$GLB,, | Performed by: FAMILY MEDICINE

## 2020-12-03 PROCEDURE — 99214 PR OFFICE/OUTPT VISIT, EST, LEVL IV, 30-39 MIN: ICD-10-PCS | Mod: S$GLB,,, | Performed by: FAMILY MEDICINE

## 2020-12-03 RX ORDER — AMLODIPINE BESYLATE 10 MG/1
10 TABLET ORAL DAILY
Qty: 90 TABLET | Refills: 3
Start: 2020-12-03 | End: 2021-03-18

## 2020-12-03 NOTE — PROGRESS NOTES
Subjective:       Patient ID: Buddy Park is a 69 y.o. male.    Chief Complaint: Follow-up    HPI   Follow-up. Has followed-up with GI since he last saw me and is scheduled for a colonoscopy later this month. Had been having diarrhea and weakness, and his stool tested positive for occult blood. I stopped his metformin approx 6 weeks ago, as his a1c was < 5.5. Amlodipine was cut back to 5 mg around the same time. No longer feeling like he's going to pass out.    Review of Systems   Constitutional: Negative for chills, diaphoresis, fatigue and fever.   Gastrointestinal: Positive for diarrhea. Negative for anal bleeding, nausea and rectal pain.   Musculoskeletal: Positive for gait problem.   Skin: Negative for color change, pallor, rash and wound.   Neurological: Negative for tremors, seizures, syncope and headaches.       Past Medical History:   Diagnosis Date    Angina pectoris     Anxiety     Arthritis     Biliary colic     Cochlear implant in place     Deaf     LEFT EAR    Depression     Heart failure     High cholesterol     History of colon polyps 2/20/2018    [place] (hard of hearing)     HEARING AID - RIGHT    Hyperlipidemia     Hypertension     Kidney stone     Kidney stones     Renal stones     Stroke     Trouble in sleeping     Wears glasses      Past Surgical History:   Procedure Laterality Date    CAROTID [facility]GIOPLASTY  06/2018    [facility]     CATARACT EXTRACTION Bilateral     CHOLECYSTECTOMY  2-6-2014    COLONOSCOPY  1/9/2013    Dr. Gillette, 5 year recheck (family history colon cancer)    COLONOSCOPY N/A 3/12/2018    Procedure: COLONOSCOPY;  Surgeon: Garett Go MD;  Location: [facility];  Service: Endoscopy;  Laterality: N/A;    CYSTOSCOPY W/ RETROGRADES Bilateral 10/28/2019    Procedure: CYSTOSCOPY, WITH RETROGRADE PYELOGRAM;  Surgeon: Gretchen Proctor MD;  Location: [facility] OR;  Service: Urology;  Laterality: Bilateral;    CYSTOSCOPY W/ URETERAL STENT PLACEMENT Left  10/28/2019    Procedure: CYSTOSCOPY, WITH URETERAL STENT INSERTION;  Surgeon: Gretchen Proctor MD;  Location: [facility] OR;  Service: Urology;  Laterality: Left;    CYSTOSCOPY W/ URETERAL STENT REMOVAL Left 12/2/2019    Procedure: CYSTOSCOPY, WITH URETERAL STENT REMOVAL;  Surgeon: Gretchen Proctor MD;  Location: [facility] OR;  Service: Urology;  Laterality: Left;    EAR MASTOIDECTOMY W/ COCHLEAR IMPLANT W/ LANDMARK      left ear    EXTRACORPOREAL SHOCK WAVE LITHOTRIPSY Left 12/2/2019    Procedure: LITHOTRIPSY, ESWL;  Surgeon: Gretchen Proctor MD;  Location: [facility] OR;  Service: Urology;  Laterality: Left;    EYE SURGERY      FOREIGN BODY REMOVAL Right     glass removed from right foot/repair    FRACTURE SURGERY      right arm x2    LITHOTRIPSY      ROTATOR CUFF REPAIR      Right     SINUS SURGERY      TONSILLECTOMY      VASCULAR SURGERY       Social History     Socioeconomic History    Marital status:      Spouse name: Not on file    Number of children: Not on file    Years of education: Not on file    Highest education level: Not on file   Occupational History    Not on file   Social Needs    Financial resource strain: Not on file    Food insecurity     Worry: Not on file     Inability: Not on file    Transportation needs     Medical: Not on file     Non-medical: Not on file   Tobacco Use    Smoking status: Never Smoker    Smokeless tobacco: Never Used   Substance and Sexual Activity    Alcohol use: Yes     Comment: once every two months    Drug use: No    Sexual activity: Yes   Lifestyle    Physical activity     Days per week: Not on file     Minutes per session: Not on file    Stress: Not on file   Relationships    Social connections     Talks on phone: Not on file     Gets together: Not on file     Attends [employer] service: Not on file     Active member of club or organization: Not on file     Attends meetings of clubs or organizations: Not on file     Relationship status: Not on file  "  Other Topics Concern    Not on file   Social History Narrative    Not on file     Family History   Problem Relation Age of Onset    Cancer Mother         colon    Heart disease Father     Gout Father     Kidney disease Father     Arthritis Father     Hypertension Father     Early death Daughter         mva    Alcohol abuse Brother     Cancer Brother         mouth cancer w mets    No Known Problems Sister     Alcohol abuse Brother     No Known Problems Son     Heart disease Maternal Grandmother         MI    Stroke Maternal Grandfather     Heart disease Paternal Grandmother         MI    No Known Problems Son     Cancer Paternal Uncle         lung cancer    Allergic rhinitis Neg Hx     Allergies Neg Hx     Angioedema Neg Hx     Asthma Neg Hx     Atopy Neg Hx     Eczema Neg Hx     Immunodeficiency Neg Hx     Rhinitis Neg Hx     Urticaria Neg Hx     Collagen disease Neg Hx        Objective:      BP (!) 159/94 (BP Location: Right arm, Patient Position: Sitting, BP Method: Medium (Automatic))   Pulse 87   Temp 97.7 °F (36.5 °C)   Resp 18   Ht 5' 5" (1.651 m)   Wt 64.4 kg (142 lb)   BMI 23.63 kg/m²   Physical Exam  Vitals signs reviewed.   Constitutional:       General: He is not in acute distress.     Appearance: He is normal weight. He is not toxic-appearing.   HENT:      Head: Normocephalic and atraumatic.   Eyes:      General: No scleral icterus.        Right eye: No discharge.         Left eye: No discharge.      Conjunctiva/sclera: Conjunctivae normal.   Cardiovascular:      Rate and Rhythm: Normal rate and regular rhythm.      Heart sounds: No murmur. No gallop.    Neurological:      Mental Status: He is alert and oriented to person, place, and time. Mental status is at baseline.   Psychiatric:         Mood and Affect: Mood normal.         Behavior: Behavior normal.         Thought Content: Thought content normal.         Judgment: Judgment normal.         Assessment:       1. " Prediabetes    2. Essential hypertension        Plan:       Doing better. Update labs. Go back to 10 mg of amlodipine.    Prediabetes  -     Hemoglobin A1C; Future; Expected date: 12/03/2020    Essential hypertension  -     amLODIPine (NORVASC) 10 MG tablet; Take 1 tablet (10 mg total) by mouth once daily.  Dispense: 90 tablet; Refill: 3  -     Hemoglobin A1C; Future; Expected date: 12/03/2020  -     Comprehensive Metabolic Panel; Future; Expected date: 12/03/2020  -     CBC Auto Differential; Future; Expected date: 12/03/2020            Risks, benefits, and side effects were discussed with the patient. All questions were answered to the fullest satisfaction of the patient, and pt verbalized understanding and agreement to treatment plan. Pt was to call with any new or worsening symptoms, or present to the ER.

## 2020-12-03 NOTE — Clinical Note
Please contact [employer] to see if their hand device is something I can order for this patient so that his insurance will cover it. Thanks

## 2020-12-04 ENCOUNTER — PATIENT MESSAGE (OUTPATIENT)
Dept: FAMILY MEDICINE | Facility: CLINIC | Age: 70
End: 2020-12-04

## 2020-12-04 ENCOUNTER — LAB VISIT (OUTPATIENT)
Dept: LAB | Facility: HOSPITAL | Age: 70
End: 2020-12-04
Attending: FAMILY MEDICINE
Payer: MEDICARE

## 2020-12-04 ENCOUNTER — TELEPHONE (OUTPATIENT)
Dept: GASTROENTEROLOGY | Facility: CLINIC | Age: 70
End: 2020-12-04

## 2020-12-04 ENCOUNTER — CLINICAL SUPPORT (OUTPATIENT)
Dept: REHABILITATION | Facility: HOSPITAL | Age: 70
End: 2020-12-04
Payer: MEDICARE

## 2020-12-04 DIAGNOSIS — R73.03 PREDIABETES: ICD-10-CM

## 2020-12-04 DIAGNOSIS — I63.81 CEREBROVASCULAR ACCIDENT (CVA) DUE TO OCCLUSION OF SMALL ARTERY: Primary | ICD-10-CM

## 2020-12-04 DIAGNOSIS — I10 ESSENTIAL HYPERTENSION: ICD-10-CM

## 2020-12-04 LAB
ALBUMIN SERPL BCP-MCNC: 4.3 G/DL (ref 3.5–5.2)
ALP SERPL-CCNC: 60 U/L (ref 55–135)
ALT SERPL W/O P-5'-P-CCNC: 21 U/L (ref 10–44)
ANION GAP SERPL CALC-SCNC: 10 MMOL/L (ref 8–16)
AST SERPL-CCNC: 22 U/L (ref 10–40)
BASOPHILS # BLD AUTO: 0.03 K/UL (ref 0–0.2)
BASOPHILS NFR BLD: 0.5 % (ref 0–1.9)
BILIRUB SERPL-MCNC: 1.4 MG/DL (ref 0.1–1)
BUN SERPL-MCNC: 17 MG/DL (ref 8–23)
CALCIUM SERPL-MCNC: 9.8 MG/DL (ref 8.7–10.5)
CHLORIDE SERPL-SCNC: 104 MMOL/L (ref 95–110)
CO2 SERPL-SCNC: 28 MMOL/L (ref 23–29)
CREAT SERPL-MCNC: 1.2 MG/DL (ref 0.5–1.4)
DIFFERENTIAL METHOD: NORMAL
E COLI SXT1 STL QL IA: NEGATIVE
E COLI SXT2 STL QL IA: NEGATIVE
EOSINOPHIL # BLD AUTO: 0.1 K/UL (ref 0–0.5)
EOSINOPHIL NFR BLD: 1.6 % (ref 0–8)
ERYTHROCYTE [DISTWIDTH] IN BLOOD BY AUTOMATED COUNT: 13 % (ref 11.5–14.5)
EST. GFR  (AFRICAN AMERICAN): >60 ML/MIN/1.73 M^2
EST. GFR  (NON AFRICAN AMERICAN): >60 ML/MIN/1.73 M^2
ESTIMATED AVG GLUCOSE: 103 MG/DL (ref 68–131)
GLUCOSE SERPL-MCNC: 99 MG/DL (ref 70–110)
HBA1C MFR BLD HPLC: 5.2 % (ref 4.5–6.2)
HCT VFR BLD AUTO: 44 % (ref 40–54)
HGB BLD-MCNC: 14.9 G/DL (ref 14–18)
IMM GRANULOCYTES # BLD AUTO: 0.01 K/UL (ref 0–0.04)
IMM GRANULOCYTES NFR BLD AUTO: 0.2 % (ref 0–0.5)
LYMPHOCYTES # BLD AUTO: 2.4 K/UL (ref 1–4.8)
LYMPHOCYTES NFR BLD: 43 % (ref 18–48)
MCH RBC QN AUTO: 30.1 PG (ref 27–31)
MCHC RBC AUTO-ENTMCNC: 33.9 G/DL (ref 32–36)
MCV RBC AUTO: 89 FL (ref 82–98)
MONOCYTES # BLD AUTO: 0.6 K/UL (ref 0.3–1)
MONOCYTES NFR BLD: 10.5 % (ref 4–15)
NEUTROPHILS # BLD AUTO: 2.5 K/UL (ref 1.8–7.7)
NEUTROPHILS NFR BLD: 44.2 % (ref 38–73)
NRBC BLD-RTO: 0 /100 WBC
O+P STL MICRO: NORMAL
PLATELET # BLD AUTO: 177 K/UL (ref 150–350)
PMV BLD AUTO: 10.1 FL (ref 9.2–12.9)
POTASSIUM SERPL-SCNC: 4.1 MMOL/L (ref 3.5–5.1)
PROT SERPL-MCNC: 7.2 G/DL (ref 6–8.4)
RBC # BLD AUTO: 4.95 M/UL (ref 4.6–6.2)
SODIUM SERPL-SCNC: 142 MMOL/L (ref 136–145)
WBC # BLD AUTO: 5.6 K/UL (ref 3.9–12.7)

## 2020-12-04 PROCEDURE — 97110 THERAPEUTIC EXERCISES: CPT | Mod: PN

## 2020-12-04 PROCEDURE — 85025 COMPLETE CBC W/AUTO DIFF WBC: CPT

## 2020-12-04 PROCEDURE — 97014 ELECTRIC STIMULATION THERAPY: CPT | Mod: PN

## 2020-12-04 PROCEDURE — 80053 COMPREHEN METABOLIC PANEL: CPT

## 2020-12-04 PROCEDURE — 36415 COLL VENOUS BLD VENIPUNCTURE: CPT

## 2020-12-04 PROCEDURE — 97010 HOT OR COLD PACKS THERAPY: CPT | Mod: PN

## 2020-12-04 PROCEDURE — 83036 HEMOGLOBIN GLYCOSYLATED A1C: CPT

## 2020-12-04 NOTE — TELEPHONE ENCOUNTER
----- Message from Natalie Pathak NP sent at 12/3/2020  9:11 PM CST -----  Please let patient know his stool studies show blood in the stool; otherwise normal/negative results. Recommend he continue with scheduled colonoscopy to further evaluate the positive occult blood. Some of the stool studies are pending and we will contact him when they are resulted.

## 2020-12-04 NOTE — PROGRESS NOTES
"Patient:  Buddy Park  [facility] #:  [id number]   Date of Note: 12/04/2020   Referring Physician:  Macario Broussard, DO  Diagnosis:  CVA  Encounter Diagnosis   Name Primary?    Cerebrovascular accident (CVA) due to occlusion of small artery Yes        Start Time: 1000  End Time: 1045  Total Time: 45 min        Subjective: "My arm is getting better."    Pain: Patient did not report any pain during session.    Objective:   Patient seen by OT this session. Patient was eager to engage in skilled outpatient therapy session.  Patient continues to be noted with no with no active movement of LUE.      Treatment:   -Heat was applied to left shoulder for 15 minutes.  -OT performed LUE PROM at all joints in all planes of movement 5x10 reps.  -OT provided PPS for elbow extension of LUE.  -Electrical stimulation was applied to left shoulder for 15 minutes.      Assessment:  Patient tolerated skilled outpatient OT session well.  Patient is motivated to improve functional deficits. Patient continues to be noted with no active movement of LUE.  Patient was educated on the importance of performing HEP daily.      Plan:  Continue with current POC 2x per week.    Yamil Lopez OTR/L                                     "

## 2020-12-07 ENCOUNTER — CLINICAL SUPPORT (OUTPATIENT)
Dept: REHABILITATION | Facility: HOSPITAL | Age: 70
End: 2020-12-07
Payer: MEDICARE

## 2020-12-07 ENCOUNTER — PATIENT MESSAGE (OUTPATIENT)
Dept: GASTROENTEROLOGY | Facility: CLINIC | Age: 70
End: 2020-12-07

## 2020-12-07 ENCOUNTER — PATIENT MESSAGE (OUTPATIENT)
Dept: FAMILY MEDICINE | Facility: CLINIC | Age: 70
End: 2020-12-07

## 2020-12-07 DIAGNOSIS — I63.81 CEREBROVASCULAR ACCIDENT (CVA) DUE TO OCCLUSION OF SMALL ARTERY: Primary | ICD-10-CM

## 2020-12-07 LAB — BACTERIA STL CULT: NORMAL

## 2020-12-07 PROCEDURE — 97010 HOT OR COLD PACKS THERAPY: CPT | Mod: PN

## 2020-12-07 PROCEDURE — 97110 THERAPEUTIC EXERCISES: CPT | Mod: PN

## 2020-12-07 NOTE — PROGRESS NOTES
"Patient:  Buddy Park  [facility] #:  [id number]   Date of Note: 12/07/2020   Referring Physician:  Macario Broussard, DO  Diagnosis:  CVA  Encounter Diagnosis   Name Primary?    Cerebrovascular accident (CVA) due to occlusion of small artery Yes        Start Time: 930  End Time: 1015  Total Time: 45 min        Subjective: "My arm is getting better."    Pain: Patient did not report any pain during session.    Objective:   Patient seen by OT this session. Patient was eager to engage in skilled outpatient therapy session.  Patient reported not feeling well during session.  Patient noted to be very pale in facial appearance.  Patient also became diaphoretic during session.  Patient's BP readings were 104/45 and 99/68 respectively.  Patient continues to be noted with no with no active movement of LUE.      Treatment:   -Heat was applied to left shoulder for 15 minutes.  -OT performed LUE PROM at all joints in all planes of movement 5x10 reps.  -OT provided PPS for elbow extension of LUE.        Assessment:  Patient tolerated skilled outpatient OT session well.  Patient is motivated to improve functional deficits. Patient reported not feeling well during session.  Patient noted to be very pale in facial appearance.  Patient also became diaphoretic during session.  Patient's BP readings were 104/45 and 99/68 respectively.Patient continues to be noted with no active movement of LUE.  Patient was educated on the importance of performing HEP daily.      Plan:  Continue with current POC 2x per week.    Yamil Lopez OTR/L                                       "

## 2020-12-07 NOTE — TELEPHONE ENCOUNTER
DO Sara Villatoro, LPN 6 hours ago (7:09 AM)     He should be fine to hold his plavix and aspirin for 5 days. Thanks     Message text

## 2020-12-11 ENCOUNTER — PATIENT MESSAGE (OUTPATIENT)
Dept: FAMILY MEDICINE | Facility: CLINIC | Age: 70
End: 2020-12-11

## 2020-12-14 ENCOUNTER — CLINICAL SUPPORT (OUTPATIENT)
Dept: REHABILITATION | Facility: HOSPITAL | Age: 70
End: 2020-12-14
Payer: MEDICARE

## 2020-12-14 ENCOUNTER — PATIENT MESSAGE (OUTPATIENT)
Dept: FAMILY MEDICINE | Facility: CLINIC | Age: 70
End: 2020-12-14

## 2020-12-14 DIAGNOSIS — I63.81 CEREBROVASCULAR ACCIDENT (CVA) DUE TO OCCLUSION OF SMALL ARTERY: Primary | ICD-10-CM

## 2020-12-14 PROCEDURE — 97032 APPL MODALITY 1+ESTIM EA 15: CPT | Mod: PN

## 2020-12-14 PROCEDURE — 97010 HOT OR COLD PACKS THERAPY: CPT | Mod: PN

## 2020-12-14 PROCEDURE — 97110 THERAPEUTIC EXERCISES: CPT | Mod: PN

## 2020-12-14 NOTE — PROGRESS NOTES
"Patient:  Buddy Park  [facility] #:  [id number]   Date of Note: 12/14/2020   Referring Physician:  Macario Broussard, DO  Diagnosis:  CVA  Encounter Diagnosis   Name Primary?    Cerebrovascular accident (CVA) due to occlusion of small artery Yes        Start Time: 930  End Time: 1015  Total Time: 45 min        Subjective: "My arm is getting better."    Pain: Patient did not report any pain during session.    Objective:   Patient seen by OT this session. Patient was eager to engage in skilled outpatient therapy session.  Patient reported not feeling well during session.  Patient noted to be very pale in facial appearance.  Patient also became diaphoretic during session.  Patient's BP readings were 104/45 and 99/68 respectively.  Patient continues to be noted with no with no active movement of LUE.      Treatment:   -Heat was applied to left shoulder for 15 minutes.  -OT performed LUE PROM at all joints in all planes of movement 5x10 reps.  -OT provided PPS for elbow extension of LUE.  -Electrical stimulation was applied to left shoulder for 20 minutes.        Assessment:  Patient tolerated skilled outpatient OT session well.  Patient is motivated to improve functional deficits. Patient reported not feeling well during session.  Patient noted to be very pale in facial appearance.  Patient also became diaphoretic during session.  Patient's BP readings were 104/45 and 99/68 respectively.Patient continues to be noted with no active movement of LUE.  Patient was educated on the importance of performing HEP daily.      Plan:  Continue with current POC 2x per week.    Yamil Lopez, OTR/L                                         "

## 2020-12-15 NOTE — TELEPHONE ENCOUNTER
I spoke to Mr. Park, He is Frustrated because he really wants this Device so that he can start using his hand again. I told him to send me the information of the place that carries this and we would send order right over. I definitely understand his frustration. I tried to explain that I [facility] and that it would take 24 to 72 hours.  He said his wife wrote down some places that supplies device but she cannot read her handwriting . He will call back tomorrow with Info

## 2020-12-16 ENCOUNTER — PATIENT MESSAGE (OUTPATIENT)
Dept: FAMILY MEDICINE | Facility: CLINIC | Age: 70
End: 2020-12-16

## 2020-12-16 ENCOUNTER — TELEPHONE (OUTPATIENT)
Dept: FAMILY MEDICINE | Facility: CLINIC | Age: 70
End: 2020-12-16

## 2020-12-16 NOTE — TELEPHONE ENCOUNTER
Calling patient about elevated b/p at last clinic visit. Pt will do home reading this evening and send to me imn a portal message and if it it still elevated will don. A nurse visit for tomorrow.

## 2020-12-17 ENCOUNTER — TELEPHONE (OUTPATIENT)
Dept: FAMILY MEDICINE | Facility: CLINIC | Age: 70
End: 2020-12-17

## 2020-12-17 ENCOUNTER — CLINICAL SUPPORT (OUTPATIENT)
Dept: FAMILY MEDICINE | Facility: CLINIC | Age: 70
End: 2020-12-17
Payer: MEDICARE

## 2020-12-17 ENCOUNTER — CLINICAL SUPPORT (OUTPATIENT)
Dept: REHABILITATION | Facility: HOSPITAL | Age: 70
End: 2020-12-17
Payer: MEDICARE

## 2020-12-17 VITALS — DIASTOLIC BLOOD PRESSURE: 67 MMHG | OXYGEN SATURATION: 98 % | HEART RATE: 92 BPM | SYSTOLIC BLOOD PRESSURE: 117 MMHG

## 2020-12-17 VITALS — DIASTOLIC BLOOD PRESSURE: 74 MMHG | SYSTOLIC BLOOD PRESSURE: 119 MMHG

## 2020-12-17 DIAGNOSIS — I10 ESSENTIAL HYPERTENSION: Primary | ICD-10-CM

## 2020-12-17 DIAGNOSIS — I63.81 CEREBROVASCULAR ACCIDENT (CVA) DUE TO OCCLUSION OF SMALL ARTERY: Primary | ICD-10-CM

## 2020-12-17 PROCEDURE — 97010 HOT OR COLD PACKS THERAPY: CPT | Mod: PN

## 2020-12-17 PROCEDURE — 97032 APPL MODALITY 1+ESTIM EA 15: CPT | Mod: PN

## 2020-12-17 PROCEDURE — 99999 PR PBB SHADOW E&M-EST. PATIENT-LVL II: CPT | Mod: PBBFAC,,,

## 2020-12-17 PROCEDURE — 97110 THERAPEUTIC EXERCISES: CPT | Mod: PN

## 2020-12-17 PROCEDURE — 99999 PR PBB SHADOW E&M-EST. PATIENT-LVL II: ICD-10-PCS | Mod: PBBFAC,,,

## 2020-12-17 NOTE — PROGRESS NOTES
"Patient:  Buddy Park  [facility] #:  [id number]   Date of Note: 12/17/2020   Referring Physician:  Macario Broussard, DO  Diagnosis:  CVA  Encounter Diagnosis   Name Primary?    Cerebrovascular accident (CVA) due to occlusion of small artery Yes        Start Time: 845  End Time: 930  Total Time: 45 min        Subjective: "My arm is getting better."    Pain: Patient did not report any pain during session.    Objective:   Patient seen by OT this session. Patient was eager to engage in skilled outpatient therapy session.  Patient continues to be noted with no with no active movement of LUE.      Treatment:   -Heat was applied to left shoulder for 15 minutes.  -OT performed LUE PROM at all joints in all planes of movement 5x10 reps.  -OT provided PPS for elbow extension of LUE.  -Electrical stimulation was applied to left shoulder for 15 minutes.      Assessment:  Patient tolerated skilled outpatient OT session well.  Patient is motivated to improve functional deficits. Patient continues to be noted with no active movement of LUE.  Patient was educated on the importance of performing HEP daily.      Plan:  Continue with current POC 2x per week.    Yamil Lopez, OTR/L                                     "

## 2020-12-22 ENCOUNTER — TELEPHONE (OUTPATIENT)
Dept: FAMILY MEDICINE | Facility: CLINIC | Age: 70
End: 2020-12-22

## 2020-12-22 ENCOUNTER — HOSPITAL ENCOUNTER (OUTPATIENT)
Dept: RADIOLOGY | Facility: HOSPITAL | Age: 70
Discharge: HOME | End: 2020-12-22
Attending: NURSE PRACTITIONER
Payer: MEDICARE

## 2020-12-22 ENCOUNTER — PATIENT MESSAGE (OUTPATIENT)
Dept: FAMILY MEDICINE | Facility: CLINIC | Age: 70
End: 2020-12-22

## 2020-12-22 ENCOUNTER — OFFICE VISIT (OUTPATIENT)
Dept: FAMILY MEDICINE | Facility: CLINIC | Age: 70
End: 2020-12-22
Payer: MEDICARE

## 2020-12-22 VITALS — DIASTOLIC BLOOD PRESSURE: 74 MMHG | SYSTOLIC BLOOD PRESSURE: 119 MMHG

## 2020-12-22 VITALS
SYSTOLIC BLOOD PRESSURE: 137 MMHG | OXYGEN SATURATION: 98 % | RESPIRATION RATE: 18 BRPM | WEIGHT: 140 LBS | HEIGHT: 65 IN | HEART RATE: 101 BPM | BODY MASS INDEX: 23.32 KG/M2 | DIASTOLIC BLOOD PRESSURE: 88 MMHG | TEMPERATURE: 98 F

## 2020-12-22 DIAGNOSIS — M79.671 RIGHT FOOT PAIN: ICD-10-CM

## 2020-12-22 DIAGNOSIS — M79.671 RIGHT FOOT PAIN: Primary | ICD-10-CM

## 2020-12-22 PROCEDURE — 3079F PR MOST RECENT DIASTOLIC BLOOD PRESSURE 80-89 MM HG: ICD-10-PCS | Mod: CPTII,S$GLB,, | Performed by: NURSE PRACTITIONER

## 2020-12-22 PROCEDURE — 1126F PR PAIN SEVERITY QUANTIFIED, NO PAIN PRESENT: ICD-10-PCS | Mod: S$GLB,,, | Performed by: NURSE PRACTITIONER

## 2020-12-22 PROCEDURE — 3079F DIAST BP 80-89 MM HG: CPT | Mod: CPTII,S$GLB,, | Performed by: NURSE PRACTITIONER

## 2020-12-22 PROCEDURE — 73630 XR FOOT COMPLETE 3 VIEW RIGHT: ICD-10-PCS | Mod: 26,RT,, | Performed by: RADIOLOGY

## 2020-12-22 PROCEDURE — 99999 PR PBB SHADOW E&M-EST. PATIENT-LVL IV: CPT | Mod: PBBFAC,,, | Performed by: NURSE PRACTITIONER

## 2020-12-22 PROCEDURE — 3075F SYST BP GE 130 - 139MM HG: CPT | Mod: CPTII,S$GLB,, | Performed by: NURSE PRACTITIONER

## 2020-12-22 PROCEDURE — 1101F PT FALLS ASSESS-DOCD LE1/YR: CPT | Mod: CPTII,S$GLB,, | Performed by: NURSE PRACTITIONER

## 2020-12-22 PROCEDURE — 3008F PR BODY MASS INDEX (BMI) DOCUMENTED: ICD-10-PCS | Mod: CPTII,S$GLB,, | Performed by: NURSE PRACTITIONER

## 2020-12-22 PROCEDURE — 73630 X-RAY EXAM OF FOOT: CPT | Mod: 26,RT,, | Performed by: RADIOLOGY

## 2020-12-22 PROCEDURE — 1159F MED LIST DOCD IN RCRD: CPT | Mod: S$GLB,,, | Performed by: NURSE PRACTITIONER

## 2020-12-22 PROCEDURE — 3288F FALL RISK ASSESSMENT DOCD: CPT | Mod: CPTII,S$GLB,, | Performed by: NURSE PRACTITIONER

## 2020-12-22 PROCEDURE — 3075F PR MOST RECENT SYSTOLIC BLOOD PRESS GE 130-139MM HG: ICD-10-PCS | Mod: CPTII,S$GLB,, | Performed by: NURSE PRACTITIONER

## 2020-12-22 PROCEDURE — 3008F BODY MASS INDEX DOCD: CPT | Mod: CPTII,S$GLB,, | Performed by: NURSE PRACTITIONER

## 2020-12-22 PROCEDURE — 1159F PR MEDICATION LIST DOCUMENTED IN MEDICAL RECORD: ICD-10-PCS | Mod: S$GLB,,, | Performed by: NURSE PRACTITIONER

## 2020-12-22 PROCEDURE — 73630 X-RAY EXAM OF FOOT: CPT | Mod: TC,PN,RT

## 2020-12-22 PROCEDURE — 99213 PR OFFICE/OUTPT VISIT, EST, LEVL III, 20-29 MIN: ICD-10-PCS | Mod: S$GLB,,, | Performed by: NURSE PRACTITIONER

## 2020-12-22 PROCEDURE — 1101F PR PT FALLS ASSESS DOC 0-1 FALLS W/OUT INJ PAST YR: ICD-10-PCS | Mod: CPTII,S$GLB,, | Performed by: NURSE PRACTITIONER

## 2020-12-22 PROCEDURE — 3288F PR FALLS RISK ASSESSMENT DOCUMENTED: ICD-10-PCS | Mod: CPTII,S$GLB,, | Performed by: NURSE PRACTITIONER

## 2020-12-22 PROCEDURE — 99999 PR PBB SHADOW E&M-EST. PATIENT-LVL IV: ICD-10-PCS | Mod: PBBFAC,,, | Performed by: NURSE PRACTITIONER

## 2020-12-22 PROCEDURE — 1157F PR ADVANCE CARE PLAN OR EQUIV PRESENT IN MEDICAL RECORD: ICD-10-PCS | Mod: S$GLB,,, | Performed by: NURSE PRACTITIONER

## 2020-12-22 PROCEDURE — 99213 OFFICE O/P EST LOW 20 MIN: CPT | Mod: S$GLB,,, | Performed by: NURSE PRACTITIONER

## 2020-12-22 PROCEDURE — 1157F ADVNC CARE PLAN IN RCRD: CPT | Mod: S$GLB,,, | Performed by: NURSE PRACTITIONER

## 2020-12-22 PROCEDURE — 1126F AMNT PAIN NOTED NONE PRSNT: CPT | Mod: S$GLB,,, | Performed by: NURSE PRACTITIONER

## 2020-12-22 RX ORDER — TRAMADOL HYDROCHLORIDE 50 MG/1
50 TABLET ORAL EVERY 6 HOURS PRN
Qty: 12 TABLET | Refills: 0 | Status: SHIPPED | OUTPATIENT
Start: 2020-12-22 | End: 2020-12-25

## 2020-12-22 NOTE — TELEPHONE ENCOUNTER
----- Message from Augustina  sent at 12/22/2020 12:31 PM CST -----  Regarding: same day access  Contact: pt  Type: Needs Medical Advice    Who Called:      Best Call Back Number:     Additional Information: Requesting a call back from Nurse, regarding pt states he is in a lot of pain and can not wait until Thursday to be sen he wants to come in today ,please call back with advice

## 2020-12-22 NOTE — PROGRESS NOTES
"Subjective:       Patient ID: Buddy Park is a 70 y.o. male.    Chief Complaint: Ankle Pain (right)    Mr. Buddy Park is a 70 year old male who presents to the clinic today for right ankle pain. New finding. He is in a wheelchair today.  Pain started today. Reports he twisted his ankle when he was outside today. He is afraid he fractured his ankle. Hx of CVA. Reports pain is "horrible." Reports he does not have any medication to improve his pain    Review of Systems   Constitutional: Positive for activity change. Negative for chills, diaphoresis, fatigue and fever.   Respiratory: Negative for cough, chest tightness, shortness of breath and wheezing.    Gastrointestinal: Negative for abdominal distention, abdominal pain, anal bleeding, diarrhea, nausea and rectal pain.   Musculoskeletal: Positive for arthralgias and joint swelling. Negative for gait problem.   Skin: Negative for color change, pallor, rash and wound.   Neurological: Negative for tremors, seizures, syncope and headaches.         Reviewed family, medical, surgical, and social history.    Objective:      /88 (BP Location: Right arm, Patient Position: Sitting, BP Method: Medium (Automatic))   Pulse 101   Temp 98 °F (36.7 °C) (Tympanic)   Resp 18   Ht 5' 5" (1.651 m)   Wt 63.5 kg (140 lb)   SpO2 98%   BMI 23.30 kg/m²   Physical Exam  Vitals signs reviewed.   Constitutional:       General: He is not in acute distress.     Appearance: He is normal weight. He is not toxic-appearing.   HENT:      Head: Normocephalic and atraumatic.      Nose: Nose normal.      Mouth/Throat:      Mouth: Mucous membranes are moist.      Pharynx: Oropharynx is clear.   Eyes:      General: No scleral icterus.        Right eye: No discharge.         Left eye: No discharge.      Conjunctiva/sclera: Conjunctivae normal.      Pupils: Pupils are equal, round, and reactive to light.   Neck:      Musculoskeletal: Normal range of motion.   Cardiovascular:      Rate and " Rhythm: Normal rate and regular rhythm.      Heart sounds: No murmur. No gallop.    Pulmonary:      Effort: Pulmonary effort is normal.      Breath sounds: Normal breath sounds. No wheezing.   Abdominal:      General: Bowel sounds are normal.      Palpations: Abdomen is soft.   Musculoskeletal:         General: Swelling present.      Right ankle: He exhibits decreased range of motion and swelling.   Skin:     Capillary Refill: Capillary refill takes less than 2 seconds.   Neurological:      Mental Status: He is alert and oriented to person, place, and time. Mental status is at baseline.   Psychiatric:         Mood and Affect: Mood normal.         Behavior: Behavior normal.         Thought Content: Thought content normal.         Judgment: Judgment normal.         Assessment:       1. Right foot pain        Plan:       Right foot pain  -     X-Ray Foot Complete Right; Future; Expected date: 12/22/2020  -     traMADoL (ULTRAM) 50 mg tablet; Take 1 tablet (50 mg total) by mouth every 6 (six) hours as needed for Pain.  Dispense: 12 tablet; Refill: 0          PLAN:  - Discussed with patient the plan of care  - Obtain xray  - Short term tramadol for PRN use reviewed  - Medications reviewed. Medication side effects discussed. Patient has no questions or concerns at this time. Informed patient to notify me regarding any concerns.   - Continue monitoring, ice 3x daily  - Informed patient to please notify me with any questions or concerns at anytime  - Follow up TBD      Risks, benefits, and side effects were discussed with the patient. All questions were answered to the fullest satisfaction of the patient, and pt verbalized understanding and agreement to treatment plan. Pt was to call with any new or worsening symptoms, or present to the ER.

## 2020-12-24 ENCOUNTER — OFFICE VISIT (OUTPATIENT)
Dept: FAMILY MEDICINE | Facility: CLINIC | Age: 70
End: 2020-12-24
Payer: MEDICARE

## 2020-12-24 VITALS
DIASTOLIC BLOOD PRESSURE: 80 MMHG | TEMPERATURE: 97 F | HEART RATE: 89 BPM | RESPIRATION RATE: 18 BRPM | BODY MASS INDEX: 23.32 KG/M2 | OXYGEN SATURATION: 98 % | SYSTOLIC BLOOD PRESSURE: 133 MMHG | WEIGHT: 140 LBS | HEIGHT: 65 IN

## 2020-12-24 DIAGNOSIS — M79.671 RIGHT FOOT PAIN: Primary | ICD-10-CM

## 2020-12-24 PROCEDURE — 1126F PR PAIN SEVERITY QUANTIFIED, NO PAIN PRESENT: ICD-10-PCS | Mod: S$GLB,,, | Performed by: NURSE PRACTITIONER

## 2020-12-24 PROCEDURE — 1157F ADVNC CARE PLAN IN RCRD: CPT | Mod: S$GLB,,, | Performed by: NURSE PRACTITIONER

## 2020-12-24 PROCEDURE — 3075F SYST BP GE 130 - 139MM HG: CPT | Mod: CPTII,S$GLB,, | Performed by: NURSE PRACTITIONER

## 2020-12-24 PROCEDURE — 3288F PR FALLS RISK ASSESSMENT DOCUMENTED: ICD-10-PCS | Mod: CPTII,S$GLB,, | Performed by: NURSE PRACTITIONER

## 2020-12-24 PROCEDURE — 1126F AMNT PAIN NOTED NONE PRSNT: CPT | Mod: S$GLB,,, | Performed by: NURSE PRACTITIONER

## 2020-12-24 PROCEDURE — 3008F BODY MASS INDEX DOCD: CPT | Mod: CPTII,S$GLB,, | Performed by: NURSE PRACTITIONER

## 2020-12-24 PROCEDURE — 99999 PR PBB SHADOW E&M-EST. PATIENT-LVL III: CPT | Mod: PBBFAC,,, | Performed by: NURSE PRACTITIONER

## 2020-12-24 PROCEDURE — 99213 PR OFFICE/OUTPT VISIT, EST, LEVL III, 20-29 MIN: ICD-10-PCS | Mod: S$GLB,,, | Performed by: NURSE PRACTITIONER

## 2020-12-24 PROCEDURE — 3079F DIAST BP 80-89 MM HG: CPT | Mod: CPTII,S$GLB,, | Performed by: NURSE PRACTITIONER

## 2020-12-24 PROCEDURE — 1101F PR PT FALLS ASSESS DOC 0-1 FALLS W/OUT INJ PAST YR: ICD-10-PCS | Mod: CPTII,S$GLB,, | Performed by: NURSE PRACTITIONER

## 2020-12-24 PROCEDURE — 99213 OFFICE O/P EST LOW 20 MIN: CPT | Mod: S$GLB,,, | Performed by: NURSE PRACTITIONER

## 2020-12-24 PROCEDURE — 3075F PR MOST RECENT SYSTOLIC BLOOD PRESS GE 130-139MM HG: ICD-10-PCS | Mod: CPTII,S$GLB,, | Performed by: NURSE PRACTITIONER

## 2020-12-24 PROCEDURE — 3288F FALL RISK ASSESSMENT DOCD: CPT | Mod: CPTII,S$GLB,, | Performed by: NURSE PRACTITIONER

## 2020-12-24 PROCEDURE — 1101F PT FALLS ASSESS-DOCD LE1/YR: CPT | Mod: CPTII,S$GLB,, | Performed by: NURSE PRACTITIONER

## 2020-12-24 PROCEDURE — 1159F PR MEDICATION LIST DOCUMENTED IN MEDICAL RECORD: ICD-10-PCS | Mod: S$GLB,,, | Performed by: NURSE PRACTITIONER

## 2020-12-24 PROCEDURE — 1159F MED LIST DOCD IN RCRD: CPT | Mod: S$GLB,,, | Performed by: NURSE PRACTITIONER

## 2020-12-24 PROCEDURE — 3008F PR BODY MASS INDEX (BMI) DOCUMENTED: ICD-10-PCS | Mod: CPTII,S$GLB,, | Performed by: NURSE PRACTITIONER

## 2020-12-24 PROCEDURE — 3079F PR MOST RECENT DIASTOLIC BLOOD PRESSURE 80-89 MM HG: ICD-10-PCS | Mod: CPTII,S$GLB,, | Performed by: NURSE PRACTITIONER

## 2020-12-24 PROCEDURE — 1157F PR ADVANCE CARE PLAN OR EQUIV PRESENT IN MEDICAL RECORD: ICD-10-PCS | Mod: S$GLB,,, | Performed by: NURSE PRACTITIONER

## 2020-12-24 PROCEDURE — 99999 PR PBB SHADOW E&M-EST. PATIENT-LVL III: ICD-10-PCS | Mod: PBBFAC,,, | Performed by: NURSE PRACTITIONER

## 2020-12-24 NOTE — PROGRESS NOTES
"Subjective:       Patient ID: Buddy Park is a 70 y.o. male.    Chief Complaint: Follow-up (ankle still in pain)    Mr. Buddy Park is a 70 year old male who presents to the clinic today for f/u right foot pain. Reports pain is excruciating. Reports he slept in the recliner. Patient reports he cannot walk. States swelling and tender to touch. As noted, I saw him on Tuesday. He rolled his ankle on his driveway.     XRAY   No radiographically evident acute, displaced fracture, dislocation, or osseous destructive process.  There is a small plantar calcaneal spur.  There is dorsal osteophyte formation observed at the talar head.  No erosions.  No calcified tophi.  There is soft tissue swelling noted about the lateral aspect of the right foot.    Follow-up  Associated symptoms include arthralgias and joint swelling. Pertinent negatives include no abdominal pain, chills, coughing, diaphoresis, fatigue, fever, headaches, nausea or rash.     Review of Systems   Constitutional: Positive for activity change. Negative for chills, diaphoresis, fatigue and fever.   Respiratory: Negative for cough, chest tightness, shortness of breath and wheezing.    Gastrointestinal: Negative for abdominal distention, abdominal pain, anal bleeding, diarrhea, nausea and rectal pain.   Musculoskeletal: Positive for arthralgias and joint swelling. Negative for gait problem.   Skin: Negative for color change, pallor, rash and wound.   Neurological: Negative for tremors, seizures, syncope and headaches.         Reviewed family, medical, surgical, and social history.    Objective:      /80 (BP Location: Left arm, Patient Position: Sitting, BP Method: Medium (Automatic))   Pulse 89   Temp 97 °F (36.1 °C) (Tympanic)   Resp 18   Ht 5' 5" (1.651 m)   Wt 63.5 kg (140 lb)   SpO2 98%   BMI 23.30 kg/m²   Physical Exam  Vitals signs reviewed.   Constitutional:       General: He is not in acute distress.     Appearance: He is normal weight. " He is not toxic-appearing.   HENT:      Head: Normocephalic and atraumatic.      Nose: Nose normal.      Mouth/Throat:      Mouth: Mucous membranes are moist.      Pharynx: Oropharynx is clear.   Eyes:      General: No scleral icterus.        Right eye: No discharge.         Left eye: No discharge.      Conjunctiva/sclera: Conjunctivae normal.      Pupils: Pupils are equal, round, and reactive to light.   Neck:      Musculoskeletal: Normal range of motion.   Cardiovascular:      Rate and Rhythm: Normal rate and regular rhythm.      Heart sounds: No murmur. No gallop.    Pulmonary:      Effort: Pulmonary effort is normal.      Breath sounds: Normal breath sounds. No wheezing.   Abdominal:      General: Bowel sounds are normal.      Palpations: Abdomen is soft.   Musculoskeletal:         General: Swelling present.      Right ankle: He exhibits decreased range of motion and swelling.   Skin:     Capillary Refill: Capillary refill takes less than 2 seconds.   Neurological:      Mental Status: He is alert and oriented to person, place, and time. Mental status is at baseline.   Psychiatric:         Mood and Affect: Mood normal.         Behavior: Behavior normal.         Thought Content: Thought content normal.         Judgment: Judgment normal.         Assessment:       1. Right foot pain        Plan:       Right foot pain          PLAN:  - Discussed with patient the plan of care  - Xray reviewed  - Boot applied to right foot  - Medications reviewed. Medication side effects discussed. Patient has no questions or concerns at this time. Informed patient to notify me regarding any concerns.   - Continue monitoring, ice, and elevate  - Informed patient to please notify me with any questions or concerns at anytime      Risks, benefits, and side effects were discussed with the patient. All questions were answered to the fullest satisfaction of the patient, and pt verbalized understanding and agreement to treatment plan. Pt was  to call with any new or worsening symptoms, or present to the ER.

## 2020-12-26 DIAGNOSIS — F32.5 MAJOR DEPRESSIVE DISORDER WITH SINGLE EPISODE, IN FULL REMISSION: ICD-10-CM

## 2020-12-28 ENCOUNTER — PATIENT MESSAGE (OUTPATIENT)
Dept: FAMILY MEDICINE | Facility: CLINIC | Age: 70
End: 2020-12-28

## 2020-12-29 ENCOUNTER — TELEPHONE (OUTPATIENT)
Dept: FAMILY MEDICINE | Facility: CLINIC | Age: 70
End: 2020-12-29

## 2020-12-29 RX ORDER — BUPROPION HYDROCHLORIDE 150 MG/1
TABLET ORAL
Qty: 90 TABLET | Refills: 1 | Status: SHIPPED | OUTPATIENT
Start: 2020-12-29 | End: 2022-01-05 | Stop reason: SDUPTHER

## 2020-12-29 NOTE — PROGRESS NOTES
Refill Routing Note   Medication(s) are not appropriate for processing by [facility] for the following reason(s):     - Outside of protocol  [facility] action(s):  Route        Medication reconciliation completed: No   Automatic Epic Generated Protocol Data:        Requested Prescriptions   Pending Prescriptions Disp Refills    buPROPion (WELLBUTRIN XL) 150 MG TB24 tablet [Pharmacy Med Name: BUPROPION HCL  MG TABLET] 90 tablet 0     Sig: TAKE 1 TABLET BY MOUTH EVERY DAY IN THE MORNING       Psychiatry: Antidepressants - bupropion Passed - 12/26/2020  3:14 PM        Passed - Patient is at least 18 years old        Passed - Last BP in normal range within 360 days.     BP Readings from Last 3 Encounters:   12/24/20 133/80   12/22/20 119/74   12/22/20 137/88              Passed - Office visit in past 12 months or future 90 days     Recent Outpatient Visits            4 days ago Right foot pain    [facility] Britni Weeks NP    6 days ago Right foot pain    [facility] Britni Weeks NP    3 weeks ago Prediabetes    [facility] Tsering Mercado DO    4 weeks ago Chronic diarrhea    [facility] G[facility] Natalie Pathak NP    2 months ago Hypotension, unspecified hypotension type    [facility] Tsering Mercado DO          Future Appointments              In 3 days Yamil Lopez OT [facility], [facility]    In 3 weeks Rolando Worthington MD [facility] - Cardiology, [facility]    In 2 months Tsering Mercado DO [facility]                      Appointments  past 12m or future 3m with PCP    Date Provider   Last Visit   12/22/2020 Tsering Mercado DO   Next Visit   3/4/2021 Tsering Mercado DO   ED visits in  past 90 days: 0        Note composed:8:13 PM 12/28/2020

## 2020-12-31 ENCOUNTER — CLINICAL SUPPORT (OUTPATIENT)
Dept: REHABILITATION | Facility: HOSPITAL | Age: 70
End: 2020-12-31
Attending: ORTHOPAEDIC SURGERY
Payer: MEDICARE

## 2020-12-31 DIAGNOSIS — I63.81 CEREBROVASCULAR ACCIDENT (CVA) DUE TO OCCLUSION OF SMALL ARTERY: Primary | ICD-10-CM

## 2020-12-31 PROCEDURE — 97110 THERAPEUTIC EXERCISES: CPT | Mod: PN

## 2020-12-31 PROCEDURE — 97032 APPL MODALITY 1+ESTIM EA 15: CPT | Mod: PN

## 2020-12-31 PROCEDURE — 97010 HOT OR COLD PACKS THERAPY: CPT | Mod: PN

## 2020-12-31 NOTE — PROGRESS NOTES
"Patient:  Buddy Park  [facility] #:  [id number]   Date of Note: 12/31/2020   Referring Physician:  Macario Broussard, DO  Diagnosis:  CVA  Encounter Diagnosis   Name Primary?    Cerebrovascular accident (CVA) due to occlusion of small artery Yes        Start Time: 930  End Time: 1015  Total Time: 45 min        Subjective: "My arm is getting better."    Pain: Patient did not report any pain during session.    Objective:   Patient seen by OT this session. Patient was eager to engage in skilled outpatient therapy session.  Patient continues to be noted with no with no active movement of LUE.      Treatment:   -Heat was applied to left shoulder for 15 minutes.  -OT performed LUE PROM at all joints in all planes of movement 7x10 reps.  -OT provided PPS for elbow extension of LUE.  -Electrical stimulation was applied to left shoulder for 15 minutes.      Assessment:  Patient tolerated skilled outpatient OT session well.  Patient is motivated to improve functional deficits. Patient continues to be noted with no active movement of LUE.  Patient was educated on the importance of performing HEP daily.      Plan:  Continue with current POC 2x per week.    Yamil Lopez, OTR/L                                       "

## 2021-01-06 ENCOUNTER — APPOINTMENT (OUTPATIENT)
Dept: REHABILITATION | Facility: HOSPITAL | Age: 71
End: 2021-01-06
Payer: MEDICARE

## 2021-01-08 ENCOUNTER — CLINICAL SUPPORT (OUTPATIENT)
Dept: REHABILITATION | Facility: HOSPITAL | Age: 71
End: 2021-01-08
Payer: MEDICARE

## 2021-01-08 DIAGNOSIS — I63.81 CEREBROVASCULAR ACCIDENT (CVA) DUE TO OCCLUSION OF SMALL ARTERY: Primary | ICD-10-CM

## 2021-01-08 PROCEDURE — 97032 APPL MODALITY 1+ESTIM EA 15: CPT | Mod: PN

## 2021-01-08 PROCEDURE — 97010 HOT OR COLD PACKS THERAPY: CPT | Mod: PN,GO

## 2021-01-08 PROCEDURE — 97110 THERAPEUTIC EXERCISES: CPT | Mod: PN

## 2021-01-13 ENCOUNTER — CLINICAL SUPPORT (OUTPATIENT)
Dept: REHABILITATION | Facility: HOSPITAL | Age: 71
End: 2021-01-13
Payer: MEDICARE

## 2021-01-13 DIAGNOSIS — I63.81 CEREBROVASCULAR ACCIDENT (CVA) DUE TO OCCLUSION OF SMALL ARTERY: Primary | ICD-10-CM

## 2021-01-13 PROCEDURE — 97010 HOT OR COLD PACKS THERAPY: CPT | Mod: PN,GO

## 2021-01-13 PROCEDURE — 97110 THERAPEUTIC EXERCISES: CPT | Mod: PN,GO

## 2021-01-13 PROCEDURE — 97032 APPL MODALITY 1+ESTIM EA 15: CPT | Mod: PN,GO

## 2021-01-15 ENCOUNTER — PATIENT MESSAGE (OUTPATIENT)
Dept: GASTROENTEROLOGY | Facility: CLINIC | Age: 71
End: 2021-01-15

## 2021-01-15 ENCOUNTER — CLINICAL SUPPORT (OUTPATIENT)
Dept: REHABILITATION | Facility: HOSPITAL | Age: 71
End: 2021-01-15
Payer: MEDICARE

## 2021-01-15 DIAGNOSIS — I63.81 CEREBROVASCULAR ACCIDENT (CVA) DUE TO OCCLUSION OF SMALL ARTERY: Primary | ICD-10-CM

## 2021-01-15 DIAGNOSIS — Z01.818 PRE-OP TESTING: ICD-10-CM

## 2021-01-15 PROCEDURE — 97010 HOT OR COLD PACKS THERAPY: CPT | Mod: PN

## 2021-01-15 PROCEDURE — 97032 APPL MODALITY 1+ESTIM EA 15: CPT | Mod: PN

## 2021-01-15 PROCEDURE — 97110 THERAPEUTIC EXERCISES: CPT | Mod: PN,GO

## 2021-01-20 ENCOUNTER — CLINICAL SUPPORT (OUTPATIENT)
Dept: REHABILITATION | Facility: HOSPITAL | Age: 71
End: 2021-01-20
Payer: MEDICARE

## 2021-01-20 ENCOUNTER — PATIENT OUTREACH (OUTPATIENT)
Dept: ADMINISTRATIVE | Facility: OTHER | Age: 71
End: 2021-01-20

## 2021-01-20 DIAGNOSIS — I63.81 CEREBROVASCULAR ACCIDENT (CVA) DUE TO OCCLUSION OF SMALL ARTERY: Primary | ICD-10-CM

## 2021-01-20 PROCEDURE — 97010 HOT OR COLD PACKS THERAPY: CPT | Mod: PN,GO

## 2021-01-20 PROCEDURE — 97032 APPL MODALITY 1+ESTIM EA 15: CPT | Mod: PN

## 2021-01-20 PROCEDURE — 97110 THERAPEUTIC EXERCISES: CPT | Mod: PN,GO

## 2021-01-21 ENCOUNTER — OFFICE VISIT (OUTPATIENT)
Dept: CARDIOLOGY | Facility: CLINIC | Age: 71
End: 2021-01-21
Payer: MEDICARE

## 2021-01-21 VITALS
OXYGEN SATURATION: 97 % | HEART RATE: 90 BPM | HEIGHT: 65 IN | SYSTOLIC BLOOD PRESSURE: 146 MMHG | RESPIRATION RATE: 16 BRPM | BODY MASS INDEX: 22.49 KG/M2 | TEMPERATURE: 97 F | DIASTOLIC BLOOD PRESSURE: 97 MMHG | WEIGHT: 135 LBS

## 2021-01-21 DIAGNOSIS — H90.A32 MIXED CONDUCTIVE AND SENSORINEURAL HEARING LOSS OF LEFT EAR WITH RESTRICTED HEARING OF RIGHT EAR: ICD-10-CM

## 2021-01-21 DIAGNOSIS — I50.32 CHRONIC DIASTOLIC HEART FAILURE: ICD-10-CM

## 2021-01-21 DIAGNOSIS — M21.372 LEFT FOOT DROP: ICD-10-CM

## 2021-01-21 DIAGNOSIS — E78.00 PURE HYPERCHOLESTEROLEMIA: ICD-10-CM

## 2021-01-21 DIAGNOSIS — R74.01 ELEVATED AST (SGOT): ICD-10-CM

## 2021-01-21 DIAGNOSIS — G47.19 EXCESSIVE DAYTIME SLEEPINESS: ICD-10-CM

## 2021-01-21 DIAGNOSIS — R94.31 ABNORMAL ECG: ICD-10-CM

## 2021-01-21 DIAGNOSIS — I63.81 CEREBROVASCULAR ACCIDENT (CVA) DUE TO OCCLUSION OF SMALL ARTERY: Primary | ICD-10-CM

## 2021-01-21 DIAGNOSIS — I10 ESSENTIAL HYPERTENSION: ICD-10-CM

## 2021-01-21 DIAGNOSIS — Z98.890 HISTORY OF LEFT-SIDED CAROTID ENDARTERECTOMY: ICD-10-CM

## 2021-01-21 PROCEDURE — 99999 PR PBB SHADOW E&M-EST. PATIENT-LVL IV: ICD-10-PCS | Mod: PBBFAC,,, | Performed by: INTERNAL MEDICINE

## 2021-01-21 PROCEDURE — 3008F BODY MASS INDEX DOCD: CPT | Mod: CPTII,S$GLB,, | Performed by: INTERNAL MEDICINE

## 2021-01-21 PROCEDURE — 3077F PR MOST RECENT SYSTOLIC BLOOD PRESSURE >= 140 MM HG: ICD-10-PCS | Mod: CPTII,S$GLB,, | Performed by: INTERNAL MEDICINE

## 2021-01-21 PROCEDURE — 3077F SYST BP >= 140 MM HG: CPT | Mod: CPTII,S$GLB,, | Performed by: INTERNAL MEDICINE

## 2021-01-21 PROCEDURE — 93000 EKG 12-LEAD: ICD-10-PCS | Mod: S$GLB,,, | Performed by: INTERNAL MEDICINE

## 2021-01-21 PROCEDURE — 99999 PR PBB SHADOW E&M-EST. PATIENT-LVL IV: CPT | Mod: PBBFAC,,, | Performed by: INTERNAL MEDICINE

## 2021-01-21 PROCEDURE — 1157F PR ADVANCE CARE PLAN OR EQUIV PRESENT IN MEDICAL RECORD: ICD-10-PCS | Mod: S$GLB,,, | Performed by: INTERNAL MEDICINE

## 2021-01-21 PROCEDURE — 3080F DIAST BP >= 90 MM HG: CPT | Mod: CPTII,S$GLB,, | Performed by: INTERNAL MEDICINE

## 2021-01-21 PROCEDURE — 3008F PR BODY MASS INDEX (BMI) DOCUMENTED: ICD-10-PCS | Mod: CPTII,S$GLB,, | Performed by: INTERNAL MEDICINE

## 2021-01-21 PROCEDURE — 3288F FALL RISK ASSESSMENT DOCD: CPT | Mod: CPTII,S$GLB,, | Performed by: INTERNAL MEDICINE

## 2021-01-21 PROCEDURE — 1159F PR MEDICATION LIST DOCUMENTED IN MEDICAL RECORD: ICD-10-PCS | Mod: S$GLB,,, | Performed by: INTERNAL MEDICINE

## 2021-01-21 PROCEDURE — 3080F PR MOST RECENT DIASTOLIC BLOOD PRESSURE >= 90 MM HG: ICD-10-PCS | Mod: CPTII,S$GLB,, | Performed by: INTERNAL MEDICINE

## 2021-01-21 PROCEDURE — 1157F ADVNC CARE PLAN IN RCRD: CPT | Mod: S$GLB,,, | Performed by: INTERNAL MEDICINE

## 2021-01-21 PROCEDURE — 3288F PR FALLS RISK ASSESSMENT DOCUMENTED: ICD-10-PCS | Mod: CPTII,S$GLB,, | Performed by: INTERNAL MEDICINE

## 2021-01-21 PROCEDURE — 99215 PR OFFICE/OUTPT VISIT, EST, LEVL V, 40-54 MIN: ICD-10-PCS | Mod: 25,S$GLB,, | Performed by: INTERNAL MEDICINE

## 2021-01-21 PROCEDURE — 1159F MED LIST DOCD IN RCRD: CPT | Mod: S$GLB,,, | Performed by: INTERNAL MEDICINE

## 2021-01-21 PROCEDURE — 99215 OFFICE O/P EST HI 40 MIN: CPT | Mod: 25,S$GLB,, | Performed by: INTERNAL MEDICINE

## 2021-01-21 PROCEDURE — 1101F PT FALLS ASSESS-DOCD LE1/YR: CPT | Mod: CPTII,S$GLB,, | Performed by: INTERNAL MEDICINE

## 2021-01-21 PROCEDURE — 93000 ELECTROCARDIOGRAM COMPLETE: CPT | Mod: S$GLB,,, | Performed by: INTERNAL MEDICINE

## 2021-01-21 PROCEDURE — 1101F PR PT FALLS ASSESS DOC 0-1 FALLS W/OUT INJ PAST YR: ICD-10-PCS | Mod: CPTII,S$GLB,, | Performed by: INTERNAL MEDICINE

## 2021-01-22 ENCOUNTER — CLINICAL SUPPORT (OUTPATIENT)
Dept: REHABILITATION | Facility: HOSPITAL | Age: 71
End: 2021-01-22
Payer: MEDICARE

## 2021-01-22 DIAGNOSIS — I63.81 CEREBROVASCULAR ACCIDENT (CVA) DUE TO OCCLUSION OF SMALL ARTERY: Primary | ICD-10-CM

## 2021-01-22 PROCEDURE — 97032 APPL MODALITY 1+ESTIM EA 15: CPT | Mod: PN,GO

## 2021-01-22 PROCEDURE — 97010 HOT OR COLD PACKS THERAPY: CPT | Mod: PN

## 2021-01-22 PROCEDURE — 97110 THERAPEUTIC EXERCISES: CPT | Mod: PN,GO

## 2021-01-26 ENCOUNTER — CLINICAL SUPPORT (OUTPATIENT)
Dept: REHABILITATION | Facility: HOSPITAL | Age: 71
End: 2021-01-26
Payer: MEDICARE

## 2021-01-26 DIAGNOSIS — I63.81 CEREBROVASCULAR ACCIDENT (CVA) DUE TO OCCLUSION OF SMALL ARTERY: Primary | ICD-10-CM

## 2021-01-26 PROCEDURE — 97110 THERAPEUTIC EXERCISES: CPT | Mod: PN,GO

## 2021-01-26 PROCEDURE — 97032 APPL MODALITY 1+ESTIM EA 15: CPT | Mod: PN

## 2021-01-26 PROCEDURE — 97010 HOT OR COLD PACKS THERAPY: CPT | Mod: PN,GO

## 2021-01-29 ENCOUNTER — CLINICAL SUPPORT (OUTPATIENT)
Dept: REHABILITATION | Facility: HOSPITAL | Age: 71
End: 2021-01-29
Payer: MEDICARE

## 2021-01-29 DIAGNOSIS — I63.81 CEREBROVASCULAR ACCIDENT (CVA) DUE TO OCCLUSION OF SMALL ARTERY: Primary | ICD-10-CM

## 2021-01-29 PROCEDURE — 97010 HOT OR COLD PACKS THERAPY: CPT | Mod: PN,GO

## 2021-01-29 PROCEDURE — 97032 APPL MODALITY 1+ESTIM EA 15: CPT | Mod: PN,GO

## 2021-01-29 PROCEDURE — 97110 THERAPEUTIC EXERCISES: CPT | Mod: PN,GO

## 2021-02-02 ENCOUNTER — CLINICAL SUPPORT (OUTPATIENT)
Dept: REHABILITATION | Facility: HOSPITAL | Age: 71
End: 2021-02-02
Payer: MEDICARE

## 2021-02-02 DIAGNOSIS — I63.81 CEREBROVASCULAR ACCIDENT (CVA) DUE TO OCCLUSION OF SMALL ARTERY: Primary | ICD-10-CM

## 2021-02-02 PROCEDURE — 97032 APPL MODALITY 1+ESTIM EA 15: CPT | Mod: PN

## 2021-02-02 PROCEDURE — 97110 THERAPEUTIC EXERCISES: CPT | Mod: PN

## 2021-02-02 PROCEDURE — 97010 HOT OR COLD PACKS THERAPY: CPT | Mod: PN

## 2021-02-09 ENCOUNTER — OFFICE VISIT (OUTPATIENT)
Dept: OPHTHALMOLOGY | Facility: CLINIC | Age: 71
End: 2021-02-09
Payer: MEDICARE

## 2021-02-09 DIAGNOSIS — Z96.1 PSEUDOPHAKIA, BOTH EYES: ICD-10-CM

## 2021-02-09 DIAGNOSIS — E11.9 DIABETES MELLITUS TYPE 2 WITHOUT RETINOPATHY: Primary | ICD-10-CM

## 2021-02-09 DIAGNOSIS — H04.123 DRY EYE SYNDROME, BILATERAL: ICD-10-CM

## 2021-02-09 PROCEDURE — 1157F PR ADVANCE CARE PLAN OR EQUIV PRESENT IN MEDICAL RECORD: ICD-10-PCS | Mod: S$GLB,,, | Performed by: OPHTHALMOLOGY

## 2021-02-09 PROCEDURE — 3288F FALL RISK ASSESSMENT DOCD: CPT | Mod: CPTII,S$GLB,, | Performed by: OPHTHALMOLOGY

## 2021-02-09 PROCEDURE — 92004 COMPRE OPH EXAM NEW PT 1/>: CPT | Mod: S$GLB,,, | Performed by: OPHTHALMOLOGY

## 2021-02-09 PROCEDURE — 1126F PR PAIN SEVERITY QUANTIFIED, NO PAIN PRESENT: ICD-10-PCS | Mod: S$GLB,,, | Performed by: OPHTHALMOLOGY

## 2021-02-09 PROCEDURE — 1126F AMNT PAIN NOTED NONE PRSNT: CPT | Mod: S$GLB,,, | Performed by: OPHTHALMOLOGY

## 2021-02-09 PROCEDURE — 99999 PR PBB SHADOW E&M-EST. PATIENT-LVL III: CPT | Mod: PBBFAC,,, | Performed by: OPHTHALMOLOGY

## 2021-02-09 PROCEDURE — 99999 PR PBB SHADOW E&M-EST. PATIENT-LVL III: ICD-10-PCS | Mod: PBBFAC,,, | Performed by: OPHTHALMOLOGY

## 2021-02-09 PROCEDURE — 92004 PR EYE EXAM, NEW PATIENT,COMPREHESV: ICD-10-PCS | Mod: S$GLB,,, | Performed by: OPHTHALMOLOGY

## 2021-02-09 PROCEDURE — 1100F PTFALLS ASSESS-DOCD GE2>/YR: CPT | Mod: CPTII,S$GLB,, | Performed by: OPHTHALMOLOGY

## 2021-02-09 PROCEDURE — 1157F ADVNC CARE PLAN IN RCRD: CPT | Mod: S$GLB,,, | Performed by: OPHTHALMOLOGY

## 2021-02-09 PROCEDURE — 1100F PR PT FALLS ASSESS DOC 2+ FALLS/FALL W/INJURY/YR: ICD-10-PCS | Mod: CPTII,S$GLB,, | Performed by: OPHTHALMOLOGY

## 2021-02-09 PROCEDURE — 3288F PR FALLS RISK ASSESSMENT DOCUMENTED: ICD-10-PCS | Mod: CPTII,S$GLB,, | Performed by: OPHTHALMOLOGY

## 2021-02-11 ENCOUNTER — CLINICAL SUPPORT (OUTPATIENT)
Dept: REHABILITATION | Facility: HOSPITAL | Age: 71
End: 2021-02-11
Payer: MEDICARE

## 2021-02-11 DIAGNOSIS — I63.81 CEREBROVASCULAR ACCIDENT (CVA) DUE TO OCCLUSION OF SMALL ARTERY: Primary | ICD-10-CM

## 2021-02-11 PROCEDURE — 97010 HOT OR COLD PACKS THERAPY: CPT | Mod: PN

## 2021-02-11 PROCEDURE — 97110 THERAPEUTIC EXERCISES: CPT | Mod: PN

## 2021-02-11 PROCEDURE — 97032 APPL MODALITY 1+ESTIM EA 15: CPT | Mod: PN

## 2021-02-12 ENCOUNTER — LAB VISIT (OUTPATIENT)
Dept: PRIMARY CARE CLINIC | Facility: CLINIC | Age: 71
End: 2021-02-12
Payer: MEDICARE

## 2021-02-12 DIAGNOSIS — Z01.818 PRE-OP TESTING: ICD-10-CM

## 2021-02-12 PROCEDURE — U0003 INFECTIOUS AGENT DETECTION BY NUCLEIC ACID (DNA OR RNA); SEVERE ACUTE RESPIRATORY SYNDROME CORONAVIRUS 2 (SARS-COV-2) (CORONAVIRUS DISEASE [COVID-19]), AMPLIFIED PROBE TECHNIQUE, MAKING USE OF HIGH THROUGHPUT TECHNOLOGIES AS DESCRIBED BY CMS-2020-01-R: HCPCS

## 2021-02-12 PROCEDURE — U0005 INFEC AGEN DETEC AMPLI PROBE: HCPCS

## 2021-02-13 ENCOUNTER — NURSE TRIAGE (OUTPATIENT)
Dept: ADMINISTRATIVE | Facility: CLINIC | Age: 71
End: 2021-02-13

## 2021-02-13 LAB — SARS-COV-2 RNA RESP QL NAA+PROBE: NOT DETECTED

## 2021-02-15 ENCOUNTER — ANESTHESIA EVENT (OUTPATIENT)
Dept: ENDOSCOPY | Facility: HOSPITAL | Age: 71
End: 2021-02-15
Payer: MEDICARE

## 2021-02-15 ENCOUNTER — ANESTHESIA (OUTPATIENT)
Dept: ENDOSCOPY | Facility: HOSPITAL | Age: 71
End: 2021-02-15
Payer: MEDICARE

## 2021-02-15 ENCOUNTER — HOSPITAL ENCOUNTER (OUTPATIENT)
Facility: HOSPITAL | Age: 71
Discharge: HOME | End: 2021-02-15
Attending: INTERNAL MEDICINE | Admitting: INTERNAL MEDICINE
Payer: MEDICARE

## 2021-02-15 DIAGNOSIS — R19.7 DIARRHEA: ICD-10-CM

## 2021-02-15 DIAGNOSIS — K64.8 INTERNAL HEMORRHOIDS: Primary | ICD-10-CM

## 2021-02-15 DIAGNOSIS — Z86.010 HISTORY OF COLON POLYPS: ICD-10-CM

## 2021-02-15 PROCEDURE — 37000009 HC ANESTHESIA EA ADD 15 MINS: Performed by: INTERNAL MEDICINE

## 2021-02-15 PROCEDURE — 63600175 PHARM REV CODE 636 W HCPCS: Performed by: NURSE ANESTHETIST, CERTIFIED REGISTERED

## 2021-02-15 PROCEDURE — 27201012 HC FORCEPS, HOT/COLD, DISP: Performed by: INTERNAL MEDICINE

## 2021-02-15 PROCEDURE — 88305 TISSUE EXAM BY PATHOLOGIST: CPT | Mod: 26,,, | Performed by: PATHOLOGY

## 2021-02-15 PROCEDURE — 25000003 PHARM REV CODE 250: Performed by: INTERNAL MEDICINE

## 2021-02-15 PROCEDURE — D9220A PRA ANESTHESIA: ICD-10-PCS | Mod: ANES,,, | Performed by: ANESTHESIOLOGY

## 2021-02-15 PROCEDURE — 45380 COLONOSCOPY AND BIOPSY: CPT | Mod: ,,, | Performed by: INTERNAL MEDICINE

## 2021-02-15 PROCEDURE — 37000008 HC ANESTHESIA 1ST 15 MINUTES: Performed by: INTERNAL MEDICINE

## 2021-02-15 PROCEDURE — 45380 PR COLONOSCOPY,BIOPSY: ICD-10-PCS | Mod: ,,, | Performed by: INTERNAL MEDICINE

## 2021-02-15 PROCEDURE — 45380 COLONOSCOPY AND BIOPSY: CPT | Performed by: INTERNAL MEDICINE

## 2021-02-15 PROCEDURE — D9220A PRA ANESTHESIA: Mod: CRNA,,, | Performed by: NURSE ANESTHETIST, CERTIFIED REGISTERED

## 2021-02-15 PROCEDURE — 25000003 PHARM REV CODE 250: Performed by: NURSE ANESTHETIST, CERTIFIED REGISTERED

## 2021-02-15 PROCEDURE — 88305 TISSUE EXAM BY PATHOLOGIST: ICD-10-PCS | Mod: 26,,, | Performed by: PATHOLOGY

## 2021-02-15 PROCEDURE — D9220A PRA ANESTHESIA: ICD-10-PCS | Mod: CRNA,,, | Performed by: NURSE ANESTHETIST, CERTIFIED REGISTERED

## 2021-02-15 PROCEDURE — D9220A PRA ANESTHESIA: Mod: ANES,,, | Performed by: ANESTHESIOLOGY

## 2021-02-15 PROCEDURE — 88305 TISSUE EXAM BY PATHOLOGIST: CPT | Mod: 59 | Performed by: PATHOLOGY

## 2021-02-15 RX ORDER — SODIUM CHLORIDE 9 MG/ML
INJECTION, SOLUTION INTRAVENOUS CONTINUOUS
Status: DISCONTINUED | OUTPATIENT
Start: 2021-02-15 | End: 2021-02-15 | Stop reason: HOSPADM

## 2021-02-15 RX ORDER — PROPOFOL 10 MG/ML
VIAL (ML) INTRAVENOUS
Status: DISCONTINUED | OUTPATIENT
Start: 2021-02-15 | End: 2021-02-15

## 2021-02-15 RX ORDER — LIDOCAINE HYDROCHLORIDE 20 MG/ML
INJECTION INTRAVENOUS
Status: DISCONTINUED | OUTPATIENT
Start: 2021-02-15 | End: 2021-02-15

## 2021-02-15 RX ADMIN — PROPOFOL 50 MG: 10 INJECTION, EMULSION INTRAVENOUS at 07:02

## 2021-02-15 RX ADMIN — SODIUM CHLORIDE: 0.9 INJECTION, SOLUTION INTRAVENOUS at 07:02

## 2021-02-15 RX ADMIN — LIDOCAINE HYDROCHLORIDE 50 MG: 20 INJECTION, SOLUTION INTRAVENOUS at 07:02

## 2021-02-15 RX ADMIN — PROPOFOL 100 MG: 10 INJECTION, EMULSION INTRAVENOUS at 07:02

## 2021-02-17 VITALS
RESPIRATION RATE: 16 BRPM | WEIGHT: 144 LBS | SYSTOLIC BLOOD PRESSURE: 156 MMHG | HEART RATE: 78 BPM | BODY MASS INDEX: 23.96 KG/M2 | TEMPERATURE: 98 F | DIASTOLIC BLOOD PRESSURE: 86 MMHG | OXYGEN SATURATION: 99 %

## 2021-02-18 LAB
FINAL PATHOLOGIC DIAGNOSIS: NORMAL
GROSS: NORMAL
Lab: NORMAL

## 2021-02-19 ENCOUNTER — PATIENT MESSAGE (OUTPATIENT)
Dept: GASTROENTEROLOGY | Facility: CLINIC | Age: 71
End: 2021-02-19

## 2021-02-23 ENCOUNTER — PATIENT MESSAGE (OUTPATIENT)
Dept: FAMILY MEDICINE | Facility: CLINIC | Age: 71
End: 2021-02-23

## 2021-02-25 ENCOUNTER — PATIENT MESSAGE (OUTPATIENT)
Dept: FAMILY MEDICINE | Facility: CLINIC | Age: 71
End: 2021-02-25

## 2021-03-02 ENCOUNTER — PATIENT MESSAGE (OUTPATIENT)
Dept: FAMILY MEDICINE | Facility: CLINIC | Age: 71
End: 2021-03-02

## 2021-03-05 ENCOUNTER — TELEPHONE (OUTPATIENT)
Dept: FAMILY MEDICINE | Facility: CLINIC | Age: 71
End: 2021-03-05

## 2021-03-09 ENCOUNTER — TELEPHONE (OUTPATIENT)
Dept: FAMILY MEDICINE | Facility: CLINIC | Age: 71
End: 2021-03-09

## 2021-03-11 ENCOUNTER — PATIENT MESSAGE (OUTPATIENT)
Dept: FAMILY MEDICINE | Facility: CLINIC | Age: 71
End: 2021-03-11

## 2021-03-11 DIAGNOSIS — I63.81 CEREBROVASCULAR ACCIDENT (CVA) DUE TO OCCLUSION OF SMALL ARTERY: Primary | ICD-10-CM

## 2021-03-11 DIAGNOSIS — M79.2 NEURALGIA OF LEFT UPPER EXTREMITY: ICD-10-CM

## 2021-03-15 DIAGNOSIS — I10 ESSENTIAL HYPERTENSION: ICD-10-CM

## 2021-03-16 ENCOUNTER — CLINICAL SUPPORT (OUTPATIENT)
Dept: REHABILITATION | Facility: HOSPITAL | Age: 71
End: 2021-03-16
Attending: FAMILY MEDICINE
Payer: MEDICARE

## 2021-03-16 DIAGNOSIS — I63.81 CEREBROVASCULAR ACCIDENT (CVA) DUE TO OCCLUSION OF SMALL ARTERY: ICD-10-CM

## 2021-03-16 DIAGNOSIS — M79.2 NEURALGIA OF LEFT UPPER EXTREMITY: ICD-10-CM

## 2021-03-16 PROCEDURE — 97162 PT EVAL MOD COMPLEX 30 MIN: CPT | Mod: PN

## 2021-03-18 RX ORDER — CLOPIDOGREL BISULFATE 75 MG/1
TABLET ORAL
Qty: 90 TABLET | Refills: 3 | Status: SHIPPED | OUTPATIENT
Start: 2021-03-18 | End: 2022-06-13

## 2021-03-18 RX ORDER — AMLODIPINE BESYLATE 10 MG/1
TABLET ORAL
Qty: 90 TABLET | Refills: 3 | Status: SHIPPED | OUTPATIENT
Start: 2021-03-18 | End: 2022-12-29 | Stop reason: SDUPTHER

## 2021-03-19 ENCOUNTER — CLINICAL SUPPORT (OUTPATIENT)
Dept: REHABILITATION | Facility: HOSPITAL | Age: 71
End: 2021-03-19
Attending: FAMILY MEDICINE
Payer: MEDICARE

## 2021-03-19 DIAGNOSIS — M62.81 MUSCLE WEAKNESS (GENERALIZED): ICD-10-CM

## 2021-03-19 DIAGNOSIS — R26.9 GAIT DIFFICULTY: Primary | ICD-10-CM

## 2021-03-19 PROCEDURE — 97110 THERAPEUTIC EXERCISES: CPT | Mod: PN,CQ

## 2021-03-22 ENCOUNTER — CLINICAL SUPPORT (OUTPATIENT)
Dept: REHABILITATION | Facility: HOSPITAL | Age: 71
End: 2021-03-22
Attending: FAMILY MEDICINE
Payer: MEDICARE

## 2021-03-22 DIAGNOSIS — R26.9 GAIT DIFFICULTY: ICD-10-CM

## 2021-03-22 DIAGNOSIS — M62.81 MUSCLE WEAKNESS (GENERALIZED): Primary | ICD-10-CM

## 2021-03-22 PROCEDURE — 97110 THERAPEUTIC EXERCISES: CPT | Mod: PN,CQ

## 2021-03-26 ENCOUNTER — PATIENT MESSAGE (OUTPATIENT)
Dept: FAMILY MEDICINE | Facility: CLINIC | Age: 71
End: 2021-03-26

## 2021-03-26 ENCOUNTER — CLINICAL SUPPORT (OUTPATIENT)
Dept: REHABILITATION | Facility: HOSPITAL | Age: 71
End: 2021-03-26
Attending: FAMILY MEDICINE
Payer: MEDICARE

## 2021-03-26 DIAGNOSIS — R26.9 GAIT DIFFICULTY: ICD-10-CM

## 2021-03-26 DIAGNOSIS — M62.81 MUSCLE WEAKNESS (GENERALIZED): Primary | ICD-10-CM

## 2021-03-26 PROCEDURE — 97110 THERAPEUTIC EXERCISES: CPT | Mod: PN,CQ

## 2021-03-28 DIAGNOSIS — M79.672 PAIN IN LEFT FOOT: ICD-10-CM

## 2021-03-28 DIAGNOSIS — M10.072 ACUTE IDIOPATHIC GOUT INVOLVING TOE OF LEFT FOOT: ICD-10-CM

## 2021-03-28 DIAGNOSIS — M20.5X2 HALLUX LIMITUS OF LEFT FOOT: ICD-10-CM

## 2021-03-28 DIAGNOSIS — M19.072 PRIMARY OSTEOARTHRITIS OF LEFT FOOT: ICD-10-CM

## 2021-03-29 ENCOUNTER — CLINICAL SUPPORT (OUTPATIENT)
Dept: REHABILITATION | Facility: HOSPITAL | Age: 71
End: 2021-03-29
Attending: FAMILY MEDICINE
Payer: MEDICARE

## 2021-03-29 DIAGNOSIS — R26.9 GAIT DIFFICULTY: ICD-10-CM

## 2021-03-29 DIAGNOSIS — M62.81 MUSCLE WEAKNESS (GENERALIZED): Primary | ICD-10-CM

## 2021-03-29 PROCEDURE — 97110 THERAPEUTIC EXERCISES: CPT | Mod: PN,CQ

## 2021-03-31 RX ORDER — COLCHICINE 0.6 MG/1
TABLET, FILM COATED ORAL
Qty: 90 TABLET | Refills: 3 | Status: SHIPPED | OUTPATIENT
Start: 2021-03-31 | End: 2022-11-21 | Stop reason: SDUPTHER

## 2021-04-01 ENCOUNTER — CLINICAL SUPPORT (OUTPATIENT)
Dept: REHABILITATION | Facility: HOSPITAL | Age: 71
End: 2021-04-01
Attending: FAMILY MEDICINE
Payer: MEDICARE

## 2021-04-01 DIAGNOSIS — R26.9 GAIT DIFFICULTY: ICD-10-CM

## 2021-04-01 DIAGNOSIS — M62.81 MUSCLE WEAKNESS (GENERALIZED): ICD-10-CM

## 2021-04-01 PROCEDURE — 97110 THERAPEUTIC EXERCISES: CPT | Mod: PN

## 2021-04-01 PROCEDURE — 97140 MANUAL THERAPY 1/> REGIONS: CPT | Mod: PN

## 2021-04-05 ENCOUNTER — CLINICAL SUPPORT (OUTPATIENT)
Dept: REHABILITATION | Facility: HOSPITAL | Age: 71
End: 2021-04-05
Attending: FAMILY MEDICINE
Payer: MEDICARE

## 2021-04-05 DIAGNOSIS — M62.81 MUSCLE WEAKNESS (GENERALIZED): ICD-10-CM

## 2021-04-05 DIAGNOSIS — R26.9 GAIT DIFFICULTY: Primary | ICD-10-CM

## 2021-04-05 PROCEDURE — 97110 THERAPEUTIC EXERCISES: CPT | Mod: PN,CQ

## 2021-04-08 ENCOUNTER — CLINICAL SUPPORT (OUTPATIENT)
Dept: REHABILITATION | Facility: HOSPITAL | Age: 71
End: 2021-04-08
Attending: FAMILY MEDICINE
Payer: MEDICARE

## 2021-04-08 DIAGNOSIS — R26.9 GAIT DIFFICULTY: ICD-10-CM

## 2021-04-08 DIAGNOSIS — M62.81 MUSCLE WEAKNESS (GENERALIZED): Primary | ICD-10-CM

## 2021-04-08 PROCEDURE — 97110 THERAPEUTIC EXERCISES: CPT | Mod: PN,CQ

## 2021-04-09 ENCOUNTER — PATIENT MESSAGE (OUTPATIENT)
Dept: FAMILY MEDICINE | Facility: CLINIC | Age: 71
End: 2021-04-09

## 2021-04-09 DIAGNOSIS — R19.7 DIARRHEA, UNSPECIFIED TYPE: Primary | ICD-10-CM

## 2021-04-12 ENCOUNTER — CLINICAL SUPPORT (OUTPATIENT)
Dept: REHABILITATION | Facility: HOSPITAL | Age: 71
End: 2021-04-12
Attending: FAMILY MEDICINE
Payer: MEDICARE

## 2021-04-12 DIAGNOSIS — R26.9 GAIT DIFFICULTY: ICD-10-CM

## 2021-04-12 DIAGNOSIS — M62.81 MUSCLE WEAKNESS (GENERALIZED): Primary | ICD-10-CM

## 2021-04-12 PROCEDURE — 97110 THERAPEUTIC EXERCISES: CPT | Mod: PN,CQ

## 2021-04-15 ENCOUNTER — CLINICAL SUPPORT (OUTPATIENT)
Dept: REHABILITATION | Facility: HOSPITAL | Age: 71
End: 2021-04-15
Attending: FAMILY MEDICINE
Payer: MEDICARE

## 2021-04-15 DIAGNOSIS — R26.9 GAIT DIFFICULTY: ICD-10-CM

## 2021-04-15 DIAGNOSIS — M62.81 MUSCLE WEAKNESS (GENERALIZED): Primary | ICD-10-CM

## 2021-04-15 PROCEDURE — 97110 THERAPEUTIC EXERCISES: CPT | Mod: PN,CQ

## 2021-04-19 ENCOUNTER — CLINICAL SUPPORT (OUTPATIENT)
Dept: REHABILITATION | Facility: HOSPITAL | Age: 71
End: 2021-04-19
Attending: FAMILY MEDICINE
Payer: MEDICARE

## 2021-04-19 DIAGNOSIS — R26.9 GAIT DIFFICULTY: ICD-10-CM

## 2021-04-19 DIAGNOSIS — M62.81 MUSCLE WEAKNESS (GENERALIZED): Primary | ICD-10-CM

## 2021-04-19 PROCEDURE — 97110 THERAPEUTIC EXERCISES: CPT | Mod: PN,CQ

## 2021-04-22 ENCOUNTER — CLINICAL SUPPORT (OUTPATIENT)
Dept: REHABILITATION | Facility: HOSPITAL | Age: 71
End: 2021-04-22
Attending: FAMILY MEDICINE
Payer: MEDICARE

## 2021-04-22 DIAGNOSIS — M62.81 MUSCLE WEAKNESS (GENERALIZED): ICD-10-CM

## 2021-04-22 DIAGNOSIS — R26.9 GAIT DIFFICULTY: ICD-10-CM

## 2021-04-22 PROCEDURE — 97140 MANUAL THERAPY 1/> REGIONS: CPT | Mod: PN,CQ

## 2021-04-22 PROCEDURE — 97110 THERAPEUTIC EXERCISES: CPT | Mod: PN,CQ

## 2021-04-26 ENCOUNTER — CLINICAL SUPPORT (OUTPATIENT)
Dept: REHABILITATION | Facility: HOSPITAL | Age: 71
End: 2021-04-26
Attending: FAMILY MEDICINE
Payer: MEDICARE

## 2021-04-26 DIAGNOSIS — R26.9 GAIT DIFFICULTY: ICD-10-CM

## 2021-04-26 DIAGNOSIS — M62.81 MUSCLE WEAKNESS (GENERALIZED): ICD-10-CM

## 2021-04-26 PROCEDURE — 97110 THERAPEUTIC EXERCISES: CPT | Mod: PN

## 2021-04-26 PROCEDURE — 97140 MANUAL THERAPY 1/> REGIONS: CPT | Mod: PN

## 2021-04-29 ENCOUNTER — PATIENT MESSAGE (OUTPATIENT)
Dept: FAMILY MEDICINE | Facility: CLINIC | Age: 71
End: 2021-04-29

## 2021-04-29 ENCOUNTER — CLINICAL SUPPORT (OUTPATIENT)
Dept: REHABILITATION | Facility: HOSPITAL | Age: 71
End: 2021-04-29
Attending: FAMILY MEDICINE
Payer: MEDICARE

## 2021-04-29 ENCOUNTER — TELEPHONE (OUTPATIENT)
Dept: FAMILY MEDICINE | Facility: CLINIC | Age: 71
End: 2021-04-29

## 2021-04-29 DIAGNOSIS — M62.81 MUSCLE WEAKNESS (GENERALIZED): ICD-10-CM

## 2021-04-29 DIAGNOSIS — M79.2 NEURALGIA OF LEFT UPPER EXTREMITY: ICD-10-CM

## 2021-04-29 DIAGNOSIS — M79.671 RIGHT FOOT PAIN: Primary | ICD-10-CM

## 2021-04-29 DIAGNOSIS — R26.9 GAIT DIFFICULTY: Primary | ICD-10-CM

## 2021-04-29 PROCEDURE — 97112 NEUROMUSCULAR REEDUCATION: CPT | Mod: PN,CQ

## 2021-04-29 PROCEDURE — 97110 THERAPEUTIC EXERCISES: CPT | Mod: PN,CQ

## 2021-04-30 ENCOUNTER — CLINICAL SUPPORT (OUTPATIENT)
Dept: REHABILITATION | Facility: HOSPITAL | Age: 71
End: 2021-04-30
Attending: FAMILY MEDICINE
Payer: MEDICARE

## 2021-04-30 DIAGNOSIS — M79.2 NEURALGIA OF LEFT UPPER EXTREMITY: ICD-10-CM

## 2021-04-30 PROCEDURE — 97530 THERAPEUTIC ACTIVITIES: CPT | Mod: PN

## 2021-04-30 PROCEDURE — 97110 THERAPEUTIC EXERCISES: CPT | Mod: PN

## 2021-04-30 PROCEDURE — 97166 OT EVAL MOD COMPLEX 45 MIN: CPT | Mod: PN

## 2021-05-04 ENCOUNTER — CLINICAL SUPPORT (OUTPATIENT)
Dept: REHABILITATION | Facility: HOSPITAL | Age: 71
End: 2021-05-04
Attending: FAMILY MEDICINE
Payer: MEDICARE

## 2021-05-04 DIAGNOSIS — M62.81 MUSCLE WEAKNESS (GENERALIZED): ICD-10-CM

## 2021-05-04 DIAGNOSIS — R26.9 GAIT DIFFICULTY: Primary | ICD-10-CM

## 2021-05-04 DIAGNOSIS — I63.81 CEREBROVASCULAR ACCIDENT (CVA) DUE TO OCCLUSION OF SMALL ARTERY: Primary | ICD-10-CM

## 2021-05-04 PROCEDURE — 97110 THERAPEUTIC EXERCISES: CPT | Mod: PN

## 2021-05-04 PROCEDURE — 97112 NEUROMUSCULAR REEDUCATION: CPT | Mod: PN,CQ

## 2021-05-04 PROCEDURE — 97110 THERAPEUTIC EXERCISES: CPT | Mod: PN,CQ

## 2021-05-04 PROCEDURE — 97112 NEUROMUSCULAR REEDUCATION: CPT | Mod: PN

## 2021-05-07 ENCOUNTER — CLINICAL SUPPORT (OUTPATIENT)
Dept: REHABILITATION | Facility: HOSPITAL | Age: 71
End: 2021-05-07
Attending: FAMILY MEDICINE
Payer: MEDICARE

## 2021-05-07 DIAGNOSIS — I63.81 CEREBROVASCULAR ACCIDENT (CVA) DUE TO OCCLUSION OF SMALL ARTERY: Primary | ICD-10-CM

## 2021-05-07 DIAGNOSIS — M62.81 MUSCLE WEAKNESS (GENERALIZED): ICD-10-CM

## 2021-05-07 DIAGNOSIS — R26.9 GAIT DIFFICULTY: Primary | ICD-10-CM

## 2021-05-07 PROCEDURE — 97112 NEUROMUSCULAR REEDUCATION: CPT | Mod: PN

## 2021-05-07 PROCEDURE — 97116 GAIT TRAINING THERAPY: CPT | Mod: PN,CQ

## 2021-05-07 PROCEDURE — 97032 APPL MODALITY 1+ESTIM EA 15: CPT | Mod: PN

## 2021-05-07 PROCEDURE — 97110 THERAPEUTIC EXERCISES: CPT | Mod: PN

## 2021-05-07 PROCEDURE — 97112 NEUROMUSCULAR REEDUCATION: CPT | Mod: PN,CQ

## 2021-05-07 PROCEDURE — 97110 THERAPEUTIC EXERCISES: CPT | Mod: PN,CQ

## 2021-05-11 ENCOUNTER — CLINICAL SUPPORT (OUTPATIENT)
Dept: REHABILITATION | Facility: HOSPITAL | Age: 71
End: 2021-05-11
Attending: FAMILY MEDICINE
Payer: MEDICARE

## 2021-05-11 DIAGNOSIS — M62.81 MUSCLE WEAKNESS (GENERALIZED): ICD-10-CM

## 2021-05-11 DIAGNOSIS — R26.9 GAIT DIFFICULTY: Primary | ICD-10-CM

## 2021-05-11 DIAGNOSIS — I63.81 CEREBROVASCULAR ACCIDENT (CVA) DUE TO OCCLUSION OF SMALL ARTERY: Primary | ICD-10-CM

## 2021-05-11 PROCEDURE — 97112 NEUROMUSCULAR REEDUCATION: CPT | Mod: PN,CQ

## 2021-05-11 PROCEDURE — 97110 THERAPEUTIC EXERCISES: CPT | Mod: PN

## 2021-05-11 PROCEDURE — 97112 NEUROMUSCULAR REEDUCATION: CPT | Mod: PN

## 2021-05-11 PROCEDURE — 97032 APPL MODALITY 1+ESTIM EA 15: CPT | Mod: PN

## 2021-05-11 PROCEDURE — 97110 THERAPEUTIC EXERCISES: CPT | Mod: PN,CQ

## 2021-05-14 ENCOUNTER — CLINICAL SUPPORT (OUTPATIENT)
Dept: REHABILITATION | Facility: HOSPITAL | Age: 71
End: 2021-05-14
Attending: FAMILY MEDICINE
Payer: MEDICARE

## 2021-05-14 DIAGNOSIS — M62.81 MUSCLE WEAKNESS (GENERALIZED): ICD-10-CM

## 2021-05-14 DIAGNOSIS — R26.9 GAIT DIFFICULTY: Primary | ICD-10-CM

## 2021-05-14 DIAGNOSIS — I63.81 CEREBROVASCULAR ACCIDENT (CVA) DUE TO OCCLUSION OF SMALL ARTERY: Primary | ICD-10-CM

## 2021-05-14 PROCEDURE — 97112 NEUROMUSCULAR REEDUCATION: CPT | Mod: PN,CQ

## 2021-05-14 PROCEDURE — 97112 NEUROMUSCULAR REEDUCATION: CPT | Mod: PN

## 2021-05-14 PROCEDURE — 97110 THERAPEUTIC EXERCISES: CPT | Mod: PN

## 2021-05-14 PROCEDURE — 97110 THERAPEUTIC EXERCISES: CPT | Mod: PN,CQ

## 2021-05-14 PROCEDURE — 97032 APPL MODALITY 1+ESTIM EA 15: CPT | Mod: PN

## 2021-05-18 ENCOUNTER — CLINICAL SUPPORT (OUTPATIENT)
Dept: REHABILITATION | Facility: HOSPITAL | Age: 71
End: 2021-05-18
Attending: FAMILY MEDICINE
Payer: MEDICARE

## 2021-05-18 DIAGNOSIS — R26.9 GAIT DIFFICULTY: Primary | ICD-10-CM

## 2021-05-18 DIAGNOSIS — I63.81 CEREBROVASCULAR ACCIDENT (CVA) DUE TO OCCLUSION OF SMALL ARTERY: Primary | ICD-10-CM

## 2021-05-18 DIAGNOSIS — M62.81 MUSCLE WEAKNESS (GENERALIZED): ICD-10-CM

## 2021-05-18 PROCEDURE — 97110 THERAPEUTIC EXERCISES: CPT | Mod: PN

## 2021-05-18 PROCEDURE — 97112 NEUROMUSCULAR REEDUCATION: CPT | Mod: PN

## 2021-05-18 PROCEDURE — 97110 THERAPEUTIC EXERCISES: CPT | Mod: PN,CQ

## 2021-05-18 PROCEDURE — 97116 GAIT TRAINING THERAPY: CPT | Mod: PN,CQ

## 2021-05-18 PROCEDURE — 97032 APPL MODALITY 1+ESTIM EA 15: CPT | Mod: PN

## 2021-05-21 ENCOUNTER — CLINICAL SUPPORT (OUTPATIENT)
Dept: REHABILITATION | Facility: HOSPITAL | Age: 71
End: 2021-05-21
Attending: FAMILY MEDICINE
Payer: MEDICARE

## 2021-05-21 DIAGNOSIS — M62.81 MUSCLE WEAKNESS (GENERALIZED): ICD-10-CM

## 2021-05-21 DIAGNOSIS — R26.9 GAIT DIFFICULTY: ICD-10-CM

## 2021-05-21 DIAGNOSIS — I63.81 CEREBROVASCULAR ACCIDENT (CVA) DUE TO OCCLUSION OF SMALL ARTERY: Primary | ICD-10-CM

## 2021-05-21 PROCEDURE — 97032 APPL MODALITY 1+ESTIM EA 15: CPT | Mod: PN

## 2021-05-21 PROCEDURE — 97112 NEUROMUSCULAR REEDUCATION: CPT | Mod: PN

## 2021-05-21 PROCEDURE — 97110 THERAPEUTIC EXERCISES: CPT | Mod: PN

## 2021-05-25 ENCOUNTER — CLINICAL SUPPORT (OUTPATIENT)
Dept: REHABILITATION | Facility: HOSPITAL | Age: 71
End: 2021-05-25
Attending: FAMILY MEDICINE
Payer: MEDICARE

## 2021-05-25 DIAGNOSIS — I63.81 CEREBROVASCULAR ACCIDENT (CVA) DUE TO OCCLUSION OF SMALL ARTERY: Primary | ICD-10-CM

## 2021-05-25 PROCEDURE — 97032 APPL MODALITY 1+ESTIM EA 15: CPT | Mod: PN

## 2021-05-25 PROCEDURE — 97110 THERAPEUTIC EXERCISES: CPT | Mod: PN

## 2021-05-28 ENCOUNTER — CLINICAL SUPPORT (OUTPATIENT)
Dept: REHABILITATION | Facility: HOSPITAL | Age: 71
End: 2021-05-28
Attending: FAMILY MEDICINE
Payer: MEDICARE

## 2021-05-28 DIAGNOSIS — G47.19 EXCESSIVE DAYTIME SLEEPINESS: Primary | ICD-10-CM

## 2021-05-28 PROCEDURE — 97112 NEUROMUSCULAR REEDUCATION: CPT | Mod: PN

## 2021-05-28 PROCEDURE — 97110 THERAPEUTIC EXERCISES: CPT | Mod: PN

## 2021-06-01 ENCOUNTER — CLINICAL SUPPORT (OUTPATIENT)
Dept: REHABILITATION | Facility: HOSPITAL | Age: 71
End: 2021-06-01
Attending: FAMILY MEDICINE
Payer: MEDICARE

## 2021-06-01 DIAGNOSIS — I63.81 CEREBROVASCULAR ACCIDENT (CVA) DUE TO OCCLUSION OF SMALL ARTERY: Primary | ICD-10-CM

## 2021-06-01 PROCEDURE — 97110 THERAPEUTIC EXERCISES: CPT | Mod: PN

## 2021-06-01 PROCEDURE — 97032 APPL MODALITY 1+ESTIM EA 15: CPT | Mod: PN

## 2021-06-03 ENCOUNTER — CLINICAL SUPPORT (OUTPATIENT)
Dept: REHABILITATION | Facility: HOSPITAL | Age: 71
End: 2021-06-03
Attending: FAMILY MEDICINE
Payer: MEDICARE

## 2021-06-03 DIAGNOSIS — I63.81 CEREBROVASCULAR ACCIDENT (CVA) DUE TO OCCLUSION OF SMALL ARTERY: Primary | ICD-10-CM

## 2021-06-03 PROCEDURE — 97110 THERAPEUTIC EXERCISES: CPT | Mod: PN

## 2021-06-03 PROCEDURE — 97032 APPL MODALITY 1+ESTIM EA 15: CPT | Mod: PN

## 2021-06-08 ENCOUNTER — OFFICE VISIT (OUTPATIENT)
Dept: CARDIOLOGY | Facility: CLINIC | Age: 71
End: 2021-06-08
Payer: MEDICARE

## 2021-06-08 ENCOUNTER — PATIENT MESSAGE (OUTPATIENT)
Dept: FAMILY MEDICINE | Facility: CLINIC | Age: 71
End: 2021-06-08

## 2021-06-08 ENCOUNTER — CLINICAL SUPPORT (OUTPATIENT)
Dept: REHABILITATION | Facility: HOSPITAL | Age: 71
End: 2021-06-08
Attending: FAMILY MEDICINE
Payer: MEDICARE

## 2021-06-08 VITALS
WEIGHT: 152 LBS | BODY MASS INDEX: 25.33 KG/M2 | HEIGHT: 65 IN | HEART RATE: 79 BPM | SYSTOLIC BLOOD PRESSURE: 139 MMHG | DIASTOLIC BLOOD PRESSURE: 84 MMHG

## 2021-06-08 DIAGNOSIS — G47.19 EXCESSIVE DAYTIME SLEEPINESS: ICD-10-CM

## 2021-06-08 DIAGNOSIS — I63.81 CEREBROVASCULAR ACCIDENT (CVA) DUE TO OCCLUSION OF SMALL ARTERY: ICD-10-CM

## 2021-06-08 DIAGNOSIS — R53.82 CHRONIC FATIGUE: ICD-10-CM

## 2021-06-08 DIAGNOSIS — I10 ESSENTIAL HYPERTENSION: ICD-10-CM

## 2021-06-08 DIAGNOSIS — Z91.81 AT RISK FOR FALLING: ICD-10-CM

## 2021-06-08 DIAGNOSIS — G47.19 EXCESSIVE DAYTIME SLEEPINESS: Primary | ICD-10-CM

## 2021-06-08 PROCEDURE — 99215 OFFICE O/P EST HI 40 MIN: CPT | Mod: S$GLB,,, | Performed by: INTERNAL MEDICINE

## 2021-06-08 PROCEDURE — 1101F PR PT FALLS ASSESS DOC 0-1 FALLS W/OUT INJ PAST YR: ICD-10-PCS | Mod: CPTII,S$GLB,, | Performed by: INTERNAL MEDICINE

## 2021-06-08 PROCEDURE — 1126F PR PAIN SEVERITY QUANTIFIED, NO PAIN PRESENT: ICD-10-PCS | Mod: S$GLB,,, | Performed by: INTERNAL MEDICINE

## 2021-06-08 PROCEDURE — 3008F PR BODY MASS INDEX (BMI) DOCUMENTED: ICD-10-PCS | Mod: CPTII,S$GLB,, | Performed by: INTERNAL MEDICINE

## 2021-06-08 PROCEDURE — 97112 NEUROMUSCULAR REEDUCATION: CPT | Mod: PN

## 2021-06-08 PROCEDURE — 3288F PR FALLS RISK ASSESSMENT DOCUMENTED: ICD-10-PCS | Mod: CPTII,S$GLB,, | Performed by: INTERNAL MEDICINE

## 2021-06-08 PROCEDURE — 1157F ADVNC CARE PLAN IN RCRD: CPT | Mod: S$GLB,,, | Performed by: INTERNAL MEDICINE

## 2021-06-08 PROCEDURE — 3288F FALL RISK ASSESSMENT DOCD: CPT | Mod: CPTII,S$GLB,, | Performed by: INTERNAL MEDICINE

## 2021-06-08 PROCEDURE — 1101F PT FALLS ASSESS-DOCD LE1/YR: CPT | Mod: CPTII,S$GLB,, | Performed by: INTERNAL MEDICINE

## 2021-06-08 PROCEDURE — 99999 PR PBB SHADOW E&M-EST. PATIENT-LVL IV: ICD-10-PCS | Mod: PBBFAC,,, | Performed by: INTERNAL MEDICINE

## 2021-06-08 PROCEDURE — 97110 THERAPEUTIC EXERCISES: CPT | Mod: PN

## 2021-06-08 PROCEDURE — 1126F AMNT PAIN NOTED NONE PRSNT: CPT | Mod: S$GLB,,, | Performed by: INTERNAL MEDICINE

## 2021-06-08 PROCEDURE — 1157F PR ADVANCE CARE PLAN OR EQUIV PRESENT IN MEDICAL RECORD: ICD-10-PCS | Mod: S$GLB,,, | Performed by: INTERNAL MEDICINE

## 2021-06-08 PROCEDURE — 3008F BODY MASS INDEX DOCD: CPT | Mod: CPTII,S$GLB,, | Performed by: INTERNAL MEDICINE

## 2021-06-08 PROCEDURE — 99999 PR PBB SHADOW E&M-EST. PATIENT-LVL IV: CPT | Mod: PBBFAC,,, | Performed by: INTERNAL MEDICINE

## 2021-06-08 PROCEDURE — 1159F MED LIST DOCD IN RCRD: CPT | Mod: S$GLB,,, | Performed by: INTERNAL MEDICINE

## 2021-06-08 PROCEDURE — 1159F PR MEDICATION LIST DOCUMENTED IN MEDICAL RECORD: ICD-10-PCS | Mod: S$GLB,,, | Performed by: INTERNAL MEDICINE

## 2021-06-08 PROCEDURE — 99215 PR OFFICE/OUTPT VISIT, EST, LEVL V, 40-54 MIN: ICD-10-PCS | Mod: S$GLB,,, | Performed by: INTERNAL MEDICINE

## 2021-06-10 ENCOUNTER — PATIENT MESSAGE (OUTPATIENT)
Dept: FAMILY MEDICINE | Facility: CLINIC | Age: 71
End: 2021-06-10

## 2021-06-11 ENCOUNTER — PATIENT MESSAGE (OUTPATIENT)
Dept: FAMILY MEDICINE | Facility: CLINIC | Age: 71
End: 2021-06-11

## 2021-06-11 ENCOUNTER — CLINICAL SUPPORT (OUTPATIENT)
Dept: REHABILITATION | Facility: HOSPITAL | Age: 71
End: 2021-06-11
Attending: FAMILY MEDICINE
Payer: MEDICARE

## 2021-06-11 DIAGNOSIS — I63.81 CEREBROVASCULAR ACCIDENT (CVA) DUE TO OCCLUSION OF SMALL ARTERY: Primary | ICD-10-CM

## 2021-06-11 PROCEDURE — 97112 NEUROMUSCULAR REEDUCATION: CPT | Mod: PN

## 2021-06-11 PROCEDURE — 97032 APPL MODALITY 1+ESTIM EA 15: CPT | Mod: PN

## 2021-06-11 PROCEDURE — 97110 THERAPEUTIC EXERCISES: CPT | Mod: PN

## 2021-06-14 ENCOUNTER — PATIENT MESSAGE (OUTPATIENT)
Dept: FAMILY MEDICINE | Facility: CLINIC | Age: 71
End: 2021-06-14

## 2021-06-15 ENCOUNTER — CLINICAL SUPPORT (OUTPATIENT)
Dept: REHABILITATION | Facility: HOSPITAL | Age: 71
End: 2021-06-15
Attending: FAMILY MEDICINE
Payer: MEDICARE

## 2021-06-15 DIAGNOSIS — I63.81 CEREBROVASCULAR ACCIDENT (CVA) DUE TO OCCLUSION OF SMALL ARTERY: Primary | ICD-10-CM

## 2021-06-15 PROCEDURE — 97110 THERAPEUTIC EXERCISES: CPT | Mod: PN

## 2021-06-15 PROCEDURE — 97140 MANUAL THERAPY 1/> REGIONS: CPT | Mod: PN

## 2021-06-17 ENCOUNTER — PATIENT MESSAGE (OUTPATIENT)
Dept: FAMILY MEDICINE | Facility: CLINIC | Age: 71
End: 2021-06-17

## 2021-06-18 ENCOUNTER — TELEPHONE (OUTPATIENT)
Dept: FAMILY MEDICINE | Facility: CLINIC | Age: 71
End: 2021-06-18

## 2021-06-18 ENCOUNTER — CLINICAL SUPPORT (OUTPATIENT)
Dept: REHABILITATION | Facility: HOSPITAL | Age: 71
End: 2021-06-18
Attending: FAMILY MEDICINE
Payer: MEDICARE

## 2021-06-18 DIAGNOSIS — Z11.1 TUBERCULOSIS SCREENING: ICD-10-CM

## 2021-06-18 DIAGNOSIS — I63.81 CEREBROVASCULAR ACCIDENT (CVA) DUE TO OCCLUSION OF SMALL ARTERY: Primary | ICD-10-CM

## 2021-06-18 DIAGNOSIS — Z79.899 ENCOUNTER FOR LONG-TERM CURRENT USE OF MEDICATION: Primary | ICD-10-CM

## 2021-06-18 PROCEDURE — 97110 THERAPEUTIC EXERCISES: CPT | Mod: PN

## 2021-06-18 PROCEDURE — 97032 APPL MODALITY 1+ESTIM EA 15: CPT | Mod: PN

## 2021-06-22 ENCOUNTER — LAB VISIT (OUTPATIENT)
Dept: LAB | Facility: HOSPITAL | Age: 71
End: 2021-06-22
Attending: FAMILY MEDICINE
Payer: MEDICARE

## 2021-06-22 DIAGNOSIS — Z79.899 ENCOUNTER FOR LONG-TERM CURRENT USE OF MEDICATION: ICD-10-CM

## 2021-06-22 DIAGNOSIS — Z11.1 TUBERCULOSIS SCREENING: ICD-10-CM

## 2021-06-22 LAB
BASOPHILS # BLD AUTO: 0.03 K/UL (ref 0–0.2)
BASOPHILS NFR BLD: 0.5 % (ref 0–1.9)
DIFFERENTIAL METHOD: NORMAL
EOSINOPHIL # BLD AUTO: 0.1 K/UL (ref 0–0.5)
EOSINOPHIL NFR BLD: 1.8 % (ref 0–8)
ERYTHROCYTE [DISTWIDTH] IN BLOOD BY AUTOMATED COUNT: 12.9 % (ref 11.5–14.5)
HCT VFR BLD AUTO: 43.5 % (ref 40–54)
HGB BLD-MCNC: 14.8 G/DL (ref 14–18)
IMM GRANULOCYTES # BLD AUTO: 0.03 K/UL (ref 0–0.04)
IMM GRANULOCYTES NFR BLD AUTO: 0.5 % (ref 0–0.5)
LYMPHOCYTES # BLD AUTO: 2.6 K/UL (ref 1–4.8)
LYMPHOCYTES NFR BLD: 43.4 % (ref 18–48)
MCH RBC QN AUTO: 30 PG (ref 27–31)
MCHC RBC AUTO-ENTMCNC: 34 G/DL (ref 32–36)
MCV RBC AUTO: 88 FL (ref 82–98)
MONOCYTES # BLD AUTO: 0.5 K/UL (ref 0.3–1)
MONOCYTES NFR BLD: 8.8 % (ref 4–15)
NEUTROPHILS # BLD AUTO: 2.7 K/UL (ref 1.8–7.7)
NEUTROPHILS NFR BLD: 45 % (ref 38–73)
NRBC BLD-RTO: 0 /100 WBC
PLATELET # BLD AUTO: 187 K/UL (ref 150–450)
PMV BLD AUTO: 10 FL (ref 9.2–12.9)
RBC # BLD AUTO: 4.93 M/UL (ref 4.6–6.2)
WBC # BLD AUTO: 6.01 K/UL (ref 3.9–12.7)

## 2021-06-22 PROCEDURE — 85025 COMPLETE CBC W/AUTO DIFF WBC: CPT | Performed by: FAMILY MEDICINE

## 2021-06-22 PROCEDURE — 36415 COLL VENOUS BLD VENIPUNCTURE: CPT | Performed by: FAMILY MEDICINE

## 2021-06-22 PROCEDURE — 86480 TB TEST CELL IMMUN MEASURE: CPT | Performed by: FAMILY MEDICINE

## 2021-06-23 ENCOUNTER — CLINICAL SUPPORT (OUTPATIENT)
Dept: REHABILITATION | Facility: HOSPITAL | Age: 71
End: 2021-06-23
Attending: FAMILY MEDICINE
Payer: MEDICARE

## 2021-06-23 DIAGNOSIS — I63.81 CEREBROVASCULAR ACCIDENT (CVA) DUE TO OCCLUSION OF SMALL ARTERY: ICD-10-CM

## 2021-06-23 DIAGNOSIS — M79.2 NEURALGIA OF LEFT UPPER EXTREMITY: ICD-10-CM

## 2021-06-23 DIAGNOSIS — R26.9 GAIT DIFFICULTY: ICD-10-CM

## 2021-06-23 PROCEDURE — 97161 PT EVAL LOW COMPLEX 20 MIN: CPT | Mod: PN

## 2021-06-23 PROCEDURE — 97140 MANUAL THERAPY 1/> REGIONS: CPT | Mod: PN

## 2021-06-24 LAB
GAMMA INTERFERON BACKGROUND BLD IA-ACNC: 0.03 IU/ML
M TB IFN-G CD4+ BCKGRND COR BLD-ACNC: 0 IU/ML
MITOGEN IGNF BCKGRD COR BLD-ACNC: 8.69 IU/ML
TB GOLD PLUS: NEGATIVE
TB2 - NIL: 0 IU/ML

## 2021-06-25 ENCOUNTER — CLINICAL SUPPORT (OUTPATIENT)
Dept: REHABILITATION | Facility: HOSPITAL | Age: 71
End: 2021-06-25
Attending: FAMILY MEDICINE
Payer: MEDICARE

## 2021-06-25 DIAGNOSIS — M21.372 LEFT FOOT DROP: ICD-10-CM

## 2021-06-25 DIAGNOSIS — M79.2 NEURALGIA OF LEFT UPPER EXTREMITY: ICD-10-CM

## 2021-06-25 PROCEDURE — 97140 MANUAL THERAPY 1/> REGIONS: CPT | Mod: PN

## 2021-06-28 ENCOUNTER — CLINICAL SUPPORT (OUTPATIENT)
Dept: REHABILITATION | Facility: HOSPITAL | Age: 71
End: 2021-06-28
Attending: FAMILY MEDICINE
Payer: MEDICARE

## 2021-06-28 DIAGNOSIS — M21.372 LEFT FOOT DROP: ICD-10-CM

## 2021-06-28 DIAGNOSIS — M79.2 NEURALGIA OF LEFT UPPER EXTREMITY: Primary | ICD-10-CM

## 2021-06-28 PROCEDURE — 97140 MANUAL THERAPY 1/> REGIONS: CPT | Mod: PN

## 2021-06-30 ENCOUNTER — CLINICAL SUPPORT (OUTPATIENT)
Dept: REHABILITATION | Facility: HOSPITAL | Age: 71
End: 2021-06-30
Attending: FAMILY MEDICINE
Payer: MEDICARE

## 2021-06-30 DIAGNOSIS — R26.9 GAIT DIFFICULTY: ICD-10-CM

## 2021-06-30 DIAGNOSIS — M21.372 LEFT FOOT DROP: ICD-10-CM

## 2021-06-30 DIAGNOSIS — M79.2 NEURALGIA OF LEFT UPPER EXTREMITY: ICD-10-CM

## 2021-06-30 PROCEDURE — 97140 MANUAL THERAPY 1/> REGIONS: CPT | Mod: PN

## 2021-06-30 PROCEDURE — 97110 THERAPEUTIC EXERCISES: CPT | Mod: PN

## 2021-07-07 ENCOUNTER — TELEPHONE (OUTPATIENT)
Dept: FAMILY MEDICINE | Facility: CLINIC | Age: 71
End: 2021-07-07

## 2021-07-08 ENCOUNTER — PATIENT MESSAGE (OUTPATIENT)
Dept: FAMILY MEDICINE | Facility: CLINIC | Age: 71
End: 2021-07-08

## 2021-07-12 DIAGNOSIS — E78.5 HYPERLIPIDEMIA, UNSPECIFIED HYPERLIPIDEMIA TYPE: ICD-10-CM

## 2021-07-13 ENCOUNTER — TELEPHONE (OUTPATIENT)
Dept: FAMILY MEDICINE | Facility: CLINIC | Age: 71
End: 2021-07-13

## 2021-07-13 ENCOUNTER — CLINICAL SUPPORT (OUTPATIENT)
Dept: REHABILITATION | Facility: HOSPITAL | Age: 71
End: 2021-07-13
Attending: FAMILY MEDICINE
Payer: MEDICARE

## 2021-07-13 DIAGNOSIS — M62.81 MUSCLE WEAKNESS (GENERALIZED): ICD-10-CM

## 2021-07-13 DIAGNOSIS — M79.2 NEURALGIA OF LEFT UPPER EXTREMITY: Primary | ICD-10-CM

## 2021-07-13 PROCEDURE — 97140 MANUAL THERAPY 1/> REGIONS: CPT | Mod: PN

## 2021-07-14 ENCOUNTER — TELEPHONE (OUTPATIENT)
Dept: FAMILY MEDICINE | Facility: CLINIC | Age: 71
End: 2021-07-14

## 2021-07-14 RX ORDER — ATORVASTATIN CALCIUM 80 MG/1
80 TABLET, FILM COATED ORAL NIGHTLY
Qty: 90 TABLET | Refills: 1 | Status: SHIPPED | OUTPATIENT
Start: 2021-07-14 | End: 2022-01-05 | Stop reason: SDUPTHER

## 2021-07-16 ENCOUNTER — CLINICAL SUPPORT (OUTPATIENT)
Dept: REHABILITATION | Facility: HOSPITAL | Age: 71
End: 2021-07-16
Attending: FAMILY MEDICINE
Payer: MEDICARE

## 2021-07-16 DIAGNOSIS — M79.2 NEURALGIA OF LEFT UPPER EXTREMITY: Primary | ICD-10-CM

## 2021-07-16 DIAGNOSIS — R53.82 CHRONIC FATIGUE: ICD-10-CM

## 2021-07-16 PROCEDURE — 97110 THERAPEUTIC EXERCISES: CPT | Mod: PN

## 2021-07-16 PROCEDURE — 97140 MANUAL THERAPY 1/> REGIONS: CPT | Mod: PN

## 2021-07-19 ENCOUNTER — CLINICAL SUPPORT (OUTPATIENT)
Dept: REHABILITATION | Facility: HOSPITAL | Age: 71
End: 2021-07-19
Attending: FAMILY MEDICINE
Payer: MEDICARE

## 2021-07-19 DIAGNOSIS — M79.2 NEURALGIA OF LEFT UPPER EXTREMITY: Primary | ICD-10-CM

## 2021-07-19 DIAGNOSIS — I63.81 CEREBROVASCULAR ACCIDENT (CVA) DUE TO OCCLUSION OF SMALL ARTERY: ICD-10-CM

## 2021-07-19 PROCEDURE — 97110 THERAPEUTIC EXERCISES: CPT | Mod: PN

## 2021-07-19 PROCEDURE — 97140 MANUAL THERAPY 1/> REGIONS: CPT | Mod: PN

## 2021-07-22 ENCOUNTER — CLINICAL SUPPORT (OUTPATIENT)
Dept: REHABILITATION | Facility: HOSPITAL | Age: 71
End: 2021-07-22
Attending: FAMILY MEDICINE
Payer: MEDICARE

## 2021-07-22 DIAGNOSIS — I63.81 CEREBROVASCULAR ACCIDENT (CVA) DUE TO OCCLUSION OF SMALL ARTERY: ICD-10-CM

## 2021-07-22 DIAGNOSIS — M79.2 NEURALGIA OF LEFT UPPER EXTREMITY: Primary | ICD-10-CM

## 2021-07-22 PROCEDURE — 97140 MANUAL THERAPY 1/> REGIONS: CPT | Mod: PN

## 2021-07-22 PROCEDURE — 97110 THERAPEUTIC EXERCISES: CPT | Mod: PN

## 2021-07-26 ENCOUNTER — CLINICAL SUPPORT (OUTPATIENT)
Dept: REHABILITATION | Facility: HOSPITAL | Age: 71
End: 2021-07-26
Attending: FAMILY MEDICINE
Payer: MEDICARE

## 2021-07-26 DIAGNOSIS — I63.81 CEREBROVASCULAR ACCIDENT (CVA) DUE TO OCCLUSION OF SMALL ARTERY: Primary | ICD-10-CM

## 2021-07-26 DIAGNOSIS — M79.2 NEURALGIA OF LEFT UPPER EXTREMITY: ICD-10-CM

## 2021-07-26 PROCEDURE — 97140 MANUAL THERAPY 1/> REGIONS: CPT | Mod: PN

## 2021-07-26 PROCEDURE — 97110 THERAPEUTIC EXERCISES: CPT | Mod: PN

## 2021-08-02 ENCOUNTER — CLINICAL SUPPORT (OUTPATIENT)
Dept: REHABILITATION | Facility: HOSPITAL | Age: 71
End: 2021-08-02
Attending: FAMILY MEDICINE
Payer: MEDICARE

## 2021-08-02 DIAGNOSIS — I63.81 CEREBROVASCULAR ACCIDENT (CVA) DUE TO OCCLUSION OF SMALL ARTERY: Primary | ICD-10-CM

## 2021-08-02 DIAGNOSIS — M79.2 NEURALGIA OF LEFT UPPER EXTREMITY: ICD-10-CM

## 2021-08-02 PROCEDURE — 97140 MANUAL THERAPY 1/> REGIONS: CPT | Mod: PN

## 2021-08-05 ENCOUNTER — CLINICAL SUPPORT (OUTPATIENT)
Dept: REHABILITATION | Facility: HOSPITAL | Age: 71
End: 2021-08-05
Attending: FAMILY MEDICINE
Payer: MEDICARE

## 2021-08-05 DIAGNOSIS — M79.2 NEURALGIA OF LEFT UPPER EXTREMITY: ICD-10-CM

## 2021-08-05 DIAGNOSIS — I63.81 CEREBROVASCULAR ACCIDENT (CVA) DUE TO OCCLUSION OF SMALL ARTERY: Primary | ICD-10-CM

## 2021-08-05 PROCEDURE — 97140 MANUAL THERAPY 1/> REGIONS: CPT | Mod: PN

## 2021-08-09 ENCOUNTER — CLINICAL SUPPORT (OUTPATIENT)
Dept: REHABILITATION | Facility: HOSPITAL | Age: 71
End: 2021-08-09
Attending: FAMILY MEDICINE
Payer: MEDICARE

## 2021-08-09 DIAGNOSIS — M79.2 NEURALGIA OF LEFT UPPER EXTREMITY: Primary | ICD-10-CM

## 2021-08-09 PROCEDURE — 97140 MANUAL THERAPY 1/> REGIONS: CPT | Mod: PN

## 2021-08-12 ENCOUNTER — CLINICAL SUPPORT (OUTPATIENT)
Dept: REHABILITATION | Facility: HOSPITAL | Age: 71
End: 2021-08-12
Attending: FAMILY MEDICINE
Payer: MEDICARE

## 2021-08-12 DIAGNOSIS — M75.02 ADHESIVE CAPSULITIS OF LEFT SHOULDER: ICD-10-CM

## 2021-08-12 DIAGNOSIS — M79.2 NEURALGIA OF LEFT UPPER EXTREMITY: ICD-10-CM

## 2021-08-12 DIAGNOSIS — M62.81 MUSCLE WEAKNESS (GENERALIZED): Primary | ICD-10-CM

## 2021-08-12 PROCEDURE — 97110 THERAPEUTIC EXERCISES: CPT | Mod: PN

## 2021-08-12 PROCEDURE — 97140 MANUAL THERAPY 1/> REGIONS: CPT | Mod: PN

## 2021-08-20 ENCOUNTER — CLINICAL SUPPORT (OUTPATIENT)
Dept: REHABILITATION | Facility: HOSPITAL | Age: 71
End: 2021-08-20
Attending: FAMILY MEDICINE
Payer: MEDICARE

## 2021-08-20 DIAGNOSIS — M21.372 LEFT FOOT DROP: ICD-10-CM

## 2021-08-20 DIAGNOSIS — M79.2 NEURALGIA OF LEFT UPPER EXTREMITY: ICD-10-CM

## 2021-08-20 DIAGNOSIS — I63.81 CEREBROVASCULAR ACCIDENT (CVA) DUE TO OCCLUSION OF SMALL ARTERY: ICD-10-CM

## 2021-08-20 DIAGNOSIS — R26.9 GAIT DIFFICULTY: Primary | ICD-10-CM

## 2021-08-20 PROCEDURE — 97164 PT RE-EVAL EST PLAN CARE: CPT | Mod: PN

## 2021-08-24 ENCOUNTER — CLINICAL SUPPORT (OUTPATIENT)
Dept: REHABILITATION | Facility: HOSPITAL | Age: 71
End: 2021-08-24
Attending: FAMILY MEDICINE
Payer: MEDICARE

## 2021-08-24 DIAGNOSIS — M62.81 MUSCLE WEAKNESS (GENERALIZED): Primary | ICD-10-CM

## 2021-08-24 DIAGNOSIS — I63.81 CEREBROVASCULAR ACCIDENT (CVA) DUE TO OCCLUSION OF SMALL ARTERY: ICD-10-CM

## 2021-08-24 PROCEDURE — 97112 NEUROMUSCULAR REEDUCATION: CPT | Mod: PN,CQ

## 2021-08-24 PROCEDURE — 97110 THERAPEUTIC EXERCISES: CPT | Mod: PN,CQ

## 2021-08-27 ENCOUNTER — CLINICAL SUPPORT (OUTPATIENT)
Dept: REHABILITATION | Facility: HOSPITAL | Age: 71
End: 2021-08-27
Attending: FAMILY MEDICINE
Payer: MEDICARE

## 2021-08-27 DIAGNOSIS — R26.9 GAIT DIFFICULTY: Primary | ICD-10-CM

## 2021-08-27 DIAGNOSIS — I63.81 CEREBROVASCULAR ACCIDENT (CVA) DUE TO OCCLUSION OF SMALL ARTERY: ICD-10-CM

## 2021-08-27 PROCEDURE — 97110 THERAPEUTIC EXERCISES: CPT | Mod: PN,CQ

## 2021-08-27 PROCEDURE — 97112 NEUROMUSCULAR REEDUCATION: CPT | Mod: PN,CQ

## 2021-08-31 ENCOUNTER — CLINICAL SUPPORT (OUTPATIENT)
Dept: REHABILITATION | Facility: HOSPITAL | Age: 71
End: 2021-08-31
Attending: FAMILY MEDICINE
Payer: MEDICARE

## 2021-08-31 DIAGNOSIS — I63.81 CEREBROVASCULAR ACCIDENT (CVA) DUE TO OCCLUSION OF SMALL ARTERY: Primary | ICD-10-CM

## 2021-08-31 DIAGNOSIS — M62.81 MUSCLE WEAKNESS (GENERALIZED): ICD-10-CM

## 2021-08-31 DIAGNOSIS — R26.9 GAIT DIFFICULTY: ICD-10-CM

## 2021-08-31 PROCEDURE — 97110 THERAPEUTIC EXERCISES: CPT | Mod: PN,CQ

## 2021-09-03 ENCOUNTER — CLINICAL SUPPORT (OUTPATIENT)
Dept: REHABILITATION | Facility: HOSPITAL | Age: 71
End: 2021-09-03
Attending: FAMILY MEDICINE
Payer: MEDICARE

## 2021-09-03 DIAGNOSIS — M62.81 MUSCLE WEAKNESS (GENERALIZED): ICD-10-CM

## 2021-09-03 DIAGNOSIS — R26.9 GAIT DIFFICULTY: ICD-10-CM

## 2021-09-03 DIAGNOSIS — I63.81 CEREBROVASCULAR ACCIDENT (CVA) DUE TO OCCLUSION OF SMALL ARTERY: Primary | ICD-10-CM

## 2021-09-03 PROCEDURE — 97112 NEUROMUSCULAR REEDUCATION: CPT | Mod: PN,CQ

## 2021-09-03 PROCEDURE — 97110 THERAPEUTIC EXERCISES: CPT | Mod: PN,CQ

## 2021-09-07 ENCOUNTER — CLINICAL SUPPORT (OUTPATIENT)
Dept: REHABILITATION | Facility: HOSPITAL | Age: 71
End: 2021-09-07
Attending: FAMILY MEDICINE
Payer: MEDICARE

## 2021-09-07 DIAGNOSIS — R26.9 GAIT DIFFICULTY: ICD-10-CM

## 2021-09-07 DIAGNOSIS — I63.81 CEREBROVASCULAR ACCIDENT (CVA) DUE TO OCCLUSION OF SMALL ARTERY: Primary | ICD-10-CM

## 2021-09-07 PROCEDURE — 97110 THERAPEUTIC EXERCISES: CPT | Mod: PN,CQ

## 2021-09-07 PROCEDURE — 97112 NEUROMUSCULAR REEDUCATION: CPT | Mod: PN,CQ

## 2021-09-10 ENCOUNTER — CLINICAL SUPPORT (OUTPATIENT)
Dept: REHABILITATION | Facility: HOSPITAL | Age: 71
End: 2021-09-10
Attending: FAMILY MEDICINE
Payer: MEDICARE

## 2021-09-10 DIAGNOSIS — I63.81 CEREBROVASCULAR ACCIDENT (CVA) DUE TO OCCLUSION OF SMALL ARTERY: Primary | ICD-10-CM

## 2021-09-10 DIAGNOSIS — R26.9 GAIT DIFFICULTY: ICD-10-CM

## 2021-09-10 PROCEDURE — 97110 THERAPEUTIC EXERCISES: CPT | Mod: PN,CQ

## 2021-09-10 PROCEDURE — 97112 NEUROMUSCULAR REEDUCATION: CPT | Mod: PN,CQ

## 2021-09-17 ENCOUNTER — CLINICAL SUPPORT (OUTPATIENT)
Dept: REHABILITATION | Facility: HOSPITAL | Age: 71
End: 2021-09-17
Attending: FAMILY MEDICINE
Payer: MEDICARE

## 2021-09-17 DIAGNOSIS — R26.9 GAIT DIFFICULTY: ICD-10-CM

## 2021-09-17 DIAGNOSIS — M21.372 LEFT FOOT DROP: Primary | ICD-10-CM

## 2021-09-17 DIAGNOSIS — R53.1 LEFT-SIDED WEAKNESS: ICD-10-CM

## 2021-09-17 PROCEDURE — 97112 NEUROMUSCULAR REEDUCATION: CPT | Mod: PN

## 2021-09-17 PROCEDURE — 97110 THERAPEUTIC EXERCISES: CPT | Mod: PN

## 2021-09-22 ENCOUNTER — CLINICAL SUPPORT (OUTPATIENT)
Dept: REHABILITATION | Facility: HOSPITAL | Age: 71
End: 2021-09-22
Attending: FAMILY MEDICINE
Payer: MEDICARE

## 2021-09-22 DIAGNOSIS — R53.1 LEFT-SIDED WEAKNESS: ICD-10-CM

## 2021-09-22 PROCEDURE — 97110 THERAPEUTIC EXERCISES: CPT | Mod: PN

## 2021-09-22 PROCEDURE — 97112 NEUROMUSCULAR REEDUCATION: CPT | Mod: PN

## 2021-09-24 ENCOUNTER — CLINICAL SUPPORT (OUTPATIENT)
Dept: REHABILITATION | Facility: HOSPITAL | Age: 71
End: 2021-09-24
Attending: FAMILY MEDICINE
Payer: MEDICARE

## 2021-09-24 DIAGNOSIS — R53.1 LEFT-SIDED WEAKNESS: ICD-10-CM

## 2021-09-24 PROCEDURE — 97112 NEUROMUSCULAR REEDUCATION: CPT | Mod: PN

## 2021-09-24 PROCEDURE — 97110 THERAPEUTIC EXERCISES: CPT | Mod: PN

## 2021-09-29 ENCOUNTER — CLINICAL SUPPORT (OUTPATIENT)
Dept: REHABILITATION | Facility: HOSPITAL | Age: 71
End: 2021-09-29
Attending: FAMILY MEDICINE
Payer: MEDICARE

## 2021-09-29 DIAGNOSIS — R53.1 LEFT-SIDED WEAKNESS: Primary | ICD-10-CM

## 2021-09-29 PROCEDURE — 97112 NEUROMUSCULAR REEDUCATION: CPT | Mod: PN,CQ

## 2021-09-29 PROCEDURE — 97110 THERAPEUTIC EXERCISES: CPT | Mod: PN,CQ

## 2021-10-01 ENCOUNTER — CLINICAL SUPPORT (OUTPATIENT)
Dept: REHABILITATION | Facility: HOSPITAL | Age: 71
End: 2021-10-01
Attending: FAMILY MEDICINE
Payer: MEDICARE

## 2021-10-01 DIAGNOSIS — R53.1 LEFT-SIDED WEAKNESS: Primary | ICD-10-CM

## 2021-10-01 PROCEDURE — 97110 THERAPEUTIC EXERCISES: CPT | Mod: PN,CQ

## 2021-10-01 PROCEDURE — 97112 NEUROMUSCULAR REEDUCATION: CPT | Mod: PN,CQ

## 2021-12-09 DIAGNOSIS — I10 HTN (HYPERTENSION): ICD-10-CM

## 2022-01-05 ENCOUNTER — OFFICE VISIT (OUTPATIENT)
Dept: FAMILY MEDICINE | Facility: CLINIC | Age: 72
End: 2022-01-05
Payer: MEDICARE

## 2022-01-05 VITALS
WEIGHT: 155.63 LBS | SYSTOLIC BLOOD PRESSURE: 136 MMHG | HEART RATE: 88 BPM | DIASTOLIC BLOOD PRESSURE: 85 MMHG | HEIGHT: 65 IN | RESPIRATION RATE: 17 BRPM | BODY MASS INDEX: 25.93 KG/M2 | OXYGEN SATURATION: 97 %

## 2022-01-05 DIAGNOSIS — F32.5 MAJOR DEPRESSIVE DISORDER WITH SINGLE EPISODE, IN FULL REMISSION: ICD-10-CM

## 2022-01-05 DIAGNOSIS — Z00.00 ANNUAL PHYSICAL EXAM: Primary | ICD-10-CM

## 2022-01-05 DIAGNOSIS — E78.5 HYPERLIPIDEMIA, UNSPECIFIED HYPERLIPIDEMIA TYPE: ICD-10-CM

## 2022-01-05 DIAGNOSIS — I69.354 HEMIPLEGIA AND HEMIPARESIS FOLLOWING CEREBRAL INFARCTION AFFECTING LEFT NON-DOMINANT SIDE: ICD-10-CM

## 2022-01-05 DIAGNOSIS — Z79.899 ENCOUNTER FOR LONG-TERM CURRENT USE OF MEDICATION: ICD-10-CM

## 2022-01-05 PROBLEM — E11.9 DIABETES MELLITUS TYPE 2 WITHOUT RETINOPATHY: Status: ACTIVE | Noted: 2022-01-05

## 2022-01-05 PROCEDURE — 1159F MED LIST DOCD IN RCRD: CPT | Mod: CPTII,S$GLB,, | Performed by: FAMILY MEDICINE

## 2022-01-05 PROCEDURE — 3075F SYST BP GE 130 - 139MM HG: CPT | Mod: CPTII,S$GLB,, | Performed by: FAMILY MEDICINE

## 2022-01-05 PROCEDURE — 3079F PR MOST RECENT DIASTOLIC BLOOD PRESSURE 80-89 MM HG: ICD-10-PCS | Mod: CPTII,S$GLB,, | Performed by: FAMILY MEDICINE

## 2022-01-05 PROCEDURE — 99397 PER PM REEVAL EST PAT 65+ YR: CPT | Mod: 25,S$GLB,, | Performed by: FAMILY MEDICINE

## 2022-01-05 PROCEDURE — 1101F PT FALLS ASSESS-DOCD LE1/YR: CPT | Mod: CPTII,S$GLB,, | Performed by: FAMILY MEDICINE

## 2022-01-05 PROCEDURE — 99397 PR PREVENTIVE VISIT,EST,65 & OVER: ICD-10-PCS | Mod: 25,S$GLB,, | Performed by: FAMILY MEDICINE

## 2022-01-05 PROCEDURE — 1157F PR ADVANCE CARE PLAN OR EQUIV PRESENT IN MEDICAL RECORD: ICD-10-PCS | Mod: CPTII,S$GLB,, | Performed by: FAMILY MEDICINE

## 2022-01-05 PROCEDURE — 3079F DIAST BP 80-89 MM HG: CPT | Mod: CPTII,S$GLB,, | Performed by: FAMILY MEDICINE

## 2022-01-05 PROCEDURE — 3008F BODY MASS INDEX DOCD: CPT | Mod: CPTII,S$GLB,, | Performed by: FAMILY MEDICINE

## 2022-01-05 PROCEDURE — 99999 PR PBB SHADOW E&M-EST. PATIENT-LVL IV: ICD-10-PCS | Mod: PBBFAC,,, | Performed by: FAMILY MEDICINE

## 2022-01-05 PROCEDURE — 99999 PR PBB SHADOW E&M-EST. PATIENT-LVL IV: CPT | Mod: PBBFAC,,, | Performed by: FAMILY MEDICINE

## 2022-01-05 PROCEDURE — 3288F FALL RISK ASSESSMENT DOCD: CPT | Mod: CPTII,S$GLB,, | Performed by: FAMILY MEDICINE

## 2022-01-05 PROCEDURE — 1126F PR PAIN SEVERITY QUANTIFIED, NO PAIN PRESENT: ICD-10-PCS | Mod: CPTII,S$GLB,, | Performed by: FAMILY MEDICINE

## 2022-01-05 PROCEDURE — 3288F PR FALLS RISK ASSESSMENT DOCUMENTED: ICD-10-PCS | Mod: CPTII,S$GLB,, | Performed by: FAMILY MEDICINE

## 2022-01-05 PROCEDURE — 1159F PR MEDICATION LIST DOCUMENTED IN MEDICAL RECORD: ICD-10-PCS | Mod: CPTII,S$GLB,, | Performed by: FAMILY MEDICINE

## 2022-01-05 PROCEDURE — 3008F PR BODY MASS INDEX (BMI) DOCUMENTED: ICD-10-PCS | Mod: CPTII,S$GLB,, | Performed by: FAMILY MEDICINE

## 2022-01-05 PROCEDURE — 1126F AMNT PAIN NOTED NONE PRSNT: CPT | Mod: CPTII,S$GLB,, | Performed by: FAMILY MEDICINE

## 2022-01-05 PROCEDURE — 1101F PR PT FALLS ASSESS DOC 0-1 FALLS W/OUT INJ PAST YR: ICD-10-PCS | Mod: CPTII,S$GLB,, | Performed by: FAMILY MEDICINE

## 2022-01-05 PROCEDURE — 1157F ADVNC CARE PLAN IN RCRD: CPT | Mod: CPTII,S$GLB,, | Performed by: FAMILY MEDICINE

## 2022-01-05 PROCEDURE — 3075F PR MOST RECENT SYSTOLIC BLOOD PRESS GE 130-139MM HG: ICD-10-PCS | Mod: CPTII,S$GLB,, | Performed by: FAMILY MEDICINE

## 2022-01-05 RX ORDER — BUPROPION HYDROCHLORIDE 150 MG/1
150 TABLET ORAL DAILY
Qty: 90 TABLET | Refills: 3 | Status: SHIPPED | OUTPATIENT
Start: 2022-01-05 | End: 2023-02-13

## 2022-01-05 RX ORDER — ATORVASTATIN CALCIUM 80 MG/1
80 TABLET, FILM COATED ORAL NIGHTLY
Qty: 90 TABLET | Refills: 3 | Status: SHIPPED | OUTPATIENT
Start: 2022-01-05 | End: 2022-07-12 | Stop reason: ALTCHOICE

## 2022-01-05 NOTE — PROGRESS NOTES
Subjective:       Patient ID: Bdudy Park is a 71 y.o. male.    Chief Complaint: Annual Exam (Pt requesting referral for therapy for his arm, specifically Brayden. Pt requesting new foot brace. Pt consents to Lipid. Pt declines vaccines. ) and Depression    HPI   Patient presents for annual exam. Is due for bloodwork and med refills. Requests OT referral for left arm weakness. Has noticed some depressed mood. Was on wellbutrin, but the prescription ran out. Has drop foot and needs a new foot brace.    Review of Systems   Constitutional: Negative for chills and fever.   Musculoskeletal: Positive for gait problem.   Neurological: Negative for syncope.   Psychiatric/Behavioral: Positive for dysphoric mood. Negative for hallucinations.       Past Medical History:   Diagnosis Date    Angina pectoris     Anxiety     Arthritis     Biliary colic     Cochlear implant in place     Deaf     LEFT EAR    Depression     Diabetes mellitus type 2 without retinopathy 1/5/2022    Heart failure     High cholesterol     History of colon polyps 2/20/2018    [place] (hard of hearing)     HEARING AID - RIGHT    Hyperlipidemia     Hypertension     Kidney stone     Kidney stones     Renal stones     Stroke     Trouble in sleeping     Wears glasses      Past Surgical History:   Procedure Laterality Date    CAROTID ARTERY ANGIOPLASTY  06/2018    [facility]     CATARACT EXTRACTION Bilateral     CHOLECYSTECTOMY  2-6-2014    COLONOSCOPY  1/9/2013    Dr. Gillette, 5 year recheck (family history colon cancer)    COLONOSCOPY N/A 3/12/2018    Procedure: COLONOSCOPY;  Surgeon: Garett Go MD;  Location: [facility];  Service: Endoscopy;  Laterality: N/A;    COLONOSCOPY N/A 2/15/2021    Procedure: COLONOSCOPY;  Surgeon: Garett Go MD;  Location: [facility];  Service: Endoscopy;  Laterality: N/A;    CYSTOSCOPY W/ RETROGRADES Bilateral 10/28/2019    Procedure: CYSTOSCOPY, WITH RETROGRADE PYELOGRAM;  Surgeon: Gretchen Proctor  MD;  Location: [facility] OR;  Service: Urology;  Laterality: Bilateral;    CYSTOSCOPY W/ URETERAL STENT PLACEMENT Left 10/28/2019    Procedure: CYSTOSCOPY, WITH URETERAL STENT INSERTION;  Surgeon: Gretchen Proctor MD;  Location: [facility] OR;  Service: Urology;  Laterality: Left;    CYSTOSCOPY W/ URETERAL STENT REMOVAL Left 12/2/2019    Procedure: CYSTOSCOPY, WITH URETERAL STENT REMOVAL;  Surgeon: Gretchen Proctor MD;  Location: [facility] OR;  Service: Urology;  Laterality: Left;    EAR MASTOIDECTOMY W/ COCHLEAR IMPLANT W/ LANDMARK      left ear    EXTRACORPOREAL SHOCK WAVE LITHOTRIPSY Left 12/2/2019    Procedure: LITHOTRIPSY, ESWL;  Surgeon: Gretchen Proctor MD;  Location: [facility] OR;  Service: Urology;  Laterality: Left;    EYE SURGERY      FOREIGN BODY REMOVAL Right     glass removed from right foot/repair    FRACTURE SURGERY      right arm x2    LITHOTRIPSY      ROTATOR CUFF REPAIR      Right     SINUS SURGERY      TONSILLECTOMY      VASCULAR SURGERY       Social History     Socioeconomic History    Marital status:    Tobacco Use    Smoking status: Never Smoker    Smokeless tobacco: Never Used   Substance and Sexual Activity    Alcohol use: Yes     Comment: once every two months    Drug use: No    Sexual activity: Yes     Family History   Problem Relation Age of Onset    Cancer Mother         colon    Heart disease Father     Gout Father     Kidney disease Father     Arthritis Father     Hypertension Father     Early death Daughter         mva    Alcohol abuse Brother     Cancer Brother         mouth cancer w mets    No Known Problems Sister     Alcohol abuse Brother     No Known Problems Son     Heart disease Maternal Grandmother         MI    Stroke Maternal Grandfather     Heart disease Paternal Grandmother         MI    No Known Problems Son     Cancer Paternal Uncle         lung cancer    Allergic rhinitis Neg Hx     Allergies Neg Hx     Angioedema Neg Hx     Asthma Neg Hx      "Atopy Neg Hx     Eczema Neg Hx     Immunodeficiency Neg Hx     Rhinitis Neg Hx     Urticaria Neg Hx     Collagen disease Neg Hx        Objective:      /85 (BP Location: Right arm, Patient Position: Sitting, BP Method: Medium (Automatic))   Pulse 88   Resp 17   Ht 5' 5" (1.651 m)   Wt 70.6 kg (155 lb 9.6 oz)   SpO2 97%   BMI 25.89 kg/m²   Physical Exam  Vitals reviewed.   Constitutional:       General: He is not in acute distress.     Appearance: He is not toxic-appearing.   HENT:      Head: Normocephalic and atraumatic.   Eyes:      General: No scleral icterus.        Right eye: No discharge.         Left eye: No discharge.   Cardiovascular:      Rate and Rhythm: Normal rate and regular rhythm.   Pulmonary:      Effort: Pulmonary effort is normal. No respiratory distress.   Skin:     Coloration: Skin is not jaundiced.   Neurological:      Mental Status: He is alert and oriented to person, place, and time.   Psychiatric:         Mood and Affect: Mood normal.         Behavior: Behavior normal.         Assessment:       1. Annual physical exam    2. Hemiplegia and hemiparesis following cerebral infarction affecting left non-dominant side    3. Major depressive disorder with single episode, in full remission    4. Encounter for long-term current use of medication    5. Hyperlipidemia, unspecified hyperlipidemia type        Plan:       Annual physical exam    Hemiplegia and hemiparesis following cerebral infarction affecting left non-dominant side  -     Ambulatory referral/consult to Physical/Occupational Therapy; Future; Expected date: 01/12/2022  -     leg brace Misc; Use left foot brace as directed  Dispense: 1 each; Refill: 0    Major depressive disorder with single episode, in full remission  -     buPROPion (WELLBUTRIN XL) 150 MG TB24 tablet; Take 1 tablet (150 mg total) by mouth once daily.  Dispense: 90 tablet; Refill: 3    Encounter for long-term current use of medication  -     Lipid Panel; " Future; Expected date: 01/05/2022  -     Comprehensive Metabolic Panel; Future; Expected date: 01/05/2022  -     CBC Auto Differential; Future; Expected date: 01/05/2022  -     TSH; Future; Expected date: 01/05/2022  -     T4, Free; Future; Expected date: 01/05/2022    Hyperlipidemia, unspecified hyperlipidemia type  -     atorvastatin (LIPITOR) 80 MG tablet; Take 1 tablet (80 mg total) by mouth every evening.  Dispense: 90 tablet; Refill: 3            Risks, benefits, and side effects were discussed with the patient. All questions were answered to the fullest satisfaction of the patient, and pt verbalized understanding and agreement to treatment plan. Pt was to call with any new or worsening symptoms, or present to the ER.

## 2022-01-07 ENCOUNTER — LAB VISIT (OUTPATIENT)
Dept: LAB | Facility: HOSPITAL | Age: 72
End: 2022-01-07
Attending: FAMILY MEDICINE
Payer: MEDICARE

## 2022-01-07 DIAGNOSIS — Z79.899 ENCOUNTER FOR LONG-TERM CURRENT USE OF MEDICATION: ICD-10-CM

## 2022-01-07 LAB
ALBUMIN SERPL BCP-MCNC: 4.2 G/DL (ref 3.5–5.2)
ALP SERPL-CCNC: 61 U/L (ref 55–135)
ALT SERPL W/O P-5'-P-CCNC: 36 U/L (ref 10–44)
ANION GAP SERPL CALC-SCNC: 13 MMOL/L (ref 8–16)
AST SERPL-CCNC: 27 U/L (ref 10–40)
BASOPHILS # BLD AUTO: 0.03 K/UL (ref 0–0.2)
BASOPHILS NFR BLD: 0.6 % (ref 0–1.9)
BILIRUB SERPL-MCNC: 1 MG/DL (ref 0.1–1)
BUN SERPL-MCNC: 14 MG/DL (ref 8–23)
CALCIUM SERPL-MCNC: 9.7 MG/DL (ref 8.7–10.5)
CHLORIDE SERPL-SCNC: 107 MMOL/L (ref 95–110)
CHOLEST SERPL-MCNC: 229 MG/DL (ref 120–199)
CHOLEST/HDLC SERPL: 5.5 {RATIO} (ref 2–5)
CO2 SERPL-SCNC: 23 MMOL/L (ref 23–29)
CREAT SERPL-MCNC: 1.4 MG/DL (ref 0.5–1.4)
DIFFERENTIAL METHOD: ABNORMAL
EOSINOPHIL # BLD AUTO: 0.1 K/UL (ref 0–0.5)
EOSINOPHIL NFR BLD: 2.1 % (ref 0–8)
ERYTHROCYTE [DISTWIDTH] IN BLOOD BY AUTOMATED COUNT: 12.7 % (ref 11.5–14.5)
EST. GFR  (AFRICAN AMERICAN): 58.8 ML/MIN/1.73 M^2
EST. GFR  (NON AFRICAN AMERICAN): 50.9 ML/MIN/1.73 M^2
GLUCOSE SERPL-MCNC: 101 MG/DL (ref 70–110)
HCT VFR BLD AUTO: 46 % (ref 40–54)
HDLC SERPL-MCNC: 42 MG/DL (ref 40–75)
HDLC SERPL: 18.3 % (ref 20–50)
HGB BLD-MCNC: 15.6 G/DL (ref 14–18)
IMM GRANULOCYTES # BLD AUTO: 0.03 K/UL (ref 0–0.04)
IMM GRANULOCYTES NFR BLD AUTO: 0.6 % (ref 0–0.5)
LDLC SERPL CALC-MCNC: 143.4 MG/DL (ref 63–159)
LYMPHOCYTES # BLD AUTO: 2.2 K/UL (ref 1–4.8)
LYMPHOCYTES NFR BLD: 45.8 % (ref 18–48)
MCH RBC QN AUTO: 30.2 PG (ref 27–31)
MCHC RBC AUTO-ENTMCNC: 33.9 G/DL (ref 32–36)
MCV RBC AUTO: 89 FL (ref 82–98)
MONOCYTES # BLD AUTO: 0.5 K/UL (ref 0.3–1)
MONOCYTES NFR BLD: 9.7 % (ref 4–15)
NEUTROPHILS # BLD AUTO: 2 K/UL (ref 1.8–7.7)
NEUTROPHILS NFR BLD: 41.2 % (ref 38–73)
NONHDLC SERPL-MCNC: 187 MG/DL
NRBC BLD-RTO: 0 /100 WBC
PLATELET # BLD AUTO: 194 K/UL (ref 150–450)
PMV BLD AUTO: 9.9 FL (ref 9.2–12.9)
POTASSIUM SERPL-SCNC: 3.9 MMOL/L (ref 3.5–5.1)
PROT SERPL-MCNC: 7.5 G/DL (ref 6–8.4)
RBC # BLD AUTO: 5.16 M/UL (ref 4.6–6.2)
SODIUM SERPL-SCNC: 143 MMOL/L (ref 136–145)
T4 FREE SERPL-MCNC: 0.85 NG/DL (ref 0.71–1.51)
TRIGL SERPL-MCNC: 218 MG/DL (ref 30–150)
TSH SERPL DL<=0.005 MIU/L-ACNC: 2.1 UIU/ML (ref 0.4–4)
WBC # BLD AUTO: 4.85 K/UL (ref 3.9–12.7)

## 2022-01-07 PROCEDURE — 84443 ASSAY THYROID STIM HORMONE: CPT | Performed by: FAMILY MEDICINE

## 2022-01-07 PROCEDURE — 85025 COMPLETE CBC W/AUTO DIFF WBC: CPT | Performed by: FAMILY MEDICINE

## 2022-01-07 PROCEDURE — 80053 COMPREHEN METABOLIC PANEL: CPT | Performed by: FAMILY MEDICINE

## 2022-01-07 PROCEDURE — 80061 LIPID PANEL: CPT | Performed by: FAMILY MEDICINE

## 2022-01-07 PROCEDURE — 36415 COLL VENOUS BLD VENIPUNCTURE: CPT | Performed by: FAMILY MEDICINE

## 2022-01-07 PROCEDURE — 84439 ASSAY OF FREE THYROXINE: CPT | Performed by: FAMILY MEDICINE

## 2022-01-10 ENCOUNTER — CLINICAL SUPPORT (OUTPATIENT)
Dept: REHABILITATION | Facility: HOSPITAL | Age: 72
End: 2022-01-10
Attending: FAMILY MEDICINE
Payer: MEDICARE

## 2022-01-10 DIAGNOSIS — R53.1 LEFT-SIDED WEAKNESS: Primary | ICD-10-CM

## 2022-01-10 DIAGNOSIS — I69.354 HEMIPLEGIA AND HEMIPARESIS FOLLOWING CEREBRAL INFARCTION AFFECTING LEFT NON-DOMINANT SIDE: ICD-10-CM

## 2022-01-10 PROCEDURE — 97140 MANUAL THERAPY 1/> REGIONS: CPT | Mod: PN

## 2022-01-10 PROCEDURE — 97166 OT EVAL MOD COMPLEX 45 MIN: CPT | Mod: PN

## 2022-01-10 NOTE — PROGRESS NOTES
[facility]  Occupational Therapy Initial Evaluation    Date: 1/10/2022  Name: Buddy Park  Clinic Number: [id number]    Therapy Diagnosis: No diagnosis found.  Physician: Tsering Mercado DO    Physician Orders: OT evaluate and treat  Medical Diagnosis: CVA, L sided weakness  Surgical Procedure and Date: n/a  Evaluation Date: 1/10/2022  Insurance Authorization Period Expiration: 01/05/2023  Plan of Care Certification Period: 04/01/2022  Progress Note Due: 02/10/2022  Date of Return to MD: unknown  Visit # / Visits authorized: 1/12  FOTO: 1/ 3    Precautions: n/a    Time In: 12: 30  Time Out: 1 :15  Total Appointment Time (timed & untimed codes): 45 minutes    SUBJECTIVE     Date of Onset: February 1, 2019    History of Current Condition/Mechanism of Injury: Buddy reports: that after his stroke in 2019, pt was in a nursing home and lost 30 lbs in 3 months. Pt was unable to do workouts initially because he was so sick. Once home, pt fell and chipped the shoulder bone which caused pain when ranging. Pt has had OT and PT in the past.    Falls: pt fell 3 times since the stroke    Involved Side: L side  Dominant Side: Left  Imaging: n/a  Prior Therapy: OT, PT  Occupation:  Retired [employer], 10 years retired  Working presently: retired    Functional Limitations/Social History:    Previous functional status includes: Independent with all ADLs.     Current Functional Status   Home/Living environment: lives with his wife, with one step to get into the home. Pt states that he is able to avoid the step by entering through the garage.      Limitation of Functional Status as follows:   ADLs/IADLs:     - Feeding: pt is independent     - Bathing: pt is independent; pt has grab bars    - Dressing/Grooming: min A for donning long sleeve shirt    - Driving: pt is independent     Pain:  Functional Pain Scale Rating 0-10: Current 0/10, pt states that no pain just soreness  Location: none    Patient's  "Goals for Therapy: Pt would like to "get his L arm back"    Medical History:   Past Medical History:   Diagnosis Date    Angina pectoris     Anxiety     Arthritis     Biliary colic     Cochlear implant in place     Deaf     LEFT EAR    Depression     Diabetes mellitus type 2 without retinopathy 1/5/2022    Heart failure     High cholesterol     History of colon polyps 2/20/2018    [place] (hard of hearing)     HEARING AID - RIGHT    Hyperlipidemia     Hypertension     Kidney stone     Kidney stones     Renal stones     Stroke     Trouble in sleeping     Wears glasses        Surgical History:    has a past surgical history that includes Ear mastoidectomy w/ cochlear implant w/ landmark; Rotator cuff repair; Tonsillectomy; Sinus surgery; Colonoscopy (1/9/2013); Cholecystectomy (2-6-2014); Fracture surgery; Foreign Body Removal (Right); Lithotripsy; Colonoscopy (N/A, 3/12/2018); Carotid angioplasty (06/2018); Cataract extraction (Bilateral); Cystoscopy w/ retrogrades (Bilateral, 10/28/2019); Cystoscopy w/ ureteral stent placement (Left, 10/28/2019); Vascular surgery; Extracorporeal shock wave lithotripsy (Left, 12/2/2019); Cystoscopy w/ ureteral stent removal (Left, 12/2/2019); Eye surgery; and Colonoscopy (N/A, 2/15/2021).    Medications:   has a current medication list which includes the following prescription(s): amlodipine, aspirin, atorvastatin, b complex vitamins, blood sugar diagnostic, blood-glucose meter, bupropion, carvedilol, clopidogrel, colcrys, colestipol, hydrocodone-acetaminophen, ketorolac, lancets, leg brace, and nitroglycerin, and the following Facility-Administered Medications: lactated ringers.    Allergies:   Review of patient's allergies indicates:   Allergen Reactions    Bee pollens Shortness Of Breath     WASP, BEE, ANT AND CATERPILLER STINGS    Hydrochlorothiazide Shortness Of Breath and Rash    Milk containing products Diarrhea    Metoprolol Rash          OBJECTIVE "     Extensive L hand clawing, Moderate L UE flexion synergy pattern noted    PROM in the L UE: pt has limited passive range all all L UE joints  AROM in the LUE: trace L elbow flexion     Bed mobility: Pt was fully independent in bed mobility.   Dressing: Pt needed min A for donning Long sleeve shirt   Buttoning: Pt buttoned independently using R hand  Functional mobility: Pt performs functional mobility independently using cane in the R hand; pt needs min verbal cues to slow down when walking for increased safety measures    Limitation/Restriction for FOTO Neuro Survey    Therapist reviewed FOTO scores for Buddy Park on 1/10/2022.   FOTO documents entered into [employer] - see Media section.    Limitation Score: 71%         Treatment   Total Treatment time (time-based codes) separate from Evaluation: 10 minutes    Buddy received the treatments listed below:     Manual therapy techniques for 10 minutes:  -Pt received slow, prolonged stretch to the L hand, wrist and elbow in an effort to decrease flexion synergy.    Patient Education and Home Exercises      Education provided:   - Pt was instructed to avoid quick stretches the L UE as this will cause increase in flexion synergy patterns.    Written Home Exercises Provided: yes.  Exercises were reviewed and Buddy was able to demonstrate them prior to the end of the session.  Buddy demonstrated good  understanding of the education provided. See EMR under Patient Instructions for exercises provided during therapy sessions.     Pt was advised to perform these exercises free of pain, and to stop performing them if pain occurs.    Patient/Family Education: role of OT, goals for OT, scheduling/cancellations - pt verbalized understanding. Discussed insurance limitations with patient.      ASSESSMENT     Buddy Park is a 71 y.o. male referred to outpatient occupational therapy and presents with a medical diagnosis of CVA, L side weakness.  Patient presents with the  following therapy deficits: Decreased ROM, Decreased  strength, Decreased pinch strength, Decreased muscle strength, Decreased functional hand use and Joint Stiffness and demonstrates limitations as described in the chart below. Following medical record review it is determined that pt will benefit from occupational therapy services in order to maximize pain free and/or functional use of left UE. The following goals were discussed with the patient and patient is in agreement with them as to be addressed in the treatment plan. The patient's rehab potential is fair.    Anticipated barriers to occupational therapy: none  Pt has no cultural, educational or language barriers to learning provided.    Profile and History Assessment of Occupational Performance Level of Clinical Decision Making Complexity Score   Occupational Profile:   Buddy Park is a 71 y.o. male who lives with their spouse and is retired Buddy Park has difficulty with  ADLs and IADLs as listed previously, which  Affecting hisdaily functional abilities.      Comorbidities:    has a past medical history of Angina pectoris, Anxiety, Arthritis, Biliary colic, Cochlear implant in place, Deaf, Depression, Diabetes mellitus type 2 without retinopathy, Heart failure, High cholesterol, History of colon polyps, [place] (hard of hearing), Hyperlipidemia, Hypertension, Kidney stone, Kidney stones, Renal stones, Stroke, Trouble in sleeping, and Wears glasses.    Medical and Therapy History Review:   Med hx reviewed                Performance Deficits    Physical:  Extensive L hand clawing, Moderate L UE flexion synergy pattern noted    PROM in the L UE: pt has limited passive range all all L UE joints  AROM in the LUE: trace L elbow flexion     Bed mobility: Pt was fully independent in bed mobility.   Dressing: Pt needed min A for donning Long sleeve shirt   Buttoning: Pt buttoned independently using R hand  Functional mobility: Pt performs functional  mobility independently using cane in the R hand; pt needs min verbal cues to slow down when walking for increased safety measures    Cognitive:  Attention    Psychosocial:    No Deficits     Clinical Decision Making:  moderate    Assessment Process:  Problem-Focused Assessments    Modification/Need for Assistance:  Not Necessary    Intervention Selection:  Several Treatment Options       moderate  Based on PMHX, co morbidities , data from assessments and functional level of assistance required with task and clinical presentation directly impacting function.       The following goals were discussed with the patient and patient is in agreement with them as to be addressed in the treatment plan.     Goals:   1. Pt will improve FOTO limitation score to 61% or less prior to discharge.  2. Pt will don a long sleeve shirt independently using the R hand prior to discharge.  3. Pt will report 100% compliance with HEP prior to discharge.  4. Pt will report 0/10 pain in the L hand/UE during therapy sessions prior to discharge.      PLAN   Plan of Care Certification: 1/10/2022 to 04/01/2022.     Outpatient Occupational Therapy 1 times weekly for 12 weeks to include the following interventions: Self care, Manual therapy/joint mobilizations, Modalities for pain management, Therapeutic exercises/activities., Strengthening and Electrical Modalities.      Rosaura Padilla OT      I CERTIFY THE NEED FOR THESE SERVICES FURNISHED UNDER THIS PLAN OF TREATMENT AND WHILE UNDER MY CARE  Physician's comments:      Physician's Signature: ___________________________________________________

## 2022-01-19 ENCOUNTER — PATIENT MESSAGE (OUTPATIENT)
Dept: FAMILY MEDICINE | Facility: CLINIC | Age: 72
End: 2022-01-19

## 2022-01-19 ENCOUNTER — CLINICAL SUPPORT (OUTPATIENT)
Dept: REHABILITATION | Facility: HOSPITAL | Age: 72
End: 2022-01-19
Attending: FAMILY MEDICINE
Payer: MEDICARE

## 2022-01-19 DIAGNOSIS — I69.354 HEMIPLEGIA AND HEMIPARESIS FOLLOWING CEREBRAL INFARCTION AFFECTING LEFT NON-DOMINANT SIDE: Primary | ICD-10-CM

## 2022-01-19 DIAGNOSIS — R53.1 LEFT-SIDED WEAKNESS: Primary | ICD-10-CM

## 2022-01-19 PROCEDURE — 97032 APPL MODALITY 1+ESTIM EA 15: CPT | Mod: PN

## 2022-01-19 PROCEDURE — 97140 MANUAL THERAPY 1/> REGIONS: CPT | Mod: PN

## 2022-01-19 PROCEDURE — 97530 THERAPEUTIC ACTIVITIES: CPT | Mod: PN

## 2022-01-19 NOTE — PROGRESS NOTES
Occupational Therapy Daily Treatment Note   Name: Buddy Park 1950  MRN: [id number]    Visit Date: 1/19/2022  Visit #: 2/ 12  Authorization period Expiration: 01/05/2023  Plan of Care Expiration: 04/01/2022  Precautions: none    Time In: 11: 00  Time Out: 11: 45  Total 1:1 Treatment Time: 45 min    Treatment Diagnosis: No diagnosis found.  Physician: Tsering Mercado DO    Subjective   Pt reports: he has not been stretching much lately and feels very tight  He was not compliant with home exercise program.     Pain Scale:  0/10 on VAS currently  Pain Location: L UE    Objective     Buddy participated in dynamic functional therapeutic activities to improve functional performance for 15 minutes, including:  -Pt performed weightbearing activity on the mat, bearing weight through the L UE while reaching for cones with the R hand  -Pt performed core strengthening activity, engaging the core muscles while leaning laterally and forward in an effort to improve dynamic sitting balance.     Buddy received the following manual therapy techniques:   -Slow, prolonged passive stretching was performed to the  L arm at the digits, wrist, forearm, elbow, and shoulder in an effort to break up flexion synergy for 15  minutes.     Buddy received the following supervised modalities after being cleared for contradictions: IFC, Indonesian protocol with a 5/5 duty cycle @ 21 volts for 15 minutes over the extensor tendons of the L forearm in an effort to initiate muscle contractions and improve extension at digits 1-5.       Home Exercises and Education Provided     Education provided re:   - progress towards goals   - role of therapy in multi - disciplinary team, goals for therapy  Pt educated on condition, POC, and expectations in therapy.  No spiritual or educational barriers to learning provided    Home exercises:  Pt will be provided HEP during course of treatment with progressions as appropriate. Pt was  advised to perform these exercises free of pain, and to stop performing them if pain occurs.   Buddy demonstrated good  understanding of the education provided.     Assessment   Buddy is progressing well towards his goals and no updates to goals at this time.     Pt prognosis is Good. Pt will continue to benefit from skilled outpatient physical therapy to address the deficits listed in the problem list chart on initial evaluation, provide pt/family education and to maximize pt's level of independence in the home and community environment.     Medical necessity is demonstrated by the impairments and functional limitations listed on the Initial Evaluation.     Anticipated barriers to occupational therapy: none  Pt's spiritual, cultural and educational needs considered and pt agreeable to plan of care and goals.    Goals     1. Pt will improve FOTO limitation score to 61% or less prior to discharge.  2. Pt will don a long sleeve shirt independently using the R hand prior to discharge.  3. Pt will report 100% compliance with HEP prior to discharge.  4. Pt will report 0/10 pain in the L hand/UE during therapy sessions prior to discharge.    Plan   Continue with established Plan of Care towards occupational Therapy goals.   Discussed Plan of Care with patient: Yes    Rosaura Padilla OT  1/19/2022

## 2022-01-19 NOTE — TELEPHONE ENCOUNTER
Please advise    Thank you    Airway patent, Nasal mucosa clear. Mouth with normal mucosa. Throat has no vesicles, no oropharyngeal exudates and uvula is midline.

## 2022-01-20 NOTE — TELEPHONE ENCOUNTER
Order placed for splint (resting hand splint). Please fax to [employer]. I believe [employer] is an option that does these. Thanks

## 2022-01-26 ENCOUNTER — CLINICAL SUPPORT (OUTPATIENT)
Dept: REHABILITATION | Facility: HOSPITAL | Age: 72
End: 2022-01-26
Attending: FAMILY MEDICINE
Payer: MEDICARE

## 2022-01-26 DIAGNOSIS — I69.354 HEMIPLEGIA AND HEMIPARESIS FOLLOWING CEREBRAL INFARCTION AFFECTING LEFT NON-DOMINANT SIDE: Primary | ICD-10-CM

## 2022-01-26 PROCEDURE — 97110 THERAPEUTIC EXERCISES: CPT | Mod: PN

## 2022-01-26 PROCEDURE — 97032 APPL MODALITY 1+ESTIM EA 15: CPT | Mod: PN

## 2022-01-26 PROCEDURE — 97140 MANUAL THERAPY 1/> REGIONS: CPT | Mod: PN

## 2022-01-26 NOTE — PROGRESS NOTES
Occupational Therapy Daily Treatment Note   Name: Buddy Park 1950  MRN: [id number]    Visit Date: 1/26/2022  Visit #: 4 / 12  Authorization period Expiration: 01/05/2023  Plan of Care Expiration: 04/01/2022  Precautions: none    Time In: 1: 15  Time Out: 2: 00  Total 1:1 Treatment Time: 45 min    Treatment Diagnosis: No diagnosis found.  Physician: Tsering Mercado DO    Subjective   Pt reports: he has not been doing his exercises  He was not compliant with home exercise program.     Pain Scale:  0/10 on VAS currently  Pain Location: L UE    Objective     Buddy participated in dynamic functional therapeutic activities to improve functional performance for 15 minutes, including:  -Pt performed weightbearing activity on the mat, bearing weight through the L UE while reaching for cones with the R hand  -Pt performed simulated dressing activity, using the R arm to don a long sleeve shirt, needing minimal assist from therapist to bring shirt around the shoulders.     Buddy received the following manual therapy techniques:   -Slow, prolonged passive stretching was performed to the  L arm at the digits, wrist, forearm, elbow, and shoulder in an effort to break up flexion synergy for 15  minutes.     Buddy received the following supervised modalities after being cleared for contradictions: IFC, [place] protocol with a 5/5 duty cycle @ 21 volts for 15 minutes over the extensor tendons of the L forearm in an effort to initiate muscle contractions and improve extension at digits 1-5.       Home Exercises and Education Provided     Education provided re:   - progress towards goals   - role of therapy in multi - disciplinary team, goals for therapy  Pt educated on condition, POC, and expectations in therapy.  No spiritual or educational barriers to learning provided    Home exercises:  Pt will be provided HEP during course of treatment with progressions as appropriate. Pt was advised to  perform these exercises free of pain, and to stop performing them if pain occurs.   Buddy demonstrated good  understanding of the education provided.     Assessment   Buddy is progressing well towards his goals and no updates to goals at this time.     Pt prognosis is Good. Pt will continue to benefit from skilled outpatient physical therapy to address the deficits listed in the problem list chart on initial evaluation, provide pt/family education and to maximize pt's level of independence in the home and community environment.     Medical necessity is demonstrated by the impairments and functional limitations listed on the Initial Evaluation.     Anticipated barriers to occupational therapy: none  Pt's spiritual, cultural and educational needs considered and pt agreeable to plan of care and goals.    Goals     1. Pt will improve FOTO limitation score to 61% or less prior to discharge.  2. Pt will don a long sleeve shirt independently using the R hand prior to discharge.  3. Pt will report 100% compliance with HEP prior to discharge.  4. Pt will report 0/10 pain in the L hand/UE during therapy sessions prior to discharge.    Plan   Continue with established Plan of Care towards occupational Therapy goals.   Discussed Plan of Care with patient: Yes    Rosaura Padilla OT  1/26/2022

## 2022-02-16 ENCOUNTER — CLINICAL SUPPORT (OUTPATIENT)
Dept: REHABILITATION | Facility: HOSPITAL | Age: 72
End: 2022-02-16
Attending: FAMILY MEDICINE
Payer: MEDICARE

## 2022-02-16 DIAGNOSIS — R53.1 LEFT-SIDED WEAKNESS: Primary | ICD-10-CM

## 2022-02-16 PROCEDURE — 97530 THERAPEUTIC ACTIVITIES: CPT | Mod: PN

## 2022-02-16 PROCEDURE — 97032 APPL MODALITY 1+ESTIM EA 15: CPT | Mod: PN

## 2022-02-16 PROCEDURE — 97140 MANUAL THERAPY 1/> REGIONS: CPT | Mod: PN

## 2022-02-16 NOTE — PROGRESS NOTES
Occupational Therapy Daily Treatment Note   Name: Buddy Park 1950  MRN: [id number]    Visit Date: 2/16/2022  Visit #: 5 / 12  Authorization period Expiration: 01/05/2023  Plan of Care Expiration: 04/01/2022  Precautions: none    Time In: 11: 00  Time Out: 11: 45  Total 1:1 Treatment Time: 45 min    Treatment Diagnosis:   Encounter Diagnosis   Name Primary?    Left-sided weakness Yes     Physician: Tsering Mercado DO    Subjective   Pt reports: he has not been doing his exercises due to being sick  He was not compliant with home exercise program.     Pain Scale:  0/10 on VAS currently  Pain Location: L UE    Objective     Buddy participated in dynamic functional therapeutic activities to improve functional performance for 15 minutes, including:  -Pt performed R hand extension using medium sized ball  -Pt performed R hand , using a tennis ball  -Pt performed R shoulder flexion using large therapy ball    Buddy received the following manual therapy techniques:   -Slow, prolonged passive stretching was performed to the  L arm at the digits, wrist, forearm, elbow, and shoulder while pt was in supine in an effort to break up flexion synergy for 15  minutes.     Buddy received the following supervised modalities after being cleared for contradictions: IFC, Pt received IFC at the R shoulder @ 20 volts  for 10 minutes in an effort to initiate muscle contractions and improve AROM      Home Exercises and Education Provided     Education provided re:   - progress towards goals   - role of therapy in multi - disciplinary team, goals for therapy  Pt educated on condition, POC, and expectations in therapy.  No spiritual or educational barriers to learning provided    Home exercises:  Pt will be provided HEP during course of treatment with progressions as appropriate. Pt was advised to perform these exercises free of pain, and to stop performing them if pain occurs.   Buddy demonstrated  good  understanding of the education provided.     Assessment   Buddy is progressing well towards his goals and no updates to goals at this time.     Pt prognosis is Good. Pt will continue to benefit from skilled outpatient physical therapy to address the deficits listed in the problem list chart on initial evaluation, provide pt/family education and to maximize pt's level of independence in the home and community environment.     Medical necessity is demonstrated by the impairments and functional limitations listed on the Initial Evaluation.     Anticipated barriers to occupational therapy: none  Pt's spiritual, cultural and educational needs considered and pt agreeable to plan of care and goals.    Goals     1. Pt will improve FOTO limitation score to 61% or less prior to discharge.  2. Pt will don a long sleeve shirt independently using the R hand prior to discharge.  3. Pt will report 100% compliance with HEP prior to discharge.  4. Pt will report 0/10 pain in the L hand/UE during therapy sessions prior to discharge.    Plan   Continue with established Plan of Care towards occupational Therapy goals.   Discussed Plan of Care with patient: Yes    Rosaura Padilla OT  2/16/2022

## 2022-02-23 ENCOUNTER — CLINICAL SUPPORT (OUTPATIENT)
Dept: REHABILITATION | Facility: HOSPITAL | Age: 72
End: 2022-02-23
Attending: FAMILY MEDICINE
Payer: COMMERCIAL

## 2022-02-23 DIAGNOSIS — R53.1 LEFT-SIDED WEAKNESS: Primary | ICD-10-CM

## 2022-02-23 PROCEDURE — 97530 THERAPEUTIC ACTIVITIES: CPT | Mod: PN

## 2022-02-23 PROCEDURE — 97032 APPL MODALITY 1+ESTIM EA 15: CPT | Mod: PN

## 2022-02-23 PROCEDURE — 97140 MANUAL THERAPY 1/> REGIONS: CPT | Mod: PN

## 2022-02-23 NOTE — PROGRESS NOTES
Occupational Therapy Daily Treatment Note   Name: Buddy Park 1950  MRN: [id number]    Visit Date: 2/23/2022  Visit #: 6 / 12  Authorization period Expiration: 01/05/2023  Plan of Care Expiration: 04/01/2022  Precautions: none    Time In: 11: 00  Time Out: 11: 45  Total 1:1 Treatment Time: 45 min    Treatment Diagnosis:   Encounter Diagnosis   Name Primary?    Left-sided weakness Yes     Physician: Tsering Mercado DO    Subjective   Pt reports: that his wife worked on his arm last night   He was not compliant with home exercise program.     Pain Scale:  0/10 on VAS currently  Pain Location: L UE    Objective     Buddy participated in dynamic functional therapeutic activities to improve functional performance for 10 minutes, including:  -Pt performed R hand composite  using tennis ball   -Pt performed R hand grasp and release activity using small sized ball     Buddy received the following manual therapy techniques for 20 minutes :   -Slow, prolonged passive stretching was performed to the  L arm at the digits, wrist, forearm, elbow, and shoulder while pt was in supine in an effort to break up flexion synergy for 15  minutes.     Buddy received the following supervised modalities after being cleared for contradictions: IFC, Pt received IFC at the R shoulder @ 26 volts  for 15 minutes in an effort to initiate muscle contractions and improve AROM      Home Exercises and Education Provided     Education provided re:   - progress towards goals   - role of therapy in multi - disciplinary team, goals for therapy  Pt educated on condition, POC, and expectations in therapy.  No spiritual or educational barriers to learning provided    Home exercises:  Pt will be provided HEP during course of treatment with progressions as appropriate. Pt was advised to perform these exercises free of pain, and to stop performing them if pain occurs.   Buddy demonstrated good  understanding of the  education provided.     Assessment   Buddy is progressing well towards his goals and no updates to goals at this time.     Pt prognosis is Good. Pt will continue to benefit from skilled outpatient physical therapy to address the deficits listed in the problem list chart on initial evaluation, provide pt/family education and to maximize pt's level of independence in the home and community environment.     Medical necessity is demonstrated by the impairments and functional limitations listed on the Initial Evaluation.     Anticipated barriers to occupational therapy: none  Pt's spiritual, cultural and educational needs considered and pt agreeable to plan of care and goals.    Goals     1. Pt will improve FOTO limitation score to 61% or less prior to discharge.  2. Pt will don a long sleeve shirt independently using the R hand prior to discharge.  3. Pt will report 100% compliance with HEP prior to discharge.  4. Pt will report 0/10 pain in the L hand/UE during therapy sessions prior to discharge.    Plan   Continue with established Plan of Care towards occupational Therapy goals.   Discussed Plan of Care with patient: Yes    Rosaura Padilla OT  2/23/2022

## 2022-02-28 ENCOUNTER — CLINICAL SUPPORT (OUTPATIENT)
Dept: REHABILITATION | Facility: HOSPITAL | Age: 72
End: 2022-02-28
Attending: FAMILY MEDICINE
Payer: COMMERCIAL

## 2022-02-28 DIAGNOSIS — R53.1 LEFT-SIDED WEAKNESS: Primary | ICD-10-CM

## 2022-02-28 PROCEDURE — 97530 THERAPEUTIC ACTIVITIES: CPT | Mod: PN

## 2022-02-28 PROCEDURE — 97140 MANUAL THERAPY 1/> REGIONS: CPT | Mod: PN

## 2022-02-28 PROCEDURE — 97032 APPL MODALITY 1+ESTIM EA 15: CPT | Mod: PN

## 2022-02-28 NOTE — PROGRESS NOTES
Occupational Therapy Daily Treatment Note   Name: Buddy Park 1950  MRN: [id number]    Visit Date: 2/28/2022  Visit #: 7 / 12  Authorization period Expiration: 01/05/2023  Plan of Care Expiration: 04/01/2022  Precautions: none    Time In: 12: 30  Time Out: 1:15  Total 1:1 Treatment Time: 45 min    Treatment Diagnosis:   Encounter Diagnosis   Name Primary?    Left-sided weakness Yes     Physician: Tsering Mercado DO    Subjective   Pt reports: that his brother in law was moving his arm for an hour last night  He was not compliant with home exercise program.     Pain Scale:  0/10 on VAS currently  Pain Location: L UE    Objective     Buddy participated in dynamic functional therapeutic activities for 15 minutes to improve functional performance for 10 minutes, including:  -Pt performed R hand composite  using tennis ball   -Pt performed R hand grasp and release activity using small sized ball   -Pt performed R hand composite extension with hand laid flat against the mat    Buddy received the following manual therapy techniques for 15 minutes :   -Slow, prolonged passive stretching was performed to the  L arm at the digits, wrist, forearm, elbow, and shoulder while pt was in supine in an effort to break up flexion synergy for 15  minutes.     Buddy received the following supervised modalities after being cleared for contradictions: IFC, Pt received IFC at the R shoulder @ 26 volts  for 15 minutes in an effort to initiate muscle contractions and improve AROM      Home Exercises and Education Provided     Education provided re:   - progress towards goals   - role of therapy in multi - disciplinary team, goals for therapy  Pt educated on condition, POC, and expectations in therapy.  No spiritual or educational barriers to learning provided    Home exercises:  Pt will be provided HEP during course of treatment with progressions as appropriate. Pt was advised to perform these  exercises free of pain, and to stop performing them if pain occurs.   Buddy demonstrated good  understanding of the education provided.     Assessment   Buddy is progressing well towards his goals and no updates to goals at this time.     Pt prognosis is Good. Pt will continue to benefit from skilled outpatient physical therapy to address the deficits listed in the problem list chart on initial evaluation, provide pt/family education and to maximize pt's level of independence in the home and community environment.     Medical necessity is demonstrated by the impairments and functional limitations listed on the Initial Evaluation.     Anticipated barriers to occupational therapy: none  Pt's spiritual, cultural and educational needs considered and pt agreeable to plan of care and goals.    Goals     1. Pt will improve FOTO limitation score to 61% or less prior to discharge.  2. Pt will don a long sleeve shirt independently using the R hand prior to discharge.  3. Pt will report 100% compliance with HEP prior to discharge.  4. Pt will report 0/10 pain in the L hand/UE during therapy sessions prior to discharge.    Plan   Continue with established Plan of Care towards occupational Therapy goals.   Discussed Plan of Care with patient: Yes    Rosaura Padilla OT  2/28/2022

## 2022-03-09 ENCOUNTER — CLINICAL SUPPORT (OUTPATIENT)
Dept: REHABILITATION | Facility: HOSPITAL | Age: 72
End: 2022-03-09
Attending: FAMILY MEDICINE
Payer: COMMERCIAL

## 2022-03-09 DIAGNOSIS — R53.1 LEFT-SIDED WEAKNESS: Primary | ICD-10-CM

## 2022-03-09 PROCEDURE — 97140 MANUAL THERAPY 1/> REGIONS: CPT | Mod: PN

## 2022-03-09 PROCEDURE — 97530 THERAPEUTIC ACTIVITIES: CPT | Mod: PN

## 2022-03-09 NOTE — PROGRESS NOTES
Occupational Therapy Daily Treatment Note   Name: Buddy Park 1950  MRN: [id number]    Visit Date: 3/9/2022  Visit #: 8 / 12  Authorization period Expiration: 01/05/2023  Plan of Care Expiration: 04/01/2022  Precautions: none    Time In: 9: 45  Time Out: 10: 30  Total 1:1 Treatment Time: 45 min    Treatment Diagnosis:   Encounter Diagnosis   Name Primary?    Left-sided weakness Yes     Physician: Tsering Mercado DO    Subjective   Pt reports: I have been able to get my left hand more flat   He was not compliant with home exercise program.     Pain Scale:  0 /10 on VAS currently  Pain Location: L UE    Objective     Buddy participated in dynamic functional therapeutic activities for 35 minutes to improve functional performance for 10 minutes, including:  -Pt performed R shoulder PROM using towel to slide UE against the table at waist level  -Pt performed R shoulder scapular movement using arm skate with moderate verbal cues needed to perform movement from the shoulder and not the trunk   -Pt performed R hand composite extension with hand laid flat against the mat  -Pt performed R hand self-PROM using R unaffected arm to extend the L hand digits  -Pt performed R forearm self-PROM using R unaffected arm to pronate/supinate the L forearm  -Pt performed R lateral pinch using yellow resistance clip, pt was able to perform independently.  -Pt performed R hand composite grasp using tennis ball     Buddy received the following manual therapy techniques for 10 minutes :   -Slow, prolonged passive stretching was performed to the  L digits and wrist.    Home Exercises and Education Provided     Education provided re:   - progress towards goals   - role of therapy in multi - disciplinary team, goals for therapy  Pt educated on condition, POC, and expectations in therapy.  No spiritual or educational barriers to learning provided    Home exercises:  Pt will be provided HEP during course of  treatment with progressions as appropriate. Pt was advised to perform these exercises free of pain, and to stop performing them if pain occurs.   Buddy demonstrated good  understanding of the education provided.     Assessment   Buddy showed Active L thumb movement today when performing lateral pinch activity using light resistance clip which is progress from . Pt is progressing well towards his goals and no updates to goals at this time.     Pt prognosis is Good. Pt will continue to benefit from skilled outpatient physical therapy to address the deficits listed in the problem list chart on initial evaluation, provide pt/family education and to maximize pt's level of independence in the home and community environment.     Medical necessity is demonstrated by the impairments and functional limitations listed on the Initial Evaluation.     Anticipated barriers to occupational therapy: none  Pt's spiritual, cultural and educational needs considered and pt agreeable to plan of care and goals.    Goals     1. Pt will improve FOTO limitation score to 61% or less prior to discharge. Progressing   2. Pt will don a long sleeve shirt independently using the R hand prior to discharge. Goal met  3. Pt will report 100% compliance with HEP prior to discharge. Partial met  4. Pt will report 0/10 pain in the L hand/UE during therapy sessions prior to discharge. Goal met    Plan   Pt to continue wearing the resting hand splint at night, pt understands the wearing schedule and the benefits of the splint. Continue with established Plan of Care towards occupational Therapy goals.   Discussed Plan of Care with patient: Yes    Rosaura Padilla OT  3/9/2022

## 2022-03-16 ENCOUNTER — CLINICAL SUPPORT (OUTPATIENT)
Dept: REHABILITATION | Facility: HOSPITAL | Age: 72
End: 2022-03-16
Attending: FAMILY MEDICINE
Payer: COMMERCIAL

## 2022-03-16 DIAGNOSIS — R53.1 LEFT-SIDED WEAKNESS: Primary | ICD-10-CM

## 2022-03-16 PROCEDURE — 97110 THERAPEUTIC EXERCISES: CPT | Mod: PN

## 2022-03-16 PROCEDURE — 97140 MANUAL THERAPY 1/> REGIONS: CPT | Mod: PN

## 2022-03-16 NOTE — PROGRESS NOTES
Occupational Therapy Daily Treatment Note   Name: Buddy Park 1950  MRN: [id number]    Visit Date: 3/16/2022  Visit #: 9 / 12  Authorization period Expiration: 01/05/2023  Plan of Care Expiration: 04/01/2022  Precautions: none    Time In: 9: 30  Time Out: 10: 15  Total 1:1 Treatment Time: 45 min    Treatment Diagnosis:   Encounter Diagnosis   Name Primary?    Left-sided weakness Yes     Physician: Tsering Mercado DO    Subjective   Pt reports: I have not been moving it much this week  He was not compliant with home exercise program.     Pain Scale:  0 /10 on VAS currently  Pain Location: L UE    Objective     Buddy participated in dynamic functional therapeutic activities for 20 minutes to improve functional performance for 10 minutes, including:  -Pt performed L hand composite extension with hand laid flat against the mat  -Pt performed L hand self-PROM using R unaffected arm to extend the L hand digits  -Pt performed L lateral pinch using red resistance clip, pt was able to perform independently.  -Pt performed L hand composite grasp using foam ball     Buddy received the following manual therapy techniques for 25 minutes :   -Pt received slow, prolonged passive stretching was performed to the  L UE, digits, and wrist for 10 mins  -Pt received L scapular mobilization in shoulder abduction, forward flexion, extension for 10 mins  -Pt received theragun to the L scapula and L deltoid in an effort to break up tension and spasticity for 5 mins    Home Exercises and Education Provided   Pt to continue with HEP as prescribed.  Education provided re:   - progress towards goals   - role of therapy in multi - disciplinary team, goals for therapy  Pt educated on condition, POC, and expectations in therapy.  No spiritual or educational barriers to learning provided    Home exercises:  Pt will be provided HEP during course of treatment with progressions as appropriate. Pt was advised to  perform these exercises free of pain, and to stop performing them if pain occurs.   Buddy demonstrated good  understanding of the education provided.     Assessment   Buddy was able to actively flex his L elbow by approximately 5 degrees after manual therapy techniques applied to the L UE by OT. This marks progress from previous sessions. Pt is progressing well towards his goals and no updates to goals at this time.     Pt prognosis is Good. Pt will continue to benefit from skilled outpatient physical therapy to address the deficits listed in the problem list chart on initial evaluation, provide pt/family education and to maximize pt's level of independence in the home and community environment.     Medical necessity is demonstrated by the impairments and functional limitations listed on the Initial Evaluation.     Anticipated barriers to occupational therapy: none  Pt's spiritual, cultural and educational needs considered and pt agreeable to plan of care and goals.    Goals     1. Pt will improve FOTO limitation score to 61% or less prior to discharge. Progressing   2. Pt will don a long sleeve shirt independently using the R hand prior to discharge. Goal met  3. Pt will report 100% compliance with HEP prior to discharge. Partial met  4. Pt will report 0/10 pain in the L hand/UE during therapy sessions prior to discharge. Goal met    Plan   Pt was taught new self - PROM technique for L shoulder and elbow that he can perform independentl y which he is to incorporate into his HEP. He has not been compliant with resting hand splint and OT went over the importance of wearing this on a consistent basis. Pt verbalized understanding.Continue with established Plan of Care towards occupational Therapy goals.   Discussed Plan of Care with patient: Yes    Rosaura Padilla, OT  3/16/2022

## 2022-03-23 ENCOUNTER — CLINICAL SUPPORT (OUTPATIENT)
Dept: REHABILITATION | Facility: HOSPITAL | Age: 72
End: 2022-03-23
Attending: FAMILY MEDICINE
Payer: MEDICARE

## 2022-03-23 DIAGNOSIS — R53.1 LEFT-SIDED WEAKNESS: Primary | ICD-10-CM

## 2022-03-23 PROCEDURE — 97110 THERAPEUTIC EXERCISES: CPT | Mod: PN

## 2022-03-23 PROCEDURE — 97530 THERAPEUTIC ACTIVITIES: CPT | Mod: PN

## 2022-03-23 NOTE — PROGRESS NOTES
Occupational Therapy Daily Treatment Note   Name: Buddy Park 1950  MRN: [id number]    Visit Date: 3/23/2022  Visit #: 10 / 12  Authorization period Expiration: 01/05/2023  Plan of Care Expiration: 04/01/2022  Precautions: none    Time In: 9: 30  Time Out: 10: 15  Total 1:1 Treatment Time: 45 min    Treatment Diagnosis:   No diagnosis found.  Physician: Tsering Mercado DO    Subjective   Pt reports: that he was able to bend his left arm a couple days ago  He was not compliant with home exercise program.     Pain Scale:  0 /10 on VAS currently  Pain Location: L UE    Objective     Buddy participated in dynamic functional therapeutic activities for 25 minutes to improve functional performance for 10 minutes, including:  -Pt performed L hand composite extension in standing with hand laid flat against the table. Pt then performed weightbearing through the L UE.   -Pt performed L hand self-PROM using clasp technique, R unaffected arm assisted in extending the L shoulder (using towel to help glide across the table)  -Pt performed L lateral pinch using yellow resistance clip, pt was able to perform independently.    Buddy received the following manual therapy techniques for 20 minutes :   -Pt received slow, prolonged passive stretching was performed to the  L shoulder, forearm, digits and wrist for 10 mins  -Pt received L scapular mobilization for shoulder retraction for 20 reps.     Home Exercises and Education Provided   Pt to continue with HEP as prescribed.  Education provided re:   - progress towards goals   - role of therapy in multi - disciplinary team, goals for therapy  Pt educated on condition, POC, and expectations in therapy.  No spiritual or educational barriers to learning provided    Home exercises:  Pt will be provided HEP during course of treatment with progressions as appropriate. Pt was advised to perform these exercises free of pain, and to stop performing them if pain  occurs.   Buddy demonstrated good  understanding of the education provided.     Assessment   Buddy is still moderately to severely spastic in the L UE, primarily at the shoulder. Pt does not tolerate weightbearing activities well, citing pain in the digits and wrist. Pt is progressing well towards his goals and no updates to goals at this time.     Pt prognosis is Good. Pt will continue to benefit from skilled outpatient physical therapy to address the deficits listed in the problem list chart on initial evaluation, provide pt/family education and to maximize pt's level of independence in the home and community environment.     Medical necessity is demonstrated by the impairments and functional limitations listed on the Initial Evaluation.     Anticipated barriers to occupational therapy: none  Pt's spiritual, cultural and educational needs considered and pt agreeable to plan of care and goals.    Goals     1. Pt will improve FOTO limitation score to 61% or less prior to discharge. Progressing   2. Pt will don a long sleeve shirt independently using the R hand prior to discharge. Goal met  3. Pt will report 100% compliance with HEP prior to discharge. Goal met  4. Pt will report 0/10 pain in the L hand/UE during therapy sessions prior to discharge. Goal met    Plan   Pt is to continue wearing L resting hand splint as tolerated. Pt is also to continue practicing functional tasks at home such as reaching into fridge using the R arm, pulling fridge open with L arm .Continue with established Plan of Care towards occupational Therapy goals.   Discussed Plan of Care with patient: Yes    Rosaura Padilla, OT  3/23/2022

## 2022-04-01 ENCOUNTER — CLINICAL SUPPORT (OUTPATIENT)
Dept: REHABILITATION | Facility: HOSPITAL | Age: 72
End: 2022-04-01
Attending: FAMILY MEDICINE
Payer: COMMERCIAL

## 2022-04-01 DIAGNOSIS — R53.1 LEFT-SIDED WEAKNESS: Primary | ICD-10-CM

## 2022-04-01 PROCEDURE — 97110 THERAPEUTIC EXERCISES: CPT | Mod: PN

## 2022-04-01 PROCEDURE — 97140 MANUAL THERAPY 1/> REGIONS: CPT | Mod: PN

## 2022-04-01 NOTE — PROGRESS NOTES
Occupational Therapy Daily Treatment Note   Name: Buddy Park 1950  MRN: [id number]    Visit Date: 4/1/2022  Visit #: 11 / 12  Authorization period Expiration: 01/05/2023  Plan of Care Expiration: 04/01/2022  Precautions: none    Time In: 11: 00  Time Out: 11: 45  Total 1:1 Treatment Time: 45 min    Treatment Diagnosis:   No diagnosis found.  Physician: Tsering Mercado DO    Subjective   Pt reports: he has been working his hand and arm a lot  He was not compliant with home exercise program.     Pain Scale:  0 /10 on VAS currently  Pain Location: L UE    Objective     Buddy participated in dynamic functional therapeutic activities for 15 minutes to improve functional performance for 10 minutes, including:  -Pt performed L hand self-PROM using clasp technique, R unaffected arm assisted in extending the L shoulder (using towel to help glide across the table)  -Pt performed WB through the L hand, OT passively extending the digits in order to provide a prolonged lengthening of the flexor tendons for 5 mins (with rest breaks)    Buddy received the following manual therapy techniques for 25 minutes :   -Pt received slow, prolonged passive stretching was performed to the  L shoulder, forearm, digits and wrist for 10 mins    Home Exercises and Education Provided   Pt to continue with HEP as prescribed.  Education provided re:   - progress towards goals   - role of therapy in multi - disciplinary team, goals for therapy  Pt educated on condition, POC, and expectations in therapy.  No spiritual or educational barriers to learning provided    Home exercises:  Pt will be provided HEP during course of treatment with progressions as appropriate. Pt was advised to perform these exercises free of pain, and to stop performing them if pain occurs.   Buddy demonstrated good  understanding of the education provided.     Assessment   Buddy is still moderately to severely spastic in the L UE, primarily  at the shoulder. Pt does not tolerate weightbearing activities well, citing pain in the digits and wrist. Pt is progressing well towards his goals and no updates to goals at this time.     Pt prognosis is Good. Pt will continue to benefit from skilled outpatient physical therapy to address the deficits listed in the problem list chart on initial evaluation, provide pt/family education and to maximize pt's level of independence in the home and community environment.     Medical necessity is demonstrated by the impairments and functional limitations listed on the Initial Evaluation.     Anticipated barriers to occupational therapy: none  Pt's spiritual, cultural and educational needs considered and pt agreeable to plan of care and goals.    Goals     1. Pt will improve FOTO limitation score to 61% or less prior to discharge. Progressing   2. Pt will don a long sleeve shirt independently using the R hand prior to discharge. Goal met  3. Pt will report 100% compliance with HEP prior to discharge. Goal met  4. Pt will report 0/10 pain in the L hand/UE during therapy sessions prior to discharge. Goal met    Plan   Pt is to continue wearing L resting hand splint as tolerated. Pt is also to continue practicing functional tasks at home such as reaching into fridge using the R arm, pulling fridge open with L arm .Continue with established Plan of Care towards occupational Therapy goals.   Discussed Plan of Care with patient: Yes    Rosaura Padilla OT  4/1/2022

## 2022-04-04 ENCOUNTER — PATIENT MESSAGE (OUTPATIENT)
Dept: FAMILY MEDICINE | Facility: CLINIC | Age: 72
End: 2022-04-04
Payer: COMMERCIAL

## 2022-04-08 ENCOUNTER — CLINICAL SUPPORT (OUTPATIENT)
Dept: REHABILITATION | Facility: HOSPITAL | Age: 72
End: 2022-04-08
Attending: FAMILY MEDICINE
Payer: MEDICARE

## 2022-04-08 DIAGNOSIS — R53.1 LEFT-SIDED WEAKNESS: Primary | ICD-10-CM

## 2022-04-08 PROCEDURE — 97110 THERAPEUTIC EXERCISES: CPT | Mod: PN

## 2022-04-08 PROCEDURE — 97032 APPL MODALITY 1+ESTIM EA 15: CPT | Mod: PN

## 2022-04-08 NOTE — PROGRESS NOTES
Occupational Therapy Discharge Summary    Name: Buddy Park 1950  MRN: [id number]   Date: 4/8/2022  Principal Problem: CVA, L sided weakness    Subjective: Pt reports that he is fully independent at home.   Patient Discharged from acute Occupational Therapy on 4/8/2022.  Please refer to prior OT noted date on 4/1/2022 for functional status.    Objective: Pt received various manual therapy techniques, therapeutic activities, therapeutic exercises, and modalities for pain/ROM. Pt tolerated all treatments well.  GOALS:   1. Pt will improve FOTO limitation score to 61% or less prior to discharge. Goal met  2. Pt will don a long sleeve shirt independently using the R hand prior to discharge. Goal met  3. Pt will report 100% compliance with HEP prior to discharge. Goal met  4. Pt will report 0/10 pain in the L hand/UE during therapy sessions prior to discharge. Goal met       Assessment:  Patient is no longer making progress. Pt has reached maximum medical improvement from OT standpoint. Pt is fully independent at home with Activities of daily living and performs higher level skills within the community such as driving. Due to this, pt is not appropriate for an extension in OT.    Pt still exhibiting moderate to maximum flexion synergy in the L UE. Pt has trace abduction at the L shoulder, and no AROM exhibited at L elbow/wrist/digits.     Reasons for Discontinuation of Therapy Services  Therapist determines that the patient will no longer benefit from therapy services.      Plan:  Patient Discharged to: Home with no OT needed. Pt is not making functional improvements within occupational therapy and is fully independent with ACTIVITIES OF DAILY LIVING/IADL tasks.    Rosaura Padilla, OT

## 2022-04-09 ENCOUNTER — PATIENT MESSAGE (OUTPATIENT)
Dept: FAMILY MEDICINE | Facility: CLINIC | Age: 72
End: 2022-04-09
Payer: COMMERCIAL

## 2022-04-26 ENCOUNTER — PATIENT MESSAGE (OUTPATIENT)
Dept: FAMILY MEDICINE | Facility: CLINIC | Age: 72
End: 2022-04-26
Payer: COMMERCIAL

## 2022-06-24 ENCOUNTER — PATIENT MESSAGE (OUTPATIENT)
Dept: FAMILY MEDICINE | Facility: CLINIC | Age: 72
End: 2022-06-24
Payer: COMMERCIAL

## 2022-06-28 NOTE — TELEPHONE ENCOUNTER
Spoke with [employer] [place] at [facility]. Paperwork emailed to me. Placed on Dr. Mercado desk for further review. To be faxed back Attn: [employer] [place] at [phone] when completed. Once paperwork is received, rep will contact patient to evaluate for qualification to obtain equipment.

## 2022-06-28 NOTE — TELEPHONE ENCOUNTER
There's no order for these in epic. Can we contact the company and see if they have an order form they can fax us? [email or web address]  Thanks

## 2022-07-02 DIAGNOSIS — R15.2 INCONTINENCE OF FECES WITH FECAL URGENCY: ICD-10-CM

## 2022-07-02 DIAGNOSIS — R15.9 INCONTINENCE OF FECES WITH FECAL URGENCY: ICD-10-CM

## 2022-07-02 DIAGNOSIS — K52.9 CHRONIC DIARRHEA: ICD-10-CM

## 2022-07-04 RX ORDER — MONTELUKAST SODIUM 4 MG/1
TABLET, CHEWABLE ORAL
Qty: 60 TABLET | Refills: 0 | Status: SHIPPED | OUTPATIENT
Start: 2022-07-04 | End: 2022-07-19

## 2022-07-07 ENCOUNTER — OFFICE VISIT (OUTPATIENT)
Dept: FAMILY MEDICINE | Facility: CLINIC | Age: 72
End: 2022-07-07
Payer: COMMERCIAL

## 2022-07-07 VITALS
SYSTOLIC BLOOD PRESSURE: 123 MMHG | BODY MASS INDEX: 27.36 KG/M2 | WEIGHT: 154.38 LBS | HEIGHT: 63 IN | DIASTOLIC BLOOD PRESSURE: 76 MMHG | OXYGEN SATURATION: 98 % | HEART RATE: 75 BPM

## 2022-07-07 DIAGNOSIS — N18.31 STAGE 3A CHRONIC KIDNEY DISEASE: ICD-10-CM

## 2022-07-07 DIAGNOSIS — I50.32 CHRONIC DIASTOLIC HEART FAILURE: ICD-10-CM

## 2022-07-07 DIAGNOSIS — R73.03 PRE-DIABETES: ICD-10-CM

## 2022-07-07 PROBLEM — E11.9 DIABETES MELLITUS TYPE 2 WITHOUT RETINOPATHY: Status: RESOLVED | Noted: 2022-01-05 | Resolved: 2022-07-07

## 2022-07-07 PROCEDURE — 3008F PR BODY MASS INDEX (BMI) DOCUMENTED: ICD-10-PCS | Mod: CPTII,S$GLB,, | Performed by: FAMILY MEDICINE

## 2022-07-07 PROCEDURE — 99213 OFFICE O/P EST LOW 20 MIN: CPT | Mod: S$GLB,,, | Performed by: FAMILY MEDICINE

## 2022-07-07 PROCEDURE — 1157F ADVNC CARE PLAN IN RCRD: CPT | Mod: CPTII,S$GLB,, | Performed by: FAMILY MEDICINE

## 2022-07-07 PROCEDURE — 3074F PR MOST RECENT SYSTOLIC BLOOD PRESSURE < 130 MM HG: ICD-10-PCS | Mod: CPTII,S$GLB,, | Performed by: FAMILY MEDICINE

## 2022-07-07 PROCEDURE — 3078F PR MOST RECENT DIASTOLIC BLOOD PRESSURE < 80 MM HG: ICD-10-PCS | Mod: CPTII,S$GLB,, | Performed by: FAMILY MEDICINE

## 2022-07-07 PROCEDURE — 3008F BODY MASS INDEX DOCD: CPT | Mod: CPTII,S$GLB,, | Performed by: FAMILY MEDICINE

## 2022-07-07 PROCEDURE — 1160F RVW MEDS BY RX/DR IN RCRD: CPT | Mod: CPTII,S$GLB,, | Performed by: FAMILY MEDICINE

## 2022-07-07 PROCEDURE — 1160F PR REVIEW ALL MEDS BY PRESCRIBER/CLIN PHARMACIST DOCUMENTED: ICD-10-PCS | Mod: CPTII,S$GLB,, | Performed by: FAMILY MEDICINE

## 2022-07-07 PROCEDURE — 1101F PR PT FALLS ASSESS DOC 0-1 FALLS W/OUT INJ PAST YR: ICD-10-PCS | Mod: CPTII,S$GLB,, | Performed by: FAMILY MEDICINE

## 2022-07-07 PROCEDURE — 1126F AMNT PAIN NOTED NONE PRSNT: CPT | Mod: CPTII,S$GLB,, | Performed by: FAMILY MEDICINE

## 2022-07-07 PROCEDURE — 3288F FALL RISK ASSESSMENT DOCD: CPT | Mod: CPTII,S$GLB,, | Performed by: FAMILY MEDICINE

## 2022-07-07 PROCEDURE — 1126F PR PAIN SEVERITY QUANTIFIED, NO PAIN PRESENT: ICD-10-PCS | Mod: CPTII,S$GLB,, | Performed by: FAMILY MEDICINE

## 2022-07-07 PROCEDURE — 3288F PR FALLS RISK ASSESSMENT DOCUMENTED: ICD-10-PCS | Mod: CPTII,S$GLB,, | Performed by: FAMILY MEDICINE

## 2022-07-07 PROCEDURE — 1101F PT FALLS ASSESS-DOCD LE1/YR: CPT | Mod: CPTII,S$GLB,, | Performed by: FAMILY MEDICINE

## 2022-07-07 PROCEDURE — 99213 PR OFFICE/OUTPT VISIT, EST, LEVL III, 20-29 MIN: ICD-10-PCS | Mod: S$GLB,,, | Performed by: FAMILY MEDICINE

## 2022-07-07 PROCEDURE — 1159F MED LIST DOCD IN RCRD: CPT | Mod: CPTII,S$GLB,, | Performed by: FAMILY MEDICINE

## 2022-07-07 PROCEDURE — 3074F SYST BP LT 130 MM HG: CPT | Mod: CPTII,S$GLB,, | Performed by: FAMILY MEDICINE

## 2022-07-07 PROCEDURE — 3072F PR LOW RISK FOR RETINOPATHY: ICD-10-PCS | Mod: CPTII,S$GLB,, | Performed by: FAMILY MEDICINE

## 2022-07-07 PROCEDURE — 1159F PR MEDICATION LIST DOCUMENTED IN MEDICAL RECORD: ICD-10-PCS | Mod: CPTII,S$GLB,, | Performed by: FAMILY MEDICINE

## 2022-07-07 PROCEDURE — 99999 PR PBB SHADOW E&M-EST. PATIENT-LVL IV: CPT | Mod: PBBFAC,,, | Performed by: FAMILY MEDICINE

## 2022-07-07 PROCEDURE — 3072F LOW RISK FOR RETINOPATHY: CPT | Mod: CPTII,S$GLB,, | Performed by: FAMILY MEDICINE

## 2022-07-07 PROCEDURE — 99999 PR PBB SHADOW E&M-EST. PATIENT-LVL IV: ICD-10-PCS | Mod: PBBFAC,,, | Performed by: FAMILY MEDICINE

## 2022-07-07 PROCEDURE — 1157F PR ADVANCE CARE PLAN OR EQUIV PRESENT IN MEDICAL RECORD: ICD-10-PCS | Mod: CPTII,S$GLB,, | Performed by: FAMILY MEDICINE

## 2022-07-07 PROCEDURE — 3078F DIAST BP <80 MM HG: CPT | Mod: CPTII,S$GLB,, | Performed by: FAMILY MEDICINE

## 2022-07-07 NOTE — PROGRESS NOTES
Subjective:       Patient ID: Buddy Park is a 71 y.o. male.    Chief Complaint: Follow-up (6 mth f/u)    HPI   6 month follow-up. Medications reviewed, due for fasting labs. Diabetes listed in his chart, but he reports he was only ever told that he's pre-diabetic. Admits to being tired a good bit of the time.    Review of Systems   Constitutional: Positive for fatigue. Negative for chills and fever.   Respiratory: Negative for shortness of breath.    Cardiovascular: Negative for chest pain.   Psychiatric/Behavioral: Negative for agitation and hallucinations.       Past Medical History:   Diagnosis Date    Angina pectoris     Anxiety     Arthritis     Biliary colic     Cochlear implant in place     Deaf     LEFT EAR    Depression     Diabetes mellitus type 2 without retinopathy 1/5/2022    Heart failure     High cholesterol     History of colon polyps 2/20/2018    [place] (hard of hearing)     HEARING AID - RIGHT    Hyperlipidemia     Hypertension     Kidney stone     Kidney stones     Renal stones     Stroke     Trouble in sleeping     Wears glasses      Past Surgical History:   Procedure Laterality Date    [facility]  06/2018    [facility]     CATARACT EXTRACTION Bilateral     CHOLECYSTECTOMY  2-6-2014    COLONOSCOPY  1/9/2013    Dr. Gillette, 5 year recheck (family history colon cancer)    COLONOSCOPY N/A 3/12/2018    Procedure: COLONOSCOPY;  Surgeon: Garett Go MD;  Location: [facility];  Service: Endoscopy;  Laterality: N/A;    COLONOSCOPY N/A 2/15/2021    Procedure: COLONOSCOPY;  Surgeon: Garett Go MD;  Location: [facility];  Service: Endoscopy;  Laterality: N/A;    CYSTOSCOPY W/ RETROGRADES Bilateral 10/28/2019    Procedure: CYSTOSCOPY, WITH RETROGRADE PYELOGRAM;  Surgeon: Gretchen Proctor MD;  Location: [facility];  Service: Urology;  Laterality: Bilateral;    CYSTOSCOPY W/ URETERAL STENT PLACEMENT Left 10/28/2019    Procedure: CYSTOSCOPY, WITH URETERAL STENT  INSERTION;  Surgeon: Gretchen Proctor MD;  Location: [facility] OR;  Service: Urology;  Laterality: Left;    CYSTOSCOPY W/ URETERAL STENT REMOVAL Left 12/2/2019    Procedure: CYSTOSCOPY, WITH URETERAL STENT REMOVAL;  Surgeon: Gretchen Proctor MD;  Location: [facility] OR;  Service: Urology;  Laterality: Left;    EAR MASTOIDECTOMY W/ COCHLEAR IMPLANT W/ LANDMARK      left ear    EXTRACORPOREAL SHOCK WAVE LITHOTRIPSY Left 12/2/2019    Procedure: LITHOTRIPSY, ESWL;  Surgeon: Gretchen Proctor MD;  Location: [facility] OR;  Service: Urology;  Laterality: Left;    EYE SURGERY      FOREIGN BODY REMOVAL Right     glass removed from right foot/repair    FRACTURE SURGERY      right arm x2    LITHOTRIPSY      ROTATOR CUFF REPAIR      Right     SINUS SURGERY      TONSILLECTOMY      VASCULAR SURGERY       Social History     Socioeconomic History    Marital status:    Tobacco Use    Smoking status: Never Smoker    Smokeless tobacco: Never Used   Substance and Sexual Activity    Alcohol use: Yes     Comment: once every two months    Drug use: No    Sexual activity: Yes     Family History   Problem Relation Age of Onset    Cancer Mother         colon    Heart disease Father     Gout Father     Kidney disease Father     Arthritis Father     Hypertension Father     Early death Daughter         mva    Alcohol abuse Brother     Cancer Brother         mouth cancer w mets    No Known Problems Sister     Alcohol abuse Brother     No Known Problems Son     Heart disease Maternal Grandmother         MI    Stroke Maternal Grandfather     Heart disease Paternal Grandmother         MI    No Known Problems Son     Cancer Paternal Uncle         lung cancer    Allergic rhinitis Neg Hx     Allergies Neg Hx     Angioedema Neg Hx     Asthma Neg Hx     Atopy Neg Hx     Eczema Neg Hx     Immunodeficiency Neg Hx     Rhinitis Neg Hx     Urticaria Neg Hx     Collagen disease Neg Hx        Objective:      /76 (BP  "Location: Right arm, Patient Position: Sitting, BP Method: Medium (Manual))   Pulse 75   Ht 5' 3" (1.6 m)   Wt 70 kg (154 lb 6.4 oz)   SpO2 98%   BMI 27.35 kg/m²   Physical Exam  Vitals reviewed.   Constitutional:       General: He is not in acute distress.     Appearance: He is not toxic-appearing.   HENT:      Head: Normocephalic and atraumatic.   Cardiovascular:      Rate and Rhythm: Normal rate.   Pulmonary:      Effort: Pulmonary effort is normal. No respiratory distress.   Neurological:      Mental Status: He is alert and oriented to person, place, and time.   Psychiatric:         Mood and Affect: Mood normal.         Behavior: Behavior normal.         Assessment:       1. Pre-diabetes    2. Chronic diastolic heart failure    3. Stage 3a chronic kidney disease        Plan:       Update fasting labs. Continue current med regimen for now. F/u 3-6 months.    Pre-diabetes  -     CBC Auto Differential; Future; Expected date: 07/07/2022  -     Comprehensive Metabolic Panel; Future; Expected date: 07/07/2022  -     Lipid Panel; Future; Expected date: 10/07/2022  -     Hemoglobin A1C; Future; Expected date: 07/07/2022    Chronic diastolic heart failure    Stage 3a chronic kidney disease  -     CBC Auto Differential; Future; Expected date: 07/07/2022  -     Comprehensive Metabolic Panel; Future; Expected date: 07/07/2022  -     Lipid Panel; Future; Expected date: 10/07/2022            Risks, benefits, and side effects were discussed with the patient. All questions were answered to the fullest satisfaction of the patient, and pt verbalized understanding and agreement to treatment plan. Pt was to call with any new or worsening symptoms, or present to the ER.      "

## 2022-07-11 ENCOUNTER — LAB VISIT (OUTPATIENT)
Dept: LAB | Facility: HOSPITAL | Age: 72
End: 2022-07-11
Attending: FAMILY MEDICINE
Payer: COMMERCIAL

## 2022-07-11 DIAGNOSIS — R73.03 PRE-DIABETES: ICD-10-CM

## 2022-07-11 DIAGNOSIS — N18.31 STAGE 3A CHRONIC KIDNEY DISEASE: ICD-10-CM

## 2022-07-11 LAB
ALBUMIN SERPL BCP-MCNC: 4 G/DL (ref 3.5–5.2)
ALP SERPL-CCNC: 63 U/L (ref 55–135)
ALT SERPL W/O P-5'-P-CCNC: 24 U/L (ref 10–44)
ANION GAP SERPL CALC-SCNC: 11 MMOL/L (ref 8–16)
AST SERPL-CCNC: 19 U/L (ref 10–40)
BASOPHILS # BLD AUTO: 0.03 K/UL (ref 0–0.2)
BASOPHILS NFR BLD: 0.6 % (ref 0–1.9)
BILIRUB SERPL-MCNC: 1.1 MG/DL (ref 0.1–1)
BUN SERPL-MCNC: 15 MG/DL (ref 8–23)
CALCIUM SERPL-MCNC: 9.5 MG/DL (ref 8.7–10.5)
CHLORIDE SERPL-SCNC: 106 MMOL/L (ref 95–110)
CHOLEST SERPL-MCNC: 223 MG/DL (ref 120–199)
CHOLEST/HDLC SERPL: 5.6 {RATIO} (ref 2–5)
CO2 SERPL-SCNC: 23 MMOL/L (ref 23–29)
CREAT SERPL-MCNC: 1.5 MG/DL (ref 0.5–1.4)
DIFFERENTIAL METHOD: ABNORMAL
EOSINOPHIL # BLD AUTO: 0.1 K/UL (ref 0–0.5)
EOSINOPHIL NFR BLD: 1.6 % (ref 0–8)
ERYTHROCYTE [DISTWIDTH] IN BLOOD BY AUTOMATED COUNT: 12.9 % (ref 11.5–14.5)
EST. GFR  (AFRICAN AMERICAN): 54.1 ML/MIN/1.73 M^2
EST. GFR  (NON AFRICAN AMERICAN): 46.8 ML/MIN/1.73 M^2
ESTIMATED AVG GLUCOSE: 105 MG/DL (ref 68–131)
GLUCOSE SERPL-MCNC: 104 MG/DL (ref 70–110)
HBA1C MFR BLD: 5.3 % (ref 4–5.6)
HCT VFR BLD AUTO: 44.6 % (ref 40–54)
HDLC SERPL-MCNC: 40 MG/DL (ref 40–75)
HDLC SERPL: 17.9 % (ref 20–50)
HGB BLD-MCNC: 15.7 G/DL (ref 14–18)
IMM GRANULOCYTES # BLD AUTO: 0.04 K/UL (ref 0–0.04)
IMM GRANULOCYTES NFR BLD AUTO: 0.8 % (ref 0–0.5)
LDLC SERPL CALC-MCNC: 133.6 MG/DL (ref 63–159)
LYMPHOCYTES # BLD AUTO: 2.2 K/UL (ref 1–4.8)
LYMPHOCYTES NFR BLD: 43 % (ref 18–48)
MCH RBC QN AUTO: 30.3 PG (ref 27–31)
MCHC RBC AUTO-ENTMCNC: 35.2 G/DL (ref 32–36)
MCV RBC AUTO: 86 FL (ref 82–98)
MONOCYTES # BLD AUTO: 0.5 K/UL (ref 0.3–1)
MONOCYTES NFR BLD: 9.3 % (ref 4–15)
NEUTROPHILS # BLD AUTO: 2.3 K/UL (ref 1.8–7.7)
NEUTROPHILS NFR BLD: 44.7 % (ref 38–73)
NONHDLC SERPL-MCNC: 183 MG/DL
NRBC BLD-RTO: 0 /100 WBC
PLATELET # BLD AUTO: 198 K/UL (ref 150–450)
PMV BLD AUTO: 9.7 FL (ref 9.2–12.9)
POTASSIUM SERPL-SCNC: 3.9 MMOL/L (ref 3.5–5.1)
PROT SERPL-MCNC: 7.4 G/DL (ref 6–8.4)
RBC # BLD AUTO: 5.18 M/UL (ref 4.6–6.2)
SODIUM SERPL-SCNC: 140 MMOL/L (ref 136–145)
TRIGL SERPL-MCNC: 247 MG/DL (ref 30–150)
WBC # BLD AUTO: 5.07 K/UL (ref 3.9–12.7)

## 2022-07-11 PROCEDURE — 80053 COMPREHEN METABOLIC PANEL: CPT | Performed by: FAMILY MEDICINE

## 2022-07-11 PROCEDURE — 85025 COMPLETE CBC W/AUTO DIFF WBC: CPT | Performed by: FAMILY MEDICINE

## 2022-07-11 PROCEDURE — 36415 COLL VENOUS BLD VENIPUNCTURE: CPT | Performed by: FAMILY MEDICINE

## 2022-07-11 PROCEDURE — 83036 HEMOGLOBIN GLYCOSYLATED A1C: CPT | Performed by: FAMILY MEDICINE

## 2022-07-11 PROCEDURE — 80061 LIPID PANEL: CPT | Performed by: FAMILY MEDICINE

## 2022-07-12 DIAGNOSIS — E78.5 HYPERLIPIDEMIA, UNSPECIFIED HYPERLIPIDEMIA TYPE: Primary | ICD-10-CM

## 2022-07-12 DIAGNOSIS — N18.31 STAGE 3A CHRONIC KIDNEY DISEASE: ICD-10-CM

## 2022-07-12 RX ORDER — ROSUVASTATIN CALCIUM 40 MG/1
40 TABLET, COATED ORAL NIGHTLY
Qty: 90 TABLET | Refills: 3 | Status: SHIPPED | OUTPATIENT
Start: 2022-07-12 | End: 2024-03-28 | Stop reason: SDUPTHER

## 2022-07-13 ENCOUNTER — PATIENT MESSAGE (OUTPATIENT)
Dept: ORTHOPEDICS | Facility: CLINIC | Age: 72
End: 2022-07-13

## 2022-07-18 DIAGNOSIS — R15.2 INCONTINENCE OF FECES WITH FECAL URGENCY: ICD-10-CM

## 2022-07-18 DIAGNOSIS — R15.9 INCONTINENCE OF FECES WITH FECAL URGENCY: ICD-10-CM

## 2022-07-18 DIAGNOSIS — K52.9 CHRONIC DIARRHEA: ICD-10-CM

## 2022-07-19 RX ORDER — MONTELUKAST SODIUM 4 MG/1
TABLET, CHEWABLE ORAL
Qty: 180 TABLET | Refills: 0 | Status: SHIPPED | OUTPATIENT
Start: 2022-07-19

## 2022-07-20 ENCOUNTER — TELEPHONE (OUTPATIENT)
Dept: FAMILY MEDICINE | Facility: CLINIC | Age: 72
End: 2022-07-20
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
## 2022-09-20 ENCOUNTER — TELEPHONE (OUTPATIENT)
Dept: FAMILY MEDICINE | Facility: CLINIC | Age: 72
End: 2022-09-20

## 2022-09-20 NOTE — TELEPHONE ENCOUNTER
----- Message from Lillie Broussard sent at 9/20/2022 11:16 AM CDT -----  Type:  RX Refill Request    Who Called:  pt     RX Name and Strength: cream     How is the patient currently taking it? (ex. 1XDay): 1 x a day     Is this a 30 day or 90 day RX: 30    Preferred Pharmacy with phone number: [facility]/pharmacy #[id number] - [place], [place] - [address]   Phone:  [phone]  Fax:  [phone]          Local or Mail Order:  local    Ordering Provider:  DR Mercado    Would the patient rather a call back or a response via [facility]?  Call     Best Call Back Number: [phone]    Additional Information:  refill  pt didn't have the name of the cream

## 2022-09-25 PROCEDURE — G0180 PR HOME HEALTH MD CERTIFICATION: ICD-10-PCS | Mod: ,,, | Performed by: FAMILY MEDICINE

## 2022-09-25 PROCEDURE — G0180 MD CERTIFICATION HHA PATIENT: HCPCS | Mod: ,,, | Performed by: FAMILY MEDICINE

## 2022-10-20 ENCOUNTER — EXTERNAL HOME HEALTH (OUTPATIENT)
Dept: HOME HEALTH SERVICES | Facility: HOSPITAL | Age: 72
End: 2022-10-20

## 2022-10-21 RX ORDER — CARVEDILOL 12.5 MG/1
TABLET ORAL
Qty: 90 TABLET | Refills: 0 | Status: SHIPPED | OUTPATIENT
Start: 2022-10-21 | End: 2023-03-16 | Stop reason: SDUPTHER

## 2022-10-25 ENCOUNTER — OFFICE VISIT (OUTPATIENT)
Dept: FAMILY MEDICINE | Facility: CLINIC | Age: 72
End: 2022-10-25
Payer: COMMERCIAL

## 2022-10-25 VITALS
DIASTOLIC BLOOD PRESSURE: 80 MMHG | HEART RATE: 81 BPM | OXYGEN SATURATION: 87 % | BODY MASS INDEX: 27.34 KG/M2 | HEIGHT: 63 IN | WEIGHT: 154.31 LBS | SYSTOLIC BLOOD PRESSURE: 122 MMHG

## 2022-10-25 DIAGNOSIS — M21.372 LEFT FOOT DROP: ICD-10-CM

## 2022-10-25 DIAGNOSIS — Z91.81 AT RISK FOR FALLING: ICD-10-CM

## 2022-10-25 DIAGNOSIS — G89.21 CHRONIC PAIN DUE TO TRAUMA: ICD-10-CM

## 2022-10-25 DIAGNOSIS — R26.9 GAIT DIFFICULTY: ICD-10-CM

## 2022-10-25 DIAGNOSIS — M62.81 MUSCLE WEAKNESS (GENERALIZED): ICD-10-CM

## 2022-10-25 DIAGNOSIS — I69.354 HEMIPLEGIA AND HEMIPARESIS FOLLOWING CEREBRAL INFARCTION AFFECTING LEFT NON-DOMINANT SIDE: Primary | ICD-10-CM

## 2022-10-25 DIAGNOSIS — R53.82 CHRONIC FATIGUE: ICD-10-CM

## 2022-10-25 DIAGNOSIS — R53.1 LEFT-SIDED WEAKNESS: ICD-10-CM

## 2022-10-25 PROBLEM — Z86.73 HISTORY OF STROKE: Status: ACTIVE | Noted: 2020-02-03

## 2022-10-25 PROBLEM — M79.2 NEURALGIA OF LEFT UPPER EXTREMITY: Status: RESOLVED | Noted: 2020-05-21 | Resolved: 2022-10-25

## 2022-10-25 PROCEDURE — 1160F RVW MEDS BY RX/DR IN RCRD: CPT | Mod: CPTII,S$GLB,, | Performed by: STUDENT IN AN ORGANIZED HEALTH CARE EDUCATION/TRAINING PROGRAM

## 2022-10-25 PROCEDURE — 99999 PR PBB SHADOW E&M-EST. PATIENT-LVL V: CPT | Mod: PBBFAC,,, | Performed by: STUDENT IN AN ORGANIZED HEALTH CARE EDUCATION/TRAINING PROGRAM

## 2022-10-25 PROCEDURE — 1126F AMNT PAIN NOTED NONE PRSNT: CPT | Mod: CPTII,S$GLB,, | Performed by: STUDENT IN AN ORGANIZED HEALTH CARE EDUCATION/TRAINING PROGRAM

## 2022-10-25 PROCEDURE — 1160F PR REVIEW ALL MEDS BY PRESCRIBER/CLIN PHARMACIST DOCUMENTED: ICD-10-PCS | Mod: CPTII,S$GLB,, | Performed by: STUDENT IN AN ORGANIZED HEALTH CARE EDUCATION/TRAINING PROGRAM

## 2022-10-25 PROCEDURE — 1126F PR PAIN SEVERITY QUANTIFIED, NO PAIN PRESENT: ICD-10-PCS | Mod: CPTII,S$GLB,, | Performed by: STUDENT IN AN ORGANIZED HEALTH CARE EDUCATION/TRAINING PROGRAM

## 2022-10-25 PROCEDURE — 1157F ADVNC CARE PLAN IN RCRD: CPT | Mod: CPTII,S$GLB,, | Performed by: STUDENT IN AN ORGANIZED HEALTH CARE EDUCATION/TRAINING PROGRAM

## 2022-10-25 PROCEDURE — 1159F MED LIST DOCD IN RCRD: CPT | Mod: CPTII,S$GLB,, | Performed by: STUDENT IN AN ORGANIZED HEALTH CARE EDUCATION/TRAINING PROGRAM

## 2022-10-25 PROCEDURE — 1101F PT FALLS ASSESS-DOCD LE1/YR: CPT | Mod: CPTII,S$GLB,, | Performed by: STUDENT IN AN ORGANIZED HEALTH CARE EDUCATION/TRAINING PROGRAM

## 2022-10-25 PROCEDURE — 3074F PR MOST RECENT SYSTOLIC BLOOD PRESSURE < 130 MM HG: ICD-10-PCS | Mod: CPTII,S$GLB,, | Performed by: STUDENT IN AN ORGANIZED HEALTH CARE EDUCATION/TRAINING PROGRAM

## 2022-10-25 PROCEDURE — 3079F DIAST BP 80-89 MM HG: CPT | Mod: CPTII,S$GLB,, | Performed by: STUDENT IN AN ORGANIZED HEALTH CARE EDUCATION/TRAINING PROGRAM

## 2022-10-25 PROCEDURE — 1157F PR ADVANCE CARE PLAN OR EQUIV PRESENT IN MEDICAL RECORD: ICD-10-PCS | Mod: CPTII,S$GLB,, | Performed by: STUDENT IN AN ORGANIZED HEALTH CARE EDUCATION/TRAINING PROGRAM

## 2022-10-25 PROCEDURE — 3079F PR MOST RECENT DIASTOLIC BLOOD PRESSURE 80-89 MM HG: ICD-10-PCS | Mod: CPTII,S$GLB,, | Performed by: STUDENT IN AN ORGANIZED HEALTH CARE EDUCATION/TRAINING PROGRAM

## 2022-10-25 PROCEDURE — 3044F PR MOST RECENT HEMOGLOBIN A1C LEVEL <7.0%: ICD-10-PCS | Mod: CPTII,S$GLB,, | Performed by: STUDENT IN AN ORGANIZED HEALTH CARE EDUCATION/TRAINING PROGRAM

## 2022-10-25 PROCEDURE — 99999 PR PBB SHADOW E&M-EST. PATIENT-LVL V: ICD-10-PCS | Mod: PBBFAC,,, | Performed by: STUDENT IN AN ORGANIZED HEALTH CARE EDUCATION/TRAINING PROGRAM

## 2022-10-25 PROCEDURE — 99214 PR OFFICE/OUTPT VISIT, EST, LEVL IV, 30-39 MIN: ICD-10-PCS | Mod: S$GLB,,, | Performed by: STUDENT IN AN ORGANIZED HEALTH CARE EDUCATION/TRAINING PROGRAM

## 2022-10-25 PROCEDURE — 1101F PR PT FALLS ASSESS DOC 0-1 FALLS W/OUT INJ PAST YR: ICD-10-PCS | Mod: CPTII,S$GLB,, | Performed by: STUDENT IN AN ORGANIZED HEALTH CARE EDUCATION/TRAINING PROGRAM

## 2022-10-25 PROCEDURE — 3044F HG A1C LEVEL LT 7.0%: CPT | Mod: CPTII,S$GLB,, | Performed by: STUDENT IN AN ORGANIZED HEALTH CARE EDUCATION/TRAINING PROGRAM

## 2022-10-25 PROCEDURE — 3288F FALL RISK ASSESSMENT DOCD: CPT | Mod: CPTII,S$GLB,, | Performed by: STUDENT IN AN ORGANIZED HEALTH CARE EDUCATION/TRAINING PROGRAM

## 2022-10-25 PROCEDURE — 99214 OFFICE O/P EST MOD 30 MIN: CPT | Mod: S$GLB,,, | Performed by: STUDENT IN AN ORGANIZED HEALTH CARE EDUCATION/TRAINING PROGRAM

## 2022-10-25 PROCEDURE — 1159F PR MEDICATION LIST DOCUMENTED IN MEDICAL RECORD: ICD-10-PCS | Mod: CPTII,S$GLB,, | Performed by: STUDENT IN AN ORGANIZED HEALTH CARE EDUCATION/TRAINING PROGRAM

## 2022-10-25 PROCEDURE — 3288F PR FALLS RISK ASSESSMENT DOCUMENTED: ICD-10-PCS | Mod: CPTII,S$GLB,, | Performed by: STUDENT IN AN ORGANIZED HEALTH CARE EDUCATION/TRAINING PROGRAM

## 2022-10-25 PROCEDURE — 3074F SYST BP LT 130 MM HG: CPT | Mod: CPTII,S$GLB,, | Performed by: STUDENT IN AN ORGANIZED HEALTH CARE EDUCATION/TRAINING PROGRAM

## 2022-10-25 RX ORDER — GABAPENTIN 400 MG/1
400 CAPSULE ORAL 3 TIMES DAILY
COMMUNITY

## 2022-10-25 RX ORDER — OXYCODONE HYDROCHLORIDE 5 MG/1
5 CAPSULE ORAL EVERY 4 HOURS PRN
COMMUNITY
End: 2022-11-05 | Stop reason: ALTCHOICE
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
## 2022-11-05 ENCOUNTER — HOSPITAL ENCOUNTER (EMERGENCY)
Facility: HOSPITAL | Age: 72
Discharge: HOME | End: 2022-11-05
Attending: EMERGENCY MEDICINE
Payer: COMMERCIAL

## 2022-11-05 VITALS
HEART RATE: 82 BPM | BODY MASS INDEX: 26.58 KG/M2 | DIASTOLIC BLOOD PRESSURE: 94 MMHG | RESPIRATION RATE: 18 BRPM | WEIGHT: 150 LBS | SYSTOLIC BLOOD PRESSURE: 132 MMHG | HEIGHT: 63 IN | TEMPERATURE: 98 F | OXYGEN SATURATION: 100 %
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
## 2022-11-05 DIAGNOSIS — M25.562 KNEE PAIN, LEFT: Primary | ICD-10-CM

## 2022-11-05 PROCEDURE — 25000003 PHARM REV CODE 250: Performed by: NURSE PRACTITIONER

## 2022-11-05 PROCEDURE — 99283 EMERGENCY DEPT VISIT LOW MDM: CPT

## 2022-11-05 PROCEDURE — 73562 X-RAY EXAM OF KNEE 3: CPT | Mod: TC,LT

## 2022-11-05 PROCEDURE — 73562 X-RAY EXAM OF KNEE 3: CPT | Mod: 26,LT,, | Performed by: RADIOLOGY

## 2022-11-05 PROCEDURE — 73562 XR KNEE 3 VIEW LEFT: ICD-10-PCS | Mod: 26,LT,, | Performed by: RADIOLOGY

## 2022-11-05 RX ORDER — HYDROCODONE BITARTRATE AND ACETAMINOPHEN 7.5; 325 MG/1; MG/1
1 TABLET ORAL EVERY 6 HOURS PRN
Qty: 12 TABLET | Refills: 0 | Status: SHIPPED | OUTPATIENT
Start: 2022-11-05 | End: 2024-02-23

## 2022-11-05 RX ORDER — HYDROCODONE BITARTRATE AND ACETAMINOPHEN 10; 325 MG/1; MG/1
1 TABLET ORAL
Status: COMPLETED | OUTPATIENT
Start: 2022-11-05 | End: 2022-11-05

## 2022-11-05 RX ADMIN — HYDROCODONE BITARTRATE AND ACETAMINOPHEN 1 TABLET: 10; 325 TABLET ORAL at 12:11
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
## 2022-11-05 NOTE — DISCHARGE INSTRUCTIONS
Take the medications as prescribed. Follow up with orthopedic. Return for any worsening or new symptoms. Follow up with Primary Care Provider in the next 2-3 days.

## 2022-11-11 ENCOUNTER — OFFICE VISIT (OUTPATIENT)
Dept: ORTHOPEDICS | Facility: CLINIC | Age: 72
End: 2022-11-11
Payer: COMMERCIAL

## 2022-11-11 VITALS — BODY MASS INDEX: 26.57 KG/M2 | HEIGHT: 63 IN | WEIGHT: 149.94 LBS | RESPIRATION RATE: 16 BRPM

## 2022-11-11 DIAGNOSIS — M17.12 PRIMARY OSTEOARTHRITIS OF LEFT KNEE: Primary | ICD-10-CM

## 2022-11-11 DIAGNOSIS — R25.2 SPASTICITY AS LATE EFFECT OF CEREBROVASCULAR ACCIDENT (CVA): ICD-10-CM

## 2022-11-11 DIAGNOSIS — I69.398 SPASTICITY AS LATE EFFECT OF CEREBROVASCULAR ACCIDENT (CVA): ICD-10-CM

## 2022-11-11 DIAGNOSIS — M25.562 KNEE PAIN, LEFT: ICD-10-CM

## 2022-11-11 DIAGNOSIS — M70.52 PES ANSERINUS BURSITIS OF LEFT KNEE: ICD-10-CM

## 2022-11-11 PROCEDURE — 3288F FALL RISK ASSESSMENT DOCD: CPT | Mod: CPTII,S$GLB,, | Performed by: ORTHOPAEDIC SURGERY

## 2022-11-11 PROCEDURE — 1157F PR ADVANCE CARE PLAN OR EQUIV PRESENT IN MEDICAL RECORD: ICD-10-PCS | Mod: CPTII,S$GLB,, | Performed by: ORTHOPAEDIC SURGERY

## 2022-11-11 PROCEDURE — 1159F PR MEDICATION LIST DOCUMENTED IN MEDICAL RECORD: ICD-10-PCS | Mod: CPTII,S$GLB,, | Performed by: ORTHOPAEDIC SURGERY

## 2022-11-11 PROCEDURE — 1157F ADVNC CARE PLAN IN RCRD: CPT | Mod: CPTII,S$GLB,, | Performed by: ORTHOPAEDIC SURGERY

## 2022-11-11 PROCEDURE — 3044F PR MOST RECENT HEMOGLOBIN A1C LEVEL <7.0%: ICD-10-PCS | Mod: CPTII,S$GLB,, | Performed by: ORTHOPAEDIC SURGERY

## 2022-11-11 PROCEDURE — 3288F PR FALLS RISK ASSESSMENT DOCUMENTED: ICD-10-PCS | Mod: CPTII,S$GLB,, | Performed by: ORTHOPAEDIC SURGERY

## 2022-11-11 PROCEDURE — 3008F BODY MASS INDEX DOCD: CPT | Mod: CPTII,S$GLB,, | Performed by: ORTHOPAEDIC SURGERY

## 2022-11-11 PROCEDURE — 3008F PR BODY MASS INDEX (BMI) DOCUMENTED: ICD-10-PCS | Mod: CPTII,S$GLB,, | Performed by: ORTHOPAEDIC SURGERY

## 2022-11-11 PROCEDURE — 3044F HG A1C LEVEL LT 7.0%: CPT | Mod: CPTII,S$GLB,, | Performed by: ORTHOPAEDIC SURGERY

## 2022-11-11 PROCEDURE — 99999 PR PBB SHADOW E&M-EST. PATIENT-LVL III: ICD-10-PCS | Mod: PBBFAC,,, | Performed by: ORTHOPAEDIC SURGERY

## 2022-11-11 PROCEDURE — 99214 OFFICE O/P EST MOD 30 MIN: CPT | Mod: 25,S$GLB,, | Performed by: ORTHOPAEDIC SURGERY

## 2022-11-11 PROCEDURE — 99214 PR OFFICE/OUTPT VISIT, EST, LEVL IV, 30-39 MIN: ICD-10-PCS | Mod: 25,S$GLB,, | Performed by: ORTHOPAEDIC SURGERY

## 2022-11-11 PROCEDURE — 1100F PR PT FALLS ASSESS DOC 2+ FALLS/FALL W/INJURY/YR: ICD-10-PCS | Mod: CPTII,S$GLB,, | Performed by: ORTHOPAEDIC SURGERY

## 2022-11-11 PROCEDURE — 1126F AMNT PAIN NOTED NONE PRSNT: CPT | Mod: CPTII,S$GLB,, | Performed by: ORTHOPAEDIC SURGERY

## 2022-11-11 PROCEDURE — 20610 DRAIN/INJ JOINT/BURSA W/O US: CPT | Mod: LT,S$GLB,, | Performed by: ORTHOPAEDIC SURGERY

## 2022-11-11 PROCEDURE — 99999 PR PBB SHADOW E&M-EST. PATIENT-LVL III: CPT | Mod: PBBFAC,,, | Performed by: ORTHOPAEDIC SURGERY

## 2022-11-11 PROCEDURE — 1159F MED LIST DOCD IN RCRD: CPT | Mod: CPTII,S$GLB,, | Performed by: ORTHOPAEDIC SURGERY

## 2022-11-11 PROCEDURE — 20610 LARGE JOINT ASPIRATION/INJECTION: L KNEE: ICD-10-PCS | Mod: LT,S$GLB,, | Performed by: ORTHOPAEDIC SURGERY

## 2022-11-11 PROCEDURE — 1126F PR PAIN SEVERITY QUANTIFIED, NO PAIN PRESENT: ICD-10-PCS | Mod: CPTII,S$GLB,, | Performed by: ORTHOPAEDIC SURGERY

## 2022-11-11 PROCEDURE — 1100F PTFALLS ASSESS-DOCD GE2>/YR: CPT | Mod: CPTII,S$GLB,, | Performed by: ORTHOPAEDIC SURGERY

## 2022-11-11 RX ORDER — TRIAMCINOLONE ACETONIDE 40 MG/ML
40 INJECTION, SUSPENSION INTRA-ARTICULAR; INTRAMUSCULAR
Status: DISCONTINUED | OUTPATIENT
Start: 2022-11-11 | End: 2022-11-11 | Stop reason: HOSPADM

## 2022-11-11 RX ADMIN — TRIAMCINOLONE ACETONIDE 40 MG: 40 INJECTION, SUSPENSION INTRA-ARTICULAR; INTRAMUSCULAR at 10:11
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
## 2022-11-11 NOTE — PROCEDURES
Large Joint Aspiration/Injection: L anserine bursa    Date/Time: 11/11/2022 10:30 AM  Performed by: Macario Broussard DO  Authorized by: Macario Broussard DO     Indications:  Diagnostic evaluation and pain  Site marked: the procedure site was marked    Timeout: prior to procedure the correct patient, procedure, and site was verified    Prep: patient was prepped and draped in usual sterile fashion      Details:  Needle Size:  22 G  Ultrasonic Guidance for needle placement?: No    Approach:  Anteromedial  Location:  Knee  Site:  L anserine bursa  Medications:  40 mg triamcinolone acetonide 40 mg/mL  Patient tolerance:  Patient tolerated the procedure well with no immediate complications

## 2022-11-11 NOTE — PROCEDURES
Large Joint Aspiration/Injection: L knee    Date/Time: 11/11/2022 10:30 AM  Performed by: Macario Broussard DO  Authorized by: Macario Broussard DO     Indications:  Arthritis, diagnostic evaluation, joint swelling and pain  Site marked: the procedure site was marked    Timeout: prior to procedure the correct patient, procedure, and site was verified    Prep: patient was prepped and draped in usual sterile fashion      Details:  Needle Size:  22 G  Ultrasonic Guidance for needle placement?: No    Approach:  Anterolateral  Location:  Knee  Site:  L knee  Medications:  40 mg triamcinolone acetonide 40 mg/mL  Patient tolerance:  Patient tolerated the procedure well with no immediate complications

## 2022-11-18 ENCOUNTER — TELEPHONE (OUTPATIENT)
Dept: FAMILY MEDICINE | Facility: CLINIC | Age: 72
End: 2022-11-18

## 2022-11-18 NOTE — TELEPHONE ENCOUNTER
"This pt's prior authorization for miconazole, bulk, Powd has been denied. Per insurance:    The Medicare rule in the Prescription Drug Manual (Chapter 6, Section 10.4) says bulk powders (i.e., Active Pharmaceutical Ingredients for compounding) do not satisfy the definition of a Part D drug and are not covered by Part D. [employer] follows Medicare rules. Your drug is a bulk powder and per Medicare rules is not covered."  "

## 2022-11-22 ENCOUNTER — PATIENT MESSAGE (OUTPATIENT)
Dept: FAMILY MEDICINE | Facility: CLINIC | Age: 72
End: 2022-11-22

## 2022-11-24 PROCEDURE — G0179 PR HOME HEALTH MD RECERTIFICATION: ICD-10-PCS | Mod: ,,, | Performed by: STUDENT IN AN ORGANIZED HEALTH CARE EDUCATION/TRAINING PROGRAM

## 2022-11-24 PROCEDURE — G0179 MD RECERTIFICATION HHA PT: HCPCS | Mod: ,,, | Performed by: STUDENT IN AN ORGANIZED HEALTH CARE EDUCATION/TRAINING PROGRAM

## 2022-11-28 ENCOUNTER — TELEPHONE (OUTPATIENT)
Dept: FAMILY MEDICINE | Facility: CLINIC | Age: 72
End: 2022-11-28

## 2022-11-28 NOTE — TELEPHONE ENCOUNTER
----- Message from Rocco Dukes sent at 11/28/2022 11:43 AM CST -----  Contact: Patient  Type:  Patient Call          Who Called: Patient         Does the patient know what this is regarding?: requesting a call back ; pt said he  have pain in his foot ; pt said he also need a leg brace ; please advise           Would the patient rather a call back or a response via [employer]? Call           Best Call Back Number: [phone]             Additional Information:

## 2022-11-30 ENCOUNTER — DOCUMENT SCAN (OUTPATIENT)
Dept: HOME HEALTH SERVICES | Facility: HOSPITAL | Age: 72
End: 2022-11-30

## 2022-11-30 ENCOUNTER — PATIENT MESSAGE (OUTPATIENT)
Dept: FAMILY MEDICINE | Facility: CLINIC | Age: 72
End: 2022-11-30

## 2022-12-01 RX ORDER — CHOLESTYRAMINE LIGHT 4 G/5.7G
4 POWDER, FOR SUSPENSION ORAL 2 TIMES DAILY PRN
Qty: 239.4 G | Refills: 1 | Status: SHIPPED | OUTPATIENT
Start: 2022-12-01 | End: 2023-12-01

## 2022-12-05 ENCOUNTER — TELEPHONE (OUTPATIENT)
Dept: FAMILY MEDICINE | Facility: CLINIC | Age: 72
End: 2022-12-05

## 2022-12-05 NOTE — TELEPHONE ENCOUNTER
Called twice no answer. We have not received any paperwork regarding his scooter. They will have to fax the office again.

## 2022-12-05 NOTE — TELEPHONE ENCOUNTER
----- Message from Susan Pagan LPN sent at 12/2/2022  2:02 PM CST -----    ----- Message -----  From: Ann Marie Aceves  Sent: 12/2/2022   2:00 PM CST  To: [facility] Staff    Type: Needs Medical Advice         Who Called:[facility] Call Back Number: [phone]  Additional Information: Requesting a call back regarding They need office to call about his motor scooter evaluation paperwork they need completed.     They faxed it over       Fax back to [phone] please     Please Advise- Thank you

## 2022-12-08 ENCOUNTER — TELEPHONE (OUTPATIENT)
Dept: ORTHOPEDICS | Facility: CLINIC | Age: 72
End: 2022-12-08

## 2022-12-08 NOTE — TELEPHONE ENCOUNTER
Returned call. I stated the patient needs to be evaluated by the provider before he can order a brace. Patient stated understanding and is agreeable to appointment of 1/17/2023 in [place] and being added to the wait list for a sooner appointment.    ----- Message from Tracy Madison sent at 12/8/2022 11:33 AM CST -----  Type: Needs Medical Advice  Who Called:  pt     Best Call Back Number: [phone] (home)    Additional Information: pt sts he needs a foot brace.. please advise thank you

## 2022-12-12 ENCOUNTER — PATIENT MESSAGE (OUTPATIENT)
Dept: FAMILY MEDICINE | Facility: CLINIC | Age: 72
End: 2022-12-12

## 2022-12-15 ENCOUNTER — TELEPHONE (OUTPATIENT)
Dept: ORTHOPEDICS | Facility: CLINIC | Age: 72
End: 2022-12-15

## 2022-12-15 NOTE — TELEPHONE ENCOUNTER
Returned call. [facility] for patient stating per our previous conversation, the patient has be seen and evaluated for the brace he is requesting. If he does not have an office visit being evaluated for this condition, his insurance may likely refuse to pay for the brace and the patient would be responsible for the cost of the brace. I stated for the patient to call back with any questions or concerns.    ----- Message from Sergio Martinez sent at 12/14/2022  4:17 PM CST -----  Regarding: wants leg brace, call pt   Contact: pt   wants leg brace, call pt

## 2022-12-20 ENCOUNTER — HOSPITAL ENCOUNTER (OUTPATIENT)
Dept: RADIOLOGY | Facility: HOSPITAL | Age: 72
Discharge: HOME | End: 2022-12-20
Attending: ORTHOPAEDIC SURGERY
Payer: COMMERCIAL

## 2022-12-20 ENCOUNTER — OFFICE VISIT (OUTPATIENT)
Dept: ORTHOPEDICS | Facility: CLINIC | Age: 72
End: 2022-12-20
Payer: COMMERCIAL

## 2022-12-20 ENCOUNTER — EXTERNAL HOME HEALTH (OUTPATIENT)
Dept: HOME HEALTH SERVICES | Facility: HOSPITAL | Age: 72
End: 2022-12-20

## 2022-12-20 VITALS — WEIGHT: 149.94 LBS | RESPIRATION RATE: 14 BRPM | BODY MASS INDEX: 26.57 KG/M2 | HEIGHT: 63 IN

## 2022-12-20 DIAGNOSIS — M79.672 LEFT FOOT PAIN: ICD-10-CM

## 2022-12-20 DIAGNOSIS — M25.572 LEFT ANKLE PAIN, UNSPECIFIED CHRONICITY: Primary | ICD-10-CM

## 2022-12-20 DIAGNOSIS — I69.398 SPASTICITY AS LATE EFFECT OF CEREBROVASCULAR ACCIDENT (CVA): ICD-10-CM

## 2022-12-20 DIAGNOSIS — R25.2 SPASTICITY AS LATE EFFECT OF CEREBROVASCULAR ACCIDENT (CVA): ICD-10-CM

## 2022-12-20 DIAGNOSIS — M21.372 LEFT FOOT DROP: Primary | ICD-10-CM

## 2022-12-20 DIAGNOSIS — M24.542 CONTRACTURE OF JOINT OF LEFT HAND: ICD-10-CM

## 2022-12-20 DIAGNOSIS — M25.572 LEFT ANKLE PAIN, UNSPECIFIED CHRONICITY: ICD-10-CM

## 2022-12-20 PROCEDURE — 1101F PR PT FALLS ASSESS DOC 0-1 FALLS W/OUT INJ PAST YR: ICD-10-PCS | Mod: CPTII,S$GLB,, | Performed by: ORTHOPAEDIC SURGERY

## 2022-12-20 PROCEDURE — 1159F PR MEDICATION LIST DOCUMENTED IN MEDICAL RECORD: ICD-10-PCS | Mod: CPTII,S$GLB,, | Performed by: ORTHOPAEDIC SURGERY

## 2022-12-20 PROCEDURE — 3008F BODY MASS INDEX DOCD: CPT | Mod: CPTII,S$GLB,, | Performed by: ORTHOPAEDIC SURGERY

## 2022-12-20 PROCEDURE — 3288F PR FALLS RISK ASSESSMENT DOCUMENTED: ICD-10-PCS | Mod: CPTII,S$GLB,, | Performed by: ORTHOPAEDIC SURGERY

## 2022-12-20 PROCEDURE — 73610 X-RAY EXAM OF ANKLE: CPT | Mod: TC,PN,LT

## 2022-12-20 PROCEDURE — 73630 XR FOOT COMPLETE 3 VIEW LEFT: ICD-10-PCS | Mod: 26,LT,, | Performed by: RADIOLOGY

## 2022-12-20 PROCEDURE — 73610 X-RAY EXAM OF ANKLE: CPT | Mod: 26,LT,, | Performed by: RADIOLOGY

## 2022-12-20 PROCEDURE — 3044F PR MOST RECENT HEMOGLOBIN A1C LEVEL <7.0%: ICD-10-PCS | Mod: CPTII,S$GLB,, | Performed by: ORTHOPAEDIC SURGERY

## 2022-12-20 PROCEDURE — 1159F MED LIST DOCD IN RCRD: CPT | Mod: CPTII,S$GLB,, | Performed by: ORTHOPAEDIC SURGERY

## 2022-12-20 PROCEDURE — 3288F FALL RISK ASSESSMENT DOCD: CPT | Mod: CPTII,S$GLB,, | Performed by: ORTHOPAEDIC SURGERY

## 2022-12-20 PROCEDURE — 99214 OFFICE O/P EST MOD 30 MIN: CPT | Mod: S$GLB,,, | Performed by: ORTHOPAEDIC SURGERY

## 2022-12-20 PROCEDURE — 99999 PR PBB SHADOW E&M-EST. PATIENT-LVL III: CPT | Mod: PBBFAC,,, | Performed by: ORTHOPAEDIC SURGERY

## 2022-12-20 PROCEDURE — 3008F PR BODY MASS INDEX (BMI) DOCUMENTED: ICD-10-PCS | Mod: CPTII,S$GLB,, | Performed by: ORTHOPAEDIC SURGERY

## 2022-12-20 PROCEDURE — 1157F ADVNC CARE PLAN IN RCRD: CPT | Mod: CPTII,S$GLB,, | Performed by: ORTHOPAEDIC SURGERY

## 2022-12-20 PROCEDURE — 73630 X-RAY EXAM OF FOOT: CPT | Mod: 26,LT,, | Performed by: RADIOLOGY

## 2022-12-20 PROCEDURE — 73610 XR ANKLE COMPLETE 3 VIEW LEFT: ICD-10-PCS | Mod: 26,LT,, | Performed by: RADIOLOGY

## 2022-12-20 PROCEDURE — 99999 PR PBB SHADOW E&M-EST. PATIENT-LVL III: ICD-10-PCS | Mod: PBBFAC,,, | Performed by: ORTHOPAEDIC SURGERY

## 2022-12-20 PROCEDURE — 1157F PR ADVANCE CARE PLAN OR EQUIV PRESENT IN MEDICAL RECORD: ICD-10-PCS | Mod: CPTII,S$GLB,, | Performed by: ORTHOPAEDIC SURGERY

## 2022-12-20 PROCEDURE — 3044F HG A1C LEVEL LT 7.0%: CPT | Mod: CPTII,S$GLB,, | Performed by: ORTHOPAEDIC SURGERY

## 2022-12-20 PROCEDURE — 1126F AMNT PAIN NOTED NONE PRSNT: CPT | Mod: CPTII,S$GLB,, | Performed by: ORTHOPAEDIC SURGERY

## 2022-12-20 PROCEDURE — 99214 PR OFFICE/OUTPT VISIT, EST, LEVL IV, 30-39 MIN: ICD-10-PCS | Mod: S$GLB,,, | Performed by: ORTHOPAEDIC SURGERY

## 2022-12-20 PROCEDURE — 1126F PR PAIN SEVERITY QUANTIFIED, NO PAIN PRESENT: ICD-10-PCS | Mod: CPTII,S$GLB,, | Performed by: ORTHOPAEDIC SURGERY

## 2022-12-20 PROCEDURE — 1101F PT FALLS ASSESS-DOCD LE1/YR: CPT | Mod: CPTII,S$GLB,, | Performed by: ORTHOPAEDIC SURGERY

## 2022-12-20 PROCEDURE — 73630 X-RAY EXAM OF FOOT: CPT | Mod: TC,PN,LT

## 2022-12-20 NOTE — PROGRESS NOTES
Subjective:      Patient ID: Buddy Park is a 72 y.o. male.    Chief Complaint: Weakness of the Left Foot and Weakness of the Left Ankle      HPI:  Mr. Park is right-hand-dominant and returned today for evaluation of his left arm and his left lower extremity.  Last visit on 11/11/2022 he mentioned that he was having left foot pain.  Today he also has issues with contractures of the fingers of his left hand.  His symptoms are both sequela from a left hemiplegia after he had a stroke on 02/01/2020.  He lost motor but maintained sensation.  His left lower extremity regained his strength but his left upper extremity did not.  When he walks he catches the toe of his foot because he has a foot drop.  He has used a [employer] L300 footdrop electronic system in the past which gave him good results.  There is also 1 for the upper extremity available.    ROS:  No new diagnosis/surgery/prescriptions since last office visit on 11/11/2022.  Constitution: Negative for chills and fever.   HENT: Negative for congestion.    Eyes: Negative for blurred vision.   Cardiovascular: Negative for chest pain.   Respiratory: Negative for cough.    Endocrine: Negative for polydipsia.   Hematologic/Lymphatic: Negative for adenopathy.   Skin: Negative for flushing and itching.   Musculoskeletal: Positive for gout and joint pain.   Gastrointestinal: Positive for diarrhea. Negative for constipation and heartburn.   Genitourinary: Negative for nocturia.   Neurological: Negative for headaches and seizures.   Psychiatric/Behavioral: Negative for depression and substance abuse. The patient is nervous/anxious.    Allergic/Immunologic: Negative for environmental allergies.       Objective:      Physical Exam:   General: AAOx3.  No acute distress  Vascular:  Pulses intact and equal bilaterally.  Capillary refill less than 3 seconds and equal bilaterally  Neurologic:  Pinprick and soft touch intact and equal bilaterally  Integment:  No ecchymosis, no  errythema  Extremity:  Wrist/hand pronation/supination left wrist 45/45 degrees, right wrist 90/90 degrees.  Dorsiflexion/volar flexion left wrist maintained in a flexed position with the fingers maintain in a flexed position.  Passively the patient's fingers can be brought into extension.                      Foot/ankle.  Patient maintains his left foot in a equinus position.  Is flexible and able to be brought into plantigrade.  Positive clonus left lower extremity.  Nontender with palpation ATFL and deltoid ligaments.  Drawer equal bilaterally.  Nontender at the Achilles insertion on the calcaneus bilaterally.  Achilles palpable throughout full distribution.  Nontender at the plantar fascia insertion on the calcaneus.  Radiography:  Personally reviewed x-rays of the left foot and ankle completed on 12/20/2022 showed arthritic changes and some osteopenia.      Assessment:       Impression:     1. Left foot drop    2. 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 Contracture of joint of left hand          Plan:       1.  Discussed physical examination and radiographic findings with the patient. Buddy understands that he has a footdrop of his left foot and a hand contracture of his left hand.  Treatment alternatives and outcomes were discussed with the patient he understands he could be treated conservatively with observation, activity modification, NSAIDs, bracing, physical therapy, injections, or he could consider surgical intervention such as tendon transfers.  He could also have fusions.  He stated that he got a good result with his footdrop with an AFO and also he has used a [employer] L300 which gave him good results with his left lower extremity he has researched and found that there was also a similar device called the [employer] H 200 for the forearm.  2. A prescription for the patient to obtain an AFO for his left ankle was prepared form.  3. A prescription for the patient to obtain a  [employer] L300 for his left lower extremity and [employer] H200 for his left upper extremity was prepared for the patient to obtain these devices to help with motion.  4. Any pain can be treated with over-the-counter medications dosed per box instructions.  5. Continue doing home exercises such as finger and ankle stretching exercises to maintain supple motion  6. Continue with physical therapy.  7. Follow up p.r.n..

## 2022-12-28 ENCOUNTER — PATIENT MESSAGE (OUTPATIENT)
Dept: FAMILY MEDICINE | Facility: CLINIC | Age: 72
End: 2022-12-28

## 2022-12-29 DIAGNOSIS — I10 ESSENTIAL HYPERTENSION: ICD-10-CM

## 2022-12-29 RX ORDER — AMLODIPINE BESYLATE 10 MG/1
10 TABLET ORAL DAILY
Qty: 90 TABLET | Refills: 3 | Status: SHIPPED | OUTPATIENT
Start: 2022-12-29 | End: 2024-03-28 | Stop reason: SDUPTHER

## 2023-01-04 DIAGNOSIS — Z86.73 HISTORY OF STROKE: ICD-10-CM

## 2023-01-04 DIAGNOSIS — M24.549 CONTRACTURE OF JOINT OF HAND, UNSPECIFIED LATERALITY: Primary | ICD-10-CM

## 2023-01-04 DIAGNOSIS — I69.354 HEMIPLEGIA AND HEMIPARESIS FOLLOWING CEREBRAL INFARCTION AFFECTING LEFT NON-DOMINANT SIDE: ICD-10-CM

## 2023-01-06 ENCOUNTER — TELEPHONE (OUTPATIENT)
Dept: FAMILY MEDICINE | Facility: CLINIC | Age: 73
End: 2023-01-06
Payer: MEDICARE

## 2023-01-06 ENCOUNTER — OFFICE VISIT (OUTPATIENT)
Dept: FAMILY MEDICINE | Facility: CLINIC | Age: 73
End: 2023-01-06
Payer: MEDICARE

## 2023-01-06 VITALS
HEIGHT: 63 IN | DIASTOLIC BLOOD PRESSURE: 82 MMHG | HEART RATE: 88 BPM | BODY MASS INDEX: 25.34 KG/M2 | WEIGHT: 143 LBS | OXYGEN SATURATION: 97 % | SYSTOLIC BLOOD PRESSURE: 136 MMHG | RESPIRATION RATE: 15 BRPM

## 2023-01-06 DIAGNOSIS — N18.31 STAGE 3A CHRONIC KIDNEY DISEASE: ICD-10-CM

## 2023-01-06 DIAGNOSIS — R26.9 GAIT DIFFICULTY: ICD-10-CM

## 2023-01-06 DIAGNOSIS — R73.03 PRE-DIABETES: ICD-10-CM

## 2023-01-06 DIAGNOSIS — Z86.73 HISTORY OF STROKE: Primary | ICD-10-CM

## 2023-01-06 DIAGNOSIS — Z12.5 ENCOUNTER FOR SCREENING FOR MALIGNANT NEOPLASM OF PROSTATE: ICD-10-CM

## 2023-01-06 DIAGNOSIS — I69.354 HEMIPLEGIA AND HEMIPARESIS FOLLOWING CEREBRAL INFARCTION AFFECTING LEFT NON-DOMINANT SIDE: ICD-10-CM

## 2023-01-06 DIAGNOSIS — E78.5 HYPERLIPIDEMIA, UNSPECIFIED HYPERLIPIDEMIA TYPE: ICD-10-CM

## 2023-01-06 PROCEDURE — 3075F PR MOST RECENT SYSTOLIC BLOOD PRESS GE 130-139MM HG: ICD-10-PCS | Mod: CPTII,S$GLB,,

## 2023-01-06 PROCEDURE — 1126F AMNT PAIN NOTED NONE PRSNT: CPT | Mod: CPTII,S$GLB,,

## 2023-01-06 PROCEDURE — 1157F ADVNC CARE PLAN IN RCRD: CPT | Mod: CPTII,S$GLB,,

## 2023-01-06 PROCEDURE — 99999 PR PBB SHADOW E&M-EST. PATIENT-LVL IV: CPT | Mod: PBBFAC,,,

## 2023-01-06 PROCEDURE — 99397 PER PM REEVAL EST PAT 65+ YR: CPT | Mod: S$GLB,,,

## 2023-01-06 PROCEDURE — 1101F PT FALLS ASSESS-DOCD LE1/YR: CPT | Mod: CPTII,S$GLB,,

## 2023-01-06 PROCEDURE — 3288F PR FALLS RISK ASSESSMENT DOCUMENTED: ICD-10-PCS | Mod: CPTII,S$GLB,,

## 2023-01-06 PROCEDURE — 1157F PR ADVANCE CARE PLAN OR EQUIV PRESENT IN MEDICAL RECORD: ICD-10-PCS | Mod: CPTII,S$GLB,,

## 2023-01-06 PROCEDURE — 3008F BODY MASS INDEX DOCD: CPT | Mod: CPTII,S$GLB,,

## 2023-01-06 PROCEDURE — 3079F DIAST BP 80-89 MM HG: CPT | Mod: CPTII,S$GLB,,

## 2023-01-06 PROCEDURE — 1101F PR PT FALLS ASSESS DOC 0-1 FALLS W/OUT INJ PAST YR: ICD-10-PCS | Mod: CPTII,S$GLB,,

## 2023-01-06 PROCEDURE — 1159F MED LIST DOCD IN RCRD: CPT | Mod: CPTII,S$GLB,,

## 2023-01-06 PROCEDURE — 3079F PR MOST RECENT DIASTOLIC BLOOD PRESSURE 80-89 MM HG: ICD-10-PCS | Mod: CPTII,S$GLB,,

## 2023-01-06 PROCEDURE — 1159F PR MEDICATION LIST DOCUMENTED IN MEDICAL RECORD: ICD-10-PCS | Mod: CPTII,S$GLB,,

## 2023-01-06 PROCEDURE — 1126F PR PAIN SEVERITY QUANTIFIED, NO PAIN PRESENT: ICD-10-PCS | Mod: CPTII,S$GLB,,

## 2023-01-06 PROCEDURE — 3008F PR BODY MASS INDEX (BMI) DOCUMENTED: ICD-10-PCS | Mod: CPTII,S$GLB,,

## 2023-01-06 PROCEDURE — 99999 PR PBB SHADOW E&M-EST. PATIENT-LVL IV: ICD-10-PCS | Mod: PBBFAC,,,

## 2023-01-06 PROCEDURE — 99397 PR PREVENTIVE VISIT,EST,65 & OVER: ICD-10-PCS | Mod: S$GLB,,,

## 2023-01-06 PROCEDURE — 3075F SYST BP GE 130 - 139MM HG: CPT | Mod: CPTII,S$GLB,,

## 2023-01-06 PROCEDURE — 3288F FALL RISK ASSESSMENT DOCD: CPT | Mod: CPTII,S$GLB,,

## 2023-01-06 RX ORDER — MULTIVITAMIN
1 TABLET ORAL
COMMUNITY

## 2023-01-06 RX ORDER — FERROUS SULFATE 325(65) MG
325 TABLET ORAL
COMMUNITY

## 2023-01-06 NOTE — TELEPHONE ENCOUNTER
----- Message from Sourav Lennon sent at 1/6/2023  9:32 AM CST -----  Contact: pt  Type: Needs Medical Advice  Who Called:  pt     Best Call Back Number: [phone]    Additional Information: pt states he would like to speak with someone about his referral being sent for HH. Please advise.
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
## 2023-01-06 NOTE — TELEPHONE ENCOUNTER
Spoke with patient. Informed pt referral has been sent to [facility]. Pt voiced understanding.

## 2023-01-10 ENCOUNTER — LAB VISIT (OUTPATIENT)
Dept: LAB | Facility: HOSPITAL | Age: 73
End: 2023-01-10
Payer: MEDICARE

## 2023-01-10 DIAGNOSIS — Z86.73 HISTORY OF STROKE: ICD-10-CM

## 2023-01-10 DIAGNOSIS — Z12.5 ENCOUNTER FOR SCREENING FOR MALIGNANT NEOPLASM OF PROSTATE: ICD-10-CM

## 2023-01-10 DIAGNOSIS — E78.5 HYPERLIPIDEMIA, UNSPECIFIED HYPERLIPIDEMIA TYPE: ICD-10-CM

## 2023-01-10 DIAGNOSIS — N18.31 STAGE 3A CHRONIC KIDNEY DISEASE: ICD-10-CM

## 2023-01-10 DIAGNOSIS — R73.03 PRE-DIABETES: ICD-10-CM

## 2023-01-10 LAB
ALBUMIN SERPL BCP-MCNC: 3.7 G/DL (ref 3.5–5.2)
ALP SERPL-CCNC: 85 U/L (ref 55–135)
ALT SERPL W/O P-5'-P-CCNC: 10 U/L (ref 10–44)
ANION GAP SERPL CALC-SCNC: 7 MMOL/L (ref 8–16)
AST SERPL-CCNC: 12 U/L (ref 10–40)
BASOPHILS # BLD AUTO: 0.03 K/UL (ref 0–0.2)
BASOPHILS NFR BLD: 0.6 % (ref 0–1.9)
BILIRUB SERPL-MCNC: 0.8 MG/DL (ref 0.1–1)
BUN SERPL-MCNC: 12 MG/DL (ref 8–23)
CALCIUM SERPL-MCNC: 9.5 MG/DL (ref 8.7–10.5)
CHLORIDE SERPL-SCNC: 108 MMOL/L (ref 95–110)
CHOLEST SERPL-MCNC: 229 MG/DL (ref 120–199)
CHOLEST/HDLC SERPL: 5.6 {RATIO} (ref 2–5)
CO2 SERPL-SCNC: 26 MMOL/L (ref 23–29)
COMPLEXED PSA SERPL-MCNC: 0.72 NG/ML (ref 0–4)
CREAT SERPL-MCNC: 1.7 MG/DL (ref 0.5–1.4)
DIFFERENTIAL METHOD: ABNORMAL
EOSINOPHIL # BLD AUTO: 0.1 K/UL (ref 0–0.5)
EOSINOPHIL NFR BLD: 1.6 % (ref 0–8)
ERYTHROCYTE [DISTWIDTH] IN BLOOD BY AUTOMATED COUNT: 14.3 % (ref 11.5–14.5)
EST. GFR  (NO RACE VARIABLE): 42.8 ML/MIN/1.73 M^2
ESTIMATED AVG GLUCOSE: 103 MG/DL (ref 68–131)
GLUCOSE SERPL-MCNC: 96 MG/DL (ref 70–110)
HBA1C MFR BLD: 5.2 % (ref 4–5.6)
HCT VFR BLD AUTO: 44.3 % (ref 40–54)
HDLC SERPL-MCNC: 41 MG/DL (ref 40–75)
HDLC SERPL: 17.9 % (ref 20–50)
HGB BLD-MCNC: 15 G/DL (ref 14–18)
IMM GRANULOCYTES # BLD AUTO: 0.05 K/UL (ref 0–0.04)
IMM GRANULOCYTES NFR BLD AUTO: 1 % (ref 0–0.5)
LDLC SERPL CALC-MCNC: 151 MG/DL (ref 63–159)
LYMPHOCYTES # BLD AUTO: 1.9 K/UL (ref 1–4.8)
LYMPHOCYTES NFR BLD: 37.6 % (ref 18–48)
MCH RBC QN AUTO: 29.4 PG (ref 27–31)
MCHC RBC AUTO-ENTMCNC: 33.9 G/DL (ref 32–36)
MCV RBC AUTO: 87 FL (ref 82–98)
MONOCYTES # BLD AUTO: 0.4 K/UL (ref 0.3–1)
MONOCYTES NFR BLD: 8.5 % (ref 4–15)
NEUTROPHILS # BLD AUTO: 2.6 K/UL (ref 1.8–7.7)
NEUTROPHILS NFR BLD: 50.7 % (ref 38–73)
NONHDLC SERPL-MCNC: 188 MG/DL
NRBC BLD-RTO: 0 /100 WBC
PLATELET # BLD AUTO: 251 K/UL (ref 150–450)
PMV BLD AUTO: 8.8 FL (ref 9.2–12.9)
POTASSIUM SERPL-SCNC: 4.1 MMOL/L (ref 3.5–5.1)
PROT SERPL-MCNC: 7.1 G/DL (ref 6–8.4)
RBC # BLD AUTO: 5.11 M/UL (ref 4.6–6.2)
SODIUM SERPL-SCNC: 141 MMOL/L (ref 136–145)
T4 FREE SERPL-MCNC: 0.97 NG/DL (ref 0.71–1.51)
TRIGL SERPL-MCNC: 185 MG/DL (ref 30–150)
TSH SERPL DL<=0.005 MIU/L-ACNC: 2.65 UIU/ML (ref 0.4–4)
WBC # BLD AUTO: 5.03 K/UL (ref 3.9–12.7)

## 2023-01-10 PROCEDURE — 84153 ASSAY OF PSA TOTAL: CPT

## 2023-01-10 PROCEDURE — 83036 HEMOGLOBIN GLYCOSYLATED A1C: CPT

## 2023-01-10 PROCEDURE — 80061 LIPID PANEL: CPT

## 2023-01-10 PROCEDURE — 80053 COMPREHEN METABOLIC PANEL: CPT

## 2023-01-10 PROCEDURE — 85025 COMPLETE CBC W/AUTO DIFF WBC: CPT

## 2023-01-10 PROCEDURE — 36415 COLL VENOUS BLD VENIPUNCTURE: CPT

## 2023-01-10 PROCEDURE — 84439 ASSAY OF FREE THYROXINE: CPT

## 2023-01-10 PROCEDURE — 84443 ASSAY THYROID STIM HORMONE: CPT

## 2023-01-23 ENCOUNTER — DOCUMENT SCAN (OUTPATIENT)
Dept: HOME HEALTH SERVICES | Facility: HOSPITAL | Age: 73
End: 2023-01-23
Payer: MEDICARE

## 2023-01-23 PROCEDURE — G0179 MD RECERTIFICATION HHA PT: HCPCS | Mod: ,,, | Performed by: STUDENT IN AN ORGANIZED HEALTH CARE EDUCATION/TRAINING PROGRAM

## 2023-01-23 PROCEDURE — G0179 PR HOME HEALTH MD RECERTIFICATION: ICD-10-PCS | Mod: ,,, | Performed by: STUDENT IN AN ORGANIZED HEALTH CARE EDUCATION/TRAINING PROGRAM

## 2023-01-26 ENCOUNTER — PATIENT OUTREACH (OUTPATIENT)
Dept: ADMINISTRATIVE | Facility: HOSPITAL | Age: 73
End: 2023-01-26
Payer: MEDICARE

## 2023-02-01 ENCOUNTER — PATIENT MESSAGE (OUTPATIENT)
Dept: FAMILY MEDICINE | Facility: CLINIC | Age: 73
End: 2023-02-01
Payer: MEDICARE

## 2023-02-01 DIAGNOSIS — I69.354 HEMIPLEGIA AND HEMIPARESIS FOLLOWING CEREBRAL INFARCTION AFFECTING LEFT NON-DOMINANT SIDE: Primary | ICD-10-CM

## 2023-02-01 RX ORDER — BACLOFEN 10 MG/1
10 TABLET ORAL 2 TIMES DAILY PRN
Qty: 60 TABLET | Refills: 3 | Status: SHIPPED | OUTPATIENT
Start: 2023-02-01 | End: 2024-02-01

## 2023-02-02 ENCOUNTER — PATIENT MESSAGE (OUTPATIENT)
Dept: FAMILY MEDICINE | Facility: CLINIC | Age: 73
End: 2023-02-02
Payer: MEDICARE

## 2023-02-03 ENCOUNTER — HOSPITAL ENCOUNTER (EMERGENCY)
Facility: HOSPITAL | Age: 73
Discharge: HOME | End: 2023-02-03
Attending: EMERGENCY MEDICINE
Payer: MEDICARE

## 2023-02-03 ENCOUNTER — TELEPHONE (OUTPATIENT)
Dept: FAMILY MEDICINE | Facility: CLINIC | Age: 73
End: 2023-02-03
Payer: MEDICARE

## 2023-02-03 VITALS
WEIGHT: 140 LBS | BODY MASS INDEX: 24.8 KG/M2 | SYSTOLIC BLOOD PRESSURE: 148 MMHG | DIASTOLIC BLOOD PRESSURE: 73 MMHG | HEART RATE: 70 BPM | OXYGEN SATURATION: 97 % | HEIGHT: 63 IN | RESPIRATION RATE: 20 BRPM

## 2023-02-03 DIAGNOSIS — I10 HYPERTENSION, UNSPECIFIED TYPE: Primary | ICD-10-CM

## 2023-02-03 DIAGNOSIS — I10 HYPERTENSION: ICD-10-CM

## 2023-02-03 PROCEDURE — 93010 EKG 12-LEAD: ICD-10-PCS | Mod: ,,, | Performed by: INTERNAL MEDICINE

## 2023-02-03 PROCEDURE — 93005 ELECTROCARDIOGRAM TRACING: CPT

## 2023-02-03 PROCEDURE — 99283 EMERGENCY DEPT VISIT LOW MDM: CPT | Mod: 25

## 2023-02-03 PROCEDURE — 93010 ELECTROCARDIOGRAM REPORT: CPT | Mod: ,,, | Performed by: INTERNAL MEDICINE

## 2023-02-03 PROCEDURE — 25000003 PHARM REV CODE 250: Performed by: EMERGENCY MEDICINE

## 2023-02-03 RX ORDER — CLONIDINE HYDROCHLORIDE 0.1 MG/1
0.1 TABLET ORAL 2 TIMES DAILY PRN
Qty: 30 TABLET | Refills: 0 | Status: SHIPPED | OUTPATIENT
Start: 2023-02-03 | End: 2023-05-03 | Stop reason: SDUPTHER

## 2023-02-03 RX ORDER — CLONIDINE HYDROCHLORIDE 0.1 MG/1
0.1 TABLET ORAL 2 TIMES DAILY PRN
Qty: 60 TABLET | Refills: 11 | Status: SHIPPED | OUTPATIENT
Start: 2023-02-03 | End: 2023-02-03 | Stop reason: SDUPTHER

## 2023-02-03 RX ORDER — CLONIDINE HYDROCHLORIDE 0.1 MG/1
0.1 TABLET ORAL
Status: COMPLETED | OUTPATIENT
Start: 2023-02-03 | End: 2023-02-03

## 2023-02-03 RX ORDER — ACETAMINOPHEN 325 MG/1
650 TABLET ORAL
Status: COMPLETED | OUTPATIENT
Start: 2023-02-03 | End: 2023-02-03

## 2023-02-03 RX ADMIN — ACETAMINOPHEN 650 MG: 325 TABLET ORAL at 07:02

## 2023-02-03 RX ADMIN — CLONIDINE HYDROCHLORIDE 0.1 MG: 0.1 TABLET ORAL at 07:02

## 2023-02-03 NOTE — TELEPHONE ENCOUNTER
----- Message from Laxmi Anderson sent at 2/3/2023  3:29 PM CST -----  Contact: Preston with [facility]  Type: Needs Medical Advice    Who Called:  Preston with with [facility]     Symptoms (please be specific):  high blood pressure     How long has patient had these symptoms:  for a while     Pharmacy name and phone #:  - [id number] - [place], [place] - [address];    Best Call Back Number: Preston [phone] or the pt cell [phone]    Additional Information: Preston will be with the patient for a while please call back to advise.

## 2023-02-03 NOTE — TELEPHONE ENCOUNTER
"Spoke with Preston with [facility]. Preston reports  being with patient at this time for PT. Preston reports patient current /111., BP keeps spiking. Informed Preston patient should report to the ER. Preston states, " I figured so, we have a guideline as well and I wanted to run it by the pcp first" informed preston I would get the message to Dr. Malone, but patient should report to the ER. Preston voiced understanding.   "

## 2023-02-04 NOTE — DISCHARGE INSTRUCTIONS
Take your blood pressure about 30-45 minutes after getting up in the morning, and then again in the afternoon as he have been doing.  Right than numbers down in a notebook to show your doctor.  If the top number is above 150, or if the bottom number is above 100, take clonidine as we discussed.  Call Dr. Malone tomorrow for her advice.  Return here as needed or if worse in any way.

## 2023-02-04 NOTE — ED PROVIDER NOTES
Encounter Date: 2/3/2023       History     Chief Complaint   Patient presents with    Hypertension     Physical therapist checked patient's blood pressure during visit. PT called his physician. Patient instructed to go to the ER. Denies pain, nausea or shortness of breath. Left arm discomfort. Took routine medications at 4pm today. History of stroke with left sided neglect     72-year-old male, history of stroke with left arm paralysis and left leg weakness, comes via private vehicle for evaluation of elevated blood pressure.  Patient states his  physical therapist came by the house today at about 4:00 p.m., and found the patient's blood pressure to be elevated.  PCP was contacted and he told the patient's come to the emergency department for evaluation.  Patient is not sure of the name of his blood pressure medication, but he states he takes it once daily, in the afternoon.  He took medication a proximally 1 hour prior to coming here.  Initially blood pressure in triage was 194/108, now systolic is in the 180s and trending downward apparently.  Patient denies any symptoms.  He states that he does not regularly check his blood pressure because he can not use the left arm.    Review of patient's allergies indicates:   Allergen Reactions    Bee pollens Shortness Of Breath     WASP, BEE, ANT AND CATERPILLER STINGS    Hydrochlorothiazide Shortness Of Breath and Rash    Milk containing products Diarrhea    Metoprolol Rash     Past Medical History:   Diagnosis Date    Angina pectoris     Anxiety     Arthritis     Biliary colic     Cochlear implant in place     Deaf     LEFT EAR    Depression     Diabetes mellitus type 2 without retinopathy 1/5/2022    Heart failure     High cholesterol     History of colon polyps 2/20/2018    [place] (hard of hearing)     HEARING AID - RIGHT    Hyperlipidemia     Hypertension     Kidney stone     Kidney stones     Renal stones     Stroke     Trouble in sleeping     Wears glasses      Past  Surgical History:   Procedure Laterality Date    CAROTID ARTERY ANGIOPLASTY  06/2018    [facility]     CATARACT EXTRACTION Bilateral     CHOLECYSTECTOMY  02/06/2014    COLONOSCOPY  01/09/2013    Dr. Gillette, 5 year recheck (family history colon cancer)    COLONOSCOPY N/A 03/12/2018    Procedure: COLONOSCOPY;  Surgeon: Garett Go MD;  Location: [facility] ENDO;  Service: Endoscopy;  Laterality: N/A;    COLONOSCOPY N/A 02/15/2021    Procedure: COLONOSCOPY;  Surgeon: Garett Go MD;  Location: [facility] ENDO;  Service: Endoscopy;  Laterality: N/A;    CYSTOSCOPY W/ RETROGRADES Bilateral 10/28/2019    Procedure: CYSTOSCOPY, WITH RETROGRADE PYELOGRAM;  Surgeon: Gretchen Proctor MD;  Location: [facility] OR;  Service: Urology;  Laterality: Bilateral;    CYSTOSCOPY W/ URETERAL STENT PLACEMENT Left 10/28/2019    Procedure: CYSTOSCOPY, WITH URETERAL STENT INSERTION;  Surgeon: Gretchen Proctor MD;  Location: [facility] OR;  Service: Urology;  Laterality: Left;    CYSTOSCOPY W/ URETERAL STENT REMOVAL Left 12/02/2019    Procedure: CYSTOSCOPY, WITH URETERAL STENT REMOVAL;  Surgeon: Gretchen Proctor MD;  Location: [facility] OR;  Service: Urology;  Laterality: Left;    EAR MASTOIDECTOMY W/ COCHLEAR IMPLANT W/ LANDMARK      left ear    EXTRACORPOREAL SHOCK WAVE LITHOTRIPSY Left 12/02/2019    Procedure: LITHOTRIPSY, ESWL;  Surgeon: Gretchen Proctor MD;  Location: [facility] OR;  Service: Urology;  Laterality: Left;    EYE SURGERY      FOREIGN BODY REMOVAL Right     glass removed from right foot/repair    FRACTURE SURGERY      right arm x2    HIP SURGERY Left     LITHOTRIPSY      ROTATOR CUFF REPAIR      Right     SINUS SURGERY      TONSILLECTOMY      VASCULAR SURGERY       Family History   Problem Relation Age of Onset    Cancer Mother         colon    Heart disease Father     Gout Father     Kidney disease Father     Arthritis Father     Hypertension Father     Early death Daughter         mva    Alcohol abuse Brother     Cancer Brother         mouth cancer w  mets    No Known Problems Sister     Alcohol abuse Brother     No Known Problems Son     Heart disease Maternal Grandmother         MI    Stroke Maternal Grandfather     Heart disease Paternal Grandmother         MI    No Known Problems Son     Cancer Paternal Uncle         lung cancer    Allergic rhinitis Neg Hx     Allergies Neg Hx     Angioedema Neg Hx     Asthma Neg Hx     Atopy Neg Hx     Eczema Neg Hx     Immunodeficiency Neg Hx     Rhinitis Neg Hx     Urticaria Neg Hx     Collagen disease Neg Hx      Social History     Tobacco Use    Smoking status: Never    Smokeless tobacco: Never   Substance Use Topics    Alcohol use: Yes     Comment: once every two months    Drug use: No     Review of Systems   Constitutional: Negative.    HENT: Negative.     Eyes: Negative.    Respiratory: Negative.     Cardiovascular: Negative.    Endocrine: Negative.    Genitourinary: Negative.    Musculoskeletal: Negative.    Skin: Negative.    Allergic/Immunologic: Negative.    Neurological: Negative.    Hematological: Negative.    Psychiatric/Behavioral: Negative.       Physical Exam     Initial Vitals [02/03/23 1717]   BP Pulse Resp Temp SpO2   (!) 194/108 76 20 -- 96 %      MAP       --         Physical Exam    Nursing note and vitals reviewed.  Constitutional: He appears well-developed and well-nourished. No distress.   HENT:   Head: Normocephalic and atraumatic.   Nose: Nose normal.   Mouth/Throat: Oropharynx is clear and moist. No oropharyngeal exudate.   Eyes: Conjunctivae and EOM are normal. Pupils are equal, round, and reactive to light. No scleral icterus.   Neck: Neck supple. No JVD present.   Normal range of motion.  Cardiovascular:  Normal rate, regular rhythm, normal heart sounds and intact distal pulses.           No murmur heard.  Pulmonary/Chest: Breath sounds normal. No stridor. No respiratory distress. He has no wheezes.   Abdominal: Abdomen is soft. Bowel sounds are normal. He exhibits no distension. There is no  abdominal tenderness.   Musculoskeletal:         General: No tenderness or edema. Normal range of motion.      Cervical back: Normal range of motion and neck supple.     Neurological: He is alert and oriented to person, place, and time. GCS score is 15. GCS eye subscore is 4. GCS verbal subscore is 5. GCS motor subscore is 6.   Chronic left-sided extremity weakness secondary to previous stroke, no new findings   Skin: Skin is warm and dry. Capillary refill takes less than 2 seconds. No rash noted. No erythema.   Psychiatric: He has a normal mood and affect. His behavior is normal.       ED Course   Procedures  Labs Reviewed - No data to display       ECG Results              EKG 12-lead (Final result)  Result time 02/06/23 08:59:17      Final result by [facility] (02/06/23 08:59:17)                   Narrative:    Test Reason : I10,    Vent. Rate : 075 BPM     Atrial Rate : 075 BPM     P-R Int : 148 ms          QRS Dur : 086 ms      QT Int : 382 ms       P-R-T Axes : 015 072 070 degrees     QTc Int : 426 ms    Normal sinus rhythm  Cannot rule out Anterior infarct (cited on or before 21-JAN-2021)  Abnormal ECG  When compared with ECG of 21-JAN-2021 15:43,  Premature ventricular complexes are no longer Present  Questionable change in initial forces of Anterior leads  Confirmed by Rolando Worthington MD ([id number]) on 2/6/2023 8:59:11 AM    Referred By: MARK   SELF           Confirmed By:Rolando Worthington MD                                  Imaging Results    None          Medications   cloNIDine tablet 0.1 mg (0.1 mg Oral Given 2/3/23 1927)   acetaminophen tablet 650 mg (650 mg Oral Given 2/3/23 1925)     Medical Decision Making:   Differential Diagnosis:   Hypertension, noncompliance, illness, pain, etc.  ED Management:  Patient took his blood pressure medications about 1 hour prior to coming here, and by the time he arrived here, his blood pressure started to improve.  He was observed for certain amount of time, and  blood pressure seemed to stabilize, but was still elevated, so he was given 0.1 mg clonidine with good result.  He states he is ready to go.  Blood pressure elevation may be secondary to some left arm discomfort which was treated here with Tylenol.  He states that he has been doing extra physical therapy lately.  Will prescribe p.r.n. clonidine b.i.d., and patient will follow-up with his PCP for further advice.  Return here for any worsening signs or symptoms.                        Clinical Impression:   Final diagnoses:  [I10] Hypertension  [I10] Hypertension, unspecified type (Primary)        ED Disposition Condition    Discharge Stable          ED Prescriptions       Medication Sig Dispense Start Date End Date Auth. Provider    cloNIDine (CATAPRES) 0.1 MG tablet  (Status: Discontinued) Take 1 tablet (0.1 mg total) by mouth 2 (two) times daily as needed (For blood pressure with the top number above 150 or bottom number above 100). 60 tablet 2/3/2023 2/3/2023 German Argueta MD    cloNIDine (CATAPRES) 0.1 MG tablet Take 1 tablet (0.1 mg total) by mouth 2 (two) times daily as needed (For blood pressure with the top number above 150 or bottom number above 100). 30 tablet 2/3/2023 2/3/2024 German Argueta MD          Follow-up Information       Follow up With Specialties Details Why Contact Info    Gema Malone MD Family Medicine Call in 1 day  [address] [address] [phone]      [facility] Emergency Dept Emergency Medicine  If symptoms worsen [address] [facility] [address] [phone]             German Argueta MD  02/03/23 2024       German Argueta MD  02/20/23 1561

## 2023-02-06 ENCOUNTER — HOSPITAL ENCOUNTER (EMERGENCY)
Facility: HOSPITAL | Age: 73
Discharge: HOME | End: 2023-02-06
Attending: EMERGENCY MEDICINE
Payer: MEDICARE

## 2023-02-06 VITALS
RESPIRATION RATE: 20 BRPM | DIASTOLIC BLOOD PRESSURE: 95 MMHG | HEART RATE: 68 BPM | BODY MASS INDEX: 25.69 KG/M2 | WEIGHT: 145 LBS | SYSTOLIC BLOOD PRESSURE: 162 MMHG | HEIGHT: 63 IN | OXYGEN SATURATION: 99 % | TEMPERATURE: 98 F

## 2023-02-06 DIAGNOSIS — R07.89 CHEST WALL PAIN: Primary | ICD-10-CM

## 2023-02-06 DIAGNOSIS — R07.89 DISCOMFORT IN CHEST: ICD-10-CM

## 2023-02-06 LAB
ALBUMIN SERPL BCP-MCNC: 3.4 G/DL (ref 3.5–5.2)
ALP SERPL-CCNC: 87 U/L (ref 55–135)
ALT SERPL W/O P-5'-P-CCNC: 11 U/L (ref 10–44)
ANION GAP SERPL CALC-SCNC: 9 MMOL/L (ref 8–16)
AST SERPL-CCNC: 12 U/L (ref 10–40)
BASOPHILS # BLD AUTO: 0.02 K/UL (ref 0–0.2)
BASOPHILS NFR BLD: 0.4 % (ref 0–1.9)
BILIRUB SERPL-MCNC: 0.7 MG/DL (ref 0.1–1)
BNP SERPL-MCNC: 14 PG/ML (ref 0–99)
BUN SERPL-MCNC: 17 MG/DL (ref 8–23)
CALCIUM SERPL-MCNC: 9.1 MG/DL (ref 8.7–10.5)
CHLORIDE SERPL-SCNC: 106 MMOL/L (ref 95–110)
CO2 SERPL-SCNC: 25 MMOL/L (ref 23–29)
CREAT SERPL-MCNC: 1.5 MG/DL (ref 0.5–1.4)
DIFFERENTIAL METHOD: ABNORMAL
EOSINOPHIL # BLD AUTO: 0.1 K/UL (ref 0–0.5)
EOSINOPHIL NFR BLD: 1.7 % (ref 0–8)
ERYTHROCYTE [DISTWIDTH] IN BLOOD BY AUTOMATED COUNT: 13.9 % (ref 11.5–14.5)
EST. GFR  (NO RACE VARIABLE): 49.8 ML/MIN/1.73 M^2
GLUCOSE SERPL-MCNC: 99 MG/DL (ref 70–110)
HCT VFR BLD AUTO: 41.3 % (ref 40–54)
HGB BLD-MCNC: 14 G/DL (ref 14–18)
IMM GRANULOCYTES # BLD AUTO: 0.02 K/UL (ref 0–0.04)
IMM GRANULOCYTES NFR BLD AUTO: 0.4 % (ref 0–0.5)
LYMPHOCYTES # BLD AUTO: 2 K/UL (ref 1–4.8)
LYMPHOCYTES NFR BLD: 41.9 % (ref 18–48)
MCH RBC QN AUTO: 29.1 PG (ref 27–31)
MCHC RBC AUTO-ENTMCNC: 33.9 G/DL (ref 32–36)
MCV RBC AUTO: 86 FL (ref 82–98)
MONOCYTES # BLD AUTO: 0.4 K/UL (ref 0.3–1)
MONOCYTES NFR BLD: 9.2 % (ref 4–15)
NEUTROPHILS # BLD AUTO: 2.2 K/UL (ref 1.8–7.7)
NEUTROPHILS NFR BLD: 46.4 % (ref 38–73)
NRBC BLD-RTO: 0 /100 WBC
PLATELET # BLD AUTO: 246 K/UL (ref 150–450)
PMV BLD AUTO: 9.1 FL (ref 9.2–12.9)
POTASSIUM SERPL-SCNC: 3.6 MMOL/L (ref 3.5–5.1)
PROT SERPL-MCNC: 6.7 G/DL (ref 6–8.4)
RBC # BLD AUTO: 4.81 M/UL (ref 4.6–6.2)
SODIUM SERPL-SCNC: 140 MMOL/L (ref 136–145)
TROPONIN I SERPL DL<=0.01 NG/ML-MCNC: <0.006 NG/ML (ref 0–0.03)
TROPONIN I SERPL DL<=0.01 NG/ML-MCNC: <0.006 NG/ML (ref 0–0.03)
WBC # BLD AUTO: 4.68 K/UL (ref 3.9–12.7)

## 2023-02-06 PROCEDURE — 93010 EKG 12-LEAD: ICD-10-PCS | Mod: ,,, | Performed by: INTERNAL MEDICINE

## 2023-02-06 PROCEDURE — 71045 X-RAY EXAM CHEST 1 VIEW: CPT | Mod: TC

## 2023-02-06 PROCEDURE — 84484 ASSAY OF TROPONIN QUANT: CPT | Mod: 91 | Performed by: EMERGENCY MEDICINE

## 2023-02-06 PROCEDURE — 71045 XR CHEST AP PORTABLE: ICD-10-PCS | Mod: 26,,, | Performed by: RADIOLOGY

## 2023-02-06 PROCEDURE — 93005 ELECTROCARDIOGRAM TRACING: CPT

## 2023-02-06 PROCEDURE — 25000003 PHARM REV CODE 250: Performed by: EMERGENCY MEDICINE

## 2023-02-06 PROCEDURE — 93010 ELECTROCARDIOGRAM REPORT: CPT | Mod: ,,, | Performed by: INTERNAL MEDICINE

## 2023-02-06 PROCEDURE — 36415 COLL VENOUS BLD VENIPUNCTURE: CPT | Performed by: EMERGENCY MEDICINE

## 2023-02-06 PROCEDURE — 71045 X-RAY EXAM CHEST 1 VIEW: CPT | Mod: 26,,, | Performed by: RADIOLOGY

## 2023-02-06 PROCEDURE — 83880 ASSAY OF NATRIURETIC PEPTIDE: CPT | Performed by: EMERGENCY MEDICINE

## 2023-02-06 PROCEDURE — 99285 EMERGENCY DEPT VISIT HI MDM: CPT | Mod: 25

## 2023-02-06 PROCEDURE — 80053 COMPREHEN METABOLIC PANEL: CPT | Performed by: EMERGENCY MEDICINE

## 2023-02-06 PROCEDURE — 85025 COMPLETE CBC W/AUTO DIFF WBC: CPT | Performed by: EMERGENCY MEDICINE

## 2023-02-06 RX ORDER — ASPIRIN 325 MG
325 TABLET ORAL
Status: COMPLETED | OUTPATIENT
Start: 2023-02-06 | End: 2023-02-06

## 2023-02-06 RX ADMIN — ASPIRIN 325 MG ORAL TABLET 325 MG: 325 PILL ORAL at 02:02

## 2023-02-06 RX ADMIN — NITROGLYCERIN 1 INCH: 20 OINTMENT TOPICAL at 02:02

## 2023-02-06 NOTE — ED PROVIDER NOTES
Encounter Date: 2/6/2023       History     Chief Complaint   Patient presents with    Chest Pain     Left sided chest pain x 1 hr. No pain during triage.      72-year-old male, here from home via private vehicle for evaluation and treatment of left upper chest pain.  Symptoms started a proximally 1 hour prior to arrival.  He states the pain lasted only for about 10 seconds then dissipated.  While in route here he had 2 more similar episodes.  He is now pain-free.  Denies shortness of breath, nausea, vomiting, or diaphoresis.  Denies any history of coronary artery disease.  His chart shows that he has a history of angina pectoris however.  Also suffers from anxiety, arthritis, biliary colic, cochlear implant, secondary to deafness of the left ear, hyperlipidemia, stroke, left-sided hemiparesis, and kidney stones.  He was seen here recently for elevated blood pressure.  He states he has been taking his blood pressure medicines as prescribed.    Review of patient's allergies indicates:   Allergen Reactions    Bee pollens Shortness Of Breath     WASP, BEE, ANT AND CATERPILLER STINGS    Hydrochlorothiazide Shortness Of Breath and Rash    Milk containing products Diarrhea    Metoprolol Rash     Past Medical History:   Diagnosis Date    Angina pectoris     Anxiety     Arthritis     Biliary colic     Cochlear implant in place     Deaf     LEFT EAR    Depression     Diabetes mellitus type 2 without retinopathy 1/5/2022    Heart failure     High cholesterol     History of colon polyps 2/20/2018    [place] (hard of hearing)     HEARING AID - RIGHT    Hyperlipidemia     Hypertension     Kidney stone     Kidney stones     Renal stones     Stroke     Trouble in sleeping     Wears glasses      Past Surgical History:   Procedure Laterality Date    CAROTID ARTERY ANGIOPLASTY  06/2018    [facility]     CATARACT EXTRACTION Bilateral     CHOLECYSTECTOMY  02/06/2014    COLONOSCOPY  01/09/2013    Dr. Gillette, 5 year recheck (family history colon  cancer)    COLONOSCOPY N/A 03/12/2018    Procedure: COLONOSCOPY;  Surgeon: Garett Go MD;  Location: [facility] ENDO;  Service: Endoscopy;  Laterality: N/A;    COLONOSCOPY N/A 02/15/2021    Procedure: COLONOSCOPY;  Surgeon: Garett Go MD;  Location: [facility] ENDO;  Service: Endoscopy;  Laterality: N/A;    CYSTOSCOPY W/ RETROGRADES Bilateral 10/28/2019    Procedure: CYSTOSCOPY, WITH RETROGRADE PYELOGRAM;  Surgeon: Gretchen Proctor MD;  Location: [facility];  Service: Urology;  Laterality: Bilateral;    CYSTOSCOPY W/ URETERAL STENT PLACEMENT Left 10/28/2019    Procedure: CYSTOSCOPY, WITH URETERAL STENT INSERTION;  Surgeon: Gretchen Proctor MD;  Location: [facility] OR;  Service: Urology;  Laterality: Left;    CYSTOSCOPY W/ URETERAL STENT REMOVAL Left 12/02/2019    Procedure: CYSTOSCOPY, WITH URETERAL STENT REMOVAL;  Surgeon: Gretchen Proctor MD;  Location: [facility] OR;  Service: Urology;  Laterality: Left;    EAR MASTOIDECTOMY W/ COCHLEAR IMPLANT W/ LANDMARK      left ear    EXTRACORPOREAL SHOCK WAVE LITHOTRIPSY Left 12/02/2019    Procedure: LITHOTRIPSY, ESWL;  Surgeon: Gretchen Proctor MD;  Location: [facility] OR;  Service: Urology;  Laterality: Left;    EYE SURGERY      FOREIGN BODY REMOVAL Right     glass removed from right foot/repair    FRACTURE SURGERY      right arm x2    HIP SURGERY Left     LITHOTRIPSY      ROTATOR CUFF REPAIR      Right     SINUS SURGERY      TONSILLECTOMY      VASCULAR SURGERY       Family History   Problem Relation Age of Onset    Cancer Mother         colon    Heart disease Father     Gout Father     Kidney disease Father     Arthritis Father     Hypertension Father     Early death Daughter         mva    Alcohol abuse Brother     Cancer Brother         mouth cancer w mets    No Known Problems Sister     Alcohol abuse Brother     No Known Problems Son     Heart disease Maternal Grandmother         MI    Stroke Maternal Grandfather     Heart disease Paternal Grandmother         MI    No Known Problems  Son     Cancer Paternal Uncle         lung cancer    Allergic rhinitis Neg Hx     Allergies Neg Hx     Angioedema Neg Hx     Asthma Neg Hx     Atopy Neg Hx     Eczema Neg Hx     Immunodeficiency Neg Hx     Rhinitis Neg Hx     Urticaria Neg Hx     Collagen disease Neg Hx      Social History     Tobacco Use    Smoking status: Never    Smokeless tobacco: Never   Substance Use Topics    Alcohol use: Yes     Comment: once every two months    Drug use: No     Review of Systems   Constitutional: Negative.    HENT: Negative.     Eyes: Negative.    Respiratory: Negative.     Cardiovascular:  Positive for chest pain.   Endocrine: Negative.    Genitourinary: Negative.    Musculoskeletal: Negative.    Skin: Negative.    Allergic/Immunologic: Negative.    Neurological: Negative.    Psychiatric/Behavioral: Negative.       Physical Exam     Initial Vitals [02/06/23 1356]   BP Pulse Resp Temp SpO2   (!) 163/86 65 18 97.7 °F (36.5 °C) 98 %      MAP       --         Physical Exam    Nursing note and vitals reviewed.  Constitutional: He appears well-developed and well-nourished. No distress.   HENT:   Head: Normocephalic and atraumatic.   Nose: Nose normal.   Mouth/Throat: Oropharynx is clear and moist. No oropharyngeal exudate.   Eyes: Conjunctivae and EOM are normal. Pupils are equal, round, and reactive to light. Right eye exhibits no discharge. Left eye exhibits no discharge. No scleral icterus.   Neck: Neck supple. No thyromegaly present. No JVD present.   Normal range of motion.  Cardiovascular:  Normal rate, regular rhythm, normal heart sounds and intact distal pulses.           No murmur heard.  Pulmonary/Chest: Breath sounds normal. No stridor. No respiratory distress. He has no wheezes. He has no rales. He exhibits no tenderness.   Abdominal: Abdomen is soft. Bowel sounds are normal. He exhibits no distension. There is no abdominal tenderness.   Musculoskeletal:         General: No tenderness or edema. Normal range of  motion.      Cervical back: Normal range of motion and neck supple.     Neurological: He is alert and oriented to person, place, and time. No cranial nerve deficit or sensory deficit. GCS score is 15. GCS eye subscore is 4. GCS verbal subscore is 5. GCS motor subscore is 6.   Chronic left-sided weakness secondary to previous stroke   Skin: Skin is warm and dry. Capillary refill takes less than 2 seconds. No rash noted. No erythema.   Psychiatric: He has a normal mood and affect. His behavior is normal.       ED Course   Procedures  Labs Reviewed   CBC W/ AUTO DIFFERENTIAL - Abnormal; Notable for the following components:       Result Value    MPV 9.1 (*)     All other components within normal limits   COMPREHENSIVE METABOLIC PANEL - Abnormal; Notable for the following components:    Creatinine 1.5 (*)     Albumin 3.4 (*)     eGFR 49.8 (*)     All other components within normal limits   TROPONIN I   B-TYPE NATRIURETIC PEPTIDE   TROPONIN I     EKG Readings: (Independently Interpreted)   EKG, personally reviewed by me, shows normal sinus rhythm at 66 b eats per minute.  ND interval 166, .  No ST elevations or depressions, no T-wave changes, no arrhythmia.   ECG Results              EKG 12-lead (Final result)  Result time 02/07/23 09:15:05      Final result by Interface, Lab In [facility] (02/07/23 09:15:05)                   Narrative:    Test Reason : R07.89,    Vent. Rate : 066 BPM     Atrial Rate : 066 BPM     P-R Int : 166 ms          QRS Dur : 084 ms      QT Int : 398 ms       P-R-T Axes : 059 069 070 degrees     QTc Int : 417 ms    Normal sinus rhythm  Normal ECG  When compared with ECG of 03-FEB-2023 17:36,  No significant change was found  Confirmed by Rolando Worthington MD ([id number]) on 2/7/2023 9:14:55 AM    Referred By: [email or web address]   SELF           Confirmed By:Rolando Worthington MD                                  Imaging Results              X-Ray Chest AP Portable (Final result)  Result time 02/06/23 14:39:29      Final result  by Bobo Erickson MD (02/06/23 14:39:29)                   Impression:      No acute chest disease.      Electronically signed by: Bobo Erickson  Date:    02/06/2023  Time:    14:39               Narrative:    EXAMINATION:  XR CHEST AP PORTABLE    CLINICAL HISTORY:  Chest Pain;    TECHNIQUE:  Portable view of the chest was performed.    COMPARISON:  02/01/2020.    FINDINGS:  Lungs are clear.  No focal consolidation.  Heart size top-normal.  Mediastinal contours unremarkable.  Trachea midline.    Bony thorax intact.                                    X-Rays:   Independently Interpreted Readings:   Other Readings:  Chest x-ray personally reviewed by me shows clear lungs bilaterally.  Normal cardiac silhouette, normal skeletal structures.  Medications   aspirin tablet 325 mg (325 mg Oral Given 2/6/23 1415)   nitroGLYCERIN 2% TD oint ointment 1 inch (1 inch Topical (Top) Given 2/6/23 1416)     Medical Decision Making:   Differential Diagnosis:   Myocardial infarction, myocardial ischemia, pleurisy, costochondritis, musculoskeletal pain, etc.  ED Management:  Patient's labs are unremarkable.  EKG is normal.  Troponin normal x2.  Patient's symptoms were fleeting, and likely secondary to some sort of musculoskeletal issue.  Chest x-ray was clear and normal.  I believe he is safe for discharge home to follow-up with his PCP.  Continue previously prescribed medications and treatments and return here as needed or if worse in any way.                        Clinical Impression:   Final diagnoses:  [R07.89] Discomfort in chest  [R07.89] Chest wall pain (Primary)        ED Disposition Condition    Discharge Stable          ED Prescriptions    None       Follow-up Information       Follow up With Specialties Details Why Contact Info    Gema Malone MD Family Medicine In 1 day  [address] [facility] [address] [phone]      [facility] Emergency Medicine  As needed, If symptoms worsen [address] [facility] [address]  [phone]             German Argueta MD  02/06/23 1840       German Argueta MD  02/20/23 3147

## 2023-02-06 NOTE — ED NOTES
Bed: Exam 03  Expected date: 2/6/23  Expected time: 1:46 PM  Means of arrival:   Comments:  Amr abd pain

## 2023-02-07 NOTE — DISCHARGE INSTRUCTIONS
Today's labs and EKG were normal.  No indication that your symptoms were caused by a heart attack.  Continue with your previously prescribed medications and treatments, and follow-up with your primary care doctor tomorrow.  Return here as needed or if worse in any way.

## 2023-02-13 DIAGNOSIS — F32.5 MAJOR DEPRESSIVE DISORDER WITH SINGLE EPISODE, IN FULL REMISSION: ICD-10-CM

## 2023-02-13 RX ORDER — BUPROPION HYDROCHLORIDE 150 MG/1
TABLET ORAL
Qty: 90 TABLET | Refills: 3 | Status: SHIPPED | OUTPATIENT
Start: 2023-02-13 | End: 2023-07-28 | Stop reason: SDUPTHER

## 2023-02-13 NOTE — TELEPHONE ENCOUNTER
----- Message from Lg Ryan sent at 2/13/2023 12:04 PM CST -----  Regarding: refill  Contact: patient  Type:  RX Refill Request    Who Called:  Patient   Refill or New Rx:  Refill  RX Name and Strength:  WELLBUTRIN XL) 150 MG   How is the patient currently taking it? (ex. 1XDay):  one time a day  Is this a 30 day or 90 day RX:  30day    Preferred Pharmacy with phone number:    [facility]/pharmacy #[id number] - [place], [place] - [address]  [address] [place] [address]  Phone: [phone] Fax: [phone]     Local or Mail Order:  Local  Ordering Provider:  Gema Malone  Best Call Back Number:  [phone] (home)

## 2023-02-23 ENCOUNTER — TELEPHONE (OUTPATIENT)
Dept: FAMILY MEDICINE | Facility: CLINIC | Age: 73
End: 2023-02-23
Payer: MEDICARE

## 2023-02-23 DIAGNOSIS — F32.5 MAJOR DEPRESSIVE DISORDER WITH SINGLE EPISODE, IN FULL REMISSION: Primary | ICD-10-CM

## 2023-02-23 NOTE — TELEPHONE ENCOUNTER
----- Message from Belia Waddell sent at 2/23/2023  8:31 AM CST -----  Contact: LA OBRIEN [[id number]][phone]  Type:  Patient Requesting Referral    Who Called: LA OBRIEN [[id number]]  Does the patient already have the specialty appointment scheduled?: No  If yes, what is the date of that appointment?: N/A  Referral to What Specialty: Mental Health Therapy  Reason for Referral: Mental Health Therapy  Does the patient want the referral with a specific physician?: Yes, up to provider  Is the [facility]  Patient Requesting a Response?: Yes  Would the patient rather a call back or a response via [facility]? Phone call   Best Call Back Number: [phone]  Additional Information:  Patient understands Dr. Malone is out of the office until tomorrow, asks if another staff member can help today?

## 2023-02-23 NOTE — TELEPHONE ENCOUNTER
Dear Dr. Puckett,     In the absence of Gema Malone MD, can you please address this patients concern or request?      Pt is requesting a referral to Mental Health Therapy. Please advise  Thank you,  Susan Pagan LPN

## 2023-03-01 ENCOUNTER — PATIENT MESSAGE (OUTPATIENT)
Dept: FAMILY MEDICINE | Facility: CLINIC | Age: 73
End: 2023-03-01

## 2023-03-01 ENCOUNTER — EXTERNAL HOME HEALTH (OUTPATIENT)
Dept: HOME HEALTH SERVICES | Facility: HOSPITAL | Age: 73
End: 2023-03-01
Payer: MEDICARE

## 2023-03-02 ENCOUNTER — DOCUMENT SCAN (OUTPATIENT)
Dept: HOME HEALTH SERVICES | Facility: HOSPITAL | Age: 73
End: 2023-03-02
Payer: MEDICARE

## 2023-03-02 ENCOUNTER — OFFICE VISIT (OUTPATIENT)
Dept: FAMILY MEDICINE | Facility: CLINIC | Age: 73
End: 2023-03-02
Payer: MEDICARE

## 2023-03-02 ENCOUNTER — HOSPITAL ENCOUNTER (OUTPATIENT)
Dept: RADIOLOGY | Facility: HOSPITAL | Age: 73
Discharge: HOME | End: 2023-03-02
Payer: MEDICARE

## 2023-03-02 VITALS
SYSTOLIC BLOOD PRESSURE: 160 MMHG | OXYGEN SATURATION: 97 % | HEART RATE: 99 BPM | BODY MASS INDEX: 25.69 KG/M2 | HEIGHT: 63 IN | DIASTOLIC BLOOD PRESSURE: 98 MMHG | RESPIRATION RATE: 15 BRPM

## 2023-03-02 DIAGNOSIS — S86.912A KNEE STRAIN, LEFT, INITIAL ENCOUNTER: Primary | ICD-10-CM

## 2023-03-02 DIAGNOSIS — S96.912A ANKLE STRAIN, LEFT, INITIAL ENCOUNTER: ICD-10-CM

## 2023-03-02 DIAGNOSIS — S86.912A KNEE STRAIN, LEFT, INITIAL ENCOUNTER: ICD-10-CM

## 2023-03-02 PROCEDURE — 99999 PR PBB SHADOW E&M-EST. PATIENT-LVL IV: CPT | Mod: PBBFAC,,,

## 2023-03-02 PROCEDURE — 73610 XR ANKLE COMPLETE 3 VIEW LEFT: ICD-10-PCS | Mod: 26,LT,, | Performed by: RADIOLOGY

## 2023-03-02 PROCEDURE — 99213 PR OFFICE/OUTPT VISIT, EST, LEVL III, 20-29 MIN: ICD-10-PCS | Mod: ,,,

## 2023-03-02 PROCEDURE — 3080F DIAST BP >= 90 MM HG: CPT | Mod: CPTII,,,

## 2023-03-02 PROCEDURE — 1101F PT FALLS ASSESS-DOCD LE1/YR: CPT | Mod: CPTII,,,

## 2023-03-02 PROCEDURE — 3044F HG A1C LEVEL LT 7.0%: CPT | Mod: CPTII,,,

## 2023-03-02 PROCEDURE — 1157F ADVNC CARE PLAN IN RCRD: CPT | Mod: CPTII,,,

## 2023-03-02 PROCEDURE — 3077F PR MOST RECENT SYSTOLIC BLOOD PRESSURE >= 140 MM HG: ICD-10-PCS | Mod: CPTII,,,

## 2023-03-02 PROCEDURE — 99213 OFFICE O/P EST LOW 20 MIN: CPT | Mod: ,,,

## 2023-03-02 PROCEDURE — 73610 X-RAY EXAM OF ANKLE: CPT | Mod: 26,LT,, | Performed by: RADIOLOGY

## 2023-03-02 PROCEDURE — 1157F PR ADVANCE CARE PLAN OR EQUIV PRESENT IN MEDICAL RECORD: ICD-10-PCS | Mod: CPTII,,,

## 2023-03-02 PROCEDURE — 1101F PR PT FALLS ASSESS DOC 0-1 FALLS W/OUT INJ PAST YR: ICD-10-PCS | Mod: CPTII,,,

## 2023-03-02 PROCEDURE — 1159F MED LIST DOCD IN RCRD: CPT | Mod: CPTII,,,

## 2023-03-02 PROCEDURE — 3008F PR BODY MASS INDEX (BMI) DOCUMENTED: ICD-10-PCS | Mod: CPTII,,,

## 2023-03-02 PROCEDURE — 3288F PR FALLS RISK ASSESSMENT DOCUMENTED: ICD-10-PCS | Mod: CPTII,,,

## 2023-03-02 PROCEDURE — 3288F FALL RISK ASSESSMENT DOCD: CPT | Mod: CPTII,,,

## 2023-03-02 PROCEDURE — 1125F AMNT PAIN NOTED PAIN PRSNT: CPT | Mod: CPTII,,,

## 2023-03-02 PROCEDURE — 1125F PR PAIN SEVERITY QUANTIFIED, PAIN PRESENT: ICD-10-PCS | Mod: CPTII,,,

## 2023-03-02 PROCEDURE — 3080F PR MOST RECENT DIASTOLIC BLOOD PRESSURE >= 90 MM HG: ICD-10-PCS | Mod: CPTII,,,

## 2023-03-02 PROCEDURE — 99999 PR PBB SHADOW E&M-EST. PATIENT-LVL IV: ICD-10-PCS | Mod: PBBFAC,,,

## 2023-03-02 PROCEDURE — 73560 X-RAY EXAM OF KNEE 1 OR 2: CPT | Mod: 26,LT,, | Performed by: RADIOLOGY

## 2023-03-02 PROCEDURE — 3044F PR MOST RECENT HEMOGLOBIN A1C LEVEL <7.0%: ICD-10-PCS | Mod: CPTII,,,

## 2023-03-02 PROCEDURE — 3077F SYST BP >= 140 MM HG: CPT | Mod: CPTII,,,

## 2023-03-02 PROCEDURE — 1159F PR MEDICATION LIST DOCUMENTED IN MEDICAL RECORD: ICD-10-PCS | Mod: CPTII,,,

## 2023-03-02 PROCEDURE — 73560 XR KNEE 1 OR 2 VIEW LEFT: ICD-10-PCS | Mod: 26,LT,, | Performed by: RADIOLOGY

## 2023-03-02 PROCEDURE — 73610 X-RAY EXAM OF ANKLE: CPT | Mod: TC,PN,LT

## 2023-03-02 PROCEDURE — 3008F BODY MASS INDEX DOCD: CPT | Mod: CPTII,,,

## 2023-03-02 PROCEDURE — 73560 X-RAY EXAM OF KNEE 1 OR 2: CPT | Mod: TC,PN,LT

## 2023-03-02 RX ORDER — NAPROXEN 500 MG/1
500 TABLET ORAL 2 TIMES DAILY WITH MEALS
Qty: 20 TABLET | Refills: 0 | Status: SHIPPED | OUTPATIENT
Start: 2023-03-02 | End: 2023-03-12

## 2023-03-02 NOTE — PROGRESS NOTES
"[facility] - Clinic Visit Note    Subjective:           Chief Complaint: Knee Pain (Left knee/x1w)    Buddy Park is a 72 y.o. male presenting to clinic today here today with complaints of left knee and ankle pain that started over one week ago after almost falling when stepped on a rock. The pain is only present when weight bearing.  Recovering from stroke, is working with PT but not on legs anymore.   Not currently taking anything for it.       Review of Systems   Constitutional:  Negative for chills and fever.   HENT:  Negative for congestion.    Respiratory:  Negative for cough and shortness of breath.    Cardiovascular:  Negative for chest pain and leg swelling.   Gastrointestinal:  Negative for abdominal pain, constipation, diarrhea, nausea and vomiting.   Genitourinary:  Negative for difficulty urinating.   Musculoskeletal:  Positive for arthralgias.   Neurological:  Negative for dizziness and headaches.   All other systems reviewed and are negative.        Objective:      BP (!) 160/98 (BP Location: Left arm, Patient Position: Sitting, BP Method: Medium (Manual))   Pulse 99   Resp 15   Ht 5' 3" (1.6 m)   SpO2 97%   BMI 25.69 kg/m²   Physical Exam  Vitals and nursing note reviewed.   Constitutional:       Appearance: Normal appearance.   HENT:      Head: Normocephalic and atraumatic.      Nose: Nose normal.   Pulmonary:      Effort: Pulmonary effort is normal.   Abdominal:      General: Abdomen is flat.   Musculoskeletal:         General: Normal range of motion.      Cervical back: Normal range of motion.      Comments: In wheelchair, no redness, swelling or heat noted on left ankle or knee   Skin:     General: Skin is warm and dry.   Neurological:      Mental Status: He is alert and oriented to person, place, and time.   Psychiatric:         Mood and Affect: Mood normal.         Behavior: Behavior normal.         Thought Content: Thought content normal.         Judgment: Judgment normal.     "   Assessment and Plan:       1. Knee strain, left, initial encounter  -     naproxen (NAPROSYN) 500 MG tablet; Take 1 tablet (500 mg total) by mouth 2 (two) times daily with meals. for 10 days  Dispense: 20 tablet; Refill: 0  -     X-Ray Knee 1 or 2 View Left; Future; Expected date: 03/02/2023    2. Ankle strain, left, initial encounter  -     naproxen (NAPROSYN) 500 MG tablet; Take 1 tablet (500 mg total) by mouth 2 (two) times daily with meals. for 10 days  Dispense: 20 tablet; Refill: 0  -     X-Ray Ankle Complete Left; Future; Expected date: 03/02/2023        PLAN: Applies to all diagnosis and orders listed above.  - naproxen with food  - xray of ankle and knee ordered  - Follow up if symptoms worsen or fail to improve.     Discussed with patient the plan of care. Medications reviewed. Medication side effects discussed. Patient has no questions or concerns at this time. Reviewed, monitored, evaluated, and assessed chronic conditions as outlined above. Reviewed family, medical, surgical, and social history. Reviewed and discussed labs and imaging from the last 3 months.  Informed patient to please notify me with any questions or concerns at anytime.    Thank you,  MIKE Ramos  Family Medicine  [facility]

## 2023-03-07 DIAGNOSIS — M89.9 LYTIC BONE LESION OF FEMUR: Primary | ICD-10-CM

## 2023-03-09 ENCOUNTER — TELEPHONE (OUTPATIENT)
Dept: FAMILY MEDICINE | Facility: CLINIC | Age: 73
End: 2023-03-09
Payer: MEDICARE

## 2023-03-09 NOTE — TELEPHONE ENCOUNTER
----- Message from Zaynab Prasad NP sent at 3/7/2023  3:18 PM CST -----  Please let the patient know that I reviewed his x-rays and it looks like he also has some thinning of his bones in his leg. This puts him at greater risk for fracture. I would like to do another full body bone scan to see the health of all his other bones and see if this is not caused by another disease process. I will place this order for him, please schedule.     Thank you,  Leonila Prasad, MIKE

## 2023-03-10 ENCOUNTER — PATIENT MESSAGE (OUTPATIENT)
Dept: FAMILY MEDICINE | Facility: CLINIC | Age: 73
End: 2023-03-10
Payer: MEDICARE

## 2023-03-10 DIAGNOSIS — F41.9 ANXIETY: ICD-10-CM

## 2023-03-10 DIAGNOSIS — F32.5 MAJOR DEPRESSIVE DISORDER WITH SINGLE EPISODE, IN FULL REMISSION: Primary | ICD-10-CM

## 2023-03-16 ENCOUNTER — TELEPHONE (OUTPATIENT)
Dept: FAMILY MEDICINE | Facility: CLINIC | Age: 73
End: 2023-03-16
Payer: MEDICARE

## 2023-03-16 ENCOUNTER — CLINICAL SUPPORT (OUTPATIENT)
Dept: FAMILY MEDICINE | Facility: CLINIC | Age: 73
End: 2023-03-16
Payer: MEDICARE

## 2023-03-16 VITALS — SYSTOLIC BLOOD PRESSURE: 134 MMHG | HEART RATE: 84 BPM | DIASTOLIC BLOOD PRESSURE: 82 MMHG

## 2023-03-16 DIAGNOSIS — I10 PRIMARY HYPERTENSION: Primary | ICD-10-CM

## 2023-03-16 PROCEDURE — 99999 PR PBB SHADOW E&M-EST. PATIENT-LVL I: ICD-10-PCS | Mod: PBBFAC,,,

## 2023-03-16 PROCEDURE — 99999 PR PBB SHADOW E&M-EST. PATIENT-LVL I: CPT | Mod: PBBFAC,,,

## 2023-03-16 RX ORDER — CARVEDILOL 25 MG/1
25 TABLET ORAL DAILY
Qty: 30 TABLET | Refills: 2 | Status: SHIPPED | OUTPATIENT
Start: 2023-03-16 | End: 2024-03-15

## 2023-03-16 NOTE — TELEPHONE ENCOUNTER
Pt came into clinic for BP check. Pt blood pressure WNL. Pt states he has been running high some days ranging from 150-190 systolic he takes clonidine PRN and it makes him very dizzy and weak. He is requesting an adjustment to his daily HTN management regimen

## 2023-03-16 NOTE — PROGRESS NOTES
Buddy Park 72 y.o. male is here today for Blood Pressure check.   History of HTN yes.    Review of patient's allergies indicates:   Allergen Reactions    Bee pollens Shortness Of Breath     WASP, BEE, ANT AND CATERPILLER STINGS    Hydrochlorothiazide Shortness Of Breath and Rash    Milk containing products Diarrhea    Metoprolol Rash     Creatinine   Date Value Ref Range Status   02/06/2023 1.5 (H) 0.5 - 1.4 mg/dL Final   03/19/2012 1.1 0.2 - 1.4 mg/dl Final     Sodium   Date Value Ref Range Status   02/06/2023 140 136 - 145 mmol/L Final     Potassium   Date Value Ref Range Status   02/06/2023 3.6 3.5 - 5.1 mmol/L Final   ]  Patient verifies taking blood pressure medications on a regular basis at the same time of the day.     Current Outpatient Medications:     amLODIPine (NORVASC) 10 MG tablet, Take 1 tablet (10 mg total) by mouth once daily., Disp: 90 tablet, Rfl: 3    aspirin (ECOTRIN) 81 MG EC tablet, Take 81 mg by mouth once daily., Disp: , Rfl:     b complex vitamins tablet, Take 1 tablet by mouth once daily., Disp: , Rfl:     baclofen (LIORESAL) 10 MG tablet, Take 1 tablet (10 mg total) by mouth 2 (two) times daily as needed (muscle spasm)., Disp: 60 tablet, Rfl: 3    blood sugar diagnostic Strp, To check BG 2-3 times daily, to use with insurance preferred meter (Patient not taking: Reported on 1/6/2023), Disp: 100 each, Rfl: 0    blood-glucose meter kit, To check BG 2-3 times daily, to use with insurance preferred meter, Disp: 1 each, Rfl: 0    buPROPion (WELLBUTRIN XL) 150 MG TB24 tablet, TAKE 1 TABLET BY MOUTH EVERY DAY, Disp: 90 tablet, Rfl: 3    carvediloL (COREG) 12.5 MG tablet, TAKE 1 TABLET BY MOUTH EVERY DAY, Disp: 90 tablet, Rfl: 0    cholestyramine-aspartame (CHOLESTYRAMINE LIGHT) 4 gram Powd, Take 4 g by mouth 2 (two) times daily as needed (diarrhea)., Disp: 239.4 g, Rfl: 1    cloNIDine (CATAPRES) 0.1 MG tablet, Take 1 tablet (0.1 mg total) by mouth 2 (two) times daily as needed (For blood  pressure with the top number above 150 or bottom number above 100)., Disp: 30 tablet, Rfl: 0    clopidogreL (PLAVIX) 75 mg tablet, TAKE 1 TABLET BY MOUTH EVERY DAY, Disp: 90 tablet, Rfl: 3    colchicine (COLCRYS) 0.6 mg tablet, Take 1 tablet (0.6 mg total) by mouth once daily., Disp: 90 tablet, Rfl: 3    colestipoL (COLESTID) 1 gram Tab, TAKE 1 TABLET BY MOUTH TWICE A DAY, Disp: 180 tablet, Rfl: 0    ferrous sulfate (FEOSOL) 325 mg (65 mg iron) Tab tablet, Take 325 mg by mouth., Disp: , Rfl:     gabapentin (NEURONTIN) 400 MG capsule, Take 400 mg by mouth 3 (three) times daily., Disp: , Rfl:     HYDROcodone-acetaminophen (NORCO) 7.5-325 mg per tablet, Take 1 tablet by mouth every 6 (six) hours as needed for Pain. (Patient not taking: Reported on 1/6/2023), Disp: 12 tablet, Rfl: 0    ketoconazole (NIZORAL) 2 % cream, Apply topically 2 (two) times daily., Disp: 60 g, Rfl: 1    ketorolac (TORADOL) 10 mg tablet, Take 1 tablet (10 mg total) by mouth every 6 (six) hours as needed for Pain., Disp: 28 tablet, Rfl: 0    lancets Misc, To check BG 2 times daily, to use with insurance preferred meter (Patient not taking: Reported on 1/6/2023), Disp: 90 each, Rfl: 0    leg brace Misc, Use left foot brace as directed, Disp: 1 each, Rfl: 0    miconazole, bulk, Powd, 1 application by Misc.(Non-Drug; Combo Route) route 2 (two) times daily as needed (rash)., Disp: 100 g, Rfl: 1    multivitamin (THERAGRAN) per tablet, Take 1 tablet by mouth., Disp: , Rfl:     nitroGLYCERIN (NITROSTAT) 0.4 MG SL tablet, Place 1 tablet (0.4 mg total) under the tongue every 5 (five) minutes as needed for Chest pain., Disp: 25 tablet, Rfl: 11    rosuvastatin (CRESTOR) 40 MG Tab, Take 1 tablet (40 mg total) by mouth every evening., Disp: 90 tablet, Rfl: 3  No current facility-administered medications for this visit.    Facility-Administered Medications Ordered in Other Visits:     lactated ringers infusion, , Intravenous, Continuous, Demario Yates MD,  Last Rate: 0 mL/hr at 10/28/19 1405, New Bag at 12/02/19 0943  Does patient have record of home blood pressure readings yes.   Last dose of blood pressure medication was taken at this morning.  Patient is symptomatic.       BP: 134/82 , Pulse: 84 .    Blood pressure reading after 15 minutes was 134/82, Pulse 84.

## 2023-03-16 NOTE — TELEPHONE ENCOUNTER
Sure, Please notify the patient that I increased his Coreg to 25 mg daily. I sent in a new prescription for him as well.     Thank you,  MIKE Ramos

## 2023-03-22 ENCOUNTER — HOSPITAL ENCOUNTER (OUTPATIENT)
Dept: RADIOLOGY | Facility: HOSPITAL | Age: 73
Discharge: HOME | End: 2023-03-22
Payer: MEDICARE

## 2023-03-22 DIAGNOSIS — M89.9 LYTIC BONE LESION OF FEMUR: ICD-10-CM

## 2023-03-22 PROCEDURE — 78306 BONE IMAGING WHOLE BODY: CPT | Mod: TC

## 2023-03-22 PROCEDURE — A9503 TC99M MEDRONATE: HCPCS

## 2023-03-22 PROCEDURE — 78306 BONE IMAGING WHOLE BODY: CPT | Mod: 26,,, | Performed by: RADIOLOGY

## 2023-03-22 PROCEDURE — 78306 NM BONE SCAN WHOLE BODY: ICD-10-PCS | Mod: 26,,, | Performed by: RADIOLOGY

## 2023-03-23 ENCOUNTER — TELEPHONE (OUTPATIENT)
Dept: FAMILY MEDICINE | Facility: CLINIC | Age: 73
End: 2023-03-23
Payer: MEDICARE

## 2023-03-23 ENCOUNTER — HOSPITAL ENCOUNTER (OUTPATIENT)
Dept: RADIOLOGY | Facility: HOSPITAL | Age: 73
Discharge: HOME | End: 2023-03-23
Payer: MEDICARE

## 2023-03-23 DIAGNOSIS — R10.2 PELVIC PAIN: Primary | ICD-10-CM

## 2023-03-23 DIAGNOSIS — R10.2 PELVIC PAIN: ICD-10-CM

## 2023-03-23 PROCEDURE — 73502 XR HIP WITH PELVIS WHEN PERFORMED, 2 OR 3 VIEWS LEFT: ICD-10-PCS | Mod: 26,LT,, | Performed by: RADIOLOGY

## 2023-03-23 PROCEDURE — 73502 X-RAY EXAM HIP UNI 2-3 VIEWS: CPT | Mod: 26,LT,, | Performed by: RADIOLOGY

## 2023-03-23 PROCEDURE — 73502 X-RAY EXAM HIP UNI 2-3 VIEWS: CPT | Mod: TC,PN,LT

## 2023-03-23 NOTE — PROGRESS NOTES
Discussed results with patient via telephone call. He reports he cannot have an MRI d/t the type of cochlear implant he has. Will refer to Orthopedics for further evaluation. Patient voiced understanding.

## 2023-03-23 NOTE — TELEPHONE ENCOUNTER
Spoke with patient via telephone regarding bone scan results after collaborating with Dr. Sheriff (Orthopedic specialist). Recommending plain films of left hip and pelvis and go from there. Recommended starting Vitamin D and calcium supplement to help with osteopenia. Pt. Verbalized understanding, no further questions.

## 2023-03-23 NOTE — PROGRESS NOTES
Spoke with patient via telephone about results. Recommending x-ray of left hip and pelvis. Verbalized understanding, would like it scheduled as soon as possible. Orders placed.   Please contact patient to schedule.    Thank you,  MIKE Ramos

## 2023-03-24 ENCOUNTER — TELEPHONE (OUTPATIENT)
Dept: ORTHOPEDICS | Facility: CLINIC | Age: 73
End: 2023-03-24
Payer: MEDICARE

## 2023-03-27 ENCOUNTER — OFFICE VISIT (OUTPATIENT)
Dept: ORTHOPEDICS | Facility: CLINIC | Age: 73
End: 2023-03-27
Payer: MEDICARE

## 2023-03-27 DIAGNOSIS — M16.12 PRIMARY OSTEOARTHRITIS OF LEFT HIP: Primary | ICD-10-CM

## 2023-03-27 DIAGNOSIS — R10.2 PELVIC PAIN: ICD-10-CM

## 2023-03-27 PROCEDURE — 99213 OFFICE O/P EST LOW 20 MIN: CPT | Mod: S$GLB,,, | Performed by: ORTHOPAEDIC SURGERY

## 2023-03-27 PROCEDURE — 1160F PR REVIEW ALL MEDS BY PRESCRIBER/CLIN PHARMACIST DOCUMENTED: ICD-10-PCS | Mod: CPTII,S$GLB,, | Performed by: ORTHOPAEDIC SURGERY

## 2023-03-27 PROCEDURE — 1157F PR ADVANCE CARE PLAN OR EQUIV PRESENT IN MEDICAL RECORD: ICD-10-PCS | Mod: CPTII,S$GLB,, | Performed by: ORTHOPAEDIC SURGERY

## 2023-03-27 PROCEDURE — 99999 PR PBB SHADOW E&M-EST. PATIENT-LVL III: CPT | Mod: PBBFAC,,, | Performed by: ORTHOPAEDIC SURGERY

## 2023-03-27 PROCEDURE — 99999 PR PBB SHADOW E&M-EST. PATIENT-LVL III: ICD-10-PCS | Mod: PBBFAC,,, | Performed by: ORTHOPAEDIC SURGERY

## 2023-03-27 PROCEDURE — 1157F ADVNC CARE PLAN IN RCRD: CPT | Mod: CPTII,S$GLB,, | Performed by: ORTHOPAEDIC SURGERY

## 2023-03-27 PROCEDURE — 3044F PR MOST RECENT HEMOGLOBIN A1C LEVEL <7.0%: ICD-10-PCS | Mod: CPTII,S$GLB,, | Performed by: ORTHOPAEDIC SURGERY

## 2023-03-27 PROCEDURE — 3044F HG A1C LEVEL LT 7.0%: CPT | Mod: CPTII,S$GLB,, | Performed by: ORTHOPAEDIC SURGERY

## 2023-03-27 PROCEDURE — 99213 PR OFFICE/OUTPT VISIT, EST, LEVL III, 20-29 MIN: ICD-10-PCS | Mod: S$GLB,,, | Performed by: ORTHOPAEDIC SURGERY

## 2023-03-27 PROCEDURE — 1159F PR MEDICATION LIST DOCUMENTED IN MEDICAL RECORD: ICD-10-PCS | Mod: CPTII,S$GLB,, | Performed by: ORTHOPAEDIC SURGERY

## 2023-03-27 PROCEDURE — 1160F RVW MEDS BY RX/DR IN RCRD: CPT | Mod: CPTII,S$GLB,, | Performed by: ORTHOPAEDIC SURGERY

## 2023-03-27 PROCEDURE — 1159F MED LIST DOCD IN RCRD: CPT | Mod: CPTII,S$GLB,, | Performed by: ORTHOPAEDIC SURGERY

## 2023-03-27 PROCEDURE — 1126F AMNT PAIN NOTED NONE PRSNT: CPT | Mod: CPTII,S$GLB,, | Performed by: ORTHOPAEDIC SURGERY

## 2023-03-27 PROCEDURE — 1126F PR PAIN SEVERITY QUANTIFIED, NO PAIN PRESENT: ICD-10-PCS | Mod: CPTII,S$GLB,, | Performed by: ORTHOPAEDIC SURGERY
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
## 2023-04-24 ENCOUNTER — HOSPITAL ENCOUNTER (OUTPATIENT)
Dept: RADIOLOGY | Facility: HOSPITAL | Age: 73
Discharge: HOME | End: 2023-04-24
Attending: ORTHOPAEDIC SURGERY
Payer: MEDICARE

## 2023-04-24 ENCOUNTER — OFFICE VISIT (OUTPATIENT)
Dept: ORTHOPEDICS | Facility: CLINIC | Age: 73
End: 2023-04-24
Payer: MEDICARE

## 2023-04-24 DIAGNOSIS — M25.512 LEFT SHOULDER PAIN, UNSPECIFIED CHRONICITY: Primary | ICD-10-CM

## 2023-04-24 DIAGNOSIS — M25.512 LEFT SHOULDER PAIN, UNSPECIFIED CHRONICITY: ICD-10-CM

## 2023-04-24 DIAGNOSIS — M79.602 LEFT ARM PAIN: Primary | ICD-10-CM

## 2023-04-24 PROCEDURE — 1157F ADVNC CARE PLAN IN RCRD: CPT | Mod: CPTII,S$GLB,, | Performed by: ORTHOPAEDIC SURGERY

## 2023-04-24 PROCEDURE — 73030 X-RAY EXAM OF SHOULDER: CPT | Mod: 26,LT,, | Performed by: RADIOLOGY

## 2023-04-24 PROCEDURE — 1160F RVW MEDS BY RX/DR IN RCRD: CPT | Mod: CPTII,S$GLB,, | Performed by: ORTHOPAEDIC SURGERY

## 2023-04-24 PROCEDURE — 1159F PR MEDICATION LIST DOCUMENTED IN MEDICAL RECORD: ICD-10-PCS | Mod: CPTII,S$GLB,, | Performed by: ORTHOPAEDIC SURGERY

## 2023-04-24 PROCEDURE — 3288F PR FALLS RISK ASSESSMENT DOCUMENTED: ICD-10-PCS | Mod: CPTII,S$GLB,, | Performed by: ORTHOPAEDIC SURGERY

## 2023-04-24 PROCEDURE — 3288F FALL RISK ASSESSMENT DOCD: CPT | Mod: CPTII,S$GLB,, | Performed by: ORTHOPAEDIC SURGERY

## 2023-04-24 PROCEDURE — 99999 PR PBB SHADOW E&M-EST. PATIENT-LVL III: ICD-10-PCS | Mod: PBBFAC,,, | Performed by: ORTHOPAEDIC SURGERY

## 2023-04-24 PROCEDURE — 1159F MED LIST DOCD IN RCRD: CPT | Mod: CPTII,S$GLB,, | Performed by: ORTHOPAEDIC SURGERY

## 2023-04-24 PROCEDURE — 1125F PR PAIN SEVERITY QUANTIFIED, PAIN PRESENT: ICD-10-PCS | Mod: CPTII,S$GLB,, | Performed by: ORTHOPAEDIC SURGERY

## 2023-04-24 PROCEDURE — 73030 X-RAY EXAM OF SHOULDER: CPT | Mod: TC,PN,LT

## 2023-04-24 PROCEDURE — 1125F AMNT PAIN NOTED PAIN PRSNT: CPT | Mod: CPTII,S$GLB,, | Performed by: ORTHOPAEDIC SURGERY

## 2023-04-24 PROCEDURE — 99999 PR PBB SHADOW E&M-EST. PATIENT-LVL III: CPT | Mod: PBBFAC,,, | Performed by: ORTHOPAEDIC SURGERY

## 2023-04-24 PROCEDURE — 3044F HG A1C LEVEL LT 7.0%: CPT | Mod: CPTII,S$GLB,, | Performed by: ORTHOPAEDIC SURGERY

## 2023-04-24 PROCEDURE — 1100F PR PT FALLS ASSESS DOC 2+ FALLS/FALL W/INJURY/YR: ICD-10-PCS | Mod: CPTII,S$GLB,, | Performed by: ORTHOPAEDIC SURGERY

## 2023-04-24 PROCEDURE — 99213 PR OFFICE/OUTPT VISIT, EST, LEVL III, 20-29 MIN: ICD-10-PCS | Mod: S$GLB,,, | Performed by: ORTHOPAEDIC SURGERY

## 2023-04-24 PROCEDURE — 1157F PR ADVANCE CARE PLAN OR EQUIV PRESENT IN MEDICAL RECORD: ICD-10-PCS | Mod: CPTII,S$GLB,, | Performed by: ORTHOPAEDIC SURGERY

## 2023-04-24 PROCEDURE — 99213 OFFICE O/P EST LOW 20 MIN: CPT | Mod: S$GLB,,, | Performed by: ORTHOPAEDIC SURGERY

## 2023-04-24 PROCEDURE — 1100F PTFALLS ASSESS-DOCD GE2>/YR: CPT | Mod: CPTII,S$GLB,, | Performed by: ORTHOPAEDIC SURGERY

## 2023-04-24 PROCEDURE — 73030 XR SHOULDER TRAUMA 3 VIEW LEFT: ICD-10-PCS | Mod: 26,LT,, | Performed by: RADIOLOGY

## 2023-04-24 PROCEDURE — 1160F PR REVIEW ALL MEDS BY PRESCRIBER/CLIN PHARMACIST DOCUMENTED: ICD-10-PCS | Mod: CPTII,S$GLB,, | Performed by: ORTHOPAEDIC SURGERY

## 2023-04-24 PROCEDURE — 3044F PR MOST RECENT HEMOGLOBIN A1C LEVEL <7.0%: ICD-10-PCS | Mod: CPTII,S$GLB,, | Performed by: ORTHOPAEDIC SURGERY

## 2023-04-24 NOTE — PROGRESS NOTES
Subjective:      Patient ID: Buddy Park is a 72 y.o. male.    Chief Complaint: Injury and Pain of the Left Upper Arm    HPI  Patient is in with a new complaint left upper arm pain.  He sustained actual blunt trauma during a fall comes in as a precaution before proceeding with this at therapy at the suggestion of the therapist.  He states the was actually mainly upon by as wife a sometime last week.  He had difficulty with what little motion he has in his shoulder status post a left-sided CVA after just several days.  ROS      Objective:    Ortho Exam     Constitutional:   Patient is alert  and oriented in no acute distress  HEENT:  normocephalic atraumatic; PERRL EOMI  Neck:  Supple without adenopathy  Cardiovascular:  Normal rate and rhythm  Pulmonary:  Normal respiratory effort normal chest wall expansion  Abdominal:  Nonprotuberant nondistended  Musculoskeletal:  Patient has some subtle ecchymosis  Over the distal left deltoid otherwise no change in very limited active use of the left arm  Neurological:  No focal defect; cranial nerves 2-12 grossly intact  Psychiatric/behavioral:  Mood and behavior normal      X-Ray Shoulder Trauma 3 view Left  Narrative: EXAMINATION:  XR SHOULDER TRAUMA 3 VIEW LEFT    CLINICAL HISTORY:  Pain in left shoulder    TECHNIQUE:  Three views of the left shoulder were performed.    COMPARISON  08/10/2020.    FINDINGS:  Mild glenohumeral degenerative osteoarthrosis with mild joint space narrowing and small osteophyte formation.  AC joint intact with mild early degenerative osteoarthrosis.    There is bone demineralization.  Impression: Mild degenerative osteoarthrosis.    Electronically signed by: Bobo Erickson  Date:    04/24/2023  Time:    11:01       My Findings:    I have personally reviewed radiographs and concur with above findings    Assessment:       Encounter Diagnosis   Name Primary?    Left arm pain Yes         Plan:       I have explained to the patient I see no cause  for concern.  I think he continues to do his therapy without any restrictions.  He can ice the area condition local massage and moist heat over the next several days.  Advance activities as tolerated.  Follow-up can be as needed.        Past Medical History:   Diagnosis Date    Angina pectoris     Anxiety     Arthritis     Biliary colic     Cochlear implant in place     Deaf     LEFT EAR    Depression     Diabetes mellitus type 2 without retinopathy 1/5/2022    Heart failure     High cholesterol     History of colon polyps 2/20/2018    [place] (hard of hearing)     HEARING AID - RIGHT    Hyperlipidemia     Hypertension     Kidney stone     Kidney stones     Renal stones     Stroke     Trouble in sleeping     Wears glasses      Past Surgical History:   Procedure Laterality Date    CAROTID ARTERY ANGIOPLASTY  06/2018    [facility]     CATARACT EXTRACTION Bilateral     CHOLECYSTECTOMY  02/06/2014    COLONOSCOPY  01/09/2013    Dr. Gillette, 5 year recheck (family history colon cancer)    COLONOSCOPY N/A 03/12/2018    Procedure: COLONOSCOPY;  Surgeon: Garett Go MD;  Location: [facility] ENDO;  Service: Endoscopy;  Laterality: N/A;    COLONOSCOPY N/A 02/15/2021    Procedure: COLONOSCOPY;  Surgeon: Garett Go MD;  Location: [facility] ENDO;  Service: Endoscopy;  Laterality: N/A;    CYSTOSCOPY W/ RETROGRADES Bilateral 10/28/2019    Procedure: CYSTOSCOPY, WITH RETROGRADE PYELOGRAM;  Surgeon: Gretchen Proctor MD;  Location: [facility] OR;  Service: Urology;  Laterality: Bilateral;    CYSTOSCOPY W/ URETERAL STENT PLACEMENT Left 10/28/2019    Procedure: CYSTOSCOPY, WITH URETERAL STENT INSERTION;  Surgeon: Gretchen Proctor MD;  Location: [facility] OR;  Service: Urology;  Laterality: Left;    CYSTOSCOPY W/ URETERAL STENT REMOVAL Left 12/02/2019    Procedure: CYSTOSCOPY, WITH URETERAL STENT REMOVAL;  Surgeon: Gretchen Proctor MD;  Location: [facility] OR;  Service: Urology;  Laterality: Left;    EAR MASTOIDECTOMY W/ COCHLEAR IMPLANT W/ LANDMARK      left  ear    EXTRACORPOREAL SHOCK WAVE LITHOTRIPSY Left 12/02/2019    Procedure: LITHOTRIPSY, ESWL;  Surgeon: Gretchen Proctor MD;  Location: [facility];  Service: Urology;  Laterality: Left;    EYE SURGERY      FOREIGN BODY REMOVAL Right     glass removed from right foot/repair    FRACTURE SURGERY      right arm x2    HIP SURGERY Left     LITHOTRIPSY      ROTATOR CUFF REPAIR      Right     SINUS SURGERY      TONSILLECTOMY      VASCULAR SURGERY           Current Outpatient Medications:     amLODIPine (NORVASC) 10 MG tablet, Take 1 tablet (10 mg total) by mouth once daily., Disp: 90 tablet, Rfl: 3    aspirin (ECOTRIN) 81 MG EC tablet, Take 81 mg by mouth once daily., Disp: , Rfl:     b complex vitamins tablet, Take 1 tablet by mouth once daily., Disp: , Rfl:     baclofen (LIORESAL) 10 MG tablet, Take 1 tablet (10 mg total) by mouth 2 (two) times daily as needed (muscle spasm)., Disp: 60 tablet, Rfl: 3    blood sugar diagnostic Strp, To check BG 2-3 times daily, to use with insurance preferred meter (Patient not taking: Reported on 1/6/2023), Disp: 100 each, Rfl: 0    blood-glucose meter kit, To check BG 2-3 times daily, to use with insurance preferred meter, Disp: 1 each, Rfl: 0    buPROPion (WELLBUTRIN XL) 150 MG TB24 tablet, TAKE 1 TABLET BY MOUTH EVERY DAY, Disp: 90 tablet, Rfl: 3    carvediloL (COREG) 25 MG tablet, Take 1 tablet (25 mg total) by mouth once daily., Disp: 30 tablet, Rfl: 2    cholestyramine-aspartame (CHOLESTYRAMINE LIGHT) 4 gram Powd, Take 4 g by mouth 2 (two) times daily as needed (diarrhea)., Disp: 239.4 g, Rfl: 1    cloNIDine (CATAPRES) 0.1 MG tablet, Take 1 tablet (0.1 mg total) by mouth 2 (two) times daily as needed (For blood pressure with the top number above 150 or bottom number above 100)., Disp: 30 tablet, Rfl: 0    clopidogreL (PLAVIX) 75 mg tablet, TAKE 1 TABLET BY MOUTH EVERY DAY, Disp: 90 tablet, Rfl: 3    colchicine (COLCRYS) 0.6 mg tablet, Take 1 tablet (0.6 mg total) by mouth once daily.,  Disp: 90 tablet, Rfl: 3    colestipoL (COLESTID) 1 gram Tab, TAKE 1 TABLET BY MOUTH TWICE A DAY, Disp: 180 tablet, Rfl: 0    ferrous sulfate (FEOSOL) 325 mg (65 mg iron) Tab tablet, Take 325 mg by mouth., Disp: , Rfl:     gabapentin (NEURONTIN) 400 MG capsule, Take 400 mg by mouth 3 (three) times daily., Disp: , Rfl:     HYDROcodone-acetaminophen (NORCO) 7.5-325 mg per tablet, Take 1 tablet by mouth every 6 (six) hours as needed for Pain. (Patient not taking: Reported on 1/6/2023), Disp: 12 tablet, Rfl: 0    ketoconazole (NIZORAL) 2 % cream, Apply topically 2 (two) times daily., Disp: 60 g, Rfl: 1    ketorolac (TORADOL) 10 mg tablet, Take 1 tablet (10 mg total) by mouth every 6 (six) hours as needed for Pain., Disp: 28 tablet, Rfl: 0    lancets Misc, To check BG 2 times daily, to use with insurance preferred meter (Patient not taking: Reported on 1/6/2023), Disp: 90 each, Rfl: 0    leg brace Misc, Use left foot brace as directed, Disp: 1 each, Rfl: 0    miconazole, bulk, Powd, 1 application by Misc.(Non-Drug; Combo Route) route 2 (two) times daily as needed (rash)., Disp: 100 g, Rfl: 1    multivitamin (THERAGRAN) per tablet, Take 1 tablet by mouth., Disp: , Rfl:     nitroGLYCERIN (NITROSTAT) 0.4 MG SL tablet, Place 1 tablet (0.4 mg total) under the tongue every 5 (five) minutes as needed for Chest pain., Disp: 25 tablet, Rfl: 11    rosuvastatin (CRESTOR) 40 MG Tab, Take 1 tablet (40 mg total) by mouth every evening., Disp: 90 tablet, Rfl: 3  No current facility-administered medications for this visit.    Facility-Administered Medications Ordered in Other Visits:     lactated ringers infusion, , Intravenous, Continuous, Demario Yates MD, Last Rate: 0 mL/hr at 10/28/19 1405, New Bag at 12/02/19 0946    Review of patient's allergies indicates:   Allergen Reactions    Bee pollens Shortness Of Breath     WASP, BEE, ANT AND CATERPILLER STINGS    Hydrochlorothiazide Shortness Of Breath and Rash    Milk containing  products Diarrhea    Metoprolol Rash       Family History   Problem Relation Age of Onset    Cancer Mother         colon    Heart disease Father     Gout Father     Kidney disease Father     Arthritis Father     Hypertension Father     Early death Daughter         mva    Alcohol abuse Brother     Cancer Brother         mouth cancer w mets    No Known Problems Sister     Alcohol abuse Brother     No Known Problems Son     Heart disease Maternal Grandmother         MI    Stroke Maternal Grandfather     Heart disease Paternal Grandmother         MI    No Known Problems Son     Cancer Paternal Uncle         lung cancer    Allergic rhinitis Neg Hx     Allergies Neg Hx     Angioedema Neg Hx     Asthma Neg Hx     Atopy Neg Hx     Eczema Neg Hx     Immunodeficiency Neg Hx     Rhinitis Neg Hx     Urticaria Neg Hx     Collagen disease Neg Hx      Social History     Occupational History    Not on file   Tobacco Use    Smoking status: Never    Smokeless tobacco: Never   Substance and Sexual Activity    Alcohol use: Yes     Comment: once every two months    Drug use: No    Sexual activity: Yes

## 2023-05-03 DIAGNOSIS — I10 HYPERTENSION, UNSPECIFIED TYPE: ICD-10-CM

## 2023-05-03 RX ORDER — CLONIDINE HYDROCHLORIDE 0.1 MG/1
0.1 TABLET ORAL 2 TIMES DAILY PRN
Qty: 30 TABLET | Refills: 0 | Status: SHIPPED | OUTPATIENT
Start: 2023-05-03 | End: 2024-05-02

## 2023-05-03 NOTE — TELEPHONE ENCOUNTER
----- Message from Lacey Sage sent at 5/3/2023 12:02 PM CDT -----  Regarding: pt called  Name of Who is Calling: LA OBRIEN [[id number]] bennett ( [facility] )      What is the request in detail: requesting a call back about patients elevated  blood pressure . He is out of medication.  Please advise       Can the clinic reply by [facility]: No      What [facility] to [facility] Back [facility]: [phone]

## 2023-05-03 NOTE — TELEPHONE ENCOUNTER
"  Dear Dr. Puckett,     In the absence of Gema Malone MD, can you please address this patients concern or request?      Spoke with Preston with [facility]. Preston states, " elevated /109, pt is out of clonidine. Always in this range. Pt just took his coreg 30 minutes ago, so it will be in system. " Advised the ER, yet Preston states, " BP is always in this range. Message sent to MD  Thank you,  Susan Pagan LPN       "

## 2023-05-26 ENCOUNTER — TELEPHONE (OUTPATIENT)
Dept: FAMILY MEDICINE | Facility: CLINIC | Age: 73
End: 2023-05-26
Payer: MEDICARE

## 2023-05-26 ENCOUNTER — PATIENT MESSAGE (OUTPATIENT)
Dept: FAMILY MEDICINE | Facility: CLINIC | Age: 73
End: 2023-05-26
Payer: MEDICARE

## 2023-05-26 ENCOUNTER — OFFICE VISIT (OUTPATIENT)
Dept: URGENT CARE | Facility: CLINIC | Age: 73
End: 2023-05-26
Payer: MEDICARE

## 2023-05-26 VITALS
RESPIRATION RATE: 18 BRPM | TEMPERATURE: 98 F | WEIGHT: 135 LBS | BODY MASS INDEX: 23.92 KG/M2 | OXYGEN SATURATION: 98 % | SYSTOLIC BLOOD PRESSURE: 170 MMHG | HEIGHT: 63 IN | DIASTOLIC BLOOD PRESSURE: 104 MMHG | HEART RATE: 84 BPM

## 2023-05-26 DIAGNOSIS — J02.9 PHARYNGITIS, UNSPECIFIED ETIOLOGY: Primary | ICD-10-CM

## 2023-05-26 PROCEDURE — 99212 OFFICE O/P EST SF 10 MIN: CPT | Mod: ,,, | Performed by: NURSE PRACTITIONER

## 2023-05-26 PROCEDURE — 99212 PR OFFICE/OUTPT VISIT, EST, LEVL II, 10-19 MIN: ICD-10-PCS | Mod: ,,, | Performed by: NURSE PRACTITIONER

## 2023-05-26 RX ORDER — AMOXICILLIN 500 MG/1
500 CAPSULE ORAL EVERY 8 HOURS
Qty: 30 CAPSULE | Refills: 0 | Status: SHIPPED | OUTPATIENT
Start: 2023-05-26 | End: 2023-06-05

## 2023-05-26 NOTE — PROGRESS NOTES
"Subjective:       Patient ID: Budyd Park is a 72 y.o. male.    Vitals:  height is 5' 3" (1.6 m) and weight is 61.2 kg (135 lb). His oral temperature is 98 °F (36.7 °C). His blood pressure is 170/104 (abnormal) and his pulse is 84. His respiration is 18 and oxygen saturation is 98%.     Chief Complaint: Sore Throat    This is a 72 y.o. male who presents today with a chief complaint of Sore Throat.  Patient presents with Sore Throat. BP noted to be elevated. Patient reports that he did not take is BP medication today.        Sore Throat   This is a new problem. The current episode started today. The problem has been unchanged. Neither side of throat is experiencing more pain than the other. There has been no fever. The pain is at a severity of 0/10. The patient is experiencing no pain. He has tried nothing for the symptoms. The treatment provided no relief.     HENT:  Positive for sore throat.          Objective:      Physical Exam   Constitutional: He is oriented to person, place, and time. normal  HENT:   Head: Normocephalic and atraumatic.   Nose: Nose normal.   Mouth/Throat: Uvula is midline and mucous membranes are normal. Mucous membranes are moist. Posterior oropharyngeal erythema present. Oropharynx is clear.   Eyes: Conjunctivae are normal. Extraocular movement intact   Neck: Neck supple.   Cardiovascular: Normal rate, regular rhythm, normal heart sounds and normal pulses.   Pulmonary/Chest: Effort normal and breath sounds normal.   Neurological: He is alert and oriented to person, place, and time.   Skin: Skin is warm and dry.   Psychiatric: His behavior is normal. Mood normal.   Vitals reviewed.      Past medical history and current medications reviewed.       Assessment:           1. Pharyngitis, unspecified etiology              Plan:     Take you blood pressure medication as prescribed when you get home.    Pharyngitis, unspecified etiology  -     amoxicillin (AMOXIL) 500 MG capsule; Take 1 capsule " (500 mg total) by mouth every 8 (eight) hours. for 10 days  Dispense: 30 capsule; Refill: 0             There are no Patient Instructions on file for this visit.           Medical Decision Making:   Differential Diagnosis:   URI

## 2023-05-26 NOTE — TELEPHONE ENCOUNTER
Dear Dr. Obregon,     In the absence of Dr. Malone, can you please address this patients concern or request?      Pt reports sore throat. OTC med not effective. Please advise.   Thank you,  Susan Pagan LPN

## 2023-05-26 NOTE — TELEPHONE ENCOUNTER
----- Message from Ana Heidy sent at 5/26/2023  9:40 AM CDT -----  Regarding: advice  Contact: patient  Type: Needs Medical Advice  Who Called:  patient  Symptoms (please be specific):  sore throat  How long has patient had these symptoms:  1 day  Pharmacy name and phone #:    [id number] - [place] - [address]  [address] [place] [address]  Phone: [phone] Fax: [phone]  Best Call Back Number: [phone] (home)     Additional Information: Patient requesting doctor call something in for his sore throat.  Please call patient to advise.  Thanks!

## 2023-06-12 ENCOUNTER — OFFICE VISIT (OUTPATIENT)
Dept: ORTHOPEDICS | Facility: CLINIC | Age: 73
End: 2023-06-12
Payer: MEDICARE

## 2023-06-12 DIAGNOSIS — M79.672 LEFT FOOT PAIN: Primary | ICD-10-CM

## 2023-06-12 DIAGNOSIS — M21.372 LEFT FOOT DROP: ICD-10-CM

## 2023-06-12 PROCEDURE — 1159F MED LIST DOCD IN RCRD: CPT | Mod: CPTII,S$GLB,, | Performed by: ORTHOPAEDIC SURGERY

## 2023-06-12 PROCEDURE — 99999 PR PBB SHADOW E&M-EST. PATIENT-LVL III: CPT | Mod: PBBFAC,,, | Performed by: ORTHOPAEDIC SURGERY

## 2023-06-12 PROCEDURE — 3044F PR MOST RECENT HEMOGLOBIN A1C LEVEL <7.0%: ICD-10-PCS | Mod: CPTII,S$GLB,, | Performed by: ORTHOPAEDIC SURGERY

## 2023-06-12 PROCEDURE — 99213 OFFICE O/P EST LOW 20 MIN: CPT | Mod: S$GLB,,, | Performed by: ORTHOPAEDIC SURGERY

## 2023-06-12 PROCEDURE — 1160F PR REVIEW ALL MEDS BY PRESCRIBER/CLIN PHARMACIST DOCUMENTED: ICD-10-PCS | Mod: CPTII,S$GLB,, | Performed by: ORTHOPAEDIC SURGERY

## 2023-06-12 PROCEDURE — 99999 PR PBB SHADOW E&M-EST. PATIENT-LVL III: ICD-10-PCS | Mod: PBBFAC,,, | Performed by: ORTHOPAEDIC SURGERY

## 2023-06-12 PROCEDURE — 1157F ADVNC CARE PLAN IN RCRD: CPT | Mod: CPTII,S$GLB,, | Performed by: ORTHOPAEDIC SURGERY

## 2023-06-12 PROCEDURE — 1159F PR MEDICATION LIST DOCUMENTED IN MEDICAL RECORD: ICD-10-PCS | Mod: CPTII,S$GLB,, | Performed by: ORTHOPAEDIC SURGERY

## 2023-06-12 PROCEDURE — 1160F RVW MEDS BY RX/DR IN RCRD: CPT | Mod: CPTII,S$GLB,, | Performed by: ORTHOPAEDIC SURGERY

## 2023-06-12 PROCEDURE — 1157F PR ADVANCE CARE PLAN OR EQUIV PRESENT IN MEDICAL RECORD: ICD-10-PCS | Mod: CPTII,S$GLB,, | Performed by: ORTHOPAEDIC SURGERY

## 2023-06-12 PROCEDURE — 99213 PR OFFICE/OUTPT VISIT, EST, LEVL III, 20-29 MIN: ICD-10-PCS | Mod: S$GLB,,, | Performed by: ORTHOPAEDIC SURGERY

## 2023-06-12 PROCEDURE — 3044F HG A1C LEVEL LT 7.0%: CPT | Mod: CPTII,S$GLB,, | Performed by: ORTHOPAEDIC SURGERY

## 2023-06-12 PROCEDURE — 1125F PR PAIN SEVERITY QUANTIFIED, PAIN PRESENT: ICD-10-PCS | Mod: CPTII,S$GLB,, | Performed by: ORTHOPAEDIC SURGERY

## 2023-06-12 PROCEDURE — 1125F AMNT PAIN NOTED PAIN PRSNT: CPT | Mod: CPTII,S$GLB,, | Performed by: ORTHOPAEDIC SURGERY

## 2023-06-12 NOTE — PROGRESS NOTES
Subjective:      Patient ID: Buddy Park is a 72 y.o. male.    Chief Complaint: Pain of the Left Foot    HPI  Patient is in with ongoing problems with his left lower extremity status post a CVA.  Was apparently prescribed an AFO in the past but it is such that he can not fasten or apply himself.  Apparently he was seen by therapist who recommended a different type of brace for him and some additional outpatient therapy to maximize his level of independence and functional rehab.  ROS      Objective:    Ortho Exam     Constitutional:   Patient is alert  and oriented in no acute distress  HEENT:  normocephalic atraumatic; PERRL EOMI  Neck:  Supple without adenopathy  Cardiovascular:  Normal rate and rhythm  Pulmonary:  Normal respiratory effort normal chest wall expansion  Abdominal:  Nonprotuberant nondistended  Musculoskeletal:  Patient comes in with significant Achilles tightness.  He can not achieve neutral plantar flexion poor motor control of his left lower extremity.  Neurological:  No focal defect; cranial nerves 2-12 grossly intact  Psychiatric/behavioral:  Mood and behavior normal      X-Ray Shoulder Trauma 3 view Left  Narrative: EXAMINATION:  XR SHOULDER TRAUMA 3 VIEW LEFT    CLINICAL HISTORY:  Pain in left shoulder    TECHNIQUE:  Three views of the left shoulder were performed.    COMPARISON  08/10/2020.    FINDINGS:  Mild glenohumeral degenerative osteoarthrosis with mild joint space narrowing and small osteophyte formation.  AC joint intact with mild early degenerative osteoarthrosis.    There is bone demineralization.  Impression: Mild degenerative osteoarthrosis.    Electronically signed by: Bobo Erickson  Date:    04/24/2023  Time:    11:01       My Radiographs Findings:    No new radiographs of the foot and ankle today.  Assessment:       Encounter Diagnoses   Name Primary?    Left foot pain Yes    Left foot drop          Plan:       I have discussed medical condition and treatment options with  him at length.  We will put in a prescription for outpatient therapy in his long as he is making progress they certainly can extend is prescription.  We will also submit paperwork to try to get him fitted for a different AFO that he can manage more independently.  Follow up can be as needed.        Past Medical History:   Diagnosis Date    Angina pectoris     Anxiety     Arthritis     Biliary colic     Cochlear implant in place     Deaf     LEFT EAR    Depression     Diabetes mellitus type 2 without retinopathy 1/5/2022    Heart failure     High cholesterol     History of colon polyps 2/20/2018    [place] (hard of hearing)     HEARING AID - RIGHT    Hyperlipidemia     Hypertension     Kidney stone     Kidney stones     Renal stones     Stroke     Trouble in sleeping     Wears glasses      Past Surgical History:   Procedure Laterality Date    CAROTID ARTERY ANGIOPLASTY  06/2018    [facility]     CATARACT EXTRACTION Bilateral     CHOLECYSTECTOMY  02/06/2014    COLONOSCOPY  01/09/2013    Dr. Gillette, 5 year recheck (family history colon cancer)    COLONOSCOPY N/A 03/12/2018    Procedure: COLONOSCOPY;  Surgeon: Garett Go MD;  Location: [facility] ENDO;  Service: Endoscopy;  Laterality: N/A;    COLONOSCOPY N/A 02/15/2021    Procedure: COLONOSCOPY;  Surgeon: Garett Go MD;  Location: [facility] ENDO;  Service: Endoscopy;  Laterality: N/A;    CYSTOSCOPY W/ RETROGRADES Bilateral 10/28/2019    Procedure: CYSTOSCOPY, WITH RETROGRADE PYELOGRAM;  Surgeon: Gretchen Proctor MD;  Location: [facility] OR;  Service: Urology;  Laterality: Bilateral;    CYSTOSCOPY W/ URETERAL STENT PLACEMENT Left 10/28/2019    Procedure: CYSTOSCOPY, WITH URETERAL STENT INSERTION;  Surgeon: Gretchen Proctor MD;  Location: [facility] OR;  Service: Urology;  Laterality: Left;    CYSTOSCOPY W/ URETERAL STENT REMOVAL Left 12/02/2019    Procedure: CYSTOSCOPY, WITH URETERAL STENT REMOVAL;  Surgeon: Gretchen Proctor MD;  Location: [facility] OR;  Service: Urology;  Laterality: Left;     EAR MASTOIDECTOMY W/ COCHLEAR IMPLANT W/ LANDMARK      left ear    EXTRACORPOREAL SHOCK WAVE LITHOTRIPSY Left 12/02/2019    Procedure: LITHOTRIPSY, ESWL;  Surgeon: Gretchen Proctor MD;  Location: [facility];  Service: Urology;  Laterality: Left;    EYE SURGERY      FOREIGN BODY REMOVAL Right     glass removed from right foot/repair    FRACTURE SURGERY      right arm x2    HIP SURGERY Left     LITHOTRIPSY      ROTATOR CUFF REPAIR      Right     SINUS SURGERY      TONSILLECTOMY      VASCULAR SURGERY           Current Outpatient Medications:     amLODIPine (NORVASC) 10 MG tablet, Take 1 tablet (10 mg total) by mouth once daily., Disp: 90 tablet, Rfl: 3    aspirin (ECOTRIN) 81 MG EC tablet, Take 81 mg by mouth once daily., Disp: , Rfl:     b complex vitamins tablet, Take 1 tablet by mouth once daily., Disp: , Rfl:     baclofen (LIORESAL) 10 MG tablet, Take 1 tablet (10 mg total) by mouth 2 (two) times daily as needed (muscle spasm)., Disp: 60 tablet, Rfl: 3    blood sugar diagnostic Strp, To check BG 2-3 times daily, to use with insurance preferred meter (Patient not taking: Reported on 1/6/2023), Disp: 100 each, Rfl: 0    blood-glucose meter kit, To check BG 2-3 times daily, to use with insurance preferred meter, Disp: 1 each, Rfl: 0    buPROPion (WELLBUTRIN XL) 150 MG TB24 tablet, TAKE 1 TABLET BY MOUTH EVERY DAY, Disp: 90 tablet, Rfl: 3    carvediloL (COREG) 25 MG tablet, Take 1 tablet (25 mg total) by mouth once daily., Disp: 30 tablet, Rfl: 2    cholestyramine-aspartame (CHOLESTYRAMINE LIGHT) 4 gram Powd, Take 4 g by mouth 2 (two) times daily as needed (diarrhea)., Disp: 239.4 g, Rfl: 1    cloNIDine (CATAPRES) 0.1 MG tablet, Take 1 tablet (0.1 mg total) by mouth 2 (two) times daily as needed (For blood pressure with the top number above 150 or bottom number above 100)., Disp: 30 tablet, Rfl: 0    clopidogreL (PLAVIX) 75 mg tablet, TAKE 1 TABLET BY MOUTH EVERY DAY, Disp: 90 tablet, Rfl: 3    colchicine (COLCRYS) 0.6  mg tablet, Take 1 tablet (0.6 mg total) by mouth once daily., Disp: 90 tablet, Rfl: 3    colestipoL (COLESTID) 1 gram Tab, TAKE 1 TABLET BY MOUTH TWICE A DAY, Disp: 180 tablet, Rfl: 0    ferrous sulfate (FEOSOL) 325 mg (65 mg iron) Tab tablet, Take 325 mg by mouth., Disp: , Rfl:     gabapentin (NEURONTIN) 400 MG capsule, Take 400 mg by mouth 3 (three) times daily., Disp: , Rfl:     HYDROcodone-acetaminophen (NORCO) 7.5-325 mg per tablet, Take 1 tablet by mouth every 6 (six) hours as needed for Pain., Disp: 12 tablet, Rfl: 0    ketoconazole (NIZORAL) 2 % cream, Apply topically 2 (two) times daily., Disp: 60 g, Rfl: 1    ketorolac (TORADOL) 10 mg tablet, Take 1 tablet (10 mg total) by mouth every 6 (six) hours as needed for Pain., Disp: 28 tablet, Rfl: 0    lancets Misc, To check BG 2 times daily, to use with insurance preferred meter, Disp: 90 each, Rfl: 0    leg brace Misc, Use left foot brace as directed, Disp: 1 each, Rfl: 0    miconazole, bulk, Powd, 1 application by Misc.(Non-Drug; Combo Route) route 2 (two) times daily as needed (rash)., Disp: 100 g, Rfl: 1    multivitamin (THERAGRAN) per tablet, Take 1 tablet by mouth., Disp: , Rfl:     nitroGLYCERIN (NITROSTAT) 0.4 MG SL tablet, Place 1 tablet (0.4 mg total) under the tongue every 5 (five) minutes as needed for Chest pain., Disp: 25 tablet, Rfl: 11    rosuvastatin (CRESTOR) 40 MG Tab, Take 1 tablet (40 mg total) by mouth every evening., Disp: 90 tablet, Rfl: 3  No current facility-administered medications for this visit.    Facility-Administered Medications Ordered in Other Visits:     lactated ringers infusion, , Intravenous, Continuous, Demario Yates MD, Last Rate: 0 mL/hr at 10/28/19 1405, New Bag at 12/02/19 1985    Review of patient's allergies indicates:   Allergen Reactions    Bee pollens Shortness Of Breath     WASP, BEE, ANT AND CATERPILLER STINGS    Hydrochlorothiazide Shortness Of Breath and Rash    Milk containing products (dairy) Diarrhea     Metoprolol Rash       Family History   Problem Relation Age of Onset    Cancer Mother         colon    Heart disease Father     Gout Father     Kidney disease Father     Arthritis Father     Hypertension Father     Early death Daughter         mva    Alcohol abuse Brother     Cancer Brother         mouth cancer w mets    No Known Problems Sister     Alcohol abuse Brother     No Known Problems Son     Heart disease Maternal Grandmother         MI    Stroke Maternal Grandfather     Heart disease Paternal Grandmother         MI    No Known Problems Son     Cancer Paternal Uncle         lung cancer    Allergic rhinitis Neg Hx     Allergies Neg Hx     Angioedema Neg Hx     Asthma Neg Hx     Atopy Neg Hx     Eczema Neg Hx     Immunodeficiency Neg Hx     Rhinitis Neg Hx     Urticaria Neg Hx     Collagen disease Neg Hx      Social History     Occupational History    Not on file   Tobacco Use    Smoking status: Never    Smokeless tobacco: Never   Substance and Sexual Activity    Alcohol use: Yes     Comment: once every two months    Drug use: No    Sexual activity: Yes

## 2023-06-20 ENCOUNTER — TELEPHONE (OUTPATIENT)
Dept: FAMILY MEDICINE | Facility: CLINIC | Age: 73
End: 2023-06-20
Payer: MEDICARE

## 2023-06-20 ENCOUNTER — PATIENT MESSAGE (OUTPATIENT)
Dept: FAMILY MEDICINE | Facility: CLINIC | Age: 73
End: 2023-06-20
Payer: MEDICARE

## 2023-06-20 RX ORDER — PREDNISONE 20 MG/1
20 TABLET ORAL 2 TIMES DAILY
Qty: 10 TABLET | Refills: 0 | Status: SHIPPED | OUTPATIENT
Start: 2023-06-20 | End: 2023-06-25

## 2023-06-20 NOTE — TELEPHONE ENCOUNTER
I will send in some prednisone for the gout, and he should try some loperamide OTC for the diarrhea, if he hasn't tried it already.

## 2023-06-20 NOTE — TELEPHONE ENCOUNTER
----- Message from Svetlana Samuels sent at 6/20/2023 12:09 PM CDT -----  Regarding: (L) Big Toe Gout  Contact: LA OBRIEN [phone]    Type: Needs Medical Advice      Who Called:  LA OBRIEN Call Back Number: [phone] (home)     Pharmacy:   [facility]/pharmacy #[id number] - [place], [place] - [address]  [address] [place] [address]  Phone: [phone] Fax: [phone]    Additional Information: Patient is calling to speak with nurse/MA regarding (L) Big Toe Gout and having diarrhea issues as well.   Please call back and advise. Thanks

## 2023-06-23 NOTE — NURSING
Pt is calm and cooperative, pt denies any pain, pt is aaox3, pt has left facial droop with slurred speech with no issue swallowing, left arm movement is absent and left leg movement is severely limited with no feeling noted in the left leg or arm, pt turned every 2 hours with help, pt tolerated po intake well, pt used urinal and bed pan as needed with assistance, fall precautions maintained,  will continue to monitor. Pt up to chair with pt and did use urinal with assist called for bedpan with consistency and is in good spirits throughout shift is anxious for transfer for rehab facility.            no

## 2023-06-28 ENCOUNTER — CLINICAL SUPPORT (OUTPATIENT)
Dept: REHABILITATION | Facility: HOSPITAL | Age: 73
End: 2023-06-28
Attending: ORTHOPAEDIC SURGERY
Payer: MEDICARE

## 2023-06-28 DIAGNOSIS — M79.672 LEFT FOOT PAIN: ICD-10-CM

## 2023-06-28 DIAGNOSIS — M21.372 LEFT FOOT DROP: Primary | ICD-10-CM

## 2023-06-28 PROCEDURE — 97161 PT EVAL LOW COMPLEX 20 MIN: CPT | Mod: PN

## 2023-06-28 PROCEDURE — 97110 THERAPEUTIC EXERCISES: CPT | Mod: PN
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
## 2023-06-28 NOTE — PROGRESS NOTES
PT/PTA met face to face to discuss pt's treatment plan and progress towards established goals. Pt will be seen by a physical therapist minimally every 6th visit or every 30 days.     Please see Plan of Care dated 6/28/23 for goals.       DENVER Kilgore, PT

## 2023-07-06 ENCOUNTER — CLINICAL SUPPORT (OUTPATIENT)
Dept: REHABILITATION | Facility: HOSPITAL | Age: 73
End: 2023-07-06
Attending: ORTHOPAEDIC SURGERY
Payer: MEDICARE

## 2023-07-06 DIAGNOSIS — M79.672 LEFT FOOT PAIN: ICD-10-CM

## 2023-07-06 DIAGNOSIS — M21.372 LEFT FOOT DROP: Primary | ICD-10-CM

## 2023-07-06 PROCEDURE — 97140 MANUAL THERAPY 1/> REGIONS: CPT | Mod: PN

## 2023-07-06 PROCEDURE — 97110 THERAPEUTIC EXERCISES: CPT | Mod: PN
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
## 2023-07-10 ENCOUNTER — CLINICAL SUPPORT (OUTPATIENT)
Dept: REHABILITATION | Facility: HOSPITAL | Age: 73
End: 2023-07-10
Attending: ORTHOPAEDIC SURGERY
Payer: MEDICARE

## 2023-07-10 DIAGNOSIS — R26.9 GAIT DIFFICULTY: Primary | ICD-10-CM

## 2023-07-10 PROCEDURE — 97110 THERAPEUTIC EXERCISES: CPT | Mod: PN,CQ

## 2023-07-10 PROCEDURE — 97140 MANUAL THERAPY 1/> REGIONS: CPT | Mod: PN,CQ
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
## 2023-07-12 ENCOUNTER — CLINICAL SUPPORT (OUTPATIENT)
Dept: REHABILITATION | Facility: HOSPITAL | Age: 73
End: 2023-07-12
Attending: ORTHOPAEDIC SURGERY
Payer: MEDICARE

## 2023-07-12 ENCOUNTER — PATIENT MESSAGE (OUTPATIENT)
Dept: FAMILY MEDICINE | Facility: CLINIC | Age: 73
End: 2023-07-12
Payer: MEDICARE

## 2023-07-12 DIAGNOSIS — R26.9 GAIT DIFFICULTY: Primary | ICD-10-CM

## 2023-07-12 PROCEDURE — 97140 MANUAL THERAPY 1/> REGIONS: CPT | Mod: PN,CQ

## 2023-07-12 PROCEDURE — 97110 THERAPEUTIC EXERCISES: CPT | Mod: PN,CQ
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
## 2023-07-13 ENCOUNTER — PATIENT MESSAGE (OUTPATIENT)
Dept: FAMILY MEDICINE | Facility: CLINIC | Age: 73
End: 2023-07-13
Payer: MEDICARE

## 2023-07-13 DIAGNOSIS — Z86.73 HISTORY OF STROKE: Primary | ICD-10-CM

## 2023-07-13 DIAGNOSIS — I69.354 HEMIPLEGIA AND HEMIPARESIS FOLLOWING CEREBRAL INFARCTION AFFECTING LEFT NON-DOMINANT SIDE: ICD-10-CM

## 2023-07-14 ENCOUNTER — PATIENT MESSAGE (OUTPATIENT)
Dept: FAMILY MEDICINE | Facility: CLINIC | Age: 73
End: 2023-07-14
Payer: MEDICARE

## 2023-07-14 VITALS — SYSTOLIC BLOOD PRESSURE: 135 MMHG | DIASTOLIC BLOOD PRESSURE: 85 MMHG

## 2023-07-17 ENCOUNTER — CLINICAL SUPPORT (OUTPATIENT)
Dept: REHABILITATION | Facility: HOSPITAL | Age: 73
End: 2023-07-17
Attending: ORTHOPAEDIC SURGERY
Payer: MEDICARE

## 2023-07-17 DIAGNOSIS — M79.672 LEFT FOOT PAIN: ICD-10-CM

## 2023-07-17 DIAGNOSIS — R26.9 GAIT DIFFICULTY: Primary | ICD-10-CM

## 2023-07-17 DIAGNOSIS — M21.372 LEFT FOOT DROP: ICD-10-CM

## 2023-07-17 PROCEDURE — 97110 THERAPEUTIC EXERCISES: CPT | Mod: PN

## 2023-07-17 PROCEDURE — 97140 MANUAL THERAPY 1/> REGIONS: CPT | Mod: PN
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
## 2023-07-19 ENCOUNTER — CLINICAL SUPPORT (OUTPATIENT)
Dept: REHABILITATION | Facility: HOSPITAL | Age: 73
End: 2023-07-19
Attending: ORTHOPAEDIC SURGERY
Payer: MEDICARE

## 2023-07-19 DIAGNOSIS — R26.9 GAIT DIFFICULTY: Primary | ICD-10-CM

## 2023-07-19 PROCEDURE — 97140 MANUAL THERAPY 1/> REGIONS: CPT | Mod: PN,CQ

## 2023-07-19 PROCEDURE — 97110 THERAPEUTIC EXERCISES: CPT | Mod: PN,CQ
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
## 2023-07-24 ENCOUNTER — CLINICAL SUPPORT (OUTPATIENT)
Dept: REHABILITATION | Facility: HOSPITAL | Age: 73
End: 2023-07-24
Payer: MEDICARE

## 2023-07-24 DIAGNOSIS — R26.9 GAIT DIFFICULTY: Primary | ICD-10-CM

## 2023-07-24 PROCEDURE — 97140 MANUAL THERAPY 1/> REGIONS: CPT | Mod: PN,CQ

## 2023-07-24 PROCEDURE — 97110 THERAPEUTIC EXERCISES: CPT | Mod: PN,CQ
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
## 2023-07-26 ENCOUNTER — CLINICAL SUPPORT (OUTPATIENT)
Dept: REHABILITATION | Facility: HOSPITAL | Age: 73
End: 2023-07-26
Attending: ORTHOPAEDIC SURGERY
Payer: MEDICARE

## 2023-07-26 DIAGNOSIS — I69.354 HEMIPLEGIA AND HEMIPARESIS FOLLOWING CEREBRAL INFARCTION AFFECTING LEFT NON-DOMINANT SIDE: ICD-10-CM

## 2023-07-26 DIAGNOSIS — R26.9 GAIT DIFFICULTY: Primary | ICD-10-CM

## 2023-07-26 DIAGNOSIS — Z86.73 HISTORY OF STROKE: ICD-10-CM

## 2023-07-26 PROCEDURE — 97014 ELECTRIC STIMULATION THERAPY: CPT | Mod: PN

## 2023-07-26 PROCEDURE — 97140 MANUAL THERAPY 1/> REGIONS: CPT | Mod: PN,CQ

## 2023-07-26 PROCEDURE — 97167 OT EVAL HIGH COMPLEX 60 MIN: CPT | Mod: PN

## 2023-07-26 PROCEDURE — 97110 THERAPEUTIC EXERCISES: CPT | Mod: PN,CQ

## 2023-07-26 PROCEDURE — 97110 THERAPEUTIC EXERCISES: CPT | Mod: PN

## 2023-07-26 NOTE — PROGRESS NOTES
[facility]  Occupational Therapy Initial Evaluation      Name: Buddy Park  Clinic Number: [id number]    Therapy Diagnosis:   Encounter Diagnoses   Name Primary?    History of stroke     Hemiplegia and hemiparesis following cerebral infarction affecting left non-dominant side      Physician: Marshall Sheriff,*    Physician Orders: Eval and Treat  Medical Diagnosis: Hemiplegia and hemiparesis following cerebral infarction and affecting left non-dominant side  Evaluation Date: 7/26/2023  Insurance Authorization Period Expiration: 12/31/2023  Plan of Care Certification Period: 9/6/2023  Visit # / Visits authorized: 1 / 12  FOTO: 1/ 3    Precautions:  Standard    Time In: 9:50  Time Out: 10:50  Total Billable Time: 60 minutes    Subjective      Date of Onset: 2/2020    History of Current Condition/Mechanism of Injury: Buddy reports: Patient noted with decreased LUE AROM/PROM.  Patient has only trace movement at left shoulder.  Patient has increased tone at left elbow, wrist, and digits.    Falls: Moderate fall risk    Involved Side: left   Dominant Side: Left    Mechanism of Injury: Hemiparesis of LUE at all joints which include shoulder, elbow, wrist, and digits.   Imaging: No imaging done    Prior Therapy: Yes    Pain:  Functional Pain Scale Rating 0-10:   3/10 on average  1/10 at best  7/10 at worst  Location: LUE  Description: Tight  Aggravating Factors: Extension, Flexing, and Lifting  Easing Factors: relaxation    Occupation:  Retired  Working presently: unemployed  Duties: None    Functional Limitations/Social History:    Previous functional status includes: Patient is modified independent with all ADLs.    Current Functional Status   Home/Living environment: Patient lives with his wife in a single story home.    -    - DME: Patient owns a quad cane.      Limitation of Functional Status as follows:   ADLs/IADLs:     - Feeding:Patient is independent with self feeding.    -  "Bathing: Patient is modified independent with bathing.    - Dressing/Grooming: Patient is modified independent with UE/LE dressing.    - Home Management: Patient modified independent with home management.    - Driving: Patient is independent with driving.     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    Buddy Park  has a past surgical history that includes Ear mastoidectomy w/ cochlear implant w/ landmark; Rotator cuff repair; Tonsillectomy; Sinus surgery; Colonoscopy (01/09/2013); Cholecystectomy (02/06/2014); Fracture surgery; Foreign Body Removal (Right); Lithotripsy; Colonoscopy (N/A, 03/12/2018); Carotid angioplasty (06/2018); Cataract extraction (Bilateral); Cystoscopy w/ retrogrades (Bilateral, 10/28/2019); Cystoscopy w/ ureteral stent placement (Left, 10/28/2019); Vascular surgery; Extracorporeal shock wave lithotripsy (Left, 12/02/2019); Cystoscopy w/ ureteral stent removal (Left, 12/02/2019); Eye surgery; Colonoscopy (N/A, 02/15/2021); and Hip surgery (Left).    Buddy has a current medication list which includes the following prescription(s): amlodipine, aspirin, b complex vitamins, baclofen, blood sugar diagnostic, blood-glucose meter, bupropion, carvedilol, cholestyramine light, clonidine, clopidogrel, colchicine, colestipol, ferrous sulfate, gabapentin, hydrocodone-acetaminophen, ketoconazole, ketorolac, lancets, leg brace, miconazole (bulk), multivitamin, nitroglycerin, and rosuvastatin, and the following " Facility-Administered Medications: lactated ringers.    Review of patient's allergies indicates:   Allergen Reactions    Bee pollens Shortness Of Breath     WASP, BEE, ANT AND CATERPILLER STINGS    Hydrochlorothiazide Shortness Of Breath and Rash    Milk containing products (dairy) Diarrhea    Metoprolol Rash        Objective    Patient noted with hemiparesis of LUE.  Patient only noted with trace movement in LUE.  Patient noted with increased tone in LUE at all joints.    Intake Outcome Measure for FOTO Neurological Survey    Therapist reviewed FOTO scores for Buddy Park on 7/26/2023.   FOTO documents entered into [employer] - see Media section.    Intake Score: 50%       Treatment      Total Treatment time (time-based codes) separate from Evaluation: 30 minutes    Buddy received the treatments listed below:      therapeutic exercises to develop ROM and flexibility:  OT performed LUE PROM for shoulder flexion 5x10 reps while not exceeding 90 degrees.  OT performed LUE PROM for shoulder abduction 5x10 reps while not exceeding 75 degrees.  OT performed LUE PROM for elbow flexion 5x10 reps.  OT performed LUE PROM for elbow extension 5x10 reps.  OT performed LUE wrist flexion 5x10 reps.  OT performed LUE wrist extension 5x10 reps.  OT performed LUE digit flexion 3x10 reps.  OT performed LUE digit extension 3x10 reps.  Patient performed UBE with LUE strapped on for 10 minutes in order to improve reciprocal movement patterns.      supervised modalities after being cleared for contradictions: NMES Electrical Stimulation:  Buddy received NMES Electrical Stimulation to elicit muscle contraction of the left shoulder. Pt received stimulation at a rate of 25 pps with symmetric current, ramp of 10 seconds with 10 second on time and 10 second off time. Patient tolerated treatment well without any adverse effects.       Patient Education and Home Exercises      Education provided:   -role of OT, goals for OT,  scheduling/cancellations, insurance limitations with patient.  -Additional Education provided: Patient was educated on rationale for each treatment intervention.    Written Home Exercises Provided: yes.  Exercises were reviewed and Buddy was able to demonstrate them prior to the end of the session.    Buddy demonstrated fair  understanding of the education provided.     Pt was advised to perform these exercises free of pain, and to stop performing them if pain occurs.    See EMR under Patient Instructions for exercises provided 7/26/2023.    Assessment      Buddy Park is a 72 y.o. male referred to outpatient occupational therapy and presents with a medical diagnosis of history of stroke.    Following medical record review it is determined that pt will benefit from occupational therapy services in order to maximize pain free and/or functional use of left UE. The following goals were discussed with the patient and patient is in agreement with them as to be addressed in the treatment plan. The patient's rehab potential is Fair.     Anticipated barriers to occupational therapy: None    Plan of care discussed with patient: Yes  Patient's spiritual, cultural and educational needs considered and patient is agreeable to the plan of care and goals as stated below:     Medical Necessity is demonstrated by the following  Occupational Profile/History  Co-morbidities and personal factors that may impact the plan of care [] LOW: Brief chart review  [] MODERATE: Expanded chart review   [x] HIGH: Extensive chart review    Moderate / High Support Documentation: High     Examination  Performance deficits relating to physical, cognitive or psychosocial skills that result in activity limitations and/or participation restrictions  [] LOW: addressing 1-3 Performance deficits  [] MODERATE: 3-5 Performance deficits  [x] HIGH: 5+ Performance deficits (please support below)    Moderate / High Support Documentation:    Physical:  Joint  Mobility  Joint Stability  Muscle Power/Strength  Muscle Endurance  Control of Voluntary Movement   Strength  Pinch Strength  Gross Motor Coordination  Fine Motor Coordination  Pain    Cognitive:  No Deficits    Psychosocial:    Family Support     Treatment Options [] LOW: Limited options  [] MODERATE: Several options  [] HIGH: Multiple options      Decision Making/ Complexity Score: moderate       The following goals were discussed with the patient and patient is in agreement with them as to be addressed in the treatment plan.     Goals:   Patient will report no pain when performing therapeutic tasks with LUE prior to discharge.  Patient will increase LUE MMT grade to 2-/5 at shoulder prior to discharge.  Patient will increase LUE PROM to WFL prior to discharge.  Patient will be compliant with HEP daily prior to discharge.    Plan     Plan of Care Certification: 7/26/2023 to 9/6/2023.     Outpatient Occupational Therapy 2 times weekly for 6 weeks to include the following interventions: Modalities for pain management, Therapeutic exercises/activities., Strengthening, and Electrical Modalities.    Yamil Lopez OT    Physician's Signature: _________________________________________ Date: ________________
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
## 2023-07-27 ENCOUNTER — NURSE TRIAGE (OUTPATIENT)
Dept: ADMINISTRATIVE | Facility: CLINIC | Age: 73
End: 2023-07-27
Payer: MEDICARE

## 2023-07-27 ENCOUNTER — PATIENT MESSAGE (OUTPATIENT)
Dept: FAMILY MEDICINE | Facility: CLINIC | Age: 73
End: 2023-07-27
Payer: MEDICARE

## 2023-07-27 DIAGNOSIS — F32.5 MAJOR DEPRESSIVE DISORDER WITH SINGLE EPISODE, IN FULL REMISSION: ICD-10-CM

## 2023-07-27 NOTE — TELEPHONE ENCOUNTER
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 Buddy denies SI/HI. Buddy states "I am feeling very depressed. I'm 72 years old. I have the mind and attitude of a 30 year old. I had a stroke 3.5 years ago that I have not recovered from. Not walking well. Stroke affected my left side and I am left handed. I'm pretty well shut down if you know what I mean." Buddy unsure if he is prescribed antidepressant med but does not think he is. States his wife manages his meds for him. Buddy reports neighbor is at his front door and places triager on hold as he states he is unable to hold the phone in his hand and open the door at the same time. Buddy returns to call. Buddy states "I can't do nothing. My wife left about 15 mins ago. I feel very depressed. I am supposed to exercise and do thing to get better. Can't do the things I can do because I can't concentrate. My wife is going on a cruise in late October with my sister and MATTY and I was asked not to go since my wife has been taking care of her mom and step day and she asked me not to go since she said she is not [facility]." Buddy states he felt very upset after his wife called him &  told him that her passport came in and she was upset because she did not like the way her picture came out. Buddy states since his wife's passport came in, he knows he will not be going on the cruise since he does not have a passport & cannot talk to his wife because she does not like to have serious conversations. Buddy continues to deny SI/HI at this time, remains calm and cooperative for an entire triage call. Advised pt per triage protocol to go to nearest ED now for physician francine. Triager recommends pt not drive self. Instructed to call  now if no immediate  to call  Pt refused. Triager provided caller with [facility] Help line phone number and reiterated care advice to caller. V/u but pt refused.   Reason " for Disposition   Depression and unable to do any of normal activities (e.g., self care, school, work; in comparison to baseline).    Additional Information   Negative: Patient attempted suicide   Negative: Patient is threatening suicide now   Negative: Violent behavior, or threatening to physically hurt or kill someone   Negative: Patient is very confused (disoriented, slurred speech) and no other adult (e.g., friend or family member) available   Negative: Difficult to awaken or acting very confused (disoriented, slurred speech) and new-onset   Negative: Sounds like a life-threatening emergency to the triager    Protocols used: Depression-A-[place]

## 2023-07-28 ENCOUNTER — CLINICAL SUPPORT (OUTPATIENT)
Dept: REHABILITATION | Facility: HOSPITAL | Age: 73
End: 2023-07-28
Attending: ORTHOPAEDIC SURGERY
Payer: MEDICARE

## 2023-07-28 ENCOUNTER — PATIENT MESSAGE (OUTPATIENT)
Dept: FAMILY MEDICINE | Facility: CLINIC | Age: 73
End: 2023-07-28
Payer: MEDICARE

## 2023-07-28 DIAGNOSIS — Z86.73 HISTORY OF STROKE: Primary | ICD-10-CM

## 2023-07-28 PROCEDURE — 97110 THERAPEUTIC EXERCISES: CPT | Mod: PN

## 2023-07-28 RX ORDER — BUPROPION HYDROCHLORIDE 300 MG/1
300 TABLET ORAL DAILY
Qty: 90 TABLET | Refills: 3 | Status: SHIPPED | OUTPATIENT
Start: 2023-07-28

## 2023-07-28 NOTE — PROGRESS NOTES
"                                                    Occupational Therapy Daily Note     Name: Buddy Park  Clinic Number: [id number]  Diagnosis:    History of stroke      Hemiplegia and hemiparesis following cerebral infarction affecting left non-dominant side      Physician: Gema Malone MD  Precautions: Moderate fall risk  Visit #: 2 of 12  Time In: 10:54 AM  Time Out: 11:56 AM  Total Billable Minutes: 62 minutes    Subjective     Pt reports: "I haven't had time to do my exercises this morning. I need to warm my hand up." Patient reported compliance with home exercise programs.     Pain Scale: Buddy rates pain on a scale of 0-10 to be 0 currently.    Objective     Buddy received the following individual therapeutic exercises to develop endurance, range of motion, and flexibility:  Patient performed LUE AAROM clasp hand shoulder forward flexion 3x10 reps.   Patient performed LUE AAROM clasp hand side to side 3x10 reps.  OT performed LUE PROM for elbow flexion 5x10 reps.  OT performed LUE PROM for elbow extension 5x10 reps.  OT performed LUE wrist flexion 5x10 reps.  OT performed LUE wrist extension 5x10 reps.  OT performed LUE digit flexion 3x10 reps.  OT performed LUE digit extension 3x10 reps.  Patient performed UBE with LUE strapped on for 10 minutes in order to improve reciprocal movement patterns.    supervised modalities after being cleared for contradictions: NMES Electrical Stimulation:  Buddy received NMES Electrical Stimulation to elicit muscle contraction of the left shoulder. Patient received stimulation at a rate of 34 pps with symmetric current, ramp of 10 seconds with 10 second on time and 10 second off time. Patient tolerated treatment well without any adverse effects.     Home Exercises Provided:     Patient demonstrated good understanding of the education provided. Buddy demonstrated good return demonstration of activities. He stated he is hoping to hire help to assist in PROM " exercises.    Education provided re:  Buddy verbalized good understanding of education provided. Patient understands purpose of therapeutic exercises, plan of care, and role of therapy in multidisciplinary care team.   No spiritual or educational barriers to learning provided    Assessment     Patient tolerated treatment well. Patient completing treatment without pain or complication.    Patient is progressing well toward goals.      Goals      Patient will report no pain when performing therapeutic tasks with LUE prior to discharge. In Progress  Patient will increase LUE MMT grade to 2-/5 at shoulder prior to discharge. In Progress  Patient will increase LUE PROM to WFL prior to discharge. In Progress  Patient will be compliant with HEP daily prior to discharge. In Progress    Plan     Continue with established Plan of Care towards OT goals.    Therapist: Mariluz Gaspar OT  7/28/2023

## 2023-07-28 NOTE — PROGRESS NOTES
"                                                    Occupational Therapy Daily Note     Name: Buddy Park  Clinic Number: [id number]  Diagnosis:    History of stroke      Hemiplegia and hemiparesis following cerebral infarction affecting left non-dominant side      Physician: Gema Malone MD  Precautions: Moderate fall risk  Visit #: 3 of 12  Time In: 2:36 PM  Time Out: 2:36  Total Billable Minutes: 60 minutes    Subjective     Pt reports: "I did not get any good stretches this weekend." Patient reported compliance with home exercise programs.     Pain Scale: Buddy rates pain on a scale of 0-10 to be 0 currently.    Objective     Buddy received the following individual therapeutic exercises to develop endurance, range of motion, and flexibility:   OT performed LUE PROM supine for shoulder flexion 5x10 reps.   OT performed LUE PROM for elbow flexion 5x10 reps.  OT performed LUE PROM for elbow extension 5x10 reps.  OT performed LUE wrist flexion 5x10 reps.  OT performed LUE wrist extension 5x10 reps.  OT performed LUE digit flexion 3x10 reps.  OT performed LUE digit extension 3x10 reps.  Patient performed UBE with LUE strapped on for 10 minutes in order to improve reciprocal movement patterns.    supervised modalities after being cleared for contradictions: NMES Electrical Stimulation:  Buddy received NMES Electrical Stimulation to elicit muscle contraction of the left shoulder. Patient received stimulation at a rate of 34 pps with symmetric current, ramp of 10 seconds with 10 second on time and 10 second off time. Patient tolerated treatment well without any adverse effects.     Home Exercises Provided:     Patient demonstrated good understanding of the education provided. Buddy demonstrated good return demonstration of activities. He stated he is hoping to hire help to assist in PROM exercises.    Education provided re:  Buddy verbalized good understanding of education provided. Patient understands " purpose of therapeutic exercises, plan of care, and role of therapy in multidisciplinary care team.   No spiritual or educational barriers to learning provided    Assessment     Patient tolerated treatment well. Patient completing treatment without pain or complication.    Patient is progressing well toward goals.      Goals      Patient will report no pain when performing therapeutic tasks with LUE prior to discharge. In Progress  Patient will increase LUE MMT grade to 2-/5 at shoulder prior to discharge. In Progress  Patient will increase LUE PROM to WFL prior to discharge. In Progress  Patient will be compliant with HEP daily prior to discharge. In Progress    Plan     Continue with established Plan of Care towards OT goals.    Therapist: Mariluz Gaspar OT  7/31/23

## 2023-07-31 ENCOUNTER — CLINICAL SUPPORT (OUTPATIENT)
Dept: REHABILITATION | Facility: HOSPITAL | Age: 73
End: 2023-07-31
Payer: MEDICARE

## 2023-07-31 DIAGNOSIS — Z86.73 HISTORY OF STROKE: Primary | ICD-10-CM

## 2023-07-31 DIAGNOSIS — R26.9 GAIT DIFFICULTY: Primary | ICD-10-CM

## 2023-07-31 PROCEDURE — 97530 THERAPEUTIC ACTIVITIES: CPT | Mod: PN,CQ

## 2023-07-31 PROCEDURE — 97112 NEUROMUSCULAR REEDUCATION: CPT | Mod: PN,CQ

## 2023-07-31 PROCEDURE — 97110 THERAPEUTIC EXERCISES: CPT | Mod: PN,CQ

## 2023-07-31 PROCEDURE — 97110 THERAPEUTIC EXERCISES: CPT | Mod: PN

## 2023-07-31 PROCEDURE — 97116 GAIT TRAINING THERAPY: CPT | Mod: PN,CQ
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
## 2023-08-02 ENCOUNTER — OFFICE VISIT (OUTPATIENT)
Dept: FAMILY MEDICINE | Facility: CLINIC | Age: 73
End: 2023-08-02
Payer: MEDICARE

## 2023-08-02 VITALS
SYSTOLIC BLOOD PRESSURE: 110 MMHG | OXYGEN SATURATION: 97 % | HEART RATE: 119 BPM | TEMPERATURE: 98 F | BODY MASS INDEX: 24.52 KG/M2 | DIASTOLIC BLOOD PRESSURE: 68 MMHG | HEIGHT: 63 IN | WEIGHT: 138.38 LBS | RESPIRATION RATE: 16 BRPM

## 2023-08-02 DIAGNOSIS — Z86.73 HISTORY OF STROKE: ICD-10-CM

## 2023-08-02 DIAGNOSIS — I69.354 HEMIPLEGIA AND HEMIPARESIS FOLLOWING CEREBRAL INFARCTION AFFECTING LEFT NON-DOMINANT SIDE: ICD-10-CM

## 2023-08-02 DIAGNOSIS — F32.5 MAJOR DEPRESSIVE DISORDER WITH SINGLE EPISODE, IN FULL REMISSION: ICD-10-CM

## 2023-08-02 DIAGNOSIS — F51.01 PRIMARY INSOMNIA: Primary | ICD-10-CM

## 2023-08-02 PROCEDURE — 1126F AMNT PAIN NOTED NONE PRSNT: CPT | Mod: CPTII,S$GLB,, | Performed by: STUDENT IN AN ORGANIZED HEALTH CARE EDUCATION/TRAINING PROGRAM

## 2023-08-02 PROCEDURE — 99999 PR PBB SHADOW E&M-EST. PATIENT-LVL V: CPT | Mod: PBBFAC,,, | Performed by: STUDENT IN AN ORGANIZED HEALTH CARE EDUCATION/TRAINING PROGRAM

## 2023-08-02 PROCEDURE — 1157F ADVNC CARE PLAN IN RCRD: CPT | Mod: CPTII,S$GLB,, | Performed by: STUDENT IN AN ORGANIZED HEALTH CARE EDUCATION/TRAINING PROGRAM

## 2023-08-02 PROCEDURE — 1100F PR PT FALLS ASSESS DOC 2+ FALLS/FALL W/INJURY/YR: ICD-10-PCS | Mod: CPTII,S$GLB,, | Performed by: STUDENT IN AN ORGANIZED HEALTH CARE EDUCATION/TRAINING PROGRAM

## 2023-08-02 PROCEDURE — 1160F RVW MEDS BY RX/DR IN RCRD: CPT | Mod: CPTII,S$GLB,, | Performed by: STUDENT IN AN ORGANIZED HEALTH CARE EDUCATION/TRAINING PROGRAM

## 2023-08-02 PROCEDURE — 1157F PR ADVANCE CARE PLAN OR EQUIV PRESENT IN MEDICAL RECORD: ICD-10-PCS | Mod: CPTII,S$GLB,, | Performed by: STUDENT IN AN ORGANIZED HEALTH CARE EDUCATION/TRAINING PROGRAM

## 2023-08-02 PROCEDURE — 99214 OFFICE O/P EST MOD 30 MIN: CPT | Mod: S$GLB,,, | Performed by: STUDENT IN AN ORGANIZED HEALTH CARE EDUCATION/TRAINING PROGRAM

## 2023-08-02 PROCEDURE — 1159F MED LIST DOCD IN RCRD: CPT | Mod: CPTII,S$GLB,, | Performed by: STUDENT IN AN ORGANIZED HEALTH CARE EDUCATION/TRAINING PROGRAM

## 2023-08-02 PROCEDURE — 1100F PTFALLS ASSESS-DOCD GE2>/YR: CPT | Mod: CPTII,S$GLB,, | Performed by: STUDENT IN AN ORGANIZED HEALTH CARE EDUCATION/TRAINING PROGRAM

## 2023-08-02 PROCEDURE — 3078F DIAST BP <80 MM HG: CPT | Mod: CPTII,S$GLB,, | Performed by: STUDENT IN AN ORGANIZED HEALTH CARE EDUCATION/TRAINING PROGRAM

## 2023-08-02 PROCEDURE — 1126F PR PAIN SEVERITY QUANTIFIED, NO PAIN PRESENT: ICD-10-PCS | Mod: CPTII,S$GLB,, | Performed by: STUDENT IN AN ORGANIZED HEALTH CARE EDUCATION/TRAINING PROGRAM

## 2023-08-02 PROCEDURE — 3288F FALL RISK ASSESSMENT DOCD: CPT | Mod: CPTII,S$GLB,, | Performed by: STUDENT IN AN ORGANIZED HEALTH CARE EDUCATION/TRAINING PROGRAM

## 2023-08-02 PROCEDURE — 99999 PR PBB SHADOW E&M-EST. PATIENT-LVL V: ICD-10-PCS | Mod: PBBFAC,,, | Performed by: STUDENT IN AN ORGANIZED HEALTH CARE EDUCATION/TRAINING PROGRAM

## 2023-08-02 PROCEDURE — 99214 PR OFFICE/OUTPT VISIT, EST, LEVL IV, 30-39 MIN: ICD-10-PCS | Mod: S$GLB,,, | Performed by: STUDENT IN AN ORGANIZED HEALTH CARE EDUCATION/TRAINING PROGRAM

## 2023-08-02 PROCEDURE — 3044F PR MOST RECENT HEMOGLOBIN A1C LEVEL <7.0%: ICD-10-PCS | Mod: CPTII,S$GLB,, | Performed by: STUDENT IN AN ORGANIZED HEALTH CARE EDUCATION/TRAINING PROGRAM

## 2023-08-02 PROCEDURE — 3288F PR FALLS RISK ASSESSMENT DOCUMENTED: ICD-10-PCS | Mod: CPTII,S$GLB,, | Performed by: STUDENT IN AN ORGANIZED HEALTH CARE EDUCATION/TRAINING PROGRAM

## 2023-08-02 PROCEDURE — 1159F PR MEDICATION LIST DOCUMENTED IN MEDICAL RECORD: ICD-10-PCS | Mod: CPTII,S$GLB,, | Performed by: STUDENT IN AN ORGANIZED HEALTH CARE EDUCATION/TRAINING PROGRAM

## 2023-08-02 PROCEDURE — 3074F PR MOST RECENT SYSTOLIC BLOOD PRESSURE < 130 MM HG: ICD-10-PCS | Mod: CPTII,S$GLB,, | Performed by: STUDENT IN AN ORGANIZED HEALTH CARE EDUCATION/TRAINING PROGRAM

## 2023-08-02 PROCEDURE — 1160F PR REVIEW ALL MEDS BY PRESCRIBER/CLIN PHARMACIST DOCUMENTED: ICD-10-PCS | Mod: CPTII,S$GLB,, | Performed by: STUDENT IN AN ORGANIZED HEALTH CARE EDUCATION/TRAINING PROGRAM

## 2023-08-02 PROCEDURE — 3008F PR BODY MASS INDEX (BMI) DOCUMENTED: ICD-10-PCS | Mod: CPTII,S$GLB,, | Performed by: STUDENT IN AN ORGANIZED HEALTH CARE EDUCATION/TRAINING PROGRAM

## 2023-08-02 PROCEDURE — 3044F HG A1C LEVEL LT 7.0%: CPT | Mod: CPTII,S$GLB,, | Performed by: STUDENT IN AN ORGANIZED HEALTH CARE EDUCATION/TRAINING PROGRAM

## 2023-08-02 PROCEDURE — 3008F BODY MASS INDEX DOCD: CPT | Mod: CPTII,S$GLB,, | Performed by: STUDENT IN AN ORGANIZED HEALTH CARE EDUCATION/TRAINING PROGRAM

## 2023-08-02 PROCEDURE — 3078F PR MOST RECENT DIASTOLIC BLOOD PRESSURE < 80 MM HG: ICD-10-PCS | Mod: CPTII,S$GLB,, | Performed by: STUDENT IN AN ORGANIZED HEALTH CARE EDUCATION/TRAINING PROGRAM

## 2023-08-02 PROCEDURE — 3074F SYST BP LT 130 MM HG: CPT | Mod: CPTII,S$GLB,, | Performed by: STUDENT IN AN ORGANIZED HEALTH CARE EDUCATION/TRAINING PROGRAM

## 2023-08-02 RX ORDER — TRAZODONE HYDROCHLORIDE 50 MG/1
50 TABLET ORAL NIGHTLY
Qty: 30 TABLET | Refills: 11 | Status: SHIPPED | OUTPATIENT
Start: 2023-08-02 | End: 2024-08-01

## 2023-08-02 NOTE — PATIENT INSTRUCTIONS

## 2023-08-02 NOTE — PROGRESS NOTES
"Subjective:       Patient ID: Buddy Park is a 72 y.o. male.    Chief Complaint: Depression    Depression  Worsening recently  His wife is going on a vacation without him and told him "I don't want to babysit"  He has been struggling with this.  He is feeling down, not sleeping, sad  He denies si/hi.  He is active and working with physical therapy.        Review of Systems   Constitutional:  Negative for activity change and appetite change.   Respiratory:  Negative for shortness of breath.    Cardiovascular:  Negative for chest pain.   Gastrointestinal:  Negative for abdominal pain.   Genitourinary:  Negative for dysuria.   Integumentary:  Negative for rash.   Neurological:  Positive for weakness.   Psychiatric/Behavioral:  Positive for dysphoric mood and sleep disturbance. The patient is not nervous/anxious.          Objective:      Physical Exam  Constitutional:       General: He is not in acute distress.     Appearance: Normal appearance. He is not ill-appearing.   Eyes:      Conjunctiva/sclera: Conjunctivae normal.   Cardiovascular:      Rate and Rhythm: Normal rate and regular rhythm.      Heart sounds: Normal heart sounds. No murmur heard.  Pulmonary:      Effort: Pulmonary effort is normal. No respiratory distress.      Breath sounds: Normal breath sounds.   Musculoskeletal:      Right lower leg: No edema.      Left lower leg: No edema.   Skin:     General: Skin is warm and dry.   Neurological:      Mental Status: He is alert. Mental status is at baseline.      Motor: Weakness (left sided hemiparesis) present.      Gait: Gait abnormal (walks with a cane).   Psychiatric:         Mood and Affect: Mood normal.         Behavior: Behavior normal.         Thought Content: Thought content normal.         Judgment: Judgment normal.         Assessment:       1. Primary insomnia    2. History of stroke    3. Hemiplegia and hemiparesis following cerebral infarction affecting left non-dominant side    4. Major " depressive disorder with single episode, in full remission        Plan:       Problem List Items Addressed This Visit          Neuro    History of stroke     He is doing well with therapy. improving         Hemiplegia and hemiparesis following cerebral infarction affecting left non-dominant side     Improving with therapy and is walking with a cane            Psychiatric    Major depressive disorder with single episode, in full remission     Worsening recently. Stress at home with his wife.  Increased his wellbutrin and added trazodone.  followup in 6 weeks  Denies si/hi          Other Visit Diagnoses       Primary insomnia    -  Primary    Relevant Medications    traZODone (DESYREL) 50 MG tablet

## 2023-08-02 NOTE — PROGRESS NOTES
"                                                    Occupational Therapy Daily Note     Name: Buddy Park  Clinic Number: [id number]  Diagnosis:    History of stroke      Hemiplegia and hemiparesis following cerebral infarction affecting left non-dominant side      Physician: Gema Malone MD  Precautions: Moderate fall risk  Visit #: 4 of 12  Time In: 1:25 PM   Time Out: 2:29 PM  Total Billable Minutes: 64 minutes    Subjective     Pt reports: "I feel very emotional today. I am so tired of dealing with my arm."  Patient reported compliance with home exercise programs.     Pain Scale: Buddy rates pain on a scale of 0-10 to be 2 currently.    Objective     Buddy received the following individual therapeutic exercises to develop endurance, range of motion, and flexibility:   OT performed LUE PROM supine for shoulder flexion for 5 sets of 10 repetitions.   OT performed LUE passive range of motion supine external rotation for 5 sets of 10 repetitions.  OT performed LUE PROM for elbow flexion for 5 sets of 10 repetitions.  OT performed LUE PROM for elbow extension for 5 sets of 10 repetitions.   OT performed LUE wrist flexionfor for 5 sets of 10 repetitions.   OT performed LUE wrist extension for 5 sets of 10 repetitions.   OT performed LUE digit flexion for 5 sets of 10 repetitions.   OT performed LUE digit extension for 5 sets of 10 repetitions.     Buddy received the following manual techniques to mobilize the scapula including:  OT performed LUE scapular elevation for 3 sets of 10 repetitions.  OT performed LUE scapular protraction for 3 sets of 10 repetitions.    Buddy received supervised modalities after being cleared for contradictions: NMES Electrical Stimulation:  Buddy received NMES Electrical Stimulation to elicit muscle contraction of the left shoulder. Patient received stimulation at a rate of 34 pps with symmetric current, ramp of 10 seconds with 10 second on time and 10 second off time. " Patient tolerated treatment well without any adverse effects.     Home Exercises Provided:     Patient demonstrated good understanding of the education provided. Buddy demonstrated good return demonstration of activities. He stated he is hoping to hire help to assist in PROM exercises.    Education provided re:  Buddy verbalized good understanding of education provided. Patient understands purpose of therapeutic exercises, plan of care, and role of therapy in multidisciplinary care team.   No spiritual or educational barriers to learning provided    Assessment     Patient tolerated treatment well. Patient completing treatment without pain or complication. Patient demonstrated increased scapular mobility with manual techniques, which increased range of motion during passive range of motion exercises. Will continue prior to therapeutic exercise.    Patient is progressing well toward goals.      Goals      Patient will report no pain when performing therapeutic tasks with LUE prior to discharge. In Progress  Patient will increase LUE MMT grade to 2-/5 at shoulder prior to discharge. In Progress  Patient will increase LUE PROM to WFL prior to discharge. In Progress  Patient will be compliant with HEP daily prior to discharge. In Progress    Plan     Continue with established Plan of Care towards OT goals.    Therapist: Mariluz Gaspar OT  7/31/23

## 2023-08-02 NOTE — ASSESSMENT & PLAN NOTE
Worsening recently. Stress at home with his wife.  Increased his wellbutrin and added trazodone.  followup in 6 weeks  Denies si/hi

## 2023-08-03 ENCOUNTER — CLINICAL SUPPORT (OUTPATIENT)
Dept: REHABILITATION | Facility: HOSPITAL | Age: 73
End: 2023-08-03
Payer: MEDICARE

## 2023-08-03 ENCOUNTER — PATIENT MESSAGE (OUTPATIENT)
Dept: FAMILY MEDICINE | Facility: CLINIC | Age: 73
End: 2023-08-03
Payer: MEDICARE

## 2023-08-03 DIAGNOSIS — Z86.73 HISTORY OF STROKE: Primary | ICD-10-CM

## 2023-08-03 DIAGNOSIS — R26.9 GAIT DIFFICULTY: Primary | ICD-10-CM

## 2023-08-03 PROCEDURE — 97110 THERAPEUTIC EXERCISES: CPT | Mod: PN

## 2023-08-03 PROCEDURE — 97530 THERAPEUTIC ACTIVITIES: CPT | Mod: PN

## 2023-08-03 PROCEDURE — 97140 MANUAL THERAPY 1/> REGIONS: CPT | Mod: PN
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
## 2023-08-04 NOTE — PROGRESS NOTES
"                                                    Occupational Therapy Daily Note     Name: Buddy Park  Clinic Number: [id number]  Diagnosis:    History of stroke      Hemiplegia and hemiparesis following cerebral infarction affecting left non-dominant side      Physician: Gema Malone MD  Precautions: Moderate fall risk  Visit #: 5 of 12  Time In: 1:10 PM  Time Out: 2:12 PM  Total Billable Minutes: 62 minutes    Subjective     Pt reports: "I did not get to stretch my arm on Saturday or Sunday." Patient reported compliance with home exercise programs.     Pain Scale: Buddy rates pain on a scale of 0-10 to be 0 currently.    Objective     Buddy received the following individual therapeutic exercises to develop endurance, range of motion, and flexibility:   OT performed LUE PROM supine for shoulder flexion for 5 sets of 10 repetitions.   OT performed LUE passive range of motion supine external rotation for 5 sets of 10 repetitions.  OT performed LUE PROM for elbow flexion for 5 sets of 10 repetitions.  OT performed LUE PROM for elbow extension for 5 sets of 10 repetitions.   OT performed LUE wrist flexionfor for 5 sets of 10 repetitions.   OT performed LUE wrist extension for 5 sets of 10 repetitions.   OT performed LUE digit flexion for 5 sets of 10 repetitions.   OT performed UBE for 5 minutes to improve reciprocal patterns.    Buddy received the following manual techniques to mobilize the scapula including:  OT performed LUE scapular elevation for 3 sets of 10 repetitions.  OT performed LUE scapular protraction for 3 sets of 10 repetitions.    Buddy received supervised modalities after being cleared for contradictions: NMES Electrical Stimulation:  Buddy received NMES Electrical Stimulation to elicit muscle contraction of the left shoulder. Patient received stimulation at a rate of 41 pps with symmetric current, ramp of 10 seconds with 10 second on time and 10 second off time. Patient tolerated " treatment well without any adverse effects.     Home Exercises Provided:     Patient demonstrated good understanding of the education provided. Buddy demonstrated good return demonstration of activities. He stated he is hoping to hire help to assist in PROM exercises.    Education provided re:  Buddy verbalized good understanding of education provided. Patient understands purpose of therapeutic exercises, plan of care, and role of therapy in multidisciplinary care team.   No spiritual or educational barriers to learning provided. Provided patient with resource for [facility].    Assessment     Patient tolerated treatment well. Patient completing treatment without pain or complication. Patient demonstrated increased scapular mobility with manual techniques, which increased range of motion during passive range of motion exercises. Will continue prior to therapeutic exercise.    Patient is progressing well toward goals.      Goals      Patient will report no pain when performing therapeutic tasks with LUE prior to discharge. In Progress  Patient will increase LUE MMT grade to 2-/5 at shoulder prior to discharge. In Progress  Patient will increase LUE PROM to WFL prior to discharge. In Progress  Patient will be compliant with HEP daily prior to discharge. In Progress    Plan     Continue with established Plan of Care towards OT goals.    Therapist: Mariluz Gaspar OT  7/31/23

## 2023-08-07 ENCOUNTER — CLINICAL SUPPORT (OUTPATIENT)
Dept: REHABILITATION | Facility: HOSPITAL | Age: 73
End: 2023-08-07
Payer: MEDICARE

## 2023-08-07 DIAGNOSIS — R26.9 GAIT DIFFICULTY: Primary | ICD-10-CM

## 2023-08-07 DIAGNOSIS — Z86.73 HISTORY OF STROKE: Primary | ICD-10-CM

## 2023-08-07 PROCEDURE — 97110 THERAPEUTIC EXERCISES: CPT | Mod: PN,CQ

## 2023-08-07 PROCEDURE — 97112 NEUROMUSCULAR REEDUCATION: CPT | Mod: PN,CQ

## 2023-08-07 PROCEDURE — 97140 MANUAL THERAPY 1/> REGIONS: CPT | Mod: PN

## 2023-08-07 PROCEDURE — 97110 THERAPEUTIC EXERCISES: CPT | Mod: PN

## 2023-08-07 NOTE — PATIENT INSTRUCTIONS
[facility]    In person:  [facility]   [address] [place] [address]  Education Room 1st floor      Join Zoom Meeting  [email or web address]    Meeting ID: [phone]  Passcode: [phone] One tap mobile  [phone],,,,[phone],,[phone]#  ([place])    Dial by your location  +0   ([place])  Meeting ID: [phone]  Passcode: [phone] Find your local number: [email or web address]    Join by [email or web address]  [email or web address]    Join by [email or web address]  [phone] ([place])  [phone] ([place])  [phone] ([facility])  [phone] ([place])  [phone] ([place])  [phone] ([place])  [phone] ([place])  [phone] ([place])  [phone] ([place])  [phone] ([place])  [phone] ([place])  Meeting ID: [phone]  Passcode: [phone]
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
## 2023-08-08 NOTE — PROGRESS NOTES
"                                                    Occupational Therapy Daily Note     Name: Buddy Park  Clinic Number: [id number]  Diagnosis:    History of stroke      Hemiplegia and hemiparesis following cerebral infarction affecting left non-dominant side      Physician: Gema Malone MD  Precautions: Moderate fall risk  Visit #: 6 of 12  Time In: 10:50 AM  Time Out: 11:47 AM  Total Billable Minutes: 57 minutes    Subjective     Pt reports: "I was standing on my legs all night. I'm really tired, probably a little tight." reported compliance with home exercise programs.     Pain Scale: Buddy rates pain on a scale of 0-10 to be 0 currently.    Objective     Buddy received the following individual therapeutic exercises to develop endurance, range of motion, and flexibility:   OT performed LUE PROM supine for shoulder flexion for 5 sets of 10 repetitions.   OT performed LUE passive range of motion supine external rotation for 5 sets of 10 repetitions.  OT performed LUE PROM for elbow flexion for 5 sets of 10 repetitions.  OT performed LUE PROM for elbow extension for 5 sets of 10 repetitions.   OT performed LUE wrist flexionfor for 5 sets of 10 repetitions.   OT performed LUE wrist extension for 5 sets of 10 repetitions.   OT performed LUE digit flexion for 5 sets of 10 repetitions.   OT performed LUE arm skate in lateral direction while seated at table for 3 minutes.  OT performed LUE arm skate in forward to backward direction while seated at table for 3 minutes.    Buddy received the following manual techniques to mobilize the scapula including:  OT performed LUE scapular elevation for 3 sets of 10 repetitions.  OT performed LUE scapular protraction for 3 sets of 10 repetitions.    Buddy received supervised modalities after being cleared for contradictions: NMES Electrical Stimulation:  Buddy received NMES Electrical Stimulation to elicit muscle contraction of the left shoulder. Patient received " stimulation at a rate of 37 pps with symmetric current, ramp of 10 seconds with 10 second on time and 10 second off time. Patient tolerated treatment well without any adverse effects.     Home Exercises Provided:     Patient demonstrated good understanding of the education provided. Buddy demonstrated good return demonstration of activities. He stated he is hoping to hire help to assist in PROM exercises.    Education provided re:  Buddy verbalized good understanding of education provided. Patient understands purpose of therapeutic exercises, plan of care, and role of therapy in multidisciplinary care team.   No spiritual or educational barriers to learning provided. Provided patient with resource for [facility] St[facility] Survivors.    Assessment     Patient tolerated treatment well. Patient completing treatment without pain or complication. Patient demonstrated increased scapular mobility with manual techniques, which increased range of motion during passive range of motion exercises. Will continue prior to therapeutic exercise.    Patient is progressing well toward goals.    Goals      Patient will report no pain when performing therapeutic tasks with LUE prior to discharge. In Progress  Patient will increase LUE MMT grade to 2-/5 at shoulder prior to discharge. In Progress  Patient will increase LUE PROM to WFL prior to discharge. In Progress  Patient will be compliant with HEP daily prior to discharge. In Progress    Plan     Continue with established Plan of Care towards OT goals.    Therapist: Mariluz Gaspar, OT  8/9/23

## 2023-08-09 ENCOUNTER — CLINICAL SUPPORT (OUTPATIENT)
Dept: REHABILITATION | Facility: HOSPITAL | Age: 73
End: 2023-08-09
Payer: MEDICARE

## 2023-08-09 DIAGNOSIS — R26.9 GAIT DIFFICULTY: Primary | ICD-10-CM

## 2023-08-09 DIAGNOSIS — Z86.73 HISTORY OF STROKE: Primary | ICD-10-CM

## 2023-08-09 PROCEDURE — 97112 NEUROMUSCULAR REEDUCATION: CPT | Mod: PN,CQ

## 2023-08-09 PROCEDURE — 97110 THERAPEUTIC EXERCISES: CPT | Mod: PN

## 2023-08-09 PROCEDURE — 97110 THERAPEUTIC EXERCISES: CPT | Mod: PN,CQ

## 2023-08-09 PROCEDURE — 97140 MANUAL THERAPY 1/> REGIONS: CPT | Mod: PN
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
## 2023-08-11 NOTE — PROGRESS NOTES
"                                                    Occupational Therapy Daily Note     Name: Buddy Park  Clinic Number: [id number]  Diagnosis:    History of stroke      Hemiplegia and hemiparesis following cerebral infarction affecting left non-dominant side      Physician: Gema Malone MD  Precautions: Moderate fall risk  Visit #: 7 of 12  Time In: 9:50 AM  Time Out: 10:51 AM  Total Billable Minutes: 61 minutes    Subjective     Pt reports: "I did my exercises over the weekend, but I did not do them today." Patient reported compliance with home exercise programs.     Pain Scale: Buddy rates pain on a scale of 0-10 to be 0 currently.    Objective     Buddy received the following individual therapeutic exercises to develop endurance, range of motion, and flexibility:   OT performed LUE PROM supine for shoulder flexion for 5 sets of 10 repetitions.   OT performed LUE passive range of motion abduction for 5 sets of 10 repetitions.  OT performed LUE PROM for elbow flexion for 5 sets of 10 repetitions.  OT performed LUE PROM for elbow extension for 5 sets of 10 repetitions.  OT performed LUE wrist flexion for for 5 sets of 10 repetitions.   OT performed LUE wrist extension for 5 sets of 10 repetitions.   OT performed LUE digit flexion for 5 sets of 10 repetitions.   OT performed LUE shoulder rotation forward for 5 sets of 10 repetitions.  OT performed LUE shoulder rotation backward for 5 sets of 10 repetitions.  Patient performed UBE for 5 minutes to increase reciprocal patterns.    Buddy received the following manual techniques to mobilize the scapula including:  OT performed LUE scapular elevation for 3 sets of 10 repetitions.  OT performed LUE scapular protraction for 3 sets of 10 repetitions.    Buddy received supervised modalities after being cleared for contradictions: NMES Electrical Stimulation:  Buddy received NMES Electrical Stimulation to elicit muscle contraction of the left shoulder. Patient " received stimulation at a rate of 42 ps with symmetric current, ramp of 10 seconds with 10 second on time and 10 second off time. Patient tolerated treatment well without any adverse effects.     Home Exercises Provided:     Patient demonstrated good understanding of the education provided. Buddy demonstrated good return demonstration of activities. He stated he is hoping to hire help to assist in PROM exercises.    Education provided re:  Buddy verbalized good understanding of education provided. Patient understands purpose of therapeutic exercises, plan of care, and role of therapy in multidisciplinary care team.   No spiritual or educational barriers to learning provided. Provided patient with resource for [facility] Stroke Surviv[facility].    Assessment     Patient tolerated treatment well. Patient completing treatment without pain or complication. Patient demonstrated increased scapular mobility with manual techniques, which increased range of motion during passive range of motion exercises. Patient able to tolerate increased shoulder abduction by approximately 10 degrees during passive range of motion, but when reattempted later in session, patient limited by muscular tightness.     Patient is progressing well toward goals.    Goals      Patient will report no pain when performing therapeutic tasks with LUE prior to discharge. In Progress  Patient will increase LUE MMT grade to 2-/5 at shoulder prior to discharge. In Progress  Patient will increase LUE PROM to WFL prior to discharge. In Progress  Patient will be compliant with HEP daily prior to discharge. In Progress    Plan     Continue with established Plan of Care towards OT goals.    Therapist: Mariluz Gaspar OT  8/14/23

## 2023-08-14 ENCOUNTER — CLINICAL SUPPORT (OUTPATIENT)
Dept: REHABILITATION | Facility: HOSPITAL | Age: 73
End: 2023-08-14
Payer: MEDICARE

## 2023-08-14 DIAGNOSIS — R26.9 GAIT DIFFICULTY: Primary | ICD-10-CM

## 2023-08-14 DIAGNOSIS — Z86.73 HISTORY OF STROKE: Primary | ICD-10-CM

## 2023-08-14 PROCEDURE — 97112 NEUROMUSCULAR REEDUCATION: CPT | Mod: PN,CQ

## 2023-08-14 PROCEDURE — 97110 THERAPEUTIC EXERCISES: CPT | Mod: PN

## 2023-08-14 PROCEDURE — 97110 THERAPEUTIC EXERCISES: CPT | Mod: PN,CQ

## 2023-08-14 PROCEDURE — 97140 MANUAL THERAPY 1/> REGIONS: CPT | Mod: PN
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
## 2023-08-21 NOTE — PROGRESS NOTES
"                                                    Occupational Therapy Daily Note     Name: Buddy Park  Clinic Number: [id number]  Diagnosis:    History of stroke      Hemiplegia and hemiparesis following cerebral infarction affecting left non-dominant side      Physician: Gema Malone MD  Precautions: Moderate fall risk  Visit #: 8 of 12  Time In: 9:57 AM  Time Out: 10:56 AM  Total Billable Minutes: 59 minutes    Subjective     Pt reports: "I worked my arm out a lot on Sunday, but I think it's tight today."    Pain Scale: Buddy rates pain on a scale of 0-10 to be 0 currently.    Objective     Buddy received the following individual therapeutic exercises to develop endurance, range of motion, and flexibility:   OT performed LUE PROM supine for shoulder flexion for 5 sets of 10 repetitions.   OT performed LUE passive range of motion abduction for 5 sets of 10 repetitions.  OT performed LUE PROM for elbow flexion for 5 sets of 10 repetitions.  OT performed LUE PROM for elbow extension for 5 sets of 10 repetitions.  OT performed LUE wrist flexion for for 5 sets of 10 repetitions.   OT performed LUE wrist extension for 5 sets of 10 repetitions.   OT performed LUE digit flexion for 5 sets of 10 repetitions.   Patient performed LUE gross grasp squeeze on tennis ball for 3 sets of 10 repetitions.     Buddy received supervised modalities after being cleared for contradictions: NMES Electrical Stimulation:  Buddy received NMES Electrical Stimulation to elicit muscle contraction of the left shoulder. Patient received stimulation at a rate of 51 ps with symmetric current, ramp of 10 seconds with 10 second on time and 10 second off time. Patient tolerated treatment well without any adverse effects.     Home Exercises Provided:     Patient demonstrated good understanding of the education provided. Buddy demonstrated good return demonstration of activities. He stated he is hoping to hire help to assist in PROM " exercises.    Education provided re:  Buddy verbalized good understanding of education provided. Patient understands purpose of therapeutic exercises, plan of care, and role of therapy in multidisciplinary care team.   No spiritual or educational barriers to learning provided. Provided patient with resource for [facility] for St[facility] Survivors.    Assessment     Patient tolerated treatment well. Patient completing treatment without pain or complication.     Patient extremely limited my LUE muscular tightness on this date. OT continued aggressive passive range of motion program, but patient unable to tolerate full program due to left knee pain while laying down. OT attempted to start patient on UBE, but after several rotations, patient expressed he could no longer tolerate movement due to pain. Continued therapy session while patient seated in chair to increase comfort.     Patient continues to report feelings of depression, which greatly impacts his daily living. OT reinforced suggestion for patient to explore [facility] stroke support group as per previous session conversation. OT added support group information in patient instructions as previously mentioned in 8/7/23 note. Will follow up with patient.    Patient is progressing well toward goals.    Goals      Patient will report no pain when performing therapeutic tasks with LUE prior to discharge. In Progress  Patient will increase LUE MMT grade to 2-/5 at shoulder prior to discharge. In Progress  Patient will increase LUE PROM to WFL prior to discharge. In Progress  Patient will be compliant with HEP daily prior to discharge. In Progress    Plan     Continue with established Plan of Care towards OT goals.    Therapist: Mariluz Gaspar OT  8/21/23

## 2023-08-22 ENCOUNTER — CLINICAL SUPPORT (OUTPATIENT)
Dept: REHABILITATION | Facility: HOSPITAL | Age: 73
End: 2023-08-22
Payer: MEDICARE

## 2023-08-22 DIAGNOSIS — R26.9 GAIT DIFFICULTY: ICD-10-CM

## 2023-08-22 DIAGNOSIS — R53.1 LEFT-SIDED WEAKNESS: Primary | ICD-10-CM

## 2023-08-22 DIAGNOSIS — Z86.73 HISTORY OF STROKE: Primary | ICD-10-CM

## 2023-08-22 PROCEDURE — 97530 THERAPEUTIC ACTIVITIES: CPT | Mod: PN

## 2023-08-22 PROCEDURE — 97110 THERAPEUTIC EXERCISES: CPT | Mod: PN

## 2023-08-22 PROCEDURE — 97140 MANUAL THERAPY 1/> REGIONS: CPT | Mod: PN
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
## 2023-08-22 NOTE — PATIENT INSTRUCTIONS
OT discussed exploring [facility]'s support group for people who had strokes. Please use the following link to join group at patient's leisure.     Stroke Support Group  In person:  [facility]   [address] [place] [address]  Education Room 1st floor      Join Zo Meeting  [email or web address]    Meeting ID: [id number]  Passcode: [id number] One tap mobile  [phone],,,,,,[phone],,[phone]#  ([place])    Dial by your location  +3   ([place])  Meeting ID: [phone]  Passcode: [phone] Find your local number: [email or web address]    Join by TRINO  [email or web address]    Join by [email or web address]  [phone] ([place])  [phone] ([place])  [phone] ([facility])  [phone] ([facility])  [phone] ([place])  [phone] (Jesus Alberto)  [phone] ([place])  [phone] ([place])  [phone] ([place])  [phone] ([place])  [phone] ([place])  Meeting ID: [phone]  Passcode: [phone]

## 2023-08-23 NOTE — PROGRESS NOTES
"                                                    Occupational Therapy Daily Note     Name: Buddy Park  Clinic Number: [id number]  Diagnosis:    History of stroke      Hemiplegia and hemiparesis following cerebral infarction affecting left non-dominant side      Physician: Gema Malone MD  Precautions: Moderate fall risk  Visit #: 9 of 12  Time In: 10:00 AM  Time Out: 10:58 AM  Total Billable Minutes: 58 minutes    Subjective     Pt reports: "My wife can't help me move my arm because she just had shoulder surgery."    Pain Scale: Buddy rates pain on a scale of 0-10 to be 0 currently.   Pain Location: left upper extremity    Objective     Buddy received the following individual therapeutic exercises for 45 minutes to develop endurance, range of motion, and flexibility:   OT performed LUE PROM supine for shoulder flexion for 5 sets of 10 repetitions.   OT performed LUE passive range of motion abduction for 5 sets of 10 repetitions.  OT performed LUE PROM for elbow flexion for 5 sets of 10 repetitions.  OT performed LUE PROM for elbow extension for 5 sets of 10 repetitions.  OT performed LUE wrist flexion for for 5 sets of 10 repetitions.   OT performed LUE wrist extension for 5 sets of 10 repetitions.   OT performed LUE digit flexion for 5 sets of 10 repetitions.   Patient performed UBE with LUE strapped on for 5 minutes in order to improve reciprocal movement patterns.    Buddy received supervised modalities for 10 minutes after being cleared for contradictions: NMES Electrical Stimulation:  Buddy received NMES Electrical Stimulation to elicit muscle contraction of the left shoulder. Patient received stimulation at a rate of 47 ps with symmetric current, ramp of 10 seconds with 10 second on time and 10 second off time. Patient tolerated treatment well without any adverse effects.     Home Exercises Provided:     Patient demonstrated good understanding of the education provided. Buddy demonstrated " good return demonstration of activities. He stated he is hoping to hire help to assist in PROM exercises.    Education provided re:  Buddy verbalized good understanding of education provided. Patient understands purpose of therapeutic exercises, plan of care, and role of therapy in multidisciplinary care team.   No spiritual or educational barriers to learning provided. Provided patient with resource for [facility] Support Group for Stroke Survivors.    Assessment     Patient tolerated treatment well. Patient completing treatment without pain or complication.     As patient was primarily limited by muscular tightness in last session, OT began session with heat to affected extremity. Patient was able to tolerate aggressive passive range of motion exercises with greater flexibility. Patient still experiences left lower extremity pain during transfers to and from the mat, but continues to report no left upper extremity pain. Patient performed UBE for 5 minutes on this date. This is an increased time from last session, but not fully to the time achieved in previous sessions. Patient is limited in endurance due to muscular tightness. Will continue passive range of motion stretches.    Patient stated he was unable to access support group information in patient instructions provided in previous session. OT emailed patient support group information. Will follow up.     Patient is progressing well toward goals.    Goals      Patient will report no pain when performing therapeutic tasks with LUE prior to discharge. In Progress  Patient will increase LUE MMT grade to 2-/5 at shoulder prior to discharge. In Progress  Patient will increase LUE PROM to WFL prior to discharge. In Progress  Patient will be compliant with HEP daily prior to discharge. In Progress    Plan     Continue with established Plan of Care towards OT goals.    Therapist: Mariluz Gaspar OT  8/24/23

## 2023-08-24 ENCOUNTER — CLINICAL SUPPORT (OUTPATIENT)
Dept: REHABILITATION | Facility: HOSPITAL | Age: 73
End: 2023-08-24
Payer: MEDICARE

## 2023-08-24 DIAGNOSIS — Z86.73 HISTORY OF STROKE: Primary | ICD-10-CM

## 2023-08-24 DIAGNOSIS — R53.1 LEFT-SIDED WEAKNESS: Primary | ICD-10-CM

## 2023-08-24 DIAGNOSIS — R26.9 GAIT DIFFICULTY: ICD-10-CM

## 2023-08-24 PROCEDURE — 97110 THERAPEUTIC EXERCISES: CPT | Mod: PN

## 2023-08-24 PROCEDURE — 97530 THERAPEUTIC ACTIVITIES: CPT | Mod: PN
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
## 2023-08-25 NOTE — PROGRESS NOTES
"                                                    Occupational Therapy Daily Note     Name: Buddy Park  Clinic Number: [id number]  Diagnosis:    History of stroke      Hemiplegia and hemiparesis following cerebral infarction affecting left non-dominant side      Physician: Gema Malone MD  Precautions: Moderate fall risk  Visit #: 10 of 12  Time In: 2:29 PM  Time Out: 3:30 PM  Total Billable Minutes: 61 minutes    Subjective     Pt reports: "I stretched my arm out a little, not much, this weekend. My wife can't help me with her rotator cuff."    Pain Scale: Buddy rates pain on a scale of 0-10 to be 0 currently.   Pain Location: left upper extremity    Objective     Buddy received the following individual therapeutic exercises for 51 minutes to develop endurance, range of motion, and flexibility:   OT performed LUE PROM supine for shoulder flexion for 5 sets of 10 repetitions.   OT performed LUE passive range of motion abduction for 5 sets of 10 repetitions.  OT performed LUE PROM for elbow flexion for 5 sets of 10 repetitions.  OT performed LUE PROM for elbow extension for 5 sets of 10 repetitions.  OT performed LUE wrist flexion for for 5 sets of 10 repetitions.   OT performed LUE wrist extension for 5 sets of 10 repetitions.   OT performed LUE digit flexion for 5 sets of 10 repetitions.   Patient performed UBE with LUE strapped on for 5 minutes in order to improve reciprocal movement patterns.    Buddy received supervised modalities for 10 minutes after being cleared for contradictions: NMES Electrical Stimulation:  Buddy received NMES Electrical Stimulation to elicit muscle contraction of the left shoulder. Patient received stimulation at a rate of 48 ps with symmetric current, ramp of 10 seconds with 10 second on time and 10 second off time. Patient tolerated treatment well without any adverse effects.     Home Exercises Provided:     Patient demonstrated good understanding of the education " provided. Buddy demonstrated good return demonstration of activities. He stated he is hoping to hire help to assist in PROM exercises.    Education provided re:  Buddy verbalized good understanding of education provided. Patient understands purpose of therapeutic exercises, plan of care, and role of therapy in multidisciplinary care team.   No spiritual or educational barriers to learning provided. Provided patient with resource for [facility] for Stroke Survivors.    Assessment     Patient tolerated treatment well. Patient completing treatment without pain or complication.     Patient stated he received email from OT containing information regarding stroke support group, but has not opened it at this time due to limited time. Will follow up.    Patient is still limited by muscular tightness in his LUE, but demonstrated slight increased flexibility after application of heat prior to beginning therapeutic exercise. OT is able to achieve approximately 90 degrees LUE shoulder flexion and 90 degrees LUE abduction. OT will continue aggressive passive range of motion stretches to prevent increased muscle tone. Patient demonstrated increased endurance as he was able to tolerate UBE for 5 minutes with no breaks. Will continue aggressive passive range of motion exercises to decrease muscular tightness.    Patient continues to report no pain throughout functional activities.     Goals      Patient will report no pain when performing therapeutic tasks with LUE prior to discharge. In Progress  Patient will increase LUE MMT grade to 2-/5 at shoulder prior to discharge. In Progress  Patient will increase LUE PROM to WFL prior to discharge. In Progress  Patient will be compliant with HEP daily prior to discharge. In Progress    Plan     Continue with established Plan of Care towards OT goals.    Therapist: Mariluz Gaspar OT  8/28/23

## 2023-08-28 ENCOUNTER — CLINICAL SUPPORT (OUTPATIENT)
Dept: REHABILITATION | Facility: HOSPITAL | Age: 73
End: 2023-08-28
Payer: MEDICARE

## 2023-08-28 DIAGNOSIS — R26.9 GAIT DIFFICULTY: Primary | ICD-10-CM

## 2023-08-28 DIAGNOSIS — R53.1 LEFT-SIDED WEAKNESS: ICD-10-CM

## 2023-08-28 DIAGNOSIS — Z86.73 HISTORY OF STROKE: Primary | ICD-10-CM

## 2023-08-28 PROCEDURE — 97014 ELECTRIC STIMULATION THERAPY: CPT | Mod: PN

## 2023-08-28 PROCEDURE — 97110 THERAPEUTIC EXERCISES: CPT | Mod: PN,CQ

## 2023-08-28 PROCEDURE — 97110 THERAPEUTIC EXERCISES: CPT | Mod: PN

## 2023-08-28 PROCEDURE — 97530 THERAPEUTIC ACTIVITIES: CPT | Mod: PN,CQ
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
## 2023-08-29 NOTE — PROGRESS NOTES
"                                                    Occupational Therapy Daily Note     Name: Buddy Park  Clinic Number: [id number]  Diagnosis:    History of stroke      Hemiplegia and hemiparesis following cerebral infarction affecting left non-dominant side      Physician: Gema Malone MD  Precautions: Moderate fall risk  Visit #: 11 of 12  Time In: 11:03 AM  Time Out: 12:00 PM  Total Billable Minutes: 57 minutes    Subjective     Pt reports: "I was working my hand more last night. It doesn't stay loose for more than 5 minutes. I have to keep it on a rhythm."    Pain Scale: Buddy rates pain on a scale of 0-10 to be 0 currently.   Pain Location: left upper extremity    Objective     Buddy received the following individual therapeutic exercises for 42 minutes to develop endurance, range of motion, and flexibility:   OT performed LUE PROM supine for shoulder flexion for 5 sets of 10 repetitions.   OT performed LUE passive range of motion abduction for 5 sets of 10 repetitions.  OT performed LUE PROM for elbow flexion for 5 sets of 10 repetitions.  OT performed LUE PROM for elbow extension for 5 sets of 10 repetitions.  OT performed LUE wrist flexion for for 5 sets of 10 repetitions.   OT performed LUE wrist extension for 5 sets of 10 repetitions.   OT performed LUE digit flexion for 5 sets of 10 repetitions.   Patient performed LUE gross ball squeeze for 3 sets of 10 repetitions.     Buddy received supervised modalities for 10 minutes after being cleared for contradictions: NMES Electrical Stimulation:  Buddy received NMES Electrical Stimulation to elicit muscle contraction of the left shoulder. Patient received stimulation at a rate of 30 ps with symmetric current, ramp of 10 seconds with 10 second on time and 10 second off time. Patient tolerated treatment well without any adverse effects.     OT applied heat pack to left shoulder for 5 minutes prior to therapeutic exercise.    Home Exercises " Provided:     Patient demonstrated good understanding of the education provided. Buddy demonstrated good return demonstration of activities. He stated he is hoping to hire help to assist in PROM exercises.    Education provided re:  Buddy verbalized good understanding of education provided. Patient understands purpose of therapeutic exercises, plan of care, and role of therapy in multidisciplinary care team.   No spiritual or educational barriers to learning provided. Provided patient with resource for [facility] Stroke Survivors.    Assessment     Patient tolerated treatment well. Patient completing treatment without pain or complication.     Patient has plateaued in therapy treatments. Patient continues to be limited in active and passive range of motion by increased muscle tone. Patient able to achieve 90 degrees forward flexion left shoulder aggressive passive range of motion and 90 degrees abduction left shoulder aggressive passive range of motion. Patient experienced discomfort in left shoulder aggressive passive range of motion in abduction when moving to end-range at 90 degrees, so OT limited movement to pain-free range. Will continue aggressive passive range of motion exercises to decrease muscular tightness. From patient report, muscle tightness continues to persist within hours after therapeutic stretches. OT also noted increased muscle tightness at start of therapeutic sessions after several days without receiving passive range of motion stretches.     Patient requested not to use UBE on this date as he fears pain during left shoulder flexion. Patient completed left hand  exercise with no active digit extension and mild digit flexion.    Patient continues to report no pain throughout functional activities.     Goals      Patient will report no pain when performing therapeutic tasks with LUE prior to discharge. In Progress  Patient will increase LUE MMT grade to 2-/5 at shoulder  prior to discharge. In Progress  Patient will increase LUE PROM to WFL prior to discharge. In Progress  Patient will be compliant with HEP daily prior to discharge. In Progress    Plan     Continue with established Plan of Care towards OT goals.    Therapist: Mariluz Gaspar OT  8/30/23

## 2023-08-30 ENCOUNTER — CLINICAL SUPPORT (OUTPATIENT)
Dept: REHABILITATION | Facility: HOSPITAL | Age: 73
End: 2023-08-30
Payer: MEDICARE

## 2023-08-30 DIAGNOSIS — Z86.73 HISTORY OF STROKE: Primary | ICD-10-CM

## 2023-08-30 DIAGNOSIS — R26.9 GAIT DIFFICULTY: Primary | ICD-10-CM

## 2023-08-30 DIAGNOSIS — R53.1 LEFT-SIDED WEAKNESS: ICD-10-CM

## 2023-08-30 PROCEDURE — 97110 THERAPEUTIC EXERCISES: CPT | Mod: PN

## 2023-08-30 PROCEDURE — 97014 ELECTRIC STIMULATION THERAPY: CPT | Mod: PN

## 2023-08-30 PROCEDURE — 97110 THERAPEUTIC EXERCISES: CPT | Mod: PN,CQ

## 2023-08-30 PROCEDURE — 97530 THERAPEUTIC ACTIVITIES: CPT | Mod: PN,CQ
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
## 2023-09-05 NOTE — PROGRESS NOTES
"  Outpatient Therapy Discharge Summary     Name: Buddy Park  Clinic Number: [id number]    Therapy Diagnosis:   History of stroke   Hemiplegia and hemiparesis following cerebral infarction affecting left non-dominant side     Physician: Gema Malone MD    Medical Diagnosis:   History of stroke   Hemiplegia and hemiparesis following cerebral infarction affecting left non-dominant side     Evaluation Date: 7/26/23    Date of Last visit: 9/6/23  Total Visits Received: 12  Cancelled Visits: 1  No Show Visits: 0    Precautions: Moderate fall risk  Visit #: 12 of 12  Time In: 9:59 AM  Time Out: 10:59 AM  Total Billable Minutes:60     Subjective     Pt reports: "I was working my arm pretty good on Saturday. I work on my hand and get it loose, but then it tightens up within 15 minutes."  Functional changes: None reported    Pain Scale: Buddy rates pain on a scale of 0-10 to be 0 currently.   Pain Location: left upper extremity      Objective     Active Range of Motion:  Patient noted to have trace muscle movements in LUE in all joints.     Passive Range of Motion Measurements:  Left shoulder flexion: 90 degrees  Left shoulder abduction: 90 degrees  Left elbow flexion: 125 degrees  Left elbow extension: 0 degrees  Left wrist flexion: 50 degrees  Left wrist extension: 0 degrees  Left digit flexion: WNL  Left digit extension: WNL    Buddy received the following individual therapeutic exercises for 42 minutes to develop endurance, range of motion, and flexibility:   OT performed LUE PROM supine for shoulder flexion for 5 sets of 10 repetitions.   OT performed LUE passive range of motion abduction for 5 sets of 10 repetitions.  OT performed LUE PROM for elbow flexion for 5 sets of 10 repetitions.  OT performed LUE PROM for elbow extension for 5 sets of 10 repetitions.  OT performed LUE wrist flexion for for 5 sets of 10 repetitions.   OT performed LUE wrist extension for 5 sets of 10 repetitions.   OT performed LUE " digit flexion for 5 sets of 10 repetitions.   Patient performed UBE using LUE for 5 minutes with 2 rest breaks to improve reciprocal patterns.    Buddy received supervised modalities for 10 minutes after being cleared for contradictions: NMES Electrical Stimulation:  Buddy received NMES Electrical Stimulation to elicit muscle contraction of the left shoulder. Patient received stimulation at a rate of 48 ps with symmetric current, ramp of 10 seconds with 10 second on time and 10 second off time. Patient tolerated treatment well without any adverse effects.     OT applied heat pack to left shoulder for 5 minutes prior to therapeutic exercise.    Assessment      Patient's treatment included:  - Therapeutic exercise/activities  - Modalities for pain management  - Manual Therapy     Upon reassessment, patient continues to have trace movements in all joints of the left upper extremity. Patient's FOTO score increased from 24% at evaluation to 36% on this date, indicating improvements in engagement in functional tasks.     Patient achieved goal of no pain in his left upper extremity during engagement in functional daily living tasks as well as goal of maintaining compliance in home exercise program. Patient was able to perform self-passive range of motion on his left hand and elbow. Patient relied on family members to assist in shoulder passive range of motion as they are available to do so.     Patient was unable to achieve goal of increasing his left upper extremity manual muscle test score to 2-/5 as he continued to have only trace active movements in all joints of his left upper extremity. Patient was able to achieve 90 degrees shoulder flexion and abduction as well as 125 degrees elbow flexion with aggressive passive range of motion stretch. Patient's increased muscle tone was the primary limiting factor in achieving greater active and passive range of motion. Patient and OT noted patient gained some increased  flexibility in joint range of motion following aggressive passive range of motion stretches, but patient's muscle tone returned to previous level within 15 minutes to an hour after stretches. Patient was also unable to achieve goal of increasing left upper extremity passive range of motion within functional limits due to increased muscle tone.    Patient is noted to show no improvements in left upper extremity passive range of motion in biweekly therapy sessions.    GOALS    Patient will report no pain when performing therapeutic tasks with LUE prior to discharge. GOAL MET  2. Patient will increase LUE MMT grade to 2-/5 at shoulder prior to discharge. DISCHARGE  3. Patient will increase LUE PROM to WFL prior to discharge. DISCHARGE  4. Patient will be compliant with HEP daily prior to discharge. GOAL MET    Discharge reason: Patient has reached the maximum rehab potential for the present time.     Plan   As patient has plateaued in his progress to achieve stated goals, patient is discharged from occupational therapy services.

## 2023-09-06 ENCOUNTER — CLINICAL SUPPORT (OUTPATIENT)
Dept: REHABILITATION | Facility: HOSPITAL | Age: 73
End: 2023-09-06
Payer: MEDICARE

## 2023-09-06 DIAGNOSIS — R53.1 LEFT-SIDED WEAKNESS: Primary | ICD-10-CM

## 2023-09-06 DIAGNOSIS — R26.9 GAIT DIFFICULTY: ICD-10-CM

## 2023-09-06 DIAGNOSIS — Z86.73 HISTORY OF STROKE: Primary | ICD-10-CM

## 2023-09-06 PROCEDURE — 97110 THERAPEUTIC EXERCISES: CPT | Mod: PN

## 2023-09-06 PROCEDURE — 97014 ELECTRIC STIMULATION THERAPY: CPT | Mod: PN

## 2023-09-06 PROCEDURE — 97530 THERAPEUTIC ACTIVITIES: CPT | Mod: PN
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
## 2023-09-07 ENCOUNTER — PATIENT MESSAGE (OUTPATIENT)
Dept: FAMILY MEDICINE | Facility: CLINIC | Age: 73
End: 2023-09-07
Payer: MEDICARE

## 2023-09-07 DIAGNOSIS — R53.1 LEFT-SIDED WEAKNESS: Primary | ICD-10-CM

## 2023-09-08 ENCOUNTER — CLINICAL SUPPORT (OUTPATIENT)
Dept: REHABILITATION | Facility: HOSPITAL | Age: 73
End: 2023-09-08
Payer: MEDICARE

## 2023-09-08 DIAGNOSIS — R53.1 LEFT-SIDED WEAKNESS: ICD-10-CM

## 2023-09-08 DIAGNOSIS — R26.9 GAIT DIFFICULTY: Primary | ICD-10-CM

## 2023-09-08 PROCEDURE — 97530 THERAPEUTIC ACTIVITIES: CPT | Mod: PN,CQ

## 2023-09-08 PROCEDURE — 97110 THERAPEUTIC EXERCISES: CPT | Mod: PN,CQ
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
## 2023-09-11 ENCOUNTER — TELEPHONE (OUTPATIENT)
Dept: FAMILY MEDICINE | Facility: CLINIC | Age: 73
End: 2023-09-11
Payer: MEDICARE

## 2023-09-11 NOTE — TELEPHONE ENCOUNTER
----- Message from Chas Al sent at 9/11/2023  3:38 PM CDT -----  Contact: self  Type:  RX Refill Request    Who Called:  Patient  Refill or New Rx:  Refill  RX Name and Strength:  Prednisone 20mg    How is the patient currently taking it? (ex. 1XDay):  as directed  Is this a 30 day or 90 day RX:  as directed    Preferred Pharmacy with phone number:    [facility]/pharmacy #[id number] - [place], [place] - [address]  [address] [place] [address]  Phone: [phone] Fax: [phone]      Local or Mail Order:  Local  Ordering Provider:  Italo no longer with [facility]  Best Call Back Number:  [phone]    Additional Information:  Pt states he need a refill.Please call back

## 2023-09-13 ENCOUNTER — OFFICE VISIT (OUTPATIENT)
Dept: FAMILY MEDICINE | Facility: CLINIC | Age: 73
End: 2023-09-13
Payer: MEDICARE

## 2023-09-13 VITALS
SYSTOLIC BLOOD PRESSURE: 150 MMHG | DIASTOLIC BLOOD PRESSURE: 88 MMHG | BODY MASS INDEX: 25.45 KG/M2 | RESPIRATION RATE: 16 BRPM | HEIGHT: 63 IN | WEIGHT: 143.63 LBS | OXYGEN SATURATION: 98 % | HEART RATE: 84 BPM

## 2023-09-13 DIAGNOSIS — I70.0 ABDOMINAL AORTIC ATHEROSCLEROSIS: ICD-10-CM

## 2023-09-13 DIAGNOSIS — I69.354 HEMIPLEGIA AND HEMIPARESIS FOLLOWING CEREBRAL INFARCTION AFFECTING LEFT NON-DOMINANT SIDE: ICD-10-CM

## 2023-09-13 DIAGNOSIS — Z86.73 HISTORY OF STROKE: ICD-10-CM

## 2023-09-13 DIAGNOSIS — F32.5 MAJOR DEPRESSIVE DISORDER WITH SINGLE EPISODE, IN FULL REMISSION: ICD-10-CM

## 2023-09-13 DIAGNOSIS — Z13.220 ENCOUNTER FOR LIPID SCREENING FOR CARDIOVASCULAR DISEASE: ICD-10-CM

## 2023-09-13 DIAGNOSIS — I10 PRIMARY HYPERTENSION: ICD-10-CM

## 2023-09-13 DIAGNOSIS — M21.372 LEFT FOOT DROP: ICD-10-CM

## 2023-09-13 DIAGNOSIS — I50.32 CHRONIC DIASTOLIC HEART FAILURE: ICD-10-CM

## 2023-09-13 DIAGNOSIS — M10.9 ACUTE GOUT INVOLVING TOE OF LEFT FOOT, UNSPECIFIED CAUSE: Primary | ICD-10-CM

## 2023-09-13 DIAGNOSIS — R73.03 PREDIABETES: ICD-10-CM

## 2023-09-13 DIAGNOSIS — H91.92 DEAFNESS IN LEFT EAR: ICD-10-CM

## 2023-09-13 DIAGNOSIS — Z13.6 ENCOUNTER FOR LIPID SCREENING FOR CARDIOVASCULAR DISEASE: ICD-10-CM

## 2023-09-13 PROCEDURE — 1159F MED LIST DOCD IN RCRD: CPT | Mod: CPTII,S$GLB,, | Performed by: STUDENT IN AN ORGANIZED HEALTH CARE EDUCATION/TRAINING PROGRAM

## 2023-09-13 PROCEDURE — 99214 PR OFFICE/OUTPT VISIT, EST, LEVL IV, 30-39 MIN: ICD-10-PCS | Mod: S$GLB,,, | Performed by: STUDENT IN AN ORGANIZED HEALTH CARE EDUCATION/TRAINING PROGRAM

## 2023-09-13 PROCEDURE — 3080F PR MOST RECENT DIASTOLIC BLOOD PRESSURE >= 90 MM HG: ICD-10-PCS | Mod: CPTII,S$GLB,, | Performed by: STUDENT IN AN ORGANIZED HEALTH CARE EDUCATION/TRAINING PROGRAM

## 2023-09-13 PROCEDURE — 3044F PR MOST RECENT HEMOGLOBIN A1C LEVEL <7.0%: ICD-10-PCS | Mod: CPTII,S$GLB,, | Performed by: STUDENT IN AN ORGANIZED HEALTH CARE EDUCATION/TRAINING PROGRAM

## 2023-09-13 PROCEDURE — 3288F FALL RISK ASSESSMENT DOCD: CPT | Mod: CPTII,S$GLB,, | Performed by: STUDENT IN AN ORGANIZED HEALTH CARE EDUCATION/TRAINING PROGRAM

## 2023-09-13 PROCEDURE — 1126F AMNT PAIN NOTED NONE PRSNT: CPT | Mod: CPTII,S$GLB,, | Performed by: STUDENT IN AN ORGANIZED HEALTH CARE EDUCATION/TRAINING PROGRAM

## 2023-09-13 PROCEDURE — 1157F PR ADVANCE CARE PLAN OR EQUIV PRESENT IN MEDICAL RECORD: ICD-10-PCS | Mod: CPTII,S$GLB,, | Performed by: STUDENT IN AN ORGANIZED HEALTH CARE EDUCATION/TRAINING PROGRAM

## 2023-09-13 PROCEDURE — 3080F DIAST BP >= 90 MM HG: CPT | Mod: CPTII,S$GLB,, | Performed by: STUDENT IN AN ORGANIZED HEALTH CARE EDUCATION/TRAINING PROGRAM

## 2023-09-13 PROCEDURE — 3077F PR MOST RECENT SYSTOLIC BLOOD PRESSURE >= 140 MM HG: ICD-10-PCS | Mod: CPTII,S$GLB,, | Performed by: STUDENT IN AN ORGANIZED HEALTH CARE EDUCATION/TRAINING PROGRAM

## 2023-09-13 PROCEDURE — 99999 PR PBB SHADOW E&M-EST. PATIENT-LVL V: ICD-10-PCS | Mod: PBBFAC,,, | Performed by: STUDENT IN AN ORGANIZED HEALTH CARE EDUCATION/TRAINING PROGRAM

## 2023-09-13 PROCEDURE — 3288F PR FALLS RISK ASSESSMENT DOCUMENTED: ICD-10-PCS | Mod: CPTII,S$GLB,, | Performed by: STUDENT IN AN ORGANIZED HEALTH CARE EDUCATION/TRAINING PROGRAM

## 2023-09-13 PROCEDURE — 1159F PR MEDICATION LIST DOCUMENTED IN MEDICAL RECORD: ICD-10-PCS | Mod: CPTII,S$GLB,, | Performed by: STUDENT IN AN ORGANIZED HEALTH CARE EDUCATION/TRAINING PROGRAM

## 2023-09-13 PROCEDURE — 1101F PR PT FALLS ASSESS DOC 0-1 FALLS W/OUT INJ PAST YR: ICD-10-PCS | Mod: CPTII,S$GLB,, | Performed by: STUDENT IN AN ORGANIZED HEALTH CARE EDUCATION/TRAINING PROGRAM

## 2023-09-13 PROCEDURE — 1160F PR REVIEW ALL MEDS BY PRESCRIBER/CLIN PHARMACIST DOCUMENTED: ICD-10-PCS | Mod: CPTII,S$GLB,, | Performed by: STUDENT IN AN ORGANIZED HEALTH CARE EDUCATION/TRAINING PROGRAM

## 2023-09-13 PROCEDURE — 3044F HG A1C LEVEL LT 7.0%: CPT | Mod: CPTII,S$GLB,, | Performed by: STUDENT IN AN ORGANIZED HEALTH CARE EDUCATION/TRAINING PROGRAM

## 2023-09-13 PROCEDURE — 3008F BODY MASS INDEX DOCD: CPT | Mod: CPTII,S$GLB,, | Performed by: STUDENT IN AN ORGANIZED HEALTH CARE EDUCATION/TRAINING PROGRAM

## 2023-09-13 PROCEDURE — 1126F PR PAIN SEVERITY QUANTIFIED, NO PAIN PRESENT: ICD-10-PCS | Mod: CPTII,S$GLB,, | Performed by: STUDENT IN AN ORGANIZED HEALTH CARE EDUCATION/TRAINING PROGRAM

## 2023-09-13 PROCEDURE — 1160F RVW MEDS BY RX/DR IN RCRD: CPT | Mod: CPTII,S$GLB,, | Performed by: STUDENT IN AN ORGANIZED HEALTH CARE EDUCATION/TRAINING PROGRAM

## 2023-09-13 PROCEDURE — 1101F PT FALLS ASSESS-DOCD LE1/YR: CPT | Mod: CPTII,S$GLB,, | Performed by: STUDENT IN AN ORGANIZED HEALTH CARE EDUCATION/TRAINING PROGRAM

## 2023-09-13 PROCEDURE — 1157F ADVNC CARE PLAN IN RCRD: CPT | Mod: CPTII,S$GLB,, | Performed by: STUDENT IN AN ORGANIZED HEALTH CARE EDUCATION/TRAINING PROGRAM

## 2023-09-13 PROCEDURE — 99999 PR PBB SHADOW E&M-EST. PATIENT-LVL V: CPT | Mod: PBBFAC,,, | Performed by: STUDENT IN AN ORGANIZED HEALTH CARE EDUCATION/TRAINING PROGRAM

## 2023-09-13 PROCEDURE — 3077F SYST BP >= 140 MM HG: CPT | Mod: CPTII,S$GLB,, | Performed by: STUDENT IN AN ORGANIZED HEALTH CARE EDUCATION/TRAINING PROGRAM

## 2023-09-13 PROCEDURE — 99214 OFFICE O/P EST MOD 30 MIN: CPT | Mod: S$GLB,,, | Performed by: STUDENT IN AN ORGANIZED HEALTH CARE EDUCATION/TRAINING PROGRAM

## 2023-09-13 PROCEDURE — 3008F PR BODY MASS INDEX (BMI) DOCUMENTED: ICD-10-PCS | Mod: CPTII,S$GLB,, | Performed by: STUDENT IN AN ORGANIZED HEALTH CARE EDUCATION/TRAINING PROGRAM

## 2023-09-13 RX ORDER — PREDNISONE 20 MG/1
20 TABLET ORAL DAILY
Qty: 5 TABLET | Refills: 0 | Status: SHIPPED | OUTPATIENT
Start: 2023-09-13 | End: 2023-09-18

## 2023-09-13 NOTE — PROGRESS NOTES
Subjective:       Patient ID: Buddy Park is a 72 y.o. male.    Chief Complaint: Follow-up (6w f/u)    Patient Active Problem List:     Hyperlipidemia     Cochlear implant in place     [place] (hard of hearing)     Cardiomyopathy     HTN (hypertension)     Anxiety     Abdominal aortic atherosclerosis     Deafness     Tinnitus of left ear     Stenosis of right carotid artery     Bruit     Major depressive disorder with single episode, in full remission     CKD (chronic kidney disease) stage 3, GFR 30-59 ml/min     Prediabetes     Pain in left foot     Primary osteoarthritis of left foot     Hallux limitus of left foot     Renal calculus, left     History of stroke     Chronic diastolic heart failure     Traumatic complete tear of left rotator cuff     Adhesive capsulitis of left shoulder     Elevated AST (SGOT)     Abnormal ECG     Excessive daytime sleepiness     History of left-sided carotid endarterectomy     Left foot drop     Diarrhea     Gait difficulty     Muscle weakness (generalized)     Chronic fatigue     At risk for falling     Left-sided weakness     Hemiplegia and hemiparesis following cerebral infarction affecting left non-dominant side    Current Outpatient Medications:  amLODIPine (NORVASC) 10 MG tablet, Take 1 tablet (10 mg total) by mouth once daily.  aspirin (ECOTRIN) 81 MG EC tablet, Take 81 mg by mouth once daily.  b complex vitamins tablet, Take 1 tablet by mouth once daily.  baclofen (LIORESAL) 10 MG tablet, Take 1 tablet (10 mg total) by mouth 2 (two) times daily as needed (muscle spasm).  buPROPion (WELLBUTRIN XL) 300 MG 24 hr tablet, Take 1 tablet (300 mg total) by mouth once daily.  carvediloL (COREG) 25 MG tablet, Take 1 tablet (25 mg total) by mouth once daily.  cholestyramine-aspartame (CHOLESTYRAMINE LIGHT) 4 gram Powd, Take 4 g by mouth 2 (two) times daily as needed (diarrhea).  cloNIDine (CATAPRES) 0.1 MG tablet, Take 1 tablet (0.1 mg total) by mouth 2 (two) times daily as needed  (For blood pressure with the top number above 150 or bottom number above 100).  clopidogreL (PLAVIX) 75 mg tablet, TAKE 1 TABLET BY MOUTH EVERY DAY  colchicine (COLCRYS) 0.6 mg tablet, Take 1 tablet (0.6 mg total) by mouth once daily.  colestipoL (COLESTID) 1 gram Tab, TAKE 1 TABLET BY MOUTH TWICE A DAY  ferrous sulfate (FEOSOL) 325 mg (65 mg iron) Tab tablet, Take 325 mg by mouth.  gabapentin (NEURONTIN) 400 MG capsule, Take 400 mg by mouth 3 (three) times daily.  HYDROcodone-acetaminophen (NORCO) 7.5-325 mg per tablet, Take 1 tablet by mouth every 6 (six) hours as needed for Pain.  ketoconazole (NIZORAL) 2 % cream, Apply topically 2 (two) times daily.  ketorolac (TORADOL) 10 mg tablet, Take 1 tablet (10 mg total) by mouth every 6 (six) hours as needed for Pain.  lancets [facility], To check BG 2 times daily, to use with insurance preferred meter  leg brace Misc, Use left foot brace as directed  miconazole, bulk, Powd, 1 application by Misc.(Non-Drug; Combo Route) route 2 (two) times daily as needed (rash).  multivitamin (THERAGRAN) per tablet, Take 1 tablet by mouth.  traZODone (DESYREL) 50 MG tablet, Take 1 tablet (50 mg total) by mouth every evening.  blood sugar diagnostic Strp, To check BG 2-3 times daily, to use with insurance preferred meter (Patient not taking: Reported on 9/13/2023)  blood-glucose meter kit, To check BG 2-3 times daily, to use with insurance preferred meter  nitroGLYCERIN (NITROSTAT) 0.4 MG SL tablet, Place 1 tablet (0.4 mg total) under the tongue every 5 (five) minutes as needed for Chest pain.  rosuvastatin (CRESTOR) 40 MG Tab, Take 1 tablet (40 mg total) by mouth every evening.    No current facility-administered medications for this visit.  Facility-Administered Medications Ordered in Other Visits:  lactated ringers infusion            Review of Systems   Constitutional:  Negative for activity change and appetite change.   Respiratory:  Negative for shortness of breath.    Cardiovascular:   Negative for chest pain.   Gastrointestinal:  Negative for abdominal pain and anal bleeding.   Genitourinary:  Negative for dysuria.   Integumentary:  Negative for rash.   Psychiatric/Behavioral:  Negative for dysphoric mood and sleep disturbance. The patient is not nervous/anxious.          Objective:      Physical Exam  Constitutional:       General: He is not in acute distress.     Appearance: Normal appearance. He is not ill-appearing.   Eyes:      Conjunctiva/sclera: Conjunctivae normal.   Cardiovascular:      Rate and Rhythm: Normal rate and regular rhythm.      Heart sounds: Normal heart sounds. No murmur heard.  Pulmonary:      Effort: Pulmonary effort is normal. No respiratory distress.      Breath sounds: Normal breath sounds.   Musculoskeletal:      Right lower leg: No edema.      Left lower leg: No edema.   Skin:     General: Skin is warm and dry.   Neurological:      Mental Status: He is alert. Mental status is at baseline.      Gait: Gait normal.   Psychiatric:         Mood and Affect: Mood normal.         Behavior: Behavior normal.         Thought Content: Thought content normal.         Judgment: Judgment normal.         Assessment:       1. Acute gout involving toe of left foot, unspecified cause    2. History of stroke    3. Left foot drop    4. Hemiplegia and hemiparesis following cerebral infarction affecting left non-dominant side    5. Major depressive disorder with single episode, in full remission    6. Deafness in left ear    7. Primary hypertension    8. Prediabetes    9. Chronic diastolic heart failure    10. Abdominal aortic atherosclerosis    11. Encounter for lipid screening for cardiovascular disease        Plan:       Problem List Items Addressed This Visit          Neuro    History of stroke     Stable and just finished some additional therapy         Left foot drop     Wearing his brace         Hemiplegia and hemiparesis following cerebral infarction affecting left non-dominant side      Working with therapy and had another brace coming in the mail            Psychiatric    Major depressive disorder with single episode, in full remission     Continued frustration due to not going on the cruise.            ENT    Deafness     Wears his hearing aid            Cardiac/Vascular    HTN (hypertension)     BP Readings from Last 3 Encounters:   09/13/23 (!) 150/90   08/02/23 110/68   07/14/23 135/85   Stable. Continue current medications and regular followup.  Hypertension Medications               amLODIPine (NORVASC) 10 MG tablet Take 1 tablet (10 mg total) by mouth once daily.    carvediloL (COREG) 25 MG tablet Take 1 tablet (25 mg total) by mouth once daily.    cloNIDine (CATAPRES) 0.1 MG tablet Take 1 tablet (0.1 mg total) by mouth 2 (two) times daily as needed (For blood pressure with the top number above 150 or bottom number above 100).    nitroGLYCERIN (NITROSTAT) 0.4 MG SL tablet Place 1 tablet (0.4 mg total) under the tongue every 5 (five) minutes as needed for Chest pain.            Relevant Orders    CBC Auto Differential    Comprehensive Metabolic Panel    TSH    Abdominal aortic atherosclerosis     Continue risk factor management.  No chest pain         Chronic diastolic heart failure     Last echo in 2022  Concentric left ventricular remodeling. Normal left ventricular systolic function. The estimated ejection fraction is 60%. Grade 1 diastolic dysfunction.  Mild right ventricular enlargement. Normal right ventricular systolic function.  No significant valvular disorder observed.  Bubble study negative for R-L intra cardiac shunt.    Asymptomatic. No edema  No sob              Endocrine    Prediabetes     Lab Results   Component Value Date    HGBA1C 5.2 01/10/2023              Relevant Orders    Microalbumin/Creatinine Ratio, Urine    Hemoglobin A1C     Other Visit Diagnoses       Acute gout involving toe of left foot, unspecified cause    -  Primary    Relevant Medications    predniSONE  (DELTASONE) 20 MG tablet    Other Relevant Orders    Uric Acid    Encounter for lipid screening for cardiovascular disease        Relevant Orders    Lipid Panel

## 2023-09-13 NOTE — ASSESSMENT & PLAN NOTE
Last echo in 2022   Concentric left ventricular remodeling. Normal left ventricular systolic function. The estimated ejection fraction is 60%. Grade 1 diastolic dysfunction.   Mild right ventricular enlargement. Normal right ventricular systolic function.   No significant valvular disorder observed.   Bubble study negative for R-L intra cardiac shunt.    Asymptomatic. No edema  No sob

## 2023-09-13 NOTE — ASSESSMENT & PLAN NOTE
BP Readings from Last 3 Encounters:   09/13/23 (!) 150/90   08/02/23 110/68   07/14/23 135/85     Stable. Continue current medications and regular followup.  Hypertension Medications             amLODIPine (NORVASC) 10 MG tablet Take 1 tablet (10 mg total) by mouth once daily.    carvediloL (COREG) 25 MG tablet Take 1 tablet (25 mg total) by mouth once daily.    cloNIDine (CATAPRES) 0.1 MG tablet Take 1 tablet (0.1 mg total) by mouth 2 (two) times daily as needed (For blood pressure with the top number above 150 or bottom number above 100).    nitroGLYCERIN (NITROSTAT) 0.4 MG SL tablet Place 1 tablet (0.4 mg total) under the tongue every 5 (five) minutes as needed for Chest pain.

## 2023-10-06 ENCOUNTER — TELEPHONE (OUTPATIENT)
Dept: FAMILY MEDICINE | Facility: CLINIC | Age: 73
End: 2023-10-06
Payer: MEDICARE

## 2023-10-06 NOTE — TELEPHONE ENCOUNTER
----- Message from Brinaayaan Jennie sent at 10/6/2023 10:42 AM CDT -----  Regarding: Pt Advice  Needs Medical Advice      Who Called:  Pt        Would the patient rather a call back or a response via [facility]?  Call back    Best Call Back Number:  [phone]      Additional Information: Sts wanting to know if paperwork has been signed for handy cap parking ticket. Pls call when completed. Would like have done today.    Please Advise - Thank you

## 2023-10-12 ENCOUNTER — CLINICAL SUPPORT (OUTPATIENT)
Dept: REHABILITATION | Facility: HOSPITAL | Age: 73
End: 2023-10-12
Payer: MEDICARE

## 2023-10-12 DIAGNOSIS — R53.1 LEFT-SIDED WEAKNESS: ICD-10-CM

## 2023-10-12 PROCEDURE — 97161 PT EVAL LOW COMPLEX 20 MIN: CPT | Mod: PN

## 2023-10-12 NOTE — PLAN OF CARE
"[facility]  Physical Therapy Initial Evaluation    Name: Buddy Park  Clinic Number: [id number]    Therapy Diagnosis:   Encounter Diagnosis   Name Primary?    Left-sided weakness      Physician: Gema Malone MD    Physician Orders: PT Eval and Treat   Medical Diagnosis from Referral: R53.1 (ICD-10-CM) - Left-sided weakness  Evaluation Date: 10/12/2023  Authorization Period Expiration: 10/13/24  Plan of Care Expiration: 10/13/24  Visit # / Visits authorized: 1/ 1  FOTO Visit #:  1/1    Time In: 8:05 am  Time Out: 9:00 am  Total Appointment Time (timed & untimed codes): 55 minutes    Precautions: Standard    Subjective     History of current condition - Buddy reports: pt reports that he has been going to the gym once a week and he does ok when he goes. Pt states that he is seeking therapy because his "visits ran out  the last time." Pt recently discharged from this clinic approx 1 month ago and received skilled PT services over the course of 3 months. Pt reports driving himself to his eval this morning with no issues.     Medical History:   Past Medical History:   Diagnosis Date    Angina pectoris     Anxiety     Arthritis     Biliary colic     Cochlear implant in place     Deaf     LEFT EAR    Depression     Diabetes mellitus type 2 without retinopathy 1/5/2022    Heart failure     High cholesterol     History of colon polyps 2/20/2018    [place] (hard of hearing)     HEARING AID - RIGHT    Hyperlipidemia     Hypertension     Kidney stone     Kidney stones     Renal stones     Stroke     Trouble in sleeping     Wears glasses        Surgical History:   Buddy Park  has a past surgical history that includes Ear mastoidectomy w/ cochlear implant w/ landmark; Rotator cuff repair; Tonsillectomy; Sinus surgery; Colonoscopy (01/09/2013); Cholecystectomy (02/06/2014); Fracture surgery; Foreign Body Removal (Right); Lithotripsy; Colonoscopy (N/A, 03/12/2018); Carotid angioplasty " "(06/2018); Cataract extraction (Bilateral); Cystoscopy w/ retrogrades (Bilateral, 10/28/2019); Cystoscopy w/ ureteral stent placement (Left, 10/28/2019); Vascular surgery; Extracorporeal shock wave lithotripsy (Left, 12/02/2019); Cystoscopy w/ ureteral stent removal (Left, 12/02/2019); Eye surgery; Colonoscopy (N/A, 02/15/2021); and Hip surgery (Left).    Medications:   Buddy has a current medication list which includes the following prescription(s): amlodipine, aspirin, b complex vitamins, baclofen, blood sugar diagnostic, blood-glucose meter, bupropion, carvedilol, cholestyramine light, clonidine, clopidogrel, colchicine (gout), colestipol, ferrous sulfate, gabapentin, hydrocodone-acetaminophen, ketoconazole, ketorolac, lancets, leg brace, miconazole (bulk), multivitamin, nitroglycerin, rosuvastatin, and trazodone, and the following Facility-Administered Medications: lactated ringers.    Allergies:   Review of patient's allergies indicates:   Allergen Reactions    Bee pollens Shortness Of Breath     WASP, BEE, ANT AND CATERPILLER STINGS    Hydrochlorothiazide Shortness Of Breath and Rash    Milk containing products (dairy) Diarrhea    Metoprolol Rash        Imaging,    Prior Therapy: pt has been seen in this clinic for OT and PT . Pt received skilled PT services most recently from 6/28/23   -9/08/23. Prior to DC from recent services pt has received therapy in the post CVA.  Social History: Single story home, 0 LIAN; Lives with their spouse who is able to assist. Wife helps with household tasks  Prior Level of Function: Mod(I) with Hurrycane  Current Level of Function: Mod(I) with small based quad cane    Pain:  Pain:  Current 0/10, Worst 0/10, Best 0/10   Location: Left Foot   Patient denies pain at this time    Pts goals: " walk Normal and get back close to where I was 3 years ago." "I'm hoping to run a 5k race by the end of next year"    Objective   Observation: Step-to gait pattern with NBQC in RUE; Left toe " drop     Sensation: Intact to light touch at the left lower leg and foot     Joint Mobility: Left Talocrural and Subtalar joint is restricted     LEFT HIP ROM (Active / Passive)  Flexion:           90/90 deg  Abduction:       8/13 deg  Adduction:       8/14deg     KNEE ROM (Active / Passive)                          Left                  Right  Flexion --------  105/115           WNL  Extension ----  -30/-15            WNL     ANKLE ROM (Active / Passive)                          Left        Plantarflexion  Unable / 50 deg            Dorsiflexion --  Unable / -35 deg                       Inversion ------ Unable / 30 deg                        Eversion ------  Unable / 10 deg                           Lower Extremity MMT                          Left      Right  HIP  Flexion --------  3-         5                        Extension ----  2+        4+                      Abduction ----  2         4+          Adduction ----  2       5                        Int Rot ---------  Unable      5  Ext Rot --------  Unable       4+                      KNEE  Flexion --------  3+       5  Extension ----  4-        4+  ANKLE  Plantarflexion  0          5  Dorsiflexion --  0          5  Inversion ------ 0          5  Eversion ------  0          5     Lower Extremity Flexibility:      Bed Mobility:  Sit to Supine: Mod(I)  Supine to Sit: Mod(I)     Transfer Mobility:  Sit to Stand: Mod(I) with definite need for RUE assist  Stand to Sit: Mod(I) with definite need for RUE assist     Sitting Balance:  Static: Normal  Dynamic: Normal     Standing Balance:  Normal Base of Support  Static:  Eyes Open = 2 min no UE support              Eyes Closed = 2 mins  with minimal postural sway no UE support  Static standing on foam : 2 mins minimal sway. No UE support  Able to march and step up on step UES x 1      Narrow Base of Support  Static:  Eyes Open = NT               Eyes Closed = NT        Outcome Measures:  TUG Test = >60 seconds with  NBQC  Sit<>stand 30 sec= 10x      Limitation/Restriction for FOTO Survey    Therapist reviewed FOTO scores for Buddy Park on 10/12/2023.   FOTO documents entered into [employer] - see Media section.    Limitation Score: 23%         TREATMENT   Home Exercises and Patient Education Provided    Education provided:   - Current mobility level (pt is Mod I with all functional tasks)  -Plateau of care / No changes noted since recent discharge  -Continuing  previously provided maintenance program exercises provided and going to the gym  -Importance of remaining motivated    Assessment   Buddy is a 72 y.o. male referred to outpatient Physical Therapy with a medical diagnosis of R53.1 (ICD-10-CM) - Left-sided weakness. Pt recently discharged from skilled PT services after completing 18 visits reaching a level of plateau. New order received and evaluation completed. Foto limitations No new deficits identified since recent discharge. Pt  reports that he has previously provided HEP and goes to the gym to ride the bike. Pt encouraged keep up his routine and educated on his level of plateau. Pt is Mod I with mobility and ADL's. Adequate balance demonstrated.Pt is currently driving and reports no issues. PT educated pt on assessment findings. PT discussed pt current goals in relation to medical history,residual deficits post stroke and re-educated on level of plateau as discussed upon recent discharge.Pt is currently not appropriate for skilled PT services at this time. Pt verbalized understanding and state that he will continue with his HEP and going to the gym to maintain function. Discharge post evaluation.    Pt prognosis is Guarded.   Pt will benefit from skilled outpatient Physical Therapy to address the deficits stated above and in the chart below, provide pt/family education, and to maximize pt's level of independence.     Plan of care discussed with patient: Yes  Pt's spiritual, cultural and educational needs considered  and patient is agreeable to the plan of care and goals as stated below:     Anticipated Barriers for therapy: none    Medical Necessity is demonstrated by the following  History  Co-morbidities and personal factors that may impact the plan of care Co-morbidities:   See Hx    Personal Factors:   coping style  attitudes     high   Examination  Body Structures and Functions, activity limitations and participation restrictions that may impact the plan of care Body Regions:   lower extremities    Body Systems:    ROM  strength  balance  gait    Participation Restrictions:   Left weakness    Activity limitations:   Learning and applying knowledge  no deficits    General Tasks and Commands  no deficits    Communication  no deficits    Mobility    Self care  no deficits    Domestic Life  no deficits    Interactions/Relationships  no deficits    Life Areas  no deficits    Community and Social Life  no deficits         moderate   Clinical Presentation stable and uncomplicated low   Decision Making/ Complexity Score: low       Plan   Plan of care Certification: 10/12/2023 to 10/13/23    Evaluation only. Skilled PT services are not appropriate at this time.Pt understands current functional level and verbalized understanding to continue with previously provided HEP while continuing his routine of riding the bike at the gym.        Loraine Kilgore, PT

## 2023-11-13 DIAGNOSIS — M25.562 ACUTE PAIN OF LEFT KNEE: Primary | ICD-10-CM

## 2023-11-14 ENCOUNTER — OFFICE VISIT (OUTPATIENT)
Dept: ORTHOPEDICS | Facility: CLINIC | Age: 73
End: 2023-11-14
Payer: MEDICARE

## 2023-11-14 ENCOUNTER — HOSPITAL ENCOUNTER (OUTPATIENT)
Dept: RADIOLOGY | Facility: HOSPITAL | Age: 73
Discharge: HOME | End: 2023-11-14
Attending: ORTHOPAEDIC SURGERY
Payer: MEDICARE

## 2023-11-14 VITALS — OXYGEN SATURATION: 99 % | BODY MASS INDEX: 25.87 KG/M2 | HEIGHT: 63 IN | HEART RATE: 90 BPM | WEIGHT: 146 LBS

## 2023-11-14 DIAGNOSIS — M23.52 CHRONIC INSTABILITY OF LEFT KNEE: ICD-10-CM

## 2023-11-14 DIAGNOSIS — M17.12 PRIMARY OSTEOARTHRITIS OF LEFT KNEE: Primary | ICD-10-CM

## 2023-11-14 DIAGNOSIS — M25.562 LEFT KNEE PAIN, UNSPECIFIED CHRONICITY: ICD-10-CM

## 2023-11-14 DIAGNOSIS — M25.362 KNEE INSTABILITY, LEFT: ICD-10-CM

## 2023-11-14 DIAGNOSIS — M25.562 ACUTE PAIN OF LEFT KNEE: ICD-10-CM

## 2023-11-14 PROCEDURE — 20610 DRAIN/INJ JOINT/BURSA W/O US: CPT | Mod: LT,S$GLB,, | Performed by: ORTHOPAEDIC SURGERY

## 2023-11-14 PROCEDURE — 3044F PR MOST RECENT HEMOGLOBIN A1C LEVEL <7.0%: ICD-10-PCS | Mod: CPTII,S$GLB,, | Performed by: ORTHOPAEDIC SURGERY

## 2023-11-14 PROCEDURE — 3008F BODY MASS INDEX DOCD: CPT | Mod: CPTII,S$GLB,, | Performed by: ORTHOPAEDIC SURGERY

## 2023-11-14 PROCEDURE — 73562 XR KNEE 3 VIEW LEFT: ICD-10-PCS | Mod: 26,LT,, | Performed by: RADIOLOGY

## 2023-11-14 PROCEDURE — 99999 PR PBB SHADOW E&M-EST. PATIENT-LVL IV: ICD-10-PCS | Mod: PBBFAC,,, | Performed by: ORTHOPAEDIC SURGERY

## 2023-11-14 PROCEDURE — 1101F PT FALLS ASSESS-DOCD LE1/YR: CPT | Mod: CPTII,S$GLB,, | Performed by: ORTHOPAEDIC SURGERY

## 2023-11-14 PROCEDURE — 1101F PR PT FALLS ASSESS DOC 0-1 FALLS W/OUT INJ PAST YR: ICD-10-PCS | Mod: CPTII,S$GLB,, | Performed by: ORTHOPAEDIC SURGERY

## 2023-11-14 PROCEDURE — 3288F PR FALLS RISK ASSESSMENT DOCUMENTED: ICD-10-PCS | Mod: CPTII,S$GLB,, | Performed by: ORTHOPAEDIC SURGERY

## 2023-11-14 PROCEDURE — 73562 X-RAY EXAM OF KNEE 3: CPT | Mod: TC,PN,LT

## 2023-11-14 PROCEDURE — 1159F PR MEDICATION LIST DOCUMENTED IN MEDICAL RECORD: ICD-10-PCS | Mod: CPTII,S$GLB,, | Performed by: ORTHOPAEDIC SURGERY

## 2023-11-14 PROCEDURE — 3044F HG A1C LEVEL LT 7.0%: CPT | Mod: CPTII,S$GLB,, | Performed by: ORTHOPAEDIC SURGERY

## 2023-11-14 PROCEDURE — 99213 OFFICE O/P EST LOW 20 MIN: CPT | Mod: 25,S$GLB,, | Performed by: ORTHOPAEDIC SURGERY

## 2023-11-14 PROCEDURE — 3008F PR BODY MASS INDEX (BMI) DOCUMENTED: ICD-10-PCS | Mod: CPTII,S$GLB,, | Performed by: ORTHOPAEDIC SURGERY

## 2023-11-14 PROCEDURE — 99213 PR OFFICE/OUTPT VISIT, EST, LEVL III, 20-29 MIN: ICD-10-PCS | Mod: 25,S$GLB,, | Performed by: ORTHOPAEDIC SURGERY

## 2023-11-14 PROCEDURE — 99999 PR PBB SHADOW E&M-EST. PATIENT-LVL IV: CPT | Mod: PBBFAC,,, | Performed by: ORTHOPAEDIC SURGERY

## 2023-11-14 PROCEDURE — 1159F MED LIST DOCD IN RCRD: CPT | Mod: CPTII,S$GLB,, | Performed by: ORTHOPAEDIC SURGERY

## 2023-11-14 PROCEDURE — 73562 X-RAY EXAM OF KNEE 3: CPT | Mod: 26,LT,, | Performed by: RADIOLOGY

## 2023-11-14 PROCEDURE — 1157F PR ADVANCE CARE PLAN OR EQUIV PRESENT IN MEDICAL RECORD: ICD-10-PCS | Mod: CPTII,S$GLB,, | Performed by: ORTHOPAEDIC SURGERY

## 2023-11-14 PROCEDURE — 20610 LARGE JOINT ASPIRATION/INJECTION: L KNEE: ICD-10-PCS | Mod: LT,S$GLB,, | Performed by: ORTHOPAEDIC SURGERY

## 2023-11-14 PROCEDURE — 3288F FALL RISK ASSESSMENT DOCD: CPT | Mod: CPTII,S$GLB,, | Performed by: ORTHOPAEDIC SURGERY

## 2023-11-14 PROCEDURE — 1157F ADVNC CARE PLAN IN RCRD: CPT | Mod: CPTII,S$GLB,, | Performed by: ORTHOPAEDIC SURGERY

## 2023-11-14 RX ORDER — TRIAMCINOLONE ACETONIDE 40 MG/ML
40 INJECTION, SUSPENSION INTRA-ARTICULAR; INTRAMUSCULAR
Status: DISCONTINUED | OUTPATIENT
Start: 2023-11-14 | End: 2023-11-14 | Stop reason: HOSPADM

## 2023-11-14 RX ADMIN — TRIAMCINOLONE ACETONIDE 40 MG: 40 INJECTION, SUSPENSION INTRA-ARTICULAR; INTRAMUSCULAR at 01:11

## 2023-11-14 NOTE — PROGRESS NOTES
Subjective:      Patient ID: Buddy Park is a 72 y.o. male.    Chief Complaint: No chief complaint on file.      HPI:  Mr. Park returned today with complaints of 1 week of left knee pain which began when he was attending a high school basketball game and when he was traversing stairs he had sudden pain in his knee now when he bends his knee he has increased pain in his knee and rest and elevation decreases it.  Denies swelling giving way or locking.  He has taken NSAIDs without help.  He has not had a recent injection.  He has not worn a brace nor done recent physical therapy    ROS:  No new diagnosis/surgery/prescriptions since last office visit on 12/20/2022  Constitution: Negative for chills and fever.   HENT: Negative for congestion.    Eyes: Negative for blurred vision.   Cardiovascular: Negative for chest pain.   Respiratory: Negative for cough.    Endocrine: Negative for polydipsia.   Hematologic/Lymphatic: Negative for adenopathy.   Skin: Negative for flushing and itching.   Musculoskeletal: Positive for gout and joint pain.   Gastrointestinal: Positive for diarrhea. Negative for constipation and heartburn.   Genitourinary: Negative for nocturia.   Neurological: Negative for headaches and seizures.   Psychiatric/Behavioral: Negative for depression and substance abuse. The patient is nervous/anxious.    Allergic/Immunologic: Negative for environmental allergies.       Objective:      Physical Exam:   General: AAOx3.  No acute distress  Vascular:  Pulses intact and equal bilaterally.  Capillary refill less than 3 seconds and equal bilaterally  Neurologic:  Pinprick and soft touch intact and equal bilaterally  Integment:  No ecchymosis, no errythema  Extremity:  Physical therapy on his knee recently./flexion left knee 1/118°, right knee 0/128 degrees.  Mild crepitus with motion left knee.  Mild effusion left knee.  Mildly positive drop home left knee.  Negative patellar load/compression bilaterally.   Negative patella apprehension/relocation bilaterally.  Mild increased excursion with valgus stressing left knee.  Varus stressing equal bilaterally with endpoint.  Lachman's/drawer equal bilaterally with endpoint.  Mild joint line tenderness medial aspect left knee.  Tony negative both knees.  Jerauld mildly positive left knee.  Nontender at the anserine insertion bilaterally.  No swelling at the anserine insertion bilaterally.  Radiography:  Personally reviewed on 11/14/2023 showed tricompartmental arthritic changes with old depression of the medial tibial plateau      Assessment:       Impression:     1. Primary osteoarthritis of left knee    2. Knee instability, left    3. Acute pain of left knee          Plan:       1.  Discussed physical examination and radiographic findings with the patient. Buddy understands that he has arthritis of his knee but it appears he has an old medial tibial plateau fracture.  Treatment alternatives and outcomes were discussed with the patient he understands he could be treated conservatively with observation, activity modification, NSAIDs, bracing, physical therapy, injections, or he could consider surgical intervention such as total knee arthroplasty.  Discussed with the patient would like to get a CT scan of his knee to evaluate for a tibial plateau fracture.    2. Offered a steroid injection to the left knee, he elected to proceed.    3. Dewey Lazaro global brace, left knee, a prescription was prepared for the patient to obtain a brace.    4. May continue to walk with weight-bearing at tolerance.    5. Follow up after CT scan is completed.

## 2023-11-14 NOTE — PROCEDURES
Large Joint Aspiration/Injection: L knee    Date/Time: 11/14/2023 1:45 PM    Performed by: Macario Broussard DO  Authorized by: Macario Broussard, DO    Consent Done?:  Yes (Verbal)  Indications:  Arthritis, diagnostic evaluation and pain  Site marked: the procedure site was marked    Timeout: prior to procedure the correct patient, procedure, and site was verified    Prep: patient was prepped and draped in usual sterile fashion    Approach:  Anterolateral  Location:  Knee  Site:  L knee  Medications:  40 mg triamcinolone acetonide 40 mg/mL  Patient tolerance:  Patient tolerated the procedure well with no immediate complications

## 2023-11-21 ENCOUNTER — DOCUMENTATION ONLY (OUTPATIENT)
Dept: ORTHOPEDICS | Facility: CLINIC | Age: 73
End: 2023-11-21
Payer: MEDICARE

## 2023-11-21 ENCOUNTER — HOSPITAL ENCOUNTER (OUTPATIENT)
Dept: RADIOLOGY | Facility: HOSPITAL | Age: 73
Discharge: HOME | End: 2023-11-21
Attending: ORTHOPAEDIC SURGERY
Payer: MEDICARE

## 2023-11-21 DIAGNOSIS — M25.562 LEFT KNEE PAIN, UNSPECIFIED CHRONICITY: ICD-10-CM

## 2023-11-21 PROCEDURE — 73700 CT LOWER EXTREMITY W/O DYE: CPT | Mod: 26,LT,, | Performed by: RADIOLOGY

## 2023-11-21 PROCEDURE — 73700 CT LOWER EXTREMITY W/O DYE: CPT | Mod: TC,LT

## 2023-11-21 PROCEDURE — 73700 CT KNEE WITHOUT CONTRAST LEFT: ICD-10-PCS | Mod: 26,LT,, | Performed by: RADIOLOGY

## 2023-12-12 ENCOUNTER — TELEPHONE (OUTPATIENT)
Dept: FAMILY MEDICINE | Facility: CLINIC | Age: 73
End: 2023-12-12
Payer: MEDICARE

## 2023-12-12 NOTE — TELEPHONE ENCOUNTER
----- Message from Erika Gamble sent at 12/12/2023  1:05 PM CST -----  Regarding: Needs medication  Type: Needs Medical Advice  Who Called:  Buddy Aly Call Back Number: [phone]    Additional Information: Pt needs something for diarreha he needs medical advice please montserrat

## 2023-12-12 NOTE — TELEPHONE ENCOUNTER
Spoke with pt. Pt reports bouts of diarrhea, worsening over the last 3 weeks. OTC Immodium not effective. Pt requesting RX to be sent into pharmacy

## 2023-12-16 ENCOUNTER — HOSPITAL ENCOUNTER (EMERGENCY)
Facility: HOSPITAL | Age: 73
Discharge: HOME | End: 2023-12-16
Attending: EMERGENCY MEDICINE
Payer: MEDICARE

## 2023-12-16 ENCOUNTER — OFFICE VISIT (OUTPATIENT)
Dept: URGENT CARE | Facility: CLINIC | Age: 73
End: 2023-12-16
Payer: MEDICARE

## 2023-12-16 VITALS
HEART RATE: 77 BPM | BODY MASS INDEX: 21.66 KG/M2 | DIASTOLIC BLOOD PRESSURE: 90 MMHG | RESPIRATION RATE: 18 BRPM | TEMPERATURE: 98 F | OXYGEN SATURATION: 98 % | HEIGHT: 65 IN | SYSTOLIC BLOOD PRESSURE: 146 MMHG | WEIGHT: 130 LBS

## 2023-12-16 VITALS
TEMPERATURE: 98 F | HEIGHT: 65 IN | WEIGHT: 140 LBS | BODY MASS INDEX: 23.32 KG/M2 | SYSTOLIC BLOOD PRESSURE: 146 MMHG | DIASTOLIC BLOOD PRESSURE: 83 MMHG | HEART RATE: 95 BPM | RESPIRATION RATE: 17 BRPM | OXYGEN SATURATION: 98 %

## 2023-12-16 DIAGNOSIS — R19.7 DIARRHEA, UNSPECIFIED TYPE: ICD-10-CM

## 2023-12-16 DIAGNOSIS — R19.7 DIARRHEA, UNSPECIFIED TYPE: Primary | ICD-10-CM

## 2023-12-16 DIAGNOSIS — E86.0 DEHYDRATION: ICD-10-CM

## 2023-12-16 DIAGNOSIS — R10.9 ABDOMINAL PAIN, UNSPECIFIED ABDOMINAL LOCATION: Primary | ICD-10-CM

## 2023-12-16 DIAGNOSIS — A08.4 VIRAL GASTROENTERITIS: ICD-10-CM

## 2023-12-16 LAB
ALBUMIN SERPL BCP-MCNC: 4.3 G/DL (ref 3.5–5.2)
ALP SERPL-CCNC: 121 U/L (ref 55–135)
ALT SERPL W/O P-5'-P-CCNC: 43 U/L (ref 10–44)
ANION GAP SERPL CALC-SCNC: 14 MMOL/L (ref 8–16)
AST SERPL-CCNC: 35 U/L (ref 10–40)
BASOPHILS # BLD AUTO: 0.02 K/UL (ref 0–0.2)
BASOPHILS NFR BLD: 0.4 % (ref 0–1.9)
BILIRUB SERPL-MCNC: 1.4 MG/DL (ref 0.1–1)
BILIRUB UR QL STRIP: NEGATIVE
BNP SERPL-MCNC: 18 PG/ML (ref 0–99)
BUN SERPL-MCNC: 12 MG/DL (ref 8–23)
CALCIUM SERPL-MCNC: 9.3 MG/DL (ref 8.7–10.5)
CHLORIDE SERPL-SCNC: 109 MMOL/L (ref 95–110)
CLARITY UR: CLEAR
CO2 SERPL-SCNC: 21 MMOL/L (ref 23–29)
COLOR UR: YELLOW
CREAT SERPL-MCNC: 1.4 MG/DL (ref 0.5–1.4)
CTP QC/QA: YES
CTP QC/QA: YES
DIFFERENTIAL METHOD: ABNORMAL
EOSINOPHIL # BLD AUTO: 0.1 K/UL (ref 0–0.5)
EOSINOPHIL NFR BLD: 2 % (ref 0–8)
ERYTHROCYTE [DISTWIDTH] IN BLOOD BY AUTOMATED COUNT: 13.7 % (ref 11.5–14.5)
EST. GFR  (NO RACE VARIABLE): 53.7 ML/MIN/1.73 M^2
GLUCOSE SERPL-MCNC: 90 MG/DL (ref 70–110)
GLUCOSE UR QL STRIP: NEGATIVE
HCT VFR BLD AUTO: 44.9 % (ref 40–54)
HGB BLD-MCNC: 15.3 G/DL (ref 14–18)
HGB UR QL STRIP: NEGATIVE
IMM GRANULOCYTES # BLD AUTO: 0.01 K/UL (ref 0–0.04)
IMM GRANULOCYTES NFR BLD AUTO: 0.2 % (ref 0–0.5)
KETONES UR QL STRIP: NEGATIVE
LEUKOCYTE ESTERASE UR QL STRIP: NEGATIVE
LIPASE SERPL-CCNC: 25 U/L (ref 4–60)
LYMPHOCYTES # BLD AUTO: 2 K/UL (ref 1–4.8)
LYMPHOCYTES NFR BLD: 44.3 % (ref 18–48)
MCH RBC QN AUTO: 29.4 PG (ref 27–31)
MCHC RBC AUTO-ENTMCNC: 34.1 G/DL (ref 32–36)
MCV RBC AUTO: 86 FL (ref 82–98)
MONOCYTES # BLD AUTO: 0.5 K/UL (ref 0.3–1)
MONOCYTES NFR BLD: 10 % (ref 4–15)
NEUTROPHILS # BLD AUTO: 1.9 K/UL (ref 1.8–7.7)
NEUTROPHILS NFR BLD: 43.1 % (ref 38–73)
NITRITE UR QL STRIP: NEGATIVE
NRBC BLD-RTO: 0 /100 WBC
PH UR STRIP: 6 [PH] (ref 5–8)
PLATELET # BLD AUTO: 194 K/UL (ref 150–450)
PMV BLD AUTO: 8.9 FL (ref 9.2–12.9)
POC MOLECULAR INFLUENZA A AGN: NEGATIVE
POC MOLECULAR INFLUENZA B AGN: NEGATIVE
POTASSIUM SERPL-SCNC: 3.5 MMOL/L (ref 3.5–5.1)
PROT SERPL-MCNC: 7.4 G/DL (ref 6–8.4)
PROT UR QL STRIP: NEGATIVE
RBC # BLD AUTO: 5.2 M/UL (ref 4.6–6.2)
SARS-COV-2 AG RESP QL IA.RAPID: NEGATIVE
SODIUM SERPL-SCNC: 144 MMOL/L (ref 136–145)
SP GR UR STRIP: 1.02 (ref 1–1.03)
URN SPEC COLLECT METH UR: NORMAL
UROBILINOGEN UR STRIP-ACNC: NEGATIVE EU/DL
WBC # BLD AUTO: 4.49 K/UL (ref 3.9–12.7)

## 2023-12-16 PROCEDURE — 80053 COMPREHEN METABOLIC PANEL: CPT | Performed by: EMERGENCY MEDICINE

## 2023-12-16 PROCEDURE — 74176 CT ABD & PELVIS W/O CONTRAST: CPT | Mod: TC

## 2023-12-16 PROCEDURE — 87811 SARS-COV-2 COVID19 W/OPTIC: CPT | Mod: QW,S$GLB,,

## 2023-12-16 PROCEDURE — 87502 POCT INFLUENZA A/B MOLECULAR: ICD-10-PCS | Mod: QW,,,

## 2023-12-16 PROCEDURE — 81003 URINALYSIS AUTO W/O SCOPE: CPT | Performed by: EMERGENCY MEDICINE

## 2023-12-16 PROCEDURE — 74176 CT ABDOMEN PELVIS WITHOUT CONTRAST: ICD-10-PCS | Mod: 26,,, | Performed by: RADIOLOGY

## 2023-12-16 PROCEDURE — 83880 ASSAY OF NATRIURETIC PEPTIDE: CPT | Performed by: EMERGENCY MEDICINE

## 2023-12-16 PROCEDURE — 25000003 PHARM REV CODE 250: Performed by: EMERGENCY MEDICINE

## 2023-12-16 PROCEDURE — 87502 INFLUENZA DNA AMP PROBE: CPT | Mod: QW,,,

## 2023-12-16 PROCEDURE — 74176 CT ABD & PELVIS W/O CONTRAST: CPT | Mod: 26,,, | Performed by: RADIOLOGY

## 2023-12-16 PROCEDURE — 36415 COLL VENOUS BLD VENIPUNCTURE: CPT | Performed by: EMERGENCY MEDICINE

## 2023-12-16 PROCEDURE — 99215 OFFICE O/P EST HI 40 MIN: CPT | Mod: S$GLB,,,

## 2023-12-16 PROCEDURE — 83690 ASSAY OF LIPASE: CPT | Performed by: EMERGENCY MEDICINE

## 2023-12-16 PROCEDURE — 85025 COMPLETE CBC W/AUTO DIFF WBC: CPT | Performed by: EMERGENCY MEDICINE

## 2023-12-16 PROCEDURE — 87811 SARS CORONAVIRUS 2 ANTIGEN POCT, MANUAL READ: ICD-10-PCS | Mod: QW,S$GLB,,

## 2023-12-16 PROCEDURE — 99284 EMERGENCY DEPT VISIT MOD MDM: CPT | Mod: 25

## 2023-12-16 PROCEDURE — 99215 PR OFFICE/OUTPT VISIT, EST, LEVL V, 40-54 MIN: ICD-10-PCS | Mod: S$GLB,,,

## 2023-12-16 RX ORDER — ONDANSETRON 4 MG/1
4 TABLET, FILM COATED ORAL EVERY 6 HOURS PRN
Qty: 15 TABLET | Refills: 0 | Status: SHIPPED | OUTPATIENT
Start: 2023-12-16

## 2023-12-16 RX ORDER — DIPHENOXYLATE HYDROCHLORIDE AND ATROPINE SULFATE 2.5; .025 MG/1; MG/1
1 TABLET ORAL 4 TIMES DAILY PRN
Qty: 20 TABLET | Refills: 0 | Status: SHIPPED | OUTPATIENT
Start: 2023-12-16 | End: 2023-12-26

## 2023-12-16 RX ORDER — DIPHENOXYLATE HYDROCHLORIDE AND ATROPINE SULFATE 2.5; .025 MG/1; MG/1
1 TABLET ORAL
Status: COMPLETED | OUTPATIENT
Start: 2023-12-16 | End: 2023-12-16

## 2023-12-16 RX ADMIN — DIPHENOXYLATE HYDROCHLORIDE AND ATROPINE SULFATE 1 TABLET: 2.5; .025 TABLET ORAL at 05:12

## 2023-12-16 NOTE — ED PROVIDER NOTES
Encounter Date: 12/16/2023       History     Chief Complaint   Patient presents with    Abdominal Pain     Abdominal pain and diarrhea x 3 weeks      Well-developed 73-year-old male comes emergency complaints of diarrhea for the last 3 weeks.  Denies any abdominal pain.  No fevers or chills.  Patient states he is just copious amounts of diarrhea.  All the food i.e. go straight to me.  The patient appears stable at this time.  No acute distress.  Normal skin color.  Past medical history of angina anxiety arthritis biliary colic clinically ear implant, depression diabetes heart failure high cholesterol history of colon polyps hard of hearing hyperlipidemia hypertension renal stones stroke.      Review of patient's allergies indicates:   Allergen Reactions    Bee pollens Shortness Of Breath     WASP, BEE, ANT AND CATERPILLER STINGS    Hydrochlorothiazide Shortness Of Breath and Rash    Milk containing products (dairy) Diarrhea    Metoprolol Rash     Past Medical History:   Diagnosis Date    Angina pectoris     Anxiety     Arthritis     Biliary colic     Cochlear implant in place     Deaf     LEFT EAR    Depression     Diabetes mellitus type 2 without retinopathy 1/5/2022    Heart failure     High cholesterol     History of colon polyps 2/20/2018    [place] [employer] [place] (hard of hearing)     HEARING AID - RIGHT    Hyperlipidemia     Hypertension     Kidney stone     Kidney stones     Renal stones     Stroke     Trouble in sleeping     Wears glasses      Past Surgical History:   Procedure Laterality Date    CAROTID ARTERY ANGIOPLASTY  06/2018    [facility]     CATARACT EXTRACTION Bilateral     CHOLECYSTECTOMY  02/06/2014    COLONOSCOPY  01/09/2013    Dr. Gillette, 5 year recheck (family history colon cancer)    COLONOSCOPY N/A 03/12/2018    Procedure: COLONOSCOPY;  Surgeon: Garett Go MD;  Location: [facility];  Service: Endoscopy;  Laterality: N/A;    COLONOSCOPY N/A 02/15/2021    Procedure: COLONOSCOPY;  Surgeon: Garett Go MD;   Location: [facility] ENDO;  Service: Endoscopy;  Laterality: N/A;    CYSTOSCOPY W/ RETROGRADES Bilateral 10/28/2019    Procedure: CYSTOSCOPY, WITH RETROGRADE PYELOGRAM;  Surgeon: Gretchen Proctor MD;  Location: [facility] OR;  Service: Urology;  Laterality: Bilateral;    CYSTOSCOPY W/ URETERAL STENT PLACEMENT Left 10/28/2019    Procedure: CYSTOSCOPY, WITH URETERAL STENT INSERTION;  Surgeon: Gretchen Proctor MD;  Location: [facility] OR;  Service: Urology;  Laterality: Left;    CYSTOSCOPY W/ URETERAL STENT REMOVAL Left 12/02/2019    Procedure: CYSTOSCOPY, WITH URETERAL STENT REMOVAL;  Surgeon: Gretchen Proctor MD;  Location: [facility] OR;  Service: Urology;  Laterality: Left;    EAR MASTOIDECTOMY W/ COCHLEAR IMPLANT W/ LANDMARK      left ear    EXTRACORPOREAL SHOCK WAVE LITHOTRIPSY Left 12/02/2019    Procedure: LITHOTRIPSY, ESWL;  Surgeon: Gretchen Proctor MD;  Location: [facility];  Service: Urology;  Laterality: Left;    EYE SURGERY      FOREIGN BODY REMOVAL Right     glass removed from right foot/repair    FRACTURE SURGERY      right arm x2    HIP SURGERY Left     LITHOTRIPSY      ROTATOR CUFF REPAIR      Right     SINUS SURGERY      TONSILLECTOMY      VASCULAR SURGERY       Family History   Problem Relation Age of Onset    Cancer Mother         colon    Heart disease Father     Gout Father     Kidney disease Father     Arthritis Father     Hypertension Father     Early death Daughter         mva    Alcohol abuse Brother     Cancer Brother         mouth cancer w mets    No Known Problems Sister     Alcohol abuse Brother     No Known Problems Son     Heart disease Maternal Grandmother         MI    Stroke Maternal Grandfather     Heart disease Paternal Grandmother         MI    No Known Problems Son     Cancer Paternal Uncle         lung cancer    Allergic rhinitis Neg Hx     Allergies Neg Hx     Angioedema Neg Hx     Asthma Neg Hx     Atopy Neg Hx     Eczema Neg Hx     Immunodeficiency Neg Hx     Rhinitis Neg Hx     Urticaria Neg Hx      Collagen disease Neg Hx      Social History     Tobacco Use    Smoking status: Never    Smokeless tobacco: Never   Substance Use Topics    Alcohol use: Yes     Comment: once every two months    Drug use: No     Review of Systems   Constitutional:  Negative for fever.   HENT:  Negative for sore throat.    Respiratory:  Negative for shortness of breath.    Cardiovascular:  Negative for chest pain.   Gastrointestinal:  Positive for diarrhea. Negative for abdominal pain, anal bleeding, blood in stool and nausea.   Genitourinary:  Negative for dysuria.   Musculoskeletal:  Negative for back pain.   Skin:  Negative for rash.   Neurological:  Negative for weakness. 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  Hematological:  Does not bruise/bleed easily.   All other systems reviewed and are negative.      Physical Exam     Initial Vitals [12/16/23 1202]   BP Pulse Resp Temp SpO2   (!) 157/108 90 20 98 °F (36.7 °C) 98 %      MAP       --         Physical Exam    Nursing note and vitals reviewed.  Constitutional: He appears well-developed and well-nourished.   HENT:   Head: Normocephalic and atraumatic.   Eyes: EOM are normal. Pupils are equal, round, and reactive to light. No scleral icterus.   Neck: Neck supple.   Normal range of motion.  Cardiovascular:  Normal rate, regular rhythm and normal heart sounds.           No murmur heard.  Pulmonary/Chest: Breath sounds normal. He has no wheezes. He has no rales. He exhibits no tenderness.   Abdominal: Abdomen is soft. Bowel sounds are normal. He exhibits no distension. There is no abdominal tenderness. There is no rebound and no guarding.   Musculoskeletal:         General: Normal range of motion.      Cervical back: Normal range of motion and neck supple.      Comments: Left-sided contractures and mild hemiparesis secondary to right-sided CVA in the distant past.     Neurological: He is alert and oriented to person, place, and time. He displays  normal reflexes (no reflexia of the patient's left leg.). No cranial nerve deficit or sensory deficit. GCS eye subscore is 4. GCS verbal subscore is 5. GCS motor subscore is 6.   Skin: Skin is warm and dry.   Psychiatric: He has a normal mood and affect. His behavior is normal. Judgment and thought content normal.         ED Course   Procedures  Labs Reviewed   CBC W/ AUTO DIFFERENTIAL - Abnormal; Notable for the following components:       Result Value    MPV 8.9 (*)     All other components within normal limits    Narrative:     For upper or mid abdominal pain.   COMPREHENSIVE METABOLIC PANEL - Abnormal; Notable for the following components:    CO2 21 (*)     Total Bilirubin 1.4 (*)     eGFR 53.7 (*)     All other components within normal limits    Narrative:     For upper or mid abdominal pain.   URINALYSIS, REFLEX TO URINE CULTURE    Narrative:     In and Out Cath as needed it patient unable to void  Preferred Collection Type->Urine, Clean Catch  Specimen Source->Urine   LIPASE    Narrative:     For upper or mid abdominal pain.   B-TYPE NATRIURETIC PEPTIDE   B-TYPE NATRIURETIC PEPTIDE    Narrative:     For upper or mid abdominal pain.          Imaging Results              CT Abdomen Pelvis  Without Contrast (Final result)  Result time 12/16/23 17:20:36      Final result by Chet Miller MD (12/16/23 17:20:36)                   Impression:      1. No acute intra-abdominal process appreciated.  2. Fat containing inguinal hernias, left greater than right  3. Bilateral Non-obstructing nephrolithiasis  4. Postoperative changes of the pelvis and stable 34 mm sclerotic focus within the right supra-acetabular iliac bone.  5. Hiatal hernia  6. Faint ground-glass airspace opacity at the lung bases which could relate to multifocal pneumonitis.  Please correlate clinically.      Electronically signed by: Kevin Miller MD  Date:    12/16/2023  Time:    17:20               Narrative:    EXAMINATION:  CT ABDOMEN PELVIS  WITHOUT CONTRAST    CLINICAL HISTORY:  diarrhea;    TECHNIQUE:  Low dose axial images, sagittal and coronal reformations were obtained from the lung bases to the pubic symphysis.  Oral contrast was not administered.    COMPARISON:  [facility]-12/02/2019.  CT renal stone study-05/28/2019    FINDINGS:  There are coronary artery calcifications present.  There is a small hiatal hernia present.  There are faint areas of ground-glass airspace opacity present within both lower lung zones.  Please correlate clinically for symptoms of multifocal pneumonitis.    The liver is unremarkable.  The gallbladder is surgically absent.  The spleen, stomach, duodenal C-loop, pancreas, and adrenal glands show no significant abnormalities.  There is atherosclerotic calcification present within the abdominal aorta and common iliac arteries.  There is non-obstructing right nephrolithiasis present.  In particular, there is a 4 mm non-obstructing stone in the upper pole collecting system of the right kidney and a 4 mm stone in the lower pole collecting system of the right kidney on series 2, image 42.  There is a solitary 3 mm non-obstructing stone in the upper pole collecting system of the left kidney.  No definite hydronephrosis or solid renal mass by non-contrast CT.  There is a lobulated contour of both kidneys, similar to the prior examination.  The ureters are normal in caliber and course without definite stones appreciated.  Please note that the distal left ureter is obscured by metal artifact from the patient's left hemipelvis orthopedic hardware.  The urinary bladder is incompletely visualized.  No definite bladder stones or mass appreciated.  There are prostate calcifications, similar to the prior study.    The appendix is well visualized and appears unremarkable.  The visualized loops of bowel show no evidence of inflammation or obstruction.  There are bilateral fat containing inguinal hernias, left greater than right.  No mesenteric  adenopathy or peritoneal soft tissue mass.  No ascites or pneumoperitoneum.  No intra-abdominal abscess.    There are postoperative changes of ORIF of a comminuted left iliac bone fracture which involves the medial left acetabular wall.  The patient's screw and plate construct traverses the symphysis pubis, left pubic ramus, and left iliac bone and ischium.  There are orthopedic screws observed traversing the left sacroiliac joint.  No widening of the left sacroiliac joint.  No hardware complication appreciated.  There is severe degenerative osteoarthritis of the left hip joint in the form of severe hip joint space narrowing, rim osteophyte formation, and cyst formation.  There is a densely sclerotic 22 x 26 x 34 mm focus present within the supra-acetabular right iliac bone posteriorly, unchanged when compared to the prior examination.                                    X-Rays:   Independently Interpreted Readings:   Other Readings:  CT is unremarkable for any acute pathology.  There is some chronic pathology.  See report.    Medications - No data to display  Medical Decision Making  Pancreatitis, colitis, diverticulitis, epiploic appendicitis, appendicitis, cholelithiasis, Meckel's diverticulum, volvulus, SBO, UTI.      Amount and/or Complexity of Data Reviewed  Labs: ordered.  Radiology: ordered.  Discussion of management or test interpretation with external provider(s): The patient is stable this time.  Laboratory values are completely unremarkable for any acute abdominal pathology.  The CT scan also supports this.  There is no acute abdominal pathology little bit on CT scan either.  Uncertain what the etiology of this patient's diarrhea or mild abdominal pain cramping.  I will treat the patient with Lomotil.  Give the patient Bentyl for the cramping.  Discharge patient home at this time.  Have the patient follow up with primary care physician or his GI doctor at a could be in time in the next week or 2.  Patient  understands agrees with plan.  Patient understands clearly to return emergency with any worsening nausea vomiting diarrhea or otherwise.                                      Clinical Impression:  Final diagnoses:  [R10.9] Abdominal pain, unspecified abdominal location (Primary)  [R19.7] Diarrhea, unspecified type          ED Disposition Condition    Discharge Stable          ED Prescriptions       Medication Sig Dispense Start Date End Date Auth. Provider    diphenoxylate-atropine 2.5-0.025 mg (LOMOTIL) 2.5-0.025 mg per tablet Take 1 tablet by mouth 4 (four) times daily as needed for Diarrhea. 20 tablet 12/16/2023 12/26/2023 Reymundo Bentley MD    ondansetron (ZOFRAN) 4 MG tablet Take 1 tablet (4 mg total) by mouth every 6 (six) hours as needed. 15 tablet 12/16/2023 -- Reymundo Bentley MD          Follow-up Information       Follow up With Specialties Details Why Contact Info    Gema Malone MD Family Medicine  As needed, If symptoms worsen [address] [facility]  [address]  [facility] [address] [phone]               Reymundo Bentley MD  12/16/23 5715

## 2023-12-16 NOTE — PROGRESS NOTES
"Subjective:       Patient ID: Buddy Park is a 73 y.o. male.    Vitals:  height is 5' 5" (1.651 m) and weight is 63.5 kg (140 lb). His oral temperature is 97.8 °F (36.6 °C). His blood pressure is 146/83 (abnormal) and his pulse is 95. His respiration is 17 and oxygen saturation is 98%.     Chief Complaint: Diarrhea (Patient presents with diarrhea he states has been off and on X 3 weeks. Patient reports his stomach began hurting last night and bowel movements became painful so he felt the need to come in and be seen. )    This is a 73 y.o. male who presents today with a chief complaint of diarrhea.   Patient presents with:  Diarrhea: Patient presents with diarrhea he states has been off and on X 3 weeks. Patient reports his stomach began hurting last night and bowel movements became painful so he felt the need to come in and be seen.  Patient states that he has not been able to tolerate any food or drink without have diarrhea. Patient states that he has lost a significant amount of weight due to this and is having generalized weakness.         Diarrhea   This is a recurrent problem. The current episode started 1 to 4 weeks ago. The problem occurs 5 to 10 times per day. The problem has been waxing and waning. The stool consistency is described as Watery. The patient states that diarrhea awakens him from sleep. Associated symptoms include abdominal pain and weight loss. Nothing aggravates the symptoms.       Constitution: Positive for generalized weakness.   Neck: neck negative.   Cardiovascular: Negative.    Eyes: Negative.    Respiratory: Negative.     Gastrointestinal:  Positive for abdominal pain and diarrhea.   Endocrine: negative.   Genitourinary: Negative.    Musculoskeletal: Negative.    Skin:  Positive for pale.   Allergic/Immunologic: Negative.    Neurological: Negative.    Hematologic/Lymphatic: Negative.    Psychiatric/Behavioral: Negative.             Objective:      Physical Exam   Constitutional: He " is cooperative.   HENT:   Head: Normocephalic and atraumatic.   Ears:   Right Ear: Tympanic membrane, external ear and ear canal normal.   Left Ear: Tympanic membrane, external ear and ear canal normal.   Nose: Nose normal.   Eyes: Conjunctivae are normal. Pupils are equal, round, and reactive to light. Extraocular movement intact   Neck: Neck supple.   Cardiovascular: Normal rate, regular rhythm, normal heart sounds and normal pulses.   Pulmonary/Chest: Effort normal and breath sounds normal.   Abdominal: Normal appearance. Soft. There is abdominal tenderness.   Musculoskeletal: Normal range of motion.         General: Normal range of motion.      Comments: Patients normal   Neurological: no focal deficit. He is alert and at baseline.   Skin: Skin is warm and pale. Capillary refill takes less than 2 seconds.         Comments: Patient has tenting of skin   Psychiatric: His behavior is normal. Mood, judgment and thought content normal.   Nursing note and vitals reviewed.        Past medical history and current medications reviewed.       Assessment:     Results for orders placed or performed in visit on 12/16/23   SARS Coronavirus 2 Antigen, POCT Manual Read   Result Value Ref Range    SARS Coronavirus 2 Antigen Negative Negative     Acceptable Yes    POCT Influenza A/B MOLECULAR   Result Value Ref Range    POC Molecular Influenza A Ag Negative Negative, Not Reported    POC Molecular Influenza B Ag Negative Negative, Not Reported     Acceptable Yes           1. Diarrhea, unspecified type    2. Dehydration    3. Viral gastroenteritis              Plan:         Diarrhea, unspecified type  -     SARS Coronavirus 2 Antigen, POCT Manual Read  -     POCT Influenza A/B MOLECULAR  -     Refer to Emergency Dept.    Dehydration  -     Refer to Emergency Dept.    Viral gastroenteritis  -     Refer to Emergency Dept.             Patient Instructions   Go straight to the er           Medical Decision  Making:   Initial Assessment:   This is a 73 y.o. male who presents today with a chief complaint of diarrhea.   Patient presents with:  Diarrhea: Patient presents with diarrhea he states has been off and on X 3 weeks. Patient reports his stomach began hurting last night and bowel movements became painful so he felt the need to come in and be seen.  Patient states that he has not been able to tolerate any food or drink without have diarrhea. Patient states that he has lost a significant amount of weight due to this and is having generalized weakness.           Differential Diagnosis:   Dehydration  Electrolyte imbalance  E coli  C Diff  Urgent Care Management:  Covid  Flu testing  Due to history of CKD and Heart Failure patients needs blood work prior to administering fluids for dehydration to ensure able to tolerate. Due to patients weight loss, pale color,tenting of skin and weakness with diarrhea and abdominal pain for 3 weeks patient need further testing.

## 2023-12-16 NOTE — Clinical Note
"    [address] [facility] [address]  Phone: [phone]  Fax: [phone]      Return to School    Buddy "Wei Park was seen and treated in our emergency department on 12/16/2023.     COVID-19 is present in our communities across the [facility]. There is limited testing for COVID at this time, so not all patients can be tested. In this situation, your employee meets the following criteria:    Buddy Park has met the criteria for COVID-19 testing and has a POSITIVE result. He can return to school once they are asymptomatic for 24 hours without the use of fever reducing medications AND at least five days from the first positive result. A mask is recommended for 5 days post quarantine.     If you have any questions or concerns, or if I can be of further assistance, please do not hesitate to contact me.    Sincerely,         "

## 2023-12-16 NOTE — DISCHARGE INSTRUCTIONS
Take medication as prescribed.  During emergency with a worsening symptomatology to include worsening diarrhea, fevers chills nausea vomiting diarrhea abdominal pain or otherwise.

## 2023-12-18 ENCOUNTER — TELEPHONE (OUTPATIENT)
Dept: FAMILY MEDICINE | Facility: CLINIC | Age: 73
End: 2023-12-18
Payer: MEDICARE

## 2023-12-18 NOTE — TELEPHONE ENCOUNTER
----- Message from Bernadette Vasquez sent at 12/18/2023  2:14 PM CST -----  Contact: self  Type:  Needs Medical Advice    Who Called: self  Symptoms (please be specific): pt needs to speak to nurse concerning  his diarrhea problem. Pt needs dr to go through his medication list to see which medicine is giving him the diarrhea problem. Pt sts he thinks it is carvedilol.   Would the patient rather a call back or a response via [facility]? call  Best Call Back Number: [phone] (home)     Additional Information: please advise and thank  you.        
RLE/weight-bearing as tolerated

## 2023-12-20 ENCOUNTER — TELEPHONE (OUTPATIENT)
Dept: URGENT CARE | Facility: CLINIC | Age: 73
End: 2023-12-20
Payer: MEDICARE

## 2024-01-08 ENCOUNTER — TELEPHONE (OUTPATIENT)
Dept: ORTHOPEDICS | Facility: CLINIC | Age: 74
End: 2024-01-08
Payer: MEDICARE

## 2024-01-08 NOTE — TELEPHONE ENCOUNTER
Returned call. The patient was offered and agreeable to an appointment on 1/16/2024 at 9:45 a.m. in [place] to discuss his CT scan results and knee pain with Dr. Broussard.    ----- Message from Yulisa Glass sent at 1/8/2024 10:17 AM CST -----  Contact: PT  Type: Needs Medical Advice    Who Called: PT  Best Call Back Number: [phone]  Additional  Information: PT requesting a call back to  discuss issues he's still having with his legs. Would like a call back  to discuss xrays and next steps  Please Advise- Thank you

## 2024-01-16 ENCOUNTER — OFFICE VISIT (OUTPATIENT)
Dept: ORTHOPEDICS | Facility: CLINIC | Age: 74
End: 2024-01-16
Payer: MEDICARE

## 2024-01-16 VITALS — BODY MASS INDEX: 21.67 KG/M2 | RESPIRATION RATE: 17 BRPM | WEIGHT: 130.06 LBS | HEIGHT: 65 IN

## 2024-01-16 DIAGNOSIS — M25.562 LEFT KNEE PAIN, UNSPECIFIED CHRONICITY: ICD-10-CM

## 2024-01-16 DIAGNOSIS — M17.12 PRIMARY OSTEOARTHRITIS OF LEFT KNEE: Primary | ICD-10-CM

## 2024-01-16 DIAGNOSIS — M25.362 KNEE INSTABILITY, LEFT: ICD-10-CM

## 2024-01-16 PROCEDURE — 3008F BODY MASS INDEX DOCD: CPT | Mod: CPTII,S$GLB,, | Performed by: ORTHOPAEDIC SURGERY

## 2024-01-16 PROCEDURE — 1126F AMNT PAIN NOTED NONE PRSNT: CPT | Mod: CPTII,S$GLB,, | Performed by: ORTHOPAEDIC SURGERY

## 2024-01-16 PROCEDURE — 1157F ADVNC CARE PLAN IN RCRD: CPT | Mod: CPTII,S$GLB,, | Performed by: ORTHOPAEDIC SURGERY

## 2024-01-16 PROCEDURE — 3288F FALL RISK ASSESSMENT DOCD: CPT | Mod: CPTII,S$GLB,, | Performed by: ORTHOPAEDIC SURGERY

## 2024-01-16 PROCEDURE — 99999 PR PBB SHADOW E&M-EST. PATIENT-LVL IV: CPT | Mod: PBBFAC,,, | Performed by: ORTHOPAEDIC SURGERY

## 2024-01-16 PROCEDURE — 1100F PTFALLS ASSESS-DOCD GE2>/YR: CPT | Mod: CPTII,S$GLB,, | Performed by: ORTHOPAEDIC SURGERY

## 2024-01-16 PROCEDURE — 1159F MED LIST DOCD IN RCRD: CPT | Mod: CPTII,S$GLB,, | Performed by: ORTHOPAEDIC SURGERY

## 2024-01-16 PROCEDURE — 99213 OFFICE O/P EST LOW 20 MIN: CPT | Mod: S$GLB,,, | Performed by: ORTHOPAEDIC SURGERY

## 2024-01-16 NOTE — PROGRESS NOTES
Patient ID: Buddy Park is a 72 y.o. male.     Chief Complaint: No chief complaint on file.        HPI:  Mr. Park returned today for re-evaluation of his left knee.  He stated today his knee does not hurt he is just concerned because it has making a crackling sound.  He has been trying to go to the gym to get some strength and intermittently will wear a brace but has trouble putting it on.  He was last seen on 11/14/2023 for 1 week of left knee pain which began when he was attending a high school basketball game and when he was traversing stairs he had sudden pain in his knee now when he bends his knee he has increased pain in his knee and rest and elevation decreases it.      ROS:  No new diagnosis/surgery/prescriptions since last office visit on 11/14/2023  Constitution: Negative for chills and fever.   HENT: Negative for congestion.    Eyes: Negative for blurred vision.   Cardiovascular: Negative for chest pain.   Respiratory: Negative for cough.    Endocrine: Negative for polydipsia.   Hematologic/Lymphatic: Negative for adenopathy.   Skin: Negative for flushing and itching.   Musculoskeletal: Positive for gout and joint pain.   Gastrointestinal: Positive for diarrhea. Negative for constipation and heartburn.   Genitourinary: Negative for nocturia.   Neurological: Negative for headaches and seizures.   Psychiatric/Behavioral: Negative for depression and substance abuse. The patient is nervous/anxious.    Allergic/Immunologic: Negative for environmental allergies.       Objective:      Physical Exam:   General: AAOx3.  No acute distress  Vascular:  Pulses intact and equal bilaterally.  Capillary refill less than 3 seconds and equal bilaterally  Neurologic:  Pinprick and soft touch intact and equal bilaterally  Integment:  No ecchymosis, no errythema  Extremity:  Knee:  Extension./flexion left knee 1/118°, right knee 0/128 degrees.  Crepitus with motion left knee.  Mild effusion left knee.  Mildly  positive drop home left knee.  Negative patellar load/compression bilaterally.  Negative patella apprehension/relocation bilaterally.  Mild increased excursion with valgus stressing left knee.  Varus stressing equal bilaterally with endpoint.  Lachman's/drawer equal bilaterally with endpoint.  Mild joint line tenderness medial aspect left knee.  Tony negative both knees.  Steven mildly positive left knee.  Nontender at the anserine insertion bilaterally.  No swelling at the anserine insertion bilaterally.  Radiography:  Personally reviewed CT scan of the left knee completed on 11/21/2023 which showed tricompartmental arthritic changes no fracture.      Assessment:      Impression:      1. Primary osteoarthritis of left knee    2. Knee instability, left    3. Acute pain of left knee       Plan:       1.  Discussed physical examination and radiographic findings with the patient. Buddy understands that he has arthritis of his left knee..  Treatment alternatives and outcomes were discussed with the patient he understands he could be treated conservatively with observation, activity modification, NSAIDs, bracing, physical therapy, injections, or he could consider surgical intervention such as total knee arthroplasty.  Since he is pain-free recommend he continue with conservative management.    2. Refer to pain management to evaluate the patient for possible Coolief therapy.    3. Continue with NSAIDs as tolerated allowed by [facility].    4. Brace wear was discussed with the patient.    5. Continue doing home exercises such as quadriceps and hamstring strengthening these were reinforced with the patient.  6. Follow up p.r.n..

## 2024-02-02 DIAGNOSIS — M25.522 LEFT ELBOW PAIN: ICD-10-CM

## 2024-02-02 DIAGNOSIS — M20.5X2 HALLUX LIMITUS OF LEFT FOOT: ICD-10-CM

## 2024-02-02 DIAGNOSIS — M10.072 ACUTE IDIOPATHIC GOUT INVOLVING TOE OF LEFT FOOT: ICD-10-CM

## 2024-02-02 DIAGNOSIS — M25.512 ACUTE PAIN OF LEFT SHOULDER: ICD-10-CM

## 2024-02-02 DIAGNOSIS — M19.072 PRIMARY OSTEOARTHRITIS OF LEFT FOOT: ICD-10-CM

## 2024-02-02 DIAGNOSIS — M79.672 PAIN IN LEFT FOOT: ICD-10-CM

## 2024-02-02 RX ORDER — KETOROLAC TROMETHAMINE 10 MG/1
10 TABLET, FILM COATED ORAL EVERY 6 HOURS PRN
Qty: 20 TABLET | Refills: 1 | Status: SHIPPED | OUTPATIENT
Start: 2024-02-02 | End: 2024-06-10

## 2024-02-02 RX ORDER — COLCHICINE 0.6 MG/1
0.6 TABLET ORAL
Qty: 90 TABLET | Refills: 1 | Status: SHIPPED | OUTPATIENT
Start: 2024-02-02 | End: 2024-06-10

## 2024-02-02 NOTE — TELEPHONE ENCOUNTER
Refill Routing Note   Medication(s) are not appropriate for processing by [facility] for the following reason(s):        ED/Hospital Visit since last OV with provider  Required labs outdated  Outside of protocol    [facility] action(s):  Defer  Route               Appointments  past 12m or future 3m with PCP    Date Provider   Last Visit   9/13/2023 Gema Malone MD   Next Visit   3/20/2024 Gema Malone MD   ED visits in past 90 days: 1        Note composed:2:25 PM 02/02/2024

## 2024-02-02 NOTE — TELEPHONE ENCOUNTER
No care due was identified.  [facility] Embedded Care Due Messages. Reference number: [id number].   2/02/2024 12:11:59 PM CST

## 2024-02-23 ENCOUNTER — OFFICE VISIT (OUTPATIENT)
Dept: PAIN MEDICINE | Facility: CLINIC | Age: 74
End: 2024-02-23
Payer: MEDICARE

## 2024-02-23 VITALS — HEIGHT: 65 IN | BODY MASS INDEX: 21.66 KG/M2 | WEIGHT: 130 LBS

## 2024-02-23 DIAGNOSIS — M17.12 PRIMARY OSTEOARTHRITIS OF LEFT KNEE: ICD-10-CM

## 2024-02-23 PROCEDURE — 3008F BODY MASS INDEX DOCD: CPT | Mod: CPTII,S$GLB,, | Performed by: STUDENT IN AN ORGANIZED HEALTH CARE EDUCATION/TRAINING PROGRAM

## 2024-02-23 PROCEDURE — 99999 PR PBB SHADOW E&M-EST. PATIENT-LVL V: CPT | Mod: PBBFAC,,, | Performed by: STUDENT IN AN ORGANIZED HEALTH CARE EDUCATION/TRAINING PROGRAM

## 2024-02-23 PROCEDURE — 1157F ADVNC CARE PLAN IN RCRD: CPT | Mod: CPTII,S$GLB,, | Performed by: STUDENT IN AN ORGANIZED HEALTH CARE EDUCATION/TRAINING PROGRAM

## 2024-02-23 PROCEDURE — 1159F MED LIST DOCD IN RCRD: CPT | Mod: CPTII,S$GLB,, | Performed by: STUDENT IN AN ORGANIZED HEALTH CARE EDUCATION/TRAINING PROGRAM

## 2024-02-23 PROCEDURE — 1160F RVW MEDS BY RX/DR IN RCRD: CPT | Mod: CPTII,S$GLB,, | Performed by: STUDENT IN AN ORGANIZED HEALTH CARE EDUCATION/TRAINING PROGRAM

## 2024-02-23 PROCEDURE — 99204 OFFICE O/P NEW MOD 45 MIN: CPT | Mod: S$GLB,,, | Performed by: STUDENT IN AN ORGANIZED HEALTH CARE EDUCATION/TRAINING PROGRAM

## 2024-02-23 PROCEDURE — 3288F FALL RISK ASSESSMENT DOCD: CPT | Mod: CPTII,S$GLB,, | Performed by: STUDENT IN AN ORGANIZED HEALTH CARE EDUCATION/TRAINING PROGRAM

## 2024-02-23 PROCEDURE — 1101F PT FALLS ASSESS-DOCD LE1/YR: CPT | Mod: CPTII,S$GLB,, | Performed by: STUDENT IN AN ORGANIZED HEALTH CARE EDUCATION/TRAINING PROGRAM

## 2024-02-23 PROCEDURE — 1125F AMNT PAIN NOTED PAIN PRSNT: CPT | Mod: CPTII,S$GLB,, | Performed by: STUDENT IN AN ORGANIZED HEALTH CARE EDUCATION/TRAINING PROGRAM

## 2024-02-23 RX ORDER — DICLOFENAC SODIUM 75 MG/1
75 TABLET, DELAYED RELEASE ORAL 2 TIMES DAILY
Qty: 60 TABLET | Refills: 3 | Status: SHIPPED | OUTPATIENT
Start: 2024-02-23 | End: 2024-03-24

## 2024-02-23 RX ORDER — ACETAMINOPHEN AND CODEINE PHOSPHATE 300; 30 MG/1; MG/1
1 TABLET ORAL 2 TIMES DAILY PRN
Qty: 60 TABLET | Refills: 0 | Status: SHIPPED | OUTPATIENT
Start: 2024-02-23 | End: 2024-03-24
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
## 2024-03-06 ENCOUNTER — LAB VISIT (OUTPATIENT)
Dept: LAB | Facility: HOSPITAL | Age: 74
End: 2024-03-06
Attending: STUDENT IN AN ORGANIZED HEALTH CARE EDUCATION/TRAINING PROGRAM
Payer: MEDICARE

## 2024-03-06 DIAGNOSIS — Z13.6 ENCOUNTER FOR LIPID SCREENING FOR CARDIOVASCULAR DISEASE: ICD-10-CM

## 2024-03-06 DIAGNOSIS — I10 PRIMARY HYPERTENSION: ICD-10-CM

## 2024-03-06 DIAGNOSIS — Z13.220 ENCOUNTER FOR LIPID SCREENING FOR CARDIOVASCULAR DISEASE: ICD-10-CM

## 2024-03-06 DIAGNOSIS — R73.03 PREDIABETES: ICD-10-CM

## 2024-03-06 DIAGNOSIS — M10.9 ACUTE GOUT INVOLVING TOE OF LEFT FOOT, UNSPECIFIED CAUSE: ICD-10-CM

## 2024-03-06 LAB
ALBUMIN SERPL BCP-MCNC: 4 G/DL (ref 3.5–5.2)
ALBUMIN/CREAT UR: 6.7 UG/MG (ref 0–30)
ALP SERPL-CCNC: 96 U/L (ref 55–135)
ALT SERPL W/O P-5'-P-CCNC: 8 U/L (ref 10–44)
ANION GAP SERPL CALC-SCNC: 11 MMOL/L (ref 8–16)
AST SERPL-CCNC: 15 U/L (ref 10–40)
BASOPHILS # BLD AUTO: 0.02 K/UL (ref 0–0.2)
BASOPHILS NFR BLD: 0.4 % (ref 0–1.9)
BILIRUB SERPL-MCNC: 1.1 MG/DL (ref 0.1–1)
BUN SERPL-MCNC: 17 MG/DL (ref 8–23)
CALCIUM SERPL-MCNC: 9.3 MG/DL (ref 8.7–10.5)
CHLORIDE SERPL-SCNC: 108 MMOL/L (ref 95–110)
CHOLEST SERPL-MCNC: 189 MG/DL (ref 120–199)
CHOLEST/HDLC SERPL: 4.6 {RATIO} (ref 2–5)
CO2 SERPL-SCNC: 24 MMOL/L (ref 23–29)
CREAT SERPL-MCNC: 1.5 MG/DL (ref 0.5–1.4)
CREAT UR-MCNC: 165 MG/DL (ref 23–375)
DIFFERENTIAL METHOD BLD: NORMAL
EOSINOPHIL # BLD AUTO: 0.1 K/UL (ref 0–0.5)
EOSINOPHIL NFR BLD: 1.5 % (ref 0–8)
ERYTHROCYTE [DISTWIDTH] IN BLOOD BY AUTOMATED COUNT: 13.2 % (ref 11.5–14.5)
EST. GFR  (NO RACE VARIABLE): 49.5 ML/MIN/1.73 M^2
ESTIMATED AVG GLUCOSE: 97 MG/DL (ref 68–131)
GLUCOSE SERPL-MCNC: 100 MG/DL (ref 70–110)
HBA1C MFR BLD: 5 % (ref 4–5.6)
HCT VFR BLD AUTO: 44.1 % (ref 40–54)
HDLC SERPL-MCNC: 41 MG/DL (ref 40–75)
HDLC SERPL: 21.7 % (ref 20–50)
HGB BLD-MCNC: 14.8 G/DL (ref 14–18)
IMM GRANULOCYTES # BLD AUTO: 0.02 K/UL (ref 0–0.04)
IMM GRANULOCYTES NFR BLD AUTO: 0.4 % (ref 0–0.5)
LDLC SERPL CALC-MCNC: 126 MG/DL (ref 63–159)
LYMPHOCYTES # BLD AUTO: 1.9 K/UL (ref 1–4.8)
LYMPHOCYTES NFR BLD: 36 % (ref 18–48)
MCH RBC QN AUTO: 29.5 PG (ref 27–31)
MCHC RBC AUTO-ENTMCNC: 33.6 G/DL (ref 32–36)
MCV RBC AUTO: 88 FL (ref 82–98)
MICROALBUMIN UR DL<=1MG/L-MCNC: 11 UG/ML
MONOCYTES # BLD AUTO: 0.4 K/UL (ref 0.3–1)
MONOCYTES NFR BLD: 7.7 % (ref 4–15)
NEUTROPHILS # BLD AUTO: 2.9 K/UL (ref 1.8–7.7)
NEUTROPHILS NFR BLD: 54 % (ref 38–73)
NONHDLC SERPL-MCNC: 148 MG/DL
NRBC BLD-RTO: 0 /100 WBC
PLATELET # BLD AUTO: 199 K/UL (ref 150–450)
PMV BLD AUTO: 9.2 FL (ref 9.2–12.9)
POTASSIUM SERPL-SCNC: 4 MMOL/L (ref 3.5–5.1)
PROT SERPL-MCNC: 7.3 G/DL (ref 6–8.4)
RBC # BLD AUTO: 5.01 M/UL (ref 4.6–6.2)
SODIUM SERPL-SCNC: 143 MMOL/L (ref 136–145)
TRIGL SERPL-MCNC: 110 MG/DL (ref 30–150)
TSH SERPL DL<=0.005 MIU/L-ACNC: 1.85 UIU/ML (ref 0.4–4)
URATE SERPL-MCNC: 9.1 MG/DL (ref 3.4–7)
WBC # BLD AUTO: 5.33 K/UL (ref 3.9–12.7)

## 2024-03-06 PROCEDURE — 83036 HEMOGLOBIN GLYCOSYLATED A1C: CPT | Performed by: STUDENT IN AN ORGANIZED HEALTH CARE EDUCATION/TRAINING PROGRAM

## 2024-03-06 PROCEDURE — 84443 ASSAY THYROID STIM HORMONE: CPT | Performed by: STUDENT IN AN ORGANIZED HEALTH CARE EDUCATION/TRAINING PROGRAM

## 2024-03-06 PROCEDURE — 80053 COMPREHEN METABOLIC PANEL: CPT | Performed by: STUDENT IN AN ORGANIZED HEALTH CARE EDUCATION/TRAINING PROGRAM

## 2024-03-06 PROCEDURE — 84550 ASSAY OF BLOOD/URIC ACID: CPT | Performed by: STUDENT IN AN ORGANIZED HEALTH CARE EDUCATION/TRAINING PROGRAM

## 2024-03-06 PROCEDURE — 82043 UR ALBUMIN QUANTITATIVE: CPT | Performed by: STUDENT IN AN ORGANIZED HEALTH CARE EDUCATION/TRAINING PROGRAM

## 2024-03-06 PROCEDURE — 80061 LIPID PANEL: CPT | Performed by: STUDENT IN AN ORGANIZED HEALTH CARE EDUCATION/TRAINING PROGRAM

## 2024-03-06 PROCEDURE — 85025 COMPLETE CBC W/AUTO DIFF WBC: CPT | Performed by: STUDENT IN AN ORGANIZED HEALTH CARE EDUCATION/TRAINING PROGRAM

## 2024-03-06 PROCEDURE — 36415 COLL VENOUS BLD VENIPUNCTURE: CPT | Performed by: STUDENT IN AN ORGANIZED HEALTH CARE EDUCATION/TRAINING PROGRAM

## 2024-03-11 ENCOUNTER — TELEPHONE (OUTPATIENT)
Dept: FAMILY MEDICINE | Facility: CLINIC | Age: 74
End: 2024-03-11
Payer: MEDICARE

## 2024-03-11 DIAGNOSIS — R53.1 LEFT-SIDED WEAKNESS: Primary | ICD-10-CM

## 2024-03-11 DIAGNOSIS — M21.372 LEFT FOOT DROP: ICD-10-CM

## 2024-03-11 DIAGNOSIS — I69.354 HEMIPLEGIA AND HEMIPARESIS FOLLOWING CEREBRAL INFARCTION AFFECTING LEFT NON-DOMINANT SIDE: ICD-10-CM

## 2024-03-20 ENCOUNTER — OFFICE VISIT (OUTPATIENT)
Dept: FAMILY MEDICINE | Facility: CLINIC | Age: 74
End: 2024-03-20
Payer: MEDICARE

## 2024-03-20 VITALS
RESPIRATION RATE: 16 BRPM | SYSTOLIC BLOOD PRESSURE: 139 MMHG | DIASTOLIC BLOOD PRESSURE: 79 MMHG | HEIGHT: 65 IN | BODY MASS INDEX: 23.79 KG/M2 | HEART RATE: 78 BPM | WEIGHT: 142.81 LBS | OXYGEN SATURATION: 98 %

## 2024-03-20 DIAGNOSIS — I10 PRIMARY HYPERTENSION: ICD-10-CM

## 2024-03-20 DIAGNOSIS — I70.0 ABDOMINAL AORTIC ATHEROSCLEROSIS: ICD-10-CM

## 2024-03-20 DIAGNOSIS — M10.9 GOUT INVOLVING TOE OF LEFT FOOT, UNSPECIFIED CAUSE, UNSPECIFIED CHRONICITY: ICD-10-CM

## 2024-03-20 DIAGNOSIS — Z86.73 HISTORY OF STROKE: Primary | ICD-10-CM

## 2024-03-20 DIAGNOSIS — I50.32 CHRONIC DIASTOLIC HEART FAILURE: ICD-10-CM

## 2024-03-20 DIAGNOSIS — N18.31 STAGE 3A CHRONIC KIDNEY DISEASE: ICD-10-CM

## 2024-03-20 DIAGNOSIS — R26.9 GAIT DIFFICULTY: ICD-10-CM

## 2024-03-20 DIAGNOSIS — I69.354 HEMIPLEGIA AND HEMIPARESIS FOLLOWING CEREBRAL INFARCTION AFFECTING LEFT NON-DOMINANT SIDE: ICD-10-CM

## 2024-03-20 DIAGNOSIS — I42.9 CARDIOMYOPATHY, UNSPECIFIED TYPE: ICD-10-CM

## 2024-03-20 DIAGNOSIS — R73.03 PREDIABETES: ICD-10-CM

## 2024-03-20 DIAGNOSIS — G89.4 CHRONIC PAIN SYNDROME: ICD-10-CM

## 2024-03-20 DIAGNOSIS — F41.9 ANXIETY: ICD-10-CM

## 2024-03-20 DIAGNOSIS — F32.5 MAJOR DEPRESSIVE DISORDER WITH SINGLE EPISODE, IN FULL REMISSION: ICD-10-CM

## 2024-03-20 DIAGNOSIS — E78.00 PURE HYPERCHOLESTEROLEMIA: ICD-10-CM

## 2024-03-20 PROCEDURE — 1160F RVW MEDS BY RX/DR IN RCRD: CPT | Mod: CPTII,S$GLB,, | Performed by: STUDENT IN AN ORGANIZED HEALTH CARE EDUCATION/TRAINING PROGRAM

## 2024-03-20 PROCEDURE — 99999 PR PBB SHADOW E&M-EST. PATIENT-LVL V: CPT | Mod: PBBFAC,,, | Performed by: STUDENT IN AN ORGANIZED HEALTH CARE EDUCATION/TRAINING PROGRAM

## 2024-03-20 PROCEDURE — 3061F NEG MICROALBUMINURIA REV: CPT | Mod: CPTII,S$GLB,, | Performed by: STUDENT IN AN ORGANIZED HEALTH CARE EDUCATION/TRAINING PROGRAM

## 2024-03-20 PROCEDURE — 3288F FALL RISK ASSESSMENT DOCD: CPT | Mod: CPTII,S$GLB,, | Performed by: STUDENT IN AN ORGANIZED HEALTH CARE EDUCATION/TRAINING PROGRAM

## 2024-03-20 PROCEDURE — 3075F SYST BP GE 130 - 139MM HG: CPT | Mod: CPTII,S$GLB,, | Performed by: STUDENT IN AN ORGANIZED HEALTH CARE EDUCATION/TRAINING PROGRAM

## 2024-03-20 PROCEDURE — 3044F HG A1C LEVEL LT 7.0%: CPT | Mod: CPTII,S$GLB,, | Performed by: STUDENT IN AN ORGANIZED HEALTH CARE EDUCATION/TRAINING PROGRAM

## 2024-03-20 PROCEDURE — 99214 OFFICE O/P EST MOD 30 MIN: CPT | Mod: S$GLB,,, | Performed by: STUDENT IN AN ORGANIZED HEALTH CARE EDUCATION/TRAINING PROGRAM

## 2024-03-20 PROCEDURE — 3008F BODY MASS INDEX DOCD: CPT | Mod: CPTII,S$GLB,, | Performed by: STUDENT IN AN ORGANIZED HEALTH CARE EDUCATION/TRAINING PROGRAM

## 2024-03-20 PROCEDURE — 1101F PT FALLS ASSESS-DOCD LE1/YR: CPT | Mod: CPTII,S$GLB,, | Performed by: STUDENT IN AN ORGANIZED HEALTH CARE EDUCATION/TRAINING PROGRAM

## 2024-03-20 PROCEDURE — 3078F DIAST BP <80 MM HG: CPT | Mod: CPTII,S$GLB,, | Performed by: STUDENT IN AN ORGANIZED HEALTH CARE EDUCATION/TRAINING PROGRAM

## 2024-03-20 PROCEDURE — 3066F NEPHROPATHY DOC TX: CPT | Mod: CPTII,S$GLB,, | Performed by: STUDENT IN AN ORGANIZED HEALTH CARE EDUCATION/TRAINING PROGRAM

## 2024-03-20 PROCEDURE — 1126F AMNT PAIN NOTED NONE PRSNT: CPT | Mod: CPTII,S$GLB,, | Performed by: STUDENT IN AN ORGANIZED HEALTH CARE EDUCATION/TRAINING PROGRAM

## 2024-03-20 PROCEDURE — 1157F ADVNC CARE PLAN IN RCRD: CPT | Mod: CPTII,S$GLB,, | Performed by: STUDENT IN AN ORGANIZED HEALTH CARE EDUCATION/TRAINING PROGRAM

## 2024-03-20 PROCEDURE — 1159F MED LIST DOCD IN RCRD: CPT | Mod: CPTII,S$GLB,, | Performed by: STUDENT IN AN ORGANIZED HEALTH CARE EDUCATION/TRAINING PROGRAM

## 2024-03-20 RX ORDER — ALLOPURINOL 100 MG/1
100 TABLET ORAL DAILY
Qty: 90 TABLET | Refills: 3 | Status: SHIPPED | OUTPATIENT
Start: 2024-03-20

## 2024-03-20 NOTE — ASSESSMENT & PLAN NOTE
BMP  Lab Results   Component Value Date     03/06/2024    K 4.0 03/06/2024     03/06/2024    CO2 24 03/06/2024    BUN 17 03/06/2024    CREATININE 1.5 (H) 03/06/2024    CALCIUM 9.3 03/06/2024    ANIONGAP 11 03/06/2024    EGFRNORACEVR 49.5 (A) 03/06/2024     Stable. Continue current medications and regular followup.

## 2024-03-20 NOTE — PROGRESS NOTES
Subjective:       Patient ID: Buddy Park is a 73 y.o. male.    Chief Complaint: Follow-up (6 month)    Patient Active Problem List:     Hyperlipidemia     Cochlear implant in place     [place] (hard of hearing)     Cardiomyopathy     HTN (hypertension)     Anxiety     Abdominal aortic atherosclerosis     Deafness     Tinnitus of left ear     Stenosis of right carotid artery     Bruit     Major depressive disorder with single episode, in full remission     CKD (chronic kidney disease) stage 3, GFR 30-59 ml/min     Prediabetes     Pain in left foot     Primary osteoarthritis of left foot     Hallux limitus of left foot     Renal calculus, left     History of stroke     Chronic diastolic heart failure     Traumatic complete tear of left rotator cuff     Adhesive capsulitis of left shoulder     Elevated AST (SGOT)     Abnormal ECG     Excessive daytime sleepiness     History of left-sided carotid endarterectomy     Left foot drop     Diarrhea     Gait difficulty     Muscle weakness (generalized)     Chronic fatigue     At risk for falling     Left-sided weakness     Hemiplegia and hemiparesis following cerebral infarction affecting left non-dominant side    Current Outpatient Medications:  acetaminophen-codeine 300-30mg (TYLENOL #3) 300-30 mg Tab, Take 1 tablet by mouth 2 (two) times daily as needed (severe pain).  amLODIPine (NORVASC) 10 MG tablet, Take 1 tablet (10 mg total) by mouth once daily. (Patient not taking: Reported on 3/20/2024)  aspirin (ECOTRIN) 81 MG EC tablet, Take 81 mg by mouth once daily.  b complex vitamins tablet, Take 1 tablet by mouth once daily.  baclofen (LIORESAL) 10 MG tablet, Take 1 tablet (10 mg total) by mouth 2 (two) times daily as needed (muscle spasm).  blood sugar diagnostic Strp, To check BG 2-3 times daily, to use with insurance preferred meter (Patient not taking: Reported on 3/20/2024)  blood-glucose meter kit, To check BG 2-3 times daily, to use with insurance preferred  meter  buPROPion (WELLBUTRIN XL) 300 MG 24 hr tablet, Take 1 tablet (300 mg total) by mouth once daily. (Patient not taking: Reported on 3/20/2024)  carvediloL (COREG) 25 MG tablet, Take 1 tablet (25 mg total) by mouth once daily.  cholestyramine-aspartame (CHOLESTYRAMINE LIGHT) 4 gram Powd, Take 4 g by mouth 2 (two) times daily as needed (diarrhea).  cloNIDine (CATAPRES) 0.1 MG tablet, Take 1 tablet (0.1 mg total) by mouth 2 (two) times daily as needed (For blood pressure with the top number above 150 or bottom number above 100). (Patient not taking: Reported on 3/20/2024)  clopidogreL (PLAVIX) 75 mg tablet, TAKE 1 TABLET BY MOUTH EVERY DAY (Patient not taking: Reported on 3/20/2024)  colchicine (COLCRYS) 0.6 mg tablet, TAKE 1 TABLET BY MOUTH ONCE DAILY. (Patient not taking: Reported on 3/20/2024)  colestipoL (COLESTID) 1 gram Tab, TAKE 1 TABLET BY MOUTH TWICE A DAY (Patient not taking: Reported on 3/20/2024)  diclofenac (VOLTAREN) 75 MG EC tablet, Take 1 tablet (75 mg total) by mouth 2 (two) times daily. (Patient not taking: Reported on 3/20/2024)  ferrous sulfate (FEOSOL) 325 mg (65 mg iron) Tab tablet, Take 325 mg by mouth.  gabapentin (NEURONTIN) 400 MG capsule, Take 400 mg by mouth 3 (three) times daily.  ketoconazole (NIZORAL) 2 % cream, Apply topically 2 (two) times daily. (Patient not taking: Reported on 3/20/2024)  ketorolac (TORADOL) 10 mg tablet, TAKE 1 TABLET BY MOUTH EVERY 6 HOURS AS NEEDED FOR PAIN. (Patient not taking: Reported on 3/20/2024)  lancets Misc, To check BG 2 times daily, to use with insurance preferred meter (Patient not taking: Reported on 3/20/2024)  leg brace Misc, Use left foot brace as directed (Patient not taking: Reported on 3/20/2024)  miconazole, bulk, Powd, 1 application by Misc.(Non-Drug; Combo Route) route 2 (two) times daily as needed (rash). (Patient not taking: Reported on 3/20/2024)  multivitamin (THERAGRAN) per tablet, Take 1 tablet by mouth.  nitroGLYCERIN (NITROSTAT)  0.4 MG SL tablet, Place 1 tablet (0.4 mg total) under the tongue every 5 (five) minutes as needed for Chest pain.  ondansetron (ZOFRAN) 4 MG tablet, Take 1 tablet (4 mg total) by mouth every 6 (six) hours as needed. (Patient not taking: Reported on 3/20/2024)  rosuvastatin (CRESTOR) 40 MG Tab, Take 1 tablet (40 mg total) by mouth every evening.  traZODone (DESYREL) 50 MG tablet, Take 1 tablet (50 mg total) by mouth every evening. (Patient not taking: Reported on 3/20/2024)    No current facility-administered medications for this visit.  Facility-Administered Medications Ordered in Other Visits:  lactated ringers infusion            Review of Systems   Constitutional:  Negative for activity change and appetite change.   Respiratory:  Negative for shortness of breath.    Cardiovascular:  Negative for chest pain.   Gastrointestinal:  Negative for abdominal pain.   Genitourinary:  Negative for dysuria.   Integumentary:  Negative for rash.   Psychiatric/Behavioral:  Negative for dysphoric mood and sleep disturbance. The patient is not nervous/anxious.          Objective:      Physical Exam  Constitutional:       General: He is not in acute distress.     Appearance: Normal appearance. He is not ill-appearing.   Eyes:      Conjunctiva/sclera: Conjunctivae normal.   Cardiovascular:      Rate and Rhythm: Normal rate and regular rhythm.      Heart sounds: Normal heart sounds. No murmur heard.  Pulmonary:      Effort: Pulmonary effort is normal. No respiratory distress.      Breath sounds: Normal breath sounds.   Musculoskeletal:      Right lower leg: No edema.      Left lower leg: No edema.   Skin:     General: Skin is warm and dry.   Neurological:      Mental Status: He is alert. Mental status is at baseline.      Motor: Weakness (left sided hemiparesis) present.      Gait: Gait abnormal (walks with a cane).   Psychiatric:         Mood and Affect: Mood normal.         Behavior: Behavior normal.         Thought Content:  Thought content normal.         Judgment: Judgment normal.         Assessment:       1. History of stroke    2. Hemiplegia and hemiparesis following cerebral infarction affecting left non-dominant side    3. Anxiety    4. Major depressive disorder with single episode, in full remission    5. Pure hypercholesterolemia    6. Cardiomyopathy, unspecified type    7. Primary hypertension    8. Abdominal aortic atherosclerosis    9. Chronic diastolic heart failure    10. Stage 3a chronic kidney disease    11. Prediabetes    12. Gait difficulty    13. Chronic pain syndrome    14. Gout involving toe of left foot, unspecified cause, unspecified chronicity        Plan:       Problem List Items Addressed This Visit          Neuro    History of stroke - Primary     Stable. Continue current medications and regular followup.  Continue risk factor management           Hemiplegia and hemiparesis following cerebral infarction affecting left non-dominant side     Unchanged. Doing some therapy. stable         Chronic pain syndrome     Seeing pain management for tylenol 3- [facility] in [place]            Psychiatric    Anxiety     Stable. Continue current medications and regular followup.           Major depressive disorder with single episode, in full remission     Stable. Continue current medications and regular followup.              Cardiac/Vascular    Hyperlipidemia     Stable. Continue current medications and regular followup.  Stopped his medication- restart         Cardiomyopathy     Encouraged him to restart his medication         HTN (hypertension)     Stable. Continue current medications and regular followup.           Abdominal aortic atherosclerosis     Continue risk factor management  Stable. Continue current medications and regular followup.           Chronic diastolic heart failure     Stable. Continue current medications and regular followup.              Renal/    CKD (chronic kidney disease) stage 3, GFR 30-59  ml/min     BMP  Lab Results   Component Value Date     03/06/2024    K 4.0 03/06/2024     03/06/2024    CO2 24 03/06/2024    BUN 17 03/06/2024    CREATININE 1.5 (H) 03/06/2024    CALCIUM 9.3 03/06/2024    ANIONGAP 11 03/06/2024    EGFRNORACEVR 49.5 (A) 03/06/2024   Stable. Continue current medications and regular followup.              Endocrine    Prediabetes     Lab Results   Component Value Date    HGBA1C 5.0 03/06/2024   Stable. Continue current medications and regular followup.              Other    Gait difficulty     Stable. Continue current medications and regular followup.            Other Visit Diagnoses       Gout involving toe of left foot, unspecified cause, unspecified chronicity        Relevant Medications    allopurinoL (ZYLOPRIM) 100 MG tablet               reviewed  Labs reviewed  CT abdomen reviewed from recent ED visit

## 2024-03-20 NOTE — ASSESSMENT & PLAN NOTE
Lab Results   Component Value Date    HGBA1C 5.0 03/06/2024     Stable. Continue current medications and regular followup.

## 2024-03-25 ENCOUNTER — CLINICAL SUPPORT (OUTPATIENT)
Dept: REHABILITATION | Facility: HOSPITAL | Age: 74
End: 2024-03-25
Attending: STUDENT IN AN ORGANIZED HEALTH CARE EDUCATION/TRAINING PROGRAM
Payer: MEDICARE

## 2024-03-25 DIAGNOSIS — Z74.09 IMPAIRED FUNCTIONAL MOBILITY, BALANCE, GAIT, AND ENDURANCE: Primary | ICD-10-CM

## 2024-03-25 DIAGNOSIS — M21.372 LEFT FOOT DROP: ICD-10-CM

## 2024-03-25 DIAGNOSIS — I69.354 HEMIPLEGIA AND HEMIPARESIS FOLLOWING CEREBRAL INFARCTION AFFECTING LEFT NON-DOMINANT SIDE: ICD-10-CM

## 2024-03-25 DIAGNOSIS — R53.1 LEFT-SIDED WEAKNESS: ICD-10-CM

## 2024-03-25 PROCEDURE — 97110 THERAPEUTIC EXERCISES: CPT | Mod: PN

## 2024-03-25 PROCEDURE — 97161 PT EVAL LOW COMPLEX 20 MIN: CPT | Mod: PN

## 2024-03-25 NOTE — PLAN OF CARE
PT met face to face with Marbin Cook PTA to discuss patient's treatment plan and progress towards established goals.  Treatment will be continued as described in initial report/eval and progress notes.  Patient will be seen by physical therapist every sixth visit and minimally once per month.    Additional information: NA
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
## 2024-03-26 ENCOUNTER — OFFICE VISIT (OUTPATIENT)
Dept: PAIN MEDICINE | Facility: CLINIC | Age: 74
End: 2024-03-26
Payer: MEDICARE

## 2024-03-26 VITALS — BODY MASS INDEX: 23.66 KG/M2 | HEIGHT: 65 IN | WEIGHT: 142 LBS

## 2024-03-26 DIAGNOSIS — M25.552 LEFT HIP PAIN: Primary | ICD-10-CM

## 2024-03-26 PROCEDURE — 1157F ADVNC CARE PLAN IN RCRD: CPT | Mod: CPTII,S$GLB,, | Performed by: STUDENT IN AN ORGANIZED HEALTH CARE EDUCATION/TRAINING PROGRAM

## 2024-03-26 PROCEDURE — 1159F MED LIST DOCD IN RCRD: CPT | Mod: CPTII,S$GLB,, | Performed by: STUDENT IN AN ORGANIZED HEALTH CARE EDUCATION/TRAINING PROGRAM

## 2024-03-26 PROCEDURE — 99999 PR PBB SHADOW E&M-EST. PATIENT-LVL IV: CPT | Mod: PBBFAC,,, | Performed by: STUDENT IN AN ORGANIZED HEALTH CARE EDUCATION/TRAINING PROGRAM

## 2024-03-26 PROCEDURE — 1101F PT FALLS ASSESS-DOCD LE1/YR: CPT | Mod: CPTII,S$GLB,, | Performed by: STUDENT IN AN ORGANIZED HEALTH CARE EDUCATION/TRAINING PROGRAM

## 2024-03-26 PROCEDURE — 3061F NEG MICROALBUMINURIA REV: CPT | Mod: CPTII,S$GLB,, | Performed by: STUDENT IN AN ORGANIZED HEALTH CARE EDUCATION/TRAINING PROGRAM

## 2024-03-26 PROCEDURE — 3044F HG A1C LEVEL LT 7.0%: CPT | Mod: CPTII,S$GLB,, | Performed by: STUDENT IN AN ORGANIZED HEALTH CARE EDUCATION/TRAINING PROGRAM

## 2024-03-26 PROCEDURE — 99214 OFFICE O/P EST MOD 30 MIN: CPT | Mod: S$GLB,,, | Performed by: STUDENT IN AN ORGANIZED HEALTH CARE EDUCATION/TRAINING PROGRAM

## 2024-03-26 PROCEDURE — 1126F AMNT PAIN NOTED NONE PRSNT: CPT | Mod: CPTII,S$GLB,, | Performed by: STUDENT IN AN ORGANIZED HEALTH CARE EDUCATION/TRAINING PROGRAM

## 2024-03-26 PROCEDURE — 1160F RVW MEDS BY RX/DR IN RCRD: CPT | Mod: CPTII,S$GLB,, | Performed by: STUDENT IN AN ORGANIZED HEALTH CARE EDUCATION/TRAINING PROGRAM

## 2024-03-26 PROCEDURE — 3008F BODY MASS INDEX DOCD: CPT | Mod: CPTII,S$GLB,, | Performed by: STUDENT IN AN ORGANIZED HEALTH CARE EDUCATION/TRAINING PROGRAM

## 2024-03-26 PROCEDURE — 3288F FALL RISK ASSESSMENT DOCD: CPT | Mod: CPTII,S$GLB,, | Performed by: STUDENT IN AN ORGANIZED HEALTH CARE EDUCATION/TRAINING PROGRAM

## 2024-03-26 PROCEDURE — 3066F NEPHROPATHY DOC TX: CPT | Mod: CPTII,S$GLB,, | Performed by: STUDENT IN AN ORGANIZED HEALTH CARE EDUCATION/TRAINING PROGRAM

## 2024-03-26 RX ORDER — ACETAMINOPHEN AND CODEINE PHOSPHATE 300; 30 MG/1; MG/1
1 TABLET ORAL 2 TIMES DAILY PRN
Qty: 30 TABLET | Refills: 2 | Status: SHIPPED | OUTPATIENT
Start: 2024-03-26 | End: 2024-03-26

## 2024-03-26 RX ORDER — LIDOCAINE 50 MG/G
1 PATCH TOPICAL DAILY
Qty: 30 PATCH | Refills: 3 | Status: SHIPPED | OUTPATIENT
Start: 2024-03-26 | End: 2024-04-25

## 2024-03-26 RX ORDER — ACETAMINOPHEN AND CODEINE PHOSPHATE 300; 30 MG/1; MG/1
1 TABLET ORAL 2 TIMES DAILY PRN
Qty: 60 TABLET | Refills: 2 | Status: SHIPPED | OUTPATIENT
Start: 2024-03-26 | End: 2024-04-25
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
## 2024-03-28 DIAGNOSIS — E78.5 HYPERLIPIDEMIA, UNSPECIFIED HYPERLIPIDEMIA TYPE: ICD-10-CM

## 2024-03-28 DIAGNOSIS — I10 ESSENTIAL HYPERTENSION: ICD-10-CM

## 2024-03-28 RX ORDER — AMLODIPINE BESYLATE 10 MG/1
10 TABLET ORAL DAILY
Qty: 90 TABLET | Refills: 3 | Status: SHIPPED | OUTPATIENT
Start: 2024-03-28

## 2024-03-28 RX ORDER — ROSUVASTATIN CALCIUM 40 MG/1
40 TABLET, COATED ORAL NIGHTLY
Qty: 90 TABLET | Refills: 3 | Status: SHIPPED | OUTPATIENT
Start: 2024-03-28 | End: 2024-06-18

## 2024-03-28 NOTE — TELEPHONE ENCOUNTER
No care due was identified.  [facility] Embedded Care Due Messages. Reference number: [id number].   3/28/2024 11:44:33 AM CDT

## 2024-03-28 NOTE — TELEPHONE ENCOUNTER
Refill Routing Note   Medication(s) are not appropriate for processing by [facility] for the following reason(s):        No active prescription written by provider: previously prescribed by Tsering Mercado DO       [facility] action(s):  Defer  Approve               Appointments  past 12m or future 3m with PCP    Date Provider   Last Visit   3/20/2024 Gema Malone MD   Next Visit   9/20/2024 Gema Malone MD   ED visits in past 90 days: 0        Note composed:4:38 PM 03/28/2024

## 2024-03-28 NOTE — TELEPHONE ENCOUNTER
----- Message from Ame Traylor sent at 3/28/2024 11:39 AM CDT -----  Type:  RX Refill Request    Who Called:  Essie with Home [facility]  Refill or New Rx:  refill  RX Name and Strength:  amLODIPine (NORVASC) 10 MG tablet, rosuvastatin (CRESTOR) 40 MG Tab  How is the patient currently taking it? (ex. 1XDay):  as directed  Is this a 30 day or 90 day RX:  90  Preferred Pharmacy with phone number:    [id number] - [place], [place] - [address]  [address] [place] [address]  Phone: [phone] Fax: [phone]  Best Call Back Number:  [phone]  Additional Information:  Essie is calling in regards to getting the pt's refills. Please call back and advise. Thanks!

## 2024-04-01 ENCOUNTER — CLINICAL SUPPORT (OUTPATIENT)
Dept: REHABILITATION | Facility: HOSPITAL | Age: 74
End: 2024-04-01
Payer: MEDICARE

## 2024-04-01 DIAGNOSIS — Z74.09 IMPAIRED FUNCTIONAL MOBILITY, BALANCE, GAIT, AND ENDURANCE: Primary | ICD-10-CM

## 2024-04-01 PROCEDURE — 97110 THERAPEUTIC EXERCISES: CPT | Mod: PN,CQ

## 2024-04-01 PROCEDURE — 97112 NEUROMUSCULAR REEDUCATION: CPT | Mod: PN,CQ
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
## 2024-04-03 ENCOUNTER — CLINICAL SUPPORT (OUTPATIENT)
Dept: REHABILITATION | Facility: HOSPITAL | Age: 74
End: 2024-04-03
Payer: MEDICARE

## 2024-04-03 DIAGNOSIS — Z74.09 IMPAIRED FUNCTIONAL MOBILITY, BALANCE, GAIT, AND ENDURANCE: Primary | ICD-10-CM

## 2024-04-03 PROCEDURE — 97112 NEUROMUSCULAR REEDUCATION: CPT | Mod: PN,CQ

## 2024-04-03 PROCEDURE — 97110 THERAPEUTIC EXERCISES: CPT | Mod: PN,CQ
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
## 2024-04-08 ENCOUNTER — CLINICAL SUPPORT (OUTPATIENT)
Dept: REHABILITATION | Facility: HOSPITAL | Age: 74
End: 2024-04-08
Payer: MEDICARE

## 2024-04-08 DIAGNOSIS — Z74.09 IMPAIRED FUNCTIONAL MOBILITY, BALANCE, GAIT, AND ENDURANCE: Primary | ICD-10-CM

## 2024-04-08 PROCEDURE — 97110 THERAPEUTIC EXERCISES: CPT | Mod: PN,CQ

## 2024-04-08 PROCEDURE — 97112 NEUROMUSCULAR REEDUCATION: CPT | Mod: PN,CQ
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
## 2024-04-10 ENCOUNTER — CLINICAL SUPPORT (OUTPATIENT)
Dept: REHABILITATION | Facility: HOSPITAL | Age: 74
End: 2024-04-10
Payer: MEDICARE

## 2024-04-10 DIAGNOSIS — Z74.09 IMPAIRED FUNCTIONAL MOBILITY, BALANCE, GAIT, AND ENDURANCE: Primary | ICD-10-CM

## 2024-04-10 PROCEDURE — 97112 NEUROMUSCULAR REEDUCATION: CPT | Mod: PN,CQ

## 2024-04-10 PROCEDURE — 97110 THERAPEUTIC EXERCISES: CPT | Mod: PN,CQ
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
## 2024-04-15 ENCOUNTER — CLINICAL SUPPORT (OUTPATIENT)
Dept: REHABILITATION | Facility: HOSPITAL | Age: 74
End: 2024-04-15
Payer: MEDICARE

## 2024-04-15 DIAGNOSIS — Z74.09 IMPAIRED FUNCTIONAL MOBILITY, BALANCE, GAIT, AND ENDURANCE: Primary | ICD-10-CM

## 2024-04-15 PROCEDURE — 97110 THERAPEUTIC EXERCISES: CPT | Mod: PN,CQ

## 2024-04-15 PROCEDURE — 97112 NEUROMUSCULAR REEDUCATION: CPT | Mod: PN,CQ
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
## 2024-04-17 ENCOUNTER — CLINICAL SUPPORT (OUTPATIENT)
Dept: REHABILITATION | Facility: HOSPITAL | Age: 74
End: 2024-04-17
Payer: MEDICARE

## 2024-04-17 DIAGNOSIS — Z74.09 IMPAIRED FUNCTIONAL MOBILITY, BALANCE, GAIT, AND ENDURANCE: Primary | ICD-10-CM

## 2024-04-17 PROCEDURE — 97116 GAIT TRAINING THERAPY: CPT | Mod: PN

## 2024-04-17 PROCEDURE — 97112 NEUROMUSCULAR REEDUCATION: CPT | Mod: PN

## 2024-04-17 PROCEDURE — 97110 THERAPEUTIC EXERCISES: CPT | Mod: PN
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
## 2024-04-22 ENCOUNTER — CLINICAL SUPPORT (OUTPATIENT)
Dept: REHABILITATION | Facility: HOSPITAL | Age: 74
End: 2024-04-22
Payer: MEDICARE

## 2024-04-22 DIAGNOSIS — Z74.09 IMPAIRED FUNCTIONAL MOBILITY, BALANCE, GAIT, AND ENDURANCE: Primary | ICD-10-CM

## 2024-04-22 PROCEDURE — 97116 GAIT TRAINING THERAPY: CPT | Mod: PN,CQ

## 2024-04-22 PROCEDURE — 97110 THERAPEUTIC EXERCISES: CPT | Mod: PN,CQ
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
## 2024-04-24 ENCOUNTER — CLINICAL SUPPORT (OUTPATIENT)
Dept: REHABILITATION | Facility: HOSPITAL | Age: 74
End: 2024-04-24
Payer: MEDICARE

## 2024-04-24 DIAGNOSIS — Z74.09 IMPAIRED FUNCTIONAL MOBILITY, BALANCE, GAIT, AND ENDURANCE: Primary | ICD-10-CM

## 2024-04-24 PROCEDURE — 97110 THERAPEUTIC EXERCISES: CPT | Mod: PN,CQ

## 2024-04-24 PROCEDURE — 97116 GAIT TRAINING THERAPY: CPT | Mod: PN,CQ
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
## 2024-04-29 ENCOUNTER — CLINICAL SUPPORT (OUTPATIENT)
Dept: REHABILITATION | Facility: HOSPITAL | Age: 74
End: 2024-04-29
Payer: MEDICARE

## 2024-04-29 DIAGNOSIS — Z86.73 HISTORY OF STROKE: ICD-10-CM

## 2024-04-29 DIAGNOSIS — Z74.09 IMPAIRED FUNCTIONAL MOBILITY, BALANCE, GAIT, AND ENDURANCE: Primary | ICD-10-CM

## 2024-04-29 DIAGNOSIS — I69.354 HEMIPLEGIA AND HEMIPARESIS FOLLOWING CEREBRAL INFARCTION AFFECTING LEFT NON-DOMINANT SIDE: ICD-10-CM

## 2024-04-29 PROCEDURE — 97116 GAIT TRAINING THERAPY: CPT | Mod: PN,CQ

## 2024-04-29 PROCEDURE — 97110 THERAPEUTIC EXERCISES: CPT | Mod: PN,CQ
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
## 2024-05-01 ENCOUNTER — CLINICAL SUPPORT (OUTPATIENT)
Dept: REHABILITATION | Facility: HOSPITAL | Age: 74
End: 2024-05-01
Payer: MEDICARE

## 2024-05-01 DIAGNOSIS — Z74.09 IMPAIRED FUNCTIONAL MOBILITY, BALANCE, GAIT, AND ENDURANCE: Primary | ICD-10-CM

## 2024-05-01 PROCEDURE — 97110 THERAPEUTIC EXERCISES: CPT | Mod: PN

## 2024-05-01 PROCEDURE — 97116 GAIT TRAINING THERAPY: CPT | Mod: PN
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
## 2024-05-07 ENCOUNTER — DOCUMENTATION ONLY (OUTPATIENT)
Dept: FAMILY MEDICINE | Facility: CLINIC | Age: 74
End: 2024-05-07
Payer: MEDICARE

## 2024-05-07 NOTE — PROGRESS NOTES
Received paperwork for [facility] in [facility]  It is my opinion due to his pas CVA, foot drop, left sided hemiplegia, chronic pain, gait difficulty, impaired balance and high risk of falls  He qualifies for a mobility accessible apartment to accommodate any needed equipment to prevent fall and injury.  This is a permanent need for him.  Dr. Malone

## 2024-05-08 ENCOUNTER — CLINICAL SUPPORT (OUTPATIENT)
Dept: REHABILITATION | Facility: HOSPITAL | Age: 74
End: 2024-05-08
Payer: MEDICARE

## 2024-05-08 DIAGNOSIS — Z74.09 IMPAIRED FUNCTIONAL MOBILITY, BALANCE, GAIT, AND ENDURANCE: Primary | ICD-10-CM

## 2024-05-08 PROCEDURE — 97110 THERAPEUTIC EXERCISES: CPT | Mod: KX,PN,CQ

## 2024-05-08 PROCEDURE — 97116 GAIT TRAINING THERAPY: CPT | Mod: KX,PN,CQ
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
## 2024-05-14 ENCOUNTER — TELEPHONE (OUTPATIENT)
Dept: FAMILY MEDICINE | Facility: CLINIC | Age: 74
End: 2024-05-14
Payer: MEDICARE

## 2024-05-14 NOTE — TELEPHONE ENCOUNTER
----- Message from Tasha Veras sent at 5/14/2024 10:56 AM CDT -----  Contact: PT  Type:  Needs Medical Advice    Who Called: PT   Symptoms (please be specific): PLEASE CALL TO UPDATE PT ON PAPERWORK FOR [facility]    Would the patient rather a call back or a response via [facility]? CALL   Best Call Back Number: [phone] (home)   Additional Information: THANK YOU

## 2024-05-14 NOTE — TELEPHONE ENCOUNTER
Reviewed chart in regards to paperwork      [facility]  It is my opinion due to his pas CVA, foot drop, left sided hemiplegia, chronic pain, gait difficulty, impaired balance and high risk of falls  He qualifies for a mobility accessible apartment to accommodate any needed equipment to prevent fall and injury.  This is a permanent need for him.  Dr. aMlone    Was paperwork faxed back to [place]?

## 2024-05-15 ENCOUNTER — CLINICAL SUPPORT (OUTPATIENT)
Dept: REHABILITATION | Facility: HOSPITAL | Age: 74
End: 2024-05-15
Payer: MEDICARE

## 2024-05-15 DIAGNOSIS — Z74.09 IMPAIRED FUNCTIONAL MOBILITY, BALANCE, GAIT, AND ENDURANCE: Primary | ICD-10-CM

## 2024-05-15 PROCEDURE — 97110 THERAPEUTIC EXERCISES: CPT | Mod: KX,PN

## 2024-05-15 PROCEDURE — 97116 GAIT TRAINING THERAPY: CPT | Mod: KX,PN
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
## 2024-05-16 NOTE — TELEPHONE ENCOUNTER
Thank you. I hope by sending it the staff pool was helpful. Please let me know if you need anything!

## 2024-05-29 NOTE — TELEPHONE ENCOUNTER
Ordering ct scan of left shoulder. Thanks  
Pt contacted. Given number to Central Scheduling.  
Went to have MRI on Saturday as ordered. Has cochlear implant, so could not have MRI. It was advised for him to get U/S or CT scan on left arm instead. Pt states he cannot have physical therapy until testing is completed. Please advise.                ----- Message from Lg Ryan sent at 9/8/2020  7:50 AM CDT -----  Regarding: MRI  Contact: patient  Patient want to speak with a nurse regarding unable to do MRI because of a plate he has in  head please call back at [phone]    Case number [id number]      
29-May-2024 22:22

## 2024-06-13 ENCOUNTER — CLINICAL SUPPORT (OUTPATIENT)
Dept: AUDIOLOGY | Facility: CLINIC | Age: 74
End: 2024-06-13
Payer: MEDICARE

## 2024-06-13 DIAGNOSIS — H90.3 ASYMMETRICAL SENSORINEURAL HEARING LOSS: ICD-10-CM

## 2024-06-13 NOTE — PROGRESS NOTES
Pt had a hearing test on 8/14/23 when he got his last hearing aid. He no longer wears the CI for his left ear and wears an ITE for the right ear that he received approx 1 year ago. He will check with insurance to determine any heairng aid benefits and call the clinic if he wishes to schedule a hearing test. Gave him a copy of his hearing test from 2016.

## 2024-06-18 ENCOUNTER — OFFICE VISIT (OUTPATIENT)
Dept: NEUROLOGY | Facility: CLINIC | Age: 74
End: 2024-06-18
Payer: MEDICARE

## 2024-06-18 VITALS
HEART RATE: 83 BPM | WEIGHT: 139.88 LBS | BODY MASS INDEX: 23.31 KG/M2 | SYSTOLIC BLOOD PRESSURE: 117 MMHG | DIASTOLIC BLOOD PRESSURE: 67 MMHG | HEIGHT: 65 IN | RESPIRATION RATE: 16 BRPM

## 2024-06-18 DIAGNOSIS — I69.354 HEMIPLEGIA AND HEMIPARESIS FOLLOWING CEREBRAL INFARCTION AFFECTING LEFT NON-DOMINANT SIDE: ICD-10-CM

## 2024-06-18 DIAGNOSIS — I10 PRIMARY HYPERTENSION: ICD-10-CM

## 2024-06-18 DIAGNOSIS — I65.21 STENOSIS OF RIGHT CAROTID ARTERY: ICD-10-CM

## 2024-06-18 DIAGNOSIS — Z86.73 HISTORY OF STROKE: ICD-10-CM

## 2024-06-18 DIAGNOSIS — R55 NEAR SYNCOPE: ICD-10-CM

## 2024-06-18 DIAGNOSIS — I69.352 SPASTIC HEMIPARESIS OF LEFT DOMINANT SIDE AS LATE EFFECT OF CEREBRAL INFARCTION: ICD-10-CM

## 2024-06-18 DIAGNOSIS — Z98.890 HISTORY OF LEFT-SIDED CAROTID ENDARTERECTOMY: ICD-10-CM

## 2024-06-18 DIAGNOSIS — E78.2 MIXED HYPERLIPIDEMIA: ICD-10-CM

## 2024-06-18 DIAGNOSIS — I42.9 CARDIOMYOPATHY, UNSPECIFIED TYPE: ICD-10-CM

## 2024-06-18 DIAGNOSIS — I65.23 CAROTID STENOSIS, BILATERAL: Primary | ICD-10-CM

## 2024-06-18 PROBLEM — R53.1 LEFT-SIDED WEAKNESS: Status: RESOLVED | Noted: 2021-09-17 | Resolved: 2024-06-18

## 2024-06-18 PROCEDURE — 99999 PR PBB SHADOW E&M-EST. PATIENT-LVL V: CPT | Mod: PBBFAC,,, | Performed by: NURSE PRACTITIONER

## 2024-06-18 NOTE — ASSESSMENT & PLAN NOTE
Diagnostic testing from 2020 CVA hospitalization reviewed with patient and family  - Unable to have MRI due to presence of cochlear implant. Serial CT scans show new hypodensity c/w ischemic CVA in the R frontal / CR region. Chronic R BG infarct present.   - Vascular imaging with 40% R ICA stenosis  - TTE with pEF. No intracardiac shunting, thrombus or LAE.   - EKG NSR  - LDL was 146 at the time, normal A1C     Reviewed vascular RF: HTN, HLD, carotid stenosis     Non compliance has been an issue for him. Pt and daughter deny cognitive concerns post stroke that may be impacting compliance.  No clear long term indication for DAPT. Will update CUS now to monitor R ICA disease and can likely de-escalate to monotherapy based on results   Statin therapy currently on hold due to abnormal LFTs. Dr. Graff is investigating this. Recommend resuming when able for goal LDL < 70 in light of his vascular history   BP at goal today  Dietary / lifestyle changes discussed - info on Mediterranean diet provided, working to improve this   CVA education / ED precautions discussed   Referral to PMR for spasticity management as noted, to begin outpatient therapy soon

## 2024-06-18 NOTE — PATIENT INSTRUCTIONS
We will obtain a scan of the carotid arteries to monitor the blockage previously noted in the R side.     For now, you can continue aspirin and Plavix, but I do not suspect that we will keep you on this for long. We will likely just continue one of these.     The episodes of low BP could be related to a cardiac issue. Be sure to discuss with the cardiologist. A heart monitor should be considered.     We will hope to resume the cholesterol medication with the goal of getting the LDL cholesterol to < 70 -- we will wait for your liver enzymes to improve / further testing per your PCP    I have placed a referral to Dr. Brink in physical medicine and rehab. You may benefit from getting  more Botox injections.     Please call our clinic at [phone] or send a message on the [facility] portal if there are any changes to the plan discussed today. For example, if you are not contacted for the requested tests, referral(s) within one week, if you are unable to receive the medications prescribed, or if you feel you need to change the treatment course for any reason.     Follow up with me in about 2 months

## 2024-06-18 NOTE — PROGRESS NOTES
NEUROLOGY  Outpatient Consultation Visit     [facility]  [address] [facility], [place] [address]  [phone] (office) / [phone] (fax)    Patient Name:  Buddy Park  :  1950  MR #:  [id number]  Acct #:  [id number]    Date of  Visit: 2024    Other Physicians:  Dennis Graff MD (Primary Care Physician)      CHIEF COMPLAINT: Stroke (In 2020/ establishing care)        HISTORY OF PRESENT ILLNESS:  Buddy Park is a 73 y.o. L-handed male seen in consultation for CVA per Dennis Graff MD    Medical history is significant for HTN, HLD, bilateral carotid stenosis s/p L CEA, mastoidectomy, s/p cochlear implantation, gout, anxiety, depression, CKD3, preDM, nephrolithiasis    Here with his daughter in law.     CVA in . Treated [facility]. Presented with LSW. Treated with DAPT, was not on any AP agents previously per documentation. He thinks that he was taking a baby ASA, though. Did have prior L CEA. No other CVA that he recalls.      ED visit  with near syncope. BP 70/50 at home. Sx included lightheadedness, nausea, diaphoresis, bilateral upper lip numbness. No other focal neurological sx. Had 4-5 episodes of this recently. Occurred at rest (at the kitchen table, sitting in rocker on porch, while out at storage unit). Not after a meal or with position change. Always has to have a BM afterwards. No post ictal behaviors. Feels better after a few minutes after laying down.     To establish with cardiology soon. Denies significant cardiac history, like stents, arrhythmia, etc.     Denies significant neuropathy or sensory loss. Has intermittent tingling in the L foot, not sure if this is CVA related.     Recently moved in with son and SOFIE from [place]. Going through a divorce. Plan is to get his own apartment here. Came with multiple unopened med bottles, DIL trying to figure out what he should be on. DIL doesn't think that he has any cognitive issues. BP was  elevated when he came to live with them and he wasn't on any meds. Now only on Norvasc at PM. He wasn't taking any AP agents, resumed ASA in May. Recently resumed Plavix per new PCP until he could get here to discuss. Liver enzymes were elevated recently, statin currently on hold. PCP investigating. Was taking a lot of Tylenol for his knee pain.     He ambulates with a cane. Had a fall in May. Has more arm than leg weakness. He does have an AFO for the leg. Will be starting PT soon. Got Botox in the past while in [facility], helped him temporarily.     Allergies:  Review of patient's allergies indicates:   Allergen Reactions    Bee pollens Shortness Of Breath     WASP, BEE, ANT AND CATERPILLER STINGS    Hydrochlorothiazide Shortness Of Breath and Rash    Milk containing products (dairy) Diarrhea    Metoprolol Rash       Current Medications:  Current Outpatient Medications   Medication Sig Dispense Refill    allopurinoL (ZYLOPRIM) 100 MG tablet Take 1 tablet (100 mg total) by mouth once daily. 90 tablet 3    amLODIPine (NORVASC) 10 MG tablet Take 1 tablet (10 mg total) by mouth once daily. 90 tablet 3    aspirin (ECOTRIN) 81 MG EC tablet Take 81 mg by mouth once daily.      b complex vitamins tablet Take 1 tablet by mouth once daily.      buPROPion (WELLBUTRIN XL) 300 MG 24 hr tablet Take 1 tablet (300 mg total) by mouth once daily. 90 tablet 3    C/sourcherry/celery/grape seed (TART CHERRY ORAL) Take by mouth.      clopidogreL (PLAVIX) 75 mg tablet Take 1 tablet (75 mg total) by mouth once daily. 90 tablet 3    diclofenac (VOLTAREN) 75 MG EC tablet Take 75 mg by mouth 2 (two) times daily.      traZODone (DESYREL) 50 MG tablet Take 1 tablet (50 mg total) by mouth every evening. 30 tablet 11    blood-glucose meter kit To check BG 2-3 times daily, to use with insurance preferred meter 1 each 0     No current facility-administered medications for this visit.     Facility-Administered Medications Ordered in Other Visits    Medication Dose Route Frequency Provider Last Rate Last Admin    lactated ringers infusion   Intravenous Continuous Mayeaux, Demario FRANCIS MD 0 mL/hr at 10/28/19 1405 New Bag at 12/02/19 0943       Past Medical History:  Past Medical History:   Diagnosis Date    Angina pectoris     Anxiety     Arthritis     Biliary colic     Cochlear implant in place     Deaf     LEFT EAR    Depression     Diabetes mellitus type 2 without retinopathy 1/5/2022    Heart failure     High cholesterol     History of colon polyps 2/20/2018    [place] (hard of hearing)     HEARING AID - RIGHT    Hyperlipidemia     Hypertension     Kidney stone     Kidney stones     Renal stones     Stroke     Trouble in sleeping     Wears glasses        Past Surgical History:  Past Surgical History:   Procedure Laterality Date    CAROTID ARTERY ANGIOPLASTY  06/2018    [facility]     CATARACT EXTRACTION Bilateral     CHOLECYSTECTOMY  02/06/2014    COLONOSCOPY  01/09/2013    Dr. Gillette, 5 year recheck (family history colon cancer)    COLONOSCOPY N/A 03/12/2018    Procedure: COLONOSCOPY;  Surgeon: Garett Go MD;  Location: [facility] ENDO;  Service: Endoscopy;  Laterality: N/A;    COLONOSCOPY N/A 02/15/2021    Procedure: COLONOSCOPY;  Surgeon: Garett Go MD;  Location: [facility] ENDO;  Service: Endoscopy;  Laterality: N/A;    CYSTOSCOPY W/ RETROGRADES Bilateral 10/28/2019    Procedure: CYSTOSCOPY, WITH RETROGRADE PYELOGRAM;  Surgeon: Gretchen Proctor MD;  Location: [facility] OR;  Service: Urology;  Laterality: Bilateral;    CYSTOSCOPY W/ URETERAL STENT PLACEMENT Left 10/28/2019    Procedure: CYSTOSCOPY, WITH URETERAL STENT INSERTION;  Surgeon: Gretchen Proctor MD;  Location: [facility] OR;  Service: Urology;  Laterality: Left;    CYSTOSCOPY W/ URETERAL STENT REMOVAL Left 12/02/2019    Procedure: CYSTOSCOPY, WITH URETERAL STENT REMOVAL;  Surgeon: Gretchen Proctor MD;  Location: [facility] OR;  Service: Urology;  Laterality: Left;    EAR MASTOIDECTOMY W/ COCHLEAR IMPLANT W/ LANDMARK       "left ear    EXTRACORPOREAL SHOCK WAVE LITHOTRIPSY Left 12/02/2019    Procedure: LITHOTRIPSY, ESWL;  Surgeon: Gretchen Proctor MD;  Location: [facility];  Service: Urology;  Laterality: Left;    EYE SURGERY      FOREIGN BODY REMOVAL Right     glass removed from right foot/repair    FRACTURE SURGERY      right arm x2    HIP SURGERY Left     LITHOTRIPSY      ROTATOR CUFF REPAIR      Right     SINUS SURGERY      TONSILLECTOMY      VASCULAR SURGERY         Family History:  family history includes Alcohol abuse in his brother and brother; Arthritis in his father; Cancer in his brother, mother, and paternal uncle; Early death in his daughter; Gout in his father; Heart disease in his father, maternal grandmother, and paternal grandmother; Hypertension in his father; Kidney disease in his father; No Known Problems in his sister, son, and son; Stroke in his maternal grandfather.    Social History:   reports that he has never smoked. He has never used smokeless tobacco. He reports current alcohol use. He reports that he does not use drugs.      REVIEW OF SYSTEMS:  As per HPI    PHYSICAL EXAM:  /67 (BP Location: Right arm, Patient Position: Sitting, BP Method: Medium (Automatic))   Pulse 83   Resp 16   Ht 5' 5" (1.651 m)   Wt 63.5 kg (139 lb 14.1 oz)   BMI 23.28 kg/m²     General: Well groomed. No acute distress.  Cardiovascular: Regular rate and rhythm.   Pulmonary: Normal effort and rate.   Musculoskeletal: No obvious joint deformities, moves all extremities well.  Extremities: No clubbing, cyanosis or edema.     Neurological exam:  Mental status: Awake and alert.  Oriented to person, place, time and situation. 2/3 words on DR.  Fund of knowledge normal. Normal attention and concentration.   Speech/Language: Fluent and appropriate. No dysarthria or aphasia on conversation. Able to follow complex commands.   Cranial nerves (II-XII): Visual fields full. Pupils equal round and reactive to light, extraocular movements " intact, no ptosis, no nystagmus. Facial sensation intact. L central facial weakness. Hearing impaired. Palate mobile and midline. Shoulder shrug normal bilaterally. Normal tongue protrusion.   Motor: 5 out of 5 strength throughout the R extremities. LUE > LE spastic hemiparesis (has some distal UE movement, L foot drop). Normal bulk. No abnormal movements noted. No drift appreciated.   Sensation: Decreased to touch in the L extremities. No neglect.   DTR: 3+ at the knees and biceps bilaterally.  Coordination: Finger-nose-finger testing intact on the R. No tremor.   Gait: Uses 4 pt cane. Steppage on the L.     DIAGNOSTIC DATA:  I have personally reviewed provider notes, labs and imaging made available to me today.     Imaging:  CT 6/7/2024:  FINDINGS:  Left-sided cochlear implant causes streak artifact.  Gray-white matter differentiation is within normal limits. There is chronic involutional change.  There is chronic white matter microischemic change.  There is intracranial atherosclerosis.  There is chronic lacunar infarct right basal ganglia.  There is chronic infarct within the right periventricular white matter corona radiata/centrum semiovale level.  No acute intracranial hemorrhage, extra-axial fluid collection, hydrocephalus, mass effect, or midline shift is noted.  No large vessel territory acute ischemia is identified.  Visualized paranasal sinuses are clear.  Surgical changes are present.  Visualized mastoid air cells are clear.  No acute displaced calvarial fracture is identified.     Impression:     1. No acute intracranial abnormalities identified.  2. Chronic involutional and chronic ischemic changes are noted.     CT 2/3/2020:  Impression:     1.   There is no intracranial hemorrhage.  There has been mild interval progression of indistinct hypodensity in the right frontal white matter extending to the corona radiata consistent with interval progression of nonhemorrhagic ischemic change when compared  to the recent studies.  There is no mass effect or midline shift.     2.  There is a chronic infarction in the right basal ganglia.     3.  There is significant artifact related to left-sided cochlear implant.    CTA head and neck 2/2020:  Impression:  40% stenosis identified at the origin of the right internal carotid artery secondary to plaque.  Significant stenosis is not seen on the left.  Otherwise negative CTA of the head and neck.    Cardiac:  EKG 6/7/24:  NSR    TTE 2/2020:  Concentric left ventricular remodeling. Normal left ventricular systolic function. The estimated ejection fraction is 60%. Grade 1 diastolic dysfunction.  Mild right ventricular enlargement. Normal right ventricular systolic function.  No significant valvular disorder observed.  Bubble study negative for R-L intra cardiac shunt.  The left atrium is normal. Normal pulmonary venous flow     Labs:  Lab Results   Component Value Date    WBC 5.46 06/10/2024    HGB 14.0 06/10/2024    HCT 42.2 06/10/2024     06/10/2024    MCV 88 06/10/2024    RDW 13.9 06/10/2024     Lab Results   Component Value Date     06/14/2024    K 3.6 06/14/2024     06/14/2024    CO2 28 06/14/2024    BUN 18 06/14/2024    CREATININE 1.30 06/14/2024     06/14/2024    CALCIUM 9.5 06/14/2024    MG 2.1 06/07/2024    PHOS 2.7 10/23/2019     Lab Results   Component Value Date    PROT 7.3 06/14/2024    ALBUMIN 4.6 06/14/2024    BILITOT 0.9 06/14/2024    AST 81 (H) 06/14/2024    ALKPHOS 77 06/14/2024     (H) 06/14/2024    GGT 23 06/14/2024     Lab Results   Component Value Date    INR 1.1 06/14/2024     Lab Results   Component Value Date    CHOL 189 03/06/2024    HDL 41 03/06/2024    LDLCALC 126.0 03/06/2024    TRIG 110 03/06/2024    CHOLHDL 21.7 03/06/2024     Lab Results   Component Value Date    HGBA1C 5.0 03/06/2024      Lab Results   Component Value Date    YTRFTZVG83 568 10/22/2020     Lab Results   Component Value Date    FOLATE 17.9  10/22/2020     Lab Results   Component Value Date    TSH 1.847 03/06/2024       ASSESSMENT & PLAN:  Buddy Park is a 73 y.o. L-handed male seen in consultation for CVA.     Problem List Items Addressed This Visit          Neuro    History of stroke    Overview     2/2020 - R frontal PVWM / CR ischemic infarct         Current Assessment & Plan     Diagnostic testing from 2020 CVA hospitalization reviewed with patient and family  - Unable to have MRI due to presence of cochlear implant. Serial CT scans show new hypodensity c/w ischemic CVA in the R frontal / CR region. Chronic R BG infarct present.   - Vascular imaging with 40% R ICA stenosis  - TTE with pEF. No intracardiac shunting, thrombus or LAE.   - EKG NSR  - LDL was 146 at the time, normal A1C     Reviewed vascular RF: HTN, HLD, carotid stenosis     Non compliance has been an issue for him. Pt and daughter deny cognitive concerns post stroke that may be impacting compliance.  No clear long term indication for DAPT. Will update CUS now to monitor R ICA disease and can likely de-escalate to monotherapy based on results   Statin therapy currently on hold due to abnormal LFTs. Dr. Graff is investigating this. Recommend resuming when able for goal LDL < 70 in light of his vascular history   BP at goal today  Dietary / lifestyle changes discussed - info on Mediterranean diet provided, working to improve this   CVA education / ED precautions discussed   Referral to PMR for spasticity management as noted, to begin outpatient therapy soon               Spastic hemiparesis of left dominant side as late effect of cerebral infarction    Current Assessment & Plan     Referral to Dr. Brink for spasticity management          RESOLVED: Hemiplegia and hemiparesis following cerebral infarction affecting left non-dominant side       Cardiac/Vascular    Hyperlipidemia    Cardiomyopathy    HTN (hypertension)    Stenosis of right carotid artery    Overview     40% on CTA head  & neck from 2/2020         Current Assessment & Plan     Update CUS now           History of left-sided carotid endarterectomy       Other    Near syncope    Current Assessment & Plan     Has had 4-5 episodes of presyncope with hypotension  Not positional in nature, occurring at rest without clear trigger (like after a meal, for ex)  Updating CUS as above  Upcoming cardiology appt -- recommend evaluation for arrhythmia  Not suggestive of epileptic events based on history           Other Visit Diagnoses       Carotid stenosis, bilateral    -  Primary            Follow up: 2 months     I spent a total of 65 minutes on the day of the visit.    This includes face to face time with the patient, as well as non-face to face time preparing for and completing the visit (review of prior diagnostic testing and clinical notes, obtaining or reviewing history, documenting clinical information in the EMR, independently interpreting and communicating results to the patient/family and coordinating ongoing care).       I appreciate the opportunity to participate in the care of this patient. Please feel free to contact me with any concerns or questions.       Shannon Pool, ACN-AG  [facility]  [address]  [place] [address]

## 2024-06-18 NOTE — ASSESSMENT & PLAN NOTE
Has had 4-5 episodes of presyncope with hypotension  Not positional in nature, occurring at rest without clear trigger (like after a meal, for ex)  Updating CUS as above  Upcoming cardiology appt -- recommend evaluation for arrhythmia  Not suggestive of epileptic events based on history

## 2024-06-21 ENCOUNTER — OFFICE VISIT (OUTPATIENT)
Dept: ORTHOPEDICS | Facility: CLINIC | Age: 74
End: 2024-06-21
Payer: MEDICARE

## 2024-06-21 VITALS — BODY MASS INDEX: 23.32 KG/M2 | WEIGHT: 140 LBS | HEIGHT: 65 IN

## 2024-06-21 DIAGNOSIS — G89.29 CHRONIC PAIN OF LEFT KNEE: ICD-10-CM

## 2024-06-21 DIAGNOSIS — M25.562 CHRONIC PAIN OF LEFT KNEE: ICD-10-CM

## 2024-06-21 PROCEDURE — 99999 PR PBB SHADOW E&M-EST. PATIENT-LVL III: CPT | Mod: PBBFAC,,, | Performed by: ORTHOPAEDIC SURGERY

## 2024-06-21 NOTE — PROGRESS NOTES
73 years old had a stroke about 4 years ago left-sided weakness complaining of pain in his left knee.  Difficulty getting around pain is 3 on good days 7 on bad days.  Patient reports have had a cortisone shot which did not provide any relief.      Exam shows no signs infection extensor mechanism functioning well no swelling no instability does have tenderness the joint line     X-rays show medial joint space narrowing     Assessment:  Degenerative disease left knee    Plan:  Continue with nonoperative care, follow-up as needed

## 2024-06-24 PROBLEM — S06.5XAA SDH (SUBDURAL HEMATOMA): Status: ACTIVE | Noted: 2024-06-24

## 2024-06-25 PROBLEM — Z73.6 LIMITATION OF ACTIVITY DUE TO DISABILITY: Status: ACTIVE | Noted: 2024-06-25

## 2024-06-28 DIAGNOSIS — I62.00 NONTRAUMATIC SUBDURAL HEMORRHAGE, UNSPECIFIED: Primary | ICD-10-CM

## 2024-07-03 ENCOUNTER — TELEPHONE (OUTPATIENT)
Dept: NEUROSURGERY | Facility: CLINIC | Age: 74
End: 2024-07-03
Payer: MEDICARE

## 2024-07-03 NOTE — TELEPHONE ENCOUNTER
----- Message from Emmy Luke LPN sent at 7/3/2024  2:38 PM CDT -----    ----- Message -----  From: Anne Pulido LPN  Sent: 7/2/2024   5:05 PM CDT  To: Emmy Luke LPN      ----- Message -----  From: Iris Harris NP  Sent: 7/2/2024   1:24 PM CDT  To: [facility] Staff    Needs follow up CT of head w/o in one week (like beginning of next week Monday, Tuesday if possible). SDh

## 2024-07-12 ENCOUNTER — HOSPITAL ENCOUNTER (OUTPATIENT)
Dept: RADIOLOGY | Facility: HOSPITAL | Age: 74
Discharge: HOME | End: 2024-07-12
Attending: STUDENT IN AN ORGANIZED HEALTH CARE EDUCATION/TRAINING PROGRAM
Payer: MEDICARE

## 2024-07-12 ENCOUNTER — OFFICE VISIT (OUTPATIENT)
Dept: NEUROSURGERY | Facility: CLINIC | Age: 74
End: 2024-07-12
Payer: MEDICARE

## 2024-07-12 VITALS
SYSTOLIC BLOOD PRESSURE: 119 MMHG | BODY MASS INDEX: 22.34 KG/M2 | RESPIRATION RATE: 18 BRPM | HEART RATE: 83 BPM | WEIGHT: 134.06 LBS | DIASTOLIC BLOOD PRESSURE: 70 MMHG | HEIGHT: 65 IN

## 2024-07-12 DIAGNOSIS — I62.00 NONTRAUMATIC SUBDURAL HEMORRHAGE, UNSPECIFIED: ICD-10-CM

## 2024-07-12 DIAGNOSIS — G96.08 SUBDURAL HYGROMA: Primary | ICD-10-CM

## 2024-07-12 PROCEDURE — 70450 CT HEAD/BRAIN W/O DYE: CPT | Mod: TC,PO

## 2024-07-12 PROCEDURE — 70450 CT HEAD/BRAIN W/O DYE: CPT | Mod: 26,,, | Performed by: RADIOLOGY

## 2024-07-12 NOTE — PROGRESS NOTES
[place] - Neurosurgery - [facility]  Clinic Progress Note       PCP: Dennis Graff MD    SUBJECTIVE:     Chief Complaint:   Chief Complaint   Patient presents with    Follow-up     Imaging f/u       History of Present Illness:  Buddy Park is a 73 y.o. male who presents for hospital follow up. Patient was initially admitted on 6/24 following an unwitnessed fall. CT head revealed SDH. Unfortunately, patient developed worsening right subdural hygroma with subsequent admission. He presents today with repeat CT. He has been holding his aspirin and Plavix. He denies headaches, seizures, weakness.     Pertinent and recent history, provider evaluations, imaging and data reviewed in EPIC        Past Medical History:   Diagnosis Date    Angina pectoris     Anxiety     Arthritis     Biliary colic     Cochlear implant in place     Deaf     LEFT EAR    Depression     Diabetes mellitus type 2 without retinopathy 01/05/2022    Heart failure     History of colon polyps 02/20/2018    [place] (hard of hearing)     HEARING AID - RIGHT    Hyperlipidemia     Hypertension     Kidney stone     Kidney stones     Renal stones     Stroke     Trouble in sleeping     Wears glasses      Past Surgical History:   Procedure Laterality Date    CAROTID ARTERY ANGIOPLASTY  06/2018    [facility]     CATARACT EXTRACTION Bilateral     CHOLECYSTECTOMY  02/06/2014    COLONOSCOPY  01/09/2013    Dr. Gillette, 5 year recheck (family history colon cancer)    COLONOSCOPY N/A 03/12/2018    Procedure: COLONOSCOPY;  Surgeon: Garett Go MD;  Location: [facility] ENDO;  Service: Endoscopy;  Laterality: N/A;    COLONOSCOPY N/A 02/15/2021    Procedure: COLONOSCOPY;  Surgeon: Garett Go MD;  Location: [facility] ENDO;  Service: Endoscopy;  Laterality: N/A;    CYSTOSCOPY W/ RETROGRADES Bilateral 10/28/2019    Procedure: CYSTOSCOPY, WITH RETROGRADE PYELOGRAM;  Surgeon: Gretchen Protcor MD;  Location: [facility];  Service: Urology;  Laterality:  Bilateral;    CYSTOSCOPY W/ URETERAL STENT PLACEMENT Left 10/28/2019    Procedure: CYSTOSCOPY, WITH URETERAL STENT INSERTION;  Surgeon: Gretchen Proctor MD;  Location: [facility] OR;  Service: Urology;  Laterality: Left;    CYSTOSCOPY W/ URETERAL STENT REMOVAL Left 12/02/2019    Procedure: CYSTOSCOPY, WITH URETERAL STENT REMOVAL;  Surgeon: Gretchen Proctor MD;  Location: [facility] OR;  Service: Urology;  Laterality: Left;    EAR MASTOIDECTOMY W/ COCHLEAR IMPLANT W/ LANDMARK      left ear    EXTRACORPOREAL SHOCK WAVE LITHOTRIPSY Left 12/02/2019    Procedure: LITHOTRIPSY, ESWL;  Surgeon: Gretchen Proctor MD;  Location: [facility] OR;  Service: Urology;  Laterality: Left;    EYE SURGERY      FOREIGN BODY REMOVAL Right     glass removed from right foot/repair    FRACTURE SURGERY      right arm x2    HIP SURGERY Left     LITHOTRIPSY      ROTATOR CUFF REPAIR      Right     SINUS SURGERY      TONSILLECTOMY      VASCULAR SURGERY       Family History   Problem Relation Name Age of Onset    Cancer Mother          colon    Heart disease Father      Gout Father      Kidney disease Father      Arthritis Father      Hypertension Father      Early death Daughter          mva    Alcohol abuse Brother      Cancer Brother          mouth cancer w mets    No Known Problems Sister      Alcohol abuse Brother      No Known Problems Son      Heart disease Maternal Grandmother          MI    Stroke Maternal Grandfather      Heart disease Paternal Grandmother          MI    No Known Problems Son      Cancer Paternal Uncle          lung cancer    Allergic rhinitis Neg Hx      Allergies Neg Hx      Angioedema Neg Hx      Asthma Neg Hx      Atopy Neg Hx      Eczema Neg Hx      Immunodeficiency Neg Hx      Rhinitis Neg Hx      Urticaria Neg Hx      Collagen disease Neg Hx       Social History     Tobacco Use    Smoking status: Never    Smokeless tobacco: Never   Substance Use Topics    Alcohol use: Yes     Comment: once every two months    Drug use: No     "  Review of patient's allergies indicates:   Allergen Reactions    Bee pollens Shortness Of Breath     WASP, BEE, ANT AND CATERPILLER STINGS    Hydrochlorothiazide Shortness Of Breath and Rash    Milk containing products (dairy) Diarrhea    Metoprolol Rash       Current Outpatient Medications:     allopurinoL (ZYLOPRIM) 100 MG tablet, Take 1 tablet (100 mg total) by mouth once daily., Disp: 90 tablet, Rfl: 3    amLODIPine (NORVASC) 10 MG tablet, Take 1 tablet (10 mg total) by mouth once daily., Disp: 90 tablet, Rfl: 3    b complex vitamins tablet, Take 1 tablet by mouth once daily., Disp: , Rfl:     buPROPion (WELLBUTRIN XL) 300 MG 24 hr tablet, Take 1 tablet (300 mg total) by mouth once daily., Disp: 90 tablet, Rfl: 3    C/sourcherry/celery/grape seed (TART CHERRY ORAL), Take by mouth., Disp: , Rfl:     diclofenac (VOLTAREN) 75 MG EC tablet, Take 75 mg by mouth 2 (two) times daily., Disp: , Rfl:     rosuvastatin (CRESTOR) 40 MG Tab, Take 40 mg by mouth every evening., Disp: , Rfl:     traZODone (DESYREL) 50 MG tablet, Take 1 tablet (50 mg total) by mouth every evening., Disp: 30 tablet, Rfl: 11    blood-glucose meter kit, To check BG 2-3 times daily, to use with insurance preferred meter, Disp: 1 each, Rfl: 0  No current facility-administered medications for this visit.    Facility-Administered Medications Ordered in Other Visits:     lactated ringers infusion, , Intravenous, Continuous, Demario Yates MD, Last Rate: 0 mL/hr at 10/28/19 1405, New Bag at 12/02/19 0943    Review of Systems:   Constitutional: no fever, chills or night sweats. No changes in weight   Eyes: no visual changes   Neurological: no seizures or tremors           OBJECTIVE:     Vital Signs (Most Recent):  Pulse: 83 (07/12/24 0953)  Resp: 18 (07/12/24 0953)  BP: 119/70 (07/12/24 0953)  Estimated body mass index is 22.31 kg/m² as calculated from the following:    Height as of this encounter: 5' 5" (1.651 m).    Weight as of this encounter: " 60.8 kg (134 lb 0.6 oz).    Physical Exam:   General: well developed, well nourished, no distress   Neurologic: Alert and oriented. Thought content appropriate. GCS 15.   Cranial nerves: face symmetric, tongue midline, CN II-XII grossly intact  Head: normocephalic, atraumatic  Eyes: EOMI  Neck: trachea midline, no JVD   Cardiovascular: no LE edema  Pulmonary: normal respirations, no signs of respiratory distress  Abdomen: non-distended  Motor Strength: Moves all extremities spontaneously with good tone. No abnormal movements seen.             Diagnostic Results:  I have independently reviewed the following imaging:  CT head with stable right subdural hygroma     ASSESSMENT/PLAN:     Subdural hygroma  -     CT Head Without Contrast; Future; Expected date: 07/12/2024        Buddy Park is a 73 y.o. male with right hygroma. He will continue to hold ASA/plavix. Repeat CT head in 4 weeks     Patient verbalized understanding of plan. Encouraged to call with any questions or concerns.     This note was partially dictated using voice recognition software, so please excuse any errors that were not corrected.

## 2024-08-01 DIAGNOSIS — F51.01 PRIMARY INSOMNIA: ICD-10-CM

## 2024-08-01 DIAGNOSIS — F32.5 MAJOR DEPRESSIVE DISORDER WITH SINGLE EPISODE, IN FULL REMISSION: ICD-10-CM

## 2024-08-01 RX ORDER — TRAZODONE HYDROCHLORIDE 50 MG/1
50 TABLET ORAL NIGHTLY
Qty: 30 TABLET | Refills: 0 | Status: SHIPPED | OUTPATIENT
Start: 2024-08-01

## 2024-08-01 RX ORDER — BUPROPION HYDROCHLORIDE 300 MG/1
300 TABLET ORAL
Qty: 30 TABLET | Refills: 0 | Status: SHIPPED | OUTPATIENT
Start: 2024-08-01

## 2024-08-01 NOTE — TELEPHONE ENCOUNTER
Last OV 6/10/24 (next OV 9/6/24)  Last lab 6/25/24  Last auth      Trazodone 8/2/23 30 tab 11 refill      Bupropion 7/28/23 90 tab 3 refill

## 2024-08-02 NOTE — TELEPHONE ENCOUNTER
Patient had read report. Reviewed. He stated he has started the metformin and is loosing weight. Will book apt with nephrologist. Please place order. He will call back tomorrow to book.   Opt out

## 2024-08-09 ENCOUNTER — HOSPITAL ENCOUNTER (OUTPATIENT)
Dept: RADIOLOGY | Facility: HOSPITAL | Age: 74
Discharge: HOME | End: 2024-08-09
Attending: PHYSICIAN ASSISTANT
Payer: MEDICARE

## 2024-08-09 DIAGNOSIS — G96.08 SUBDURAL HYGROMA: ICD-10-CM

## 2024-08-09 PROBLEM — Z71.89 ACP (ADVANCE CARE PLANNING): Status: ACTIVE | Noted: 2024-08-09

## 2024-08-09 PROBLEM — G47.00 INSOMNIA: Status: ACTIVE | Noted: 2024-08-09

## 2024-08-09 PROBLEM — R55 POSTURAL DIZZINESS WITH PRESYNCOPE: Status: ACTIVE | Noted: 2024-08-09

## 2024-08-09 PROBLEM — I77.9 CAROTID ARTERIAL DISEASE: Status: ACTIVE | Noted: 2024-08-09

## 2024-08-09 PROBLEM — M10.9 GOUT: Status: ACTIVE | Noted: 2019-08-04

## 2024-08-09 PROBLEM — Z86.79 HISTORY OF SUBDURAL HEMATOMA: Status: ACTIVE | Noted: 2024-08-09

## 2024-08-09 PROBLEM — R42 POSTURAL DIZZINESS WITH PRESYNCOPE: Status: ACTIVE | Noted: 2024-08-09

## 2024-08-09 PROBLEM — R29.818 ACUTE FOCAL NEUROLOGICAL DEFICIT: Status: ACTIVE | Noted: 2024-08-09

## 2024-08-09 PROBLEM — F32.A DEPRESSION: Status: ACTIVE | Noted: 2024-08-09

## 2024-08-09 PROBLEM — N18.31 STAGE 3A CHRONIC KIDNEY DISEASE: Status: ACTIVE | Noted: 2019-02-01

## 2024-08-15 ENCOUNTER — OFFICE VISIT (OUTPATIENT)
Dept: CARDIOLOGY | Facility: CLINIC | Age: 74
End: 2024-08-15
Payer: MEDICARE

## 2024-08-15 VITALS
HEART RATE: 80 BPM | DIASTOLIC BLOOD PRESSURE: 74 MMHG | BODY MASS INDEX: 22.99 KG/M2 | SYSTOLIC BLOOD PRESSURE: 135 MMHG | HEIGHT: 65 IN | WEIGHT: 138 LBS

## 2024-08-15 DIAGNOSIS — I65.21 CAROTID STENOSIS, RIGHT: ICD-10-CM

## 2024-08-15 DIAGNOSIS — N18.31 STAGE 3A CHRONIC KIDNEY DISEASE: ICD-10-CM

## 2024-08-15 DIAGNOSIS — Z98.890 HISTORY OF LEFT-SIDED CAROTID ENDARTERECTOMY: Chronic | ICD-10-CM

## 2024-08-15 DIAGNOSIS — R55 POSTURAL DIZZINESS WITH NEAR SYNCOPE: Chronic | ICD-10-CM

## 2024-08-15 DIAGNOSIS — E78.49 OTHER HYPERLIPIDEMIA: ICD-10-CM

## 2024-08-15 DIAGNOSIS — I10 PRIMARY HYPERTENSION: ICD-10-CM

## 2024-08-15 DIAGNOSIS — R42 POSTURAL DIZZINESS WITH NEAR SYNCOPE: Chronic | ICD-10-CM

## 2024-08-15 DIAGNOSIS — I50.32 CHRONIC DIASTOLIC HEART FAILURE: Chronic | ICD-10-CM

## 2024-08-15 DIAGNOSIS — I42.9 CARDIOMYOPATHY, UNSPECIFIED TYPE: Primary | ICD-10-CM

## 2024-08-15 DIAGNOSIS — I63.89 CEREBROVASCULAR ACCIDENT (CVA) DUE TO OTHER MECHANISM: ICD-10-CM

## 2024-08-15 DIAGNOSIS — I70.0 ABDOMINAL AORTIC ATHEROSCLEROSIS: Chronic | ICD-10-CM

## 2024-08-15 PROBLEM — I63.9 CEREBROVASCULAR ACCIDENT: Status: ACTIVE | Noted: 2024-08-15

## 2024-08-15 PROCEDURE — 3288F FALL RISK ASSESSMENT DOCD: CPT | Mod: CPTII,S$GLB,, | Performed by: INTERNAL MEDICINE

## 2024-08-15 PROCEDURE — 1157F ADVNC CARE PLAN IN RCRD: CPT | Mod: CPTII,S$GLB,, | Performed by: INTERNAL MEDICINE

## 2024-08-15 PROCEDURE — 3008F BODY MASS INDEX DOCD: CPT | Mod: CPTII,S$GLB,, | Performed by: INTERNAL MEDICINE

## 2024-08-15 PROCEDURE — 99214 OFFICE O/P EST MOD 30 MIN: CPT | Mod: S$GLB,,, | Performed by: INTERNAL MEDICINE

## 2024-08-15 PROCEDURE — 1159F MED LIST DOCD IN RCRD: CPT | Mod: CPTII,S$GLB,, | Performed by: INTERNAL MEDICINE

## 2024-08-15 PROCEDURE — 1126F AMNT PAIN NOTED NONE PRSNT: CPT | Mod: CPTII,S$GLB,, | Performed by: INTERNAL MEDICINE

## 2024-08-15 PROCEDURE — 99999 PR PBB SHADOW E&M-EST. PATIENT-LVL III: CPT | Mod: PBBFAC,,, | Performed by: INTERNAL MEDICINE

## 2024-08-15 PROCEDURE — 3075F SYST BP GE 130 - 139MM HG: CPT | Mod: CPTII,S$GLB,, | Performed by: INTERNAL MEDICINE

## 2024-08-15 PROCEDURE — 3066F NEPHROPATHY DOC TX: CPT | Mod: CPTII,S$GLB,, | Performed by: INTERNAL MEDICINE

## 2024-08-15 PROCEDURE — 1100F PTFALLS ASSESS-DOCD GE2>/YR: CPT | Mod: CPTII,S$GLB,, | Performed by: INTERNAL MEDICINE

## 2024-08-15 PROCEDURE — 3078F DIAST BP <80 MM HG: CPT | Mod: CPTII,S$GLB,, | Performed by: INTERNAL MEDICINE

## 2024-08-15 PROCEDURE — 3044F HG A1C LEVEL LT 7.0%: CPT | Mod: CPTII,S$GLB,, | Performed by: INTERNAL MEDICINE

## 2024-08-15 PROCEDURE — 3061F NEG MICROALBUMINURIA REV: CPT | Mod: CPTII,S$GLB,, | Performed by: INTERNAL MEDICINE

## 2024-08-15 PROCEDURE — 4010F ACE/ARB THERAPY RXD/TAKEN: CPT | Mod: CPTII,S$GLB,, | Performed by: INTERNAL MEDICINE

## 2024-08-15 NOTE — PROGRESS NOTES
Subjective:    Patient ID:  Buddy Park is a 73 y.o. male who presents for Establish Care and Heart Problem        HPI    NP EVALUATION, CMY ????, IN CHART, ALSO DIASTOLIC HF, ??? UNKNOWN HISTORY, PATIENT DOES NOT RECALL THAT HISTORY, SUUBDURAL HEMATOMA, REPEAT CT, LAST WEEK, HAD SYNCOPAL EPISODE WHILE WAILTING SITTING CHAIR BP WAS IN 70'S /30'S, SIMILAR EPISODE 2 MO AGO, HIT HEAD, DC AMLODIPINE CHANGED TO [place], H/O CVA 2020, L SIDED HEMIPARESIS, HAD CAROTID STENOSIS, RECENT ECHO, CAROTIDS IN JUNE 50-69% NITZA, MOVED FROM [place], FORGETS MEDS ESPECIALLY AT NIGHT, SEE ROS  eft Ventricle: The left ventricle is normal in size. Normal wall thickness. There is normal systolic function. Ejection fraction by visual approximation is 55%.    Right Ventricle: Normal right ventricular cavity size. Wall thickness is normal. Systolic function is normal.    Aortic Valve: The aortic valve is a trileaflet valve.    IVC/SVC: Normal venous pressure at 3 mmHg.  Past Medical History:   Diagnosis Date    ACP (advance care planning) 8/9/2024    Angina pectoris     Anxiety     Arthritis     Biliary colic     Cochlear implant in place     Deaf     LEFT EAR    Depression     Diabetes mellitus type 2 without retinopathy 01/05/2022    Heart failure     History of colon polyps 02/20/2018    [place] (hard of hearing)     HEARING AID - RIGHT    Hyperlipidemia     Hypertension     Kidney stone     Kidney stones     Renal stones     Stroke     Trouble in sleeping     Wears glasses      Past Surgical History:   Procedure Laterality Date    CAROTID ARTERY ANGIOPLASTY  06/2018    [facility]     CATARACT EXTRACTION Bilateral     CHOLECYSTECTOMY  02/06/2014    COLONOSCOPY  01/09/2013    Dr. Gillette, 5 year recheck (family history colon cancer)    COLONOSCOPY N/A 03/12/2018    Procedure: COLONOSCOPY;  Surgeon: Garett Go MD;  Location: [facility];  Service: Endoscopy;  Laterality: N/A;    COLONOSCOPY N/A 02/15/2021    Procedure: COLONOSCOPY;  Surgeon:  Garett Go MD;  Location: [facility] ENDO;  Service: Endoscopy;  Laterality: N/A;    CYSTOSCOPY W/ RETROGRADES Bilateral 10/28/2019    Procedure: CYSTOSCOPY, WITH RETROGRADE PYELOGRAM;  Surgeon: Gretchen Proctor MD;  Location: [facility] OR;  Service: Urology;  Laterality: Bilateral;    CYSTOSCOPY W/ URETERAL STENT PLACEMENT Left 10/28/2019    Procedure: CYSTOSCOPY, WITH URETERAL STENT INSERTION;  Surgeon: Gretchen Proctor MD;  Location: [facility] OR;  Service: Urology;  Laterality: Left;    CYSTOSCOPY W/ URETERAL STENT REMOVAL Left 12/02/2019    Procedure: CYSTOSCOPY, WITH URETERAL STENT REMOVAL;  Surgeon: Gretchen Proctor MD;  Location: [facility] OR;  Service: Urology;  Laterality: Left;    EAR MASTOIDECTOMY W/ COCHLEAR IMPLANT W/ LANDMARK      left ear    EXTRACORPOREAL SHOCK WAVE LITHOTRIPSY Left 12/02/2019    Procedure: LITHOTRIPSY, ESWL;  Surgeon: Gretchen Proctor MD;  Location: [facility] OR;  Service: Urology;  Laterality: Left;    EYE SURGERY      FOREIGN BODY REMOVAL Right     glass removed from right foot/repair    FRACTURE SURGERY      right arm x2    HIP SURGERY Left     LITHOTRIPSY      ROTATOR CUFF REPAIR      Right     SINUS SURGERY      TONSILLECTOMY      VASCULAR SURGERY       Family History   Problem Relation Name Age of Onset    Cancer Mother          colon    Heart disease Father      Gout Father      Kidney disease Father      Arthritis Father      Hypertension Father      Early death Daughter          mva    Alcohol abuse Brother      Cancer Brother          mouth cancer w mets    No Known Problems Sister      Alcohol abuse Brother      No Known Problems Son      Heart disease Maternal Grandmother          MI    Stroke Maternal Grandfather      Heart disease Paternal Grandmother          MI    No Known Problems Son      Cancer Paternal Uncle          lung cancer    Allergic rhinitis Neg Hx      Allergies Neg Hx      Angioedema Neg Hx      Asthma Neg Hx      Atopy Neg Hx      Eczema Neg Hx      Immunodeficiency Neg  Hx      Rhinitis Neg Hx      Urticaria Neg Hx      Collagen disease Neg Hx       Social History     Socioeconomic History    Marital status:    Tobacco Use    Smoking status: Never    Smokeless tobacco: Never   Substance and Sexual Activity    Alcohol use: Yes     Comment: once every two months    Drug use: No    Sexual activity: Yes     Social Determinants of Health     Financial Resource Strain: Medium Risk (6/18/2024)    Overall Financial Resource Strain (CARDIA)     Difficulty of Paying Living Expenses: Somewhat hard   Food Insecurity: No Food Insecurity (8/10/2024)    Hunger Vital Sign     Worried About Running Out of Food in the Last Year: Never true     Ran Out of Food in the Last Year: Never true   Transportation Needs: No Transportation Needs (8/10/2024)    TRANSPORTATION NEEDS     Transportation : No   Physical Activity: Unknown (6/18/2024)    Exercise Vital Sign     Days of Exercise per Week: 0 days   Stress: Stress Concern Present (6/18/2024)    [employer] [employer] - Occupational Stress Questionnaire     Feeling of Stress : To some extent   Housing Stability: Low Risk  (8/10/2024)    Housing Stability Vital Sign     Unable to Pay for Housing in the Last Year: No     Homeless in the Last Year: No       Review of patient's allergies indicates:   Allergen Reactions    Bee pollens Shortness Of Breath     WASP, BEE, ANT AND CATERPILLER STINGS    Hydrochlorothiazide Shortness Of Breath and Rash    Milk containing products (dairy) Diarrhea    Metoprolol Rash       Current Outpatient Medications:     allopurinoL (ZYLOPRIM) 100 MG tablet, Take 1 tablet (100 mg total) by mouth once daily., Disp: 90 tablet, Rfl: 3    b complex vitamins tablet, Take 1 tablet by mouth once daily., Disp: , Rfl:     blood-glucose meter kit, To check BG 2-3 times daily, to use with insurance preferred meter, Disp: 1 each, Rfl: 0    buPROPion (WELLBUTRIN XL) 300 MG 24 hr tablet, TAKE 1 TABLET BY MOUTH EVERY  "DAY, Disp: 30 tablet, Rfl: 0    C/sourcherry/celery/grape seed (TART CHERRY ORAL), Take by mouth., Disp: , Rfl:     diclofenac (VOLTAREN) 75 MG EC tablet, Take 75 mg by mouth 2 (two) times daily., Disp: , Rfl:     losartan (COZAAR) 25 MG tablet, Take 1 tablet (25 mg total) by mouth once daily. Hold for SBP<100, Disp: 30 tablet, Rfl: 1    rosuvastatin (CRESTOR) 40 MG Tab, Take 40 mg by mouth every evening., Disp: , Rfl:     traZODone (DESYREL) 50 MG tablet, TAKE 1 TABLET BY MOUTH EVERY EVENING., Disp: 30 tablet, Rfl: 0  No current facility-administered medications for this visit.    Facility-Administered Medications Ordered in Other Visits:     lactated ringers infusion, , Intravenous, Continuous, Demario Yates MD, Last Rate: 0 mL/hr at 10/28/19 1405, New Bag at 12/02/19 0943    Review of Systems   Constitutional: Negative for chills, decreased appetite, diaphoresis, fever and malaise/fatigue.   HENT:  Negative for congestion and stridor.    Cardiovascular:  Positive for near-syncope. Negative for chest pain, claudication, cyanosis, dyspnea on exertion (MILD) and irregular heartbeat.   Gastrointestinal:  Negative for abdominal pain, jaundice, melena and nausea.   Genitourinary:  Negative for dysuria and flank pain.   Neurological:  Positive for focal weakness and loss of balance.   Psychiatric/Behavioral:  Positive for memory loss. Negative for altered mental status and depression.         Objective:      Vitals:    08/15/24 1512   BP: 135/74   Pulse: 80   Weight: 62.6 kg (138 lb 0.1 oz)   Height: 5' 5" (1.651 m)   PainSc: 0-No pain     Body mass index is 22.97 kg/m².    Physical Exam  Constitutional:       Appearance: Normal appearance.   Neck:      Vascular: No JVD.     Cardiovascular:      Rate and Rhythm: Normal rate and regular rhythm. No extrasystoles are present.     Pulses:           Carotid pulses are 1+ on the right side with bruit and 2+ on the left side.       Radial pulses are 1+ on the right side " and 2+ on the left side.      Heart sounds:      No friction rub.   Musculoskeletal:      Cervical back: Neck supple.      Right lower leg: No edema.      Left lower leg: No edema.      Comments: USES A CANE, L FOOT DROP, L HEMIPARESIS   Neurological:      Mental Status: He is alert and oriented to person, place, and time.      Cranial Nerves: Dysarthria (MILD) present.                 ..    Chemistry        Component Value Date/Time     08/10/2024 0612    K 3.9 08/10/2024 0612     08/10/2024 0612    CO2 26 08/10/2024 0612    BUN 17 08/10/2024 0612    CREATININE 1.21 08/10/2024 0612    CREATININE 1.1 03/19/2012 1635    GLU 90 08/10/2024 0612        Component Value Date/Time    CALCIUM 8.7 08/10/2024 0612    CALCIUM 8.9 03/19/2012 1635    ALKPHOS 104 08/09/2024 1125    ALKPHOS 68 03/19/2012 1635    AST 28 08/09/2024 1125    AST 46 (H) 03/19/2012 1635    ALT 28 08/09/2024 1125    BILITOT 1.1 08/09/2024 1125    ESTGFRAFRICA 66 (L) 07/28/2022 0506    ESTGFRAFRICA 54.1 (A) 07/11/2022 0938    EGFRNONAA 46.8 (A) 07/11/2022 0938            ..  Lab Results   Component Value Date    CHOL 148 06/24/2024    CHOL 189 03/06/2024    CHOL 229 (H) 01/10/2023     Lab Results   Component Value Date    HDL 42 06/24/2024    HDL 41 03/06/2024    HDL 41 01/10/2023     Lab Results   Component Value Date    LDLCALC 80.4 06/24/2024    LDLCALC 126.0 03/06/2024    LDLCALC 151.0 01/10/2023     Lab Results   Component Value Date    TRIG 128 06/24/2024    TRIG 110 03/06/2024    TRIG 185 (H) 01/10/2023     Lab Results   Component Value Date    CHOLHDL 28.4 06/24/2024    CHOLHDL 21.7 03/06/2024    CHOLHDL 17.9 (L) 01/10/2023     ..  Lab Results   Component Value Date    WBC 6.13 08/10/2024    HGB 13.7 (L) 08/10/2024    HCT 40.4 08/10/2024    MCV 89 08/10/2024     08/10/2024       Test(s) Reviewed  I have reviewed the following in detail:  [] Stress test   [] Angiography   [x] Echocardiogram   [x] Labs   [x] Other:        Assessment:         ICD-10-CM ICD-9-CM   1. Cardiomyopathy, unspecified type  I42.9 425.4   2. Carotid stenosis, right  I65.21 433.10   3. Primary hypertension  I10 401.9   4. Other hyperlipidemia  E78.49 272.4   5. History of left-sided carotid endarterectomy  Z98.890 V45.89   6. Abdominal aortic atherosclerosis  I70.0 440.0   7. Stage 3a chronic kidney disease  N18.31 585.3   8. Cerebrovascular accident (CVA) due to other mechanism  I63.89 434.91   9. Chronic diastolic heart failure  I50.32 428.32   10. Postural dizziness with near syncope  R42 780.4    R55 780.2     Problem List Items Addressed This Visit          Neuro    Cerebrovascular accident       Cardiac/Vascular    HLD (hyperlipidemia)    Cardiomyopathy - Primary    Relevant Orders    Holter monitor - 72 hour    Nuclear Stress - Cardiology Interpreted    HTN (hypertension)    Abdominal aortic atherosclerosis    Chronic diastolic heart failure    Relevant Orders    Nuclear Stress - Cardiology Interpreted    History of left-sided carotid endarterectomy    Carotid stenosis, right    Relevant Orders    CV Ultrasound Bilateral Doppler Carotid       Renal/    Stage 3a chronic kidney disease       Other    Postural dizziness with near syncope    Relevant Orders    Holter monitor - 72 hour    Nuclear Stress - Cardiology Interpreted        Plan:     AVOID NSAIDS, WILL NEED FURTHER EVALUATION CHECK NUCLEAR STRESS TEST ASSESS FOR ISCHEMIA 72 HOUR HOLTER , CAROTID ULTRASOUND, CLINICALLY NO OVERT HEART FAILURE ASSESS FOR ANY CLINICAL ARRHYTHMIA DIET EXERCISE AVOID DEHYDRATION RETURN TO CLINIC IN FEW WEEKS AFTER TESTS      Cardiomyopathy, unspecified type  Comments:  Needs a new cardiologist-referred to Dr. Marcano.  Will continue current medication for now and monitor for any worsening symptoms  Orders:  -     Ambulatory referral/consult to Cardiology  -     Holter monitor - 72 hour; Future  -     Nuclear Stress - Cardiology Interpreted; Future    Carotid  stenosis, right  -     CV Ultrasound Bilateral Doppler Carotid; Future    Primary hypertension    Other hyperlipidemia    History of left-sided carotid endarterectomy    Abdominal aortic atherosclerosis    Stage 3a chronic kidney disease    Cerebrovascular accident (CVA) due to other mechanism    Chronic diastolic heart failure  -     Nuclear Stress - Cardiology Interpreted; Future    Postural dizziness with near syncope  -     Holter monitor - 72 hour; Future  -     Nuclear Stress - Cardiology Interpreted; Future    RTC Low level/low impact aerobic exercise 5x's/wk. Heart healthy diet and risk factor modification.    See labs and med orders.    Aerobic exercise 5x's/wk. Heart healthy diet and risk factor modification.    See labs and med orders.

## 2024-08-19 ENCOUNTER — TELEPHONE (OUTPATIENT)
Dept: NEUROSURGERY | Facility: CLINIC | Age: 74
End: 2024-08-19
Payer: MEDICARE

## 2024-08-19 DIAGNOSIS — I62.00 NONTRAUMATIC SUBDURAL HEMORRHAGE, UNSPECIFIED: Primary | ICD-10-CM

## 2024-08-19 NOTE — TELEPHONE ENCOUNTER
----- Message from Jayleen Genao sent at 8/19/2024  1:15 PM CDT -----  Type: Needs Medical Advice  Who Called:  Pts juan jose Galan     Best Call Back Number: [phone]      Additional Information: Angelia stated pt was supposed to have a 2 week f/u ct done at the [facility] but no one contacted her for scheduling. No orders on file. Pls call back and advise

## 2024-08-27 ENCOUNTER — HOSPITAL ENCOUNTER (OUTPATIENT)
Dept: CARDIOLOGY | Facility: HOSPITAL | Age: 74
Discharge: HOME | End: 2024-08-27
Attending: INTERNAL MEDICINE
Payer: MEDICARE

## 2024-08-27 ENCOUNTER — HOSPITAL ENCOUNTER (OUTPATIENT)
Dept: RADIOLOGY | Facility: HOSPITAL | Age: 74
Discharge: HOME | End: 2024-08-27
Attending: STUDENT IN AN ORGANIZED HEALTH CARE EDUCATION/TRAINING PROGRAM
Payer: MEDICARE

## 2024-08-27 DIAGNOSIS — I62.00 NONTRAUMATIC SUBDURAL HEMORRHAGE, UNSPECIFIED: ICD-10-CM

## 2024-08-27 DIAGNOSIS — I65.21 CAROTID STENOSIS, RIGHT: ICD-10-CM

## 2024-08-27 DIAGNOSIS — R42 POSTURAL DIZZINESS WITH NEAR SYNCOPE: Chronic | ICD-10-CM

## 2024-08-27 DIAGNOSIS — I42.9 CARDIOMYOPATHY, UNSPECIFIED TYPE: ICD-10-CM

## 2024-08-27 DIAGNOSIS — R55 POSTURAL DIZZINESS WITH NEAR SYNCOPE: Chronic | ICD-10-CM

## 2024-08-27 LAB
LEFT CBA DIAS: 15 CM/S
LEFT CBA SYS: 63 CM/S
LEFT CCA DIST DIAS: 25 CM/S
LEFT CCA DIST SYS: 103 CM/S
LEFT CCA MID DIAS: 20 CM/S
LEFT CCA MID SYS: 77 CM/S
LEFT CCA PROX DIAS: 19 CM/S
LEFT CCA PROX SYS: 93 CM/S
LEFT ECA DIAS: 2 CM/S
LEFT ECA SYS: 91 CM/S
LEFT ICA DIST DIAS: 19 CM/S
LEFT ICA DIST SYS: 54 CM/S
LEFT ICA MID DIAS: 29 CM/S
LEFT ICA MID SYS: 85 CM/S
LEFT ICA PROX DIAS: 25 CM/S
LEFT ICA PROX SYS: 102 CM/S
LEFT VERTEBRAL DIAS: 14 CM/S
LEFT VERTEBRAL SYS: 44 CM/S
OHS CV CAROTID RIGHT ICA EDV HIGHEST: 88
OHS CV CAROTID ULTRASOUND LEFT ICA/CCA RATIO: 0.99
OHS CV CAROTID ULTRASOUND RIGHT ICA/CCA RATIO: 6.36
OHS CV PV CAROTID LEFT HIGHEST CCA: 103
OHS CV PV CAROTID LEFT HIGHEST ICA: 102
OHS CV PV CAROTID RIGHT HIGHEST CCA: 81
OHS CV PV CAROTID RIGHT HIGHEST ICA: 356
OHS CV US CAROTID LEFT HIGHEST EDV: 29
RIGHT CBA DIAS: 11 CM/S
RIGHT CBA SYS: 51 CM/S
RIGHT CCA DIST DIAS: 10 CM/S
RIGHT CCA DIST SYS: 56 CM/S
RIGHT CCA MID DIAS: 13 CM/S
RIGHT CCA MID SYS: 69 CM/S
RIGHT CCA PROX DIAS: 9 CM/S
RIGHT CCA PROX SYS: 81 CM/S
RIGHT ECA DIAS: 24 CM/S
RIGHT ECA SYS: 182 CM/S
RIGHT ICA DIST DIAS: 19 CM/S
RIGHT ICA DIST SYS: 62 CM/S
RIGHT ICA MID DIAS: 24 CM/S
RIGHT ICA MID SYS: 97 CM/S
RIGHT ICA PROX DIAS: 88 CM/S
RIGHT ICA PROX SYS: 356 CM/S
RIGHT VERTEBRAL DIAS: 11 CM/S
RIGHT VERTEBRAL SYS: 48 CM/S

## 2024-08-27 PROCEDURE — 93243 EXT ECG>48HR<7D SCAN A/R: CPT | Mod: PO

## 2024-08-27 PROCEDURE — 70450 CT HEAD/BRAIN W/O DYE: CPT | Mod: TC,PO

## 2024-08-27 PROCEDURE — 93880 EXTRACRANIAL BILAT STUDY: CPT | Mod: PO

## 2024-08-27 PROCEDURE — 70450 CT HEAD/BRAIN W/O DYE: CPT | Mod: 26,,, | Performed by: RADIOLOGY

## 2024-08-27 PROCEDURE — 93242 EXT ECG>48HR<7D RECORDING: CPT | Mod: PO

## 2024-08-27 PROCEDURE — 93880 EXTRACRANIAL BILAT STUDY: CPT | Mod: 26,,, | Performed by: INTERNAL MEDICINE

## 2024-08-28 ENCOUNTER — OFFICE VISIT (OUTPATIENT)
Dept: NEUROSURGERY | Facility: CLINIC | Age: 74
End: 2024-08-28
Payer: MEDICARE

## 2024-08-28 VITALS
DIASTOLIC BLOOD PRESSURE: 64 MMHG | RESPIRATION RATE: 18 BRPM | WEIGHT: 138 LBS | HEART RATE: 79 BPM | SYSTOLIC BLOOD PRESSURE: 114 MMHG | BODY MASS INDEX: 22.99 KG/M2 | HEIGHT: 65 IN

## 2024-08-28 DIAGNOSIS — I62.00 NONTRAUMATIC SUBDURAL HEMORRHAGE, UNSPECIFIED: Primary | ICD-10-CM

## 2024-08-28 PROCEDURE — 1159F MED LIST DOCD IN RCRD: CPT | Mod: CPTII,S$GLB,, | Performed by: STUDENT IN AN ORGANIZED HEALTH CARE EDUCATION/TRAINING PROGRAM

## 2024-08-28 PROCEDURE — 1101F PT FALLS ASSESS-DOCD LE1/YR: CPT | Mod: CPTII,S$GLB,, | Performed by: STUDENT IN AN ORGANIZED HEALTH CARE EDUCATION/TRAINING PROGRAM

## 2024-08-28 PROCEDURE — 1126F AMNT PAIN NOTED NONE PRSNT: CPT | Mod: CPTII,S$GLB,, | Performed by: STUDENT IN AN ORGANIZED HEALTH CARE EDUCATION/TRAINING PROGRAM

## 2024-08-28 PROCEDURE — 3074F SYST BP LT 130 MM HG: CPT | Mod: CPTII,S$GLB,, | Performed by: STUDENT IN AN ORGANIZED HEALTH CARE EDUCATION/TRAINING PROGRAM

## 2024-08-28 PROCEDURE — 3061F NEG MICROALBUMINURIA REV: CPT | Mod: CPTII,S$GLB,, | Performed by: STUDENT IN AN ORGANIZED HEALTH CARE EDUCATION/TRAINING PROGRAM

## 2024-08-28 PROCEDURE — 1157F ADVNC CARE PLAN IN RCRD: CPT | Mod: CPTII,S$GLB,, | Performed by: STUDENT IN AN ORGANIZED HEALTH CARE EDUCATION/TRAINING PROGRAM

## 2024-08-28 PROCEDURE — 3008F BODY MASS INDEX DOCD: CPT | Mod: CPTII,S$GLB,, | Performed by: STUDENT IN AN ORGANIZED HEALTH CARE EDUCATION/TRAINING PROGRAM

## 2024-08-28 PROCEDURE — 3044F HG A1C LEVEL LT 7.0%: CPT | Mod: CPTII,S$GLB,, | Performed by: STUDENT IN AN ORGANIZED HEALTH CARE EDUCATION/TRAINING PROGRAM

## 2024-08-28 PROCEDURE — 3066F NEPHROPATHY DOC TX: CPT | Mod: CPTII,S$GLB,, | Performed by: STUDENT IN AN ORGANIZED HEALTH CARE EDUCATION/TRAINING PROGRAM

## 2024-08-28 PROCEDURE — 3288F FALL RISK ASSESSMENT DOCD: CPT | Mod: CPTII,S$GLB,, | Performed by: STUDENT IN AN ORGANIZED HEALTH CARE EDUCATION/TRAINING PROGRAM

## 2024-08-28 PROCEDURE — 3078F DIAST BP <80 MM HG: CPT | Mod: CPTII,S$GLB,, | Performed by: STUDENT IN AN ORGANIZED HEALTH CARE EDUCATION/TRAINING PROGRAM

## 2024-08-28 PROCEDURE — 99214 OFFICE O/P EST MOD 30 MIN: CPT | Mod: S$GLB,,, | Performed by: STUDENT IN AN ORGANIZED HEALTH CARE EDUCATION/TRAINING PROGRAM

## 2024-08-28 PROCEDURE — 4010F ACE/ARB THERAPY RXD/TAKEN: CPT | Mod: CPTII,S$GLB,, | Performed by: STUDENT IN AN ORGANIZED HEALTH CARE EDUCATION/TRAINING PROGRAM

## 2024-08-28 NOTE — PROGRESS NOTES
"REFERRING PHYSICIAN:    No ref. provider found  N/A    Postoperative visit     CLINICAL PROGRESS:   Buddy Park I  Presents today for re-evaluation with a new CT scan  He is most recently admitted with TIA like symptoms    He is doing well he has had no recurrence feels that he is at his baseline    He had a CT scan today which shows no significant mass effect some residual subacute stable subdural hematoma no increase in size no mass effect on the right            MEDICATIONS:                   List reviewed, see chart for dosing details.    ALLERGIES:       Review of patient's allergies indicates:   Allergen Reactions    Bee pollens Shortness Of Breath     WASP, BEE, ANT AND CATERPILLER STINGS    Hydrochlorothiazide Shortness Of Breath and Rash    Milk containing products (dairy) Diarrhea    Metoprolol Rash       PHYSICAL EXAMINATION:          VITAL SIGNS:   /64 (BP Location: Right arm, Patient Position: Sitting)   Pulse 79   Resp 18   Ht 5' 5" (1.651 m)   Wt 62.6 kg (138 lb 0.1 oz)   BMI 22.97 kg/m²     GENERAL:  Patient is well developed, well nourished, calm, collected, and in no apparent distress.  Incision is healed    NEUROLOGICAL:      He is stable neurological exam.  He has a left-sided hemiparesis which is chronic  Awake alert communicating appropriately  Right-sided strength is stable      IMAGING DATA:  CT scan see above      ASSESSMENT/PLAN:    73-year-old gentleman with a history of stroke and TIAs  Right-sided subacute subdural hematoma without mass effect, stable in size no new or acute hemorrhage  Reviewed in detail, he has follow up his medical team tomorrow  Reportedly had a higher risk off of antiplatelets as he plans to resume   Risks benefits reviewed in detail  With thin 1-2 months we will repeat a CT scan                  Advised to call the office Iif any questions or concerns arise.    This note was partially dictated using voice recognition software, so please excuse any " errors that were not corrected.

## 2024-08-29 ENCOUNTER — TELEPHONE (OUTPATIENT)
Dept: VASCULAR SURGERY | Facility: CLINIC | Age: 74
End: 2024-08-29
Payer: MEDICARE

## 2024-08-29 NOTE — TELEPHONE ENCOUNTER
Spoke w/ pt daughter in law per pt request. Appt scheduled for 10/17. Directions given. DIL verbalized understanding.

## 2024-09-03 ENCOUNTER — PATIENT MESSAGE (OUTPATIENT)
Dept: CARDIOLOGY | Facility: HOSPITAL | Age: 74
End: 2024-09-03
Payer: MEDICARE

## 2024-09-04 ENCOUNTER — TELEPHONE (OUTPATIENT)
Dept: CARDIOLOGY | Facility: CLINIC | Age: 74
End: 2024-09-04

## 2024-09-04 DIAGNOSIS — I65.29 OCCLUSION AND STENOSIS OF UNSPECIFIED CAROTID ARTERY: Primary | ICD-10-CM

## 2024-09-04 NOTE — TELEPHONE ENCOUNTER
----- Message from Nayeli Marcano MD sent at 8/31/2024  2:36 PM CDT -----  Carotid stenosis needs carotid CTA

## 2024-09-05 ENCOUNTER — PATIENT MESSAGE (OUTPATIENT)
Dept: CARDIOLOGY | Facility: CLINIC | Age: 74
End: 2024-09-05

## 2024-09-05 ENCOUNTER — HOSPITAL ENCOUNTER (OUTPATIENT)
Dept: CARDIOLOGY | Facility: HOSPITAL | Age: 74
Discharge: HOME | End: 2024-09-05
Attending: INTERNAL MEDICINE
Payer: MEDICARE

## 2024-09-05 ENCOUNTER — HOSPITAL ENCOUNTER (OUTPATIENT)
Dept: RADIOLOGY | Facility: HOSPITAL | Age: 74
Discharge: HOME | End: 2024-09-05
Attending: INTERNAL MEDICINE
Payer: MEDICARE

## 2024-09-05 VITALS — WEIGHT: 138 LBS | HEIGHT: 65 IN | BODY MASS INDEX: 22.99 KG/M2

## 2024-09-05 DIAGNOSIS — R55 POSTURAL DIZZINESS WITH NEAR SYNCOPE: Chronic | ICD-10-CM

## 2024-09-05 DIAGNOSIS — R42 POSTURAL DIZZINESS WITH NEAR SYNCOPE: Chronic | ICD-10-CM

## 2024-09-05 DIAGNOSIS — I50.32 CHRONIC DIASTOLIC HEART FAILURE: Chronic | ICD-10-CM

## 2024-09-05 DIAGNOSIS — I42.9 CARDIOMYOPATHY, UNSPECIFIED TYPE: ICD-10-CM

## 2024-09-05 LAB
CV PHARM DOSE: 0.4 MG
CV STRESS BASE HR: 72 BPM
DIASTOLIC BLOOD PRESSURE: 78 MMHG
NUC REST EJECTION FRACTION: 64
OHS CV CPX 1 MINUTE RECOVERY HEART RATE: 115 BPM
OHS CV CPX 85 PERCENT MAX PREDICTED HEART RATE MALE: 127
OHS CV CPX MAX PREDICTED HEART RATE: 149
OHS CV CPX PATIENT IS FEMALE: 0
OHS CV CPX PATIENT IS MALE: 1
OHS CV CPX PEAK DIASTOLIC BLOOD PRESSURE: 73 MMHG
OHS CV CPX PEAK HEAR RATE: 116 BPM
OHS CV CPX PEAK RATE PRESSURE PRODUCT: NORMAL
OHS CV CPX PEAK SYSTOLIC BLOOD PRESSURE: 181 MMHG
OHS CV CPX PERCENT MAX PREDICTED HEART RATE ACHIEVED: 78
OHS CV CPX RATE PRESSURE PRODUCT PRESENTING: NORMAL
OHS CV PHARM TIME: 933 MIN
SYSTOLIC BLOOD PRESSURE: 146 MMHG

## 2024-09-05 PROCEDURE — 93016 CV STRESS TEST SUPVJ ONLY: CPT | Mod: ,,, | Performed by: INTERNAL MEDICINE

## 2024-09-05 PROCEDURE — 78452 HT MUSCLE IMAGE SPECT MULT: CPT | Mod: 26,,, | Performed by: INTERNAL MEDICINE

## 2024-09-05 PROCEDURE — 93017 CV STRESS TEST TRACING ONLY: CPT | Mod: PO

## 2024-09-05 PROCEDURE — 78452 HT MUSCLE IMAGE SPECT MULT: CPT | Mod: PO

## 2024-09-05 PROCEDURE — 93018 CV STRESS TEST I&R ONLY: CPT | Mod: ,,, | Performed by: INTERNAL MEDICINE

## 2024-09-05 PROCEDURE — 63600175 PHARM REV CODE 636 W HCPCS: Mod: PO | Performed by: INTERNAL MEDICINE

## 2024-09-05 PROCEDURE — A9502 TC99M TETROFOSMIN: HCPCS | Mod: PO | Performed by: INTERNAL MEDICINE

## 2024-09-05 RX ORDER — AMINOPHYLLINE 25 MG/ML
75 INJECTION, SOLUTION INTRAVENOUS
Status: COMPLETED | OUTPATIENT
Start: 2024-09-05 | End: 2024-09-05

## 2024-09-05 RX ORDER — REGADENOSON 0.08 MG/ML
0.4 INJECTION, SOLUTION INTRAVENOUS
Status: COMPLETED | OUTPATIENT
Start: 2024-09-05 | End: 2024-09-05

## 2024-09-05 RX ADMIN — TETROFOSMIN 32.2 MILLICURIE: 1.38 INJECTION, POWDER, LYOPHILIZED, FOR SOLUTION INTRAVENOUS at 09:09

## 2024-09-05 RX ADMIN — REGADENOSON 0.4 MG: 0.08 INJECTION, SOLUTION INTRAVENOUS at 09:09

## 2024-09-05 RX ADMIN — AMINOPHYLLINE 75 MG: 25 INJECTION, SOLUTION INTRAVENOUS at 09:09

## 2024-09-05 RX ADMIN — TETROFOSMIN 11 MILLICURIE: 1.38 INJECTION, POWDER, LYOPHILIZED, FOR SOLUTION INTRAVENOUS at 09:09

## 2024-09-12 ENCOUNTER — TELEPHONE (OUTPATIENT)
Dept: CARDIOLOGY | Facility: HOSPITAL | Age: 74
End: 2024-09-12
Payer: MEDICARE

## 2024-09-12 NOTE — TELEPHONE ENCOUNTER
Contacted patient ~1240 informing him a 72 hour holter monitor will be mailed to him in approximally two days for a re-wear. Last holter monitor showed a lot of artifact and results could not be determined. Patient verbalized understanding and stated he will have a family member come over to assist him with re-hook up.

## 2024-09-25 ENCOUNTER — OFFICE VISIT (OUTPATIENT)
Dept: ORTHOPEDICS | Facility: CLINIC | Age: 74
End: 2024-09-25
Payer: MEDICARE

## 2024-09-25 VITALS — WEIGHT: 136.44 LBS | BODY MASS INDEX: 22.73 KG/M2 | HEIGHT: 65 IN

## 2024-09-25 DIAGNOSIS — S99.922A FOOT INJURY, LEFT, INITIAL ENCOUNTER: ICD-10-CM

## 2024-09-25 DIAGNOSIS — M79.672 LEFT FOOT PAIN: Primary | ICD-10-CM

## 2024-09-25 DIAGNOSIS — S90.32XA CONTUSION OF LEFT FOOT, INITIAL ENCOUNTER: ICD-10-CM

## 2024-09-25 PROCEDURE — 99999 PR PBB SHADOW E&M-EST. PATIENT-LVL III: CPT | Mod: PBBFAC,,, | Performed by: ORTHOPAEDIC SURGERY

## 2024-09-25 NOTE — PROGRESS NOTES
73 years old injured his left foot about 9 days ago ran into the wall while on an electric wheelchair told he had a broken foot given a splint comes today follow-up.  Points to the 1st MTP joint as location was pain     Exam shows nontender throughout the foot no swelling no outward signs of injury or infection, nontender the 5th metatarsal    X-rays were concerning for possible 5th metacarpal neck fracture but seems similar to previous x-rays, no fractures identified in the 1st metatarsal region where he is painful    Assessment:  Left foot contusion     Plan: Symptomatic care, activities and weight-bearing to tolerance, follow-up in several weeks time as needed.  We will get him an AFO that he has been using but has recently broken    We performed a custom orthotic/brace fitting, adjusting and training with the patient. The patient demonstrated understanding and proper care. This was performed for 15 minutes.

## 2024-09-27 PROBLEM — I69.354 SPASTIC HEMIPARESIS OF LEFT NONDOMINANT SIDE AS LATE EFFECT OF CEREBRAL INFARCTION: Status: ACTIVE | Noted: 2024-09-27

## 2024-10-08 DIAGNOSIS — I65.29 CAROTID ARTERY STENOSIS: Primary | ICD-10-CM

## 2024-10-09 ENCOUNTER — HOSPITAL ENCOUNTER (OUTPATIENT)
Dept: RADIOLOGY | Facility: HOSPITAL | Age: 74
Discharge: HOME | End: 2024-10-09
Attending: STUDENT IN AN ORGANIZED HEALTH CARE EDUCATION/TRAINING PROGRAM
Payer: MEDICARE

## 2024-10-09 ENCOUNTER — HOSPITAL ENCOUNTER (OUTPATIENT)
Dept: RADIOLOGY | Facility: HOSPITAL | Age: 74
Discharge: HOME | End: 2024-10-09
Attending: INTERNAL MEDICINE
Payer: MEDICARE

## 2024-10-09 DIAGNOSIS — I65.29 OCCLUSION AND STENOSIS OF UNSPECIFIED CAROTID ARTERY: ICD-10-CM

## 2024-10-09 DIAGNOSIS — I62.00 NONTRAUMATIC SUBDURAL HEMORRHAGE, UNSPECIFIED: ICD-10-CM

## 2024-10-09 PROCEDURE — 70450 CT HEAD/BRAIN W/O DYE: CPT | Mod: 26,,, | Performed by: RADIOLOGY

## 2024-10-09 PROCEDURE — 25500020 PHARM REV CODE 255: Mod: PO | Performed by: STUDENT IN AN ORGANIZED HEALTH CARE EDUCATION/TRAINING PROGRAM

## 2024-10-09 PROCEDURE — 70498 CT ANGIOGRAPHY NECK: CPT | Mod: TC,PO

## 2024-10-09 PROCEDURE — 70498 CT ANGIOGRAPHY NECK: CPT | Mod: 26,,, | Performed by: RADIOLOGY

## 2024-10-09 PROCEDURE — 70450 CT HEAD/BRAIN W/O DYE: CPT | Mod: TC,PO

## 2024-10-09 RX ADMIN — IOHEXOL 100 ML: 350 INJECTION, SOLUTION INTRAVENOUS at 01:10

## 2024-10-17 ENCOUNTER — INITIAL CONSULT (OUTPATIENT)
Dept: VASCULAR SURGERY | Facility: CLINIC | Age: 74
End: 2024-10-17
Payer: MEDICARE

## 2024-10-17 VITALS
HEIGHT: 65 IN | WEIGHT: 129.25 LBS | SYSTOLIC BLOOD PRESSURE: 101 MMHG | DIASTOLIC BLOOD PRESSURE: 66 MMHG | BODY MASS INDEX: 21.54 KG/M2 | HEART RATE: 83 BPM

## 2024-10-17 DIAGNOSIS — I65.21 CAROTID STENOSIS, RIGHT: Primary | ICD-10-CM

## 2024-10-17 PROCEDURE — 99999 PR PBB SHADOW E&M-EST. PATIENT-LVL III: CPT | Mod: PBBFAC,,, | Performed by: STUDENT IN AN ORGANIZED HEALTH CARE EDUCATION/TRAINING PROGRAM

## 2024-10-17 NOTE — PROGRESS NOTES
[facility]  [facility]  History & Physical    SUBJECTIVE:     Chief Complaint:   Chief Complaint   Patient presents with    Carotid Artery Disease         History of Present Illness:  Buddy Park is a 73 y.o. male presents with     S/p left CEA x 6yrs ago. 1.5 afer he had a left sided stroke. He is able to walk and does not have function on his left arm.     Not on asa, plavix, statin.      He fell and had a a SDH and was started on asa and plavix and then had a further bleeding and was held. This happened 2mths ago.    Has not had a heart attack.       Review of patient's allergies indicates:   Allergen Reactions    Bee pollens Shortness Of Breath     WASP, BEE, ANT AND CATERPILLER STINGS    Hydrochlorothiazide Shortness Of Breath and Rash    Milk containing products (dairy) Diarrhea    Metoprolol Rash       Past Medical History:   Diagnosis Date    ACP (advance care planning) 8/9/2024    Angina pectoris     Anxiety     Arthritis     Biliary colic     Cochlear implant in place     Deaf     LEFT EAR    Depression     Diabetes mellitus type 2 without retinopathy 01/05/2022    Heart failure     History of colon polyps 02/20/2018    [place] (hard of hearing)     HEARING AID - RIGHT    Hyperlipidemia     Hypertension     Kidney stone     Kidney stones     Renal stones     Stroke     Trouble in sleeping     Wears glasses      Past Surgical History:   Procedure Laterality Date    CAROTID ARTERY ANGIOPLASTY  06/2018    [facility]     CATARACT EXTRACTION Bilateral     CHOLECYSTECTOMY  02/06/2014    COLONOSCOPY  01/09/2013    Dr. Gillette, 5 year recheck (family history colon cancer)    COLONOSCOPY N/A 03/12/2018    Procedure: COLONOSCOPY;  Surgeon: Garett Go MD;  Location: [facility] ENDO;  Service: Endoscopy;  Laterality: N/A;    COLONOSCOPY N/A 02/15/2021    Procedure: COLONOSCOPY;  Surgeon: Garett Go MD;  Location: [facility];  Service: Endoscopy;  Laterality: N/A;    CYSTOSCOPY W/  RETROGRADES Bilateral 10/28/2019    Procedure: CYSTOSCOPY, WITH RETROGRADE PYELOGRAM;  Surgeon: Gretchen Proctor MD;  Location: [facility] OR;  Service: Urology;  Laterality: Bilateral;    CYSTOSCOPY W/ URETERAL STENT PLACEMENT Left 10/28/2019    Procedure: CYSTOSCOPY, WITH URETERAL STENT INSERTION;  Surgeon: Gretchen Proctor MD;  [facility];  Service: Urology;  Laterality: Left;    CYSTOSCOPY W/ URETERAL STENT REMOVAL Left 12/02/2019    Procedure: CYSTOSCOPY, WITH URETERAL STENT REMOVAL;  Surgeon: Gretchen Proctor MD;  Location: [facility];  Service: Urology;  Laterality: Left;    EAR MASTOIDECTOMY W/ COCHLEAR IMPLANT W/ LANDMARK      left ear    EXTRACORPOREAL SHOCK WAVE LITHOTRIPSY Left 12/02/2019    Procedure: LITHOTRIPSY, ESWL;  Surgeon: Gretchen Proctor MD;  Location: [facility] OR;  Service: Urology;  Laterality: Left;    EYE SURGERY      FOREIGN BODY REMOVAL Right     glass removed from right foot/repair    FRACTURE SURGERY      right arm x2    HIP SURGERY Left     LITHOTRIPSY      ROTATOR CUFF REPAIR      Right     SINUS SURGERY      TONSILLECTOMY      VASCULAR SURGERY       Family History   Problem Relation Name Age of Onset    Cancer Mother          colon    Heart disease Father      Gout Father      Kidney disease Father      Arthritis Father      Hypertension Father      Early death Daughter          mva    Alcohol abuse Brother      Cancer Brother          mouth cancer w mets    No Known Problems Sister      Alcohol abuse Brother      No Known Problems Son      Heart disease Maternal Grandmother          MI    Stroke Maternal Grandfather      Heart disease Paternal Grandmother          MI    No Known Problems Son      Cancer Paternal Uncle          lung cancer    Allergic rhinitis Neg Hx      Allergies Neg Hx      Angioedema Neg Hx      Asthma Neg Hx      Atopy Neg Hx      Eczema Neg Hx      Immunodeficiency Neg Hx      Rhinitis Neg Hx      Urticaria Neg Hx      Collagen disease Neg Hx       Social History      Tobacco Use    Smoking status: Never    Smokeless tobacco: Never   Substance Use Topics    Alcohol use: Yes     Comment: once every two months    Drug use: No        Review of Systems:  Review of Systems   All other systems reviewed and are negative.      OBJECTIVE:     Vital Signs (Most Recent):  Pulse: 83 (10/17/24 0813)  BP: 101/66 (10/17/24 0813)    Physical Exam:  Physical Exam   Constitutional: He is oriented to person, place, and time.  Non-toxic appearance. No distress. Pulmonary:      Effort: Pulmonary effort is normal. No respiratory distress.      Breath sounds: No wheezing.     Abdominal: Soft.   Neurological: He is alert and oriented to person, place, and time.   Not moving the LLE and LUE. Moving the RLE and RUE   Skin: Capillary refill takes less than 2 seconds.   Psychiatric: His behavior is normal. Mood normal.       Laboratory:  Lab Results   Component Value Date    WBC 6.13 08/10/2024    HGB 13.7 (L) 08/10/2024    HCT 40.4 08/10/2024     08/10/2024    CHOL 148 06/24/2024    TRIG 128 06/24/2024    HDL 42 06/24/2024    ALT 28 08/09/2024    AST 28 08/09/2024     08/10/2024    K 3.9 08/10/2024     08/10/2024    CREATININE 1.5 (H) 10/09/2024    BUN 17 08/10/2024    CO2 26 08/10/2024    TSH 2.770 06/24/2024    PSA 0.72 01/10/2023    INR 1.1 08/09/2024    HGBA1C 5.0 06/24/2024         Diagnostic Results:    There is 80-99% right Internal Carotid Stenosis.    There is 20-39% left Internal Carotid Stenosis.    Mild-to-moderate heterogeneous calcified plaque bilaterally more pronounced of the right side.    Normal antegrade vertebral flow bilaterally.      Impression:     1. Progressive atherosclerotic narrowing of the proximal right cervical ICA demonstrating near occlusion of greater than by NASCET criteria.  2. Small amount of ulcerative plaque at the level of the left proximal ICA without evidence of any significant stenosis.  3. Vertebral arteries are patent.      Left Ventricle:  The left ventricle is normal in size. Normal wall thickness. There is normal systolic function. Ejection fraction by visual approximation is 55%.    Right Ventricle: Normal right ventricular cavity size. Wall thickness is normal. Systolic function is normal.    Aortic Valve: The aortic valve is a trileaflet valve.    IVC/SVC: Normal venous pressure at 3 mmHg.    ASSESSMENT/PLAN:       Patient's presentation, history, and exam findings are most consistent with generalized PAD in the setting of patient's associated comorbidities.  I did personally review all of the patient's imaging.  The CTA head and neck illustrates severe stenosis of the right CEA.  The carotid ultrasounds illustrates severe stenosis of the right .  This is most consistent with right asymptomatic carotid artery stenosis.  I have personally discussed these findings as well as the etiology of the pathology.    With these findings the patient is being optimized medically to reduce any potential cerebrovascular or cardiovascular accidents.  Despite medical optimization the patient still remains at risk for a subsequent cerebrovascular event that could be very debilitating and increase the risk of patient's morbidity and mortality.  I do recommend a carotid artery endarterectomy to further mitigate these risks of subsequent devastating CVA.    I discussed my recommendations with the patient and the family that was present. We discussed the benefits as well as the risk which is not limited to intraoperative/postoperative stroke, hemorrhage, nerve/arterial injury, infection, and myocardial infarct. We will discuss with the primary team and associated consults to ensure patient is optimized to undergoing general anesthesia and CEA.    We did discuss extensively smoking cessation, BP control, A1c control, taking an antiplatelet and statin, and general lifestyle changes.     Plan:  Will plan for right CEA  Will reach out to Dr. Guidry to review his recent CT  head to clear for surgery.        Thu Smith M.D.   [facility]

## 2024-10-31 ENCOUNTER — PATIENT MESSAGE (OUTPATIENT)
Dept: VASCULAR SURGERY | Facility: CLINIC | Age: 74
End: 2024-10-31
Payer: MEDICARE

## 2024-11-01 DIAGNOSIS — I65.29 CAROTID STENOSIS: ICD-10-CM

## 2024-11-01 DIAGNOSIS — I65.21 CAROTID STENOSIS, RIGHT: Primary | ICD-10-CM

## 2024-11-01 RX ORDER — CEFAZOLIN SODIUM 2 G/50ML
2 SOLUTION INTRAVENOUS
OUTPATIENT
Start: 2024-11-01

## 2024-11-01 RX ORDER — LIDOCAINE HYDROCHLORIDE 10 MG/ML
1 INJECTION, SOLUTION EPIDURAL; INFILTRATION; INTRACAUDAL; PERINEURAL ONCE
OUTPATIENT
Start: 2024-11-01 | End: 2024-11-01

## 2024-11-21 PROBLEM — M25.552 LEFT HIP PAIN: Status: ACTIVE | Noted: 2024-11-21
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
## 2024-12-19 ENCOUNTER — OFFICE VISIT (OUTPATIENT)
Dept: CARDIOLOGY | Facility: CLINIC | Age: 74
End: 2024-12-19
Payer: MEDICARE

## 2024-12-19 VITALS
WEIGHT: 127 LBS | HEIGHT: 64 IN | DIASTOLIC BLOOD PRESSURE: 82 MMHG | BODY MASS INDEX: 21.68 KG/M2 | SYSTOLIC BLOOD PRESSURE: 138 MMHG | HEART RATE: 89 BPM

## 2024-12-19 DIAGNOSIS — I50.32 CHRONIC DIASTOLIC HEART FAILURE: Primary | Chronic | ICD-10-CM

## 2024-12-19 DIAGNOSIS — I10 PRIMARY HYPERTENSION: Chronic | ICD-10-CM

## 2024-12-19 DIAGNOSIS — I63.89 CEREBROVASCULAR ACCIDENT (CVA) DUE TO OTHER MECHANISM: Chronic | ICD-10-CM

## 2024-12-19 DIAGNOSIS — E78.49 OTHER HYPERLIPIDEMIA: Chronic | ICD-10-CM

## 2024-12-19 DIAGNOSIS — I70.0 ABDOMINAL AORTIC ATHEROSCLEROSIS: Chronic | ICD-10-CM

## 2024-12-19 DIAGNOSIS — N18.31 STAGE 3A CHRONIC KIDNEY DISEASE: Chronic | ICD-10-CM

## 2024-12-19 PROCEDURE — 99999 PR PBB SHADOW E&M-EST. PATIENT-LVL III: CPT | Mod: PBBFAC,,, | Performed by: INTERNAL MEDICINE

## 2024-12-19 NOTE — PROGRESS NOTES
Subjective:    Patient ID:  Buddy Park is a 74 y.o. male who presents for Follow-up (3 MONTH) and Heart Problem        HPI    STATUS POST CAROTID ENDARTERECTOMY BY DR. BARGER PROLONGED HOSPITALIZATION,, RECORDS REVIEWED LABS [place], NORMAL NUCLEAR STRESS TEST, WILL BE GOING TO [facility], SEE ROS  Past Medical History:   Diagnosis Date    ACP (advance care planning) 08/09/2024    Angina pectoris     Anxiety     Arthritis     Biliary colic     Cochlear implant in place     Deaf     LEFT EAR    Depression     Diabetes mellitus type 2 without retinopathy 01/05/2022    Heart failure     History of colon polyps 02/20/2018    [place] (hard of hearing)     HEARING AID - RIGHT    Hyperlipidemia     Hypertension     Kidney stone     Kidney stones     Renal stones     Stroke     Trouble in sleeping     Wears glasses      Past Surgical History:   Procedure Laterality Date    CAROTID ARTERY ANGIOPLASTY  06/2018    [facility]     CAROTID ENDARTERECTOMY Right 11/20/2024    Procedure: ENDARTERECTOMY-CAROTID;  Surgeon: Thu Smith MD;  Location: [facility];  Service: Vascular;  Laterality: Right;    CATARACT EXTRACTION Bilateral     CHOLECYSTECTOMY  02/06/2014    COLONOSCOPY  01/09/2013    Dr. Gillette, 5 year recheck (family history colon cancer)    COLONOSCOPY N/A 03/12/2018    Procedure: COLONOSCOPY;  Surgeon: Garett Go MD;  Location: [facility] ENDO;  Service: Endoscopy;  Laterality: N/A;    COLONOSCOPY N/A 02/15/2021    Procedure: COLONOSCOPY;  Surgeon: Garett Go MD;  Location: [facility] ENDO;  Service: Endoscopy;  Laterality: N/A;    CYSTOSCOPY W/ RETROGRADES Bilateral 10/28/2019    Procedure: CYSTOSCOPY, WITH RETROGRADE PYELOGRAM;  Surgeon: Gretchen Proctor MD;  Location: [facility] OR;  Service: Urology;  Laterality: Bilateral;    CYSTOSCOPY W/ URETERAL STENT PLACEMENT Left 10/28/2019    Procedure: CYSTOSCOPY, WITH URETERAL STENT INSERTION;  Surgeon: Gretchen Proctor MD;  Location: [facility] OR;  Service: Urology;  Laterality:  Left;    CYSTOSCOPY W/ URETERAL STENT REMOVAL Left 12/02/2019    Procedure: CYSTOSCOPY, WITH URETERAL STENT REMOVAL;  Surgeon: Gretchen Proctor MD;  Location: [facility] OR;  Service: Urology;  Laterality: Left;    EAR MASTOIDECTOMY W/ COCHLEAR IMPLANT W/ LANDMARK      left ear    EXTRACORPOREAL SHOCK WAVE LITHOTRIPSY Left 12/02/2019    Procedure: LITHOTRIPSY, ESWL;  Surgeon: Gretchen Proctor MD;  Location: [facility] OR;  Service: Urology;  Laterality: Left;    EYE SURGERY      FOREIGN BODY REMOVAL Right     glass removed from right foot/repair    FRACTURE SURGERY      right arm x2    HIP SURGERY Left     LITHOTRIPSY      ROTATOR CUFF REPAIR      Right     SINUS SURGERY      TONSILLECTOMY      VASCULAR SURGERY       Family History   Problem Relation Name Age of Onset    Cancer Mother          colon    Heart disease Father      Gout Father      Kidney disease Father      Arthritis Father      Hypertension Father      Early death Daughter          mva    Alcohol abuse Brother      Cancer Brother          mouth cancer w mets    No Known Problems Sister      Alcohol abuse Brother      No Known Problems Son      Heart disease Maternal Grandmother          MI    Stroke Maternal Grandfather      Heart disease Paternal Grandmother          MI    No Known Problems Son      Cancer Paternal Uncle          lung cancer    Allergic rhinitis Neg Hx      Allergies Neg Hx      Angioedema Neg Hx      Asthma Neg Hx      Atopy Neg Hx      Eczema Neg Hx      Immunodeficiency Neg Hx      Rhinitis Neg Hx      Urticaria Neg Hx      Collagen disease Neg Hx       Social History     Socioeconomic History    Marital status: Single   Tobacco Use    Smoking status: Never    Smokeless tobacco: Never   Substance and Sexual Activity    Alcohol use: Yes     Comment: once every two months    Drug use: No    Sexual activity: Yes     Social Drivers of Health     Financial Resource Strain: Patient Declined (11/22/2024)    Overall Financial Resource Strain (CARDIA)      Difficulty of Paying Living Expenses: Patient declined   Food Insecurity: Patient Declined (11/22/2024)    Hunger Vital Sign     Worried About Running Out of Food in the Last Year: Patient declined     Ran Out of Food in the Last Year: Patient declined   Transportation Needs: Patient Declined (11/22/2024)    TRANSPORTATION NEEDS     Transportation : Patient declined   Physical Activity: Unknown (6/18/2024)    Exercise Vital Sign     Days of Exercise per Week: 0 days   Stress: Patient Declined (11/22/2024)    [place] [employer] - Occupational Stress Questionnaire     Feeling of Stress : Patient declined   Housing Stability: Patient Declined (11/22/2024)    Housing Stability Vital Sign     Unable to Pay for Housing in the Last Year: Patient declined     Homeless in the Last Year: Patient declined       Review of patient's allergies indicates:   Allergen Reactions    Bee pollens Shortness Of Breath     WASP, BEE, ANT AND CATERPILLER STINGS    Hydrochlorothiazide Shortness Of Breath and Rash    Milk containing products (dairy) Diarrhea    Metoprolol Rash       Current Outpatient Medications:     allopurinoL (ZYLOPRIM) 100 MG tablet, Take 1 tablet (100 mg total) by mouth once daily., Disp: 90 tablet, Rfl: 3    aspirin 81 MG Chew, Take 1 tablet (81 mg total) by mouth once daily., Disp: 90 tablet, Rfl: 3    b complex vitamins tablet, Take 1 tablet by mouth once daily., Disp: , Rfl:     buPROPion (WELLBUTRIN XL) 300 MG 24 hr tablet, Take 1 tablet (300 mg total) by mouth once daily., Disp: 90 tablet, Rfl: 3    C/sourcherry/celery/grape seed (TART CHERRY ORAL), Take by mouth every evening., Disp: , Rfl:     leg brace Misc, 1 each by Misc.(Non-Drug; Combo Route) route Daily., Disp: 1 each, Rfl: 1    losartan (COZAAR) 25 MG tablet, Take 1 tablet (25 mg total) by mouth once daily. Hold for SBP<100 (Patient taking differently: Take 25 mg by mouth every evening. Hold for SBP<100), Disp: 90 tablet, Rfl:  "3    oxyCODONE (ROXICODONE) 5 MG immediate release tablet, Take 1 tablet (5 mg total) by mouth every 6 (six) hours as needed for Pain., Disp: 15 tablet, Rfl: 0    rosuvastatin (CRESTOR) 40 MG Tab, Take 40 mg by mouth every evening., Disp: , Rfl:     traZODone (DESYREL) 50 MG tablet, TAKE 1 TABLET BY MOUTH EVERY EVENING., Disp: 30 tablet, Rfl: 0  No current facility-administered medications for this visit.    Facility-Administered Medications Ordered in Other Visits:     lactated ringers infusion, , Intravenous, Continuous, Demario Yates MD, Last Rate: 0 mL/hr at 10/28/19 1405, New Bag at 12/02/19 0943    Review of Systems   Constitutional: Negative for chills, decreased appetite and diaphoresis.   HENT:  Negative for congestion and nosebleeds.    Eyes:  Negative for blurred vision and visual disturbance.   Cardiovascular:  Negative for chest pain, claudication, cyanosis, dyspnea on exertion, orthopnea and palpitations.   Musculoskeletal:  Positive for falls. Negative for back pain.   Gastrointestinal:  Negative for abdominal pain, dysphagia, jaundice and melena.   Genitourinary:  Negative for dysuria and flank pain.   Neurological:  Positive for focal weakness.   Allergic/Immunologic: Negative for persistent infections.        Objective:      Vitals:    12/19/24 1442 12/19/24 1454   BP: (!) 162/84 138/82   Pulse: 89    Weight: 57.6 kg (127 lb)    Height: 5' 4" (1.626 m)    PainSc: 0-No pain      Body mass index is 21.8 kg/m².    Physical Exam  Constitutional:       Appearance: Normal appearance.   Neck:      Vascular: No JVD.     Cardiovascular:      Rate and Rhythm: Normal rate and regular rhythm. No extrasystoles are present.     Pulses:           Carotid pulses are 2+ on the right side and 2+ on the left side.       Radial pulses are 1+ on the right side and 2+ on the left side.      Heart sounds:      No friction rub. No S4 sounds.   Abdominal:      Palpations: Abdomen is soft.      Tenderness: There is no " abdominal tenderness.   Musculoskeletal:      Cervical back: Neck supple.      Right lower leg: No edema.      Left lower leg: No edema.      Comments: IN WC, L FOOT DROP, L HEMIPARESIS   Neurological:      Mental Status: He is alert and oriented to person, place, and time.      Cranial Nerves: Dysarthria (MILD) present.      Comments: L HEMIPARESIS   Psychiatric:         Mood and Affect: Mood normal.         Behavior: Behavior normal.               ..    Chemistry        Component Value Date/Time     11/21/2024 0301    K 3.8 11/21/2024 0301     11/21/2024 0301    CO2 27 11/21/2024 0301    BUN 12 11/21/2024 0301    CREATININE 1.15 11/21/2024 0301    CREATININE 1.1 03/19/2012 1635    GLU 87 11/21/2024 0301        Component Value Date/Time    CALCIUM 8.6 11/21/2024 0301    CALCIUM 8.9 03/19/2012 1635    ALKPHOS 104 08/09/2024 1125    ALKPHOS 68 03/19/2012 1635    AST 28 08/09/2024 1125    AST 46 (H) 03/19/2012 1635    ALT 28 08/09/2024 1125    BILITOT 1.1 08/09/2024 1125    ESTGFRAFRICA 66 (L) 07/28/2022 0506    ESTGFRAFRICA 54.1 (A) 07/11/2022 0938    EGFRNONAA 46.8 (A) 07/11/2022 0938            ..  Lab Results   Component Value Date    CHOL 148 06/24/2024    CHOL 189 03/06/2024    CHOL 229 (H) 01/10/2023     Lab Results   Component Value Date    HDL 42 06/24/2024    HDL 41 03/06/2024    HDL 41 01/10/2023     Lab Results   Component Value Date    LDLCALC 80.4 06/24/2024    LDLCALC 126.0 03/06/2024    LDLCALC 151.0 01/10/2023     Lab Results   Component Value Date    TRIG 128 06/24/2024    TRIG 110 03/06/2024    TRIG 185 (H) 01/10/2023     Lab Results   Component Value Date    CHOLHDL 28.4 06/24/2024    CHOLHDL 21.7 03/06/2024    CHOLHDL 17.9 (L) 01/10/2023     ..  Lab Results   Component Value Date    WBC 5.22 11/21/2024    HGB 11.5 (L) 11/21/2024    HCT 34.5 (L) 11/21/2024    MCV 90 11/21/2024     (L) 11/21/2024       Test(s) Reviewed  I have reviewed the following in detail:  [x] Stress test    [] Angiography   [] Echocardiogram   [x] Labs   [x] Other:       Assessment:         ICD-10-CM ICD-9-CM   1. Chronic diastolic heart failure  I50.32 428.32   2. Primary hypertension  I10 401.9   3. Abdominal aortic atherosclerosis  I70.0 440.0   4. Stage 3a chronic kidney disease  N18.31 585.3   5. Other hyperlipidemia  E78.49 272.4   6. Cerebrovascular accident (CVA) due to other mechanism  I63.89 434.91     Problem List Items Addressed This Visit          Neuro    Cerebrovascular accident    Relevant Orders    Hemoglobin       Cardiac/Vascular    HLD (hyperlipidemia)    Relevant Orders    Comprehensive Metabolic Panel    Lipid Panel    HTN (hypertension)    Relevant Orders    Hypertension Digital Medicine (HDMP) Enrollment Order (Completed)    Comprehensive Metabolic Panel    Abdominal aortic atherosclerosis    Chronic diastolic heart failure - Primary       Renal/    Stage 3a chronic kidney disease        Plan:         ALL CV CLINICALLY STABLE, NO ANGINA, NO HF, NO TIA, NO CLINICAL ARRHYTHMIA,CONTINUE CURRENT MEDS, EDUCATION, DIET, EXERCISE AS MUCH AS POSSIBLE NOW GOING TO BE STAYING IN A NURSING HOME DIGITAL HYPERTENSION REFERRAL, RETURN TO CLINIC IN 6 MONTHS DISCUSSED PLAN WITH THE PATIENT AND HIS DAUGHTER        Chronic diastolic heart failure    Primary hypertension  -     Hypertension Digital Medicine (HDMP) Enrollment Order  -     Comprehensive Metabolic Panel; Future; Expected date: 12/19/2024    Abdominal aortic atherosclerosis  Comments:  NO EMBOLIZATION    Stage 3a chronic kidney disease    Other hyperlipidemia  -     Comprehensive Metabolic Panel; Future; Expected date: 12/19/2024  -     Lipid Panel; Future; Expected date: 12/19/2024    Cerebrovascular accident (CVA) due to other mechanism  Comments:  OLD  Orders:  -     Hemoglobin; Future; Expected date: 06/19/2025    RTC Low level/low impact aerobic exercise 5x's/wk. Heart healthy diet and risk factor modification.    See labs and med  orders.    Aerobic exercise 5x's/wk. Heart healthy diet and risk factor modification.    See labs and med orders.

## 2025-03-11 ENCOUNTER — TELEPHONE (OUTPATIENT)
Dept: NEUROLOGY | Facility: CLINIC | Age: 75
End: 2025-03-11
Payer: MEDICARE

## 2025-03-11 NOTE — TELEPHONE ENCOUNTER
----- Message from Suyapa sent at 3/11/2025  1:02 PM CDT -----  Contact: Donna/Cristhian Wei  Type:  Sooner Appointment RequestCaller is requesting a sooner appointment.  Caller declined first available appointment listed below.  Caller will not accept being placed on the waitlist and is requesting a message be sent to doctor.Name of Caller:  the patientWhen is the first available appointment?  N/a Best Call Back Number:  [phone] Information:  pt is looking to get a consult for an injection left arm, please call Donna for the appt

## 2025-03-27 ENCOUNTER — TELEPHONE (OUTPATIENT)
Dept: PHYSICAL MEDICINE AND REHAB | Facility: CLINIC | Age: 75
End: 2025-03-27
Payer: MEDICARE

## 2025-03-27 NOTE — TELEPHONE ENCOUNTER
Called and spoke with Raji Wei Scheduling and got pt an appt on Wed 5/14/25 @ 12:30. No further assistance is needed at this time.     ----- Message from Erika sent at 3/26/2025  2:03 PM CDT -----  Regarding: Needs return call  Type: Needs Medical AdviceWho Called:  Raji [place] Scheduling departmentBest Call Back Number: [phone] Information: Please advise needs to delmer

## 2025-05-14 ENCOUNTER — OFFICE VISIT (OUTPATIENT)
Dept: PHYSICAL MEDICINE AND REHAB | Facility: CLINIC | Age: 75
End: 2025-05-14
Payer: MEDICARE

## 2025-05-14 VITALS
HEART RATE: 97 BPM | WEIGHT: 127.19 LBS | BODY MASS INDEX: 21.83 KG/M2 | DIASTOLIC BLOOD PRESSURE: 84 MMHG | SYSTOLIC BLOOD PRESSURE: 168 MMHG

## 2025-05-14 DIAGNOSIS — I69.352 SPASTIC HEMIPARESIS OF LEFT DOMINANT SIDE AS LATE EFFECT OF CEREBRAL INFARCTION: Primary | ICD-10-CM

## 2025-05-14 DIAGNOSIS — G81.14 LEFT SPASTIC HEMIPARESIS: ICD-10-CM

## 2025-05-14 DIAGNOSIS — I63.9 RIGHT-SIDED CEREBROVASCULAR ACCIDENT (CVA): ICD-10-CM

## 2025-05-14 PROCEDURE — 3008F BODY MASS INDEX DOCD: CPT | Mod: CPTII,S$GLB,, | Performed by: PHYSICAL MEDICINE & REHABILITATION

## 2025-05-14 PROCEDURE — 1126F AMNT PAIN NOTED NONE PRSNT: CPT | Mod: CPTII,S$GLB,, | Performed by: PHYSICAL MEDICINE & REHABILITATION

## 2025-05-14 PROCEDURE — 99999 PR PBB SHADOW E&M-EST. PATIENT-LVL III: CPT | Mod: PBBFAC,,, | Performed by: PHYSICAL MEDICINE & REHABILITATION

## 2025-05-14 PROCEDURE — 1159F MED LIST DOCD IN RCRD: CPT | Mod: CPTII,S$GLB,, | Performed by: PHYSICAL MEDICINE & REHABILITATION

## 2025-05-14 PROCEDURE — 4010F ACE/ARB THERAPY RXD/TAKEN: CPT | Mod: CPTII,S$GLB,, | Performed by: PHYSICAL MEDICINE & REHABILITATION

## 2025-05-14 PROCEDURE — 3077F SYST BP >= 140 MM HG: CPT | Mod: CPTII,S$GLB,, | Performed by: PHYSICAL MEDICINE & REHABILITATION

## 2025-05-14 PROCEDURE — 1160F RVW MEDS BY RX/DR IN RCRD: CPT | Mod: CPTII,S$GLB,, | Performed by: PHYSICAL MEDICINE & REHABILITATION

## 2025-05-14 PROCEDURE — 3079F DIAST BP 80-89 MM HG: CPT | Mod: CPTII,S$GLB,, | Performed by: PHYSICAL MEDICINE & REHABILITATION

## 2025-05-14 PROCEDURE — 1157F ADVNC CARE PLAN IN RCRD: CPT | Mod: CPTII,S$GLB,, | Performed by: PHYSICAL MEDICINE & REHABILITATION

## 2025-05-14 PROCEDURE — 99204 OFFICE O/P NEW MOD 45 MIN: CPT | Mod: S$GLB,,, | Performed by: PHYSICAL MEDICINE & REHABILITATION

## 2025-05-14 NOTE — PROGRESS NOTES
[facility]ISIT     Consulting Provider: Shannon Pool  Primary Care Provider: Dennis Graff MD     CHIEF COMPLAINT:   1.   Chief Complaint   Patient presents with    Evaluation     Pt. Has a stroke on left side, pt. wants to discuss botox injections.     2. Spasticity management recommendations.     HISTORY OF PRESENT ILLNESS: Buddy Park is a 74 y.o.-year-old male with a history of right CVA (2020) with late effect left spastic hemiparesis who presents today for evaluation and recommendations regarding spasticity management.     Reports, left arm > left dysfunction. Left arm still numbness. Tightness in shoulder, elbow, and hand. Left ankle tightness manages with AFO and quad cane.     Medications: none  - previously on muscle relaxers without relief  Injections:   - Botox to LUE 5 years ago  Surgical procedures:    EQUIPMENT:  Braces: left rigid AFO, [place]  Wheelchair:   Walker: quad cane    MOBILITY/TRANSFERS:  Walking: Distance with use of left AFO and quad cane 5-10 minute walk limited by endurance.      ACTIVITIES OF DAILY LIVING:  Upper extremity dressing: Ale  Lower extremity dressing: Ale  Bathe: ind  Groom: ind  Toilet: ind    THERAPY/LOCATION:  PT: Raij [place]  OT: Raji [place]  Speech:     LIVING SITUATION: Raji [place] since December 2024    PAST MEDICAL HISTORY:  Past Medical History:   Diagnosis Date    ACP (advance care planning) 08/09/2024    Angina pectoris     Anxiety     Arthritis     Biliary colic     Cochlear implant in place     Deaf     LEFT EAR    Depression     Diabetes mellitus type 2 without retinopathy 01/05/2022    Heart failure     History of colon polyps 02/20/2018    [place] (hard of hearing)     HEARING AID - RIGHT    Hyperlipidemia     Hypertension     Kidney stone     Kidney stones     Renal stones     Stroke     Trouble in sleeping     Wears glasses         PAST SURGICAL HISTORY:   Past Surgical History:   Procedure  Laterality Date    CAROTID ARTERY ANGIOPLASTY  06/2018    [facility]     CAROTID ENDARTERECTOMY Right 11/20/2024    Procedure: ENDARTERECTOMY-CAROTID;  Surgeon: Thu Smith MD;  Location: [facility];  Service: Vascular;  Laterality: Right;    CATARACT EXTRACTION Bilateral     CHOLECYSTECTOMY  02/06/2014    COLONOSCOPY  01/09/2013    Dr. Gillette, 5 year recheck (family history colon cancer)    COLONOSCOPY N/A 03/12/2018    Procedure: COLONOSCOPY;  Surgeon: Garett Go MD;  Location: [facility] ENDO;  Service: Endoscopy;  Laterality: N/A;    COLONOSCOPY N/A 02/15/2021    Procedure: COLONOSCOPY;  Surgeon: Garett Go MD;  Location: [facility] ENDO;  Service: Endoscopy;  Laterality: N/A;    CYSTOSCOPY W/ RETROGRADES Bilateral 10/28/2019    Procedure: CYSTOSCOPY, WITH RETROGRADE PYELOGRAM;  Surgeon: Gretchen Proctor MD;  Location: [facility] OR;  Service: Urology;  Laterality: Bilateral;    CYSTOSCOPY W/ URETERAL STENT PLACEMENT Left 10/28/2019    Procedure: CYSTOSCOPY, WITH URETERAL STENT INSERTION;  Surgeon: Gretchen Proctor MD;  Location: [facility] OR;  Service: Urology;  Laterality: Left;    CYSTOSCOPY W/ URETERAL STENT REMOVAL Left 12/02/2019    Procedure: CYSTOSCOPY, WITH URETERAL STENT REMOVAL;  Surgeon: Gretchen Proctor MD;  Location: [facility] OR;  Service: Urology;  Laterality: Left;    EAR MASTOIDECTOMY W/ COCHLEAR IMPLANT W/ LANDMARK      left ear    EXTRACORPOREAL SHOCK WAVE LITHOTRIPSY Left 12/02/2019    Procedure: LITHOTRIPSY, ESWL;  Surgeon: Gretchen Proctor MD;  Location: [facility] OR;  Service: Urology;  Laterality: Left;    EYE SURGERY      FOREIGN BODY REMOVAL Right     glass removed from right foot/repair    FRACTURE SURGERY      right arm x2    HIP SURGERY Left     LITHOTRIPSY      ROTATOR CUFF REPAIR      Right     SINUS SURGERY      TONSILLECTOMY      VASCULAR SURGERY          FAMILY HISTORY:   Family History   Problem Relation Name Age of Onset    Cancer Mother          colon    Heart disease Father      Gout Father       Kidney disease Father      Arthritis Father      Hypertension Father      Early death Daughter          mva    Alcohol abuse Brother      Cancer Brother          mouth cancer w mets    No Known Problems Sister      Alcohol abuse Brother      No Known Problems Son      Heart disease Maternal Grandmother          MI    Stroke Maternal Grandfather      Heart disease Paternal Grandmother          MI    No Known Problems Son      Cancer Paternal Uncle          lung cancer    Allergic rhinitis Neg Hx      Allergies Neg Hx      Angioedema Neg Hx      Asthma Neg Hx      Atopy Neg Hx      Eczema Neg Hx      Immunodeficiency Neg Hx      Rhinitis Neg Hx      Urticaria Neg Hx      Collagen disease Neg Hx         SOCIAL HISTORY:    Social History     Socioeconomic History    Marital status: Single   Tobacco Use    Smoking status: Never    Smokeless tobacco: Never   Substance and Sexual Activity    Alcohol use: Yes     Comment: once every two months    Drug use: No    Sexual activity: Yes     Social Drivers of Health     Financial Resource Strain: Patient Declined (11/22/2024)    Overall Financial Resource Strain (CARDIA)     Difficulty of Paying Living Expenses: Patient declined   Food Insecurity: Patient Declined (11/22/2024)    Hunger Vital Sign     Worried About Running Out of Food in the Last Year: Patient declined     Ran Out of Food in the Last Year: Patient declined   Transportation Needs: Patient Declined (11/22/2024)    TRANSPORTATION NEEDS     Transportation : Patient declined   Physical Activity: Unknown (6/18/2024)    Exercise Vital Sign     Days of Exercise per Week: 0 days   Stress: Patient Declined (11/22/2024)    [employer] - Occupational Stress Questionnaire     Feeling of Stress : Patient declined   Housing Stability: Patient Declined (11/22/2024)    Housing Stability Vital Sign     Unable to Pay for Housing in the Last Year: Patient declined     Homeless in the Last Year:  Patient declined       MEDICATIONS:   Current Medications[1]     ALLERGIES:   Review of patient's allergies indicates:   Allergen Reactions    Bee pollens Shortness Of Breath     WASP, BEE, ANT AND CATERPILLER STINGS    Hydrochlorothiazide Shortness Of Breath and Rash    Milk containing products (dairy) Diarrhea    Metoprolol Rash       REVIEW OF SYSTEMS: Denies coughs, colds, fevers, chills. No constipation. Bowel movements are regular. No dysphagia. No weight, appetite or sleep concerns. No behavior concerns. No drooling or difficulty handling oral secretions. No skin lesions.       PHYSICAL EXAMINATION:   VITALS:   Vitals:    05/14/25 1221   BP: (!) 168/84   Pulse: 97       GENERAL: The patient is awake, alert, cooperative, and in no acute distress.   HEENT: Normocephalic, atraumatic. Tracking is in all 4 quadrants. No facial asymmetry. Uvula is midline.   NECK: Supple. No lymphadenopathy. No masses. Full range of motion. No torticollis.   HEART: Appears well perfused. No lower extremity edema.   LUNGS: No increased work of breath.   ABDOMEN: Benign.   NEURO: Cranial nerves II-XII are grossly intact by observation.   EXTREMITIES: Warm, capillary refill less than 2 seconds. No clubbing, cyanosis or edema.   MUSCULOSKELETAL: No focal muscular/limb atrophy/hypertrophy. No leg length discrepancy. No gross deformity.     NEUROMUSCULAR:   Modified Rudolph Scale:  1:  1+:  2: left elbow flexors, left finger flexors  3: left shoulder adductors, left ankle plantarflexors  4:   Appropriate sitting balance.   No dyskinetic or dystonic movements appreciated.   Muscle stretch reflexes are 2+ throughout both upper and lower extremities.   No clonus was elicited at either ankle.    GAIT/DYNAMIC: left flexor synergy pattern with poor heel strike with external rotation of left leg with use of left AFO and quad cane.     Transfers from supine to sit and sit to stand are independent.     ASSESSMENT:   1. Spastic hemiparesis of  left dominant side as late effect of cerebral infarction  Prior authorization Order      2. Left spastic hemiparesis        3. Right-sided cerebrovascular accident (CVA)             Buddy Park is a 74 y.o.-year-old male with a history of right CVA (2020) with late effect left spastic hemiparesis. The following recommendations and plan were discussed in depth with the patient who voiced understanding and was in agreement.     PLAN:   Spasticity:   - we discussed options for management at this time are with oral medication, focal neurotoxin, and focal neurolysis with phenol as follows:   - no response historically to oral meds  - will start management with focal neurolysis injections with phenol to the following nerves:  Left medial pectoral  Left lateral pectoral   Left musculocutaneous  Left tibial    Bracing:    - left rigid AFO, [place]    Equipment:   - quad cane    Therapy:   - PT[facility] at [facility]    I would like to have him  return to clinic for planned LUE/LLE phenol injections.     A copy of today's visit will be made available to Shannon Pool, consulting provider.     46 minutes of total time spent on the encounter, which includes face to face time and non-face to face time preparing to see the patient (eg, review of tests), obtaining and/or reviewing separately obtained history, documenting clinical information in the electronic or other health record, independently interpreting results (not separately reported), communicating results to the patient/family/caregiver, and/or care coordination (not separately reported).        [1]   Current Outpatient Medications:     allopurinoL (ZYLOPRIM) 100 MG tablet, Take 1 tablet (100 mg total) by mouth once daily., Disp: 90 tablet, Rfl: 3    aspirin 81 MG Chew, Take 1 tablet (81 mg total) by mouth once daily., Disp: 90 tablet, Rfl: 3    b complex vitamins tablet, Take 1 tablet by mouth once daily., Disp: , Rfl:     buPROPion (WELLBUTRIN XL) 300 MG 24 hr  tablet, Take 1 tablet (300 mg total) by mouth once daily., Disp: 90 tablet, Rfl: 3    C/sourcherry/celery/grape seed (TART CHERRY ORAL), Take by mouth every evening., Disp: , Rfl:     leg brace Misc, 1 each by Misc.(Non-Drug; Combo Route) route Daily., Disp: 1 each, Rfl: 1    losartan (COZAAR) 25 MG tablet, Take 1 tablet (25 mg total) by mouth once daily. Hold for SBP<100 (Patient taking differently: Take 25 mg by mouth every evening. Hold for SBP<100), Disp: 90 tablet, Rfl: 3    oxyCODONE (ROXICODONE) 5 MG immediate release tablet, Take 1 tablet (5 mg total) by mouth every 6 (six) hours as needed for Pain., Disp: 15 tablet, Rfl: 0    rosuvastatin (CRESTOR) 40 MG Tab, Take 40 mg by mouth every evening., Disp: , Rfl:     traZODone (DESYREL) 50 MG tablet, TAKE 1 TABLET BY MOUTH EVERY EVENING., Disp: 30 tablet, Rfl: 0  No current facility-administered medications for this visit.    Facility-Administered Medications Ordered in Other Visits:     lactated ringers infusion, , Intravenous, Continuous, Demario Yates MD, Last Rate: 0 mL/hr at 10/28/19 1405, New Bag at 12/02/19 2657

## 2025-06-04 ENCOUNTER — CLINICAL SUPPORT (OUTPATIENT)
Dept: PHYSICAL MEDICINE AND REHAB | Facility: CLINIC | Age: 75
End: 2025-06-04
Payer: MEDICARE

## 2025-06-04 VITALS
BODY MASS INDEX: 21.83 KG/M2 | HEART RATE: 68 BPM | WEIGHT: 127.19 LBS | SYSTOLIC BLOOD PRESSURE: 154 MMHG | DIASTOLIC BLOOD PRESSURE: 66 MMHG

## 2025-06-04 DIAGNOSIS — G81.14 LEFT SPASTIC HEMIPARESIS: Primary | ICD-10-CM

## 2025-06-04 PROCEDURE — 99999 PR PBB SHADOW E&M-EST. PATIENT-LVL III: CPT | Mod: PBBFAC,,, | Performed by: PHYSICAL MEDICINE & REHABILITATION

## 2025-06-04 PROCEDURE — 99499 UNLISTED E&M SERVICE: CPT | Mod: S$GLB,,, | Performed by: PHYSICAL MEDICINE & REHABILITATION

## 2025-06-04 PROCEDURE — 64640 INJECTION TREATMENT OF NERVE: CPT | Mod: LT,S$GLB,, | Performed by: PHYSICAL MEDICINE & REHABILITATION

## 2025-06-04 PROCEDURE — 76942 ECHO GUIDE FOR BIOPSY: CPT | Mod: 26,S$GLB,, | Performed by: PHYSICAL MEDICINE & REHABILITATION

## 2025-06-04 RX ORDER — PHENOL 6 %
6 VIAL (ML) INJECTION ONCE
Status: COMPLETED | OUTPATIENT
Start: 2025-06-04 | End: 2025-06-04

## 2025-06-04 RX ADMIN — Medication 6 ML: at 09:06

## 2025-06-23 DIAGNOSIS — I62.00 NONTRAUMATIC SUBDURAL HEMORRHAGE, UNSPECIFIED: Primary | ICD-10-CM

## 2025-06-23 PROBLEM — Z75.8 DISCHARGE PLANNING ISSUES: Status: ACTIVE | Noted: 2025-06-23

## 2025-06-26 DIAGNOSIS — I62.00 NONTRAUMATIC SUBDURAL HEMORRHAGE, UNSPECIFIED: Primary | ICD-10-CM

## 2025-07-03 ENCOUNTER — TELEPHONE (OUTPATIENT)
Dept: CARDIOLOGY | Facility: CLINIC | Age: 75
End: 2025-07-03
Payer: MEDICARE

## 2025-07-22 ENCOUNTER — OFFICE VISIT (OUTPATIENT)
Dept: NEUROSURGERY | Facility: CLINIC | Age: 75
End: 2025-07-22
Payer: MEDICARE

## 2025-07-22 VITALS
RESPIRATION RATE: 18 BRPM | BODY MASS INDEX: 22.02 KG/M2 | DIASTOLIC BLOOD PRESSURE: 91 MMHG | HEART RATE: 87 BPM | WEIGHT: 129 LBS | HEIGHT: 64 IN | SYSTOLIC BLOOD PRESSURE: 168 MMHG

## 2025-07-22 DIAGNOSIS — I62.00 NONTRAUMATIC SUBDURAL HEMORRHAGE, UNSPECIFIED: Primary | ICD-10-CM

## 2025-07-22 PROCEDURE — 3077F SYST BP >= 140 MM HG: CPT | Mod: CPTII,S$GLB,,

## 2025-07-22 PROCEDURE — 3044F HG A1C LEVEL LT 7.0%: CPT | Mod: CPTII,S$GLB,,

## 2025-07-22 PROCEDURE — 1126F AMNT PAIN NOTED NONE PRSNT: CPT | Mod: CPTII,S$GLB,,

## 2025-07-22 PROCEDURE — 99214 OFFICE O/P EST MOD 30 MIN: CPT | Mod: S$GLB,,,

## 2025-07-22 PROCEDURE — 1101F PT FALLS ASSESS-DOCD LE1/YR: CPT | Mod: CPTII,S$GLB,,

## 2025-07-22 PROCEDURE — 4010F ACE/ARB THERAPY RXD/TAKEN: CPT | Mod: CPTII,S$GLB,,

## 2025-07-22 PROCEDURE — 3288F FALL RISK ASSESSMENT DOCD: CPT | Mod: CPTII,S$GLB,,

## 2025-07-22 PROCEDURE — 1159F MED LIST DOCD IN RCRD: CPT | Mod: CPTII,S$GLB,,

## 2025-07-22 PROCEDURE — 3080F DIAST BP >= 90 MM HG: CPT | Mod: CPTII,S$GLB,,

## 2025-07-22 PROCEDURE — 1157F ADVNC CARE PLAN IN RCRD: CPT | Mod: CPTII,S$GLB,,

## 2025-07-22 PROCEDURE — 3008F BODY MASS INDEX DOCD: CPT | Mod: CPTII,S$GLB,,

## 2025-07-22 RX ORDER — ACETAMINOPHEN 325 MG/1
325 TABLET ORAL EVERY 6 HOURS PRN
COMMUNITY
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
## 2025-08-19 ENCOUNTER — OFFICE VISIT (OUTPATIENT)
Dept: PHYSICAL MEDICINE AND REHAB | Facility: CLINIC | Age: 75
End: 2025-08-19
Payer: MEDICARE

## 2025-08-19 VITALS
BODY MASS INDEX: 22.14 KG/M2 | DIASTOLIC BLOOD PRESSURE: 84 MMHG | WEIGHT: 129 LBS | HEART RATE: 86 BPM | SYSTOLIC BLOOD PRESSURE: 164 MMHG

## 2025-08-19 DIAGNOSIS — Z74.09 IMPAIRED MOBILITY AND ACTIVITIES OF DAILY LIVING: ICD-10-CM

## 2025-08-19 DIAGNOSIS — R26.89 IMPAIRED GAIT AND MOBILITY: ICD-10-CM

## 2025-08-19 DIAGNOSIS — I69.352 SPASTIC HEMIPARESIS OF LEFT DOMINANT SIDE AS LATE EFFECT OF CEREBRAL INFARCTION: ICD-10-CM

## 2025-08-19 DIAGNOSIS — I63.9 RIGHT-SIDED CEREBROVASCULAR ACCIDENT (CVA): ICD-10-CM

## 2025-08-19 DIAGNOSIS — Z78.9 IMPAIRED MOBILITY AND ACTIVITIES OF DAILY LIVING: ICD-10-CM

## 2025-08-19 DIAGNOSIS — G81.14 LEFT SPASTIC HEMIPARESIS: Primary | ICD-10-CM

## 2025-08-19 PROCEDURE — 99215 OFFICE O/P EST HI 40 MIN: CPT | Mod: S$GLB,,, | Performed by: NURSE PRACTITIONER

## 2025-08-19 PROCEDURE — 1159F MED LIST DOCD IN RCRD: CPT | Mod: CPTII,S$GLB,, | Performed by: NURSE PRACTITIONER

## 2025-08-19 PROCEDURE — 1157F ADVNC CARE PLAN IN RCRD: CPT | Mod: CPTII,S$GLB,, | Performed by: NURSE PRACTITIONER

## 2025-08-19 PROCEDURE — 99999 PR PBB SHADOW E&M-EST. PATIENT-LVL IV: CPT | Mod: PBBFAC,,, | Performed by: NURSE PRACTITIONER

## 2025-08-19 PROCEDURE — 3044F HG A1C LEVEL LT 7.0%: CPT | Mod: CPTII,S$GLB,, | Performed by: NURSE PRACTITIONER

## 2025-08-19 PROCEDURE — 1160F RVW MEDS BY RX/DR IN RCRD: CPT | Mod: CPTII,S$GLB,, | Performed by: NURSE PRACTITIONER

## 2025-08-19 PROCEDURE — 4010F ACE/ARB THERAPY RXD/TAKEN: CPT | Mod: CPTII,S$GLB,, | Performed by: NURSE PRACTITIONER

## 2025-08-19 PROCEDURE — 3008F BODY MASS INDEX DOCD: CPT | Mod: CPTII,S$GLB,, | Performed by: NURSE PRACTITIONER

## 2025-08-19 PROCEDURE — 1126F AMNT PAIN NOTED NONE PRSNT: CPT | Mod: CPTII,S$GLB,, | Performed by: NURSE PRACTITIONER

## 2025-08-19 PROCEDURE — 3077F SYST BP >= 140 MM HG: CPT | Mod: CPTII,S$GLB,, | Performed by: NURSE PRACTITIONER

## 2025-08-19 PROCEDURE — 3079F DIAST BP 80-89 MM HG: CPT | Mod: CPTII,S$GLB,, | Performed by: NURSE PRACTITIONER

## 2025-09-02 ENCOUNTER — CLINICAL SUPPORT (OUTPATIENT)
Dept: PHYSICAL MEDICINE AND REHAB | Facility: CLINIC | Age: 75
End: 2025-09-02
Payer: MEDICARE

## 2025-09-02 VITALS
DIASTOLIC BLOOD PRESSURE: 98 MMHG | WEIGHT: 129.44 LBS | HEART RATE: 91 BPM | SYSTOLIC BLOOD PRESSURE: 160 MMHG | BODY MASS INDEX: 22.21 KG/M2

## 2025-09-02 DIAGNOSIS — Z78.9 IMPAIRED MOBILITY AND ACTIVITIES OF DAILY LIVING: ICD-10-CM

## 2025-09-02 DIAGNOSIS — I69.352 SPASTIC HEMIPARESIS OF LEFT DOMINANT SIDE AS LATE EFFECT OF CEREBRAL INFARCTION: ICD-10-CM

## 2025-09-02 DIAGNOSIS — G81.14 LEFT SPASTIC HEMIPARESIS: Primary | ICD-10-CM

## 2025-09-02 DIAGNOSIS — Z74.09 IMPAIRED MOBILITY AND ACTIVITIES OF DAILY LIVING: ICD-10-CM

## 2025-09-02 PROCEDURE — 95874 GUIDE NERV DESTR NEEDLE EMG: CPT | Mod: S$GLB,,, | Performed by: NURSE PRACTITIONER

## 2025-09-02 PROCEDURE — 99499 UNLISTED E&M SERVICE: CPT | Mod: S$GLB,,, | Performed by: NURSE PRACTITIONER

## 2025-09-02 PROCEDURE — 99999 PR PBB SHADOW E&M-EST. PATIENT-LVL III: CPT | Mod: PBBFAC,,, | Performed by: NURSE PRACTITIONER

## 2025-09-02 PROCEDURE — 64642 CHEMODENERV 1 EXTREMITY 1-4: CPT | Mod: S$GLB,,, | Performed by: NURSE PRACTITIONER

## (undated) DEVICE — GLOVE SURG ULTRA TOUCH 7.5

## (undated) DEVICE — SOL IRR NACL .9% 3000ML

## (undated) DEVICE — SEE MEDLINE ITEM 157185

## (undated) DEVICE — CONTAINER SPECIMEN STRL 4OZ

## (undated) DEVICE — SYR ONLY LUER LOCK 20CC

## (undated) DEVICE — LUBRICANT SURGILUBE 2 OZ

## (undated) DEVICE — SET IRR URLGY 2LINE UNIV SPIKE

## (undated) DEVICE — SOL IRR WATER STRL 3000 ML

## (undated) DEVICE — GLOVE SURG ULTRA TOUCH 7

## (undated) DEVICE — SLEEVE SCD EXPRESS CALF MEDIUM

## (undated) DEVICE — SEE MEDLINE ITEM 157117

## (undated) DEVICE — SOL WATER STRL IRR 1000ML

## (undated) DEVICE — SYR 10CC LUER LOCK

## (undated) DEVICE — Device

## (undated) DEVICE — SEE MEDLINE ITEM 152487

## (undated) DEVICE — SPONGE SUPER KERLIX 6X6.75IN

## (undated) DEVICE — SOL 9P NACL IRR PIC IL

## (undated) DEVICE — SYR 50ML CATH TIP

## (undated) DEVICE — SCRUB HIBICLENS 4% CHG 4OZ

## (undated) DEVICE — GUIDE WIRE MOTION .035 X 150CM